# Patient Record
Sex: FEMALE | Race: WHITE | Employment: OTHER | URBAN - METROPOLITAN AREA
[De-identification: names, ages, dates, MRNs, and addresses within clinical notes are randomized per-mention and may not be internally consistent; named-entity substitution may affect disease eponyms.]

---

## 2017-02-14 ENCOUNTER — HOSPITAL ENCOUNTER (EMERGENCY)
Age: 57
Discharge: HOME | End: 2017-02-15
Attending: EMERGENCY MEDICINE

## 2017-02-14 ENCOUNTER — APPOINTMENT (OUTPATIENT)
Dept: GENERAL RADIOLOGY | Age: 57
End: 2017-02-14

## 2017-02-14 DIAGNOSIS — J44.1 COPD EXACERBATION (HCC): Primary | ICD-10-CM

## 2017-02-14 LAB
ABSOLUTE EOS #: 0.1 K/UL (ref 0–0.4)
ABSOLUTE LYMPH #: 1.4 K/UL (ref 1–4.8)
ABSOLUTE MONO #: 0.4 K/UL (ref 0.1–1.2)
ANION GAP SERPL CALCULATED.3IONS-SCNC: 15 MMOL/L (ref 9–17)
BASOPHILS # BLD: 0 % (ref 0–2)
BASOPHILS ABSOLUTE: 0 K/UL (ref 0–0.2)
BUN BLDV-MCNC: 14 MG/DL (ref 6–20)
BUN/CREAT BLD: ABNORMAL (ref 9–20)
CALCIUM SERPL-MCNC: 9.4 MG/DL (ref 8.6–10.4)
CHLORIDE BLD-SCNC: 101 MMOL/L (ref 98–107)
CO2: 24 MMOL/L (ref 20–31)
CREAT SERPL-MCNC: 0.95 MG/DL (ref 0.5–0.9)
DIFFERENTIAL TYPE: ABNORMAL
EOSINOPHILS RELATIVE PERCENT: 1 % (ref 1–4)
GFR AFRICAN AMERICAN: >60 ML/MIN
GFR NON-AFRICAN AMERICAN: >60 ML/MIN
GFR SERPL CREATININE-BSD FRML MDRD: ABNORMAL ML/MIN/{1.73_M2}
GFR SERPL CREATININE-BSD FRML MDRD: ABNORMAL ML/MIN/{1.73_M2}
GLUCOSE BLD-MCNC: 206 MG/DL (ref 70–99)
HCT VFR BLD CALC: 41.1 % (ref 36–46)
HEMOGLOBIN: 13.7 G/DL (ref 12–16)
LYMPHOCYTES # BLD: 18 % (ref 24–44)
MCH RBC QN AUTO: 27.5 PG (ref 26–34)
MCHC RBC AUTO-ENTMCNC: 33.2 G/DL (ref 31–37)
MCV RBC AUTO: 82.8 FL (ref 80–100)
MONOCYTES # BLD: 5 % (ref 2–11)
PDW BLD-RTO: 15.6 % (ref 12.5–15.4)
PLATELET # BLD: 216 K/UL (ref 140–450)
PLATELET ESTIMATE: ABNORMAL
PMV BLD AUTO: 8.9 FL (ref 6–12)
POC TROPONIN I: 0 NG/ML (ref 0–0.1)
POC TROPONIN INTERP: NORMAL
POTASSIUM SERPL-SCNC: 3.4 MMOL/L (ref 3.7–5.3)
RBC # BLD: 4.97 M/UL (ref 4–5.2)
RBC # BLD: ABNORMAL 10*6/UL
SEG NEUTROPHILS: 76 % (ref 36–66)
SEGMENTED NEUTROPHILS ABSOLUTE COUNT: 5.9 K/UL (ref 1.8–7.7)
SODIUM BLD-SCNC: 140 MMOL/L (ref 135–144)
WBC # BLD: 7.8 K/UL (ref 3.5–11)
WBC # BLD: ABNORMAL 10*3/UL

## 2017-02-14 PROCEDURE — 6360000002 HC RX W HCPCS: Performed by: EMERGENCY MEDICINE

## 2017-02-14 PROCEDURE — 85025 COMPLETE CBC W/AUTO DIFF WBC: CPT

## 2017-02-14 PROCEDURE — 6370000000 HC RX 637 (ALT 250 FOR IP): Performed by: EMERGENCY MEDICINE

## 2017-02-14 PROCEDURE — 84484 ASSAY OF TROPONIN QUANT: CPT

## 2017-02-14 PROCEDURE — 99285 EMERGENCY DEPT VISIT HI MDM: CPT

## 2017-02-14 PROCEDURE — 94640 AIRWAY INHALATION TREATMENT: CPT

## 2017-02-14 PROCEDURE — 80048 BASIC METABOLIC PNL TOTAL CA: CPT

## 2017-02-14 PROCEDURE — 93005 ELECTROCARDIOGRAM TRACING: CPT

## 2017-02-14 PROCEDURE — 71020 XR CHEST STANDARD TWO VW: CPT

## 2017-02-14 RX ORDER — SULFAMETHOXAZOLE AND TRIMETHOPRIM 800; 160 MG/1; MG/1
1 TABLET ORAL ONCE
Status: COMPLETED | OUTPATIENT
Start: 2017-02-14 | End: 2017-02-14

## 2017-02-14 RX ORDER — METHYLPREDNISOLONE SODIUM SUCCINATE 125 MG/2ML
125 INJECTION, POWDER, LYOPHILIZED, FOR SOLUTION INTRAMUSCULAR; INTRAVENOUS ONCE
Status: COMPLETED | OUTPATIENT
Start: 2017-02-14 | End: 2017-02-14

## 2017-02-14 RX ORDER — IBUPROFEN 800 MG/1
800 TABLET ORAL ONCE
Status: COMPLETED | OUTPATIENT
Start: 2017-02-14 | End: 2017-02-14

## 2017-02-14 RX ADMIN — METHYLPREDNISOLONE SODIUM SUCCINATE 125 MG: 125 INJECTION, POWDER, FOR SOLUTION INTRAMUSCULAR; INTRAVENOUS at 22:43

## 2017-02-14 RX ADMIN — IPRATROPIUM BROMIDE 0.5 MG: 0.5 SOLUTION RESPIRATORY (INHALATION) at 22:27

## 2017-02-14 RX ADMIN — SULFAMETHOXAZOLE AND TRIMETHOPRIM 1 TABLET: 800; 160 TABLET ORAL at 22:43

## 2017-02-14 RX ADMIN — ALBUTEROL SULFATE 5 MG: 5 SOLUTION RESPIRATORY (INHALATION) at 22:28

## 2017-02-14 RX ADMIN — IBUPROFEN 800 MG: 800 TABLET, FILM COATED ORAL at 22:43

## 2017-02-14 ASSESSMENT — ENCOUNTER SYMPTOMS
NAUSEA: 1
SHORTNESS OF BREATH: 1
DIARRHEA: 0
WHEEZING: 1
CONSTIPATION: 0
COUGH: 1
ABDOMINAL PAIN: 0
CHEST TIGHTNESS: 1
EYE PAIN: 0
VOMITING: 0
RHINORRHEA: 1

## 2017-02-14 ASSESSMENT — PAIN DESCRIPTION - DESCRIPTORS: DESCRIPTORS: ACHING

## 2017-02-14 ASSESSMENT — PAIN DESCRIPTION - LOCATION: LOCATION: CHEST;BACK

## 2017-02-14 ASSESSMENT — PAIN DESCRIPTION - PAIN TYPE: TYPE: ACUTE PAIN

## 2017-02-14 ASSESSMENT — PAIN SCALES - GENERAL
PAINLEVEL_OUTOF10: 6
PAINLEVEL_OUTOF10: 6

## 2017-02-14 ASSESSMENT — PAIN DESCRIPTION - FREQUENCY: FREQUENCY: INTERMITTENT

## 2017-02-14 ASSESSMENT — PAIN DESCRIPTION - ORIENTATION: ORIENTATION: MID;UPPER

## 2017-02-15 VITALS
RESPIRATION RATE: 18 BRPM | TEMPERATURE: 98.1 F | BODY MASS INDEX: 37.67 KG/M2 | SYSTOLIC BLOOD PRESSURE: 177 MMHG | DIASTOLIC BLOOD PRESSURE: 107 MMHG | HEIGHT: 67 IN | OXYGEN SATURATION: 95 % | HEART RATE: 102 BPM | WEIGHT: 240 LBS

## 2017-02-15 LAB
POC TROPONIN I: 0 NG/ML (ref 0–0.1)
POC TROPONIN INTERP: NORMAL

## 2017-02-15 PROCEDURE — 96374 THER/PROPH/DIAG INJ IV PUSH: CPT

## 2017-02-15 PROCEDURE — 84484 ASSAY OF TROPONIN QUANT: CPT

## 2017-02-15 RX ORDER — PREDNISONE 20 MG/1
40 TABLET ORAL DAILY
Qty: 10 TABLET | Refills: 0 | Status: SHIPPED | OUTPATIENT
Start: 2017-02-15 | End: 2017-02-25

## 2017-02-15 RX ORDER — SULFAMETHOXAZOLE AND TRIMETHOPRIM 800; 160 MG/1; MG/1
1 TABLET ORAL 2 TIMES DAILY
Qty: 14 TABLET | Refills: 0 | Status: SHIPPED | OUTPATIENT
Start: 2017-02-15 | End: 2017-02-22

## 2017-02-16 LAB
EKG ATRIAL RATE: 93 BPM
EKG P AXIS: 42 DEGREES
EKG P-R INTERVAL: 148 MS
EKG Q-T INTERVAL: 380 MS
EKG QRS DURATION: 76 MS
EKG QTC CALCULATION (BAZETT): 472 MS
EKG R AXIS: 62 DEGREES
EKG T AXIS: 48 DEGREES
EKG VENTRICULAR RATE: 93 BPM

## 2018-01-09 ENCOUNTER — HOSPITAL ENCOUNTER (EMERGENCY)
Age: 58
Discharge: HOME | End: 2018-01-09
Attending: EMERGENCY MEDICINE

## 2018-01-09 ENCOUNTER — APPOINTMENT (OUTPATIENT)
Dept: GENERAL RADIOLOGY | Age: 58
End: 2018-01-09

## 2018-01-09 VITALS
HEART RATE: 78 BPM | HEIGHT: 68 IN | OXYGEN SATURATION: 99 % | WEIGHT: 245 LBS | BODY MASS INDEX: 37.13 KG/M2 | TEMPERATURE: 97.9 F | RESPIRATION RATE: 18 BRPM | DIASTOLIC BLOOD PRESSURE: 86 MMHG | SYSTOLIC BLOOD PRESSURE: 148 MMHG

## 2018-01-09 DIAGNOSIS — R07.9 CHEST PAIN, UNSPECIFIED TYPE: Primary | ICD-10-CM

## 2018-01-09 DIAGNOSIS — R73.9 HYPERGLYCEMIA: ICD-10-CM

## 2018-01-09 DIAGNOSIS — M62.838 SPASM OF MUSCLE: ICD-10-CM

## 2018-01-09 LAB
ABSOLUTE EOS #: 0.11 K/UL (ref 0–0.44)
ABSOLUTE IMMATURE GRANULOCYTE: 0.03 K/UL (ref 0–0.3)
ABSOLUTE LYMPH #: 1.76 K/UL (ref 1.1–3.7)
ABSOLUTE MONO #: 0.48 K/UL (ref 0.1–1.2)
ANION GAP SERPL CALCULATED.3IONS-SCNC: 14 MMOL/L (ref 9–17)
BASOPHILS # BLD: 1 % (ref 0–2)
BASOPHILS ABSOLUTE: 0.07 K/UL (ref 0–0.2)
BUN BLDV-MCNC: 17 MG/DL (ref 6–20)
BUN/CREAT BLD: ABNORMAL (ref 9–20)
CALCIUM SERPL-MCNC: 9.2 MG/DL (ref 8.6–10.4)
CHLORIDE BLD-SCNC: 100 MMOL/L (ref 98–107)
CO2: 25 MMOL/L (ref 20–31)
CREAT SERPL-MCNC: 1.05 MG/DL (ref 0.5–0.9)
DIFFERENTIAL TYPE: ABNORMAL
EKG ATRIAL RATE: 76 BPM
EKG P AXIS: 28 DEGREES
EKG P-R INTERVAL: 164 MS
EKG Q-T INTERVAL: 408 MS
EKG QRS DURATION: 76 MS
EKG QTC CALCULATION (BAZETT): 459 MS
EKG R AXIS: 57 DEGREES
EKG T AXIS: 67 DEGREES
EKG VENTRICULAR RATE: 76 BPM
EOSINOPHILS RELATIVE PERCENT: 1 % (ref 1–4)
GFR AFRICAN AMERICAN: >60 ML/MIN
GFR NON-AFRICAN AMERICAN: 54 ML/MIN
GFR SERPL CREATININE-BSD FRML MDRD: ABNORMAL ML/MIN/{1.73_M2}
GFR SERPL CREATININE-BSD FRML MDRD: ABNORMAL ML/MIN/{1.73_M2}
GLUCOSE BLD-MCNC: 143 MG/DL (ref 70–99)
HCT VFR BLD CALC: 42.7 % (ref 36.3–47.1)
HEMOGLOBIN: 13.3 G/DL (ref 11.9–15.1)
IMMATURE GRANULOCYTES: 0 %
LYMPHOCYTES # BLD: 20 % (ref 24–43)
MCH RBC QN AUTO: 27.3 PG (ref 25.2–33.5)
MCHC RBC AUTO-ENTMCNC: 31.1 G/DL (ref 28.4–34.8)
MCV RBC AUTO: 87.5 FL (ref 82.6–102.9)
MONOCYTES # BLD: 6 % (ref 3–12)
PDW BLD-RTO: 14 % (ref 11.8–14.4)
PLATELET # BLD: 241 K/UL (ref 138–453)
PLATELET ESTIMATE: ABNORMAL
PMV BLD AUTO: 10.6 FL (ref 8.1–13.5)
POC TROPONIN I: 0.03 NG/ML (ref 0–0.1)
POC TROPONIN I: 0.04 NG/ML (ref 0–0.1)
POC TROPONIN INTERP: NORMAL
POC TROPONIN INTERP: NORMAL
POTASSIUM SERPL-SCNC: 4 MMOL/L (ref 3.7–5.3)
RBC # BLD: 4.88 M/UL (ref 3.95–5.11)
RBC # BLD: ABNORMAL 10*6/UL
SEG NEUTROPHILS: 72 % (ref 36–65)
SEGMENTED NEUTROPHILS ABSOLUTE COUNT: 6.35 K/UL (ref 1.5–8.1)
SODIUM BLD-SCNC: 139 MMOL/L (ref 135–144)
WBC # BLD: 8.8 K/UL (ref 3.5–11.3)
WBC # BLD: ABNORMAL 10*3/UL

## 2018-01-09 PROCEDURE — 84484 ASSAY OF TROPONIN QUANT: CPT

## 2018-01-09 PROCEDURE — 71046 X-RAY EXAM CHEST 2 VIEWS: CPT

## 2018-01-09 PROCEDURE — 96375 TX/PRO/DX INJ NEW DRUG ADDON: CPT

## 2018-01-09 PROCEDURE — 96374 THER/PROPH/DIAG INJ IV PUSH: CPT

## 2018-01-09 PROCEDURE — 6360000002 HC RX W HCPCS: Performed by: EMERGENCY MEDICINE

## 2018-01-09 PROCEDURE — 80048 BASIC METABOLIC PNL TOTAL CA: CPT

## 2018-01-09 PROCEDURE — 93005 ELECTROCARDIOGRAM TRACING: CPT

## 2018-01-09 PROCEDURE — 99285 EMERGENCY DEPT VISIT HI MDM: CPT

## 2018-01-09 PROCEDURE — 94640 AIRWAY INHALATION TREATMENT: CPT

## 2018-01-09 PROCEDURE — 85025 COMPLETE CBC W/AUTO DIFF WBC: CPT

## 2018-01-09 RX ORDER — LORAZEPAM 2 MG/ML
2 INJECTION INTRAMUSCULAR ONCE
Status: COMPLETED | OUTPATIENT
Start: 2018-01-09 | End: 2018-01-09

## 2018-01-09 RX ORDER — CYCLOBENZAPRINE HCL 10 MG
10 TABLET ORAL 3 TIMES DAILY PRN
Qty: 30 TABLET | Refills: 0 | Status: SHIPPED | OUTPATIENT
Start: 2018-01-09 | End: 2018-01-19

## 2018-01-09 RX ORDER — ALBUTEROL SULFATE 2.5 MG/3ML
5 SOLUTION RESPIRATORY (INHALATION)
Status: DISCONTINUED | OUTPATIENT
Start: 2018-01-09 | End: 2018-01-09 | Stop reason: HOSPADM

## 2018-01-09 RX ORDER — DIAZEPAM 5 MG/ML
5 INJECTION, SOLUTION INTRAMUSCULAR; INTRAVENOUS ONCE
Status: DISCONTINUED | OUTPATIENT
Start: 2018-01-09 | End: 2018-01-09

## 2018-01-09 RX ORDER — FENTANYL CITRATE 50 UG/ML
50 INJECTION, SOLUTION INTRAMUSCULAR; INTRAVENOUS ONCE
Status: COMPLETED | OUTPATIENT
Start: 2018-01-09 | End: 2018-01-09

## 2018-01-09 RX ORDER — ALBUTEROL SULFATE 90 UG/1
2 AEROSOL, METERED RESPIRATORY (INHALATION)
Status: DISCONTINUED | OUTPATIENT
Start: 2018-01-09 | End: 2018-01-09

## 2018-01-09 RX ORDER — ACETAMINOPHEN 500 MG
500 TABLET ORAL EVERY 6 HOURS PRN
Qty: 60 TABLET | Refills: 2 | Status: SHIPPED | OUTPATIENT
Start: 2018-01-09

## 2018-01-09 RX ORDER — IBUPROFEN 600 MG/1
600 TABLET ORAL EVERY 6 HOURS PRN
Qty: 60 TABLET | Refills: 2 | Status: SHIPPED | OUTPATIENT
Start: 2018-01-09 | End: 2019-07-19

## 2018-01-09 RX ORDER — IPRATROPIUM BROMIDE AND ALBUTEROL SULFATE 2.5; .5 MG/3ML; MG/3ML
1 SOLUTION RESPIRATORY (INHALATION)
Status: DISCONTINUED | OUTPATIENT
Start: 2018-01-09 | End: 2018-01-09

## 2018-01-09 RX ADMIN — ALBUTEROL SULFATE 5 MG: 5 SOLUTION RESPIRATORY (INHALATION) at 11:47

## 2018-01-09 RX ADMIN — IPRATROPIUM BROMIDE 0.5 MG: 0.5 SOLUTION RESPIRATORY (INHALATION) at 11:47

## 2018-01-09 RX ADMIN — FENTANYL CITRATE 50 MCG: 50 INJECTION, SOLUTION INTRAMUSCULAR; INTRAVENOUS at 11:40

## 2018-01-09 RX ADMIN — LORAZEPAM 2 MG: 2 INJECTION INTRAMUSCULAR; INTRAVENOUS at 13:01

## 2018-01-09 RX ADMIN — ALBUTEROL SULFATE 5 MG: 5 SOLUTION RESPIRATORY (INHALATION) at 12:03

## 2018-01-09 ASSESSMENT — ENCOUNTER SYMPTOMS
COLOR CHANGE: 0
NAUSEA: 0
SHORTNESS OF BREATH: 1
RHINORRHEA: 0
COUGH: 0
VOMITING: 0
ABDOMINAL PAIN: 0
WHEEZING: 0

## 2018-01-09 ASSESSMENT — HEART SCORE: ECG: 1

## 2018-01-09 ASSESSMENT — PAIN SCALES - GENERAL
PAINLEVEL_OUTOF10: 7
PAINLEVEL_OUTOF10: 7

## 2018-03-16 ENCOUNTER — HOSPITAL ENCOUNTER (EMERGENCY)
Age: 58
Discharge: HOME | End: 2018-03-16
Attending: EMERGENCY MEDICINE

## 2018-03-16 ENCOUNTER — APPOINTMENT (OUTPATIENT)
Dept: GENERAL RADIOLOGY | Age: 58
End: 2018-03-16

## 2018-03-16 VITALS
HEART RATE: 93 BPM | DIASTOLIC BLOOD PRESSURE: 69 MMHG | WEIGHT: 245 LBS | OXYGEN SATURATION: 94 % | TEMPERATURE: 98.1 F | BODY MASS INDEX: 37.25 KG/M2 | RESPIRATION RATE: 25 BRPM | SYSTOLIC BLOOD PRESSURE: 156 MMHG

## 2018-03-16 DIAGNOSIS — F17.200 SMOKER: ICD-10-CM

## 2018-03-16 DIAGNOSIS — J45.909 UNCOMPLICATED ASTHMA, UNSPECIFIED ASTHMA SEVERITY, UNSPECIFIED WHETHER PERSISTENT: Primary | ICD-10-CM

## 2018-03-16 DIAGNOSIS — I10 HYPERTENSION, UNSPECIFIED TYPE: ICD-10-CM

## 2018-03-16 LAB
ABSOLUTE EOS #: 0.07 K/UL (ref 0–0.44)
ABSOLUTE IMMATURE GRANULOCYTE: 0.04 K/UL (ref 0–0.3)
ABSOLUTE LYMPH #: 2.13 K/UL (ref 1.1–3.7)
ABSOLUTE MONO #: 0.5 K/UL (ref 0.1–1.2)
ANION GAP SERPL CALCULATED.3IONS-SCNC: 13 MMOL/L (ref 9–17)
BASOPHILS # BLD: 1 % (ref 0–2)
BASOPHILS ABSOLUTE: 0.05 K/UL (ref 0–0.2)
BUN BLDV-MCNC: 17 MG/DL (ref 6–20)
BUN/CREAT BLD: ABNORMAL (ref 9–20)
CALCIUM SERPL-MCNC: 9.4 MG/DL (ref 8.6–10.4)
CHLORIDE BLD-SCNC: 101 MMOL/L (ref 98–107)
CO2: 25 MMOL/L (ref 20–31)
CREAT SERPL-MCNC: 0.91 MG/DL (ref 0.5–0.9)
DIFFERENTIAL TYPE: ABNORMAL
EKG ATRIAL RATE: 72 BPM
EKG P AXIS: 27 DEGREES
EKG P-R INTERVAL: 168 MS
EKG Q-T INTERVAL: 410 MS
EKG QRS DURATION: 80 MS
EKG QTC CALCULATION (BAZETT): 448 MS
EKG R AXIS: 52 DEGREES
EKG T AXIS: 21 DEGREES
EKG VENTRICULAR RATE: 72 BPM
EOSINOPHILS RELATIVE PERCENT: 1 % (ref 1–4)
GFR AFRICAN AMERICAN: >60 ML/MIN
GFR NON-AFRICAN AMERICAN: >60 ML/MIN
GFR SERPL CREATININE-BSD FRML MDRD: ABNORMAL ML/MIN/{1.73_M2}
GFR SERPL CREATININE-BSD FRML MDRD: ABNORMAL ML/MIN/{1.73_M2}
GLUCOSE BLD-MCNC: 135 MG/DL (ref 70–99)
HCT VFR BLD CALC: 39.2 % (ref 36.3–47.1)
HEMOGLOBIN: 12.6 G/DL (ref 11.9–15.1)
IMMATURE GRANULOCYTES: 1 %
LYMPHOCYTES # BLD: 26 % (ref 24–43)
MCH RBC QN AUTO: 27.3 PG (ref 25.2–33.5)
MCHC RBC AUTO-ENTMCNC: 32.1 G/DL (ref 28.4–34.8)
MCV RBC AUTO: 84.8 FL (ref 82.6–102.9)
MONOCYTES # BLD: 6 % (ref 3–12)
NRBC AUTOMATED: 0 PER 100 WBC
PDW BLD-RTO: 13.8 % (ref 11.8–14.4)
PLATELET # BLD: 236 K/UL (ref 138–453)
PLATELET ESTIMATE: ABNORMAL
PMV BLD AUTO: 12 FL (ref 8.1–13.5)
POC TROPONIN I: 0 NG/ML (ref 0–0.1)
POC TROPONIN I: 0 NG/ML (ref 0–0.1)
POC TROPONIN INTERP: NORMAL
POC TROPONIN INTERP: NORMAL
POTASSIUM SERPL-SCNC: 4 MMOL/L (ref 3.7–5.3)
RBC # BLD: 4.62 M/UL (ref 3.95–5.11)
RBC # BLD: ABNORMAL 10*6/UL
SEG NEUTROPHILS: 65 % (ref 36–65)
SEGMENTED NEUTROPHILS ABSOLUTE COUNT: 5.27 K/UL (ref 1.5–8.1)
SODIUM BLD-SCNC: 139 MMOL/L (ref 135–144)
WBC # BLD: 8.1 K/UL (ref 3.5–11.3)
WBC # BLD: ABNORMAL 10*3/UL

## 2018-03-16 PROCEDURE — 94640 AIRWAY INHALATION TREATMENT: CPT

## 2018-03-16 PROCEDURE — 71046 X-RAY EXAM CHEST 2 VIEWS: CPT

## 2018-03-16 PROCEDURE — 96374 THER/PROPH/DIAG INJ IV PUSH: CPT

## 2018-03-16 PROCEDURE — 6360000002 HC RX W HCPCS: Performed by: EMERGENCY MEDICINE

## 2018-03-16 PROCEDURE — 80048 BASIC METABOLIC PNL TOTAL CA: CPT

## 2018-03-16 PROCEDURE — 93005 ELECTROCARDIOGRAM TRACING: CPT

## 2018-03-16 PROCEDURE — 84484 ASSAY OF TROPONIN QUANT: CPT

## 2018-03-16 PROCEDURE — 94664 DEMO&/EVAL PT USE INHALER: CPT

## 2018-03-16 PROCEDURE — 85025 COMPLETE CBC W/AUTO DIFF WBC: CPT

## 2018-03-16 PROCEDURE — 6370000000 HC RX 637 (ALT 250 FOR IP): Performed by: EMERGENCY MEDICINE

## 2018-03-16 PROCEDURE — G0384 LEV 5 HOSP TYPE B ED VISIT: HCPCS

## 2018-03-16 RX ORDER — ALBUTEROL SULFATE 2.5 MG/3ML
5 SOLUTION RESPIRATORY (INHALATION)
Status: DISCONTINUED | OUTPATIENT
Start: 2018-03-16 | End: 2018-03-16 | Stop reason: HOSPADM

## 2018-03-16 RX ORDER — ALBUTEROL SULFATE 90 UG/1
2 AEROSOL, METERED RESPIRATORY (INHALATION)
Status: DISCONTINUED | OUTPATIENT
Start: 2018-03-16 | End: 2018-03-16 | Stop reason: HOSPADM

## 2018-03-16 RX ORDER — BENZONATATE 100 MG/1
100 CAPSULE ORAL ONCE
Status: COMPLETED | OUTPATIENT
Start: 2018-03-16 | End: 2018-03-16

## 2018-03-16 RX ORDER — DEXAMETHASONE SODIUM PHOSPHATE 10 MG/ML
10 INJECTION INTRAMUSCULAR; INTRAVENOUS ONCE
Status: COMPLETED | OUTPATIENT
Start: 2018-03-16 | End: 2018-03-16

## 2018-03-16 RX ORDER — IPRATROPIUM BROMIDE AND ALBUTEROL SULFATE 2.5; .5 MG/3ML; MG/3ML
1 SOLUTION RESPIRATORY (INHALATION)
Status: DISCONTINUED | OUTPATIENT
Start: 2018-03-16 | End: 2018-03-16 | Stop reason: HOSPADM

## 2018-03-16 RX ORDER — BENZONATATE 100 MG/1
100 CAPSULE ORAL 3 TIMES DAILY PRN
Qty: 30 CAPSULE | Refills: 0 | Status: SHIPPED | OUTPATIENT
Start: 2018-03-16 | End: 2018-03-23

## 2018-03-16 RX ADMIN — DEXAMETHASONE SODIUM PHOSPHATE 10 MG: 10 INJECTION INTRAMUSCULAR; INTRAVENOUS at 18:50

## 2018-03-16 RX ADMIN — ALBUTEROL SULFATE 5 MG: 5 SOLUTION RESPIRATORY (INHALATION) at 20:30

## 2018-03-16 RX ADMIN — ALBUTEROL SULFATE 5 MG: 5 SOLUTION RESPIRATORY (INHALATION) at 18:55

## 2018-03-16 RX ADMIN — BENZONATATE 100 MG: 100 CAPSULE ORAL at 18:49

## 2018-03-16 RX ADMIN — IPRATROPIUM BROMIDE 0.5 MG: 0.5 SOLUTION RESPIRATORY (INHALATION) at 20:30

## 2018-03-16 RX ADMIN — IPRATROPIUM BROMIDE 0.5 MG: 0.5 SOLUTION RESPIRATORY (INHALATION) at 18:55

## 2018-03-16 NOTE — ED NOTES
Patient updated, denies complaints  Will continue to monitor  Call light within reach         Zhen Martínez RN  03/16/18 [id number]

## 2018-03-16 NOTE — ED PROVIDER NOTES
[facility]); and PAD (peripheral artery disease) ([facility] [address] has a past surgical history that includes Coronary angioplasty with stent; Tonsillectomy; and Inner ear surgery (Right). Social History     Social History    Marital status:      Spouse name: N/A    Number of children: N/A    Years of education: N/A     Occupational History    Not on file. Social History Main Topics    Smoking status: Current Every Day Smoker     Packs/day: 0.50     Types: Cigarettes    Smokeless tobacco: Current User    Alcohol use No    Drug use: Yes     Types: Marijuana    Sexual activity: No     Other Topics Concern    Not on file     Social History Narrative    No narrative on file       History reviewed. No pertinent family history. Allergies:  Codeine and Morphine    Home Medications:  Prior to Admission medications    Medication Sig Start Date End Date Taking? Authorizing Provider   albuterol (PROVENTIL) (5 MG/ML) 0.5% nebulizer solution Take 0.5 mLs by nebulization every 6 hours as needed for Wheezing 3/16/18  Yes Jemal Dominguez MD   tiotropium (SPIRIVA) 18 MCG inhalation capsule Inhale 1 capsule into the lungs daily Pt told that she was using this medication at home. 3/16/18  Yes Jemal Dominguez MD   albuterol-ipratropium (COMBIVENT RESPIMAT)  MCG/ACT AERS inhaler Inhale 1 puff into the lungs every 6 hours 3/16/18  Yes Jemal Dominguez MD   benzonatate (TESSALON PERLES) 100 MG capsule Take 1 capsule by mouth 3 times daily as needed for Cough 3/16/18 3/23/18 Yes Jemal Dominguez MD   ibuprofen (ADVIL;MOTRIN) 600 MG tablet Take 1 tablet by mouth every 6 hours as needed for Pain 1/9/18   Robert Sharpe MD   acetaminophen (TYLENOL) 500 MG tablet Take 1 tablet by mouth every 6 hours as needed for Pain 1/9/18   Robert Sharpe MD   aspirin 325 MG tablet Take 325 mg by mouth daily. Historical Provider, MD   lisinopril (PRINIVIL;ZESTRIL) 40 MG tablet Take 40 mg by mouth daily.     Historical Provider, MD Initiate ED Aerosol Protocol    POCT troponin    POCT troponin    POCT troponin    EKG 12 Lead    Insert peripheral IV       MEDICATIONS ORDERED:  Orders Placed This Encounter   Medications    dexamethasone (DECADRON) injection 10 mg    benzonatate (TESSALON) capsule 100 mg    DISCONTD: albuterol (PROVENTIL) nebulizer solution 5 mg    DISCONTD: ipratropium-albuterol (DUONEB) nebulizer solution 1 ampule    DISCONTD: albuterol (PROVENTIL) nebulizer solution 5 mg    DISCONTD: albuterol sulfate  (90 Base) MCG/ACT inhaler 2 puff    DISCONTD: albuterol sulfate  (90 Base) MCG/ACT inhaler 2 puff    DISCONTD: ipratropium (ATROVENT HFA) 17 MCG/ACT inhaler 2 puff    DISCONTD: ipratropium (ATROVENT) 0.02 % nebulizer solution 0.5 mg    albuterol (PROVENTIL) (5 MG/ML) 0.5% nebulizer solution     Sig: Take 0.5 mLs by nebulization every 6 hours as needed for Wheezing     Dispense:  120 each     Refill:  0    tiotropium (SPIRIVA) 18 MCG inhalation capsule     Sig: Inhale 1 capsule into the lungs daily Pt told that she was using this medication at home.      Dispense:  1 capsule     Refill:  0    albuterol-ipratropium (COMBIVENT RESPIMAT)  MCG/ACT AERS inhaler     Sig: Inhale 1 puff into the lungs every 6 hours     Dispense:  1 Inhaler     Refill:  0    benzonatate (TESSALON PERLES) 100 MG capsule     Sig: Take 1 capsule by mouth 3 times daily as needed for Cough     Dispense:  30 capsule     Refill:  0       DDX: asthma,COPD exacerbation,  Pneumothorax, anaphylaxis, anxiety, PE, pericardial effusion, CHF, ACS/MI, atelectasis, lower airway obstruction, aspiration,    DIAGNOSTIC RESULTS / EMERGENCY DEPARTMENT COURSE / MDM     LABS:  Results for orders placed or performed during the hospital encounter of 03/16/18   CBC Auto Differential   Result Value Ref Range    WBC 8.1 3.5 - 11.3 k/uL    RBC 4.62 3.95 - 5.11 m/uL    Hemoglobin 12.6 11.9 - 15.1 g/dL    Hematocrit 39.2 36.3 - 47.1 %    MCV 84.8 given that she has numerous cardiac work ups although I feel that most of her symptoms are related to an asthma exacerbation secondary to her being out of her medications. Will do two sets of troponins and reassess    RADIOLOGY:  Xr Chest Standard (2 Vw)    Result Date: 3/16/2018  EXAMINATION: TWO VIEWS OF THE CHEST 3/16/2018 7:30 pm COMPARISON: Two-view chest 01/09/2018 HISTORY: ORDERING SYSTEM PROVIDED HISTORY: chest pain TECHNOLOGIST PROVIDED HISTORY: Reason for exam:->chest pain Acute chest Pain FINDINGS: Cardiac silhouette appears within normal limits for size. Mediastinum appears unremarkable. The hilar silhouettes appear unremarkable. New hazy alveolar density has developed right infrahilar lung and medial left lung base. No pleural effusion is evident. No pneumothorax is seen. Visualized osseous structures appear unremarkable. Wires and cardiac leads overlying the chest could obscure an underlying finding. Indeterminate new alveolar densities right infrahilar region and medial left lung base can be seen with pneumonitis or atelectasis. Radiographic surveillance to ensure clearing is recommended. EKG  EKG Interpretation    Interpreted by emergency department physician    Rhythm: normal sinus   Rate: normal  Axis: normal  Ectopy: none  Conduction: normal  ST Segments: no acute change  T Waves: no acute change  Q Waves: none    Clinical Impression: no acute changes and non-specific EKG    Jemal Space      All EKG's are interpreted by the Emergency Department Physician who either signs or Co-signs this chart in the absence of a cardiologist.    EMERGENCY DEPARTMENT COURSE:  Steroids given, respiratory evaluated, pulse ox placed and verified    Pre-hypertension/Hypertension: You have been informed that you may have pre-hypertension or Hypertension based on a blood pressure reading in the emergency department.  I recommend you call the primary care provider listed on your discharge instructions or

## 2018-03-16 NOTE — ED NOTES
Pt. Resting on stretcher, eyes open, RR even and labored per pt. Arrival.    Updated on POC, will continue to monitor.        Miguel Rae RN  03/16/18 [id number]

## 2018-03-16 NOTE — ED PROVIDER NOTES
[address] [facility]     Emergency Department     Faculty Attestation    I performed a history and physical examination of the patient and discussed management with the resident. I have reviewed and agree with the residents findings including all diagnostic interpretations, and treatment plans as written. Any areas of disagreement are noted on the chart. I was personally present for the key portions of any procedures. I have documented in the chart those procedures where I was not present during the key portions. I have reviewed the emergency nurses triage note. I agree with the chief complaint, past medical history, past surgical history, allergies, medications, social and family history as documented unless otherwise noted below. Documentation of the HPI, Physical Exam and Medical Decision Making performed by scribcristiano is based on my personal performance of the HPI, PE and MDM. For Physician Assistant/ Nurse Practitioner cases/documentation I have personally evaluated this patient and have completed at least one if not all key elements of the E/M (history, physical exam, and MDM). Additional findings are as noted. 61-year-old female complaining of asthma exacerbation ran out of her home medication. No fever, no chest pain. Patient does smoke, history of heart disease  PE hypertensive, speaking in complete sentences, no acute distress, abdomen obese nontender, no peritoneal finding. Plan lab xr neb  -pt s/s improved eval stable, scripts given,       Pre-hypertension/Hypertension: The patient has been informed that they may have pre-hypertension or Hypertension based on a blood pressure reading in the emergency department. I recommend that the patient call the primary care provider listed on their discharge instructions or a physician of their choice this week to arrange follow up for further evaluation of possible pre-hypertension or Hypertension.       EKG Interpretation    Interpreted by me, normal sinus rhythm, heart rate is 72, normal axis, no ST elevation, no ectopy. QT corrected is 448      CRITICAL CARE: There was a high probability of clinically significant/life threatening deterioration in this patient's condition which required my urgent intervention. Total critical care time was  minutes. This excludes any time for separately reportable procedures.        [facility], DO  03/16/18 [id number] [address], DO  03/16/18 2128       Selena Lewisold,   03/17/18 1717

## 2018-03-17 NOTE — ED NOTES
Pt. Resting on stretcher, eyes open, RR even and non-labored. Updated on POC, will continue to monitor.        More Da Silva RN  03/16/18 2053

## 2019-02-10 ENCOUNTER — APPOINTMENT (OUTPATIENT)
Dept: GENERAL RADIOLOGY | Age: 59
DRG: 246 | End: 2019-02-10

## 2019-02-10 ENCOUNTER — HOSPITAL ENCOUNTER (INPATIENT)
Age: 59
LOS: 2 days | Discharge: HOME | DRG: 246 | End: 2019-02-12
Attending: EMERGENCY MEDICINE | Admitting: INTERNAL MEDICINE

## 2019-02-10 DIAGNOSIS — I24.9 ACS (ACUTE CORONARY SYNDROME) (HCC): ICD-10-CM

## 2019-02-10 DIAGNOSIS — R77.8 ELEVATED TROPONIN: ICD-10-CM

## 2019-02-10 DIAGNOSIS — E11.8 TYPE 2 DIABETES MELLITUS WITH COMPLICATION, WITHOUT LONG-TERM CURRENT USE OF INSULIN (HCC): Primary | ICD-10-CM

## 2019-02-10 DIAGNOSIS — I20.8 STABLE ANGINA PECTORIS (HCC): ICD-10-CM

## 2019-02-10 PROBLEM — I10 HYPERTENSION: Status: ACTIVE | Noted: 2019-02-10

## 2019-02-10 LAB
ABSOLUTE EOS #: 0.32 K/UL (ref 0–0.44)
ABSOLUTE IMMATURE GRANULOCYTE: 0.04 K/UL (ref 0–0.3)
ABSOLUTE LYMPH #: 1.35 K/UL (ref 1.1–3.7)
ABSOLUTE MONO #: 0.47 K/UL (ref 0.1–1.2)
ANION GAP SERPL CALCULATED.3IONS-SCNC: 14 MMOL/L (ref 9–17)
BASOPHILS # BLD: 1 % (ref 0–2)
BASOPHILS ABSOLUTE: 0.06 K/UL (ref 0–0.2)
BNP INTERPRETATION: ABNORMAL
BUN BLDV-MCNC: 18 MG/DL (ref 6–20)
BUN/CREAT BLD: ABNORMAL (ref 9–20)
CALCIUM SERPL-MCNC: 9.4 MG/DL (ref 8.6–10.4)
CHLORIDE BLD-SCNC: 106 MMOL/L (ref 98–107)
CHOLESTEROL/HDL RATIO: 4.4
CHOLESTEROL: 161 MG/DL
CO2: 21 MMOL/L (ref 20–31)
CREAT SERPL-MCNC: 1.03 MG/DL (ref 0.5–0.9)
CREATININE URINE: 145 MG/DL (ref 28–217)
DIFFERENTIAL TYPE: ABNORMAL
EOSINOPHILS RELATIVE PERCENT: 4 % (ref 1–4)
GFR AFRICAN AMERICAN: >60 ML/MIN
GFR NON-AFRICAN AMERICAN: 55 ML/MIN
GFR SERPL CREATININE-BSD FRML MDRD: ABNORMAL ML/MIN/{1.73_M2}
GFR SERPL CREATININE-BSD FRML MDRD: ABNORMAL ML/MIN/{1.73_M2}
GLUCOSE BLD-MCNC: 188 MG/DL (ref 65–105)
GLUCOSE BLD-MCNC: 224 MG/DL (ref 70–99)
GLUCOSE BLD-MCNC: 273 MG/DL (ref 65–105)
HCT VFR BLD CALC: 39.6 % (ref 36.3–47.1)
HDLC SERPL-MCNC: 37 MG/DL
HEMOGLOBIN: 12.4 G/DL (ref 11.9–15.1)
IMMATURE GRANULOCYTES: 1 %
LDL CHOLESTEROL: 96 MG/DL (ref 0–130)
LYMPHOCYTES # BLD: 17 % (ref 24–43)
MCH RBC QN AUTO: 27.4 PG (ref 25.2–33.5)
MCHC RBC AUTO-ENTMCNC: 31.3 G/DL (ref 28.4–34.8)
MCV RBC AUTO: 87.4 FL (ref 82.6–102.9)
MONOCYTES # BLD: 6 % (ref 3–12)
NRBC AUTOMATED: 0 PER 100 WBC
PARTIAL THROMBOPLASTIN TIME: 23.3 SEC (ref 20.5–30.5)
PARTIAL THROMBOPLASTIN TIME: 28.6 SEC (ref 20.5–30.5)
PARTIAL THROMBOPLASTIN TIME: 33 SEC (ref 20.5–30.5)
PDW BLD-RTO: 14 % (ref 11.8–14.4)
PLATELET # BLD: 190 K/UL (ref 138–453)
PLATELET ESTIMATE: ABNORMAL
PMV BLD AUTO: 11 FL (ref 8.1–13.5)
POTASSIUM SERPL-SCNC: 4.3 MMOL/L (ref 3.7–5.3)
PRO-BNP: 523 PG/ML
RBC # BLD: 4.53 M/UL (ref 3.95–5.11)
RBC # BLD: ABNORMAL 10*6/UL
SEG NEUTROPHILS: 71 % (ref 36–65)
SEGMENTED NEUTROPHILS ABSOLUTE COUNT: 5.69 K/UL (ref 1.5–8.1)
SODIUM BLD-SCNC: 141 MMOL/L (ref 135–144)
SODIUM,UR: 209 MMOL/L
TOTAL PROTEIN, URINE: 18 MG/DL
TRIGL SERPL-MCNC: 139 MG/DL
TROPONIN INTERP: ABNORMAL
TROPONIN INTERP: NORMAL
TROPONIN T: ABNORMAL NG/ML
TROPONIN T: NORMAL NG/ML
TROPONIN, HIGH SENSITIVITY: 11 NG/L (ref 0–14)
TROPONIN, HIGH SENSITIVITY: 16 NG/L (ref 0–14)
TROPONIN, HIGH SENSITIVITY: 172 NG/L (ref 0–14)
TROPONIN, HIGH SENSITIVITY: 225 NG/L (ref 0–14)
TSH SERPL DL<=0.05 MIU/L-ACNC: 1.07 MIU/L (ref 0.3–5)
VLDLC SERPL CALC-MCNC: ABNORMAL MG/DL (ref 1–30)
WBC # BLD: 7.9 K/UL (ref 3.5–11.3)
WBC # BLD: ABNORMAL 10*3/UL

## 2019-02-10 PROCEDURE — 82570 ASSAY OF URINE CREATININE: CPT

## 2019-02-10 PROCEDURE — 84156 ASSAY OF PROTEIN URINE: CPT

## 2019-02-10 PROCEDURE — 84300 ASSAY OF URINE SODIUM: CPT

## 2019-02-10 PROCEDURE — 83880 ASSAY OF NATRIURETIC PEPTIDE: CPT

## 2019-02-10 PROCEDURE — 6360000002 HC RX W HCPCS: Performed by: EMERGENCY MEDICINE

## 2019-02-10 PROCEDURE — 85730 THROMBOPLASTIN TIME PARTIAL: CPT

## 2019-02-10 PROCEDURE — 93005 ELECTROCARDIOGRAM TRACING: CPT

## 2019-02-10 PROCEDURE — 36415 COLL VENOUS BLD VENIPUNCTURE: CPT

## 2019-02-10 PROCEDURE — 2580000003 HC RX 258: Performed by: STUDENT IN AN ORGANIZED HEALTH CARE EDUCATION/TRAINING PROGRAM

## 2019-02-10 PROCEDURE — 85025 COMPLETE CBC W/AUTO DIFF WBC: CPT

## 2019-02-10 PROCEDURE — 80061 LIPID PANEL: CPT

## 2019-02-10 PROCEDURE — 82947 ASSAY GLUCOSE BLOOD QUANT: CPT

## 2019-02-10 PROCEDURE — 84484 ASSAY OF TROPONIN QUANT: CPT

## 2019-02-10 PROCEDURE — 84443 ASSAY THYROID STIM HORMONE: CPT

## 2019-02-10 PROCEDURE — 6370000000 HC RX 637 (ALT 250 FOR IP): Performed by: STUDENT IN AN ORGANIZED HEALTH CARE EDUCATION/TRAINING PROGRAM

## 2019-02-10 PROCEDURE — 83036 HEMOGLOBIN GLYCOSYLATED A1C: CPT

## 2019-02-10 PROCEDURE — 99285 EMERGENCY DEPT VISIT HI MDM: CPT

## 2019-02-10 PROCEDURE — 2580000003 HC RX 258: Performed by: EMERGENCY MEDICINE

## 2019-02-10 PROCEDURE — 80048 BASIC METABOLIC PNL TOTAL CA: CPT

## 2019-02-10 PROCEDURE — 2060000002 HC BURN ICU INTERMEDIATE R&B

## 2019-02-10 PROCEDURE — 71045 X-RAY EXAM CHEST 1 VIEW: CPT

## 2019-02-10 RX ORDER — ONDANSETRON 2 MG/ML
4 INJECTION INTRAMUSCULAR; INTRAVENOUS ONCE
Status: COMPLETED | OUTPATIENT
Start: 2019-02-10 | End: 2019-02-10

## 2019-02-10 RX ORDER — ALBUTEROL SULFATE 90 UG/1
2 AEROSOL, METERED RESPIRATORY (INHALATION) 4 TIMES DAILY
Status: DISCONTINUED | OUTPATIENT
Start: 2019-02-10 | End: 2019-02-11

## 2019-02-10 RX ORDER — HEPARIN SODIUM 1000 [USP'U]/ML
4000 INJECTION, SOLUTION INTRAVENOUS; SUBCUTANEOUS PRN
Status: DISCONTINUED | OUTPATIENT
Start: 2019-02-10 | End: 2019-02-12

## 2019-02-10 RX ORDER — NITROGLYCERIN 0.4 MG/1
0.4 TABLET SUBLINGUAL EVERY 5 MIN PRN
Status: DISCONTINUED | OUTPATIENT
Start: 2019-02-10 | End: 2019-02-12 | Stop reason: HOSPADM

## 2019-02-10 RX ORDER — SODIUM CHLORIDE 0.9 % (FLUSH) 0.9 %
10 SYRINGE (ML) INJECTION EVERY 12 HOURS SCHEDULED
Status: DISCONTINUED | OUTPATIENT
Start: 2019-02-10 | End: 2019-02-12 | Stop reason: HOSPADM

## 2019-02-10 RX ORDER — ONDANSETRON 2 MG/ML
4 INJECTION INTRAMUSCULAR; INTRAVENOUS EVERY 6 HOURS PRN
Status: DISCONTINUED | OUTPATIENT
Start: 2019-02-10 | End: 2019-02-12 | Stop reason: HOSPADM

## 2019-02-10 RX ORDER — SODIUM CHLORIDE 0.9 % (FLUSH) 0.9 %
10 SYRINGE (ML) INJECTION PRN
Status: DISCONTINUED | OUTPATIENT
Start: 2019-02-10 | End: 2019-02-12 | Stop reason: HOSPADM

## 2019-02-10 RX ORDER — ASPIRIN 81 MG/1
81 TABLET, CHEWABLE ORAL DAILY
Status: DISCONTINUED | OUTPATIENT
Start: 2019-02-10 | End: 2019-02-12 | Stop reason: HOSPADM

## 2019-02-10 RX ORDER — ATORVASTATIN CALCIUM 40 MG/1
40 TABLET, FILM COATED ORAL NIGHTLY
Status: DISCONTINUED | OUTPATIENT
Start: 2019-02-10 | End: 2019-02-12 | Stop reason: HOSPADM

## 2019-02-10 RX ORDER — LISINOPRIL 20 MG/1
40 TABLET ORAL DAILY
Status: DISCONTINUED | OUTPATIENT
Start: 2019-02-10 | End: 2019-02-12 | Stop reason: HOSPADM

## 2019-02-10 RX ORDER — DIAZEPAM 5 MG/ML
5 INJECTION, SOLUTION INTRAMUSCULAR; INTRAVENOUS ONCE
Status: DISCONTINUED | OUTPATIENT
Start: 2019-02-10 | End: 2019-02-10

## 2019-02-10 RX ORDER — FENTANYL CITRATE 50 UG/ML
25 INJECTION, SOLUTION INTRAMUSCULAR; INTRAVENOUS ONCE
Status: COMPLETED | OUTPATIENT
Start: 2019-02-10 | End: 2019-02-10

## 2019-02-10 RX ORDER — NITROGLYCERIN 20 MG/100ML
5 INJECTION INTRAVENOUS CONTINUOUS
Status: DISCONTINUED | OUTPATIENT
Start: 2019-02-10 | End: 2019-02-10

## 2019-02-10 RX ORDER — HEPARIN SODIUM 10000 [USP'U]/100ML
1000 INJECTION, SOLUTION INTRAVENOUS CONTINUOUS
Status: DISCONTINUED | OUTPATIENT
Start: 2019-02-10 | End: 2019-02-10

## 2019-02-10 RX ORDER — HEPARIN SODIUM 10000 [USP'U]/100ML
1000 INJECTION, SOLUTION INTRAVENOUS CONTINUOUS
Status: DISCONTINUED | OUTPATIENT
Start: 2019-02-10 | End: 2019-02-11

## 2019-02-10 RX ORDER — LORAZEPAM 2 MG/ML
1 INJECTION INTRAMUSCULAR ONCE
Status: COMPLETED | OUTPATIENT
Start: 2019-02-10 | End: 2019-02-10

## 2019-02-10 RX ORDER — HEPARIN SODIUM 1000 [USP'U]/ML
2000 INJECTION, SOLUTION INTRAVENOUS; SUBCUTANEOUS PRN
Status: DISCONTINUED | OUTPATIENT
Start: 2019-02-10 | End: 2019-02-12

## 2019-02-10 RX ORDER — 0.9 % SODIUM CHLORIDE 0.9 %
1000 INTRAVENOUS SOLUTION INTRAVENOUS ONCE
Status: COMPLETED | OUTPATIENT
Start: 2019-02-10 | End: 2019-02-10

## 2019-02-10 RX ORDER — HEPARIN SODIUM 1000 [USP'U]/ML
4000 INJECTION, SOLUTION INTRAVENOUS; SUBCUTANEOUS ONCE
Status: COMPLETED | OUTPATIENT
Start: 2019-02-10 | End: 2019-02-10

## 2019-02-10 RX ORDER — ACETAMINOPHEN 325 MG/1
650 TABLET ORAL EVERY 4 HOURS PRN
Status: DISCONTINUED | OUTPATIENT
Start: 2019-02-10 | End: 2019-02-12 | Stop reason: HOSPADM

## 2019-02-10 RX ADMIN — HEPARIN SODIUM AND DEXTROSE 13 UNITS/KG/HR: 10000; 5 INJECTION INTRAVENOUS at 17:35

## 2019-02-10 RX ADMIN — LORAZEPAM 1 MG: 2 INJECTION INTRAMUSCULAR; INTRAVENOUS at 11:22

## 2019-02-10 RX ADMIN — FENTANYL CITRATE 25 MCG: 50 INJECTION, SOLUTION INTRAMUSCULAR; INTRAVENOUS at 09:05

## 2019-02-10 RX ADMIN — HEPARIN SODIUM AND DEXTROSE 10 ML/HR: 10000; 5 INJECTION INTRAVENOUS at 11:38

## 2019-02-10 RX ADMIN — LISINOPRIL 40 MG: 20 TABLET ORAL at 20:37

## 2019-02-10 RX ADMIN — Medication 10 ML: at 20:32

## 2019-02-10 RX ADMIN — DESMOPRESSIN ACETATE 40 MG: 0.2 TABLET ORAL at 20:40

## 2019-02-10 RX ADMIN — HEPARIN SODIUM 2000 UNITS: 1000 INJECTION, SOLUTION INTRAVENOUS; SUBCUTANEOUS at 23:48

## 2019-02-10 RX ADMIN — SODIUM CHLORIDE 1000 ML: 9 INJECTION, SOLUTION INTRAVENOUS at 11:42

## 2019-02-10 RX ADMIN — HEPARIN SODIUM 4000 UNITS: 1000 INJECTION, SOLUTION INTRAVENOUS; SUBCUTANEOUS at 11:37

## 2019-02-10 RX ADMIN — HEPARIN SODIUM 4000 UNITS: 1000 INJECTION, SOLUTION INTRAVENOUS; SUBCUTANEOUS at 17:39

## 2019-02-10 RX ADMIN — ONDANSETRON 4 MG: 2 INJECTION INTRAMUSCULAR; INTRAVENOUS at 09:06

## 2019-02-10 ASSESSMENT — PAIN SCALES - GENERAL
PAINLEVEL_OUTOF10: 0
PAINLEVEL_OUTOF10: 8
PAINLEVEL_OUTOF10: 4
PAINLEVEL_OUTOF10: 7

## 2019-02-10 ASSESSMENT — PAIN DESCRIPTION - PROGRESSION: CLINICAL_PROGRESSION: GRADUALLY IMPROVING

## 2019-02-10 ASSESSMENT — PAIN DESCRIPTION - FREQUENCY
FREQUENCY: INTERMITTENT
FREQUENCY: CONTINUOUS
FREQUENCY: CONTINUOUS

## 2019-02-10 ASSESSMENT — PAIN DESCRIPTION - ORIENTATION
ORIENTATION: LEFT
ORIENTATION: LEFT
ORIENTATION: MID;LEFT

## 2019-02-10 ASSESSMENT — PAIN DESCRIPTION - PAIN TYPE
TYPE: ACUTE PAIN
TYPE: ACUTE PAIN

## 2019-02-10 ASSESSMENT — PAIN DESCRIPTION - DESCRIPTORS
DESCRIPTORS: SHARP;SHOOTING;STABBING
DESCRIPTORS: DULL
DESCRIPTORS: SHARP;SHOOTING;STABBING

## 2019-02-10 ASSESSMENT — PAIN DESCRIPTION - LOCATION
LOCATION: ARM
LOCATION: ARM
LOCATION: CHEST

## 2019-02-10 ASSESSMENT — HEART SCORE: ECG: 1

## 2019-02-11 ENCOUNTER — APPOINTMENT (OUTPATIENT)
Dept: CARDIAC CATH/INVASIVE PROCEDURES | Age: 59
DRG: 246 | End: 2019-02-11

## 2019-02-11 PROBLEM — Z95.5 S/P DRUG ELUTING CORONARY STENT PLACEMENT: Status: ACTIVE | Noted: 2019-02-11

## 2019-02-11 LAB
ABSOLUTE EOS #: 0.38 K/UL (ref 0–0.44)
ABSOLUTE IMMATURE GRANULOCYTE: <0.03 K/UL (ref 0–0.3)
ABSOLUTE LYMPH #: 1.51 K/UL (ref 1.1–3.7)
ABSOLUTE MONO #: 0.47 K/UL (ref 0.1–1.2)
ACTIVATED CLOTTING TIME: 245 SEC (ref 79–149)
ANION GAP SERPL CALCULATED.3IONS-SCNC: 14 MMOL/L (ref 9–17)
BASOPHILS # BLD: 1 % (ref 0–2)
BASOPHILS ABSOLUTE: 0.05 K/UL (ref 0–0.2)
BUN BLDV-MCNC: 17 MG/DL (ref 6–20)
BUN/CREAT BLD: ABNORMAL (ref 9–20)
CALCIUM SERPL-MCNC: 9 MG/DL (ref 8.6–10.4)
CHLORIDE BLD-SCNC: 106 MMOL/L (ref 98–107)
CO2: 19 MMOL/L (ref 20–31)
CREAT SERPL-MCNC: 0.77 MG/DL (ref 0.5–0.9)
DIFFERENTIAL TYPE: ABNORMAL
EKG ATRIAL RATE: 72 BPM
EKG ATRIAL RATE: 72 BPM
EKG ATRIAL RATE: 77 BPM
EKG P AXIS: 41 DEGREES
EKG P AXIS: 47 DEGREES
EKG P AXIS: 48 DEGREES
EKG P-R INTERVAL: 160 MS
EKG P-R INTERVAL: 176 MS
EKG P-R INTERVAL: 178 MS
EKG Q-T INTERVAL: 406 MS
EKG Q-T INTERVAL: 408 MS
EKG Q-T INTERVAL: 432 MS
EKG QRS DURATION: 76 MS
EKG QRS DURATION: 84 MS
EKG QRS DURATION: 88 MS
EKG QTC CALCULATION (BAZETT): 444 MS
EKG QTC CALCULATION (BAZETT): 461 MS
EKG QTC CALCULATION (BAZETT): 473 MS
EKG R AXIS: 41 DEGREES
EKG R AXIS: 59 DEGREES
EKG R AXIS: 61 DEGREES
EKG T AXIS: 18 DEGREES
EKG T AXIS: 57 DEGREES
EKG T AXIS: 58 DEGREES
EKG VENTRICULAR RATE: 72 BPM
EKG VENTRICULAR RATE: 72 BPM
EKG VENTRICULAR RATE: 77 BPM
EOSINOPHILS RELATIVE PERCENT: 5 % (ref 1–4)
ESTIMATED AVERAGE GLUCOSE: 217 MG/DL
GFR AFRICAN AMERICAN: >60 ML/MIN
GFR NON-AFRICAN AMERICAN: >60 ML/MIN
GFR SERPL CREATININE-BSD FRML MDRD: ABNORMAL ML/MIN/{1.73_M2}
GFR SERPL CREATININE-BSD FRML MDRD: ABNORMAL ML/MIN/{1.73_M2}
GLUCOSE BLD-MCNC: 179 MG/DL (ref 65–105)
GLUCOSE BLD-MCNC: 194 MG/DL (ref 70–99)
GLUCOSE BLD-MCNC: 196 MG/DL (ref 65–105)
GLUCOSE BLD-MCNC: 196 MG/DL (ref 65–105)
HBA1C MFR BLD: 9.2 % (ref 4–6)
HCT VFR BLD CALC: 39.7 % (ref 36.3–47.1)
HEMOGLOBIN: 12.5 G/DL (ref 11.9–15.1)
IMMATURE GRANULOCYTES: 0 %
LYMPHOCYTES # BLD: 21 % (ref 24–43)
MCH RBC QN AUTO: 27.1 PG (ref 25.2–33.5)
MCHC RBC AUTO-ENTMCNC: 31.5 G/DL (ref 28.4–34.8)
MCV RBC AUTO: 85.9 FL (ref 82.6–102.9)
MONOCYTES # BLD: 7 % (ref 3–12)
NRBC AUTOMATED: 0 PER 100 WBC
PARTIAL THROMBOPLASTIN TIME: 33.7 SEC (ref 20.5–30.5)
PDW BLD-RTO: 14 % (ref 11.8–14.4)
PLATELET # BLD: 179 K/UL (ref 138–453)
PLATELET ESTIMATE: ABNORMAL
PMV BLD AUTO: 11.4 FL (ref 8.1–13.5)
POTASSIUM SERPL-SCNC: 4.2 MMOL/L (ref 3.7–5.3)
RBC # BLD: 4.62 M/UL (ref 3.95–5.11)
RBC # BLD: ABNORMAL 10*6/UL
SEG NEUTROPHILS: 66 % (ref 36–65)
SEGMENTED NEUTROPHILS ABSOLUTE COUNT: 4.82 K/UL (ref 1.5–8.1)
SODIUM BLD-SCNC: 139 MMOL/L (ref 135–144)
TROPONIN INTERP: ABNORMAL
TROPONIN T: ABNORMAL NG/ML
TROPONIN, HIGH SENSITIVITY: 276 NG/L (ref 0–14)
WBC # BLD: 7.3 K/UL (ref 3.5–11.3)
WBC # BLD: ABNORMAL 10*3/UL

## 2019-02-11 PROCEDURE — 6370000000 HC RX 637 (ALT 250 FOR IP): Performed by: INTERNAL MEDICINE

## 2019-02-11 PROCEDURE — 85025 COMPLETE CBC W/AUTO DIFF WBC: CPT

## 2019-02-11 PROCEDURE — 6370000000 HC RX 637 (ALT 250 FOR IP)

## 2019-02-11 PROCEDURE — 82947 ASSAY GLUCOSE BLOOD QUANT: CPT

## 2019-02-11 PROCEDURE — 6370000000 HC RX 637 (ALT 250 FOR IP): Performed by: STUDENT IN AN ORGANIZED HEALTH CARE EDUCATION/TRAINING PROGRAM

## 2019-02-11 PROCEDURE — 4A023N7 MEASUREMENT OF CARDIAC SAMPLING AND PRESSURE, LEFT HEART, PERCUTANEOUS APPROACH: ICD-10-PCS | Performed by: INTERNAL MEDICINE

## 2019-02-11 PROCEDURE — 94760 N-INVAS EAR/PLS OXIMETRY 1: CPT

## 2019-02-11 PROCEDURE — B2111ZZ FLUOROSCOPY OF MULTIPLE CORONARY ARTERIES USING LOW OSMOLAR CONTRAST: ICD-10-PCS | Performed by: INTERNAL MEDICINE

## 2019-02-11 PROCEDURE — 2060000000 HC ICU INTERMEDIATE R&B

## 2019-02-11 PROCEDURE — 84484 ASSAY OF TROPONIN QUANT: CPT

## 2019-02-11 PROCEDURE — C1887 CATHETER, GUIDING: HCPCS

## 2019-02-11 PROCEDURE — 6370000000 HC RX 637 (ALT 250 FOR IP): Performed by: EMERGENCY MEDICINE

## 2019-02-11 PROCEDURE — 92943 PRQ TRLUML REVSC CH OCC ANT: CPT

## 2019-02-11 PROCEDURE — 2500000003 HC RX 250 WO HCPCS

## 2019-02-11 PROCEDURE — 85730 THROMBOPLASTIN TIME PARTIAL: CPT

## 2019-02-11 PROCEDURE — 6360000002 HC RX W HCPCS: Performed by: INTERNAL MEDICINE

## 2019-02-11 PROCEDURE — 6360000004 HC RX CONTRAST MEDICATION

## 2019-02-11 PROCEDURE — C1769 GUIDE WIRE: HCPCS

## 2019-02-11 PROCEDURE — 99223 1ST HOSP IP/OBS HIGH 75: CPT | Performed by: INTERNAL MEDICINE

## 2019-02-11 PROCEDURE — 94640 AIRWAY INHALATION TREATMENT: CPT

## 2019-02-11 PROCEDURE — C1874 STENT, COATED/COV W/DEL SYS: HCPCS

## 2019-02-11 PROCEDURE — 2580000003 HC RX 258: Performed by: STUDENT IN AN ORGANIZED HEALTH CARE EDUCATION/TRAINING PROGRAM

## 2019-02-11 PROCEDURE — 2580000003 HC RX 258: Performed by: INTERNAL MEDICINE

## 2019-02-11 PROCEDURE — 6360000002 HC RX W HCPCS: Performed by: EMERGENCY MEDICINE

## 2019-02-11 PROCEDURE — C1725 CATH, TRANSLUMIN NON-LASER: HCPCS

## 2019-02-11 PROCEDURE — C1894 INTRO/SHEATH, NON-LASER: HCPCS

## 2019-02-11 PROCEDURE — 7100000010 HC PHASE II RECOVERY - FIRST 15 MIN

## 2019-02-11 PROCEDURE — 6360000002 HC RX W HCPCS

## 2019-02-11 PROCEDURE — 6360000002 HC RX W HCPCS: Performed by: STUDENT IN AN ORGANIZED HEALTH CARE EDUCATION/TRAINING PROGRAM

## 2019-02-11 PROCEDURE — 93458 L HRT ARTERY/VENTRICLE ANGIO: CPT

## 2019-02-11 PROCEDURE — B2151ZZ FLUOROSCOPY OF LEFT HEART USING LOW OSMOLAR CONTRAST: ICD-10-PCS | Performed by: INTERNAL MEDICINE

## 2019-02-11 PROCEDURE — 7100000011 HC PHASE II RECOVERY - ADDTL 15 MIN

## 2019-02-11 PROCEDURE — 36415 COLL VENOUS BLD VENIPUNCTURE: CPT

## 2019-02-11 PROCEDURE — 85347 COAGULATION TIME ACTIVATED: CPT

## 2019-02-11 PROCEDURE — 80048 BASIC METABOLIC PNL TOTAL CA: CPT

## 2019-02-11 PROCEDURE — 2709999900 HC NON-CHARGEABLE SUPPLY

## 2019-02-11 RX ORDER — LORAZEPAM 0.5 MG/1
0.5 TABLET ORAL ONCE
Status: COMPLETED | OUTPATIENT
Start: 2019-02-11 | End: 2019-02-11

## 2019-02-11 RX ORDER — UREA 10 %
3 LOTION (ML) TOPICAL NIGHTLY PRN
Status: DISCONTINUED | OUTPATIENT
Start: 2019-02-11 | End: 2019-02-12 | Stop reason: HOSPADM

## 2019-02-11 RX ORDER — UREA 10 %
3 LOTION (ML) TOPICAL NIGHTLY PRN
Status: DISCONTINUED | OUTPATIENT
Start: 2019-02-12 | End: 2019-02-11

## 2019-02-11 RX ORDER — ALBUTEROL SULFATE 90 UG/1
2 AEROSOL, METERED RESPIRATORY (INHALATION) EVERY 4 HOURS PRN
Status: DISCONTINUED | OUTPATIENT
Start: 2019-02-11 | End: 2019-02-12 | Stop reason: HOSPADM

## 2019-02-11 RX ORDER — SODIUM CHLORIDE 9 MG/ML
INJECTION, SOLUTION INTRAVENOUS CONTINUOUS
Status: DISCONTINUED | OUTPATIENT
Start: 2019-02-11 | End: 2019-02-12 | Stop reason: HOSPADM

## 2019-02-11 RX ORDER — ONDANSETRON 2 MG/ML
4 INJECTION INTRAMUSCULAR; INTRAVENOUS ONCE
Status: COMPLETED | OUTPATIENT
Start: 2019-02-11 | End: 2019-02-11

## 2019-02-11 RX ORDER — SODIUM CHLORIDE 0.9 % (FLUSH) 0.9 %
10 SYRINGE (ML) INJECTION PRN
Status: DISCONTINUED | OUTPATIENT
Start: 2019-02-11 | End: 2019-02-12 | Stop reason: HOSPADM

## 2019-02-11 RX ORDER — SODIUM CHLORIDE 0.9 % (FLUSH) 0.9 %
10 SYRINGE (ML) INJECTION EVERY 12 HOURS SCHEDULED
Status: DISCONTINUED | OUTPATIENT
Start: 2019-02-11 | End: 2019-02-12 | Stop reason: HOSPADM

## 2019-02-11 RX ADMIN — ONDANSETRON 4 MG: 2 INJECTION INTRAMUSCULAR; INTRAVENOUS at 16:44

## 2019-02-11 RX ADMIN — METOPROLOL TARTRATE 12.5 MG: 25 TABLET ORAL at 22:39

## 2019-02-11 RX ADMIN — IPRATROPIUM BROMIDE 2 PUFF: 17 AEROSOL, METERED RESPIRATORY (INHALATION) at 18:19

## 2019-02-11 RX ADMIN — INSULIN LISPRO 1 UNITS: 100 INJECTION, SOLUTION INTRAVENOUS; SUBCUTANEOUS at 22:07

## 2019-02-11 RX ADMIN — Medication 3 MG: at 22:11

## 2019-02-11 RX ADMIN — DESMOPRESSIN ACETATE 40 MG: 0.2 TABLET ORAL at 22:39

## 2019-02-11 RX ADMIN — ACETAMINOPHEN 650 MG: 325 TABLET ORAL at 16:36

## 2019-02-11 RX ADMIN — TICAGRELOR 90 MG: 90 TABLET ORAL at 21:50

## 2019-02-11 RX ADMIN — ONDANSETRON 4 MG: 2 INJECTION INTRAMUSCULAR; INTRAVENOUS at 21:50

## 2019-02-11 RX ADMIN — HEPARIN SODIUM 2000 UNITS: 1000 INJECTION, SOLUTION INTRAVENOUS; SUBCUTANEOUS at 08:03

## 2019-02-11 RX ADMIN — LORAZEPAM 0.5 MG: 0.5 TABLET ORAL at 21:38

## 2019-02-11 RX ADMIN — SODIUM CHLORIDE: 9 INJECTION, SOLUTION INTRAVENOUS at 17:44

## 2019-02-11 RX ADMIN — Medication 10 ML: at 21:51

## 2019-02-11 RX ADMIN — Medication 10 ML: at 08:13

## 2019-02-11 RX ADMIN — HEPARIN SODIUM AND DEXTROSE 15 UNITS/KG/HR: 10000; 5 INJECTION INTRAVENOUS at 05:13

## 2019-02-11 RX ADMIN — SODIUM CHLORIDE: 9 INJECTION, SOLUTION INTRAVENOUS at 21:39

## 2019-02-11 ASSESSMENT — PAIN SCALES - GENERAL
PAINLEVEL_OUTOF10: 0
PAINLEVEL_OUTOF10: 3

## 2019-02-12 VITALS
TEMPERATURE: 98.2 F | RESPIRATION RATE: 18 BRPM | SYSTOLIC BLOOD PRESSURE: 117 MMHG | HEART RATE: 87 BPM | BODY MASS INDEX: 36.99 KG/M2 | DIASTOLIC BLOOD PRESSURE: 79 MMHG | HEIGHT: 68 IN | OXYGEN SATURATION: 92 % | WEIGHT: 244.05 LBS

## 2019-02-12 LAB
ABSOLUTE EOS #: 0.12 K/UL (ref 0–0.44)
ABSOLUTE IMMATURE GRANULOCYTE: 0.04 K/UL (ref 0–0.3)
ABSOLUTE LYMPH #: 1.36 K/UL (ref 1.1–3.7)
ABSOLUTE MONO #: 0.64 K/UL (ref 0.1–1.2)
ANION GAP SERPL CALCULATED.3IONS-SCNC: 11 MMOL/L (ref 9–17)
BASOPHILS # BLD: 1 % (ref 0–2)
BASOPHILS ABSOLUTE: 0.04 K/UL (ref 0–0.2)
BUN BLDV-MCNC: 13 MG/DL (ref 6–20)
BUN/CREAT BLD: ABNORMAL (ref 9–20)
CALCIUM SERPL-MCNC: 8.9 MG/DL (ref 8.6–10.4)
CHLORIDE BLD-SCNC: 105 MMOL/L (ref 98–107)
CO2: 22 MMOL/L (ref 20–31)
CREAT SERPL-MCNC: 0.86 MG/DL (ref 0.5–0.9)
DIFFERENTIAL TYPE: ABNORMAL
EOSINOPHILS RELATIVE PERCENT: 2 % (ref 1–4)
GFR AFRICAN AMERICAN: >60 ML/MIN
GFR NON-AFRICAN AMERICAN: >60 ML/MIN
GFR SERPL CREATININE-BSD FRML MDRD: ABNORMAL ML/MIN/{1.73_M2}
GFR SERPL CREATININE-BSD FRML MDRD: ABNORMAL ML/MIN/{1.73_M2}
GLUCOSE BLD-MCNC: 166 MG/DL (ref 65–105)
GLUCOSE BLD-MCNC: 178 MG/DL (ref 65–105)
GLUCOSE BLD-MCNC: 182 MG/DL (ref 70–99)
HCT VFR BLD CALC: 37.8 % (ref 36.3–47.1)
HEMOGLOBIN: 11.9 G/DL (ref 11.9–15.1)
IMMATURE GRANULOCYTES: 1 %
LV EF: 53 %
LVEF MODALITY: NORMAL
LYMPHOCYTES # BLD: 17 % (ref 24–43)
MCH RBC QN AUTO: 27 PG (ref 25.2–33.5)
MCHC RBC AUTO-ENTMCNC: 31.5 G/DL (ref 28.4–34.8)
MCV RBC AUTO: 85.9 FL (ref 82.6–102.9)
MONOCYTES # BLD: 8 % (ref 3–12)
NRBC AUTOMATED: 0 PER 100 WBC
PDW BLD-RTO: 14.2 % (ref 11.8–14.4)
PLATELET # BLD: 196 K/UL (ref 138–453)
PLATELET ESTIMATE: ABNORMAL
PMV BLD AUTO: 10.9 FL (ref 8.1–13.5)
POTASSIUM SERPL-SCNC: 3.7 MMOL/L (ref 3.7–5.3)
RBC # BLD: 4.4 M/UL (ref 3.95–5.11)
RBC # BLD: ABNORMAL 10*6/UL
SEG NEUTROPHILS: 71 % (ref 36–65)
SEGMENTED NEUTROPHILS ABSOLUTE COUNT: 5.97 K/UL (ref 1.5–8.1)
SODIUM BLD-SCNC: 138 MMOL/L (ref 135–144)
WBC # BLD: 8.2 K/UL (ref 3.5–11.3)
WBC # BLD: ABNORMAL 10*3/UL

## 2019-02-12 PROCEDURE — 6370000000 HC RX 637 (ALT 250 FOR IP): Performed by: INTERNAL MEDICINE

## 2019-02-12 PROCEDURE — 80048 BASIC METABOLIC PNL TOTAL CA: CPT

## 2019-02-12 PROCEDURE — 6370000000 HC RX 637 (ALT 250 FOR IP): Performed by: STUDENT IN AN ORGANIZED HEALTH CARE EDUCATION/TRAINING PROGRAM

## 2019-02-12 PROCEDURE — 82947 ASSAY GLUCOSE BLOOD QUANT: CPT

## 2019-02-12 PROCEDURE — 99239 HOSP IP/OBS DSCHRG MGMT >30: CPT | Performed by: INTERNAL MEDICINE

## 2019-02-12 PROCEDURE — 85025 COMPLETE CBC W/AUTO DIFF WBC: CPT

## 2019-02-12 PROCEDURE — G0108 DIAB MANAGE TRN  PER INDIV: HCPCS

## 2019-02-12 PROCEDURE — 94640 AIRWAY INHALATION TREATMENT: CPT

## 2019-02-12 PROCEDURE — 36415 COLL VENOUS BLD VENIPUNCTURE: CPT

## 2019-02-12 PROCEDURE — 027034Z DILATION OF CORONARY ARTERY, ONE ARTERY WITH DRUG-ELUTING INTRALUMINAL DEVICE, PERCUTANEOUS APPROACH: ICD-10-PCS | Performed by: INTERNAL MEDICINE

## 2019-02-12 PROCEDURE — 93306 TTE W/DOPPLER COMPLETE: CPT

## 2019-02-12 RX ORDER — NICOTINE POLACRILEX 4 MG
15 LOZENGE BUCCAL PRN
Status: DISCONTINUED | OUTPATIENT
Start: 2019-02-12 | End: 2019-02-12 | Stop reason: HOSPADM

## 2019-02-12 RX ORDER — BLOOD-GLUCOSE METER
1 KIT MISCELLANEOUS DAILY
Qty: 1 KIT | Refills: 0 | Status: SHIPPED | OUTPATIENT
Start: 2019-02-12

## 2019-02-12 RX ORDER — ATORVASTATIN CALCIUM 40 MG/1
40 TABLET, FILM COATED ORAL NIGHTLY
Qty: 30 TABLET | Refills: 3 | Status: SHIPPED | OUTPATIENT
Start: 2019-02-12 | End: 2021-07-25

## 2019-02-12 RX ORDER — DEXTROSE MONOHYDRATE 50 MG/ML
100 INJECTION, SOLUTION INTRAVENOUS PRN
Status: DISCONTINUED | OUTPATIENT
Start: 2019-02-12 | End: 2019-02-12 | Stop reason: HOSPADM

## 2019-02-12 RX ORDER — GLUCOSAMINE HCL/CHONDROITIN SU 500-400 MG
CAPSULE ORAL
Qty: 100 STRIP | Refills: 3 | Status: SHIPPED | OUTPATIENT
Start: 2019-02-12

## 2019-02-12 RX ORDER — ATORVASTATIN CALCIUM 40 MG/1
40 TABLET, FILM COATED ORAL DAILY
Qty: 30 TABLET | Refills: 3 | Status: SHIPPED | OUTPATIENT
Start: 2019-02-12 | End: 2021-07-25

## 2019-02-12 RX ORDER — NITROGLYCERIN 0.4 MG/1
TABLET SUBLINGUAL
Qty: 25 TABLET | Refills: 3 | Status: SHIPPED | OUTPATIENT
Start: 2019-02-12

## 2019-02-12 RX ORDER — DEXTROSE MONOHYDRATE 25 G/50ML
12.5 INJECTION, SOLUTION INTRAVENOUS PRN
Status: DISCONTINUED | OUTPATIENT
Start: 2019-02-12 | End: 2019-02-12 | Stop reason: HOSPADM

## 2019-02-12 RX ORDER — GLIPIZIDE 5 MG/1
5 TABLET ORAL
Status: DISCONTINUED | OUTPATIENT
Start: 2019-02-13 | End: 2019-02-12 | Stop reason: HOSPADM

## 2019-02-12 RX ORDER — ASPIRIN 81 MG/1
81 TABLET, CHEWABLE ORAL DAILY
Qty: 30 TABLET | Refills: 3 | Status: SHIPPED | OUTPATIENT
Start: 2019-02-13 | End: 2022-07-22

## 2019-02-12 RX ORDER — LANCETS 30 GAUGE
1 EACH MISCELLANEOUS DAILY
Qty: 100 EACH | Refills: 3 | Status: SHIPPED | OUTPATIENT
Start: 2019-02-12

## 2019-02-12 RX ORDER — INSULIN GLARGINE 100 [IU]/ML
10 INJECTION, SOLUTION SUBCUTANEOUS NIGHTLY
Status: DISCONTINUED | OUTPATIENT
Start: 2019-02-12 | End: 2019-02-12 | Stop reason: HOSPADM

## 2019-02-12 RX ORDER — LORAZEPAM 1 MG/1
1 TABLET ORAL ONCE
Status: COMPLETED | OUTPATIENT
Start: 2019-02-12 | End: 2019-02-12

## 2019-02-12 RX ORDER — GLIPIZIDE 5 MG/1
5 TABLET ORAL 2 TIMES DAILY
Qty: 60 TABLET | Refills: 3 | Status: SHIPPED | OUTPATIENT
Start: 2019-02-12 | End: 2022-02-14 | Stop reason: ALTCHOICE

## 2019-02-12 RX ADMIN — ASPIRIN 81 MG: 81 TABLET, CHEWABLE ORAL at 09:30

## 2019-02-12 RX ADMIN — LORAZEPAM 1 MG: 1 TABLET ORAL at 03:17

## 2019-02-12 RX ADMIN — METOPROLOL TARTRATE 12.5 MG: 25 TABLET ORAL at 09:29

## 2019-02-12 RX ADMIN — LISINOPRIL 40 MG: 20 TABLET ORAL at 09:30

## 2019-02-12 RX ADMIN — IPRATROPIUM BROMIDE 2 PUFF: 17 AEROSOL, METERED RESPIRATORY (INHALATION) at 06:33

## 2019-02-12 RX ADMIN — INSULIN GLARGINE 10 UNITS: 100 INJECTION, SOLUTION SUBCUTANEOUS at 13:13

## 2019-02-12 RX ADMIN — INSULIN LISPRO 2 UNITS: 100 INJECTION, SOLUTION INTRAVENOUS; SUBCUTANEOUS at 09:31

## 2019-02-12 RX ADMIN — TICAGRELOR 90 MG: 90 TABLET ORAL at 09:30

## 2019-02-12 RX ADMIN — IPRATROPIUM BROMIDE 2 PUFF: 17 AEROSOL, METERED RESPIRATORY (INHALATION) at 15:01

## 2019-02-12 RX ADMIN — IPRATROPIUM BROMIDE 2 PUFF: 17 AEROSOL, METERED RESPIRATORY (INHALATION) at 11:15

## 2019-02-12 RX ADMIN — METFORMIN HYDROCHLORIDE 1000 MG: 500 TABLET ORAL at 14:36

## 2019-02-12 ASSESSMENT — ENCOUNTER SYMPTOMS
CHEST TIGHTNESS: 1
COUGH: 0
SHORTNESS OF BREATH: 1
VOMITING: 0
NAUSEA: 1
EYE PAIN: 0
DIARRHEA: 0
SORE THROAT: 0
ABDOMINAL PAIN: 0

## 2019-02-12 ASSESSMENT — PAIN SCALES - GENERAL: PAINLEVEL_OUTOF10: 0

## 2019-07-19 ENCOUNTER — HOSPITAL ENCOUNTER (OUTPATIENT)
Age: 59
Setting detail: OBSERVATION
Discharge: HOME | End: 2019-07-20
Attending: EMERGENCY MEDICINE | Admitting: EMERGENCY MEDICINE
Payer: MEDICARE

## 2019-07-19 ENCOUNTER — APPOINTMENT (OUTPATIENT)
Dept: GENERAL RADIOLOGY | Age: 59
End: 2019-07-19
Payer: MEDICARE

## 2019-07-19 ENCOUNTER — APPOINTMENT (OUTPATIENT)
Dept: CT IMAGING | Age: 59
End: 2019-07-19
Payer: MEDICARE

## 2019-07-19 DIAGNOSIS — R07.9 CHEST PAIN, UNSPECIFIED TYPE: Primary | ICD-10-CM

## 2019-07-19 LAB
ABSOLUTE EOS #: 0.34 K/UL (ref 0–0.44)
ABSOLUTE IMMATURE GRANULOCYTE: 0.03 K/UL (ref 0–0.3)
ABSOLUTE LYMPH #: 1.46 K/UL (ref 1.1–3.7)
ABSOLUTE MONO #: 0.65 K/UL (ref 0.1–1.2)
ANION GAP SERPL CALCULATED.3IONS-SCNC: 16 MMOL/L (ref 9–17)
BASOPHILS # BLD: 1 % (ref 0–2)
BASOPHILS ABSOLUTE: 0.06 K/UL (ref 0–0.2)
BUN BLDV-MCNC: 20 MG/DL (ref 6–20)
BUN/CREAT BLD: ABNORMAL (ref 9–20)
CALCIUM SERPL-MCNC: 9.2 MG/DL (ref 8.6–10.4)
CHLORIDE BLD-SCNC: 100 MMOL/L (ref 98–107)
CO2: 18 MMOL/L (ref 20–31)
CREAT SERPL-MCNC: 1.05 MG/DL (ref 0.5–0.9)
DIFFERENTIAL TYPE: ABNORMAL
EOSINOPHILS RELATIVE PERCENT: 3 % (ref 1–4)
GFR AFRICAN AMERICAN: >60 ML/MIN
GFR NON-AFRICAN AMERICAN: 54 ML/MIN
GFR SERPL CREATININE-BSD FRML MDRD: ABNORMAL ML/MIN/{1.73_M2}
GFR SERPL CREATININE-BSD FRML MDRD: ABNORMAL ML/MIN/{1.73_M2}
GLUCOSE BLD-MCNC: 189 MG/DL (ref 70–99)
HCT VFR BLD CALC: 38.8 % (ref 36.3–47.1)
HEMOGLOBIN: 11.8 G/DL (ref 11.9–15.1)
IMMATURE GRANULOCYTES: 0 %
LYMPHOCYTES # BLD: 15 % (ref 24–43)
MCH RBC QN AUTO: 25.8 PG (ref 25.2–33.5)
MCHC RBC AUTO-ENTMCNC: 30.4 G/DL (ref 28.4–34.8)
MCV RBC AUTO: 84.7 FL (ref 82.6–102.9)
MONOCYTES # BLD: 7 % (ref 3–12)
NRBC AUTOMATED: 0 PER 100 WBC
PDW BLD-RTO: 15.6 % (ref 11.8–14.4)
PLATELET # BLD: 245 K/UL (ref 138–453)
PLATELET ESTIMATE: ABNORMAL
PMV BLD AUTO: 11.1 FL (ref 8.1–13.5)
POTASSIUM SERPL-SCNC: 5.4 MMOL/L (ref 3.7–5.3)
RBC # BLD: 4.58 M/UL (ref 3.95–5.11)
RBC # BLD: ABNORMAL 10*6/UL
SEG NEUTROPHILS: 74 % (ref 36–65)
SEGMENTED NEUTROPHILS ABSOLUTE COUNT: 7.53 K/UL (ref 1.5–8.1)
SODIUM BLD-SCNC: 134 MMOL/L (ref 135–144)
TROPONIN INTERP: NORMAL
TROPONIN INTERP: NORMAL
TROPONIN T: NORMAL NG/ML
TROPONIN T: NORMAL NG/ML
TROPONIN, HIGH SENSITIVITY: 11 NG/L (ref 0–14)
TROPONIN, HIGH SENSITIVITY: 12 NG/L (ref 0–14)
WBC # BLD: 10.1 K/UL (ref 3.5–11.3)
WBC # BLD: ABNORMAL 10*3/UL

## 2019-07-19 PROCEDURE — 94760 N-INVAS EAR/PLS OXIMETRY 1: CPT

## 2019-07-19 PROCEDURE — 99285 EMERGENCY DEPT VISIT HI MDM: CPT

## 2019-07-19 PROCEDURE — 94664 DEMO&/EVAL PT USE INHALER: CPT

## 2019-07-19 PROCEDURE — 80048 BASIC METABOLIC PNL TOTAL CA: CPT

## 2019-07-19 PROCEDURE — G0378 HOSPITAL OBSERVATION PER HR: HCPCS

## 2019-07-19 PROCEDURE — 84484 ASSAY OF TROPONIN QUANT: CPT

## 2019-07-19 PROCEDURE — 2580000003 HC RX 258: Performed by: STUDENT IN AN ORGANIZED HEALTH CARE EDUCATION/TRAINING PROGRAM

## 2019-07-19 PROCEDURE — 71046 X-RAY EXAM CHEST 2 VIEWS: CPT

## 2019-07-19 PROCEDURE — 93005 ELECTROCARDIOGRAM TRACING: CPT

## 2019-07-19 PROCEDURE — 6360000004 HC RX CONTRAST MEDICATION: Performed by: STUDENT IN AN ORGANIZED HEALTH CARE EDUCATION/TRAINING PROGRAM

## 2019-07-19 PROCEDURE — 6360000002 HC RX W HCPCS: Performed by: STUDENT IN AN ORGANIZED HEALTH CARE EDUCATION/TRAINING PROGRAM

## 2019-07-19 PROCEDURE — 85025 COMPLETE CBC W/AUTO DIFF WBC: CPT

## 2019-07-19 PROCEDURE — 94640 AIRWAY INHALATION TREATMENT: CPT

## 2019-07-19 PROCEDURE — 6370000000 HC RX 637 (ALT 250 FOR IP): Performed by: STUDENT IN AN ORGANIZED HEALTH CARE EDUCATION/TRAINING PROGRAM

## 2019-07-19 PROCEDURE — 71275 CT ANGIOGRAPHY CHEST: CPT

## 2019-07-19 RX ORDER — ALBUTEROL SULFATE 90 UG/1
2 AEROSOL, METERED RESPIRATORY (INHALATION) 4 TIMES DAILY
Status: DISCONTINUED | OUTPATIENT
Start: 2019-07-19 | End: 2019-07-20 | Stop reason: HOSPADM

## 2019-07-19 RX ORDER — ALBUTEROL SULFATE 90 UG/1
2 AEROSOL, METERED RESPIRATORY (INHALATION)
Status: DISCONTINUED | OUTPATIENT
Start: 2019-07-19 | End: 2019-07-19

## 2019-07-19 RX ORDER — LISINOPRIL 20 MG/1
40 TABLET ORAL DAILY
Status: DISCONTINUED | OUTPATIENT
Start: 2019-07-20 | End: 2019-07-20 | Stop reason: HOSPADM

## 2019-07-19 RX ORDER — ALBUTEROL SULFATE 2.5 MG/3ML
5 SOLUTION RESPIRATORY (INHALATION)
Status: DISCONTINUED | OUTPATIENT
Start: 2019-07-19 | End: 2019-07-20 | Stop reason: HOSPADM

## 2019-07-19 RX ORDER — 0.9 % SODIUM CHLORIDE 0.9 %
500 INTRAVENOUS SOLUTION INTRAVENOUS ONCE
Status: COMPLETED | OUTPATIENT
Start: 2019-07-19 | End: 2019-07-19

## 2019-07-19 RX ORDER — GLIPIZIDE 5 MG/1
5 TABLET ORAL 2 TIMES DAILY
Status: DISCONTINUED | OUTPATIENT
Start: 2019-07-19 | End: 2019-07-20 | Stop reason: HOSPADM

## 2019-07-19 RX ORDER — SODIUM CHLORIDE 0.9 % (FLUSH) 0.9 %
10 SYRINGE (ML) INJECTION EVERY 12 HOURS SCHEDULED
Status: DISCONTINUED | OUTPATIENT
Start: 2019-07-19 | End: 2019-07-20 | Stop reason: HOSPADM

## 2019-07-19 RX ORDER — ASPIRIN 81 MG/1
81 TABLET, CHEWABLE ORAL DAILY
Status: DISCONTINUED | OUTPATIENT
Start: 2019-07-20 | End: 2019-07-20 | Stop reason: HOSPADM

## 2019-07-19 RX ORDER — NITROGLYCERIN 0.4 MG/1
0.4 TABLET SUBLINGUAL EVERY 5 MIN PRN
Status: DISCONTINUED | OUTPATIENT
Start: 2019-07-19 | End: 2019-07-20 | Stop reason: HOSPADM

## 2019-07-19 RX ORDER — SODIUM CHLORIDE 0.9 % (FLUSH) 0.9 %
10 SYRINGE (ML) INJECTION PRN
Status: DISCONTINUED | OUTPATIENT
Start: 2019-07-19 | End: 2019-07-20 | Stop reason: HOSPADM

## 2019-07-19 RX ORDER — ACETAMINOPHEN 325 MG/1
650 TABLET ORAL EVERY 4 HOURS PRN
Status: DISCONTINUED | OUTPATIENT
Start: 2019-07-19 | End: 2019-07-20 | Stop reason: HOSPADM

## 2019-07-19 RX ORDER — IPRATROPIUM BROMIDE AND ALBUTEROL SULFATE 2.5; .5 MG/3ML; MG/3ML
1 SOLUTION RESPIRATORY (INHALATION)
Status: DISCONTINUED | OUTPATIENT
Start: 2019-07-19 | End: 2019-07-19

## 2019-07-19 RX ORDER — ATORVASTATIN CALCIUM 40 MG/1
40 TABLET, FILM COATED ORAL NIGHTLY
Status: DISCONTINUED | OUTPATIENT
Start: 2019-07-19 | End: 2019-07-20 | Stop reason: HOSPADM

## 2019-07-19 RX ADMIN — ALBUTEROL SULFATE 5 MG: 5 SOLUTION RESPIRATORY (INHALATION) at 18:36

## 2019-07-19 RX ADMIN — ALBUTEROL SULFATE 5 MG: 5 SOLUTION RESPIRATORY (INHALATION) at 18:26

## 2019-07-19 RX ADMIN — IOHEXOL 75 ML: 350 INJECTION, SOLUTION INTRAVENOUS at 19:02

## 2019-07-19 RX ADMIN — IPRATROPIUM BROMIDE 0.5 MG: 0.5 SOLUTION RESPIRATORY (INHALATION) at 18:26

## 2019-07-19 RX ADMIN — Medication 10 ML: at 22:34

## 2019-07-19 RX ADMIN — TICAGRELOR 90 MG: 90 TABLET ORAL at 22:30

## 2019-07-19 RX ADMIN — GLIPIZIDE 5 MG: 5 TABLET ORAL at 22:30

## 2019-07-19 RX ADMIN — METOPROLOL TARTRATE 12.5 MG: 25 TABLET ORAL at 22:30

## 2019-07-19 RX ADMIN — SODIUM CHLORIDE 500 ML: 9 INJECTION, SOLUTION INTRAVENOUS at 19:55

## 2019-07-19 ASSESSMENT — PAIN DESCRIPTION - LOCATION: LOCATION: CHEST;SHOULDER

## 2019-07-19 ASSESSMENT — PAIN DESCRIPTION - ORIENTATION: ORIENTATION: LEFT;RIGHT

## 2019-07-19 ASSESSMENT — PAIN SCALES - GENERAL: PAINLEVEL_OUTOF10: 6

## 2019-07-19 NOTE — ED NOTES
Bed: 16  Expected date:   Expected time:   Means of arrival:   Comments:  Jl Novoa RN  07/19/19 [id number]

## 2019-07-19 NOTE — ED PROVIDER NOTES
[facility]  Emergency Department Encounter  EmergencyMedicine Resident     Pt Name:Pina Enriquez  MRN: [id number]  Birthdate 1960  Date of evaluation: 7/19/19  PCP:  Renu Capellan    CHIEF COMPLAINT       Chief Complaint   Patient presents with    Chest Pain       HISTORY OF PRESENT ILLNESS  (Location/Symptom, Timing/Onset, Context/Setting, Quality, Duration, Modifying Factors, Severity.)      Yuli Schwarz is a 66 y.o. female who presents with midsternal chest pressure with radiation to bilateral shoulders which began approximately 1 hour prior to arrival.  Patient states that since approximately 8 AM this morning she has been lightheaded. She has a past history of CAD, COPD, diabetes mellitus, hyperlipidemia, hypertension. Most recent cardiac catheterization 2/10/19, she has a stent in place. Cardiologist is Dr. Jade Leon. She states that she used nitro with moderate improvement in symptoms. She took 4 aspirin prior to arrival in the emergency department. She admits to daily marijuana use. She denies any history of DVT, PE, recent surgery, long travel, calf swelling, hemoptysis, hormone replacement, or cancer. PAST MEDICAL / SURGICAL / SOCIAL / FAMILY HISTORY      has a past medical history of Asthma, Blood circulation, collateral, CAD (coronary artery disease), COPD (chronic obstructive pulmonary disease) ([address] Diabetes mellitus ([address] Hyperlipidemia, Hypertension, Movement disorder, Neuropathy, and PAD (peripheral artery disease) ([address] has a past surgical history that includes Coronary angioplasty with stent; Tonsillectomy; Inner ear surgery (Right); and Coronary angioplasty with stent (02/11/2019). Social History     Socioeconomic History    Marital status:       Spouse name: Not on file    Number of children: Not on file    Years of education: Not on file    Highest education level: Not on file   Occupational History    Not on file   Social Needs    Financial resource strain: Not on file    Food insecurity:     Worry: Not on file     Inability: Not on file    Transportation needs:     Medical: Not on file     Non-medical: Not on file   Tobacco Use    Smoking status: Current Every Day Smoker     Packs/day: 0.50     Types: Cigarettes    Smokeless tobacco: Current User   Substance and Sexual Activity    Alcohol use: No    Drug use: Yes     Types: Marijuana    Sexual activity: Never   Lifestyle    Physical activity:     Days per week: Not on file     Minutes per session: Not on file    Stress: Not on file   Relationships    Social connections:     Talks on phone: Not on file     Gets together: Not on file     Attends Orthodox service: Not on file     Active member of club or organization: Not on file     Attends meetings of clubs or organizations: Not on file     Relationship status: Not on file    Intimate partner violence:     Fear of current or ex partner: Not on file     Emotionally abused: Not on file     Physically abused: Not on file     Forced sexual activity: Not on file   Other Topics Concern    Not on file   Social History Narrative    Not on file       History reviewed. No pertinent family history. Allergies:  Codeine and Morphine    Home Medications:  Prior to Admission medications    Medication Sig Start Date End Date Taking? Authorizing Provider   aspirin 81 MG chewable tablet Take 1 tablet by mouth daily 2/13/19  Yes BINDU Blake CNP   nitroGLYCERIN (NITROSTAT) 0.4 MG SL tablet up to max of 3 total doses.  If no relief after 1 dose, call 911. 2/12/19  Yes BINDU Blake CNP   atorvastatin (LIPITOR) 40 MG tablet Take 1 tablet by mouth nightly 2/12/19   BINDU Blake CNP   metoprolol tartrate (LOPRESSOR) 25 MG tablet Take 0.5 tablets by mouth 2 times daily 2/12/19   BINDU Blake CNP   ticagrelor (BRILINTA) 90 MG TABS tablet Take 1 tablet by mouth 2 times daily 2/12/19   BINDU Blake CNP   Lancets MISC 1 each by Does not apply route daily 2/12/19   Yamile Wang MD   glipiZIDE (GLUCOTROL) 5 MG tablet Take 1 tablet by mouth 2 times daily 2/12/19   Yamile Wang MD   metFORMIN (GLUCOPHAGE) 1000 MG tablet Take 1 tablet by mouth 2 times daily (with meals) 2/12/19   Yamile Wang MD   atorvastatin (LIPITOR) 40 MG tablet Take 1 tablet by mouth daily 2/12/19   Yamile Wang MD   blood glucose monitor strips Test 3 times a day & as needed for symptoms of irregular blood glucose. 2/12/19   Yamile Wang MD   glucose monitoring kit (FREESTYLE) monitoring kit 1 kit by Does not apply route daily 2/12/19   Yamile Wang MD   albuterol (PROVENTIL) (5 MG/ML) 0.5% nebulizer solution Take 0.5 mLs by nebulization every 6 hours as needed for Wheezing 3/16/18   Cherelle Espinosa MD   tiotropium (SPIRIVA) 18 MCG inhalation capsule Inhale 1 capsule into the lungs daily Pt told that she was using this medication at home. 3/16/18   Cherelle Espinosa MD   albuterol-ipratropium (COMBIVENT RESPIMAT)  MCG/ACT AERS inhaler Inhale 1 puff into the lungs every 6 hours 3/16/18   Cherelle Espinosa MD   acetaminophen (TYLENOL) 500 MG tablet Take 1 tablet by mouth every 6 hours as needed for Pain 1/9/18   Jayda Escobar MD   lisinopril (PRINIVIL;ZESTRIL) 40 MG tablet Take 40 mg by mouth daily. Historical Provider, MD       REVIEW OF SYSTEMS    (2-9 systems for level 4, 10 or more for level 5)      Review of Systems   Constitutional: Negative for chills, diaphoresis, fatigue and fever. HENT: Negative for congestion, rhinorrhea and sore throat. Eyes: Negative for visual disturbance. Respiratory: Positive for shortness of breath. Cardiovascular: Positive for chest pain. Negative for leg swelling. Gastrointestinal: Positive for nausea. Negative for abdominal pain and vomiting. Genitourinary: Negative for dysuria, frequency and hematuria. Musculoskeletal: Positive for back pain. Negative for neck pain.    Skin:

## 2019-07-20 VITALS
RESPIRATION RATE: 18 BRPM | WEIGHT: 240 LBS | TEMPERATURE: 98 F | OXYGEN SATURATION: 97 % | DIASTOLIC BLOOD PRESSURE: 61 MMHG | HEART RATE: 62 BPM | SYSTOLIC BLOOD PRESSURE: 123 MMHG | BODY MASS INDEX: 36.49 KG/M2

## 2019-07-20 LAB
GLUCOSE BLD-MCNC: 101 MG/DL (ref 65–105)
GLUCOSE BLD-MCNC: 163 MG/DL (ref 65–105)

## 2019-07-20 PROCEDURE — 6370000000 HC RX 637 (ALT 250 FOR IP): Performed by: STUDENT IN AN ORGANIZED HEALTH CARE EDUCATION/TRAINING PROGRAM

## 2019-07-20 PROCEDURE — 6360000002 HC RX W HCPCS: Performed by: STUDENT IN AN ORGANIZED HEALTH CARE EDUCATION/TRAINING PROGRAM

## 2019-07-20 PROCEDURE — G0378 HOSPITAL OBSERVATION PER HR: HCPCS

## 2019-07-20 PROCEDURE — 82947 ASSAY GLUCOSE BLOOD QUANT: CPT

## 2019-07-20 PROCEDURE — 96372 THER/PROPH/DIAG INJ SC/IM: CPT

## 2019-07-20 PROCEDURE — 2580000003 HC RX 258: Performed by: STUDENT IN AN ORGANIZED HEALTH CARE EDUCATION/TRAINING PROGRAM

## 2019-07-20 PROCEDURE — 94640 AIRWAY INHALATION TREATMENT: CPT

## 2019-07-20 PROCEDURE — 93005 ELECTROCARDIOGRAM TRACING: CPT | Performed by: EMERGENCY MEDICINE

## 2019-07-20 RX ORDER — DEXTROSE MONOHYDRATE 25 G/50ML
12.5 INJECTION, SOLUTION INTRAVENOUS PRN
Status: DISCONTINUED | OUTPATIENT
Start: 2019-07-20 | End: 2019-07-20 | Stop reason: HOSPADM

## 2019-07-20 RX ORDER — NICOTINE POLACRILEX 4 MG
15 LOZENGE BUCCAL PRN
Status: DISCONTINUED | OUTPATIENT
Start: 2019-07-20 | End: 2019-07-20 | Stop reason: HOSPADM

## 2019-07-20 RX ORDER — DEXTROSE MONOHYDRATE 50 MG/ML
100 INJECTION, SOLUTION INTRAVENOUS PRN
Status: DISCONTINUED | OUTPATIENT
Start: 2019-07-20 | End: 2019-07-20 | Stop reason: HOSPADM

## 2019-07-20 RX ADMIN — ALBUTEROL SULFATE 2 PUFF: 90 AEROSOL, METERED RESPIRATORY (INHALATION) at 08:50

## 2019-07-20 RX ADMIN — IPRATROPIUM BROMIDE 2 PUFF: 17 AEROSOL, METERED RESPIRATORY (INHALATION) at 12:23

## 2019-07-20 RX ADMIN — IPRATROPIUM BROMIDE 2 PUFF: 17 AEROSOL, METERED RESPIRATORY (INHALATION) at 08:50

## 2019-07-20 RX ADMIN — TICAGRELOR 90 MG: 90 TABLET ORAL at 08:22

## 2019-07-20 RX ADMIN — ENOXAPARIN SODIUM 40 MG: 40 INJECTION SUBCUTANEOUS at 08:26

## 2019-07-20 RX ADMIN — ALBUTEROL SULFATE 2 PUFF: 90 AEROSOL, METERED RESPIRATORY (INHALATION) at 12:23

## 2019-07-20 RX ADMIN — DESMOPRESSIN ACETATE 40 MG: 0.2 TABLET ORAL at 08:22

## 2019-07-20 RX ADMIN — Medication 10 ML: at 08:23

## 2019-07-20 RX ADMIN — ASPIRIN 81 MG: 81 TABLET, CHEWABLE ORAL at 08:22

## 2019-07-20 RX ADMIN — TIOTROPIUM BROMIDE 18 MCG: 18 CAPSULE ORAL; RESPIRATORY (INHALATION) at 08:50

## 2019-07-20 ASSESSMENT — ENCOUNTER SYMPTOMS
VOMITING: 0
SORE THROAT: 0
RHINORRHEA: 0
ABDOMINAL PAIN: 0
NAUSEA: 1
COLOR CHANGE: 0
SHORTNESS OF BREATH: 1
BACK PAIN: 1

## 2019-07-20 ASSESSMENT — HEART SCORE: ECG: 0

## 2019-07-20 NOTE — H&P
[facility]  CDU / OBSERVATION eNCOUnter  Resident Note     Pt Name: Alonzo Thrasher  MRN: [id number]  [place] 1960  Date of evaluation: 7/20/19  Patient's PCP is :  Chano De La Rosa    CHIEF COMPLAINT       Chief Complaint   Patient presents with    Chest Pain         HISTORY OF PRESENT ILLNESS    Alonzo Thrasher is a 66 y.o. female who presents with acute retrosternal chest pressure that radiates to her bilateral shoulders and was associated with some lightheadedness. This pain lasted a little over an hour. states nitroglycerin helped. She denied vomiting, diaphoresis, shortness of breath. Patient had a heart catheterization with stenting in February 2019. Patient has a past medical history of CAD with stenting, peripheral artery disease, hypertension, hyperlipidemia, diabetes, COPD, asthma. She was admitted to the observation unit for evaluation by cardiology. Location/Symptom: retrosternal  Timing/Onset: this 8am  Provocation: none  Quality: pressure  Radiation: b/l arms  Severity: moderate  Timing/Duration: x3 hours  Modifying Factors: previous stetnting    REVIEW OF SYSTEMS       General ROS - No fevers, No malaise   Ophthalmic ROS - No discharge, No changes in vision  ENT ROS -  No sore throat, No rhinorrhea,   Respiratory ROS - no shortness of breath, no cough, no  wheezing  Cardiovascular ROS - chest pain, no dyspnea on exertion  Gastrointestinal ROS - No abdominal pain, no nausea or vomiting, no change in bowel habits, no black or bloody stools  Genito-Urinary ROS - No dysuria, trouble voiding, or hematuria  Musculoskeletal ROS - No myalgias, No arthalgias  Neurological ROS - No headache, lightheadedness, No focal weakness, no loss of sensation  Dermatological ROS - No lesions, No rash     (PQRS) Advance directives on face sheet per hospital policy.  No change unless specifically mentioned in chart    PAST MEDICAL HISTORY    has a past medical history of Asthma, Blood

## 2019-07-20 NOTE — CONSULTS
[facility]  In Patient Cardiology Consult             Date:   7/20/2019  Patient name: Juliet Ennis  Date of admission:  7/19/2019  5:14 PM  MRN:   [id number]  YOB: 1960    Reason for Admission: Bilateral shoulder pain    CHIEF COMPLAINT: Bilateral shoulder pain    History Obtained From: Patient/chart    HISTORY OF PRESENT ILLNESS:    This is a 62-year-old female with history as below who presents due to bilateral shoulder pain. She states it started on her right and left shoulder. It did radiate to her back. Also had some pain across her chest.  She states she has shoulder issues. This feels like her shoulder pain. States that she did have a history of cardiac catheterization with stenting in February 2019. This feels completely different than what she felt prior to that. No associated diaphoresis, shortness of breath, orthopnea, PND, palpitations, lower extremity edema. Past Medical History:   has a past medical history of Asthma, Blood circulation, collateral, CAD (coronary artery disease), COPD (chronic obstructive pulmonary disease) ([address] Diabetes mellitus ([address] Hyperlipidemia, Hypertension, Movement disorder, Neuropathy, and PAD (peripheral artery disease) ([address] Past Surgical History:   has a past surgical history that includes Coronary angioplasty with stent; Tonsillectomy; Inner ear surgery (Right); and Coronary angioplasty with stent (02/11/2019). Home Medications:    Prior to Admission medications    Medication Sig Start Date End Date Taking? Authorizing Provider   aspirin 81 MG chewable tablet Take 1 tablet by mouth daily 2/13/19  Yes BINDU Mock CNP   nitroGLYCERIN (NITROSTAT) 0.4 MG SL tablet up to max of 3 total doses.  If no relief after 1 dose, call 911. 2/12/19  Yes BINDU Mock CNP   atorvastatin (LIPITOR) 40 MG tablet Take 1 tablet by mouth nightly 2/12/19   BINDU Mock CNP   metoprolol tartrate (LOPRESSOR) 25 MG tablet Take 0.5 tablets by mouth 2 times daily 2/12/19   BINDU Ordaz CNP   ticagrelor (BRILINTA) 90 MG TABS tablet Take 1 tablet by mouth 2 times daily 2/12/19   BINDU Ordaz CNP   Lancets MISC 1 each by Does not apply route daily 2/12/19   Satya Juarez MD   glipiZIDE (GLUCOTROL) 5 MG tablet Take 1 tablet by mouth 2 times daily 2/12/19   Satya Juarez MD   metFORMIN (GLUCOPHAGE) 1000 MG tablet Take 1 tablet by mouth 2 times daily (with meals) 2/12/19   Satya Juarez MD   atorvastatin (LIPITOR) 40 MG tablet Take 1 tablet by mouth daily 2/12/19   Satya Juarez MD   blood glucose monitor strips Test 3 times a day & as needed for symptoms of irregular blood glucose. 2/12/19   Satya Juarez MD   glucose monitoring kit (FREESTYLE) monitoring kit 1 kit by Does not apply route daily 2/12/19   Satya Juarez MD   albuterol (PROVENTIL) (5 MG/ML) 0.5% nebulizer solution Take 0.5 mLs by nebulization every 6 hours as needed for Wheezing 3/16/18   Clarisa Kerr MD   tiotropium (SPIRIVA) 18 MCG inhalation capsule Inhale 1 capsule into the lungs daily Pt told that she was using this medication at home. 3/16/18   Clarisa Kerr MD   albuterol-ipratropium (COMBIVENT RESPIMAT)  MCG/ACT AERS inhaler Inhale 1 puff into the lungs every 6 hours 3/16/18   Clarisa Kerr MD   acetaminophen (TYLENOL) 500 MG tablet Take 1 tablet by mouth every 6 hours as needed for Pain 1/9/18   Bhupinder Astudillo MD   lisinopril (PRINIVIL;ZESTRIL) 40 MG tablet Take 40 mg by mouth daily. Historical Provider, MD       Allergies:  Codeine and Morphine    Social History:   reports that she has been smoking cigarettes. She has been smoking about 0.50 packs per day. She uses smokeless tobacco. She reports that she has current or past drug history. Drug: Marijuana. She reports that she does not drink alcohol.      Family History:   neg for early CAD    REVIEW OF SYSTEMS:    · Constitutional: there has been no unanticipated weight loss. There's been No change in energy level, No change in activity level. · Eyes: No visual changes or diplopia. No scleral icterus. · ENT: No Headaches, hearing loss or vertigo. No mouth sores or sore throat. · Cardiovascular: As HPI  · Respiratory: As HPI  · Gastrointestinal: No abdominal pain, appetite loss, blood in stools. No change in bowel or bladder habits. · Genitourinary: No dysuria, trouble voiding, or hematuria. · Musculoskeletal:  No gait disturbance, No weakness or joint complaints. · Integumentary: No rash or pruritis. · Neurological: No headache, diplopia, change in muscle strength, numbness or tingling. No change in gait, balance, coordination, mood, affect, memory, mentation, behavior. · Psychiatric: No anxiety, or depression. · Endocrine: No temperature intolerance. No excessive thirst, fluid intake, or urination. No tremor. · Hematologic/Lymphatic: No abnormal bruising or bleeding, blood clots or swollen lymph nodes. · Allergic/Immunologic: No nasal congestion or hives. PHYSICAL EXAM:    Physical Examination:    /67   Pulse 89   Temp 97.3 °F (36.3 °C) (Oral)   Resp 18   Wt 240 lb (108.9 kg)   SpO2 100%   BMI 36.49 kg/m²    Constitutional and General Appearance: alert, cooperative, no distress and appears stated age  HEENT: PERRL, no cervical lymphadenopathy. No masses palpable. Normal oral mucosa  Respiratory:  · Normal excursion and expansion without use of accessory muscles  · Resp Auscultation: Good respiratory effort. No for increased work of breathing.  On auscultation: Clear  Cardiovascular:  · Producible shoulder, chest wall pain  · Heart tones are crisp and normal. regular S1 and S2. Murmurs: none  · Jugular venous pulsation Normal  · The carotid upstroke is normal in amplitude and contour without delay or bruit  · Peripheral pulses are symmetrical and full   Abdomen:  · No masses or tenderness  · Bowel sounds present  Extremities:  · present. Poor runoff was present. The lesion was diagnosed as High Risk    (C). Devices used       - Whisper Wire 190 cm. Number of passes: 1.       - Cross It Wire 190cm. Number of passes: 1.       - Mini Trek Balloon 2.0mm x 12mm. 11 inflation(s) to a max pressure of:    16 liz. - [place] Quantum [place] Balloon 3.0x12. 3 inflation(s) to a max pressure of:    12 liz. - Xience Shirley 3.25 x 18 JOSE MARTIN. 3 inflation(s) to a max pressure of: 14    liz. Coronary Tree      Dominance: Right     LV function assessed as:Abnormal.  Ejection Fraction  +----------------------------------------------------------------------+---+  ! Method                                                                ! EF%! +----------------------------------------------------------------------+---+  ! LV gram                                                               !45 !  +----------------------------------------------------------------------+---+    Procedure Data  Procedure Start Time: 02/11/2019 15:22. Procedure End Time: 02/11/2019  16:01. The procedure was explained in detail to the patient. Risks, complications  and alternative treatments were reviewed. Written consent was obtained. Interventional Cath Status: Urgent    Entry Locations    - Retrograde Percutaneous access was performed through the Right Radial      artery. A 6 Fr sheath was inserted. Hemostasis was successfully obtained      using TR Band. Diagnostic catheters:    - 6F Guide Catheter 3DRCwas used for Right coronary angiography. Procedure Medications:    - Heparin I.V. bolus 44384 units. - Versed I.V. 1 mg.      - Fentanyl I.V. 25 mcg.      - Nitroglycerin I.C. 200 mcg.      - Fentanyl I.V. 50 mcg.      - Nitroglycerin I.C. 200 mcg.      - Nitroglycerin I.C. 200 mcg.      - Brilinta P.O. 180 mg. Contrast Material:    - Optiray 55214 ml    Fluoroscopy Time: Diagnostic: 19:12 minutes. Total: 19:12 minutes.     Estimated Blood Loss: 10 ml.     Medical History      Risk Factors      The patient risk factors include:physical activity, treated   hypercholesterolemia, treated hypertension, orally-treated diabetes   mellitus, last creatinine: 0.7 mg/dl, creatinine clearance: 154.22 ml/min   and Current/Recent(w/in 1 year) tobacco use. Admission Data  Admission Date: 02/10/2019    Admission Status: Inpatient    Cardiac Status:        -The patient's anginal syndrome was assessed as Non-STEMI with cCS III      of the [place] clinical classification. Hemodynamics      Condition: Baseline Room Air      Estimated: 210. 74Heart Rate: 68 bpm     Pressure  +-----+--------------------------------------------------------------------+  ! Site ! Pressure                                                            ! +-----+--------------------------------------------------------------------+  ! AO   !118/52 (79)                                                         !  +-----+--------------------------------------------------------------------+    Shunts     Oxygen Values      O2 [id number]            Labs:     CBC:   Recent Labs     07/19/19 1744   WBC 10.1   HGB 11.8*   HCT 38.8        BMP:   Recent Labs     07/19/19 1744   *   K 5.4*   CO2 18*   BUN 20   CREATININE 1.05*   LABGLOM 54*   GLUCOSE 189*     BNP: No results for input(s): BNP in the last 72 hours. PT/INR: No results for input(s): PROTIME, INR in the last 72 hours. APTT:No results for input(s): APTT in the last 72 hours. CARDIAC ENZYMES:  Recent Labs     07/19/19 1744 07/19/19  1948   TROPHS 12 11     FASTING LIPID PANEL:  Lab Results   Component Value Date    HDL 37 02/10/2019    LDLDIRECT 123 06/02/2012    TRIG 139 02/10/2019     LIVER PROFILE:No results for input(s): AST, ALT, LABALBU in the last 72 hours.       IMPRESSION:    Patient Active Problem List   Diagnosis    SOB (shortness of breath)    COPD (chronic obstructive pulmonary disease) [address]    Chest pain    Smoker    Non morbid obesity    Lung nodule    ACS (acute coronary syndrome) (HCC)    Hypertension    S/P drug eluting coronary stent placement - Mid RCA () 2/11/19 (Dr. Marlyne Lesch)     -Musculoskeletal chest pain. Started in her shoulders went to her back radiated across her chest.  Different character than her pain prior to her PCI in the past.  -Known CAD status post PCI with drug-eluting stent to a  of the RCA and February/2019.  -Reserved LVEF based on echo February/2019.  -History of COPD  -History of smoking  -history of COPD  -Obesity    RECOMMENDATIONS:  -Acute MI has been ruled out with negative troponins.  -Pain is most likely musculoskeletal in nature and she states this is different than her pain that she had prior to her PCI. -No further inpatient cardiac work-up at this time. Patient can follow-up as an outpatient in 2 weeks and if continues to have symptoms we will consider further testing at that time. Discussed with patient and nursing. Thank you for allowing me to participate in the care of this patient, please do not hesitate to call if you have any questions. Brittany Bennett DO, [facility] [place], [address] [facility]  [email or web address]  [phone]

## 2019-07-22 LAB
EKG ATRIAL RATE: 76 BPM
EKG ATRIAL RATE: 81 BPM
EKG P AXIS: 33 DEGREES
EKG P AXIS: 52 DEGREES
EKG P-R INTERVAL: 174 MS
EKG P-R INTERVAL: 176 MS
EKG Q-T INTERVAL: 384 MS
EKG Q-T INTERVAL: 412 MS
EKG QRS DURATION: 74 MS
EKG QRS DURATION: 76 MS
EKG QTC CALCULATION (BAZETT): 446 MS
EKG QTC CALCULATION (BAZETT): 463 MS
EKG R AXIS: 44 DEGREES
EKG R AXIS: 67 DEGREES
EKG T AXIS: 24 DEGREES
EKG T AXIS: 56 DEGREES
EKG VENTRICULAR RATE: 76 BPM
EKG VENTRICULAR RATE: 81 BPM

## 2020-06-16 ENCOUNTER — HOSPITAL ENCOUNTER (OUTPATIENT)
Age: 60
Setting detail: SPECIMEN
Discharge: HOME | End: 2020-06-16
Payer: MEDICARE

## 2020-06-16 LAB
ABSOLUTE EOS #: 0.38 K/UL (ref 0–0.44)
ABSOLUTE IMMATURE GRANULOCYTE: 0.03 K/UL (ref 0–0.3)
ABSOLUTE LYMPH #: 1.78 K/UL (ref 1.1–3.7)
ABSOLUTE MONO #: 0.59 K/UL (ref 0.1–1.2)
BASOPHILS # BLD: 1 % (ref 0–2)
BASOPHILS ABSOLUTE: 0.05 K/UL (ref 0–0.2)
CHOLESTEROL/HDL RATIO: 3.5
CHOLESTEROL: 122 MG/DL
DIFFERENTIAL TYPE: ABNORMAL
EOSINOPHILS RELATIVE PERCENT: 4 % (ref 1–4)
HCT VFR BLD CALC: 41.3 % (ref 36.3–47.1)
HDLC SERPL-MCNC: 35 MG/DL
HEMOGLOBIN: 12 G/DL (ref 11.9–15.1)
IMMATURE GRANULOCYTES: 0 %
LDL CHOLESTEROL: 57 MG/DL (ref 0–130)
LYMPHOCYTES # BLD: 18 % (ref 24–43)
MCH RBC QN AUTO: 25.7 PG (ref 25.2–33.5)
MCHC RBC AUTO-ENTMCNC: 29.1 G/DL (ref 28.4–34.8)
MCV RBC AUTO: 88.4 FL (ref 82.6–102.9)
MONOCYTES # BLD: 6 % (ref 3–12)
NRBC AUTOMATED: 0 PER 100 WBC
PDW BLD-RTO: 15.8 % (ref 11.8–14.4)
PLATELET # BLD: 298 K/UL (ref 138–453)
PLATELET ESTIMATE: ABNORMAL
PMV BLD AUTO: 11.6 FL (ref 8.1–13.5)
RBC # BLD: 4.67 M/UL (ref 3.95–5.11)
RBC # BLD: ABNORMAL 10*6/UL
SEG NEUTROPHILS: 71 % (ref 36–65)
SEGMENTED NEUTROPHILS ABSOLUTE COUNT: 7.1 K/UL (ref 1.5–8.1)
TRIGL SERPL-MCNC: 152 MG/DL
VITAMIN D 25-HYDROXY: 10.7 NG/ML (ref 30–100)
VLDLC SERPL CALC-MCNC: ABNORMAL MG/DL (ref 1–30)
WBC # BLD: 9.9 K/UL (ref 3.5–11.3)
WBC # BLD: ABNORMAL 10*3/UL

## 2020-06-17 LAB
ALBUMIN SERPL-MCNC: 3.5 G/DL (ref 3.5–5.2)
ALBUMIN/GLOBULIN RATIO: 1.1 (ref 1–2.5)
ALP BLD-CCNC: 94 U/L (ref 35–104)
ALT SERPL-CCNC: 12 U/L (ref 5–33)
ANION GAP SERPL CALCULATED.3IONS-SCNC: 18 MMOL/L (ref 9–17)
AST SERPL-CCNC: 15 U/L
BILIRUB SERPL-MCNC: <0.1 MG/DL (ref 0.3–1.2)
BUN BLDV-MCNC: 18 MG/DL (ref 6–20)
BUN/CREAT BLD: ABNORMAL (ref 9–20)
CALCIUM SERPL-MCNC: 9.4 MG/DL (ref 8.6–10.4)
CHLORIDE BLD-SCNC: 107 MMOL/L (ref 98–107)
CO2: 19 MMOL/L (ref 20–31)
CREAT SERPL-MCNC: 1 MG/DL (ref 0.5–0.9)
GFR AFRICAN AMERICAN: >60 ML/MIN
GFR NON-AFRICAN AMERICAN: 57 ML/MIN
GFR SERPL CREATININE-BSD FRML MDRD: ABNORMAL ML/MIN/{1.73_M2}
GFR SERPL CREATININE-BSD FRML MDRD: ABNORMAL ML/MIN/{1.73_M2}
GLUCOSE BLD-MCNC: 131 MG/DL (ref 70–99)
POTASSIUM SERPL-SCNC: 4.7 MMOL/L (ref 3.7–5.3)
SODIUM BLD-SCNC: 144 MMOL/L (ref 135–144)
THYROXINE, FREE: 1.11 NG/DL (ref 0.93–1.7)
TOTAL PROTEIN: 6.8 G/DL (ref 6.4–8.3)
TSH SERPL DL<=0.05 MIU/L-ACNC: 1.24 MIU/L (ref 0.3–5)

## 2020-07-22 ENCOUNTER — APPOINTMENT (OUTPATIENT)
Dept: GENERAL RADIOLOGY | Age: 60
End: 2020-07-22
Payer: MEDICARE

## 2020-07-22 ENCOUNTER — HOSPITAL ENCOUNTER (EMERGENCY)
Age: 60
Discharge: HOME | End: 2020-07-22
Attending: EMERGENCY MEDICINE
Payer: MEDICARE

## 2020-07-22 VITALS
HEART RATE: 75 BPM | SYSTOLIC BLOOD PRESSURE: 167 MMHG | OXYGEN SATURATION: 97 % | TEMPERATURE: 97.2 F | RESPIRATION RATE: 18 BRPM | DIASTOLIC BLOOD PRESSURE: 60 MMHG

## 2020-07-22 LAB
-: ABNORMAL
ABSOLUTE EOS #: 0.24 K/UL (ref 0–0.44)
ABSOLUTE IMMATURE GRANULOCYTE: <0.03 K/UL (ref 0–0.3)
ABSOLUTE LYMPH #: 1.46 K/UL (ref 1.1–3.7)
ABSOLUTE MONO #: 0.61 K/UL (ref 0.1–1.2)
ALBUMIN SERPL-MCNC: 3.7 G/DL (ref 3.5–5.2)
ALBUMIN/GLOBULIN RATIO: 1.1 (ref 1–2.5)
ALP BLD-CCNC: 86 U/L (ref 35–104)
ALT SERPL-CCNC: 15 U/L (ref 5–33)
AMORPHOUS: ABNORMAL
ANION GAP SERPL CALCULATED.3IONS-SCNC: 13 MMOL/L (ref 9–17)
AST SERPL-CCNC: 14 U/L
BACTERIA: ABNORMAL
BASOPHILS # BLD: 1 % (ref 0–2)
BASOPHILS ABSOLUTE: 0.09 K/UL (ref 0–0.2)
BILIRUB SERPL-MCNC: 0.29 MG/DL (ref 0.3–1.2)
BILIRUBIN URINE: NEGATIVE
BUN BLDV-MCNC: 20 MG/DL (ref 6–20)
BUN/CREAT BLD: ABNORMAL (ref 9–20)
CALCIUM SERPL-MCNC: 9.1 MG/DL (ref 8.6–10.4)
CASTS UA: ABNORMAL /LPF (ref 0–2)
CHLORIDE BLD-SCNC: 103 MMOL/L (ref 98–107)
CO2: 20 MMOL/L (ref 20–31)
COLOR: YELLOW
COMMENT UA: ABNORMAL
CREAT SERPL-MCNC: 1.37 MG/DL (ref 0.5–0.9)
CRYSTALS, UA: ABNORMAL /HPF
DIFFERENTIAL TYPE: ABNORMAL
EOSINOPHILS RELATIVE PERCENT: 2 % (ref 1–4)
EPITHELIAL CELLS UA: ABNORMAL /HPF (ref 0–5)
GFR AFRICAN AMERICAN: 48 ML/MIN
GFR NON-AFRICAN AMERICAN: 40 ML/MIN
GFR SERPL CREATININE-BSD FRML MDRD: ABNORMAL ML/MIN/{1.73_M2}
GFR SERPL CREATININE-BSD FRML MDRD: ABNORMAL ML/MIN/{1.73_M2}
GLUCOSE BLD-MCNC: 128 MG/DL (ref 70–99)
GLUCOSE URINE: NEGATIVE
HCT VFR BLD CALC: 42.4 % (ref 36.3–47.1)
HEMOGLOBIN: 13 G/DL (ref 11.9–15.1)
IMMATURE GRANULOCYTES: 0 %
KETONES, URINE: NEGATIVE
LEUKOCYTE ESTERASE, URINE: ABNORMAL
LIPASE: 34 U/L (ref 13–60)
LYMPHOCYTES # BLD: 15 % (ref 24–43)
MCH RBC QN AUTO: 26.1 PG (ref 25.2–33.5)
MCHC RBC AUTO-ENTMCNC: 30.7 G/DL (ref 28.4–34.8)
MCV RBC AUTO: 85 FL (ref 82.6–102.9)
MONOCYTES # BLD: 6 % (ref 3–12)
MUCUS: ABNORMAL
NITRITE, URINE: NEGATIVE
NRBC AUTOMATED: 0 PER 100 WBC
OTHER OBSERVATIONS UA: ABNORMAL
PDW BLD-RTO: 16.2 % (ref 11.8–14.4)
PH UA: 5.5 (ref 5–8)
PLATELET # BLD: 278 K/UL (ref 138–453)
PLATELET ESTIMATE: ABNORMAL
PMV BLD AUTO: 11 FL (ref 8.1–13.5)
POTASSIUM SERPL-SCNC: 4.3 MMOL/L (ref 3.7–5.3)
PROTEIN UA: NEGATIVE
RBC # BLD: 4.99 M/UL (ref 3.95–5.11)
RBC # BLD: ABNORMAL 10*6/UL
RBC UA: ABNORMAL /HPF (ref 0–2)
RENAL EPITHELIAL, UA: ABNORMAL /HPF
SEG NEUTROPHILS: 76 % (ref 36–65)
SEGMENTED NEUTROPHILS ABSOLUTE COUNT: 7.61 K/UL (ref 1.5–8.1)
SODIUM BLD-SCNC: 136 MMOL/L (ref 135–144)
SPECIFIC GRAVITY UA: 1.02 (ref 1–1.03)
TOTAL PROTEIN: 7.1 G/DL (ref 6.4–8.3)
TRICHOMONAS: ABNORMAL
TROPONIN INTERP: NORMAL
TROPONIN INTERP: NORMAL
TROPONIN T: NORMAL NG/ML
TROPONIN T: NORMAL NG/ML
TROPONIN, HIGH SENSITIVITY: 12 NG/L (ref 0–14)
TROPONIN, HIGH SENSITIVITY: 12 NG/L (ref 0–14)
TURBIDITY: ABNORMAL
URINE HGB: ABNORMAL
UROBILINOGEN, URINE: NORMAL
WBC # BLD: 10 K/UL (ref 3.5–11.3)
WBC # BLD: ABNORMAL 10*3/UL
WBC UA: ABNORMAL /HPF (ref 0–5)
YEAST: ABNORMAL

## 2020-07-22 PROCEDURE — 99284 EMERGENCY DEPT VISIT MOD MDM: CPT

## 2020-07-22 PROCEDURE — 96375 TX/PRO/DX INJ NEW DRUG ADDON: CPT

## 2020-07-22 PROCEDURE — 85025 COMPLETE CBC W/AUTO DIFF WBC: CPT

## 2020-07-22 PROCEDURE — 83690 ASSAY OF LIPASE: CPT

## 2020-07-22 PROCEDURE — 93005 ELECTROCARDIOGRAM TRACING: CPT | Performed by: EMERGENCY MEDICINE

## 2020-07-22 PROCEDURE — 6370000000 HC RX 637 (ALT 250 FOR IP): Performed by: EMERGENCY MEDICINE

## 2020-07-22 PROCEDURE — 96365 THER/PROPH/DIAG IV INF INIT: CPT

## 2020-07-22 PROCEDURE — 84484 ASSAY OF TROPONIN QUANT: CPT

## 2020-07-22 PROCEDURE — 2580000003 HC RX 258: Performed by: EMERGENCY MEDICINE

## 2020-07-22 PROCEDURE — 87086 URINE CULTURE/COLONY COUNT: CPT

## 2020-07-22 PROCEDURE — 80053 COMPREHEN METABOLIC PANEL: CPT

## 2020-07-22 PROCEDURE — 71045 X-RAY EXAM CHEST 1 VIEW: CPT

## 2020-07-22 PROCEDURE — 81001 URINALYSIS AUTO W/SCOPE: CPT

## 2020-07-22 PROCEDURE — 6360000002 HC RX W HCPCS: Performed by: EMERGENCY MEDICINE

## 2020-07-22 RX ORDER — FENTANYL CITRATE 50 UG/ML
25 INJECTION, SOLUTION INTRAMUSCULAR; INTRAVENOUS ONCE
Status: DISCONTINUED | OUTPATIENT
Start: 2020-07-22 | End: 2020-07-22

## 2020-07-22 RX ORDER — 0.9 % SODIUM CHLORIDE 0.9 %
1000 INTRAVENOUS SOLUTION INTRAVENOUS ONCE
Status: COMPLETED | OUTPATIENT
Start: 2020-07-22 | End: 2020-07-22

## 2020-07-22 RX ORDER — ACETAMINOPHEN 500 MG
TABLET ORAL
Status: DISCONTINUED
Start: 2020-07-22 | End: 2020-07-22 | Stop reason: HOSPADM

## 2020-07-22 RX ORDER — ASPIRIN 81 MG/1
324 TABLET, CHEWABLE ORAL ONCE
Status: COMPLETED | OUTPATIENT
Start: 2020-07-22 | End: 2020-07-22

## 2020-07-22 RX ORDER — CEPHALEXIN 500 MG/1
500 CAPSULE ORAL 3 TIMES DAILY
Qty: 9 CAPSULE | Refills: 0 | Status: ON HOLD | OUTPATIENT
Start: 2020-07-22 | End: 2021-07-27 | Stop reason: HOSPADM

## 2020-07-22 RX ORDER — ONDANSETRON 2 MG/ML
4 INJECTION INTRAMUSCULAR; INTRAVENOUS ONCE
Status: COMPLETED | OUTPATIENT
Start: 2020-07-22 | End: 2020-07-22

## 2020-07-22 RX ORDER — ACETAMINOPHEN 500 MG
1000 TABLET ORAL ONCE
Status: COMPLETED | OUTPATIENT
Start: 2020-07-22 | End: 2020-07-22

## 2020-07-22 RX ADMIN — ONDANSETRON 4 MG: 2 INJECTION INTRAMUSCULAR; INTRAVENOUS at 04:39

## 2020-07-22 RX ADMIN — CEFTRIAXONE SODIUM 1 G: 1 INJECTION, POWDER, FOR SOLUTION INTRAMUSCULAR; INTRAVENOUS at 06:35

## 2020-07-22 RX ADMIN — SODIUM CHLORIDE 1000 ML: 9 INJECTION, SOLUTION INTRAVENOUS at 05:54

## 2020-07-22 RX ADMIN — ACETAMINOPHEN 1000 MG: 500 TABLET ORAL at 05:32

## 2020-07-22 RX ADMIN — ASPIRIN 81 MG 324 MG: 81 TABLET ORAL at 04:38

## 2020-07-22 ASSESSMENT — PAIN DESCRIPTION - ORIENTATION: ORIENTATION: LEFT;UPPER

## 2020-07-22 ASSESSMENT — ENCOUNTER SYMPTOMS
DIARRHEA: 1
NAUSEA: 1
COLOR CHANGE: 0
BLOOD IN STOOL: 0
CONSTIPATION: 0
SHORTNESS OF BREATH: 0
BACK PAIN: 0
SINUS PAIN: 0
PHOTOPHOBIA: 0
ABDOMINAL PAIN: 1
VOMITING: 0
CHEST TIGHTNESS: 0
APNEA: 0
FACIAL SWELLING: 0

## 2020-07-22 ASSESSMENT — PAIN DESCRIPTION - LOCATION: LOCATION: ABDOMEN

## 2020-07-22 ASSESSMENT — PAIN DESCRIPTION - FREQUENCY: FREQUENCY: INTERMITTENT

## 2020-07-22 ASSESSMENT — PAIN DESCRIPTION - DESCRIPTORS: DESCRIPTORS: SHARP

## 2020-07-22 ASSESSMENT — PAIN DESCRIPTION - PAIN TYPE: TYPE: ACUTE PAIN

## 2020-07-22 ASSESSMENT — PAIN SCALES - GENERAL: PAINLEVEL_OUTOF10: 9

## 2020-07-22 NOTE — ED PROVIDER NOTES
[address]  ED  Emergency Department Encounter  EmergencyMedicine Resident     Pt Name:Pina Hadley  MRN: [id number]  Ilsagfeleanor 1960  Date of evaluation: 7/22/20  PCP:  Charo Rutledge       Chief Complaint   Patient presents with    Abdominal Pain     Upper left quadrant    Nausea       HISTORY OF PRESENT ILLNESS  (Location/Symptom, Timing/Onset, Context/Setting, Quality, Duration, Modifying Factors, Severity.)      rFeda Mascorro is a 64 y.o. female who presents with abdominal pain diffusely of her abdomen more in the upper left quadrant. Nominal pain woke her up from sleep at 0130 this evening. Never felt this pain before, described as sharp, comes and goes in waves, at worst rated 9/10. She denies any new onset of shortness of breath she does have COPD. Pain with palpation of the left chest wall. Reports some associated nausea no vomiting. Episode of diarrhea yesterday. Reports no fever, chills, or palpitations. She states she has been urinating more because she has been drinking more, does not relate that to increased frequency. PAST MEDICAL / SURGICAL / SOCIAL / FAMILY HISTORY      has a past medical history of Asthma, Blood circulation, collateral, CAD (coronary artery disease), COPD (chronic obstructive pulmonary disease) ([address] Diabetes mellitus ([address] Hyperlipidemia, Hypertension, Movement disorder, Neuropathy, and PAD (peripheral artery disease) ([address] No other pertinent past medical history     has a past surgical history that includes Coronary angioplasty with stent; Tonsillectomy; Inner ear surgery (Right); and Coronary angioplasty with stent (02/11/2019). No other pertinent past surgical history    Social History     Socioeconomic History    Marital status:       Spouse name: Not on file    Number of children: Not on file    Years of education: Not on file    Highest education level: Not on file   Occupational History    Not on file Social Needs    Financial resource strain: Not on file    Food insecurity     Worry: Not on file     Inability: Not on file    Transportation needs     Medical: Not on file     Non-medical: Not on file   Tobacco Use    Smoking status: Current Every Day Smoker     Packs/day: 0.50     Types: Cigarettes    Smokeless tobacco: Current User   Substance and Sexual Activity    Alcohol use: No    Drug use: Yes     Types: Marijuana    Sexual activity: Never   Lifestyle    Physical activity     Days per week: Not on file     Minutes per session: Not on file    Stress: Not on file   Relationships    Social connections     Talks on phone: Not on file     Gets together: Not on file     Attends Latter day service: Not on file     Active member of club or organization: Not on file     Attends meetings of clubs or organizations: Not on file     Relationship status: Not on file    Intimate partner violence     Fear of current or ex partner: Not on file     Emotionally abused: Not on file     Physically abused: Not on file     Forced sexual activity: Not on file   Other Topics Concern    Not on file   Social History Narrative    Not on file       History reviewed. No pertinent family history. Allergies:  Codeine and Morphine    Home Medications:  Prior to Admission medications    Medication Sig Start Date End Date Taking? Authorizing Provider   cephALEXin (KEFLEX) 500 MG capsule Take 1 capsule by mouth 3 times daily 7/22/20  Yes Gaston Dickson,    aspirin 81 MG chewable tablet Take 1 tablet by mouth daily 2/13/19   BINDU Somers CNP   nitroGLYCERIN (NITROSTAT) 0.4 MG SL tablet up to max of 3 total doses.  If no relief after 1 dose, call 911. 2/12/19   BNIDU Somers CNP   atorvastatin (LIPITOR) 40 MG tablet Take 1 tablet by mouth nightly 2/12/19   BINDU Somers CNP   metoprolol tartrate (LOPRESSOR) 25 MG tablet Take 0.5 tablets by mouth 2 times daily 2/12/19 BINDU Combs CNP   ticagrelor (BRILINTA) 90 MG TABS tablet Take 1 tablet by mouth 2 times daily 2/12/19   BINDU Combs CNP   Lancets MISC 1 each by Does not apply route daily 2/12/19   Dragan Sullivan MD   glipiZIDE (GLUCOTROL) 5 MG tablet Take 1 tablet by mouth 2 times daily 2/12/19   Dragan Sullivan MD   metFORMIN (GLUCOPHAGE) 1000 MG tablet Take 1 tablet by mouth 2 times daily (with meals) 2/12/19   Dragan Sullivan MD   atorvastatin (LIPITOR) 40 MG tablet Take 1 tablet by mouth daily 2/12/19   Dragan Sullivan MD   blood glucose monitor strips Test 3 times a day & as needed for symptoms of irregular blood glucose. 2/12/19   Dragan Sullivan MD   glucose monitoring kit (FREESTYLE) monitoring kit 1 kit by Does not apply route daily 2/12/19   Dragan Sullivan MD   albuterol (PROVENTIL) (5 MG/ML) 0.5% nebulizer solution Take 0.5 mLs by nebulization every 6 hours as needed for Wheezing 3/16/18   Edward Braswell MD   tiotropium (SPIRIVA) 18 MCG inhalation capsule Inhale 1 capsule into the lungs daily Pt told that she was using this medication at home. 3/16/18   Edward Braswell MD   albuterol-ipratropium (COMBIVENT RESPIMAT)  MCG/ACT AERS inhaler Inhale 1 puff into the lungs every 6 hours 3/16/18   Edward Braswell MD   acetaminophen (TYLENOL) 500 MG tablet Take 1 tablet by mouth every 6 hours as needed for Pain 1/9/18   Carlo Ospina MD   lisinopril (PRINIVIL;ZESTRIL) 40 MG tablet Take 40 mg by mouth daily. Historical Provider, MD       REVIEW OF SYSTEMS    (2-9 systems for level 4, 10 or more for level 5)      Review of Systems   Constitutional: Negative for appetite change, chills and fever. HENT: Negative for facial swelling and sinus pain. Eyes: Negative for photophobia. Respiratory: Negative for apnea, chest tightness and shortness of breath. Cardiovascular: Positive for chest pain. Negative for palpitations. Gastrointestinal: Positive for abdominal pain, diarrhea and nausea. Negative for blood in stool, constipation and vomiting. Genitourinary: Negative for difficulty urinating, dysuria, flank pain and hematuria. Musculoskeletal: Negative for arthralgias, back pain and joint swelling. Skin: Negative for color change. Neurological: Negative for light-headedness and headaches. Psychiatric/Behavioral: Negative for hallucinations. The patient is nervous/anxious. PHYSICAL EXAM   (up to 7 for level 4, 8 or more for level 5)      INITIAL VITALS:   BP (!) 167/60   Pulse 75   Temp 97.2 °F (36.2 °C) (Oral)   Resp 18   SpO2 97%     Physical Exam  Vitals signs and nursing note reviewed. Constitutional:       Appearance: Normal appearance. HENT:      Head: Normocephalic and atraumatic. Mouth/Throat:      Mouth: Mucous membranes are moist.      Pharynx: Oropharynx is clear. Eyes:      Extraocular Movements: Extraocular movements intact. Conjunctiva/sclera: Conjunctivae normal.      Pupils: Pupils are equal, round, and reactive to light. Neck:      Musculoskeletal: Normal range of motion and neck supple. Cardiovascular:      Rate and Rhythm: Normal rate and regular rhythm. Pulses: Normal pulses. Heart sounds: Normal heart sounds. Abdominal:      General: Abdomen is flat. There is no distension. Palpations: Abdomen is soft. There is no mass. Tenderness: There is abdominal tenderness in the epigastric area and left upper quadrant. There is no guarding or rebound. Musculoskeletal: Normal range of motion. General: No swelling or tenderness. Skin:     General: Skin is warm and dry. Coloration: Skin is not pale. Findings: No erythema or rash. Neurological:      General: No focal deficit present. Mental Status: She is alert and oriented to person, place, and time. Mental status is at baseline.    Psychiatric:         Mood and Affect: Mood normal.         Behavior: Behavior normal.         DIFFERENTIAL  DIAGNOSIS PLAN (LABS / IMAGING / EKG):  Orders Placed This Encounter   Procedures    Culture, Urine    XR CHEST PORTABLE    CBC Auto Differential    Lipase    Comprehensive Metabolic Panel    Troponin    Urinalysis Reflex to Culture    Microscopic Urinalysis    EKG 12 Lead    Insert peripheral IV       MEDICATIONS ORDERED:  Orders Placed This Encounter   Medications    aspirin chewable tablet 324 mg    ondansetron (ZOFRAN) injection 4 mg    DISCONTD: fentaNYL (SUBLIMAZE) injection 25 mcg    acetaminophen (TYLENOL) tablet 1,000 mg    acetaminophen (TYLENOL) 500 MG tablet     Hetal Mata: cabinet override    0.9 % sodium chloride bolus    cefTRIAXone (ROCEPHIN) 1 g IVPB in 50 mL D5W minibag    cephALEXin (KEFLEX) 500 MG capsule     Sig: Take 1 capsule by mouth 3 times daily     Dispense:  9 capsule     Refill:  0       DDX: GERD, PUD, pancreatitis, cholecystitis, GB colic, cholangitis, Wdfs-[id number]-[id number], SBO, DKA, AAA, mesenteric ischemia, perforated viscous, acute gastroenteritis, NSAP, pyelonephritis, kidney stone, appendicitis, hernia, UTI, constipation, ovarian torsion, ovarian cyst, PID, tuboovarian abscess, period/ fibroid      DIAGNOSTIC RESULTS / EMERGENCY DEPARTMENT COURSE / MDM   LAB RESULTS:  Results for orders placed or performed during the hospital encounter of 07/22/20   CBC Auto Differential   Result Value Ref Range    WBC 10.0 3.5 - 11.3 k/uL    RBC 4.99 3.95 - 5.11 m/uL    Hemoglobin 13.0 11.9 - 15.1 g/dL    Hematocrit 42.4 36.3 - 47.1 %    MCV 85.0 82.6 - 102.9 fL    MCH 26.1 25.2 - 33.5 pg    MCHC 30.7 28.4 - 34.8 g/dL    RDW 16.2 (H) 11.8 - 14.4 %    Platelets 859 530 - 926 k/uL    MPV 11.0 8.1 - 13.5 fL    NRBC Automated 0.0 0.0 per 100 WBC    Differential Type NOT REPORTED     Seg Neutrophils 76 (H) 36 - 65 %    Lymphocytes 15 (L) 24 - 43 %    Monocytes 6 3 - 12 %    Eosinophils % 2 1 - 4 %    Basophils 1 0 - 2 %    Immature Granulocytes 0 0 %    Segs Absolute 7.61 1.50 - 8.10 k/uL Absolute Lymph # 1.46 1.10 - 3.70 k/uL    Absolute Mono # 0.61 0.10 - 1.20 k/uL    Absolute Eos # 0.24 0.00 - 0.44 k/uL    Basophils Absolute 0.09 0.00 - 0.20 k/uL    Absolute Immature Granulocyte <0.03 0.00 - 0.30 k/uL    WBC Morphology NOT REPORTED     RBC Morphology ANISOCYTOSIS PRESENT     Platelet Estimate NOT REPORTED    Lipase   Result Value Ref Range    Lipase 34 13 - 60 U/L   Comprehensive Metabolic Panel   Result Value Ref Range    Glucose 128 (H) 70 - 99 mg/dL    BUN 20 6 - 20 mg/dL    CREATININE 1.37 (H) 0.50 - 0.90 mg/dL    Bun/Cre Ratio NOT REPORTED 9 - 20    Calcium 9.1 8.6 - 10.4 mg/dL    Sodium 136 135 - 144 mmol/L    Potassium 4.3 3.7 - 5.3 mmol/L    Chloride 103 98 - 107 mmol/L    CO2 20 20 - 31 mmol/L    Anion Gap 13 9 - 17 mmol/L    Alkaline Phosphatase 86 35 - 104 U/L    ALT 15 5 - 33 U/L    AST 14 <32 U/L    Total Bilirubin 0.29 (L) 0.3 - 1.2 mg/dL    Total Protein 7.1 6.4 - 8.3 g/dL    Alb 3.7 3.5 - 5.2 g/dL    Albumin/Globulin Ratio 1.1 1.0 - 2.5    GFR Non-African American 40 (L) >60 mL/min    GFR  48 (L) >60 mL/min    GFR Comment          GFR Staging NOT REPORTED    Troponin   Result Value Ref Range    Troponin, High Sensitivity 12 0 - 14 ng/L    Troponin T NOT REPORTED <0.03 ng/mL    Troponin Interp NOT REPORTED    Troponin   Result Value Ref Range    Troponin, High Sensitivity 12 0 - 14 ng/L    Troponin T NOT REPORTED <0.03 ng/mL    Troponin Interp NOT REPORTED    Urinalysis Reflex to Culture    Specimen: Urine, clean catch   Result Value Ref Range    Color, UA YELLOW YELLOW    Turbidity UA CLOUDY (A) CLEAR    Glucose, Ur NEGATIVE NEGATIVE    Bilirubin Urine NEGATIVE NEGATIVE    Ketones, Urine NEGATIVE NEGATIVE    Specific Gravity, UA 1.020 1.005 - 1.030    Urine Hgb TRACE (A) NEGATIVE    pH, UA 5.5 5.0 - 8.0    Protein, UA NEGATIVE NEGATIVE    Urobilinogen, Urine Normal Normal    Nitrite, Urine NEGATIVE NEGATIVE    Leukocyte Esterase, Urine MODERATE (A) NEGATIVE Urinalysis Comments NOT REPORTED    Microscopic Urinalysis   Result Value Ref Range    -          WBC, UA 20 TO 50 0 - 5 /HPF    RBC, UA 2 TO 5 0 - 2 /HPF    Casts UA NOT REPORTED 0 - 2 /LPF    Crystals, UA NOT REPORTED None /HPF    Epithelial Cells UA 20 TO 50 0 - 5 /HPF    Renal Epithelial, UA NOT REPORTED 0 /HPF    Bacteria, UA MODERATE (A) None    Mucus, UA 1+ (A) None    Trichomonas, UA NOT REPORTED None    Amorphous, UA NOT REPORTED None    Other Observations UA NOT REPORTED NOT REQ. Yeast, UA NOT REPORTED None       RADIOLOGY:  XR CHEST PORTABLE   Final Result   No acute cardiopulmonary abnormality. EKG  EKG Interpretation    Interpreted by emergency department physician    Rhythm: normal sinus   Rate: normal  Axis: normal  Ectopy: premature atrial contraction  Conduction: normal  ST Segments: normal  T Waves: normal  Q Waves: none    Clinical Impression: non-specific EKG    Randolph Lizarraga     All EKG's are interpreted by the Emergency Department Physician who either signs or Co-signs this chart in the absence of a cardiologist.    EMERGENCY DEPARTMENT COURSE:  ED Course as of Jul 22 0837 Wed Jul 22, 2020   0400 Dr. Nicki Morse and myself evaluated the patient. History and physical were obtained. Initial nausea control was obtained by Zofran. Aspirin was given for the concern for acute coronary syndrome. EKG was obtained and read as normal.    [CR]   0440 Troponin [CR]   0500 Patient's creatinine is currently elevated. Concern for acute kidney injury. Creatinine(!): 1.37 [CR]   3978 Patient describing that she wants more pain management. She requests not to have opiate medications due to risk of addiction. Tylenol was provided for the patient. [CR]   4313 Lizett Hall was held with patient concerning her UTI and elevated creatinine. She states she has appointment with her primary care physician on Friday.   We will give her fluids here and she can follow-up with the elevated creatinine with her primary care physician. Also informed her would give her antibiotics    [CR]      ED Course User Index  [CR] Opal Dickson DO      Antibiotics were finished, fluid was completed. Patient was discharged with antibiotics. Upon discharge her pain was resolved. CONSULTS:  None    MEDICAL DECISION MAKING:  Patient with history risk factors for acute coronary syndrome presented with abdominal pain. Symptoms in the left quadrant upper quadrant which could be atypical acute coronary syndrome. This work-up proved to be negative with a negative troponin. Urine came back positive for UTI which could be symptoms for her abdominal pain. Her pain resolved with Tylenol. Patient resting comfortably. She did have an elevated creatinine again 30% of her baseline  A month ago. This could be acute kidney injury she was given fluids here in the emergency department. She has an appointment PCP on Friday and was emphasized for strict follow-up for her elevated creatinine which the patient understood on discharge. CRITICAL CARE:  Please see attending note    FINAL IMPRESSION      1. Acute cystitis without hematuria          DISPOSITION / PLAN     DISPOSITION Decision To Discharge 07/22/2020 07:35:41 AM      PATIENT REFERRED TO:  No follow-up provider specified.     DISCHARGE MEDICATIONS:  Discharge Medication List as of 7/22/2020  7:34 AM      START taking these medications    Details   cephALEXin (KEFLEX) 500 MG capsule Take 1 capsule by mouth 3 times daily, Disp-9 capsule,R-0Print             Randolph Puckett DO  Emergency Medicine Resident    (Please note that portions of thisnote were completed with a voice recognition program.  Efforts were made to edit the dictations but occasionally words are mis-transcribed.)       Elva Castillo DO  Resident  07/22/20 9734

## 2020-07-22 NOTE — ED NOTES
Pt presents to ED with complaints of upper left abdominal pain. Pt states it started tonight around 0130. Pt states feeling nauseous and having diarrhea yesterday. Pt states having a normal meal last night at 1900. Pt's alert and oriented x4, respirations even and unlabored, and talking in clear and complete sentences. Pt states the pain is intermittent. Pt states taking an ibuprofen at 0130 but it not helping. Pt attached to cardiac monitor, EKG done, and IV established. Pt hunched over in pain.  Will continue to monitor     Za Craig RN  07/22/20 4747

## 2020-07-22 NOTE — ED NOTES
Pt ambulated to bathroom without assistance and was given urine cup for specimen     Bruno De Souza RN  07/22/20 6250

## 2020-07-22 NOTE — ED NOTES
[facility] Jul[facility] Malik [facility] [address] [facility], RN  07/22/20 1448

## 2020-07-22 NOTE — ED PROVIDER NOTES
[facility]     Emergency Department     Faculty Attestation    I performed a history and physical examination of the patient and discussed management with the resident. I have reviewed and agree with the residents findings including all diagnostic interpretations, and treatment plans as written. Any areas of disagreement are noted on the chart. I was personally present for the key portions of any procedures. I have documented in the chart those procedures where I was not present during the key portions. I have reviewed the emergency nurses triage note. I agree with the chief complaint, past medical history, past surgical history, allergies, medications, social and family history as documented unless otherwise noted below. Documentation of the HPI, Physical Exam and Medical Decision Making performed by scribcristiano is based on my personal performance of the HPI, PE and MDM. For Physician Assistant/ Nurse Practitioner cases/documentation I have personally evaluated this patient and have completed at least one if not all key elements of the E/M (history, physical exam, and MDM). Additional findings are as noted.     63 yo F c/o L sided abdominal pain, no fever, no cp, + nausea & vomiting, no sob, no injury,   PE Hetal RN escort for exam, neck supple, abdomen mild tender LUQ, no distension, no rigidity, no mass no rebound,   No calf swelling, no calf tenderness,     S/s improved, cr elevated > 1.3, fluids given pt has follow up appt in 2 days, trop x2 12, pt wishing to follow up as out pt, (not wishing to stay, risk discussed per Dr Natalya Sanchez)  appears to have decision making capacity, uti treated, vss    EKG Interpretation    Interpreted by me  Sinus rhythm, heart rate 72, normal axis, no ischemia, QT corrected 481    CRITICAL CARE: There was a high probability of clinically significant/life threatening deterioration in this patient's condition which required my urgent intervention. Total critical care time was 10 minutes. This excludes any time for separately reportable procedures.        [place] [address], DO  07/22/20 [address], DO  07/22/20 [address], DO  07/22/20 3435

## 2020-07-23 ENCOUNTER — CARE COORDINATION (OUTPATIENT)
Dept: CARE COORDINATION | Age: 60
End: 2020-07-23

## 2020-07-23 LAB
CULTURE: NORMAL
Lab: NORMAL
SPECIMEN DESCRIPTION: NORMAL

## 2020-07-23 NOTE — CARE COORDINATION
1st outreach for ED follow up/ COVID screening.  No answer, left message with return contact information

## 2020-07-23 NOTE — CARE COORDINATION
Patient contacted regarding recent discharge and COVID-19 risk. Discussed COVID-19 related testing which was not done at this time. Test results were not done. Patient informed of results, if available? [facility] Transition Nurse/ Ambulatory Care Manager contacted the patient by telephone to perform post discharge assessment. Verified name and  with patient as identifiers. Patient has following risk factors of: COPD, diabetes and obesity . CTN/ACM reviewed discharge instructions, medical action plan and red flags related to discharge diagnosis. Reviewed and educated them on any new and changed medications related to discharge diagnosis. Advised obtaining a 90-day supply of all daily and as-needed medications. Education provided regarding infection prevention, and signs and symptoms of COVID-19 and when to seek medical attention with patient who verbalized understanding. Discussed exposure protocols and quarantine from [address] you at higher risk for severe illness  and given an opportunity for questions and concerns. The patient agrees to contact the COVID-19 hotline [phone] or PCP office for questions related to their healthcare. CTN/ACM provided contact information for future reference. From CDC: Are you at higher risk for severe illness?  Wash your hands often.  Avoid close contact (6 feet, which is about two arm lengths) with people who are sick.  Put distance between yourself and other people if COVID-19 is spreading in your community.  Clean and disinfect frequently touched surfaces.  Avoid all cruise travel and non-essential air travel.  Call your healthcare professional if you have concerns about COVID-19 and your underlying condition or if you are sick.     For more information on steps you can take to protect yourself, see CDC's How to Protect Yourself    Declines Get Well Loop     Plan for follow-up call in 7-14 days based on severity of symptoms and risk factors. Spoke with patient, symptoms have improved. No new or worsening symptoms.    Plans to call her PCP this morning

## 2020-07-24 LAB
EKG ATRIAL RATE: 72 BPM
EKG P-R INTERVAL: 166 MS
EKG Q-T INTERVAL: 440 MS
EKG QRS DURATION: 76 MS
EKG QTC CALCULATION (BAZETT): 481 MS
EKG R AXIS: 54 DEGREES
EKG T AXIS: 27 DEGREES
EKG VENTRICULAR RATE: 72 BPM

## 2020-08-06 ENCOUNTER — CARE COORDINATION (OUTPATIENT)
Dept: CARE COORDINATION | Age: 60
End: 2020-08-06

## 2020-08-06 NOTE — CARE COORDINATION
You Patient resolved from the Care Transitions episode on 8/6/20  Discussed COVID-19 related testing which was not done at this time. Test results were not done. Patient informed of results, if available? NA    Patient/family has been provided the following resources and education related to COVID-19:                         Signs, symptoms and red flags related to COVID-19            CDC exposure and quarantine guidelines            [facility] exposure contact - [phone]            Contact for their local Department of Health                 Patient currently reports that the following symptoms have improved:  no new/worsening symptoms     No further outreach scheduled with this CTN/ACM. Episode of Care resolved. Patient has this CTN/ACM contact information if future needs arise.

## 2020-10-24 ENCOUNTER — APPOINTMENT (OUTPATIENT)
Dept: ULTRASOUND IMAGING | Age: 60
End: 2020-10-24
Payer: MEDICARE

## 2020-10-24 ENCOUNTER — APPOINTMENT (OUTPATIENT)
Dept: GENERAL RADIOLOGY | Age: 60
End: 2020-10-24
Payer: MEDICARE

## 2020-10-24 ENCOUNTER — HOSPITAL ENCOUNTER (EMERGENCY)
Age: 60
Discharge: HOME | End: 2020-10-24
Attending: EMERGENCY MEDICINE
Payer: MEDICARE

## 2020-10-24 VITALS
OXYGEN SATURATION: 99 % | DIASTOLIC BLOOD PRESSURE: 79 MMHG | SYSTOLIC BLOOD PRESSURE: 142 MMHG | HEART RATE: 77 BPM | TEMPERATURE: 97.3 F

## 2020-10-24 LAB
-: NORMAL
ABSOLUTE EOS #: 0.19 K/UL (ref 0–0.44)
ABSOLUTE IMMATURE GRANULOCYTE: <0.03 K/UL (ref 0–0.3)
ABSOLUTE LYMPH #: 1.29 K/UL (ref 1.1–3.7)
ABSOLUTE MONO #: 0.44 K/UL (ref 0.1–1.2)
AMORPHOUS: NORMAL
ANION GAP SERPL CALCULATED.3IONS-SCNC: 14 MMOL/L (ref 9–17)
BACTERIA: NORMAL
BASOPHILS # BLD: 1 % (ref 0–2)
BASOPHILS ABSOLUTE: 0.07 K/UL (ref 0–0.2)
BILIRUBIN URINE: NEGATIVE
BUN BLDV-MCNC: 21 MG/DL (ref 6–20)
BUN/CREAT BLD: ABNORMAL (ref 9–20)
CALCIUM SERPL-MCNC: 9.2 MG/DL (ref 8.6–10.4)
CASTS UA: NORMAL /LPF (ref 0–8)
CHLORIDE BLD-SCNC: 106 MMOL/L (ref 98–107)
CO2: 17 MMOL/L (ref 20–31)
COLOR: YELLOW
COMMENT UA: ABNORMAL
CREAT SERPL-MCNC: 1.22 MG/DL (ref 0.5–0.9)
CRYSTALS, UA: NORMAL /HPF
DIFFERENTIAL TYPE: ABNORMAL
DIRECT EXAM: NORMAL
EOSINOPHILS RELATIVE PERCENT: 2 % (ref 1–4)
EPITHELIAL CELLS UA: NORMAL /HPF (ref 0–5)
GFR AFRICAN AMERICAN: 55 ML/MIN
GFR NON-AFRICAN AMERICAN: 45 ML/MIN
GFR SERPL CREATININE-BSD FRML MDRD: ABNORMAL ML/MIN/{1.73_M2}
GFR SERPL CREATININE-BSD FRML MDRD: ABNORMAL ML/MIN/{1.73_M2}
GLUCOSE BLD-MCNC: 179 MG/DL (ref 70–99)
GLUCOSE URINE: NEGATIVE
HCT VFR BLD CALC: 39.8 % (ref 36.3–47.1)
HEMOGLOBIN: 12.4 G/DL (ref 11.9–15.1)
IMMATURE GRANULOCYTES: 0 %
KETONES, URINE: NEGATIVE
LEUKOCYTE ESTERASE, URINE: NEGATIVE
LYMPHOCYTES # BLD: 15 % (ref 24–43)
Lab: NORMAL
MCH RBC QN AUTO: 26.3 PG (ref 25.2–33.5)
MCHC RBC AUTO-ENTMCNC: 31.2 G/DL (ref 28.4–34.8)
MCV RBC AUTO: 84.5 FL (ref 82.6–102.9)
MONOCYTES # BLD: 5 % (ref 3–12)
MUCUS: NORMAL
NITRITE, URINE: NEGATIVE
NRBC AUTOMATED: 0 PER 100 WBC
OTHER OBSERVATIONS UA: NORMAL
PDW BLD-RTO: 15.7 % (ref 11.8–14.4)
PH UA: 5.5 (ref 5–8)
PLATELET # BLD: 225 K/UL (ref 138–453)
PLATELET ESTIMATE: ABNORMAL
PMV BLD AUTO: 11.6 FL (ref 8.1–13.5)
POTASSIUM SERPL-SCNC: 4.1 MMOL/L (ref 3.7–5.3)
PROTEIN UA: NEGATIVE
RBC # BLD: 4.71 M/UL (ref 3.95–5.11)
RBC # BLD: ABNORMAL 10*6/UL
RBC UA: NORMAL /HPF (ref 0–4)
RENAL EPITHELIAL, UA: NORMAL /HPF
SEG NEUTROPHILS: 77 % (ref 36–65)
SEGMENTED NEUTROPHILS ABSOLUTE COUNT: 6.47 K/UL (ref 1.5–8.1)
SODIUM BLD-SCNC: 137 MMOL/L (ref 135–144)
SPECIFIC GRAVITY UA: 1.02 (ref 1–1.03)
SPECIMEN DESCRIPTION: NORMAL
TRICHOMONAS: NORMAL
TROPONIN INTERP: NORMAL
TROPONIN INTERP: NORMAL
TROPONIN T: NORMAL NG/ML
TROPONIN T: NORMAL NG/ML
TROPONIN, HIGH SENSITIVITY: 10 NG/L (ref 0–14)
TROPONIN, HIGH SENSITIVITY: 11 NG/L (ref 0–14)
TURBIDITY: ABNORMAL
URINE HGB: ABNORMAL
UROBILINOGEN, URINE: NORMAL
WBC # BLD: 8.5 K/UL (ref 3.5–11.3)
WBC # BLD: ABNORMAL 10*3/UL
WBC UA: NORMAL /HPF (ref 0–5)
YEAST: NORMAL

## 2020-10-24 PROCEDURE — 84484 ASSAY OF TROPONIN QUANT: CPT

## 2020-10-24 PROCEDURE — 87660 TRICHOMONAS VAGIN DIR PROBE: CPT

## 2020-10-24 PROCEDURE — 85025 COMPLETE CBC W/AUTO DIFF WBC: CPT

## 2020-10-24 PROCEDURE — 80048 BASIC METABOLIC PNL TOTAL CA: CPT

## 2020-10-24 PROCEDURE — 87510 GARDNER VAG DNA DIR PROBE: CPT

## 2020-10-24 PROCEDURE — 76830 TRANSVAGINAL US NON-OB: CPT

## 2020-10-24 PROCEDURE — 87591 N.GONORRHOEAE DNA AMP PROB: CPT

## 2020-10-24 PROCEDURE — 81001 URINALYSIS AUTO W/SCOPE: CPT

## 2020-10-24 PROCEDURE — 71045 X-RAY EXAM CHEST 1 VIEW: CPT

## 2020-10-24 PROCEDURE — 93005 ELECTROCARDIOGRAM TRACING: CPT | Performed by: STUDENT IN AN ORGANIZED HEALTH CARE EDUCATION/TRAINING PROGRAM

## 2020-10-24 PROCEDURE — 87491 CHLMYD TRACH DNA AMP PROBE: CPT

## 2020-10-24 PROCEDURE — 87480 CANDIDA DNA DIR PROBE: CPT

## 2020-10-24 PROCEDURE — 99284 EMERGENCY DEPT VISIT MOD MDM: CPT

## 2020-10-24 NOTE — FLOWSHEET NOTE
Assessment:  Patient is a 64 y.o. female in ED #30. Patient welcomed  and shared family dynamics. Intervention:   listened and validated patient's feelings and concerns.  was ministry of presence. Outcome:  Patient expressed emotions and concerns. Patient expressed gratitude for visit. Plan:  Chaplains will remain available for spiritual and emotiona     10/24/20 1442   Encounter Summary   Services provided to: Patient   Referral/Consult From: [facility]   Support System Children   Continue Visiting   (10/24/2020)   Complexity of Encounter Moderate   Length of Encounter 15 minutes   Spiritual Assessment Completed Yes   Routine   Type Initial   Assessment Calm; Approachable   Intervention Active listening;Sustaining presence/ Ministry of presence   Outcome Expressed gratitude   l support as needed.

## 2020-10-24 NOTE — ED NOTES
Pt to Ed with complaint of sudden vaginal bleeding. Pt denies any other complaints at this time.       Nyla Ramires RN  10/24/20 8422

## 2020-10-24 NOTE — ED PROVIDER NOTES
FACULTY SIGN-OUT  ADDENDUM     Care of this patient was assumed from previous attending physician. The patient's initial evaluation and plan have been discussed with the prior provider who initially evaluated the patient. Attestation  I was available and discussed any additional care issues that arose and coordinated the management plans with the resident(s) caring for the patient during my duty period. Any areas of disagreement with resident's documentation of care or procedures are noted on the chart. I was personally present for the key portions of any/all procedures, during my duty period. I have documented in the chart those procedures where I was not present during the key portions. ED COURSE      The patient was given the following medications:  No orders of the defined types were placed in this encounter. RECENT VITALS:   Temp: 97.3 °F (36.3 °C), Pulse: 77,  , BP: (!) 142/79    MEDICAL DECISION MAKING        Joya Guidry is a 64 y.o. female who presents to the Emergency Department with complaints of vaginal bleeding. Ultrasound shows a thickened and hemorrhagic endometrium. Recommending short-term ultrasound follow-up in 8 to 12 weeks. Patient is not anemic with hemoglobin of 12.4. Will consult GYN for abnormal vaginal bleeding. Isaiah Hernandez MD, F.A.C.E.P.   Attending Emergency Physician    (Please note that portions of this note were completed with a voice recognition program.  Efforts were made to edit the dictations but occasionally words are mis-transcribed.)         Isaiah Hernandez MD  10/24/20 7929

## 2020-10-24 NOTE — ED PROVIDER NOTES
abnormal vaginal bleeding can be a sign of cancer in a postmenopausal woman.     EKG Interpretation    Interpreted by emergency department physician    Rhythm: normal sinus   Rate: normal  Axis: normal  Ectopy: none  Conduction: normal  ST Segments: normal  T Waves: normal  Q Waves: none    Clinical Impression: non-specific EKG    Tamara Terry MD  Attending Emergency  Physician              Nayla Camacho MD  10/24/20 [address], [place]  10/24/20 1251

## 2020-10-24 NOTE — ED PROVIDER NOTES
CNP   atorvastatin (LIPITOR) 40 MG tablet Take 1 tablet by mouth nightly 2/12/19   BINDU Cantrell - CNP   metoprolol tartrate (LOPRESSOR) 25 MG tablet Take 0.5 tablets by mouth 2 times daily 2/12/19   BINDU Cantrell - CNP   ticagrelor (BRILINTA) 90 MG TABS tablet Take 1 tablet by mouth 2 times daily 2/12/19   BINDU Cantrell - CNP   Lancets MISC 1 each by Does not apply route daily 2/12/19   Jose Roberto Wagner MD   glipiZIDE (GLUCOTROL) 5 MG tablet Take 1 tablet by mouth 2 times daily 2/12/19   Jose Roberto Wagner MD   metFORMIN (GLUCOPHAGE) 1000 MG tablet Take 1 tablet by mouth 2 times daily (with meals) 2/12/19   Jose Roberto Wagner MD   atorvastatin (LIPITOR) 40 MG tablet Take 1 tablet by mouth daily 2/12/19   Jose Roberto Wagner MD   blood glucose monitor strips Test 3 times a day & as needed for symptoms of irregular blood glucose. 2/12/19   Jose Roberto Wagner MD   glucose monitoring kit (FREESTYLE) monitoring kit 1 kit by Does not apply route daily 2/12/19   Jose Roberto Wagner MD   albuterol (PROVENTIL) (5 MG/ML) 0.5% nebulizer solution Take 0.5 mLs by nebulization every 6 hours as needed for Wheezing 3/16/18   April Garrison MD   tiotropium (SPIRIVA) 18 MCG inhalation capsule Inhale 1 capsule into the lungs daily Pt told that she was using this medication at home. 3/16/18   April Garrison MD   albuterol-ipratropium (COMBIVENT RESPIMAT)  MCG/ACT AERS inhaler Inhale 1 puff into the lungs every 6 hours 3/16/18   April Garrison MD   acetaminophen (TYLENOL) 500 MG tablet Take 1 tablet by mouth every 6 hours as needed for Pain 1/9/18   Mary Anne Cruz MD   lisinopril (PRINIVIL;ZESTRIL) 40 MG tablet Take 40 mg by mouth daily.     Historical Provider, MD       REVIEW OFSYSTEMS    (2-9 systems for level 4, 10 or more for level 5)      Constitutional ROS - No recent fevers, No recent chills  Neurological ROS - No Headache, No Syncope  Opthalmologic ROS- No eye pain, No vision changes   ENT ROS - No sore throat, No congestion  Respiratory ROS - No cough, No shortness of breath  Cardiovascular ROS - No chest pain, No palpitations   Gastrointestinal ROS - No abdominal pain, No nausea, No vomiting  Genito-Urinary ROS - No dysuria, Nohematuria, +vaginal bleeding   Musculoskeletal ROS - No back pain, No neck pain  Dermatological ROS - No wound, No rash  PHYSICAL EXAM   (up to 7 for level 4, 8 or more forlevel 5)      INITIAL VITALS:   ED Triage Vitals   BP Temp Temp src Pulse Resp SpO2 Height Weight   -- 10/24/20 1144 -- 10/24/20 1206 -- 10/24/20 1206 -- --    97.3 °F (36.3 °C)  77  99 %         Physical Exam  Constitutional:       Appearance: She is well-developed. HENT:      Head: Normocephalic and atraumatic. Eyes:      Pupils: Pupils are equal, round, and reactive to light. Neck:      Musculoskeletal: Normal range of motion and neck supple. Cardiovascular:      Rate and Rhythm: Normal rate and regular rhythm. Pulmonary:      Effort: Pulmonary effort is normal. No respiratory distress. Breath sounds: Normal breath sounds. No stridor. Abdominal:      General: Bowel sounds are normal. There is no distension. Palpations: Abdomen is soft. Genitourinary:     Comments: Cervical os was visualized, cervical os was closed, there was active bleeding, however not hemorrhaging on pelvic exam, no obvious lacerations, palpable tumors or masses. Musculoskeletal: Normal range of motion. General: No deformity. Skin:     General: Skin is warm and dry. Capillary Refill: Capillary refill takes less than 2 seconds. Neurological:      Mental Status: She is alert and oriented to person, place, and time.    Psychiatric:         Behavior: Behavior normal.         DIFFERENTIAL  DIAGNOSIS     PLAN (LABS / IMAGING / EKG):  Orders Placed This Encounter   Procedures    VAGINITIS DNA PROBE    C.trachomatis N.gonorrhoeae DNA    XR CHEST PORTABLE    US NON OB TRANSVAGINAL    CBC Auto Differential    Troponin    Urinalysis Reflex to Culture    Microscopic Urinalysis    Basic Metabolic Panel   [facility] - Silvia Roe MD, Gynecology, [facility], THE Inpatient consult to Obstetrics / Gynecology    EKG 12 Lead       MEDICATIONS ORDERED:  No orders of the defined types were placed in this encounter. DDX: Abnormal vaginal bleeding, endometrial cancer, uterine fibroids, dysuria, UTI, rectal bleeding, ACS, CAD, PE    Initial MDM/Plan: 64 y.o. female who presents with concern for vaginal bleeding of 1 day duration, states that she soaked through a maxipads today. Patient is postmenopausal, last menstrual period at age 41. Patient denies any chest pain, shortness of breath, nausea, vomiting, fever, chills. There is concern for causes of abnormal vaginal bleeding such as endometrial cancer versus uterine fibroids. Patient is hemodynamically stable, alert and oriented. Plan to do a cardiac work-up as patient does report dizziness. Will order CBC, BMP, EKG, chest x-ray. Patient has history of cardiac stents, and is on Brilinta. Likely patient be discharged home, however disposition will depend upon laboratory values and diagnostic imaging. Patient has primary care follow-up, however does not have a specialty gynecological clinic, will refer patient to gynecology here at [facility] in addition to her already scheduled follow-up, would like to ensure that patient has adequate follow-up for abnormal vaginal bleeding.     DIAGNOSTIC RESULTS / EMERGENCYDEPARTMENT COURSE / MDM     LABS:  Labs Reviewed   CBC WITH AUTO DIFFERENTIAL - Abnormal; Notable for the following components:       Result Value    RDW 15.7 (*)     Seg Neutrophils 77 (*)     Lymphocytes 15 (*)     All other components within normal limits   URINE RT REFLEX TO CULTURE - Abnormal; Notable for the following components:    Turbidity UA CLOUDY (*)     Urine Hgb LARGE (*)     All other components within normal limits   BASIC METABOLIC PANEL - Abnormal; Notable for the following components:    Glucose 179 (*)     BUN 21 (*)     CREATININE 1.22 (*)     CO2 17 (*)     GFR Non- 45 (*)     GFR  55 (*)     All other components within normal limits   VAGINITIS DNA PROBE   C.TRACHOMATIS N.GONORRHOEAE DNA   TROPONIN   TROPONIN   MICROSCOPIC URINALYSIS         RADIOLOGY:  Us Non Ob Transvaginal    Result Date: 10/24/2020  EXAMINATION: PELVIC ULTRASOUND 10/24/2020 TECHNIQUE: Transvaginal pelvic ultrasound was performed. No color Doppler evaluation was performed. COMPARISON: None HISTORY: ORDERING SYSTEM PROVIDED HISTORY: abnormal vaginal bleeding, tender uterus on bimanual FINDINGS: Exam is suboptimal due to overlying bowel gas. Measurements: Uterus:  5.7 x 3.9 x 2.7 cm Endometrial stripe:  1.3 cm Right Ovary:  1.3 x 1.1 x 0.7 cm Left Ovary:  Not measured Ultrasound Findings: Uterus: Uterus demonstrates normal myometrial echotexture. Endometrial stripe: Endometrial stripe is thickened and heterogeneous. Right Ovary: Right ovary is within normal limits. Left Ovary:  Left ovary is not visualized. Free Fluid: No evidence of free fluid. Thickened and heterogeneous endometrium as measured above findings may relate to clot/hemorrhage given history of bleeding or alternatively endometrial hyperplasia/neoplasia. Consider further assessment with endometrial tissue sampling and/or short-term follow-up ultrasound in 8-12 weeks to reassess. Xr Chest Portable    Result Date: 10/24/2020  EXAMINATION: ONE XRAY VIEW OF THE CHEST 10/24/2020 12:36 pm COMPARISON: 07/22/2020 HISTORY: ORDERING SYSTEM PROVIDED HISTORY: dizzy Reason for Exam: upr FINDINGS: The lungs are without acute focal process. No effusion or pneumothorax. The cardiomediastinal silhouette is normal.  Stable degenerative change of the mid to lower thoracic spine. Negative portable chest with no focal airspace consolidation or overt edema/CHF.          EKG  EKG Interpretation    Interpreted by me    Rhythm: normal sinus   Rate: normal  Axis: normal  Ectopy: none  Conduction: normal  ST Segments: no acute change  T Waves: no acute change  Q Waves: none    Clinical Impression: no acute changes and normal EKG    All EKG's are interpreted by the Emergency Department Physicianwho either signs or Co-signs this chart in the absence of a cardiologist.    EMERGENCY DEPARTMENT COURSE:  ED Course as of Oct 24 1830   Sat Oct 24, 2020   1515 Thickened and heterogeneous endometrium as measured above findings may relate  to clot/hemorrhage given history of bleeding or alternatively endometrial  hyperplasia/neoplasia. Consider further assessment with endometrial tissue  sampling and/or short-term follow-up ultrasound in 8-12 weeks to reassess. [NETTE]   [phone] [id number] Plan to discharge patient home with referral for obstetrician and gynecology follow-up. [NETTE]      ED Course User Index  [NETTE] Jess Allen DO           PROCEDURES:  None    CONSULTS:  IP CONSULT TO OB GYN    CRITICAL CARE:  Please see attending note    FINAL IMPRESSION      1.  Vaginal bleeding          DISPOSITION / PLAN     DISPOSITION        PATIENT REFERRED TO:  Farnaz Tong  [address] [address]  [phone]    Call   As needed    [facility]  [address] [address]  [phone]  Call   As needed    Shaka Rojas [address]  [address] [facility]  [phone]  Call in 2 days  For follow up and reschedule appt      DISCHARGE MEDICATIONS:  Discharge Medication List as of 10/24/2020  3:33 PM          Jyotsna Caba DO  Emergency Medicine Resident    (Please note that portions of this note were completed with a voice recognition program.Efforts were made to edit the dictations but occasionally words are mis-transcribed.)        Jyotsna Caba DO  Resident  10/24/20 0014

## 2020-10-26 ENCOUNTER — TELEPHONE (OUTPATIENT)
Dept: OBGYN | Age: 60
End: 2020-10-26

## 2020-10-26 LAB
EKG ATRIAL RATE: 76 BPM
EKG P AXIS: 11 DEGREES
EKG P-R INTERVAL: 190 MS
EKG Q-T INTERVAL: 420 MS
EKG QRS DURATION: 76 MS
EKG QTC CALCULATION (BAZETT): 472 MS
EKG R AXIS: 36 DEGREES
EKG T AXIS: 10 DEGREES
EKG VENTRICULAR RATE: 76 BPM

## 2020-10-26 PROCEDURE — 93010 ELECTROCARDIOGRAM REPORT: CPT | Performed by: INTERNAL MEDICINE

## 2020-10-27 LAB
C TRACH DNA GENITAL QL NAA+PROBE: NEGATIVE
N. GONORRHOEAE DNA: NEGATIVE
SPECIMEN DESCRIPTION: NORMAL

## 2021-05-08 ENCOUNTER — APPOINTMENT (OUTPATIENT)
Dept: GENERAL RADIOLOGY | Age: 61
End: 2021-05-08
Payer: MEDICARE

## 2021-05-08 ENCOUNTER — APPOINTMENT (OUTPATIENT)
Dept: NUCLEAR MEDICINE | Age: 61
End: 2021-05-08
Payer: MEDICARE

## 2021-05-08 ENCOUNTER — HOSPITAL ENCOUNTER (OUTPATIENT)
Age: 61
Setting detail: OBSERVATION
Discharge: LEFT BEFORE DISCHARGE | End: 2021-05-08
Attending: EMERGENCY MEDICINE | Admitting: EMERGENCY MEDICINE
Payer: MEDICARE

## 2021-05-08 VITALS
BODY MASS INDEX: 31.83 KG/M2 | HEIGHT: 68 IN | SYSTOLIC BLOOD PRESSURE: 150 MMHG | DIASTOLIC BLOOD PRESSURE: 81 MMHG | HEART RATE: 86 BPM | OXYGEN SATURATION: 100 % | TEMPERATURE: 97.4 F | WEIGHT: 210 LBS | RESPIRATION RATE: 20 BRPM

## 2021-05-08 DIAGNOSIS — I20.8 STABLE ANGINA PECTORIS (HCC): Primary | ICD-10-CM

## 2021-05-08 LAB
ABSOLUTE EOS #: 0.28 K/UL (ref 0–0.44)
ABSOLUTE IMMATURE GRANULOCYTE: 0.05 K/UL (ref 0–0.3)
ABSOLUTE LYMPH #: 1.29 K/UL (ref 1.1–3.7)
ABSOLUTE MONO #: 0.54 K/UL (ref 0.1–1.2)
ANION GAP SERPL CALCULATED.3IONS-SCNC: 11 MMOL/L (ref 9–17)
BASOPHILS # BLD: 1 % (ref 0–2)
BASOPHILS ABSOLUTE: 0.07 K/UL (ref 0–0.2)
BUN BLDV-MCNC: 23 MG/DL (ref 6–20)
BUN/CREAT BLD: ABNORMAL (ref 9–20)
CALCIUM SERPL-MCNC: 9.1 MG/DL (ref 8.6–10.4)
CHLORIDE BLD-SCNC: 107 MMOL/L (ref 98–107)
CO2: 22 MMOL/L (ref 20–31)
CREAT SERPL-MCNC: 1.18 MG/DL (ref 0.5–0.9)
DIFFERENTIAL TYPE: ABNORMAL
EOSINOPHILS RELATIVE PERCENT: 3 % (ref 1–4)
GFR AFRICAN AMERICAN: 57 ML/MIN
GFR NON-AFRICAN AMERICAN: 47 ML/MIN
GFR SERPL CREATININE-BSD FRML MDRD: ABNORMAL ML/MIN/{1.73_M2}
GFR SERPL CREATININE-BSD FRML MDRD: ABNORMAL ML/MIN/{1.73_M2}
GLUCOSE BLD-MCNC: 120 MG/DL (ref 70–99)
HCT VFR BLD CALC: 38.8 % (ref 36.3–47.1)
HEMOGLOBIN: 12.3 G/DL (ref 11.9–15.1)
IMMATURE GRANULOCYTES: 1 %
LV EF: 55 %
LV EF: 70 %
LVEF MODALITY: NORMAL
LVEF MODALITY: NORMAL
LYMPHOCYTES # BLD: 14 % (ref 24–43)
MCH RBC QN AUTO: 27.8 PG (ref 25.2–33.5)
MCHC RBC AUTO-ENTMCNC: 31.7 G/DL (ref 28.4–34.8)
MCV RBC AUTO: 87.6 FL (ref 82.6–102.9)
MONOCYTES # BLD: 6 % (ref 3–12)
NRBC AUTOMATED: 0 PER 100 WBC
PDW BLD-RTO: 15 % (ref 11.8–14.4)
PLATELET # BLD: 232 K/UL (ref 138–453)
PLATELET ESTIMATE: ABNORMAL
PMV BLD AUTO: 10.6 FL (ref 8.1–13.5)
POTASSIUM SERPL-SCNC: 3.8 MMOL/L (ref 3.7–5.3)
RBC # BLD: 4.43 M/UL (ref 3.95–5.11)
RBC # BLD: ABNORMAL 10*6/UL
SEG NEUTROPHILS: 75 % (ref 36–65)
SEGMENTED NEUTROPHILS ABSOLUTE COUNT: 6.95 K/UL (ref 1.5–8.1)
SODIUM BLD-SCNC: 140 MMOL/L (ref 135–144)
TROPONIN INTERP: NORMAL
TROPONIN INTERP: NORMAL
TROPONIN T: NORMAL NG/ML
TROPONIN T: NORMAL NG/ML
TROPONIN, HIGH SENSITIVITY: 11 NG/L (ref 0–14)
TROPONIN, HIGH SENSITIVITY: 12 NG/L (ref 0–14)
WBC # BLD: 9.2 K/UL (ref 3.5–11.3)
WBC # BLD: ABNORMAL 10*3/UL

## 2021-05-08 PROCEDURE — 99284 EMERGENCY DEPT VISIT MOD MDM: CPT

## 2021-05-08 PROCEDURE — G0378 HOSPITAL OBSERVATION PER HR: HCPCS

## 2021-05-08 PROCEDURE — 71046 X-RAY EXAM CHEST 2 VIEWS: CPT

## 2021-05-08 PROCEDURE — 78452 HT MUSCLE IMAGE SPECT MULT: CPT

## 2021-05-08 PROCEDURE — 3430000000 HC RX DIAGNOSTIC RADIOPHARMACEUTICAL: Performed by: EMERGENCY MEDICINE

## 2021-05-08 PROCEDURE — 93005 ELECTROCARDIOGRAM TRACING: CPT | Performed by: STUDENT IN AN ORGANIZED HEALTH CARE EDUCATION/TRAINING PROGRAM

## 2021-05-08 PROCEDURE — 80048 BASIC METABOLIC PNL TOTAL CA: CPT

## 2021-05-08 PROCEDURE — 6360000002 HC RX W HCPCS: Performed by: STUDENT IN AN ORGANIZED HEALTH CARE EDUCATION/TRAINING PROGRAM

## 2021-05-08 PROCEDURE — A9500 TC99M SESTAMIBI: HCPCS | Performed by: EMERGENCY MEDICINE

## 2021-05-08 PROCEDURE — 2580000003 HC RX 258: Performed by: STUDENT IN AN ORGANIZED HEALTH CARE EDUCATION/TRAINING PROGRAM

## 2021-05-08 PROCEDURE — 94640 AIRWAY INHALATION TREATMENT: CPT

## 2021-05-08 PROCEDURE — 85025 COMPLETE CBC W/AUTO DIFF WBC: CPT

## 2021-05-08 PROCEDURE — 2580000003 HC RX 258: Performed by: EMERGENCY MEDICINE

## 2021-05-08 PROCEDURE — 84484 ASSAY OF TROPONIN QUANT: CPT

## 2021-05-08 PROCEDURE — 96374 THER/PROPH/DIAG INJ IV PUSH: CPT

## 2021-05-08 PROCEDURE — 6360000002 HC RX W HCPCS: Performed by: EMERGENCY MEDICINE

## 2021-05-08 PROCEDURE — 94664 DEMO&/EVAL PT USE INHALER: CPT

## 2021-05-08 PROCEDURE — 6370000000 HC RX 637 (ALT 250 FOR IP): Performed by: STUDENT IN AN ORGANIZED HEALTH CARE EDUCATION/TRAINING PROGRAM

## 2021-05-08 PROCEDURE — 93017 CV STRESS TEST TRACING ONLY: CPT | Performed by: NURSE PRACTITIONER

## 2021-05-08 PROCEDURE — 93306 TTE W/DOPPLER COMPLETE: CPT

## 2021-05-08 PROCEDURE — 94761 N-INVAS EAR/PLS OXIMETRY MLT: CPT

## 2021-05-08 RX ORDER — SODIUM CHLORIDE 0.9 % (FLUSH) 0.9 %
10 SYRINGE (ML) INJECTION PRN
Status: DISCONTINUED | OUTPATIENT
Start: 2021-05-08 | End: 2021-05-08 | Stop reason: HOSPADM

## 2021-05-08 RX ORDER — IPRATROPIUM BROMIDE AND ALBUTEROL SULFATE 2.5; .5 MG/3ML; MG/3ML
1 SOLUTION RESPIRATORY (INHALATION) EVERY 4 HOURS PRN
Status: CANCELLED | OUTPATIENT
Start: 2021-05-08

## 2021-05-08 RX ORDER — DOBUTAMINE HYDROCHLORIDE 400 MG/100ML
10 INJECTION INTRAVENOUS CONTINUOUS
Status: DISCONTINUED | OUTPATIENT
Start: 2021-05-08 | End: 2021-05-08

## 2021-05-08 RX ORDER — ASPIRIN 81 MG/1
324 TABLET, CHEWABLE ORAL ONCE
Status: COMPLETED | OUTPATIENT
Start: 2021-05-08 | End: 2021-05-08

## 2021-05-08 RX ORDER — 0.9 % SODIUM CHLORIDE 0.9 %
1000 INTRAVENOUS SOLUTION INTRAVENOUS ONCE
Status: COMPLETED | OUTPATIENT
Start: 2021-05-08 | End: 2021-05-08

## 2021-05-08 RX ORDER — KETOROLAC TROMETHAMINE 15 MG/ML
15 INJECTION, SOLUTION INTRAMUSCULAR; INTRAVENOUS ONCE
Status: COMPLETED | OUTPATIENT
Start: 2021-05-08 | End: 2021-05-08

## 2021-05-08 RX ORDER — SODIUM CHLORIDE 9 MG/ML
500 INJECTION, SOLUTION INTRAVENOUS CONTINUOUS PRN
Status: DISCONTINUED | OUTPATIENT
Start: 2021-05-08 | End: 2021-05-08

## 2021-05-08 RX ORDER — ATORVASTATIN CALCIUM 80 MG/1
40 TABLET, FILM COATED ORAL NIGHTLY
Status: CANCELLED | OUTPATIENT
Start: 2021-05-08

## 2021-05-08 RX ORDER — ATROPINE SULFATE 0.1 MG/ML
0.5 INJECTION INTRAVENOUS EVERY 5 MIN PRN
Status: DISCONTINUED | OUTPATIENT
Start: 2021-05-08 | End: 2021-05-08

## 2021-05-08 RX ORDER — SODIUM CHLORIDE 0.9 % (FLUSH) 0.9 %
10 SYRINGE (ML) INJECTION PRN
Status: DISCONTINUED | OUTPATIENT
Start: 2021-05-08 | End: 2021-05-08

## 2021-05-08 RX ORDER — LISINOPRIL 10 MG/1
40 TABLET ORAL DAILY
Status: CANCELLED | OUTPATIENT
Start: 2021-05-08

## 2021-05-08 RX ORDER — NITROGLYCERIN 0.4 MG/1
0.4 TABLET SUBLINGUAL EVERY 5 MIN PRN
Status: DISCONTINUED | OUTPATIENT
Start: 2021-05-08 | End: 2021-05-08

## 2021-05-08 RX ORDER — DULAGLUTIDE 1.5 MG/.5ML
1.5 INJECTION, SOLUTION SUBCUTANEOUS WEEKLY
COMMUNITY

## 2021-05-08 RX ORDER — ASPIRIN 81 MG/1
81 TABLET, CHEWABLE ORAL DAILY
Status: CANCELLED | OUTPATIENT
Start: 2021-05-08

## 2021-05-08 RX ORDER — ALBUTEROL SULFATE 90 UG/1
2 AEROSOL, METERED RESPIRATORY (INHALATION) PRN
Status: DISCONTINUED | OUTPATIENT
Start: 2021-05-08 | End: 2021-05-08

## 2021-05-08 RX ORDER — METOPROLOL TARTRATE 5 MG/5ML
5 INJECTION INTRAVENOUS EVERY 5 MIN PRN
Status: DISCONTINUED | OUTPATIENT
Start: 2021-05-08 | End: 2021-05-08

## 2021-05-08 RX ADMIN — SODIUM CHLORIDE, PRESERVATIVE FREE 10 ML: 5 INJECTION INTRAVENOUS at 12:01

## 2021-05-08 RX ADMIN — ALBUTEROL SULFATE 5 MG: 5 SOLUTION RESPIRATORY (INHALATION) at 09:20

## 2021-05-08 RX ADMIN — IPRATROPIUM BROMIDE 0.5 MG: 0.5 SOLUTION RESPIRATORY (INHALATION) at 09:19

## 2021-05-08 RX ADMIN — SODIUM CHLORIDE, PRESERVATIVE FREE 10 ML: 5 INJECTION INTRAVENOUS at 11:48

## 2021-05-08 RX ADMIN — SODIUM CHLORIDE, PRESERVATIVE FREE 10 ML: 5 INJECTION INTRAVENOUS at 12:00

## 2021-05-08 RX ADMIN — SODIUM CHLORIDE 1000 ML: 9 INJECTION, SOLUTION INTRAVENOUS at 08:49

## 2021-05-08 RX ADMIN — KETOROLAC TROMETHAMINE 15 MG: 15 INJECTION, SOLUTION INTRAMUSCULAR; INTRAVENOUS at 08:49

## 2021-05-08 RX ADMIN — SODIUM CHLORIDE, PRESERVATIVE FREE 10 ML: 5 INJECTION INTRAVENOUS at 10:40

## 2021-05-08 RX ADMIN — ASPIRIN 324 MG: 81 TABLET, CHEWABLE ORAL at 08:49

## 2021-05-08 RX ADMIN — TETRAKIS(2-METHOXYISOBUTYLISOCYANIDE)COPPER(I) TETRAFLUOROBORATE 40 MILLICURIE: 1 INJECTION, POWDER, LYOPHILIZED, FOR SOLUTION INTRAVENOUS at 12:00

## 2021-05-08 RX ADMIN — TETRAKIS(2-METHOXYISOBUTYLISOCYANIDE)COPPER(I) TETRAFLUOROBORATE 14.2 MILLICURIE: 1 INJECTION, POWDER, LYOPHILIZED, FOR SOLUTION INTRAVENOUS at 10:40

## 2021-05-08 RX ADMIN — REGADENOSON 0.4 MG: 0.08 INJECTION, SOLUTION INTRAVENOUS at 12:01

## 2021-05-08 ASSESSMENT — PAIN DESCRIPTION - DESCRIPTORS: DESCRIPTORS: ACHING

## 2021-05-08 ASSESSMENT — ENCOUNTER SYMPTOMS
ABDOMINAL PAIN: 0
COUGH: 0
VOMITING: 0
BACK PAIN: 0
NAUSEA: 1
SHORTNESS OF BREATH: 0

## 2021-05-08 ASSESSMENT — PAIN DESCRIPTION - FREQUENCY: FREQUENCY: CONTINUOUS

## 2021-05-08 NOTE — Clinical Note
Patient Class: Observation [104]   REQUIRED: Diagnosis: Chest pain [621423]   Estimated Length of Stay: Estimated stay of less than 2 midnights   Admitting Provider: Rodrigo Kam [[id number]]

## 2021-05-08 NOTE — PROCEDURES
[facility] [address]                              CARDIAC STRESS TEST    PATIENT NAME: Darrell Coleman                      :        1960  MED REC NO:   [id number]                             ROOM:       32  ACCOUNT NO:   [de-identified]                           ADMIT DATE: 2021  PROVIDER:     Neri Flores    DATE OF STUDY:  2021    ORDERING PROVIDER:  Hansa De DO  PRIMARY CARE PROVIDER:  Nik Monroe  INTERPRETING PHYSICIAN:  Neri Flores MD    LEXISCAN STRESS STUDY:  Indication:  chest pain  Procedure explained and consent signed    Medications:  Lexiscan, 0.4 mg  Resting heart rate:  82 bpm  Resting blood pressure:  112/70 mm/Hg  Infusion heart rate:  111 bpm  Infusion blood pressure:  133/83 mm/Hg  Resting EKG:  Abnormal (sinus rhythm/heart rate-82, PVC'S, prolonged QT)  Stress heart response:  Normal  Stress BP response:  Appropriate  Stress EKG(S):  No changes seen. Heart rate increased to 80 bpm.  Chest discomfort:  None  Ischemic EKG changes:  None    IMPRESSION:  Electrocardiographically negative Lexiscan stress study. Radio-isotope  results to follow from the department of Nuclear Medicine.             Michael Muro    D: 2021 13:33:45       T: 2021 13:35:08     AS/ZACH  Job#: [id number]     Doc#: Unknown    CC:    ()

## 2021-05-08 NOTE — ED NOTES
Pt requesting to leave AMA, stating \"doctor is taking too long to discharge me\". Pt signed [place] paperwork. Pt A&Ox4 with even non labored breaths. Pt ambulated out of department with steady gait with family member.      Didi Montilla, RN  05/08/21 3343

## 2021-05-08 NOTE — ED PROVIDER NOTES
[address]  ED  Emergency Department Encounter  Emergency Medicine Resident     Pt Name: Jose Singh  MRN: [id number]  [place] 1960  Date of evaluation: 5/8/21  PCP:  Charo Rutledge       Chief Complaint   Patient presents with    Chest Pain     started around 0200 today     Dizziness       HISTORY OFPRESENT ILLNESS  (Location/Symptom, Timing/Onset, Context/Setting, Quality, Duration, Modifying Factors,Severity.)      Jose Singh is a 64 y.o. female who presents with complaints of chest pain, lightheadedness. Patient states that starting around 4-5 this morning she began having left-sided chest pain, going into her left shoulder. She was already awake and this did not wake her from sleep. She does admit to some nausea but states that she generally has some chronic issues with nausea and this is not new. She did admit to some lightheadedness this morning but currently denies. She denies shortness of breath, cough, fever, sick contacts, loss of taste or smell, lower extremity swelling, calf tenderness, history of DVT/PE, recent surgery, mobilization, hormone placement, hemoptysis or active malignancy. She denies that the chest pain is exertional, states that it does get worse with movement of her left arm and does have a history of rotator cuff injury. Patient underwent cardiac cath in 2019 where she had a drug-eluting stent placed of the RCA. She is on Brilinta and taking as prescribed. PAST MEDICAL / SURGICAL / SOCIAL / FAMILY HISTORY      has a past medical history of Asthma, Blood circulation, collateral, CAD (coronary artery disease), COPD (chronic obstructive pulmonary disease) ([address] Diabetes mellitus ([address] Hyperlipidemia, Hypertension, Movement disorder, Neuropathy, and PAD (peripheral artery disease) ([address] has a past surgical history that includes Coronary angioplasty with stent; Tonsillectomy;  Inner ear surgery (Right); and Coronary angioplasty with stent (02/11/2019). Social History     Socioeconomic History    Marital status:      Spouse name: Not on file    Number of children: Not on file    Years of education: Not on file    Highest education level: Not on file   Occupational History    Not on file   Social Needs    Financial resource strain: Not on file    Food insecurity     Worry: Not on file     Inability: Not on file    Transportation needs     Medical: Not on file     Non-medical: Not on file   Tobacco Use    Smoking status: Current Every Day Smoker     Packs/day: 0.50     Types: Cigarettes    Smokeless tobacco: Current User   Substance and Sexual Activity    Alcohol use: No    Drug use: Yes     Types: Marijuana    Sexual activity: Never   Lifestyle    Physical activity     Days per week: Not on file     Minutes per session: Not on file    Stress: Not on file   Relationships    Social connections     Talks on phone: Not on file     Gets together: Not on file     Attends Yazidi service: Not on file     Active member of club or organization: Not on file     Attends meetings of clubs or organizations: Not on file     Relationship status: Not on file    Intimate partner violence     Fear of current or ex partner: Not on file     Emotionally abused: Not on file     Physically abused: Not on file     Forced sexual activity: Not on file   Other Topics Concern    Not on file   Social History Narrative    Not on file       History reviewed. No pertinent family history. Allergies:  Codeine and Morphine    Home Medications:  Prior to Admission medications    Medication Sig Start Date End Date Taking?  Authorizing Provider   Dulaglutide (TRULICITY) 1.5 GQ/5.2VN SOPN Inject 1.5 mg into the skin once a week   Yes Historical Provider, MD   cephALEXin (KEFLEX) 500 MG capsule Take 1 capsule by mouth 3 times daily 7/22/20   Inelumang Dickson DO   aspirin 81 MG chewable tablet Take 1 tablet by mouth daily 2/13/19 BINDU Alonso CNP   nitroGLYCERIN (NITROSTAT) 0.4 MG SL tablet up to max of 3 total doses. If no relief after 1 dose, call 911. 2/12/19   BINDU Alonso CNP   atorvastatin (LIPITOR) 40 MG tablet Take 1 tablet by mouth nightly 2/12/19   BINDU Alonso CNP   metoprolol tartrate (LOPRESSOR) 25 MG tablet Take 0.5 tablets by mouth 2 times daily 2/12/19   BINDU Alonso CNP   ticagrelor (BRILINTA) 90 MG TABS tablet Take 1 tablet by mouth 2 times daily 2/12/19   BINDU Alonso CNP   Lancets MISC 1 each by Does not apply route daily 2/12/19   Shruthi Feldman MD   glipiZIDE (GLUCOTROL) 5 MG tablet Take 1 tablet by mouth 2 times daily 2/12/19   Shruthi Feldman MD   metFORMIN (GLUCOPHAGE) 1000 MG tablet Take 1 tablet by mouth 2 times daily (with meals) 2/12/19   Shruthi Feldman MD   atorvastatin (LIPITOR) 40 MG tablet Take 1 tablet by mouth daily 2/12/19   Shruthi Feldman MD   blood glucose monitor strips Test 3 times a day & as needed for symptoms of irregular blood glucose. 2/12/19   Shruthi Feldman MD   glucose monitoring kit (FREESTYLE) monitoring kit 1 kit by Does not apply route daily 2/12/19   Shruthi Feldman MD   albuterol (PROVENTIL) (5 MG/ML) 0.5% nebulizer solution Take 0.5 mLs by nebulization every 6 hours as needed for Wheezing 3/16/18   Bruce Sim MD   tiotropium (SPIRIVA) 18 MCG inhalation capsule Inhale 1 capsule into the lungs daily Pt told that she was using this medication at home. 3/16/18   Bruce Sim MD   albuterol-ipratropium (COMBIVENT RESPIMAT)  MCG/ACT AERS inhaler Inhale 1 puff into the lungs every 6 hours 3/16/18   Bruce Sim MD   acetaminophen (TYLENOL) 500 MG tablet Take 1 tablet by mouth every 6 hours as needed for Pain 1/9/18   Alex Sepulveda MD   lisinopril (PRINIVIL;ZESTRIL) 40 MG tablet Take 40 mg by mouth daily.     Historical Provider, MD       REVIEW OFSYSTEMS    (2-9 systems for level 4, 10 or more for level 5) Skin:     General: Skin is warm and dry. Findings: No rash. Neurological:      Mental Status: She is alert. Mental status is at baseline. Psychiatric:         Behavior: Behavior normal.         Thought Content: Thought content normal.         DIFFERENTIAL  DIAGNOSIS     PLAN (LABS / IMAGING / EKG):  Orders Placed This Encounter   Procedures    XR CHEST (2 VW)    NM Cardiac Stress Test Nuclear Imaging    CBC WITH AUTO DIFFERENTIAL    Troponin    BASIC METABOLIC PANEL    Troponin    Diet NPO, After Midnight    Beta Blocker Management Prior to Cardiac Stress Test    Initiate ED RT Aerosol protocol    HHN Treatment    Nasal Cannula Oxygen    Cardiac Stress Test- W Pharm    EKG 12 Lead    ECHO Complete 2D W Doppler W Color    PATIENT STATUS (FROM ED OR OR/PROCEDURAL) Observation       MEDICATIONS ORDERED:  Orders Placed This Encounter   Medications    0.9 % sodium chloride bolus    aspirin chewable tablet 324 mg    ketorolac (TORADOL) injection 15 mg    albuterol (PROVENTIL) nebulizer solution 5 mg    ipratropium (ATROVENT) 0.02 % nebulizer solution 0.5 mg    DISCONTD: DOBUTamine (DOBUTREX) 1000 mg in dextrose 5 % 250 mL infusion     Order Specific Question:   Label Comment     Answer:   STRESS TEST ONLY    perflutren lipid microspheres (DEFINITY) injection 1.65 mg    sodium chloride flush 0.9 % injection 10 mL    0.9 % sodium chloride infusion    albuterol sulfate  (90 Base) MCG/ACT inhaler 2 puff    atropine injection 0.5 mg    nitroGLYCERIN (NITROSTAT) SL tablet 0.4 mg    metoprolol (LOPRESSOR) injection 5 mg    regadenoson (LEXISCAN) injection 0.4 mg           Initial MDM/Plan/ED COURSE:    64 y.o. female who presents with complaints of chest pain, nausea. On exam patient is well-appearing, nontoxic. Vital signs are within normal limits. On physical exam abdomen is soft, nontender, nondistended. Lungs with mild end expiratory wheezing.   Cardiac regular rate and COMPARISON: 10/24/2028, 1230 hours HISTORY: ORDERING SYSTEM PROVIDED HISTORY: chest pain TECHNOLOGIST PROVIDED HISTORY: chest pain Reason for Exam: chest pain Acuity: Acute Type of Exam: Initial 56-year-old female with acute chest pain FINDINGS: Cardiac monitor leads overlie the chest. Stable blunting of the costophrenic angles which may represent chronic pleural thickening. Trachea midline. No pneumothorax. No free air. No acute focal airspace consolidation or sizable pleural effusions. Visualized osseous structures remain unchanged. Mild diffuse degenerative changes throughout the spine. 1. Stable blunting of the costophrenic angles which may reflect chronic pleural thickening. 2. No acute focal airspace consolidation. PROCEDURES:  None    CONSULTS:  IP CONSULT TO CARDIOLOGY    CRITICAL CARE:  Please see attending note    FINAL IMPRESSION      1. Stable angina pectoris ([address]          DISPOSITION / PLAN     DISPOSITION Admitted 05/08/2021 10:55:12 AM      PATIENT REFERRED TO:  No follow-up provider specified.     DISCHARGE MEDICATIONS:  New Prescriptions    No medications on file       Natalie Vences DO  Emergency Medicine Resident    (Please note that portions of this note were completed with a voice recognition program.Efforts were made to edit the dictations but occasionally words are mis-transcribed.)        Natalie Vences DO  Resident  05/08/21 0719

## 2021-05-08 NOTE — ED PROVIDER NOTES
[facility]     Emergency Department     Faculty Attestation    I performed a history and physical examination of the patient and discussed management with the resident. I reviewed the residents note and agree with the documented findings including all diagnostic interpretations and plan of care. Any areas of disagreement are noted on the chart. I was personally present for the key portions of any procedures. I have documented in the chart those procedures where I was not present during the key portions. I have reviewed the emergency nurses triage note. I agree with the chief complaint, past medical history, past surgical history, allergies, medications, social and family history as documented unless otherwise noted below. Documentation of the HPI, Physical Exam and Medical Decision Making performed by scribcristiano is based on my personal performance of the HPI, PE and MDM. For Physician Assistant/ Nurse Practitioner cases/documentation I have personally evaluated this patient and have completed at least one if not all key elements of the E/M (history, physical exam, and MDM). Additional findings are as noted. This patient was evaluated in the Emergency Department for symptoms described in the history of present illness. He/she was evaluated in the context of the global COVID-19 pandemic, which necessitated consideration that the patient might be at risk for infection with the SARS-CoV-2 virus that causes COVID-19. Institutional protocols and algorithms that pertain to the evaluation of patients at risk for COVID-19 are in a state of rapid change based on information released by regulatory bodies including the CDC and federal and state organizations. These policies and algorithms were followed during the patient's care in the ED. Primary Care Physician: Nneka Abraham    History:  This is a 64 y.o. female who presents to the Emergency Department with complaint of chest pain. Left-sided radiates to the shoulder. Started this morning. Took nitro as well as her anxiety medications without relief. History of stents. Associated lightheadedness with episode of near syncope    Physical:     height is 5' 8\" (1.727 m) and weight is 210 lb (95.3 kg). Her temporal temperature is 97.4 °F (36.3 °C). Her blood pressure is 150/81 (abnormal) and her pulse is 86. Her respiration is 20 and oxygen saturation is 99%. 64 y.o. female no acute distress, cardiac exam regular rate and rhythm no murmurs rubs gallops, pulmonary clear bilaterally abdomen is soft nontender nondistended, calves nontender nonswollen, radial pulse 2+ bilaterally.     Impression: Chest pain    Plan: Cardiac work-up, likely need for ETU admission    EKG Interpretation  EKG Interpretation    Interpreted by emergency department physician    Rhythm: normal sinus   Rate: normal  Axis: normal  Ectopy: none  Conduction: normal  ST Segments: no acute change  T Waves: no acute change  Q Waves: none    EKG  Impression: non-specific EKG    Twan Castillo MD      Interpreted by me      Halina Giles MD, Diana Arreola  Attending Emergency Physician        Twan Castillo MD  05/08/21 [id number]

## 2021-05-10 LAB
EKG ATRIAL RATE: 66 BPM
EKG Q-T INTERVAL: 426 MS
EKG QRS DURATION: 78 MS
EKG QTC CALCULATION (BAZETT): 446 MS
EKG R AXIS: 51 DEGREES
EKG T AXIS: 28 DEGREES
EKG VENTRICULAR RATE: 66 BPM

## 2021-05-10 PROCEDURE — 93010 ELECTROCARDIOGRAM REPORT: CPT | Performed by: INTERNAL MEDICINE

## 2021-05-10 NOTE — H&P
[facility]  CDU / OBSERVATION ENCOUNTER  RESIDENT NOTE     Pt Name: Sweetie Thorne  MRN: [id number]  [place] 1960  Date of evaluation: 5/8/21  Patient's PCP is :  [address] COMPLAINT       Chief Complaint   Patient presents with    Chest Pain     started around 0200 today     Dizziness         HISTORY OF Jihan Alicea 60 is a 64 y.o. female who presents chest pain and lightheadedness. Patient states that earlier this morning she began having left-sided chest pain that radiated to her shoulder. This did not wake her from sleep, she was already awake as she gets up early. Patient reports some nausea but no vomiting, this is not new and she has continuous nausea for about a year and particularly in the a.m. Denies any diaphoresis, shortness of breath, abdominal pain, change in urination or bowel habits. Patient denies having a fever, chills or any other signs of illness. Pain does increase with movement of the left arm, with history of rotator cuff injury and she feels that it potentially could be this. Patient does have a history of cardiac nature. Location/Symptom: Left-sided chest pain  Timing/Onset: This morning  Provocation: Movement of left arm  Quality: Dull pressure-like  Radiation: Left shoulder  Severity: Moderate  Timing/Duration: Consistent  Modifying Factors: Nothing    REVIEW OF SYSTEMS       Review of Systems   Constitutional: Negative for chills and fever. HENT: Negative for congestion. Respiratory: Negative for chest tightness and shortness of breath. Cardiovascular: Positive for chest pain. Negative for leg swelling. Gastrointestinal: Positive for nausea. Negative for abdominal pain, constipation, diarrhea and vomiting. Endocrine: Negative for polyuria. Genitourinary: Negative for difficulty urinating. Musculoskeletal: Positive for arthralgias (Left shoulder with history of rotator cuff injury). Negative for neck pain. Skin: Negative for color change. Neurological: Positive for light-headedness. Negative for dizziness, weakness and headaches. Psychiatric/Behavioral: Negative for confusion. (PQRS) Advance directives on face sheet per hospital policy. No change unless specifically mentioned in chart    PAST MEDICAL HISTORY    has a past medical history of Asthma, Blood circulation, collateral, CAD (coronary artery disease), COPD (chronic obstructive pulmonary disease) ([facility] Diabetes mellitus ([facility] Hyperlipidemia, Hypertension, Movement disorder, Neuropathy, and PAD (peripheral artery disease) ([facility] I have reviewed the past medical history with the patient and it is pertinent to this complaint. SURGICAL HISTORY      has a past surgical history that includes Coronary angioplasty with stent; Tonsillectomy; Inner ear surgery (Right); and Coronary angioplasty with stent (02/11/2019). I have reviewed and agree with Surgical History entered and it is pertinent to this complaint. CURRENT MEDICATIONS     No current facility-administered medications for this encounter. All medication charted and reviewed. ALLERGIES     is allergic to codeine and morphine. FAMILY HISTORY     has no family status information on file. family history is not on file. The patient denies any pertinent family history. I have reviewed and agree with the family history entered. I have reviewed the Family History and it is not significant to the case    SOCIAL HISTORY      reports that she has been smoking cigarettes. She has been smoking about 0.50 packs per day. She uses smokeless tobacco. She reports current drug use. Drug: Marijuana. She reports that she does not drink alcohol. I have reviewed and agree with all Social.  There are no concerns for substance abuse/use. PHYSICAL EXAM     INITIAL VITALS:  height is 5' 8\" (1.727 m) and weight is 210 lb (95.3 kg). Her temporal temperature is 97.4 °F (36.3 °C).  Her blood pressure is 150/81 (abnormal) and her pulse is 86. Her respiration is 20 and oxygen saturation is 100%. Physical Exam  Constitutional:       Appearance: Normal appearance. She is obese. HENT:      Head: Normocephalic and atraumatic. Mouth/Throat:      Mouth: Mucous membranes are moist.      Pharynx: Oropharynx is clear. Eyes:      Extraocular Movements: Extraocular movements intact. Conjunctiva/sclera: Conjunctivae normal.   Cardiovascular:      Rate and Rhythm: Normal rate and regular rhythm. Pulses: Normal pulses. Heart sounds: Normal heart sounds. No murmur. Pulmonary:      Effort: Pulmonary effort is normal.      Breath sounds: Normal breath sounds. Abdominal:      General: Bowel sounds are normal. There is no distension. Tenderness: There is no abdominal tenderness. There is no guarding. Musculoskeletal: Normal range of motion. General: Tenderness (Tenderness with movement of left shoulder) present. Comments: Range of motion noted to be normal with patient's natural movements   Skin:     General: Skin is warm and dry. Findings: No rash (On exposed skin). Neurological:      General: No focal deficit present. Mental Status: She is alert and oriented to person, place, and time.    Psychiatric:         Mood and Affect: Mood normal.         Behavior: Behavior normal.           DIFFERENTIAL DIAGNOSIS/MDM:     Acute coronary syndrome, shoulder injury, costochondritis, viral pericarditis    DIAGNOSTIC RESULTS     EKG: All EKG's are interpreted by the Observation Physician who either signs or Co-signs this chart in the absence of a cardiologist.    EKG Interpretation      Interpreted by observation physician    Rhythm: junctional  Rate: normal  Axis: normal  Ectopy: none  Conduction: Junctional rhythm  ST Segments: nonspecific changes  T Waves: flattening in  v1, v2, v3 and aVf  Q Waves: II, III and aVf    Clinical Impression: junctional rhythm    Randolph Muñoz DO        RADIOLOGY:   I directly visualized the following  images and reviewed the radiologist interpretations:    Echo Complete 2d W Doppler W Color    Result Date: 5/8/2021  Transthoracic Echocardiography Report (TTE)  Patient Name Hoda Sidhu Date of Study           05/08/2021               L   Date of      1960  Gender                  Female  Birth   Age          64 year(s)  Race                       Room Number  26          Height:                 68 inch, 172.72 cm   Corporate ID [id number]    Weight:                 210 pounds, 95.3 kg  #   Patient Acct [de-identified]   BSA:        2.09 m^2    BMI:        31.93 kg/m^2  #   MR #         [id number]     Sonographer             Nydia [facility]   Accession #  [id number]  Interpreting Physician  Jose Luis Zimmerman   Fellow                   Referring Nurse                           Practitioner   Interpreting             Referring Physician     Dorys Renee  Fellow                                           DO Randolph  Type of Study   TTE procedure:2D Echocardiogram, M-Mode, Doppler, Color Doppler. Procedure Date Date: 05/08/2021 Start: 11:06 AM Study Location: [facility] Technical Quality: Adequate visualization Indications:Chest pain and Dyspnea/SOB. History / Tech. Comments: Procedure explained to patient. COPD, HTN Patient Status: Inpatient Height: 68 inches Weight: 210 pounds BSA: 2.09 m^2 BMI: 31.93 kg/m^2 HR: 56 bpm CONCLUSIONS Summary Normal LV size and wall thickness. No obvious wall motion abnormality seen. Normal LV systolic function with LVEF >55%. Normal RV size and function. RV systolic pressure LA appears mildly dilated and RA appears normal in size. MAC, Calcified leaflets. Reduced mobility. Mild AS mean gradient 6 mmHg, Mild MR No other valvular abnormality noted Normal aortic root dimension. No significant pericardial effusion noted. No obvious intra-cardiac mass or shunt noted.  IVC normal diameter and inspiratory variation indicating normal RA filling pressure. Signature ----------------------------------------------------------------------------  Electronically signed by Sara Cobos(Interpreting physician) on  2021 12:32 PM ---------------------------------------------------------------------------- ----------------------------------------------------------------------------  Electronically signed by Emma Santiago(Sonographer) on 2021  12:19 PM ---------------------------------------------------------------------------- FINDINGS Left Atrium Left atrium appears mildly dilated. Left Ventricle Left ventricle is normal in size. Global left ventricular systolic function is normal. Calculated ejection fraction 66% by Heart Model. Right Atrium Right atrium is normal in size. Right Ventricle Normal right ventricular size and function. TAPSE value of 2.97cm noted. Mitral Valve Mitral valve sclerosis with mild stenosis. Mean gradient is 6mmHg . MV PHT = 120msec. MVA = 1.8cm2. At least mild mitral regurgitation. Aortic Valve Normal aortic valve structure and function without stenosis or regurgitation. Tricuspid Valve Normal tricuspid valve structure and function. No tricuspid regurgitation. Pulmonic Valve The pulmonic valve is normal in structure. No pulmonic insufficiency. Pericardial Effusion Anterior echo free space suggestive of adipose tissue is seen. Miscellaneous E/E' average = 30.7. IVC normal diameter & inspiratory collapse indicating normal RA filling pressure .  M-mode / 2D Measurements & Calculations:   LVIDd:4.7 cm(3.7 - 5.6 cm)       Diastolic EEBXOC:034 ml  QJQWM:6.5 cm(2.2 - 4.0 cm)       Systolic XCMXSC:52 ml  IVSd:1 cm(0.6 - 1.1 cm)          Aortic Root:2.7 cm(2.0 - 3.7 cm)  LVPWd:1.1 cm(0.6 - 1.1 cm)       LA Dimension: 3.7 cm(1.9 - 4.0 cm)  Fractional Shortenin.55 %    LA volume/Index: 61.33 ml /29m^2  Calculated LVEF (%): 66.67 %     LVOT:1.7 cm RVDd:2.7 cm   Mitral:                                 Aortic   Valve Area (P1/2-Time): 1.83 cm^2       Peak Velocity: 1.61 m/s  Peak E-Wave: 1.63 m/s                   Mean Velocity: 0.94 m/s  Peak A-Wave: 1.57 m/s                   Peak Gradient: 10.37 mmHg  E/A Ratio: 1.04                         Mean Gradient: 4 mmHg  Peak Gradient: 10.63 mmHg  Mean Gradient: 6 mmHg  Deceleration Time: 338 msec             Area (continuity): 1.83 cm^2  P1/2t: 120 msec                         AV VTI: 35.9 cm   Area (continuity): 0.96 cm^2  Mean Velocity: 1.16 m/s                                           Pulmonic:                                           Peak Velocity: 0.87 m/s                                          Peak Gradient: 3.02 mmHg  Diastology / Tissue Doppler Septal Wall E' velocity:0.05 m/s Septal Wall E/E':32.6 Lateral Wall E' velocity:0.06 m/s Lateral Wall E/E':28.8    Xr Chest (2 Vw)    Result Date: 5/8/2021  EXAMINATION: TWO XRAY VIEWS OF THE CHEST 5/8/2021 8:34 am COMPARISON: 10/24/2028, 1230 hours HISTORY: ORDERING SYSTEM PROVIDED HISTORY: chest pain TECHNOLOGIST PROVIDED HISTORY: chest pain Reason for Exam: chest pain Acuity: Acute Type of Exam: Initial 53-year-old female with acute chest pain FINDINGS: Cardiac monitor leads overlie the chest. Stable blunting of the costophrenic angles which may represent chronic pleural thickening. Trachea midline. No pneumothorax. No free air. No acute focal airspace consolidation or sizable pleural effusions. Visualized osseous structures remain unchanged. Mild diffuse degenerative changes throughout the spine. 1. Stable blunting of the costophrenic angles which may reflect chronic pleural thickening. 2. No acute focal airspace consolidation.      Nm Cardiac Stress Test Nuclear Imaging    Result Date: 5/8/2021  EXAMINATION: MYOCARDIAL PERFUSION IMAGING 5/8/2021 11:06 am TECHNIQUE: Rest dose:  40 mCi Tc-99m sestamibi intravenously Stress dose:  14.2 mCi Tc-99m sestamibi intravenously Under cardiology supervision, 0.4 mg Lexiscan was infused intravenously prior to injection of the stress dose. SPECT imaging was acquired following injection of the sestamibi. ECG gating was obtained following the stress acquisition. COMPARISON: None Available. HISTORY: ORDERING SYSTEM PROVIDED HISTORY: Chest pain TECHNOLOGIST PROVIDED HISTORY: Reason for Exam: Chest pain Procedure Type->Rx chest pain Reason for Exam: chest pain Additional signs and symptoms: coronary stent 70-year-old female with chest pain FINDINGS: The patient achieved a maximum heart rate of 111 beats per minute, 68 % of the maximum age predicted heart rate of 162 beats per minute. Perfusion: There is no scintigraphic evidence for a reversible or fixed perfusion defect to suggest reversible ischemia or infarct. Function: The gated SPECT data demonstrates normal left ventricular size and normal wall motion. Left ventricular ejection fraction:  Greater than 70% TID score:  1.24 (threshold value of 1.39 is used for Lexiscan stress with Tc-99m). There is no stress-induced cavitary dilatation to suggest compensated triple vessel disease. End diastolic volume:  01CW Scores are visually adjusted to account for potential artifact. Summed stress score:  0 Summed rest score:  0 Summed reversibility score:  0     1. No definitive scintigraphic evidence for reversible ischemia or infarct. 2. Left ventricular ejection fracture of greater than 70%. 3.  Please see report for EKG portion of the examination which will be performed separately by physician from cardiology. Risk stratification:  Low risk Note:  Risk stratification incorporates both clinical history and test results. Final risk determination is the responsibility of the ordering provider as history and other test results may increase or decrease the risk stratification reported for this examination.  Risk stratification criteria are adapted from \"Noninvasive Risk Stratification\" criteria from [employer]. Al, ACC/AATS/AHA/ASE/ASNC/SCAI/SCCT/STS 2017 Appropriate Use Criteria For Coronary Revascularization in Patients With Stable Ischemic Heart Disease [facility] Volume 69, Issue 17, May 2017 High risk (>3% annual death or MI) 1. Severe resting LV dysfunction (LVEF >35%) not readily explained by non coronary causes 2. Resting perfusion abnormalities greater than 10% of the myocardium in patients without prior history or evidence of MI 3. Stress-induced perfusion abnormalities encumbering greater than or equal to 10% myocardium or stress segmental scores indicating multiple vascular territories with abnormalities 4. Stress-induced LV dilatation (TID ratio greater than 1.19 for exercise and greater than 1.39 for regadenoson) Intermediate risk (1% to 3% annual death or MI) 1. Mild/moderate resting LV dysfunction (LVEF 35% to 49%) not readily explained by non coronary causes. 2.  Resting perfusion abnormalities in 5%-9.9% of the myocardium in patients without a history or prior evidence of MI 3. Stress-induced perfusion abnormality encumbering 5%-9.9% of the myocardium or stress segmental scores indicating 1 vascular territory with abnormalities but without LV dilation 4. Small wall motion abnormality involving 1-2 segments and only 1 coronary bed. Low Risk (Less than 1% annual death or MI) 1. Normal or small myocardial perfusion defect at rest or with stress encumbering less than 5% of the myocardium. LABS:  I have reviewed and interpreted all available lab results.   Labs Reviewed   CBC WITH AUTO DIFFERENTIAL - Abnormal; Notable for the following components:       Result Value    RDW 15.0 (*)     Seg Neutrophils 75 (*)     Lymphocytes 14 (*)     Immature Granulocytes 1 (*)     All other components within normal limits   BASIC METABOLIC PANEL - Abnormal; Notable for the following components:    Glucose 120 (*)     BUN 23 (*)     CREATININE 1.18 (*)     GFR Non- American 47 (*)     GFR  62 (*)     All other components within normal limits   TROPONIN   TROPONIN       SCREENING TOOLS:    HEART Risk Score for Chest Pain Patients   History and Physical Exam Suspicion Level  (Nausea, Vomiting, Diaphoresis, Radiation, Exertion)   Slightly Suspicious (0 pts)   Moderately Suspicious (1 pt)   Highly Suspicious (2 pts)   EKG Interpretation   Normal (0 pts)   Non-Specific Repolarization Disturbance (1 pt)   Significant ST-Depression (2 pts)   Age of Patient (in years)   = [address] (0 pts)   46-64 (1 pt)   = 65 (2 pts)   Risk Factors   No Risk Factors (0 pts)   1-2 Risk Factors (1 pt)   = 3 Risk Factors (2 pts)   Risk Factors Include:   Hypercholesterolemia   Hypertension   Diabetes Mellitus   Cigarette smoking   Positive family history   Obesity   CAD   (SLE, CKDz, HIV, Cocaine abuse)   Troponin Levels   = Normal Limit (0 pts)   1-3 Times Normal Limit (1 pt)   > 3 Times Normal Limit (2 pts)  TOTAL:    Percent Risk for Major Adverse Cardiac Event (MACE)  0-3 pts indicates low risk for MACE   2.5% (DISCHARGE)   4-7 pts indicates moderate risk for MACE  20.3% (OBS)  8-10 pts indicates high risk for MACE  72.7% (EARLY INVASIVE [place])    CDU IMPRESSION / PLAN      Daniel White is a 64 y.o. female who presents with acute chest pain radiating to left shoulder. · Echo/stress test to rule out acute coronary syndrome, noted to be negative. · If negative advised follow-up with PCP for left shoulder rotator cuff injury.   · Continue home medications and pain control  · Monitor vitals, labs, and imaging  · DISPO: pending consults and clinical improvement    CONSULTS:    None    PROCEDURES:  Not indicated       PATIENT REFERRED TO:    Devang Davis MD  [address] [address] [phone]    Schedule an appointment as soon as possible for a visit in 1 day        --  Alise Chris DO   Emergency Medicine Resident     This dictation was generated by voice recognition computer software. Although all attempts are made to edit the dictation for accuracy, there may be errors in the transcription that are not intended.

## 2021-05-11 ASSESSMENT — ENCOUNTER SYMPTOMS
COLOR CHANGE: 0
NAUSEA: 1
ABDOMINAL PAIN: 0
CONSTIPATION: 0
VOMITING: 0
DIARRHEA: 0
CHEST TIGHTNESS: 0
SHORTNESS OF BREATH: 0

## 2021-05-12 NOTE — DISCHARGE SUMMARY
CDU Discharge Summary        Patient:  Jessi Salmeron  YOB: 1960    MRN: [id number]   Acct: [de-identified]    Primary Care Physician: Barbee Romberg    Admit date:  5/8/2021  8:04 AM  Discharge date: 5/8/2021  5:03 PM     Discharge Diagnoses:     Acute chest pain and lightheadedness worsens with shoulder movement. Patient had negative cardiac work-up, symptoms are most likely secondary to rotator cuff injury or costochondritis. Patient's pain improved with pain management and rest.  Patient was discharged home in good condition. Follow-up:  Call today/tomorrow for a follow up appointment with Barbee Romberg , or return to the Emergency Room with worsening symptoms    Stressed to patient the importance of following up with primary care doctor for further workup/management of symptoms. Pt verbalizes understanding and agrees with plan. Discharge Medications: No changes to medications          Stanislav Delia   Home Medication Instructions [facility]:[id number]    Printed on:05/12/21 8148   Medication Information                      acetaminophen (TYLENOL) 500 MG tablet  Take 1 tablet by mouth every 6 hours as needed for Pain             albuterol (PROVENTIL) (5 MG/ML) 0.5% nebulizer solution  Take 0.5 mLs by nebulization every 6 hours as needed for Wheezing             albuterol-ipratropium (COMBIVENT RESPIMAT)  MCG/ACT AERS inhaler  Inhale 1 puff into the lungs every 6 hours             aspirin 81 MG chewable tablet  Take 1 tablet by mouth daily             atorvastatin (LIPITOR) 40 MG tablet  Take 1 tablet by mouth nightly             atorvastatin (LIPITOR) 40 MG tablet  Take 1 tablet by mouth daily             blood glucose monitor strips  Test 3 times a day & as needed for symptoms of irregular blood glucose.              cephALEXin (KEFLEX) 500 MG capsule  Take 1 capsule by mouth 3 times daily             Dulaglutide (TRULICITY) 1.5 MN/4.5KO SOPN  Inject 1.5 mg into the skin once a week glipiZIDE (GLUCOTROL) 5 MG tablet  Take 1 tablet by mouth 2 times daily             glucose monitoring kit (FREESTYLE) monitoring kit  1 kit by Does not apply route daily             Lancets MISC  1 each by Does not apply route daily             lisinopril (PRINIVIL;ZESTRIL) 40 MG tablet  Take 40 mg by mouth daily. metFORMIN (GLUCOPHAGE) 1000 MG tablet  Take 1 tablet by mouth 2 times daily (with meals)             metoprolol tartrate (LOPRESSOR) 25 MG tablet  Take 0.5 tablets by mouth 2 times daily             nitroGLYCERIN (NITROSTAT) 0.4 MG SL tablet  up to max of 3 total doses. If no relief after 1 dose, call 911. ticagrelor (BRILINTA) 90 MG TABS tablet  Take 1 tablet by mouth 2 times daily             tiotropium (SPIRIVA) 18 MCG inhalation capsule  Inhale 1 capsule into the lungs daily Pt told that she was using this medication at home.                  Diet:  No diet orders on file , Advance as tolerated     Activity:  As tolerated    Consultants: None    Procedures:  Not indicated     Diagnostic Test:   Results for orders placed or performed during the hospital encounter of 05/08/21   CBC WITH AUTO DIFFERENTIAL   Result Value Ref Range    WBC 9.2 3.5 - 11.3 k/uL    RBC 4.43 3.95 - 5.11 m/uL    Hemoglobin 12.3 11.9 - 15.1 g/dL    Hematocrit 38.8 36.3 - 47.1 %    MCV 87.6 82.6 - 102.9 fL    MCH 27.8 25.2 - 33.5 pg    MCHC 31.7 28.4 - 34.8 g/dL    RDW 15.0 (H) 11.8 - 14.4 %    Platelets 073 165 - 265 k/uL    MPV 10.6 8.1 - 13.5 fL    NRBC Automated 0.0 0.0 per 100 WBC    Differential Type NOT REPORTED     Seg Neutrophils 75 (H) 36 - 65 %    Lymphocytes 14 (L) 24 - 43 %    Monocytes 6 3 - 12 %    Eosinophils % 3 1 - 4 %    Basophils 1 0 - 2 %    Immature Granulocytes 1 (H) 0 %    Segs Absolute 6.95 1.50 - 8.10 k/uL    Absolute Lymph # 1.29 1.10 - 3.70 k/uL    Absolute Mono # 0.54 0.10 - 1.20 k/uL    Absolute Eos # 0.28 0.00 - 0.44 k/uL    Basophils Absolute 0.07 0.00 - 0.20 k/uL Absolute Immature Granulocyte 0.05 0.00 - 0.30 k/uL    WBC Morphology NOT REPORTED     RBC Morphology ANISOCYTOSIS PRESENT     Platelet Estimate NOT REPORTED    Troponin   Result Value Ref Range    Troponin, High Sensitivity 12 0 - 14 ng/L    Troponin T NOT REPORTED <0.03 ng/mL    Troponin Interp NOT REPORTED    BASIC METABOLIC PANEL   Result Value Ref Range    Glucose 120 (H) 70 - 99 mg/dL    BUN 23 (H) 6 - 20 mg/dL    CREATININE 1.18 (H) 0.50 - 0.90 mg/dL    Bun/Cre Ratio NOT REPORTED 9 - 20    Calcium 9.1 8.6 - 10.4 mg/dL    Sodium 140 135 - 144 mmol/L    Potassium 3.8 3.7 - 5.3 mmol/L    Chloride 107 98 - 107 mmol/L    CO2 22 20 - 31 mmol/L    Anion Gap 11 9 - 17 mmol/L    GFR Non-African American 47 (L) >60 mL/min    GFR  57 (L) >60 mL/min    GFR Comment          GFR Staging NOT REPORTED    Troponin   Result Value Ref Range    Troponin, High Sensitivity 11 0 - 14 ng/L    Troponin T NOT REPORTED <0.03 ng/mL    Troponin Interp NOT REPORTED    EKG 12 Lead   Result Value Ref Range    Ventricular Rate 66 BPM    Atrial Rate 66 BPM    QRS Duration 78 ms    Q-T Interval 426 ms    QTc Calculation (Bazett) 446 ms    R Axis 51 degrees    T Axis 28 degrees     Echo Complete 2d W Doppler W Color    Result Date: 5/8/2021  Transthoracic Echocardiography Report (TTE)  Patient Name Elieser Hidalgo Date of Study           05/08/2021               L   Date of      1960  Gender                  Female  Birth   Age          64 year(s)  Race                       Room Number  26          Height:                 68 inch, 172.72 cm   Corporate ID [id number]    Weight:                 210 pounds, 95.3 kg  #   Patient Acct [de-identified]   BSA:        2.09 m^2    BMI:        31.93 kg/m^2  #   MR #         [id number]     Sonographer             Frank Sutton   Accession #  [id number]  Interpreting Physician  Jose Luis Burton 63   Fellow                   Referring Nurse                           Practitioner   Interpreting Referring Physician     Charlie Dela Cruz  Fellow                                           DO Randolph  Type of Study   TTE procedure:2D Echocardiogram, M-Mode, Doppler, Color Doppler. Procedure Date Date: 05/08/2021 Start: 11:06 AM Study Location: [facility] Technical Quality: Adequate visualization Indications:Chest pain and Dyspnea/SOB. History / Tech. Comments: Procedure explained to patient. COPD, HTN Patient Status: Inpatient Height: 68 inches Weight: 210 pounds BSA: 2.09 m^2 BMI: 31.93 kg/m^2 HR: 56 bpm CONCLUSIONS Summary Normal LV size and wall thickness. No obvious wall motion abnormality seen. Normal LV systolic function with LVEF >55%. Normal RV size and function. RV systolic pressure LA appears mildly dilated and RA appears normal in size. MAC, Calcified leaflets. Reduced mobility. Mild AS mean gradient 6 mmHg, Mild MR No other valvular abnormality noted Normal aortic root dimension. No significant pericardial effusion noted. No obvious intra-cardiac mass or shunt noted. IVC normal diameter and inspiratory variation indicating normal RA filling pressure. Signature ----------------------------------------------------------------------------  Electronically signed by Sara Cobos(Interpreting physician) on  05/08/2021 12:32 PM ---------------------------------------------------------------------------- ----------------------------------------------------------------------------  Electronically signed by Emma Roth(Sonographer) on 05/08/2021  12:19 PM ---------------------------------------------------------------------------- FINDINGS Left Atrium Left atrium appears mildly dilated. Left Ventricle Left ventricle is normal in size. Global left ventricular systolic function is normal. Calculated ejection fraction 66% by Heart Model. Right Atrium Right atrium is normal in size. Right Ventricle Normal right ventricular size and function. TAPSE value of 2.97cm noted.  Mitral Valve Mitral valve sclerosis with mild stenosis. Mean gradient is 6mmHg . MV PHT = 120msec. MVA = 1.8cm2. At least mild mitral regurgitation. Aortic Valve Normal aortic valve structure and function without stenosis or regurgitation. Tricuspid Valve Normal tricuspid valve structure and function. No tricuspid regurgitation. Pulmonic Valve The pulmonic valve is normal in structure. No pulmonic insufficiency. Pericardial Effusion Anterior echo free space suggestive of adipose tissue is seen. Miscellaneous E/E' average = 30.7. IVC normal diameter & inspiratory collapse indicating normal RA filling pressure .  M-mode / 2D Measurements & Calculations:   LVIDd:4.7 cm(3.7 - 5.6 cm)       Diastolic UWPFIQ:996 ml  VIFAR:8.5 cm(2.2 - 4.0 cm)       Systolic DMZLMD:40 ml  IVSd:1 cm(0.6 - 1.1 cm)          Aortic Root:2.7 cm(2.0 - 3.7 cm)  LVPWd:1.1 cm(0.6 - 1.1 cm)       LA Dimension: 3.7 cm(1.9 - 4.0 cm)  Fractional Shortenin.55 %    LA volume/Index: 61.33 ml /29m^2  Calculated LVEF (%): 66.67 %     LVOT:1.7 cm                                   RVDd:2.7 cm   Mitral:                                 Aortic   Valve Area (P1/2-Time): 1.83 cm^2       Peak Velocity: 1.61 m/s  Peak E-Wave: 1.63 m/s                   Mean Velocity: 0.94 m/s  Peak A-Wave: 1.57 m/s                   Peak Gradient: 10.37 mmHg  E/A Ratio: 1.04                         Mean Gradient: 4 mmHg  Peak Gradient: 10.63 mmHg  Mean Gradient: 6 mmHg  Deceleration Time: 338 msec             Area (continuity): 1.83 cm^2  P1/2t: 120 msec                         AV VTI: 35.9 cm   Area (continuity): 0.96 cm^2  Mean Velocity: 1.16 m/s                                           Pulmonic:                                           Peak Velocity: 0.87 m/s                                          Peak Gradient: 3.02 mmHg  Diastology / Tissue Doppler Septal Wall E' velocity:0.05 m/s Septal Wall E/E':32.6 Lateral Wall E' velocity:0.06 m/s Lateral Wall E/E':28.8    Xr Chest (2 Vw)    Result Date: 5/8/2021  EXAMINATION: TWO XRAY VIEWS OF THE CHEST 5/8/2021 8:34 am COMPARISON: 10/24/2028, 1230 hours HISTORY: ORDERING SYSTEM PROVIDED HISTORY: chest pain TECHNOLOGIST PROVIDED HISTORY: chest pain Reason for Exam: chest pain Acuity: Acute Type of Exam: Initial 68-year-old female with acute chest pain FINDINGS: Cardiac monitor leads overlie the chest. Stable blunting of the costophrenic angles which may represent chronic pleural thickening. Trachea midline. No pneumothorax. No free air. No acute focal airspace consolidation or sizable pleural effusions. Visualized osseous structures remain unchanged. Mild diffuse degenerative changes throughout the spine. 1. Stable blunting of the costophrenic angles which may reflect chronic pleural thickening. 2. No acute focal airspace consolidation. Nm Cardiac Stress Test Nuclear Imaging    Result Date: 5/8/2021  EXAMINATION: MYOCARDIAL PERFUSION IMAGING 5/8/2021 11:06 am TECHNIQUE: Rest dose:  40 mCi Tc-99m sestamibi intravenously Stress dose:  14.2 mCi Tc-99m sestamibi intravenously Under cardiology supervision, 0.4 mg Lexiscan was infused intravenously prior to injection of the stress dose. SPECT imaging was acquired following injection of the sestamibi. ECG gating was obtained following the stress acquisition. COMPARISON: None Available. HISTORY: ORDERING SYSTEM PROVIDED HISTORY: Chest pain TECHNOLOGIST PROVIDED HISTORY: Reason for Exam: Chest pain Procedure Type->Rx chest pain Reason for Exam: chest pain Additional signs and symptoms: coronary stent 68-year-old female with chest pain FINDINGS: The patient achieved a maximum heart rate of 111 beats per minute, 68 % of the maximum age predicted heart rate of 162 beats per minute. Perfusion: There is no scintigraphic evidence for a reversible or fixed perfusion defect to suggest reversible ischemia or infarct.  Function: The gated SPECT data demonstrates normal left ventricular size and coronary causes. 2.  Resting perfusion abnormalities in 5%-9.9% of the myocardium in patients without a history or prior evidence of MI 3. Stress-induced perfusion abnormality encumbering 5%-9.9% of the myocardium or stress segmental scores indicating 1 vascular territory with abnormalities but without LV dilation 4. Small wall motion abnormality involving 1-2 segments and only 1 coronary bed. Low Risk (Less than 1% annual death or MI) 1. Normal or small myocardial perfusion defect at rest or with stress encumbering less than 5% of the myocardium. Physical Exam:    General appearance - NAD, AOx 3   Lungs -CTAB, no R/R/R  Heart - RRR, no M/R/G  Abdomen - Soft, NT/ND  Neurological:  MAEx4, No focal motor deficit, sensory loss  Extremities - Cap refil <2 sec in all ext., no edema  Skin -warm, dry      Hospital Course:  Clinical course has improved, labs and imaging reviewed. Vanessa Conde originally presented to the hospital on 5/8/2021  8:04 AM. with chest pain. At that time it was determined that She required further observation and further cardiac work-up and pain management. She was admitted and labs and imaging were followed daily. Imaging results as above. She is medically stable to be discharged. Disposition: Home    Patient stated that they will not drive themselves home from the hospital if they have gotten pain killers/ narcotics earlier that day and that they will arrange for transportation on their own or work with the  for a ride. Patient counseled NOT to drive while under the influence of narcotics/ pain killers. Condition: Good    Patient stable and ready for discharge home. I have discussed plan of care with patient and they are in understanding. They were instructed to read discharge paperwork. All of their questions and concerns were addressed.      Time Spent: 0 day      --  Tk Dhaliwal,   Emergency Medicine Resident Physician    This dictation was generated by voice recognition computer software. Although all attempts are made to edit the dictation for accuracy, there may be errors in the transcription that are not intended.

## 2021-07-25 ENCOUNTER — APPOINTMENT (OUTPATIENT)
Dept: GENERAL RADIOLOGY | Age: 61
DRG: 249 | End: 2021-07-25
Payer: MEDICARE

## 2021-07-25 ENCOUNTER — APPOINTMENT (OUTPATIENT)
Dept: CT IMAGING | Age: 61
DRG: 249 | End: 2021-07-25
Payer: MEDICARE

## 2021-07-25 ENCOUNTER — HOSPITAL ENCOUNTER (INPATIENT)
Age: 61
LOS: 2 days | Discharge: HOME | DRG: 249 | End: 2021-07-27
Attending: EMERGENCY MEDICINE | Admitting: INTERNAL MEDICINE
Payer: MEDICARE

## 2021-07-25 DIAGNOSIS — R10.84 GENERALIZED ABDOMINAL PAIN: Primary | ICD-10-CM

## 2021-07-25 PROBLEM — I25.10 CORONARY ARTERY DISEASE INVOLVING NATIVE CORONARY ARTERY OF NATIVE HEART WITHOUT ANGINA PECTORIS: Status: ACTIVE | Noted: 2021-07-25

## 2021-07-25 PROBLEM — F12.10 MARIJUANA ABUSE: Status: ACTIVE | Noted: 2021-07-25

## 2021-07-25 PROBLEM — E55.9 VITAMIN D DEFICIENCY: Status: ACTIVE | Noted: 2021-07-25

## 2021-07-25 PROBLEM — R11.2 NAUSEA AND VOMITING: Status: ACTIVE | Noted: 2021-07-25

## 2021-07-25 PROBLEM — E87.20 LACTIC ACIDOSIS: Status: ACTIVE | Noted: 2021-07-25

## 2021-07-25 PROBLEM — N17.9 AKI (ACUTE KIDNEY INJURY) (HCC): Status: ACTIVE | Noted: 2021-07-25

## 2021-07-25 PROBLEM — Z71.6 TOBACCO ABUSE COUNSELING: Status: ACTIVE | Noted: 2021-07-25

## 2021-07-25 PROBLEM — K52.9 ENTERITIS: Status: ACTIVE | Noted: 2021-07-25

## 2021-07-25 PROBLEM — E87.29 HIGH ANION GAP METABOLIC ACIDOSIS: Status: ACTIVE | Noted: 2021-07-25

## 2021-07-25 PROBLEM — E11.65 TYPE 2 DIABETES MELLITUS WITH HYPERGLYCEMIA, WITHOUT LONG-TERM CURRENT USE OF INSULIN (HCC): Status: ACTIVE | Noted: 2021-07-25

## 2021-07-25 LAB
-: ABNORMAL
ABSOLUTE EOS #: 0.26 K/UL (ref 0–0.44)
ABSOLUTE IMMATURE GRANULOCYTE: 0.04 K/UL (ref 0–0.3)
ABSOLUTE LYMPH #: 1.96 K/UL (ref 1.1–3.7)
ABSOLUTE MONO #: 0.85 K/UL (ref 0.1–1.2)
ALBUMIN SERPL-MCNC: 3.8 G/DL (ref 3.5–5.2)
ALBUMIN/GLOBULIN RATIO: 1.2 (ref 1–2.5)
ALP BLD-CCNC: 103 U/L (ref 35–104)
ALT SERPL-CCNC: 10 U/L (ref 5–33)
AMORPHOUS: ABNORMAL
ANION GAP SERPL CALCULATED.3IONS-SCNC: 17 MMOL/L (ref 9–17)
AST SERPL-CCNC: 10 U/L
BACTERIA: ABNORMAL
BASOPHILS # BLD: 1 % (ref 0–2)
BASOPHILS ABSOLUTE: 0.07 K/UL (ref 0–0.2)
BILIRUB SERPL-MCNC: 0.22 MG/DL (ref 0.3–1.2)
BILIRUBIN DIRECT: <0.08 MG/DL
BILIRUBIN URINE: NEGATIVE
BILIRUBIN, INDIRECT: ABNORMAL MG/DL (ref 0–1)
BUN BLDV-MCNC: 26 MG/DL (ref 6–20)
BUN/CREAT BLD: ABNORMAL (ref 9–20)
C-REACTIVE PROTEIN: 11.4 MG/L (ref 0–5)
CALCIUM SERPL-MCNC: 9.2 MG/DL (ref 8.6–10.4)
CASTS UA: ABNORMAL /LPF (ref 0–2)
CASTS UA: ABNORMAL /LPF (ref 0–2)
CHLORIDE BLD-SCNC: 104 MMOL/L (ref 98–107)
CO2: 17 MMOL/L (ref 20–31)
COLOR: YELLOW
COMMENT UA: ABNORMAL
CREAT SERPL-MCNC: 1.3 MG/DL (ref 0.5–0.9)
CRYSTALS, UA: ABNORMAL /HPF
DIFFERENTIAL TYPE: ABNORMAL
EOSINOPHILS RELATIVE PERCENT: 2 % (ref 1–4)
EPITHELIAL CELLS UA: ABNORMAL /HPF (ref 0–5)
GFR AFRICAN AMERICAN: 51 ML/MIN
GFR NON-AFRICAN AMERICAN: 42 ML/MIN
GFR SERPL CREATININE-BSD FRML MDRD: ABNORMAL ML/MIN/{1.73_M2}
GFR SERPL CREATININE-BSD FRML MDRD: ABNORMAL ML/MIN/{1.73_M2}
GLOBULIN: ABNORMAL G/DL (ref 1.5–3.8)
GLUCOSE BLD-MCNC: 134 MG/DL (ref 70–99)
GLUCOSE BLD-MCNC: 159 MG/DL (ref 65–105)
GLUCOSE BLD-MCNC: 199 MG/DL (ref 65–105)
GLUCOSE BLD-MCNC: 223 MG/DL (ref 65–105)
GLUCOSE URINE: NEGATIVE
HCT VFR BLD CALC: 43.9 % (ref 36.3–47.1)
HEMOGLOBIN: 13.7 G/DL (ref 11.9–15.1)
IMMATURE GRANULOCYTES: 0 %
KETONES, URINE: NEGATIVE
LACTIC ACID, SEPSIS WHOLE BLOOD: 2.1 MMOL/L (ref 0.5–1.9)
LACTIC ACID, SEPSIS: ABNORMAL MMOL/L (ref 0.5–1.9)
LEUKOCYTE ESTERASE, URINE: ABNORMAL
LIPASE: 90 U/L (ref 13–60)
LYMPHOCYTES # BLD: 17 % (ref 24–43)
MCH RBC QN AUTO: 26.7 PG (ref 25.2–33.5)
MCHC RBC AUTO-ENTMCNC: 31.2 G/DL (ref 28.4–34.8)
MCV RBC AUTO: 85.6 FL (ref 82.6–102.9)
MONOCYTES # BLD: 7 % (ref 3–12)
MUCUS: ABNORMAL
NITRITE, URINE: NEGATIVE
NRBC AUTOMATED: 0 PER 100 WBC
OTHER OBSERVATIONS UA: ABNORMAL
PDW BLD-RTO: 13.7 % (ref 11.8–14.4)
PH UA: 5 (ref 5–8)
PLATELET # BLD: 245 K/UL (ref 138–453)
PLATELET ESTIMATE: ABNORMAL
PMV BLD AUTO: 11.2 FL (ref 8.1–13.5)
POTASSIUM SERPL-SCNC: 3.8 MMOL/L (ref 3.7–5.3)
PROTEIN UA: NEGATIVE
RBC # BLD: 5.13 M/UL (ref 3.95–5.11)
RBC # BLD: ABNORMAL 10*6/UL
RBC UA: ABNORMAL /HPF (ref 0–2)
RENAL EPITHELIAL, UA: ABNORMAL /HPF
SEDIMENTATION RATE, ERYTHROCYTE: 55 MM (ref 0–30)
SEG NEUTROPHILS: 73 % (ref 36–65)
SEGMENTED NEUTROPHILS ABSOLUTE COUNT: 8.69 K/UL (ref 1.5–8.1)
SODIUM BLD-SCNC: 138 MMOL/L (ref 135–144)
SPECIFIC GRAVITY UA: 1.02 (ref 1–1.03)
TOTAL PROTEIN: 7.1 G/DL (ref 6.4–8.3)
TRICHOMONAS: ABNORMAL
TROPONIN INTERP: NORMAL
TROPONIN T: NORMAL NG/ML
TROPONIN, HIGH SENSITIVITY: 13 NG/L (ref 0–14)
TURBIDITY: ABNORMAL
URINE HGB: NEGATIVE
UROBILINOGEN, URINE: NORMAL
WBC # BLD: 11.9 K/UL (ref 3.5–11.3)
WBC # BLD: ABNORMAL 10*3/UL
WBC UA: ABNORMAL /HPF (ref 0–5)
YEAST: ABNORMAL

## 2021-07-25 PROCEDURE — 6370000000 HC RX 637 (ALT 250 FOR IP): Performed by: NURSE PRACTITIONER

## 2021-07-25 PROCEDURE — 2580000003 HC RX 258: Performed by: INTERNAL MEDICINE

## 2021-07-25 PROCEDURE — 2500000003 HC RX 250 WO HCPCS: Performed by: INTERNAL MEDICINE

## 2021-07-25 PROCEDURE — 96375 TX/PRO/DX INJ NEW DRUG ADDON: CPT

## 2021-07-25 PROCEDURE — 6360000002 HC RX W HCPCS: Performed by: INTERNAL MEDICINE

## 2021-07-25 PROCEDURE — 83690 ASSAY OF LIPASE: CPT

## 2021-07-25 PROCEDURE — 2580000003 HC RX 258: Performed by: STUDENT IN AN ORGANIZED HEALTH CARE EDUCATION/TRAINING PROGRAM

## 2021-07-25 PROCEDURE — 86140 C-REACTIVE PROTEIN: CPT

## 2021-07-25 PROCEDURE — 82947 ASSAY GLUCOSE BLOOD QUANT: CPT

## 2021-07-25 PROCEDURE — 87506 IADNA-DNA/RNA PROBE TQ 6-11: CPT

## 2021-07-25 PROCEDURE — 2500000003 HC RX 250 WO HCPCS: Performed by: STUDENT IN AN ORGANIZED HEALTH CARE EDUCATION/TRAINING PROGRAM

## 2021-07-25 PROCEDURE — 83605 ASSAY OF LACTIC ACID: CPT

## 2021-07-25 PROCEDURE — 6360000004 HC RX CONTRAST MEDICATION: Performed by: STUDENT IN AN ORGANIZED HEALTH CARE EDUCATION/TRAINING PROGRAM

## 2021-07-25 PROCEDURE — 1200000000 HC SEMI PRIVATE

## 2021-07-25 PROCEDURE — 6360000002 HC RX W HCPCS: Performed by: STUDENT IN AN ORGANIZED HEALTH CARE EDUCATION/TRAINING PROGRAM

## 2021-07-25 PROCEDURE — 2500000003 HC RX 250 WO HCPCS: Performed by: NURSE PRACTITIONER

## 2021-07-25 PROCEDURE — 87449 NOS EACH ORGANISM AG IA: CPT

## 2021-07-25 PROCEDURE — 93005 ELECTROCARDIOGRAM TRACING: CPT | Performed by: STUDENT IN AN ORGANIZED HEALTH CARE EDUCATION/TRAINING PROGRAM

## 2021-07-25 PROCEDURE — 87324 CLOSTRIDIUM AG IA: CPT

## 2021-07-25 PROCEDURE — 94664 DEMO&/EVAL PT USE INHALER: CPT

## 2021-07-25 PROCEDURE — 94640 AIRWAY INHALATION TREATMENT: CPT

## 2021-07-25 PROCEDURE — 74177 CT ABD & PELVIS W/CONTRAST: CPT

## 2021-07-25 PROCEDURE — 99222 1ST HOSP IP/OBS MODERATE 55: CPT | Performed by: INTERNAL MEDICINE

## 2021-07-25 PROCEDURE — 80076 HEPATIC FUNCTION PANEL: CPT

## 2021-07-25 PROCEDURE — 80048 BASIC METABOLIC PNL TOTAL CA: CPT

## 2021-07-25 PROCEDURE — 83993 ASSAY FOR CALPROTECTIN FECAL: CPT

## 2021-07-25 PROCEDURE — 6370000000 HC RX 637 (ALT 250 FOR IP): Performed by: INTERNAL MEDICINE

## 2021-07-25 PROCEDURE — 85652 RBC SED RATE AUTOMATED: CPT

## 2021-07-25 PROCEDURE — 96374 THER/PROPH/DIAG INJ IV PUSH: CPT

## 2021-07-25 PROCEDURE — 84484 ASSAY OF TROPONIN QUANT: CPT

## 2021-07-25 PROCEDURE — 85025 COMPLETE CBC W/AUTO DIFF WBC: CPT

## 2021-07-25 PROCEDURE — 74022 RADEX COMPL AQT ABD SERIES: CPT

## 2021-07-25 PROCEDURE — 6360000002 HC RX W HCPCS: Performed by: NURSE PRACTITIONER

## 2021-07-25 PROCEDURE — 87086 URINE CULTURE/COLONY COUNT: CPT

## 2021-07-25 PROCEDURE — 99283 EMERGENCY DEPT VISIT LOW MDM: CPT

## 2021-07-25 PROCEDURE — 81001 URINALYSIS AUTO W/SCOPE: CPT

## 2021-07-25 RX ORDER — SODIUM CHLORIDE, SODIUM LACTATE, POTASSIUM CHLORIDE, CALCIUM CHLORIDE 600; 310; 30; 20 MG/100ML; MG/100ML; MG/100ML; MG/100ML
INJECTION, SOLUTION INTRAVENOUS CONTINUOUS
Status: DISCONTINUED | OUTPATIENT
Start: 2021-07-25 | End: 2021-07-27 | Stop reason: HOSPADM

## 2021-07-25 RX ORDER — MORPHINE SULFATE 4 MG/ML
4 INJECTION, SOLUTION INTRAMUSCULAR; INTRAVENOUS
Status: DISCONTINUED | OUTPATIENT
Start: 2021-07-25 | End: 2021-07-27 | Stop reason: HOSPADM

## 2021-07-25 RX ORDER — ASPIRIN 81 MG/1
81 TABLET, CHEWABLE ORAL DAILY
Status: DISCONTINUED | OUTPATIENT
Start: 2021-07-25 | End: 2021-07-27 | Stop reason: HOSPADM

## 2021-07-25 RX ORDER — ACETAMINOPHEN 325 MG/1
650 TABLET ORAL EVERY 6 HOURS PRN
Status: DISCONTINUED | OUTPATIENT
Start: 2021-07-25 | End: 2021-07-27 | Stop reason: HOSPADM

## 2021-07-25 RX ORDER — MAGNESIUM SULFATE 1 G/100ML
1000 INJECTION INTRAVENOUS PRN
Status: DISCONTINUED | OUTPATIENT
Start: 2021-07-25 | End: 2021-07-27 | Stop reason: HOSPADM

## 2021-07-25 RX ORDER — POLYETHYLENE GLYCOL 3350 17 G/17G
17 POWDER, FOR SOLUTION ORAL DAILY PRN
Status: DISCONTINUED | OUTPATIENT
Start: 2021-07-25 | End: 2021-07-25 | Stop reason: SDUPTHER

## 2021-07-25 RX ORDER — HYDROCODONE BITARTRATE AND ACETAMINOPHEN 5; 325 MG/1; MG/1
2 TABLET ORAL EVERY 4 HOURS PRN
Status: DISCONTINUED | OUTPATIENT
Start: 2021-07-25 | End: 2021-07-27 | Stop reason: HOSPADM

## 2021-07-25 RX ORDER — DEXTROSE MONOHYDRATE 50 MG/ML
100 INJECTION, SOLUTION INTRAVENOUS PRN
Status: DISCONTINUED | OUTPATIENT
Start: 2021-07-25 | End: 2021-07-27 | Stop reason: HOSPADM

## 2021-07-25 RX ORDER — CALCIUM CARBONATE 200(500)MG
500 TABLET,CHEWABLE ORAL 3 TIMES DAILY PRN
Status: DISCONTINUED | OUTPATIENT
Start: 2021-07-25 | End: 2021-07-27 | Stop reason: HOSPADM

## 2021-07-25 RX ORDER — SODIUM CHLORIDE 0.9 % (FLUSH) 0.9 %
10 SYRINGE (ML) INJECTION PRN
Status: DISCONTINUED | OUTPATIENT
Start: 2021-07-25 | End: 2021-07-25

## 2021-07-25 RX ORDER — NICOTINE POLACRILEX 4 MG
15 LOZENGE BUCCAL PRN
Status: DISCONTINUED | OUTPATIENT
Start: 2021-07-25 | End: 2021-07-27 | Stop reason: HOSPADM

## 2021-07-25 RX ORDER — 0.9 % SODIUM CHLORIDE 0.9 %
500 INTRAVENOUS SOLUTION INTRAVENOUS ONCE
Status: COMPLETED | OUTPATIENT
Start: 2021-07-25 | End: 2021-07-26

## 2021-07-25 RX ORDER — 0.9 % SODIUM CHLORIDE 0.9 %
1000 INTRAVENOUS SOLUTION INTRAVENOUS ONCE
Status: COMPLETED | OUTPATIENT
Start: 2021-07-25 | End: 2021-07-25

## 2021-07-25 RX ORDER — SODIUM CHLORIDE 0.9 % (FLUSH) 0.9 %
5-40 SYRINGE (ML) INJECTION EVERY 12 HOURS SCHEDULED
Status: DISCONTINUED | OUTPATIENT
Start: 2021-07-25 | End: 2021-07-27 | Stop reason: HOSPADM

## 2021-07-25 RX ORDER — ACETAMINOPHEN 650 MG/1
650 SUPPOSITORY RECTAL EVERY 6 HOURS PRN
Status: DISCONTINUED | OUTPATIENT
Start: 2021-07-25 | End: 2021-07-27 | Stop reason: HOSPADM

## 2021-07-25 RX ORDER — POTASSIUM CHLORIDE 20 MEQ/1
40 TABLET, EXTENDED RELEASE ORAL PRN
Status: DISCONTINUED | OUTPATIENT
Start: 2021-07-25 | End: 2021-07-27 | Stop reason: HOSPADM

## 2021-07-25 RX ORDER — SODIUM CHLORIDE 9 MG/ML
25 INJECTION, SOLUTION INTRAVENOUS PRN
Status: DISCONTINUED | OUTPATIENT
Start: 2021-07-25 | End: 2021-07-27 | Stop reason: HOSPADM

## 2021-07-25 RX ORDER — ONDANSETRON 2 MG/ML
4 INJECTION INTRAMUSCULAR; INTRAVENOUS EVERY 6 HOURS PRN
Status: DISCONTINUED | OUTPATIENT
Start: 2021-07-25 | End: 2021-07-27 | Stop reason: HOSPADM

## 2021-07-25 RX ORDER — DIPHENHYDRAMINE HYDROCHLORIDE 50 MG/ML
25 INJECTION INTRAMUSCULAR; INTRAVENOUS ONCE
Status: COMPLETED | OUTPATIENT
Start: 2021-07-25 | End: 2021-07-25

## 2021-07-25 RX ORDER — MORPHINE SULFATE 4 MG/ML
4 INJECTION, SOLUTION INTRAMUSCULAR; INTRAVENOUS ONCE
Status: COMPLETED | OUTPATIENT
Start: 2021-07-25 | End: 2021-07-25

## 2021-07-25 RX ORDER — ONDANSETRON 4 MG/1
4 TABLET, ORALLY DISINTEGRATING ORAL EVERY 8 HOURS PRN
Status: DISCONTINUED | OUTPATIENT
Start: 2021-07-25 | End: 2021-07-27 | Stop reason: HOSPADM

## 2021-07-25 RX ORDER — SODIUM CHLORIDE 9 MG/ML
25 INJECTION, SOLUTION INTRAVENOUS PRN
Status: DISCONTINUED | OUTPATIENT
Start: 2021-07-25 | End: 2021-07-25

## 2021-07-25 RX ORDER — DEXTROSE MONOHYDRATE 25 G/50ML
12.5 INJECTION, SOLUTION INTRAVENOUS PRN
Status: DISCONTINUED | OUTPATIENT
Start: 2021-07-25 | End: 2021-07-27 | Stop reason: HOSPADM

## 2021-07-25 RX ORDER — ACETAMINOPHEN 325 MG/1
650 TABLET ORAL EVERY 6 HOURS PRN
Status: DISCONTINUED | OUTPATIENT
Start: 2021-07-25 | End: 2021-07-25 | Stop reason: SDUPTHER

## 2021-07-25 RX ORDER — DEXTROSE, SODIUM CHLORIDE, AND POTASSIUM CHLORIDE 5; .45; .15 G/100ML; G/100ML; G/100ML
INJECTION INTRAVENOUS CONTINUOUS
Status: DISCONTINUED | OUTPATIENT
Start: 2021-07-25 | End: 2021-07-25

## 2021-07-25 RX ORDER — ACETAMINOPHEN 650 MG/1
650 SUPPOSITORY RECTAL EVERY 6 HOURS PRN
Status: DISCONTINUED | OUTPATIENT
Start: 2021-07-25 | End: 2021-07-25 | Stop reason: SDUPTHER

## 2021-07-25 RX ORDER — MORPHINE SULFATE 4 MG/ML
2 INJECTION, SOLUTION INTRAMUSCULAR; INTRAVENOUS
Status: DISCONTINUED | OUTPATIENT
Start: 2021-07-25 | End: 2021-07-27 | Stop reason: HOSPADM

## 2021-07-25 RX ORDER — POLYETHYLENE GLYCOL 3350 17 G/17G
17 POWDER, FOR SOLUTION ORAL DAILY PRN
Status: DISCONTINUED | OUTPATIENT
Start: 2021-07-25 | End: 2021-07-27 | Stop reason: HOSPADM

## 2021-07-25 RX ORDER — HYDROCODONE BITARTRATE AND ACETAMINOPHEN 5; 325 MG/1; MG/1
1 TABLET ORAL EVERY 4 HOURS PRN
Status: DISCONTINUED | OUTPATIENT
Start: 2021-07-25 | End: 2021-07-27 | Stop reason: HOSPADM

## 2021-07-25 RX ORDER — SODIUM CHLORIDE 0.9 % (FLUSH) 0.9 %
10 SYRINGE (ML) INJECTION PRN
Status: DISCONTINUED | OUTPATIENT
Start: 2021-07-25 | End: 2021-07-27 | Stop reason: HOSPADM

## 2021-07-25 RX ORDER — POTASSIUM CHLORIDE 7.45 MG/ML
10 INJECTION INTRAVENOUS PRN
Status: DISCONTINUED | OUTPATIENT
Start: 2021-07-25 | End: 2021-07-27 | Stop reason: HOSPADM

## 2021-07-25 RX ORDER — METOCLOPRAMIDE HYDROCHLORIDE 5 MG/ML
10 INJECTION INTRAMUSCULAR; INTRAVENOUS ONCE
Status: COMPLETED | OUTPATIENT
Start: 2021-07-25 | End: 2021-07-25

## 2021-07-25 RX ADMIN — MORPHINE SULFATE 4 MG: 4 INJECTION INTRAVENOUS at 11:27

## 2021-07-25 RX ADMIN — METRONIDAZOLE 500 MG: 500 INJECTION, SOLUTION INTRAVENOUS at 14:00

## 2021-07-25 RX ADMIN — METRONIDAZOLE 500 MG: 500 INJECTION, SOLUTION INTRAVENOUS at 06:14

## 2021-07-25 RX ADMIN — MORPHINE SULFATE 4 MG: 4 INJECTION INTRAVENOUS at 09:09

## 2021-07-25 RX ADMIN — MORPHINE SULFATE 4 MG: 4 INJECTION INTRAVENOUS at 04:25

## 2021-07-25 RX ADMIN — ANTACID TABLETS 500 MG: 500 TABLET, CHEWABLE ORAL at 20:10

## 2021-07-25 RX ADMIN — ONDANSETRON 4 MG: 2 INJECTION INTRAMUSCULAR; INTRAVENOUS at 06:43

## 2021-07-25 RX ADMIN — TIOTROPIUM BROMIDE INHALATION SPRAY 2 PUFF: 3.12 SPRAY, METERED RESPIRATORY (INHALATION) at 08:40

## 2021-07-25 RX ADMIN — CEFTRIAXONE SODIUM 1000 MG: 1 INJECTION, POWDER, FOR SOLUTION INTRAMUSCULAR; INTRAVENOUS at 03:59

## 2021-07-25 RX ADMIN — ALBUTEROL SULFATE 2.5 MG: 5 SOLUTION RESPIRATORY (INHALATION) at 16:07

## 2021-07-25 RX ADMIN — ACETAMINOPHEN 650 MG: 325 TABLET ORAL at 23:24

## 2021-07-25 RX ADMIN — SODIUM CHLORIDE, POTASSIUM CHLORIDE, SODIUM LACTATE AND CALCIUM CHLORIDE: 600; 310; 30; 20 INJECTION, SOLUTION INTRAVENOUS at 11:30

## 2021-07-25 RX ADMIN — METRONIDAZOLE 500 MG: 500 INJECTION, SOLUTION INTRAVENOUS at 21:30

## 2021-07-25 RX ADMIN — MORPHINE SULFATE 4 MG: 4 INJECTION INTRAVENOUS at 01:00

## 2021-07-25 RX ADMIN — ENOXAPARIN SODIUM 40 MG: 100 INJECTION SUBCUTANEOUS at 09:05

## 2021-07-25 RX ADMIN — DIPHENHYDRAMINE HYDROCHLORIDE 25 MG: 50 INJECTION INTRAMUSCULAR; INTRAVENOUS at 01:01

## 2021-07-25 RX ADMIN — POTASSIUM CHLORIDE, DEXTROSE MONOHYDRATE AND SODIUM CHLORIDE: 150; 5; 450 INJECTION, SOLUTION INTRAVENOUS at 08:27

## 2021-07-25 RX ADMIN — SODIUM CHLORIDE 1000 ML: 9 INJECTION, SOLUTION INTRAVENOUS at 01:00

## 2021-07-25 RX ADMIN — MORPHINE SULFATE 4 MG: 4 INJECTION INTRAVENOUS at 17:07

## 2021-07-25 RX ADMIN — MORPHINE SULFATE 4 MG: 4 INJECTION INTRAVENOUS at 14:00

## 2021-07-25 RX ADMIN — IOPAMIDOL 75 ML: 755 INJECTION, SOLUTION INTRAVENOUS at 03:26

## 2021-07-25 RX ADMIN — METOCLOPRAMIDE 10 MG: 5 INJECTION, SOLUTION INTRAMUSCULAR; INTRAVENOUS at 01:01

## 2021-07-25 RX ADMIN — ONDANSETRON 4 MG: 2 INJECTION INTRAMUSCULAR; INTRAVENOUS at 14:00

## 2021-07-25 RX ADMIN — TICAGRELOR 90 MG: 90 TABLET ORAL at 21:30

## 2021-07-25 RX ADMIN — ASPIRIN 81 MG: 81 TABLET, CHEWABLE ORAL at 17:08

## 2021-07-25 ASSESSMENT — PAIN SCALES - GENERAL
PAINLEVEL_OUTOF10: 7
PAINLEVEL_OUTOF10: 9
PAINLEVEL_OUTOF10: 8
PAINLEVEL_OUTOF10: 5
PAINLEVEL_OUTOF10: 0
PAINLEVEL_OUTOF10: 6
PAINLEVEL_OUTOF10: 8
PAINLEVEL_OUTOF10: 7
PAINLEVEL_OUTOF10: 8
PAINLEVEL_OUTOF10: 6
PAINLEVEL_OUTOF10: 10

## 2021-07-25 ASSESSMENT — PAIN DESCRIPTION - LOCATION: LOCATION: ABDOMEN

## 2021-07-25 ASSESSMENT — ENCOUNTER SYMPTOMS
NAUSEA: 1
VOMITING: 0
SHORTNESS OF BREATH: 0
BACK PAIN: 1
ABDOMINAL PAIN: 1

## 2021-07-25 ASSESSMENT — PAIN DESCRIPTION - PAIN TYPE: TYPE: ACUTE PAIN

## 2021-07-25 NOTE — ED PROVIDER NOTES
[facility]  ED  Emergency Department Encounter  Emergency Medicine Resident     Pt Name: Haleigh Roca  MRN: [id number]  [place] 1960  Date of evaluation: 7/25/21  PCP:  Jb Monzon MD    [address] [place] [address]       Chief Complaint   Patient presents with    Emesis     Since This evening x2 episodes    Abdominal Pain    Anxiety    Back Pain       HISTORY OFPRESENT ILLNESS  (Location/Symptom, Timing/Onset, Context/Setting, Quality, Duration, Modifying Factors,Severity.)      Haleigh Roca is a 64 y. o.yo female who presents with abdominal pain, n/v.  Patient here with acute onset of abdominal pain along with nausea and vomiting, states started 5 hours before presentation, states she had 2 episode of emesis, states there is generalized abdominal pain and states that there is also back pain, has a history of chronic back pain that has been worsened by continued episodes of emesis. States she does smoke marijuana, states she has never felt like this before. Denies any traumatic injuries to the abdomen, denies hematuria, dysuria, headache or vision changes or focal neuro deficits. Denies chest pain or shortness of breath. States the pain in her abdomen is 7 out of 10 in severity, nonradiating. PAST MEDICAL / SURGICAL / SOCIAL / FAMILY HISTORY      has a past medical history of Asthma, Blood circulation, collateral, CAD (coronary artery disease), COPD (chronic obstructive pulmonary disease) ([address] Diabetes mellitus ([address] Hyperlipidemia, Hypertension, Movement disorder, Neuropathy, and PAD (peripheral artery disease) ([address] has a past surgical history that includes Coronary angioplasty with stent; Tonsillectomy; Inner ear surgery (Right); and Coronary angioplasty with stent (02/11/2019). Social History     Socioeconomic History    Marital status:       Spouse name: Not on file    Number of children: Not on file    Years of education: Not on file    Highest education level: Not on file   Occupational History    Not on file   Tobacco Use    Smoking status: Current Every Day Smoker     Packs/day: 0.50     Types: Cigarettes    Smokeless tobacco: Current User   Substance and Sexual Activity    Alcohol use: No    Drug use: Yes     Types: Marijuana    Sexual activity: Never   Other Topics Concern    Not on file   Social History Narrative    Not on file     Social Determinants of Health     Financial Resource Strain:     Difficulty of Paying Living Expenses:    Food Insecurity:     Worried About Running Out of Food in the Last Year:     Ran Out of Food in the Last Year:    Transportation Needs:     Lack of Transportation (Medical):  Lack of Transportation (Non-Medical):    Physical Activity:     Days of Exercise per Week:     Minutes of Exercise per Session:    Stress:     Feeling of Stress :    Social Connections:     Frequency of Communication with Friends and Family:     Frequency of Social Gatherings with Friends and Family:     Attends Zoroastrian Services:     Active Member of Clubs or Organizations:     Attends Club or Organization Meetings:     Marital Status:    Intimate Partner Violence:     Fear of Current or Ex-Partner:     Emotionally Abused:     Physically Abused:     Sexually Abused:        History reviewed. No pertinent family history. Allergies:  Codeine and Morphine    Home Medications:  Prior to Admission medications    Medication Sig Start Date End Date Taking? Authorizing Provider   Dulaglutide (TRULICITY) 1.5 TF/8.7IF SOPN Inject 1.5 mg into the skin once a week    Historical Provider, MD   cephALEXin (KEFLEX) 500 MG capsule Take 1 capsule by mouth 3 times daily 7/22/20   Cassie Dickson DO   aspirin 81 MG chewable tablet Take 1 tablet by mouth daily 2/13/19   BINDU Keller - CNP   nitroGLYCERIN (NITROSTAT) 0.4 MG SL tablet up to max of 3 total doses.  If no relief after 1 dose, call 911. 2/12/19   [facility] Christina Tracy, APRN - CNP   atorvastatin (LIPITOR) 40 MG tablet Take 1 tablet by mouth nightly 2/12/19   Claudetta Norway, APRN - JOSE   metoprolol tartrate (LOPRESSOR) 25 MG tablet Take 0.5 tablets by mouth 2 times daily 2/12/19   Claudetta Norway, APRN - CNP   ticagrelor (BRILINTA) 90 MG TABS tablet Take 1 tablet by mouth 2 times daily 2/12/19   Claudetta Norway, APRN - CNP   Lancets MISC 1 each by Does not apply route daily 2/12/19   Eleanor Bui MD   glipiZIDE (GLUCOTROL) 5 MG tablet Take 1 tablet by mouth 2 times daily 2/12/19   Eleanor Bui MD   metFORMIN (GLUCOPHAGE) 1000 MG tablet Take 1 tablet by mouth 2 times daily (with meals) 2/12/19   Eleanor Bui MD   atorvastatin (LIPITOR) 40 MG tablet Take 1 tablet by mouth daily 2/12/19   Eleanor Bui MD   blood glucose monitor strips Test 3 times a day & as needed for symptoms of irregular blood glucose. 2/12/19   Eleanor Bui MD   glucose monitoring kit (FREESTYLE) monitoring kit 1 kit by Does not apply route daily 2/12/19   Eleanor Bui MD   albuterol (PROVENTIL) (5 MG/ML) 0.5% nebulizer solution Take 0.5 mLs by nebulization every 6 hours as needed for Wheezing 3/16/18   Jong Ojeda MD   tiotropium (SPIRIVA) 18 MCG inhalation capsule Inhale 1 capsule into the lungs daily Pt told that she was using this medication at home. 3/16/18   Jong Ojeda MD   albuterol-ipratropium (COMBIVENT RESPIMAT)  MCG/ACT AERS inhaler Inhale 1 puff into the lungs every 6 hours 3/16/18   Jong Ojeda MD   acetaminophen (TYLENOL) 500 MG tablet Take 1 tablet by mouth every 6 hours as needed for Pain 1/9/18   Mumtaz Small MD   lisinopril (PRINIVIL;ZESTRIL) 40 MG tablet Take 40 mg by mouth daily. Historical Provider, MD       REVIEW OFSYSTEMS    (2-9 systems for level 4, 10 or more for level 5)      Review of Systems   Constitutional: Negative for diaphoresis and fever. HENT: Negative for congestion.     Eyes: Negative for visual disturbance. Respiratory: Negative for shortness of breath. Cardiovascular: Negative for chest pain. Gastrointestinal: Positive for abdominal pain and nausea. Negative for vomiting. Endocrine: Negative for polyuria. Genitourinary: Negative for dysuria. Musculoskeletal: Positive for back pain. Skin: Negative for wound. Neurological: Negative for headaches. Psychiatric/Behavioral: Negative for confusion. PHYSICAL EXAM   (up to 7 for level 4, 8 or more forlevel 5)      ED TRIAGE VITALS BP: (!) 162/92, Temp: 97.6 °F (36.4 °C), Pulse: 84, Resp: 20, SpO2: 95 %    Vitals:    07/25/21 0042 07/25/21 0201 07/25/21 0216 07/25/21 0502   BP: (!) 162/92 109/65 107/66 129/81   Pulse: 84      Resp: 20      Temp: 97.6 °F (36.4 °C)      TempSrc: Oral      SpO2: 95% 93% 94% 96%   Weight: 214 lb (97.1 kg)      Height: 5' 8\" (1.727 m)          Physical Exam  Constitutional:       General: She is not in acute distress. Appearance: She is well-developed. HENT:      Head: Normocephalic and atraumatic. Nose: Nose normal.   Eyes:      Pupils: Pupils are equal, round, and reactive to light. Cardiovascular:      Rate and Rhythm: Normal rate and regular rhythm. Heart sounds: No murmur heard. Pulmonary:      Effort: Pulmonary effort is normal. No respiratory distress. Breath sounds: No stridor. No wheezing. Abdominal:      General: There is no distension. Palpations: Abdomen is soft. Tenderness: There is generalized abdominal tenderness. Musculoskeletal:         General: No tenderness. Normal range of motion. Cervical back: Normal range of motion and neck supple. Skin:     General: Skin is warm and dry. Capillary Refill: Capillary refill takes less than 2 seconds. Findings: No erythema or rash. Neurological:      Mental Status: She is alert and oriented to person, place, and time. Sensory: No sensory deficit.       Deep Tendon Reflexes: Reflexes normal.   Psychiatric:         Behavior: Behavior normal.         DIFFERENTIAL  DIAGNOSIS     PLAN (LABS / IMAGING / EKG):  Orders Placed This Encounter   Procedures    Culture, Urine    XR ACUTE ABD SERIES CHEST 1 VW    CT ABDOMEN PELVIS W IV CONTRAST Additional Contrast? None    Urinalysis Reflex to Culture    CBC Auto Differential    Basic Metabolic Panel w/ Reflex to MG    CBC Auto Differential    Lactate, Sepsis    Lipase    Hepatic Function Panel    Troponin    Microscopic Urinalysis    Inpatient consult to Hospitalist    EKG 12 Lead    Saline lock IV    Insert peripheral IV    PATIENT STATUS (FROM ED OR OR/PROCEDURAL) Inpatient       MEDICATIONS ORDERED:  Orders Placed This Encounter   Medications    metoclopramide (REGLAN) injection 10 mg    diphenhydrAMINE (BENADRYL) injection 25 mg    morphine injection 4 mg    0.9 % sodium chloride bolus    iopamidol (ISOVUE-370) 76 % injection 75 mL    cefTRIAXone (ROCEPHIN) 1000 mg IVPB in 50 mL D5W minibag     Order Specific Question:   Antimicrobial Indications     Answer:   Urinary Tract Infection    morphine injection 4 mg    metronidazole (FLAGYL) 500 mg in NaCl 100 mL IVPB premix     Order Specific Question:   Antimicrobial Indications     Answer:   Intra-Abdominal Infection       DDX:     SBO, Pancreatitis, intractable nausea vomiting, gastroenteritis    Initial MDM/Plan: 64 y.o. female who presents with abdominal pain. Small bowel obstruction:  Elevated lactate  CT negative for small bowel obstruction  Does show enteritis  Not tolerating p.o.   IV fluids  Reglan for nausea    Intractable nausea vomiting:  Symptomatic management  IV hydration  Admission    Pancreatitis:  Lipase elevated however not 3 times the normal limit  IV hydration  Pain control    Gastroenteritis:  Could be from viral cause that is causing thickening in the colon  CT read as enteritis  IV Flagyl    UTI:  Urinalysis showing UTI  IV ceftriaxone      DIAGNOSTIC RESULTS / EMERGENCYDEPARTMENT COURSE / MDM     LABS:  Results for orders placed or performed during the hospital encounter of 07/25/21   Urinalysis Reflex to Culture    Specimen: Urine, clean catch   Result Value Ref Range    Color, UA YELLOW YELLOW    Turbidity UA CLOUDY (A) CLEAR    Glucose, Ur NEGATIVE NEGATIVE    Bilirubin Urine NEGATIVE NEGATIVE    Ketones, Urine NEGATIVE NEGATIVE    Specific Gravity, UA 1.021 1.005 - 1.030    Urine Hgb NEGATIVE NEGATIVE    pH, UA 5.0 5.0 - 8.0    Protein, UA NEGATIVE NEGATIVE    Urobilinogen, Urine Normal Normal    Nitrite, Urine NEGATIVE NEGATIVE    Leukocyte Esterase, Urine MODERATE (A) NEGATIVE    Urinalysis Comments NOT REPORTED    Basic Metabolic Panel w/ Reflex to MG   Result Value Ref Range    Glucose 134 (H) 70 - 99 mg/dL    BUN 26 (H) 6 - 20 mg/dL    CREATININE 1.30 (H) 0.50 - 0.90 mg/dL    Bun/Cre Ratio NOT REPORTED 9 - 20    Calcium 9.2 8.6 - 10.4 mg/dL    Sodium 138 135 - 144 mmol/L    Potassium 3.8 3.7 - 5.3 mmol/L    Chloride 104 98 - 107 mmol/L    CO2 17 (L) 20 - 31 mmol/L    Anion Gap 17 9 - 17 mmol/L    GFR Non-African American 42 (L) >60 mL/min    GFR  51 (L) >60 mL/min    GFR Comment          GFR Staging NOT REPORTED    CBC Auto Differential   Result Value Ref Range    WBC 11.9 (H) 3.5 - 11.3 k/uL    RBC 5.13 (H) 3.95 - 5.11 m/uL    Hemoglobin 13.7 11.9 - 15.1 g/dL    Hematocrit 43.9 36.3 - 47.1 %    MCV 85.6 82.6 - 102.9 fL    MCH 26.7 25.2 - 33.5 pg    MCHC 31.2 28.4 - 34.8 g/dL    RDW 13.7 11.8 - 14.4 %    Platelets 065 732 - 741 k/uL    MPV 11.2 8.1 - 13.5 fL    NRBC Automated 0.0 0.0 per 100 WBC    Differential Type NOT REPORTED     Seg Neutrophils 73 (H) 36 - 65 %    Lymphocytes 17 (L) 24 - 43 %    Monocytes 7 3 - 12 %    Eosinophils % 2 1 - 4 %    Basophils 1 0 - 2 %    Immature Granulocytes 0 0 %    Segs Absolute 8.69 (H) 1.50 - 8.10 k/uL    Absolute Lymph # 1.96 1.10 - 3.70 k/uL    Absolute Mono # 0.85 0.10 - 1.20 k/uL    Absolute Eos # 0.26 0.00 - 0.44 k/uL    Basophils Absolute 0.07 0.00 - 0.20 k/uL    Absolute Immature Granulocyte 0.04 0.00 - 0.30 k/uL    WBC Morphology NOT REPORTED     RBC Morphology NOT REPORTED     Platelet Estimate NOT REPORTED    Lactate, Sepsis   Result Value Ref Range    Lactic Acid, Sepsis NOT REPORTED 0.5 - 1.9 mmol/L    Lactic Acid, Sepsis, Whole Blood 2.1 (H) 0.5 - 1.9 mmol/L   Lipase   Result Value Ref Range    Lipase 90 (H) 13 - 60 U/L   Hepatic Function Panel   Result Value Ref Range    Albumin 3.8 3.5 - 5.2 g/dL    Alkaline Phosphatase 103 35 - 104 U/L    ALT 10 5 - 33 U/L    AST 10 <32 U/L    Total Bilirubin 0.22 (L) 0.3 - 1.2 mg/dL    Bilirubin, Direct <0.08 <0.31 mg/dL    Bilirubin, Indirect CANNOT BE CALCULATED 0.00 - 1.00 mg/dL    Total Protein 7.1 6.4 - 8.3 g/dL    Globulin NOT REPORTED 1.5 - 3.8 g/dL    Albumin/Globulin Ratio 1.2 1.0 - 2.5   Troponin   Result Value Ref Range    Troponin, High Sensitivity 13 0 - 14 ng/L    Troponin T NOT REPORTED <0.03 ng/mL    Troponin Interp NOT REPORTED    Microscopic Urinalysis   Result Value Ref Range    -          WBC, UA 20 TO 50 0 - 5 /HPF    RBC, UA 0 TO 2 0 - 2 /HPF    Casts UA 2 TO 5 0 - 2 /LPF    Casts UA HYALINE 0 - 2 /LPF    Crystals, UA NOT REPORTED None /HPF    Epithelial Cells UA 10 TO 20 0 - 5 /HPF    Renal Epithelial, UA NOT REPORTED 0 /HPF    Bacteria, UA MODERATE (A) None    Mucus, UA 2+ (A) None    Trichomonas, UA NOT REPORTED None    Amorphous, UA NOT REPORTED None    Other Observations UA NOT REPORTED NOT REQ. Yeast, UA NOT REPORTED None       RADIOLOGY:  CT ABDOMEN PELVIS W IV CONTRAST Additional Contrast? None   Final Result   1. Distal jejunal and ileal bowel wall thickening/edema and increased   enhancement consistent with acute enteritis. Differential diagnostic   considerations include association with infectious etiology versus   inflammatory bowel disease. Recommend clinical correlation.    2. Associated minimal pelvic free fluid. 3. Mid right renal small exophytic simple cyst.   4. Marked descending and sigmoid colon diverticulosis without evidence of   diverticulitis. XR ACUTE ABD SERIES CHEST 1 VW   Final Result   No acute process in the chest.      No bowel obstruction or free air. EKG  No ST segment elevations or depressions seen on EKG    All EKG's are interpreted by the Emergency Department Physicianwho either signs or Co-signs this chart in the absence of a cardiologist.    EMERGENCY DEPARTMENT COURSE:  ED Course as of Jul 25 0609   Sun Jul 25, 2021   0051 Seen and assessed in the emergency department no acute respiratory cardiovascular distress. Patient here with acute onset of abdominal pain along with nausea and vomiting, states started 5 hours before presentation, states she had 2 episode of emesis, states there is generalized abdominal pain and states that there is also back pain, has a history of chronic back pain that has been worsened by continued episodes of emesis. States she does smoke marijuana, states she has never felt like this before. Denies any traumatic injuries to the abdomen, denies hematuria, dysuria, headache or vision changes or focal neuro deficits. Denies chest pain or shortness of breath. States the pain in her abdomen is 7 out of 10 in severity, nonradiating.    [PS]   0230 Lactic Acid, Sepsis, Whole Blood(!): 2.1 [PS]   0230 WBC(!): 11.9 [PS]   0343 Bacteria, UA(!): MODERATE [PS]   0343 IV Rocephin   WBC, UA: 20 TO 50 [PS]   0422 acute enteritis   CT ABDOMEN PELVIS W IV CONTRAST Additional Contrast? None [PS]   5905 D/w intermed      [PS]      ED Course User Index  [PS] Kenny Hampton MD          PROCEDURES:  None    CONSULTS:  IP CONSULT TO HOSPITALIST    CRITICAL CARE:  Please see attending note    FINAL IMPRESSION      1.  Generalized abdominal pain          DISPOSITION / PLAN     DISPOSITION Admitted 07/25/2021 05:01:06 AM       PATIENT REFERRED TO:  No follow-up provider specified.     DISCHARGE MEDICATIONS:  New Prescriptions    No medications on file       Apurva Orellana MD  Emergency Medicine Resident    (Please note that portions of this note were completed with a voice recognition program.Efforts were made to edit the dictations but occasionally words are mis-transcribed.)       Apurva Orellana MD  Resident  07/25/21 [id number]

## 2021-07-25 NOTE — ED NOTES
Patient presents to ED with c/o sudden onset abd pain approx 5 hrs ago with vomiting x 3. Patient describes pain as cramping in center of abd, pain 5/10. Patient alert and oriented x 3, resp e/u, skin PWD.      Ayan Melton RN  07/25/21 0111

## 2021-07-25 NOTE — H&P
[facility]  Office: [address], DO, Susan Guevara, DO, Estevan Diana DO, Lennox Mars Blood, DO, Dayna Corado MD, Abdiaziz Fuchs MD, Navi Lim MD, Sreedhar Givens MD, Johanna Vila MD, Andre Graf MD, Guillermo Villagomez MD, Leela Snow, DO, Kapil Arevalo MD, Mercy Giles DO, Karrie Gonzalez MD,  Joy Hankins DO, Burna Cockayne, MD, Tracie Schaefer MD, Madison Colindres MD, David Fox MD, Tony Zhu MD, Ying Shah MD, Ovidio Boss [facility], [facility] [place], CNP, Rogers Padron, CNP, Paddy Duane, CNS, Ruth Plascencia, CNP, Gauri Kim, CNP, Aleksandra Dumont, CNP, Lanie Elias, CNP, Geri Malin, CNP, Jan Vera PA-C, Daisey Meigs, [facility], Lisbet Jay, CNP, Jose Bernstein, CNP, Nia Meza, CNP, Estela Nobles, CNP, Micaela Malin, CNP, Michelle Ayala, CNP, Bin Vallecillo, CNP, Christelle Vasquez, [address]    HISTORY AND PHYSICAL EXAMINATION            Date:   7/25/2021  Patient name:  Joya Guidry  Date of admission:  7/25/2021 12:44 AM  MRN:   [id number]  Account:  [de-identified]  YOB: 1960  PCP:    Alok Morgan MD  Room:   3570/4239-62  Code Status:    Full Code    Chief Complaint:     Chief Complaint   Patient presents with    Emesis     Since This evening x2 episodes    Abdominal Pain    Anxiety    Back Pain       History Obtained From:     patient, Quality of history:  good historian    History of Present Illness:     Joya Guidry is a 64 y.o. female with a history of hypertension, hyperlipidemia, non-insulin-dependent diabetes, coronary artery disease, peripheral artery disease, COPD who presents to the hospital with complaints of diffuse abdominal pain. Pain started yesterday and is described as a squeezing pain that is a 7 out of 10 in severity. Patient states that she vomited 3 times overnight but denies seeing any blood in her vomit. Pain radiates to her.   She denies any sick contacts. Denies any travel, or history of alcohol use/gallstones. Never been told she has pancreatitis or cholecystitis. Has never had abdominal surgery before. Has never had a small bowel obstruction. She did have leftover chicken but otherwise cannot recall eating anything out of the normal for her. She vomited approximately 3 times last night. On exam, patient does appear fatigued and uncomfortable. Denies any hematuria, dysuria, polyuria or urinary symptoms. No history of kidney stones. Denies any recent antibiotic use/history of C. difficile. Does admit to history of acid reflux but says this feels different. Denies any chest pain, shortness of breath, difficulty breathing. CT of her abdomen pelvis showed distal jejunal and ileal bowel wall thickening/edema and increased enhancement consistent with acute enteritis. She denies any history of inflammatory bowel disease and has no history of Crohn's/ulcerative colitis. Denies any history of diverticulitis but does have diverticulosis. Labs on admission significant for high anion gap metabolic acidosis with slightly elevated creatinine of 1.3. Lipase was only minimally elevated remainder of metabolic panel unremarkable outside of lactic acid of 2.1. CBC shows leukocytosis with left shift. Urinalysis shows moderate bacteria, leukocyte esterase, culture pending. Past Medical History:     Past Medical History:   Diagnosis Date    Asthma     Blood circulation, collateral     CAD (coronary artery disease)     [facility]ch[facility] obstructive [facility] disease) [facility]     Diabetes mellitus [facility])     Hyperlipidemia     Hypertension     Movement disorder     B/L torn rotator cuff.     Neuropathy     PAD (peripheral artery disease) ([facility])         Past Surgical History:     Past Surgical History:   Procedure Laterality Date    CORONARY ANGIOPLASTY WITH STENT PLACEMENT      x2    CORONARY ANGIOPLASTY WITH STENT PLACEMENT  02/11/2019    JOSE MARTIN to RCA per Dr. Renard Dejesus radial approuch    INNER EAR SURGERY Right     TONSILLECTOMY          Medications Prior to Admission:     Prior to Admission medications    Medication Sig Start Date End Date Taking? Authorizing Provider   Dulaglutide (TRULICITY) 1.5 EV/2.5WJ SOPN Inject 1.5 mg into the skin once a week   Yes Historical Provider, MD   metoprolol tartrate (LOPRESSOR) 25 MG tablet Take 0.5 tablets by mouth 2 times daily 2/12/19  Yes Arlys January, APRN - CNP   ticagrelor (BRILINTA) 90 MG TABS tablet Take 1 tablet by mouth 2 times daily 2/12/19  Yes Arlys January, APRN - CNP   cephALEXin (KEFLEX) 500 MG capsule Take 1 capsule by mouth 3 times daily 7/22/20   [facility],    aspirin 81 MG chewable tablet Take 1 tablet by mouth daily 2/13/19   Arlys January, APRN - CNP   nitroGLYCERIN (NITROSTAT) 0.4 MG SL tablet up to max of 3 total doses. If no relief after 1 dose, call 911. 2/12/19   Arlys January, APRN - CNP   Lancets MISC 1 each by Does not apply route daily 2/12/19   Conchita Dodd MD   glipiZIDE (GLUCOTROL) 5 MG tablet Take 1 tablet by mouth 2 times daily 2/12/19   Conchita Dodd MD   metFORMIN (GLUCOPHAGE) 1000 MG tablet Take 1 tablet by mouth 2 times daily (with meals) 2/12/19   Conchita Dodd MD   blood glucose monitor strips Test 3 times a day & as needed for symptoms of irregular blood glucose. 2/12/19   Conchita Dodd MD   glucose monitoring kit (FREESTYLE) monitoring kit 1 kit by Does not apply route daily 2/12/19   Conchita Dodd MD   albuterol (PROVENTIL) (5 MG/ML) 0.5% nebulizer solution Take 0.5 mLs by nebulization every 6 hours as needed for Wheezing 3/16/18   Griffin Peng MD   tiotropium (SPIRIVA) 18 MCG inhalation capsule Inhale 1 capsule into the lungs daily Pt told that she was using this medication at home.  3/16/18   Griffin Peng MD   albuterol-ipratropium (COMBIVENT RESPIMAT)  MCG/ACT AERS inhaler Inhale 1 puff into the lungs every 6 hours 3/16/18   Janey Mccracken MD   acetaminophen (TYLENOL) 500 MG tablet Take 1 tablet by mouth every 6 hours as needed for Pain 1/9/18   Bruce Zuluaga MD        Allergies:     Codeine and Morphine    Social History:     Tobacco:    reports that she has been smoking cigarettes. She has a 22.50 pack-year smoking history. She uses smokeless tobacco.  Alcohol:      reports no history of alcohol use. Drug Use:  reports current drug use. Drug: Marijuana. Family History:     Family History   Problem Relation Age of Onset    Hypertension Mother     Hypertension Father     Diabetes Sister     Diabetes Brother     Stroke Brother        Review of Systems:     Positive and Negative as described in HPI.     CONSTITUTIONAL:  negative for fevers, chills, sweats, admits to fatigue, denies weight loss  HEENT:  negative for vision, hearing changes, runny nose, throat pain  RESPIRATORY:  negative for shortness of breath, cough, congestion, wheezing  CARDIOVASCULAR:  negative for chest pain, palpitations  GASTROINTESTINAL: Admits to nausea, vomiting, diarrhea, denies constipation, change in bowel habits, admits to diffuse 7 out of 10 cramping abdominal pain   GENITOURINARY:  negative for difficulty of urination, burning with urination, frequency   INTEGUMENT:  negative for rash, skin lesions, easy bruising   HEMATOLOGIC/LYMPHATIC:  negative for swelling/edema   ALLERGIC/IMMUNOLOGIC:  negative for urticaria , itching  ENDOCRINE:  negative increase in drinking, increase in urination, hot or cold intolerance  MUSCULOSKELETAL:  negative joint pains, muscle aches, swelling of joints admits to no aching back pain  NEUROLOGICAL:  negative for headaches, dizziness, lightheadedness, numbness, pain, tingling extremities  BEHAVIOR/PSYCH:  negative for depression, anxiety    Physical Exam:   /71   Pulse 88   Temp 98.7 °F (37.1 °C) (Oral)   Resp 16   Ht 5' 8\" (1.727 m)   Wt 214 lb (97.1 kg)   SpO2 95% BMI 32.54 kg/m²   Temp (24hrs), Av.2 °F (36.8 °C), Min:97.6 °F (36.4 °C), Max:98.7 °F (37.1 °C)    No results for input(s): POCGLU in the last 72 hours. No intake or output data in the 24 hours ending 21 1041    General Appearance: Alert, uncomfortable appearing female in bed, appears stated age  Mental status: oriented to person, place, and time  Head: normocephalic, atraumatic  Eye: no icterus, redness, pupils equal and reactive, extraocular eye movements intact, conjunctiva clear  Ear: normal external ear, no discharge, hearing intact  Nose: no drainage noted  Mouth: mucous membranes are dry  Neck: supple, no carotid bruits, thyroid not palpable  Lungs: Bilateral equal air entry, clear to ausculation, no wheezing, rales or rhonchi, normal effort  Cardiovascular: normal rate, regular rhythm, no murmur, gallop, rub  Abdomen: Soft, slightly tender to palpation, nondistended, hyperactive  bowel sounds, no hepatomegaly or splenomegaly.   No masses or incisions noted  Neurologic: There are no new focal motor or sensory deficits, normal muscle tone and bulk, no abnormal sensation, normal speech, cranial nerves II through XII grossly intact  Skin: No gross lesions, rashes, bruising or bleeding on exposed skin area  Extremities: peripheral pulses palpable, no pedal edema or calf pain with palpation  Psych: normal affect    Investigations:      Laboratory Testing:  Recent Results (from the past 24 hour(s))   Basic Metabolic Panel w/ Reflex to MG    Collection Time: 21  1:11 AM   Result Value Ref Range    Glucose 134 (H) 70 - 99 mg/dL    BUN 26 (H) 6 - 20 mg/dL    CREATININE 1.30 (H) 0.50 - 0.90 mg/dL    Bun/Cre Ratio NOT REPORTED 9 - 20    Calcium 9.2 8.6 - 10.4 mg/dL    Sodium 138 135 - 144 mmol/L    Potassium 3.8 3.7 - 5.3 mmol/L    Chloride 104 98 - 107 mmol/L    CO2 17 (L) 20 - 31 mmol/L    Anion Gap 17 9 - 17 mmol/L    GFR Non-African American 42 (L) >60 mL/min    GFR  51 (L) >60 mL/min    GFR Comment          GFR Staging NOT REPORTED    CBC Auto Differential    Collection Time: 07/25/21  1:11 AM   Result Value Ref Range    WBC 11.9 (H) 3.5 - 11.3 k/uL    RBC 5.13 (H) 3.95 - 5.11 m/uL    Hemoglobin 13.7 11.9 - 15.1 g/dL    Hematocrit 43.9 36.3 - 47.1 %    MCV 85.6 82.6 - 102.9 fL    MCH 26.7 25.2 - 33.5 pg    MCHC 31.2 28.4 - 34.8 g/dL    RDW 13.7 11.8 - 14.4 %    Platelets 233 014 - 768 k/uL    MPV 11.2 8.1 - 13.5 fL    NRBC Automated 0.0 0.0 per 100 WBC    Differential Type NOT REPORTED     Seg Neutrophils 73 (H) 36 - 65 %    Lymphocytes 17 (L) 24 - 43 %    Monocytes 7 3 - 12 %    Eosinophils % 2 1 - 4 %    Basophils 1 0 - 2 %    Immature Granulocytes 0 0 %    Segs Absolute 8.69 (H) 1.50 - 8.10 k/uL    Absolute Lymph # 1.96 1.10 - 3.70 k/uL    Absolute Mono # 0.85 0.10 - 1.20 k/uL    Absolute Eos # 0.26 0.00 - 0.44 k/uL    Basophils Absolute 0.07 0.00 - 0.20 k/uL    Absolute Immature Granulocyte 0.04 0.00 - 0.30 k/uL    WBC Morphology NOT REPORTED     RBC Morphology NOT REPORTED     Platelet Estimate NOT REPORTED    Lactate, Sepsis    Collection Time: 07/25/21  1:11 AM   Result Value Ref Range    Lactic Acid, Sepsis NOT REPORTED 0.5 - 1.9 mmol/L    Lactic Acid, Sepsis, Whole Blood 2.1 (H) 0.5 - 1.9 mmol/L   Lipase    Collection Time: 07/25/21  1:11 AM   Result Value Ref Range    Lipase 90 (H) 13 - 60 U/L   Hepatic Function Panel    Collection Time: 07/25/21  1:11 AM   Result Value Ref Range    Albumin 3.8 3.5 - 5.2 g/dL    Alkaline Phosphatase 103 35 - 104 U/L    ALT 10 5 - 33 U/L    AST 10 <32 U/L    Total Bilirubin 0.22 (L) 0.3 - 1.2 mg/dL    Bilirubin, Direct <0.08 <0.31 mg/dL    Bilirubin, Indirect CANNOT BE CALCULATED 0.00 - 1.00 mg/dL    Total Protein 7.1 6.4 - 8.3 g/dL    Globulin NOT REPORTED 1.5 - 3.8 g/dL    Albumin/Globulin Ratio 1.2 1.0 - 2.5   Troponin    Collection Time: 07/25/21  1:11 AM   Result Value Ref Range    Troponin, High Sensitivity 13 0 - 14 ng/L    Troponin T NOT REPORTED <0.03 ng/mL    Troponin Interp NOT REPORTED    Urinalysis Reflex to Culture    Collection Time: 07/25/21  2:45 AM    Specimen: Urine, clean catch   Result Value Ref Range    Color, UA YELLOW YELLOW    Turbidity UA CLOUDY (A) CLEAR    Glucose, Ur NEGATIVE NEGATIVE    Bilirubin Urine NEGATIVE NEGATIVE    Ketones, Urine NEGATIVE NEGATIVE    Specific Gravity, UA 1.021 1.005 - 1.030    Urine Hgb NEGATIVE NEGATIVE    pH, UA 5.0 5.0 - 8.0    Protein, UA NEGATIVE NEGATIVE    Urobilinogen, Urine Normal Normal    Nitrite, Urine NEGATIVE NEGATIVE    Leukocyte Esterase, Urine MODERATE (A) NEGATIVE    Urinalysis Comments NOT REPORTED    Microscopic Urinalysis    Collection Time: 07/25/21  2:45 AM   Result Value Ref Range    -          WBC, UA 20 TO 50 0 - 5 /HPF    RBC, UA 0 TO 2 0 - 2 /HPF    Casts UA 2 TO 5 0 - 2 /LPF    Casts UA HYALINE 0 - 2 /LPF    Crystals, UA NOT REPORTED None /HPF    Epithelial Cells UA 10 TO 20 0 - 5 /HPF    Renal Epithelial, UA NOT REPORTED 0 /HPF    Bacteria, UA MODERATE (A) None    Mucus, UA 2+ (A) None    Trichomonas, UA NOT REPORTED None    Amorphous, UA NOT REPORTED None    Other Observations UA NOT REPORTED NOT REQ. Yeast, UA NOT REPORTED None       Imaging/Diagnostics:  XR ACUTE ABD SERIES CHEST 1 VW    Result Date: 7/25/2021  No acute process in the chest. No bowel obstruction or free air. CT ABDOMEN PELVIS W IV CONTRAST Additional Contrast? None    Result Date: 7/25/2021  1. Distal jejunal and ileal bowel wall thickening/edema and increased enhancement consistent with acute enteritis. Differential diagnostic considerations include association with infectious etiology versus inflammatory bowel disease. Recommend clinical correlation. 2. Associated minimal pelvic free fluid. 3. Mid right renal small exophytic simple cyst. 4. Marked descending and sigmoid colon diverticulosis without evidence of diverticulitis.        Assessment :      Hospital Problems         Last Modified POA hypoglycemia  Marijuana use disorder: smoking cessation encouraged  Obesity BMI of 32: Lifestyle modifications, increased exercise  Asthma: will order home meds  Labs, imaging, ecg reviewed  Med rec completed  DVT ppx  PT/OT  Discharge planning. Consultations:   IP CONSULT TO HOSPITALIST     Patient is admitted as inpatient status because of co-morbidities listed above, severity of signs and symptoms as outlined, requirement for current medical therapies and most importantly because of direct risk to patient if care not provided in a hospital setting. Expected length of stay > 48 hours.     Radha Roldan DO  7/25/2021  10:41 AM    Copy sent to Dr. Felipe Bates MD

## 2021-07-25 NOTE — CARE COORDINATION
Case Management Initial Discharge Plan  Tonny Mauro,             Met with:patient to discuss discharge plans. Information verified: address, contacts, phone number, , insurance Yes  Insurance Provider: [employer]. Emergency Contact/Next of Kin name & number: soha Alanis at [phone]  Who are involved in patient's support system? Son and daughter and 3 grandchildren    PCP: Ravindra Koch MD  Date of last visit: couple wks ago      Discharge Planning    Living Arrangements:  Children, Other (Comment) (grandchildren)     Home has 2 stories  3 stairs to climb to get into front door,   Location of bedroom/bathroom in home main    Patient able to perform ADL's:Independent    Current Services (outpatient & in home) none  DME equipment: nebulizer  DME provider: unknown    Is patient receiving oral anticoagulation therapy? Yes    If indicated:   Physician managing anticoagulation treatment: Schuyler Lopez MD  Where does patient obtain lab work for ATC treatment? [facility]      Potential Assistance Needed:  Home Care    Patient agreeable to home care: Yes  Freedom of choice provided:  no    Prior SNF/Rehab Placement and Facility: none  Agreeable to SNF/Rehab: Yes  [place] of choice provided: no     Evaluation: no    Expected Discharge date:       Patient expects to be discharged to: If home: is the family and/or caregiver wiling & able to provide support at home? yes  Who will be providing this support? Son, daughter and 3 grandchildren    Follow Up Appointment: Best Day/ Time:      Transportation provider: esme  Transportation arrangements needed for discharge: No    Readmission Risk              Risk of Unplanned Readmission:  24             Does patient have a readmission risk score greater than 14?: Yes  If yes, follow-up appointment must be made within 7 days of discharge.      Goals of Care:       Educated pt on transitional options, provided freedom of choice and are agreeable with plan      Discharge Plan: Home independently, has family support. Pt requesting walker, has no DME to assist her. Monitor for needs.    [phone] PS Dr. Gisella Almazan group to inform of pt's request.           Electronically signed by Vasile Villalta RN on 7/25/21 at 4:11 PM EDT

## 2021-07-26 LAB
ABSOLUTE EOS #: <0.03 K/UL (ref 0–0.44)
ABSOLUTE IMMATURE GRANULOCYTE: 0.03 K/UL (ref 0–0.3)
ABSOLUTE LYMPH #: 0.99 K/UL (ref 1.1–3.7)
ABSOLUTE MONO #: 0.59 K/UL (ref 0.1–1.2)
ALBUMIN SERPL-MCNC: 2.9 G/DL (ref 3.5–5.2)
ALBUMIN/GLOBULIN RATIO: 1.1 (ref 1–2.5)
ALP BLD-CCNC: 68 U/L (ref 35–104)
ALT SERPL-CCNC: 27 U/L (ref 5–33)
AMYLASE: 24 U/L (ref 28–100)
ANION GAP SERPL CALCULATED.3IONS-SCNC: 11 MMOL/L (ref 9–17)
AST SERPL-CCNC: 25 U/L
BASOPHILS # BLD: 0 % (ref 0–2)
BASOPHILS ABSOLUTE: 0.03 K/UL (ref 0–0.2)
BILIRUB SERPL-MCNC: 0.27 MG/DL (ref 0.3–1.2)
BUN BLDV-MCNC: 20 MG/DL (ref 6–20)
BUN/CREAT BLD: ABNORMAL (ref 9–20)
C DIFF AG + TOXIN: NEGATIVE
CALCIUM SERPL-MCNC: 8 MG/DL (ref 8.6–10.4)
CAMPYLOBACTER PCR: NORMAL
CHLORIDE BLD-SCNC: 105 MMOL/L (ref 98–107)
CO2: 19 MMOL/L (ref 20–31)
CREAT SERPL-MCNC: 0.93 MG/DL (ref 0.5–0.9)
CULTURE: NORMAL
DIFFERENTIAL TYPE: ABNORMAL
E COLI ENTEROTOXIGENIC PCR: NORMAL
EKG ATRIAL RATE: 84 BPM
EKG P AXIS: 54 DEGREES
EKG P-R INTERVAL: 176 MS
EKG Q-T INTERVAL: 390 MS
EKG QRS DURATION: 84 MS
EKG QTC CALCULATION (BAZETT): 460 MS
EKG R AXIS: 80 DEGREES
EKG T AXIS: 20 DEGREES
EKG VENTRICULAR RATE: 84 BPM
EOSINOPHILS RELATIVE PERCENT: 0 % (ref 1–4)
GFR AFRICAN AMERICAN: >60 ML/MIN
GFR NON-AFRICAN AMERICAN: >60 ML/MIN
GFR SERPL CREATININE-BSD FRML MDRD: ABNORMAL ML/MIN/{1.73_M2}
GFR SERPL CREATININE-BSD FRML MDRD: ABNORMAL ML/MIN/{1.73_M2}
GLUCOSE BLD-MCNC: 106 MG/DL (ref 65–105)
GLUCOSE BLD-MCNC: 115 MG/DL (ref 65–105)
GLUCOSE BLD-MCNC: 135 MG/DL (ref 70–99)
GLUCOSE BLD-MCNC: 136 MG/DL (ref 65–105)
GLUCOSE BLD-MCNC: 155 MG/DL (ref 65–105)
HCT VFR BLD CALC: 36.6 % (ref 36.3–47.1)
HEMOGLOBIN: 11.1 G/DL (ref 11.9–15.1)
IMMATURE GRANULOCYTES: 0 %
LACTIC ACID, SEPSIS WHOLE BLOOD: 1.1 MMOL/L (ref 0.5–1.9)
LACTIC ACID, SEPSIS: NORMAL MMOL/L (ref 0.5–1.9)
LIPASE: 13 U/L (ref 13–60)
LYMPHOCYTES # BLD: 12 % (ref 24–43)
Lab: NORMAL
MAGNESIUM: 1.7 MG/DL (ref 1.6–2.6)
MCH RBC QN AUTO: 26.7 PG (ref 25.2–33.5)
MCHC RBC AUTO-ENTMCNC: 30.3 G/DL (ref 28.4–34.8)
MCV RBC AUTO: 88 FL (ref 82.6–102.9)
MONOCYTES # BLD: 7 % (ref 3–12)
NRBC AUTOMATED: 0 PER 100 WBC
PDW BLD-RTO: 14.2 % (ref 11.8–14.4)
PHOSPHORUS: 2.2 MG/DL (ref 2.6–4.5)
PLATELET # BLD: 175 K/UL (ref 138–453)
PLATELET ESTIMATE: ABNORMAL
PLESIOMONAS SHIGELLOIDES PCR: NORMAL
PMV BLD AUTO: 10.7 FL (ref 8.1–13.5)
POTASSIUM SERPL-SCNC: 3.5 MMOL/L (ref 3.7–5.3)
RBC # BLD: 4.16 M/UL (ref 3.95–5.11)
RBC # BLD: ABNORMAL 10*6/UL
SALMONELLA PCR: NORMAL
SEG NEUTROPHILS: 81 % (ref 36–65)
SEGMENTED NEUTROPHILS ABSOLUTE COUNT: 6.92 K/UL (ref 1.5–8.1)
SHIGATOXIN GENE PCR: NORMAL
SHIGELLA SP PCR: NORMAL
SODIUM BLD-SCNC: 135 MMOL/L (ref 135–144)
SPECIMEN DESCRIPTION: NORMAL
TOTAL PROTEIN: 5.6 G/DL (ref 6.4–8.3)
VIBRIO PCR: NORMAL
WBC # BLD: 8.6 K/UL (ref 3.5–11.3)
WBC # BLD: ABNORMAL 10*3/UL
YERSINIA ENTEROCOLITICA PCR: NORMAL

## 2021-07-26 PROCEDURE — 84100 ASSAY OF PHOSPHORUS: CPT

## 2021-07-26 PROCEDURE — 80053 COMPREHEN METABOLIC PANEL: CPT

## 2021-07-26 PROCEDURE — 6360000002 HC RX W HCPCS: Performed by: INTERNAL MEDICINE

## 2021-07-26 PROCEDURE — 2580000003 HC RX 258: Performed by: NURSE PRACTITIONER

## 2021-07-26 PROCEDURE — 83735 ASSAY OF MAGNESIUM: CPT

## 2021-07-26 PROCEDURE — 82150 ASSAY OF AMYLASE: CPT

## 2021-07-26 PROCEDURE — 94640 AIRWAY INHALATION TREATMENT: CPT

## 2021-07-26 PROCEDURE — 83690 ASSAY OF LIPASE: CPT

## 2021-07-26 PROCEDURE — 97530 THERAPEUTIC ACTIVITIES: CPT

## 2021-07-26 PROCEDURE — 82947 ASSAY GLUCOSE BLOOD QUANT: CPT

## 2021-07-26 PROCEDURE — 6370000000 HC RX 637 (ALT 250 FOR IP): Performed by: INTERNAL MEDICINE

## 2021-07-26 PROCEDURE — 85025 COMPLETE CBC W/AUTO DIFF WBC: CPT

## 2021-07-26 PROCEDURE — 83605 ASSAY OF LACTIC ACID: CPT

## 2021-07-26 PROCEDURE — 97165 OT EVAL LOW COMPLEX 30 MIN: CPT

## 2021-07-26 PROCEDURE — 36415 COLL VENOUS BLD VENIPUNCTURE: CPT

## 2021-07-26 PROCEDURE — 2580000003 HC RX 258: Performed by: INTERNAL MEDICINE

## 2021-07-26 PROCEDURE — 76937 US GUIDE VASCULAR ACCESS: CPT

## 2021-07-26 PROCEDURE — 1200000000 HC SEMI PRIVATE

## 2021-07-26 PROCEDURE — 2500000003 HC RX 250 WO HCPCS: Performed by: INTERNAL MEDICINE

## 2021-07-26 PROCEDURE — 93010 ELECTROCARDIOGRAM REPORT: CPT | Performed by: INTERNAL MEDICINE

## 2021-07-26 PROCEDURE — 99233 SBSQ HOSP IP/OBS HIGH 50: CPT | Performed by: INTERNAL MEDICINE

## 2021-07-26 PROCEDURE — 97535 SELF CARE MNGMENT TRAINING: CPT

## 2021-07-26 PROCEDURE — 97161 PT EVAL LOW COMPLEX 20 MIN: CPT

## 2021-07-26 PROCEDURE — 2500000003 HC RX 250 WO HCPCS: Performed by: NURSE PRACTITIONER

## 2021-07-26 RX ORDER — SIMETHICONE 80 MG
80 TABLET,CHEWABLE ORAL EVERY 6 HOURS PRN
Status: DISCONTINUED | OUTPATIENT
Start: 2021-07-26 | End: 2021-07-27 | Stop reason: HOSPADM

## 2021-07-26 RX ADMIN — HYDROCODONE BITARTRATE AND ACETAMINOPHEN 2 TABLET: 5; 325 TABLET ORAL at 11:32

## 2021-07-26 RX ADMIN — METRONIDAZOLE 500 MG: 500 INJECTION, SOLUTION INTRAVENOUS at 17:35

## 2021-07-26 RX ADMIN — SIMETHICONE 80 MG: 80 TABLET, CHEWABLE ORAL at 13:00

## 2021-07-26 RX ADMIN — METOPROLOL TARTRATE 12.5 MG: 25 TABLET ORAL at 11:30

## 2021-07-26 RX ADMIN — CEFTRIAXONE SODIUM 1000 MG: 1 INJECTION, POWDER, FOR SOLUTION INTRAMUSCULAR; INTRAVENOUS at 04:30

## 2021-07-26 RX ADMIN — SODIUM CHLORIDE 500 ML: 9 INJECTION, SOLUTION INTRAVENOUS at 00:58

## 2021-07-26 RX ADMIN — METRONIDAZOLE 500 MG: 500 INJECTION, SOLUTION INTRAVENOUS at 22:32

## 2021-07-26 RX ADMIN — TICAGRELOR 90 MG: 90 TABLET ORAL at 20:00

## 2021-07-26 RX ADMIN — POTASSIUM CHLORIDE 40 MEQ: 1500 TABLET, EXTENDED RELEASE ORAL at 07:35

## 2021-07-26 RX ADMIN — TIOTROPIUM BROMIDE INHALATION SPRAY 2 PUFF: 3.12 SPRAY, METERED RESPIRATORY (INHALATION) at 08:20

## 2021-07-26 RX ADMIN — HYDROCODONE BITARTRATE AND ACETAMINOPHEN 2 TABLET: 5; 325 TABLET ORAL at 19:29

## 2021-07-26 RX ADMIN — ASPIRIN 81 MG: 81 TABLET, CHEWABLE ORAL at 11:31

## 2021-07-26 RX ADMIN — METOPROLOL TARTRATE 12.5 MG: 25 TABLET ORAL at 20:00

## 2021-07-26 RX ADMIN — MORPHINE SULFATE 4 MG: 4 INJECTION INTRAVENOUS at 04:33

## 2021-07-26 RX ADMIN — SODIUM PHOSPHATE, MONOBASIC, MONOHYDRATE 15 MMOL: 276; 142 INJECTION, SOLUTION INTRAVENOUS at 11:32

## 2021-07-26 RX ADMIN — METRONIDAZOLE 500 MG: 500 INJECTION, SOLUTION INTRAVENOUS at 06:30

## 2021-07-26 RX ADMIN — TICAGRELOR 90 MG: 90 TABLET ORAL at 11:31

## 2021-07-26 ASSESSMENT — PAIN SCALES - GENERAL
PAINLEVEL_OUTOF10: 9
PAINLEVEL_OUTOF10: 0
PAINLEVEL_OUTOF10: 7
PAINLEVEL_OUTOF10: 7
PAINLEVEL_OUTOF10: 5
PAINLEVEL_OUTOF10: 5

## 2021-07-26 ASSESSMENT — PAIN DESCRIPTION - PAIN TYPE
TYPE: ACUTE PAIN
TYPE: ACUTE PAIN

## 2021-07-26 ASSESSMENT — PAIN DESCRIPTION - LOCATION
LOCATION: ABDOMEN
LOCATION: ABDOMEN

## 2021-07-26 NOTE — PLAN OF CARE
Problem: Falls - Risk of:  Goal: Will remain free from falls  Description: Will remain free from falls  Outcome: Ongoing     Problem: Falls - Risk of:  Goal: Absence of physical injury  Description: Absence of physical injury  Outcome: Ongoing Size Of Lesion In Cm: 0.9 Anesthesia Type: 1% lidocaine with epinephrine and a 1:10 solution of 8.4% sodium bicarbonate Post-Care Instructions: I reviewed with the patient in detail post-care instructions. Patient is to keep the biopsy site dry overnight, and then apply bacitracin twice daily until healed. Patient may apply hydrogen peroxide soaks to remove any crusting. Destruction After The Procedure: No Cryotherapy Text: The wound bed was treated with cryotherapy after the biopsy was performed. Lab: [id number] Detail Level: Detailed Electrodesiccation Text: The wound bed was treated with electrodesiccation after the biopsy was performed. X Size Of Lesion In Cm: 0.7 Biopsy Type: H and E Notification Instructions: Patient will be notified of biopsy results. However, patient instructed to call the office if not contacted within 2 weeks. Depth Of Biopsy: dermis Additional Anesthesia Volume In Cc (Will Not Render If 0): 0 Lab Facility: [id number] Wound Care: Vaseline Dressing: bandage Anesthesia Volume In Cc (Will Not Render If 0): 1 Electrodesiccation And Curettage Text: The wound bed was treated with electrodesiccation and curettage after the biopsy was performed. Consent: Verbal consent was obtained and risks were reviewed including but not limited to scarring, infection, bleeding, scabbing, incomplete removal, nerve damage and allergy to anesthesia. Type Of Destruction Used: Curettage Billing Type: Third-Party Bill Biopsy Method: Personna blade Hemostasis: Drysol and Electrocautery Silver Nitrate Text: The wound bed was treated with silver nitrate after the biopsy was performed. Was A Bandage Applied: Yes

## 2021-07-26 NOTE — PROGRESS NOTES
Physical Therapy    Facility/Department: [address] ORTHO/MED SURG  Initial Assessment    NAME: Vince Love  : 1960  MRN: [id number]    Date of Service: 2021  Chief Complaint   Patient presents with    Emesis     Since This evening x2 episodes    Abdominal Pain    Anxiety    Back Pain     Discharge Recommendations:    No further therapy required at discharge. Assessment   Body structures, Functions, Activity limitations: Decreased functional mobility ; Decreased endurance;Decreased strength  Assessment: The pt ambulated 50 ft with a RW x CGA . She had a c/o increased abd pain with mobilization. Will continue with gait training, pain reduction. Prognosis: Good  Decision Making: Medium Complexity  PT Education: Goals;PT Role;Plan of Care  REQUIRES PT FOLLOW UP: Yes  Activity Tolerance  Activity Tolerance: Patient Tolerated treatment well       Patient Diagnosis(es): The encounter diagnosis was Generalized abdominal pain. has a past medical history of Asthma, Blood circulation, collateral, CAD (coronary artery disease), COPD (chronic obstructive pulmonary disease) ([address] Diabetes mellitus ([address] Hyperlipidemia, Hypertension, Movement disorder, Neuropathy, and PAD (peripheral artery disease) ([address] has a past surgical history that includes Coronary angioplasty with stent; Tonsillectomy; Inner ear surgery (Right); and Coronary angioplasty with stent (2019). Restrictions  Restrictions/Precautions  Restrictions/Precautions: Up as Tolerated  Required Braces or Orthoses?: No  Position Activity Restriction  Other position/activity restrictions:  Up with assist  Vision/Hearing  Vision: Impaired  Vision Exceptions: Wears glasses for reading  Hearing: Within functional limits     Subjective  General  Patient assessed for rehabilitation services?: Yes  Response To Previous Treatment: Not applicable  Family / Caregiver Present: No  Follows Commands: Within Functional Limits  Subjective  Subjective: RN and pt agreeable to PT eval  Pain Screening  Patient Currently in Pain: Yes  Pain Assessment  Pain Assessment: 0-10  Pain Level: 5  Pain Location: Abdomen  Vital Signs  Patient Currently in Pain: Yes     Orientation  Orientation  Overall Orientation Status: Within Normal Limits  Social/Functional History  Social/Functional History  Lives With: Daughter  Type of Home: Apartment (lower duplex)  Home Layout: One level  Home Access: Stairs to enter with rails  Entrance Stairs - Number of Steps: 2  Entrance Stairs - Rails: Left  Bathroom Shower/Tub: Tub/Shower unit  Bathroom Toilet: Standard  Bathroom Equipment: Shower chair  Receives Help From: Family  ADL Assistance: Independent  Homemaking Assistance: Independent  Homemaking Responsibilities: Yes  Ambulation Assistance: Independent  Transfer Assistance: Independent  Active : No  Patient's  Info: TARPS or friend  Occupation: On disability  Leisure & Hobbies: likes to watch grandson who's involved in motorcross, likes to go to [employer]  Cognition      Objective     AROM RLE (degrees)  RLE AROM: WNL  AROM LLE (degrees)  LLE AROM : WNL  AROM RUE (degrees)  RUE AROM : WNL  AROM LUE (degrees)  LUE AROM : WNL  Strength RLE  Strength RLE: WNL  Strength LLE  Strength LLE: WNL  Strength RUE  Strength RUE: WNL  Strength LUE  Strength LUE: WNL     Sensation  Overall Sensation Status: Impaired (reports numbness in B LE's)  Bed mobility  Supine to Sit: Stand by assistance  Sit to Supine: Stand by assistance  Scooting: Stand by assistance  Transfers  Sit to Stand: Stand by assistance  Stand to sit: Stand by assistance  Ambulation  Ambulation?: Yes  Ambulation 1  Surface: level tile  Device: No Device  Assistance: Contact guard assistance  Distance: amb 50 ft without a device x CGA     Balance  Posture: Good  Sitting - Static: Good  Sitting - Dynamic: Good  Standing - Static: Good  Standing - Dynamic: [facility]  Times per week: 5-6x wk  Current Treatment Recommendations: Strengthening, Endurance Training, Gait Training, Functional Mobility Training, Stair training, Safety Education & Training  Safety Devices  Type of devices: Nurse notified, Left in bed, Call light within reach    G-Code     OutComes Score     AM-PAC Score  AM-PAC Inpatient Mobility Raw Score : 20 (07/26/21 1015)  AM-PAC Inpatient T-Scale Score : 47.67 (07/26/21 1015)  Mobility Inpatient CMS 0-100% Score: 35.83 (07/26/21 1015)  Mobility Inpatient CMS G-Code Modifier : Juliet Barrow (07/26/21 1015)     Goals  Short term goals  Time Frame for Short term goals: 10 visits  Short term goal 1: amb 150 ft without a device x SBA  Short term goal 2: ascend/descend 4 steps with SBA  Short term goal 3: 20 min exercise program x SBA  Patient Goals   Patient goals : Return home       Therapy Time   Individual Concurrent Group Co-treatment   Time In 4680         Time Out 0820         Minutes 25              1 ae [address], PT

## 2021-07-26 NOTE — PROGRESS NOTES
Occupational Therapy   Occupational Therapy Initial Assessment  Date: 2021   Patient Name: Payal Oh  MRN: [id number]     : 1960    Date of Service: 2021    Discharge Recommendations:No therapy recommended at discharge. Copied from Emergency medicine:  Payal Oh is a 64 y. o.yo female who presents with abdominal pain, n/v.  Patient here with acute onset of abdominal pain along with nausea and vomiting, states started 5 hours before presentation, states she had 2 episode of emesis, states there is generalized abdominal pain and states that there is also back pain, has a history of chronic back pain that has been worsened by continued episodes of emesis. States she does smoke marijuana, states she has never felt like this before. Denies any traumatic injuries to the abdomen, denies hematuria, dysuria, headache or vision changes or focal neuro deficits. Denies chest pain or shortness of breath. States the pain in her abdomen is 7 out of 10 in severity, nonradiating     OT Equipment Recommendations  Equipment Needed: Yes  Mobility Devices: ADL Assistive Devices  ADL Assistive Devices: Hand-held Shower    Assessment    Pt sitting up on couch upon entrance to room. Pt sat at edge of couch 25 minutes with good unsupported sitting balance. Pt frequently shifting weight and re positioning due to abdominal pain. Sit to stand with stand by assistance. Pt completed dynamic mob in room with stand by assistance. Please refer to below assist levels for adl completion. Pt ed on OT POC, safety awareness tech, proper hand placement for transfers, and energy conservation tech with proper breathing tech with fair return. Pt retired supine in bed with call light and phone in reach, RN present upon exit. All needs met upon exit.   Performance deficits / Impairments: Decreased endurance;Decreased high-level IADLs;Decreased posture  Treatment Diagnosis: Enteritis  Prognosis: Good  Decision Making: Low Complexity  OT Education: OT Role;Plan of Care  Patient Education: Pt ed on OT POC, safety awareness tech, proper hand placement for transfers, and energy conservation tech with proper breathing tech with fair return. REQUIRES OT FOLLOW UP: Yes  Activity Tolerance  Activity Tolerance: Patient limited by pain  Safety Devices  Safety Devices in place: Yes  Type of devices: Call light within reach; Left in bed;Nurse notified (nurse present upon exit)  Restraints  Initially in place: No         Patient Diagnosis(es): The encounter diagnosis was Generalized abdominal pain. has a past medical history of Asthma, Blood circulation, collateral, CAD (coronary artery disease), COPD (chronic obstructive pulmonary disease) ([facility] [address] Diabetes mellitus ([facility] [address] Hyperlipidemia, Hypertension, Movement disorder, Neuropathy, and PAD (peripheral artery disease) ([facility] [address] has a past surgical history that includes Coronary angioplasty with stent; Tonsillectomy; Inner ear surgery (Right); and Coronary angioplasty with stent (02/11/2019). Treatment Diagnosis: Enteritis      Restrictions  Restrictions/Precautions  Restrictions/Precautions: Up as Tolerated, Contact Precautions  Required Braces or Orthoses?: No  Position Activity Restriction  Other position/activity restrictions: Up with assistance    Subjective   General  Patient assessed for rehabilitation services?: Yes  Patient Currently in Pain: Yes  Pain Assessment  Pain Assessment: 0-10  Pain Level: 7  Pain Type: Acute pain  Pain Location: Abdomen  Non-Pharmaceutical Pain Intervention(s): Repositioned; Ambulation/Increased Activity; Therapeutic presence;Distraction  Patient Currently in Pain: Yes  Oxygen Therapy  SpO2: 95 %  O2 Device: None (Room air)  Social/Functional History  Social/Functional History  Lives With: Family, Daughter (and 3 gr children ages 12,15,19 yrs old)  Type of Home: House (Lower duplex)  Home Layout: One level  Home Access: Stairs to enter with rails  Entrance Stairs - Number of Steps: 3 steps to enter  Entrance Stairs - Rails: Left (pt reports L rail is broken, ed pt on not to use for safety)  Bathroom Shower/Tub: Tub/Shower unit, Curtain, Shower chair without back  [address]: Standard (pt has sink on R side to push off from as needed)  Bathroom Equipment: Shower chair  Home Equipment:  (no DME)  Receives Help From: Family (pt reports dtr works days at [employer], sometimes does not get home until 10pm. 3 gr children not very helpful. Supportive dtr in law that lives 3 blocks away. Also has 3 sisters in the area)  ADL Assistance: [address]: Independent  Homemaking Responsibilities: Yes  Meal Prep Responsibility: Primary  Laundry Responsibility: Primary (pt I for own laundry)  Cleaning Responsibility: Secondary (pt responsible for cleaning living room and kitchen. Family cleans remainder.)  Bill Paying/Finance Responsibility: Primary  Shopping Responsibility: Primary (pt uses [employer] or best friend will assist with errands. pt uses electric scooter to navigate throughout the store)  Ambulation Assistance: Independent  Transfer Assistance: Independent  Active : No  Patient's  Info: [employer] or friend  Mode of Transportation: Car, Bus, SUV, Truck  Occupation: On disability  Type of occupation: Pt worked 25 years at [employer]: likes to watch grandson who's involved in 2018 [employer] likes to go to summer concerts att he drive in. Enjoys going to the [employer], has not done in a while     Objective   Vision: Impaired  Vision Exceptions: Wears glasses for reading (glasses present in hospital)  Hearing: Exceptions to [facility]  Hearing Exceptions: Hard of hearing/hearing concerns; No hearing aid (pt reports minimal hearing in L ear and 50% hearing in R ear)    Orientation  Overall Orientation Status: Within Functional Limits     Balance  Sitting Balance: Independent (pt dhifting weight and position frequently due to increased Shoulder Flexion 0-180: 0-120  R Elbow Flexion 0-145: WFL  R Elbow Extension 145-0: WFL  R Wrist Flexion 0-80: WFL  R Wrist Extension 0-70: WFL  Right Hand PROM (degrees)  Right Hand PROM: WFL  Right Hand AROM (degrees)  Right Hand AROM: WFL  LUE Strength  Gross LUE Strength: Exceptions to [facility]  L Shoulder Flex: 3-/5  L Shoulder Ext: 3-/5  L Elbow Flex: 3+/5  L Elbow Ext: 3+/5  L Wrist Flex: 4-/5  L Wrist Ext: 4-/5  L Hand General: 4-/5  RUE Strength  Gross RUE Strength: Exceptions to [facility]  R Shoulder Flex: 3-/5  R Shoulder Ext: 3-/5  R Elbow Flex: 4-/5  R Elbow Ext: 4-/5  R Wrist Flex: 4-/5  R Wrist Ext: 4-/5  R Hand General: 4-/5     Plan   Plan  Times per week: 1-3 visits  Current Treatment Recommendations: Patient/Caregiver Education & Training, Home Management Training, Pain Management, Safety Education & Training, Endurance Training    AM-PAC Score  AM-PAC Inpatient Daily Activity Raw Score: 21 (07/26/21 1209)  AM-PAC Inpatient ADL T-Scale Score : 44.27 (07/26/21 1209)  ADL Inpatient CMS 0-100% Score: 32.79 (07/26/21 1209)  ADL Inpatient CMS G-Code Modifier : CJ (07/26/21 1209)    Goals  Short term goals  Time Frame for Short term goals: Pt will, by discharge:  Short term goal 1: Dem I with a 10 min dynamic task to increase activity tolerance for daily occupations utilizing pursed lip breathing tech  Short term goal 2: Dem energy conservation tech for I with ADL's/IADL's  Short term goal 3: Identify 2 compensatory strategies for pain management relief       Therapy Time   Individual Concurrent Group Co-treatment   Time In 1110         Time Out 1152         Minutes 42         Timed Code Treatment Minutes: [address], OTR/L

## 2021-07-26 NOTE — PROGRESS NOTES
ZOIE Denis notified of pt's BP of 84/51 & 87/64, HR 90. Checked it manually and got 97/55. Ordered a 500ml bolus.

## 2021-07-26 NOTE — PROGRESS NOTES
[facility]  Office: [address], DO, John Smoker, DO, Zeenat Centers, DO, Marylu Locke Blood, DO, Colonel Agusto MD, Gareth Coronado MD, Annabel Iglesias MD, Ana Luisa Denis MD, Rao Blank MD, Casandra Syed MD, Belinda Amaya MD, Veronica Scruggs, DO, Jose A Peterson MD, Alex Cope, DO, Teresa Rebolledo MD,  Turner Badillo, DO, Gautam Wheat MD, Lin Belcher MD, Vita Balbuena MD, Grayson Bernstein MD, Madison Hawkins MD, Maria L Granados MD, Hola Giles, [facility], [facility], CNP, Abiola Venegas, CNP, Nanette Vanegas, CNS, Hiren Carney, CNP, Abdelrahman Kumar, CNP, Delana Spatz, CNP, Roland Whittaker, CNP, Gurpreet Gonzalez, CNP, Sarahy Cast PA-C, Norma Gonzalez, [facility], Estrella Lights, CNP, Zeferino Dies, CNP, Eileen Palacios, CNP, Tori Javier, CNP, Travon Lopez, CNP, Eddie Button, CNP, Mirian Rae, CNP, Leah Olivares, [address] [place] [address]    Progress Note    7/26/2021    11:41 AM    Name:   Belkis Felix  MRN:     [id number]     [place]:      [de-identified]   Room:   [address] [place] [address] Day:  1  Admit Date:  7/25/2021 12:44 AM    PCP:   Lindsay Munson MD  Code Status:  Full Code    Subjective:     C/C:   Chief Complaint   Patient presents with    Emesis     Since This evening x2 episodes    Abdominal Pain    Anxiety    Back Pain     Interval History Status: improved. Patient was seen and evaluated bedside this morning. She reports that she continues to have some nausea, abdominal pain however it has improved since yesterday. Patient reports that her symptoms have been going on for past few days. Reports that she is having loose stools.     Brief History:     See H&P    Review of Systems:     Constitutional:  negative for chills, fevers, sweats  Respiratory:  negative for cough, dyspnea on exertion, shortness of breath, wheezing  Cardiovascular:  negative for chest pain, chest pressure/discomfort, lower extremity edema, History:   Family History   Problem Relation Age of Onset    Hypertension Mother     Hypertension Father     Diabetes Sister     Diabetes Brother     Stroke Brother        Vitals:  BP (!) 123/45   Pulse 92   Temp 97.9 °F (36.6 °C) (Oral)   Resp 20   Ht 5' 8\" (1.727 m)   Wt 214 lb (97.1 kg)   SpO2 95%   BMI 32.54 kg/m²   Temp (24hrs), Av.6 °F (37 °C), Min:97.9 °F (36.6 °C), Max:99.8 °F (37.7 °C)    Recent Labs     21  15521  0721  1103   POCGLU 199* 159* 115* 136*       I/O (24Hr):   No intake or output data in the 24 hours ending 21 1141    Labs:  Hematology:  Recent Labs     21  0530   WBC 11.9* 8.6   RBC 5.13* 4.16   HGB 13.7 11.1*   HCT 43.9 36.6   MCV 85.6 88.0   MCH 26.7 26.7   MCHC 31.2 30.3   RDW 13.7 14.2    175   MPV 11.2 10.7   SEDRATE 55*  --    CRP 11.4*  --      Chemistry:  Recent Labs     21  0530    135   K 3.8 3.5*    105   CO2 17* 19*   GLUCOSE 134* 135*   BUN 26* 20   CREATININE 1.30* 0.93*   MG  --  1.7   ANIONGAP 17 11   LABGLOM 42* >60   GFRAA 51* >60   CALCIUM 9.2 8.0*   PHOS  --  2.2*   TROPHS 13  --      Recent Labs     21  01121  1211 21  1558 21  0530 21  0703 21  1103   PROT 7.1  --   --   --  5.6*  --   --    LABALBU 3.8  --   --   --  2.9*  --   --    AST 10  --   --   --  25  --   --    ALT 10  --   --   --  27  --   --    ALKPHOS 103  --   --   --  68  --   --    BILITOT 0.22*  --   --   --  0.27*  --   --    BILIDIR <0.08  --   --   --   --   --   --    AMYLASE  --   --   --   --  24*  --   --    LIPASE 90*  --   --   --  13  --   --    POCGLU  --  223* 199* 159*  --  115* 136*     ABG:No results found for: POCPH, PHART, PH, POCPCO2, VBS0YRV, PCO2, POCPO2, PO2ART, PO2, POCHCO3, PEF1LXH, HCO3, NBEA, PBEA, BEART, BE, THGBART, THB, KXR9WKH, ZNGJ7PLF, G9QWGORY, O2SAT, FIO2  Lab Results   Component Value Date/Time SPECIAL NOT REPORTED 07/25/2021 02:45 AM     Lab Results   Component Value Date/Time    CULTURE CULTURE IN PROGRESS 07/25/2021 02:45 AM       Radiology:  XR ACUTE ABD SERIES CHEST 1 VW    Result Date: 7/25/2021  No acute process in the chest. No bowel obstruction or free air. CT ABDOMEN PELVIS W IV CONTRAST Additional Contrast? None    Result Date: 7/25/2021  1. Distal jejunal and ileal bowel wall thickening/edema and increased enhancement consistent with acute enteritis. Differential diagnostic considerations include association with infectious etiology versus inflammatory bowel disease. Recommend clinical correlation. 2. Associated minimal pelvic free fluid. 3. Mid right renal small exophytic simple cyst. 4. Marked descending and sigmoid colon diverticulosis without evidence of diverticulitis.        Physical Examination:        General appearance:  alert, cooperative and no distress  Mental Status:  oriented to person, place and time and normal affect  Lungs:  clear to auscultation bilaterally, normal effort  Heart:  regular rate and rhythm, no murmur  Abdomen:  soft, nontender, nondistended, normal bowel sounds, no masses, hepatomegaly, splenomegaly  Extremities:  no edema, redness, tenderness in the calves  Skin:  no gross lesions, rashes, induration    Assessment:        Hospital Problems         Last Modified POA    * (Principal) Gastroenteritis 7/25/2021 Yes    Chronic bronchitis ([address] 7/25/2021 Yes    Tobacco abuse 7/25/2021 Yes    Lung nodule 7/25/2021 Yes    Essential hypertension 7/25/2021 Yes    HARVEY (acute kidney injury) ([address] 7/25/2021 Yes    High anion gap metabolic acidosis 1/35/9046 Yes    Coronary artery disease involving native coronary artery of native heart without angina pectoris 7/25/2021 Yes    Overview Signed 7/25/2021 10:30 AM by Mandy Davis, DO     s/p PCI with JOSE MARTIN to a  of RCA in Feb 2019:          Tobacco abuse counseling 7/25/2021 Yes    Lactic acidosis 7/25/2021 Yes

## 2021-07-27 VITALS
HEIGHT: 68 IN | OXYGEN SATURATION: 98 % | WEIGHT: 216.4 LBS | SYSTOLIC BLOOD PRESSURE: 130 MMHG | TEMPERATURE: 98 F | RESPIRATION RATE: 20 BRPM | DIASTOLIC BLOOD PRESSURE: 70 MMHG | HEART RATE: 62 BPM | BODY MASS INDEX: 32.8 KG/M2

## 2021-07-27 PROBLEM — R11.2 NAUSEA AND VOMITING: Status: RESOLVED | Noted: 2021-07-25 | Resolved: 2021-07-27

## 2021-07-27 LAB — GLUCOSE BLD-MCNC: 129 MG/DL (ref 65–105)

## 2021-07-27 PROCEDURE — 99238 HOSP IP/OBS DSCHRG MGMT 30/<: CPT | Performed by: INTERNAL MEDICINE

## 2021-07-27 PROCEDURE — 82947 ASSAY GLUCOSE BLOOD QUANT: CPT

## 2021-07-27 PROCEDURE — 2580000003 HC RX 258: Performed by: INTERNAL MEDICINE

## 2021-07-27 PROCEDURE — 6360000002 HC RX W HCPCS: Performed by: INTERNAL MEDICINE

## 2021-07-27 PROCEDURE — 6370000000 HC RX 637 (ALT 250 FOR IP): Performed by: NURSE PRACTITIONER

## 2021-07-27 PROCEDURE — 6370000000 HC RX 637 (ALT 250 FOR IP): Performed by: INTERNAL MEDICINE

## 2021-07-27 PROCEDURE — 2500000003 HC RX 250 WO HCPCS: Performed by: INTERNAL MEDICINE

## 2021-07-27 RX ORDER — ATORVASTATIN CALCIUM 40 MG/1
40 TABLET, FILM COATED ORAL DAILY
Qty: 30 TABLET | Refills: 3 | Status: SHIPPED | OUTPATIENT
Start: 2021-07-27

## 2021-07-27 RX ORDER — LISINOPRIL 40 MG/1
40 TABLET ORAL DAILY
Qty: 30 TABLET | Refills: 0 | Status: ON HOLD | OUTPATIENT
Start: 2021-07-27 | End: 2021-10-08 | Stop reason: HOSPADM

## 2021-07-27 RX ADMIN — CEFTRIAXONE SODIUM 1000 MG: 1 INJECTION, POWDER, FOR SOLUTION INTRAMUSCULAR; INTRAVENOUS at 04:13

## 2021-07-27 RX ADMIN — TICAGRELOR 90 MG: 90 TABLET ORAL at 08:42

## 2021-07-27 RX ADMIN — METOPROLOL TARTRATE 12.5 MG: 25 TABLET ORAL at 08:42

## 2021-07-27 RX ADMIN — ANTACID TABLETS 500 MG: 500 TABLET, CHEWABLE ORAL at 11:12

## 2021-07-27 RX ADMIN — ASPIRIN 81 MG: 81 TABLET, CHEWABLE ORAL at 08:42

## 2021-07-27 RX ADMIN — METRONIDAZOLE 500 MG: 500 INJECTION, SOLUTION INTRAVENOUS at 05:10

## 2021-07-27 NOTE — PLAN OF CARE
Problem: Falls - Risk of:  Goal: Will remain free from falls  Description: Will remain free from falls  7/27/2021 1255 by Domingo Ryder RN  Outcome: Met This Shift  7/27/2021 0631 by Jeremias Glaser RN  Outcome: Met This Shift  Goal: Absence of physical injury  Description: Absence of physical injury  7/27/2021 1255 by Domingo Ryder RN  Outcome: Met This Shift  7/27/2021 0631 by Jeremias Glaser RN  Outcome: Met This Shift     Problem: Pain:  Goal: Pain level will decrease  Description: Pain level will decrease  7/27/2021 1255 by Domingo Ryder RN  Outcome: Ongoing  7/27/2021 0631 by Jeremias Glaser RN  Outcome: Met This Shift  Goal: Control of acute pain  Description: Control of acute pain  7/27/2021 1255 by Domingo Ryder RN  Outcome: Ongoing  7/27/2021 0631 by Jeremias Glaser RN  Outcome: Met This Shift  Goal: Control of chronic pain  Description: Control of chronic pain  7/27/2021 1255 by Domingo Ryder RN  Outcome: Ongoing  7/27/2021 0631 by Jeremias Glaser RN  Outcome: Met This Shift

## 2021-07-27 NOTE — PLAN OF CARE
Problem: Falls - Risk of:  Goal: Will remain free from falls  Description: Will remain free from falls  7/27/2021 1257 by Zbigniew Holliday RN  Outcome: Completed  7/27/2021 1255 by Zbigniew Holliday RN  Outcome: Met This Shift  7/27/2021 0631 by Kathleen Mendoza RN  Outcome: Met This Shift  Goal: Absence of physical injury  Description: Absence of physical injury  7/27/2021 1257 by Zbigniew Holliday RN  Outcome: Completed  7/27/2021 1255 by Zbigniew Holliday RN  Outcome: Met This Shift  7/27/2021 0631 by Kathleen Mendoza RN  Outcome: Met This Shift     Problem: Pain:  Goal: Pain level will decrease  Description: Pain level will decrease  7/27/2021 1257 by Zbigniew Holliday RN  Outcome: Completed  7/27/2021 1255 by Zbigniew Holliday RN  Outcome: Ongoing  7/27/2021 0631 by Kathleen Mendoza RN  Outcome: Met This Shift  Goal: Control of acute pain  Description: Control of acute pain  7/27/2021 1257 by Zbigniew Holliday RN  Outcome: Completed  7/27/2021 1255 by Zbigniew Holliday RN  Outcome: Ongoing  7/27/2021 0631 by Kathleen Mendoza RN  Outcome: Met This Shift  Goal: Control of chronic pain  Description: Control of chronic pain  7/27/2021 1257 by Zbigniew Holliday RN  Outcome: Completed  7/27/2021 1255 by Zbigniew Holliday RN  Outcome: Ongoing  7/27/2021 0631 by Kathleen Mendoza RN  Outcome: Met This Shift

## 2021-07-27 NOTE — PLAN OF CARE
Problem: Falls - Risk of:  Goal: Will remain free from falls  Description: Will remain free from falls  Outcome: Met This Shift  Goal: Absence of physical injury  Description: Absence of physical injury  Outcome: Met This Shift     Problem: Pain:  Goal: Pain level will decrease  Description: Pain level will decrease  Outcome: Met This Shift  Goal: Control of acute pain  Description: Control of acute pain  Outcome: Met This Shift  Goal: Control of chronic pain  Description: Control of chronic pain  Outcome: Met This Shift     Problem: Musculor/Skeletal Functional Status  Goal: Highest potential functional level  Outcome: Met This Shift

## 2021-07-28 LAB — CALPROTECTIN, FECAL: 175 UG/G

## 2021-07-30 NOTE — DISCHARGE SUMMARY
[facility]  Office: [address], DO, Justyna Richey, DO, Celi Fonseca, DO, Cristian Hawk Blood, DO, Shara Patel MD, Karla Lucio MD, Torey Ulloa MD, Skyler Brown MD, Elisa Fuentes MD, Glo Lopez MD, Madi Kaplan MD, Trinh Lowery, DO, Gregoria Shi MD, Maria Isabel Kennedy DO, Sunshine Hoskins MD,  Duane Boudreaux DO, Mia Dorado MD, Malgorzata Peters MD, Jacob Espinoza MD, Johanna Valle MD, Gustavo Hodegs MD, Nato Bragg MD, Jeremiah Millan, [facility], [facility], CNP, Danitza Carrero, CNP, Bang Chavez, CNS, Michoacano Jackson, CNP, Abran Doyle, [facility], Marlon Garces, CNP, Gilda Cabrera, CNP, Jesus Villeda, CNP, Cooper Mello PA-C, Wilda Aleman, [facility], Dagoberto Day, CNP, Vinh Dowd, CNP, Frank Soto, CNP, Daniela Feldman, CNP, Souleymane Frausto, CNP, Otto Ahn, CNP, Vaughn Nissen, CNP, Tucker Dias, [facility]    Discharge Summary     Patient ID: Manuel Umana  :  1960   MRN: [id number]     ACCOUNT:  [de-identified]   Patient's PCP: Abdirizak Hawk MD  Admit Date: 2021   Discharge Date: 21    Length of Stay: 2  Code Status:  Prior  Admitting Physician: Gregoria Shi MD  Discharge Physician: Gregoria Shi MD     Active Discharge Diagnoses:     Hospital Problem Lists:  Principal Problem:    Gastroenteritis  Active Problems:    Chronic bronchitis ([facility] [address]    Tobacco abuse    Lung nodule    Essential hypertension    HARVEY (acute kidney injury) ([facility] [address]    High anion gap metabolic acidosis    Coronary artery disease involving native coronary artery of native heart without angina pectoris    Tobacco abuse counseling    Lactic acidosis    Vitamin D deficiency    Marijuana abuse    Type 2 diabetes mellitus with hyperglycemia, without long-term current use of insulin ([facility] [address]  Resolved Problems:    Nausea and vomiting      Admission Condition:  serious     Discharged Condition: stable    Hospital Stay: Hospital Course:  Joya Guidry is a 64 y.o. female who was admitted for the management of   Gastroenteritis , presented to ER with Emesis (Since This evening x2 episodes), Abdominal Pain, Anxiety, and Back Pain  per H&P    Joya Guidry is a 64 y.o. female with a history of hypertension, hyperlipidemia, non-insulin-dependent diabetes, coronary artery disease, peripheral artery disease, COPD who presents to the hospital with complaints of diffuse abdominal pain. Pain started yesterday and is described as a squeezing pain that is a 7 out of 10 in severity. Patient states that she vomited 3 times overnight but denies seeing any blood in her vomit. Pain radiates to her. She denies any sick contacts. Denies any travel, or history of alcohol use/gallstones. Never been told she has pancreatitis or cholecystitis. Has never had abdominal surgery before. Has never had a small bowel obstruction. She did have leftover chicken but otherwise cannot recall eating anything out of the normal for her. She vomited approximately 3 times last night. On exam, patient does appear fatigued and uncomfortable. Denies any hematuria, dysuria, polyuria or urinary symptoms. No history of kidney stones. Denies any recent antibiotic use/history of C. difficile. Does admit to history of acid reflux but says this feels different. Denies any chest pain, shortness of breath, difficulty breathing. CT of her abdomen pelvis showed distal jejunal and ileal bowel wall thickening/edema and increased enhancement consistent with acute enteritis. She denies any history of inflammatory bowel disease and has no history of Crohn's/ulcerative colitis. Denies any history of diverticulitis but does have diverticulosis. Labs on admission significant for high anion gap metabolic acidosis with slightly elevated creatinine of 1.3. Lipase was only minimally elevated remainder of metabolic panel unremarkable outside of lactic acid of 2.1.   CBC shows leukocytosis with left shift. Urinalysis shows moderate bacteria, leukocyte esterase,     Symptoms resolved and pt going home     Review of system:  Denies any nausea vomiting fever chills,  Denies any headaches or blurred vision,  Denies any chest pain shortness of pain orthopnea,   Denies any cough phlegm hemoptysis,  Denies any abdominal pain diarrhea constipation,  Denies any tingling tingling numbness weakness of arms or legs,   Skin no rash,    On examination,  Alert awake oriented x3,  S1-S2 present,  CTA bilateral,  Abdomen soft nontender nondistended bowel sounds present   Extremity no edema no calf tenderness,,  Skin no rash  CNS no focal neurological deficits        Significant therapeutic interventions:   Gastroenteritis: likely infectious vs cyclic vomiting syndrome from marijuana use vs nflammatory bowel disease (not likely). Continue supportive care. Continue IV fluids. Pain control. C. difficile negative. Awaiting stool study. Will need colonoscopy/age appropriate cancer screening outpt with her GI physician. Advance diet as tolerated. Dc abx if symptoms improve rapidly, symptoms resolved and she wants to go home   Acute Kidney injury with HAGMA : mild. Improving. Lactic acidosis : Resolved. Continue IV fluids  Known CAD s/p PCI with JOSE MARTIN to a  of RCA in Feb 2019: continue ASA and Brillinta. Continue lopressor. Hold ACE due to HARVEY. Vit D deficiency : replace vit D  Essential hypertension: restart home antihypertensive regimen  HLD: Continue statin  Peripheral artery disease: continue antiplt, statin, risk factor modification. Tobacco use disorder:counseled pt on importance of tobacco cessation.   DM type II with hyper glycemia: Restart home regimen.  Monitor for signs of hypoglycemia  Marijuana use disorder: smoking cessation encouraged  Obesity BMI of 32: Lifestyle modifications, increased exercise  Asthma: will order home meds  Labs, imaging, ecg reviewed  Med rec completed  DVT ppx  Significant Diagnostic Studies:   Labs / Micro:  CBC:   Lab Results   Component Value Date    WBC 8.6 07/26/2021    RBC 4.16 07/26/2021    RBC 4.10 06/02/2012    HGB 11.1 07/26/2021    HCT 36.6 07/26/2021    MCV 88.0 07/26/2021    MCH 26.7 07/26/2021    MCHC 30.3 07/26/2021    RDW 14.2 07/26/2021     07/26/2021     06/02/2012     BMP:    Lab Results   Component Value Date    GLUCOSE 135 07/26/2021    GLUCOSE 94 06/02/2012     07/26/2021    K 3.5 07/26/2021     07/26/2021    CO2 19 07/26/2021    ANIONGAP 11 07/26/2021    BUN 20 07/26/2021    CREATININE 0.93 07/26/2021    BUNCRER NOT REPORTED 07/26/2021    CALCIUM 8.0 07/26/2021    LABGLOM >60 07/26/2021    GFRAA >60 07/26/2021    GFR      07/26/2021    GFR NOT REPORTED 07/26/2021     HFP:    Lab Results   Component Value Date    PROT 5.6 07/26/2021     CMP:    Lab Results   Component Value Date    GLUCOSE 135 07/26/2021    GLUCOSE 94 06/02/2012     07/26/2021    K 3.5 07/26/2021     07/26/2021    CO2 19 07/26/2021    BUN 20 07/26/2021    CREATININE 0.93 07/26/2021    ANIONGAP 11 07/26/2021    ALKPHOS 68 07/26/2021    ALT 27 07/26/2021    AST 25 07/26/2021    BILITOT 0.27 07/26/2021    LABALBU 2.9 07/26/2021    LABALBU 4.0 06/01/2012    ALBUMIN 1.1 07/26/2021    LABGLOM >60 07/26/2021    GFRAA >60 07/26/2021    GFR      07/26/2021    GFR NOT REPORTED 07/26/2021    PROT 5.6 07/26/2021    CALCIUM 8.0 07/26/2021     PT/INR:    Lab Results   Component Value Date    PROTIME 9.7 11/20/2016    PROTIME 9.9 06/01/2012    INR 0.9 11/20/2016     PTT:   Lab Results   Component Value Date    APTT 33.7 02/11/2019     FLP:    Lab Results   Component Value Date    CHOL 122 06/16/2020    TRIG 152 06/16/2020    HDL 35 06/16/2020     U/A:    Lab Results   Component Value Date    COLORU YELLOW 07/25/2021    TURBIDITY CLOUDY 07/25/2021    SPECGRAV 1.021 07/25/2021    HGBUR NEGATIVE 07/25/2021    PHUR 5.0 07/25/2021    PROTEINU NEGATIVE 07/25/2021 GLUCOSEU NEGATIVE 07/25/2021    GLUCOSEU NEGATIVE 06/01/2012    KETUA NEGATIVE 07/25/2021    BILIRUBINUR NEGATIVE 07/25/2021    BILIRUBINUR NEGATIVE 06/01/2012    UROBILINOGEN Normal 07/25/2021    NITRU NEGATIVE 07/25/2021    LEUKOCYTESUR MODERATE 07/25/2021     TSH:    Lab Results   Component Value Date    TSH 1.24 06/16/2020          Radiology:  XR ACUTE ABD SERIES CHEST 1 VW    Result Date: 7/25/2021  No acute process in the chest. No bowel obstruction or free air. CT ABDOMEN PELVIS W IV CONTRAST Additional Contrast? None    Result Date: 7/25/2021  1. Distal jejunal and ileal bowel wall thickening/edema and increased enhancement consistent with acute enteritis. Differential diagnostic considerations include association with infectious etiology versus inflammatory bowel disease. Recommend clinical correlation. 2. Associated minimal pelvic free fluid. 3. Mid right renal small exophytic simple cyst. 4. Marked descending and sigmoid colon diverticulosis without evidence of diverticulitis. Consultations:    Consults:     Final Specialist Recommendations/Findings:   IP CONSULT TO HOSPITALIST  IP CONSULT TO IV TEAM      The patient was seen and examined on day of discharge and this discharge summary is in conjunction with any daily progress note from day of discharge. Discharge plan:     Disposition: Home    Physician Follow Up: Felipe Bates MD  In 5 days   No follow-up provider specified.      Requiring Further Evaluation/Follow Up POST HOSPITALIZATION/Incidental Findings:     Diet: cardiac diet    Activity: As tolerated    Instructions to Patient:     Discharge Medications:      Medication List      CONTINUE taking these medications    acetaminophen 500 MG tablet  Commonly known as: TYLENOL  Take 1 tablet by mouth every 6 hours as needed for Pain     albuterol (5 MG/ML) 0.5% nebulizer solution  Commonly known as: PROVENTIL  Take 0.5 mLs by nebulization every 6 hours as needed for Wheezing albuterol-ipratropium  MCG/ACT Aers inhaler  Commonly known as: COMBIVENT RESPIMAT  Inhale 1 puff into the lungs every 6 hours     aspirin 81 MG chewable tablet  Take 1 tablet by mouth daily     atorvastatin 40 MG tablet  Commonly known as: Lipitor  Take 1 tablet by mouth daily     blood glucose test strips  Test 3 times a day & as needed for symptoms of irregular blood glucose. glipiZIDE 5 MG tablet  Commonly known as: GLUCOTROL  Take 1 tablet by mouth 2 times daily     glucose monitoring kit  1 kit by Does not apply route daily     Lancets Misc  1 each by Does not apply route daily     lisinopril 40 MG tablet  Commonly known as: PRINIVIL;ZESTRIL  Take 1 tablet by mouth daily     metFORMIN 1000 MG tablet  Commonly known as: GLUCOPHAGE  Take 1 tablet by mouth 2 times daily (with meals)     metoprolol tartrate 25 MG tablet  Commonly known as: LOPRESSOR  Take 0.5 tablets by mouth 2 times daily     nitroGLYCERIN 0.4 MG SL tablet  Commonly known as: NITROSTAT  up to max of 3 total doses. If no relief after 1 dose, call 911. ticagrelor 90 MG Tabs tablet  Commonly known as: BRILINTA  Take 1 tablet by mouth 2 times daily     tiotropium 18 MCG inhalation capsule  Commonly known as: SPIRIVA  Inhale 1 capsule into the lungs daily Pt told that she was using this medication at home. Trulicity 1.5 TT/2.7JG Sopn  Generic drug: Dulaglutide        STOP taking these medications    cephALEXin 500 MG capsule  Commonly known as: Keflex           Where to Get Your Medications      These medications were sent to [facility], [address] [place] [address]  [facility], [address] [address]    Phone: [phone]   atorvastatin 40 MG tablet  lisinopril 40 MG tablet         No discharge procedures on file.     Time Spent on discharge is  20 mins in patient examination, evaluation, counseling as well as medication reconciliation, prescriptions for required medications, discharge plan and follow up. Electronically signed by   Kanchan Porter MD  7/30/2021  11:54 AM      Thank you Dr. Jb Monzon MD for the opportunity to be involved in this patient's care. No

## 2021-09-29 ENCOUNTER — APPOINTMENT (OUTPATIENT)
Dept: GENERAL RADIOLOGY | Age: 61
DRG: 951 | End: 2021-09-29
Payer: MEDICARE

## 2021-09-29 ENCOUNTER — HOSPITAL ENCOUNTER (INPATIENT)
Age: 61
LOS: 9 days | Discharge: HOME | DRG: 951 | End: 2021-10-08
Attending: EMERGENCY MEDICINE | Admitting: INTERNAL MEDICINE
Payer: MEDICARE

## 2021-09-29 DIAGNOSIS — N17.9 AKI (ACUTE KIDNEY INJURY) (HCC): ICD-10-CM

## 2021-09-29 DIAGNOSIS — E11.65 TYPE 2 DIABETES MELLITUS WITH HYPERGLYCEMIA, WITHOUT LONG-TERM CURRENT USE OF INSULIN (HCC): ICD-10-CM

## 2021-09-29 DIAGNOSIS — J96.01 ACUTE RESPIRATORY FAILURE WITH HYPOXIA AND HYPERCAPNIA (HCC): Primary | ICD-10-CM

## 2021-09-29 DIAGNOSIS — J12.9 VIRAL PNEUMONIA: ICD-10-CM

## 2021-09-29 DIAGNOSIS — J96.02 ACUTE RESPIRATORY FAILURE WITH HYPOXIA AND HYPERCAPNIA (HCC): Primary | ICD-10-CM

## 2021-09-29 PROBLEM — R06.03 RESPIRATORY DISTRESS: Status: ACTIVE | Noted: 2021-09-29

## 2021-09-29 LAB
-: ABNORMAL
ABSOLUTE EOS #: 0 K/UL (ref 0–0.4)
ABSOLUTE IMMATURE GRANULOCYTE: 0 K/UL (ref 0–0.3)
ABSOLUTE LYMPH #: 6.89 K/UL (ref 1–4.8)
ABSOLUTE MONO #: 0.92 K/UL (ref 0.1–0.8)
ADENOVIRUS PCR: NOT DETECTED
ALLEN TEST: ABNORMAL
ALLEN TEST: ABNORMAL
ALLEN TEST: POSITIVE
AMORPHOUS: ABNORMAL
ANION GAP SERPL CALCULATED.3IONS-SCNC: 15 MMOL/L (ref 9–17)
ANION GAP SERPL CALCULATED.3IONS-SCNC: 24 MMOL/L (ref 9–17)
ANION GAP: 14 MMOL/L (ref 7–16)
ANION GAP: 8 MMOL/L (ref 7–16)
ATYPICAL LYMPHOCYTE ABSOLUTE COUNT: 0.15 K/UL
ATYPICAL LYMPHOCYTES: 1 %
BACTERIA: ABNORMAL
BASOPHILS # BLD: 1 % (ref 0–2)
BASOPHILS ABSOLUTE: 0.15 K/UL (ref 0–0.2)
BETA-HYDROXYBUTYRATE: 0.18 MMOL/L (ref 0.02–0.27)
BILIRUBIN URINE: NEGATIVE
BNP INTERPRETATION: ABNORMAL
BORDETELLA PARAPERTUSSIS: NOT DETECTED
BORDETELLA PERTUSSIS PCR: NOT DETECTED
BUN BLDV-MCNC: 18 MG/DL (ref 6–20)
BUN BLDV-MCNC: 30 MG/DL (ref 6–20)
BUN/CREAT BLD: ABNORMAL (ref 9–20)
BUN/CREAT BLD: ABNORMAL (ref 9–20)
CALCIUM SERPL-MCNC: 8.1 MG/DL (ref 8.6–10.4)
CALCIUM SERPL-MCNC: 9 MG/DL (ref 8.6–10.4)
CASTS UA: ABNORMAL /LPF (ref 0–8)
CHLAMYDIA PNEUMONIAE BY PCR: NOT DETECTED
CHLORIDE BLD-SCNC: 103 MMOL/L (ref 98–107)
CHLORIDE BLD-SCNC: 98 MMOL/L (ref 98–107)
CO2: 14 MMOL/L (ref 20–31)
CO2: 18 MMOL/L (ref 20–31)
COLOR: YELLOW
CORONAVIRUS 229E PCR: NOT DETECTED
CORONAVIRUS HKU1 PCR: NOT DETECTED
CORONAVIRUS NL63 PCR: NOT DETECTED
CORONAVIRUS OC43 PCR: NOT DETECTED
CREAT SERPL-MCNC: 1.49 MG/DL (ref 0.5–0.9)
CREAT SERPL-MCNC: 1.82 MG/DL (ref 0.5–0.9)
CRYSTALS, UA: ABNORMAL /HPF
DIFFERENTIAL TYPE: ABNORMAL
EOSINOPHILS RELATIVE PERCENT: 0 % (ref 1–4)
EPITHELIAL CELLS UA: ABNORMAL /HPF (ref 0–5)
FIO2: 100
FIO2: ABNORMAL
FIO2: ABNORMAL
GFR AFRICAN AMERICAN: 35 ML/MIN
GFR AFRICAN AMERICAN: 44 ML/MIN
GFR NON-AFRICAN AMERICAN: 29 ML/MIN
GFR NON-AFRICAN AMERICAN: 31 ML/MIN
GFR NON-AFRICAN AMERICAN: 36 ML/MIN
GFR NON-AFRICAN AMERICAN: 36 ML/MIN
GFR SERPL CREATININE-BSD FRML MDRD: 38 ML/MIN
GFR SERPL CREATININE-BSD FRML MDRD: 43 ML/MIN
GFR SERPL CREATININE-BSD FRML MDRD: ABNORMAL ML/MIN/{1.73_M2}
GLUCOSE BLD-MCNC: 164 MG/DL (ref 74–100)
GLUCOSE BLD-MCNC: 202 MG/DL (ref 65–105)
GLUCOSE BLD-MCNC: 206 MG/DL (ref 65–105)
GLUCOSE BLD-MCNC: 234 MG/DL (ref 65–105)
GLUCOSE BLD-MCNC: 260 MG/DL (ref 70–99)
GLUCOSE BLD-MCNC: 271 MG/DL (ref 70–99)
GLUCOSE BLD-MCNC: 274 MG/DL (ref 65–105)
GLUCOSE BLD-MCNC: 313 MG/DL (ref 74–100)
GLUCOSE BLD-MCNC: 373 MG/DL (ref 74–100)
GLUCOSE URINE: NEGATIVE
HCO3 VENOUS: 20.7 MMOL/L (ref 22–29)
HCO3 VENOUS: 25.7 MMOL/L (ref 22–29)
HCT VFR BLD CALC: 46.2 % (ref 36.3–47.1)
HEMOGLOBIN: 13.2 G/DL (ref 11.9–15.1)
HUMAN METAPNEUMOVIRUS PCR: NOT DETECTED
IMMATURE GRANULOCYTES: 0 %
INFLUENZA A BY PCR: NOT DETECTED
INFLUENZA A H1 (2009) PCR: ABNORMAL
INFLUENZA A H1 PCR: ABNORMAL
INFLUENZA A H3 PCR: ABNORMAL
INFLUENZA B BY PCR: NOT DETECTED
KETONES, URINE: NEGATIVE
LACTIC ACID, WHOLE BLOOD: 2.7 MMOL/L (ref 0.7–2.1)
LACTIC ACID, WHOLE BLOOD: 3 MMOL/L (ref 0.7–2.1)
LACTIC ACID, WHOLE BLOOD: 8.4 MMOL/L (ref 0.7–2.1)
LACTIC ACID: ABNORMAL MMOL/L
LEUKOCYTE ESTERASE, URINE: NEGATIVE
LYMPHOCYTES # BLD: 45 % (ref 24–44)
MAGNESIUM: 2.4 MG/DL (ref 1.6–2.6)
MCH RBC QN AUTO: 26.6 PG (ref 25.2–33.5)
MCHC RBC AUTO-ENTMCNC: 28.6 G/DL (ref 28.4–34.8)
MCV RBC AUTO: 93.1 FL (ref 82.6–102.9)
MODE: ABNORMAL
MONOCYTES # BLD: 6 % (ref 1–7)
MORPHOLOGY: ABNORMAL
MRSA, DNA, NASAL: ABNORMAL
MUCUS: ABNORMAL
MYCOPLASMA PNEUMONIAE PCR: NOT DETECTED
NEGATIVE BASE EXCESS, ART: 3 (ref 0–2)
NEGATIVE BASE EXCESS, VEN: 17 (ref 0–2)
NEGATIVE BASE EXCESS, VEN: 9 (ref 0–2)
NITRITE, URINE: NEGATIVE
NRBC AUTOMATED: 0 PER 100 WBC
O2 DEVICE/FLOW/%: ABNORMAL
O2 SAT, VEN: 21 % (ref 60–85)
O2 SAT, VEN: 28 % (ref 60–85)
OTHER OBSERVATIONS UA: ABNORMAL
PARAINFLUENZA 1 PCR: NOT DETECTED
PARAINFLUENZA 2 PCR: NOT DETECTED
PARAINFLUENZA 3 PCR: NOT DETECTED
PARAINFLUENZA 4 PCR: NOT DETECTED
PARTIAL THROMBOPLASTIN TIME: 18 SEC (ref 20.5–30.5)
PARTIAL THROMBOPLASTIN TIME: 35.9 SEC (ref 20.5–30.5)
PARTIAL THROMBOPLASTIN TIME: 45.9 SEC (ref 20.5–30.5)
PATIENT TEMP: ABNORMAL
PCO2, VEN: 106.6 MM HG (ref 41–51)
PCO2, VEN: 121.1 MM HG (ref 41–51)
PDW BLD-RTO: 15.7 % (ref 11.8–14.4)
PH UA: 6.5 (ref 5–8)
PH VENOUS: 6.84 (ref 7.32–7.43)
PH VENOUS: 6.99 (ref 7.32–7.43)
PLATELET # BLD: 290 K/UL (ref 138–453)
PLATELET ESTIMATE: ABNORMAL
PMV BLD AUTO: 11.2 FL (ref 8.1–13.5)
PO2, VEN: 28.8 MM HG (ref 30–50)
PO2, VEN: 28.9 MM HG (ref 30–50)
POC BUN: 17 MG/DL (ref 8–26)
POC BUN: 19 MG/DL (ref 8–26)
POC CHLORIDE: 103 MMOL/L (ref 98–107)
POC CHLORIDE: 103 MMOL/L (ref 98–107)
POC CREATININE: 1.51 MG/DL (ref 0.51–1.19)
POC CREATININE: 1.69 MG/DL (ref 0.51–1.19)
POC HCO3: 23.7 MMOL/L (ref 21–28)
POC HEMATOCRIT: 47 % (ref 36–46)
POC HEMATOCRIT: 48 % (ref 36–46)
POC HEMOGLOBIN: 15.9 G/DL (ref 12–16)
POC HEMOGLOBIN: 16.3 G/DL (ref 12–16)
POC IONIZED CALCIUM: 1.27 MMOL/L (ref 1.15–1.33)
POC IONIZED CALCIUM: 1.31 MMOL/L (ref 1.15–1.33)
POC LACTIC ACID: 12.07 MMOL/L (ref 0.56–1.39)
POC LACTIC ACID: 3.57 MMOL/L (ref 0.56–1.39)
POC O2 SATURATION: 100 % (ref 94–98)
POC PCO2 TEMP: ABNORMAL MM HG
POC PCO2: 47.6 MM HG (ref 35–48)
POC PH TEMP: ABNORMAL
POC PH: 7.3 (ref 7.35–7.45)
POC PO2 TEMP: ABNORMAL MM HG
POC PO2: 514.7 MM HG (ref 83–108)
POC POTASSIUM: 3.7 MMOL/L (ref 3.5–4.5)
POC POTASSIUM: 5.2 MMOL/L (ref 3.5–4.5)
POC SODIUM: 138 MMOL/L (ref 138–146)
POC SODIUM: 140 MMOL/L (ref 138–146)
POC TCO2: 24 MMOL/L (ref 22–30)
POC TCO2: 28 MMOL/L (ref 22–30)
POSITIVE BASE EXCESS, ART: ABNORMAL (ref 0–3)
POSITIVE BASE EXCESS, VEN: ABNORMAL (ref 0–3)
POSITIVE BASE EXCESS, VEN: ABNORMAL (ref 0–3)
POTASSIUM SERPL-SCNC: 3.8 MMOL/L (ref 3.7–5.3)
POTASSIUM SERPL-SCNC: 3.9 MMOL/L (ref 3.7–5.3)
PRO-BNP: 2497 PG/ML
PROCALCITONIN: 0.09 NG/ML
PROTEIN UA: ABNORMAL
RBC # BLD: 4.96 M/UL (ref 3.95–5.11)
RBC # BLD: ABNORMAL 10*6/UL
RBC UA: ABNORMAL /HPF (ref 0–4)
RENAL EPITHELIAL, UA: ABNORMAL /HPF
RESP SYNCYTIAL VIRUS PCR: NOT DETECTED
RHINO/ENTEROVIRUS PCR: DETECTED
SAMPLE SITE: ABNORMAL
SARS-COV-2, PCR: NOT DETECTED
SARS-COV-2, RAPID: NOT DETECTED
SEG NEUTROPHILS: 47 % (ref 36–66)
SEGMENTED NEUTROPHILS ABSOLUTE COUNT: 7.19 K/UL (ref 1.8–7.7)
SODIUM BLD-SCNC: 136 MMOL/L (ref 135–144)
SODIUM BLD-SCNC: 136 MMOL/L (ref 135–144)
SPECIFIC GRAVITY UA: 1.01 (ref 1–1.03)
SPECIMEN DESCRIPTION: ABNORMAL
SPECIMEN DESCRIPTION: ABNORMAL
SPECIMEN DESCRIPTION: NORMAL
TCO2 (CALC), ART: ABNORMAL MMOL/L (ref 22–29)
TOTAL CO2, VENOUS: ABNORMAL MMOL/L (ref 23–30)
TOTAL CO2, VENOUS: ABNORMAL MMOL/L (ref 23–30)
TRICHOMONAS: ABNORMAL
TROPONIN INTERP: ABNORMAL
TROPONIN T: ABNORMAL NG/ML
TROPONIN, HIGH SENSITIVITY: 20 NG/L (ref 0–14)
TROPONIN, HIGH SENSITIVITY: 237 NG/L (ref 0–14)
TROPONIN, HIGH SENSITIVITY: 242 NG/L (ref 0–14)
TROPONIN, HIGH SENSITIVITY: 265 NG/L (ref 0–14)
TROPONIN, HIGH SENSITIVITY: 282 NG/L (ref 0–14)
TROPONIN, HIGH SENSITIVITY: 72 NG/L (ref 0–14)
TURBIDITY: CLEAR
URINE HGB: NEGATIVE
UROBILINOGEN, URINE: NORMAL
WBC # BLD: 15.3 K/UL (ref 3.5–11.3)
WBC # BLD: ABNORMAL 10*3/UL
WBC UA: ABNORMAL /HPF (ref 0–5)
YEAST: ABNORMAL

## 2021-09-29 PROCEDURE — 82803 BLOOD GASES ANY COMBINATION: CPT

## 2021-09-29 PROCEDURE — 2500000003 HC RX 250 WO HCPCS

## 2021-09-29 PROCEDURE — 82947 ASSAY GLUCOSE BLOOD QUANT: CPT

## 2021-09-29 PROCEDURE — 84145 PROCALCITONIN (PCT): CPT

## 2021-09-29 PROCEDURE — 96374 THER/PROPH/DIAG INJ IV PUSH: CPT

## 2021-09-29 PROCEDURE — 80051 ELECTROLYTE PANEL: CPT

## 2021-09-29 PROCEDURE — 83735 ASSAY OF MAGNESIUM: CPT

## 2021-09-29 PROCEDURE — 93005 ELECTROCARDIOGRAM TRACING: CPT | Performed by: HEALTH CARE PROVIDER

## 2021-09-29 PROCEDURE — 2500000003 HC RX 250 WO HCPCS: Performed by: HEALTH CARE PROVIDER

## 2021-09-29 PROCEDURE — 2580000003 HC RX 258: Performed by: HEALTH CARE PROVIDER

## 2021-09-29 PROCEDURE — 71045 X-RAY EXAM CHEST 1 VIEW: CPT

## 2021-09-29 PROCEDURE — 2000000000 HC ICU R&B

## 2021-09-29 PROCEDURE — 2580000003 HC RX 258: Performed by: STUDENT IN AN ORGANIZED HEALTH CARE EDUCATION/TRAINING PROGRAM

## 2021-09-29 PROCEDURE — 0BH18EZ INSERTION OF ENDOTRACHEAL AIRWAY INTO TRACHEA, VIA NATURAL OR ARTIFICIAL OPENING ENDOSCOPIC: ICD-10-PCS | Performed by: EMERGENCY MEDICINE

## 2021-09-29 PROCEDURE — 6370000000 HC RX 637 (ALT 250 FOR IP): Performed by: STUDENT IN AN ORGANIZED HEALTH CARE EDUCATION/TRAINING PROGRAM

## 2021-09-29 PROCEDURE — 36600 WITHDRAWAL OF ARTERIAL BLOOD: CPT

## 2021-09-29 PROCEDURE — 93005 ELECTROCARDIOGRAM TRACING: CPT | Performed by: STUDENT IN AN ORGANIZED HEALTH CARE EDUCATION/TRAINING PROGRAM

## 2021-09-29 PROCEDURE — 6370000000 HC RX 637 (ALT 250 FOR IP): Performed by: INTERNAL MEDICINE

## 2021-09-29 PROCEDURE — 36415 COLL VENOUS BLD VENIPUNCTURE: CPT

## 2021-09-29 PROCEDURE — 85014 HEMATOCRIT: CPT

## 2021-09-29 PROCEDURE — 36556 INSERT NON-TUNNEL CV CATH: CPT | Performed by: INTERNAL MEDICINE

## 2021-09-29 PROCEDURE — 93005 ELECTROCARDIOGRAM TRACING: CPT | Performed by: INTERNAL MEDICINE

## 2021-09-29 PROCEDURE — 94761 N-INVAS EAR/PLS OXIMETRY MLT: CPT

## 2021-09-29 PROCEDURE — 83605 ASSAY OF LACTIC ACID: CPT

## 2021-09-29 PROCEDURE — 6360000002 HC RX W HCPCS: Performed by: STUDENT IN AN ORGANIZED HEALTH CARE EDUCATION/TRAINING PROGRAM

## 2021-09-29 PROCEDURE — 06HY33Z INSERTION OF INFUSION DEVICE INTO LOWER VEIN, PERCUTANEOUS APPROACH: ICD-10-PCS | Performed by: INTERNAL MEDICINE

## 2021-09-29 PROCEDURE — 84520 ASSAY OF UREA NITROGEN: CPT

## 2021-09-29 PROCEDURE — 99285 EMERGENCY DEPT VISIT HI MDM: CPT

## 2021-09-29 PROCEDURE — 99291 CRITICAL CARE FIRST HOUR: CPT | Performed by: INTERNAL MEDICINE

## 2021-09-29 PROCEDURE — 82565 ASSAY OF CREATININE: CPT

## 2021-09-29 PROCEDURE — 6360000002 HC RX W HCPCS

## 2021-09-29 PROCEDURE — 94664 DEMO&/EVAL PT USE INHALER: CPT

## 2021-09-29 PROCEDURE — 6360000002 HC RX W HCPCS: Performed by: HEALTH CARE PROVIDER

## 2021-09-29 PROCEDURE — 31500 INSERT EMERGENCY AIRWAY: CPT

## 2021-09-29 PROCEDURE — 85025 COMPLETE CBC W/AUTO DIFF WBC: CPT

## 2021-09-29 PROCEDURE — 87635 SARS-COV-2 COVID-19 AMP PRB: CPT

## 2021-09-29 PROCEDURE — 94002 VENT MGMT INPAT INIT DAY: CPT

## 2021-09-29 PROCEDURE — 87070 CULTURE OTHR SPECIMN AEROBIC: CPT

## 2021-09-29 PROCEDURE — 80048 BASIC METABOLIC PNL TOTAL CA: CPT

## 2021-09-29 PROCEDURE — 89220 SPUTUM SPECIMEN COLLECTION: CPT

## 2021-09-29 PROCEDURE — 82010 KETONE BODYS QUAN: CPT

## 2021-09-29 PROCEDURE — 94640 AIRWAY INHALATION TREATMENT: CPT

## 2021-09-29 PROCEDURE — 2580000003 HC RX 258

## 2021-09-29 PROCEDURE — 0202U NFCT DS 22 TRGT SARS-COV-2: CPT

## 2021-09-29 PROCEDURE — 84484 ASSAY OF TROPONIN QUANT: CPT

## 2021-09-29 PROCEDURE — 87086 URINE CULTURE/COLONY COUNT: CPT

## 2021-09-29 PROCEDURE — 6370000000 HC RX 637 (ALT 250 FOR IP)

## 2021-09-29 PROCEDURE — 85730 THROMBOPLASTIN TIME PARTIAL: CPT

## 2021-09-29 PROCEDURE — 74018 RADEX ABDOMEN 1 VIEW: CPT

## 2021-09-29 PROCEDURE — 87641 MR-STAPH DNA AMP PROBE: CPT

## 2021-09-29 PROCEDURE — 82330 ASSAY OF CALCIUM: CPT

## 2021-09-29 PROCEDURE — 2700000000 HC OXYGEN THERAPY PER DAY

## 2021-09-29 PROCEDURE — 87205 SMEAR GRAM STAIN: CPT

## 2021-09-29 PROCEDURE — 2500000003 HC RX 250 WO HCPCS: Performed by: STUDENT IN AN ORGANIZED HEALTH CARE EDUCATION/TRAINING PROGRAM

## 2021-09-29 PROCEDURE — 83880 ASSAY OF NATRIURETIC PEPTIDE: CPT

## 2021-09-29 PROCEDURE — 5A1945Z RESPIRATORY VENTILATION, 24-96 CONSECUTIVE HOURS: ICD-10-PCS | Performed by: INTERNAL MEDICINE

## 2021-09-29 PROCEDURE — 81001 URINALYSIS AUTO W/SCOPE: CPT

## 2021-09-29 PROCEDURE — 36620 INSERTION CATHETER ARTERY: CPT

## 2021-09-29 PROCEDURE — 94003 VENT MGMT INPAT SUBQ DAY: CPT

## 2021-09-29 PROCEDURE — 36556 INSERT NON-TUNNEL CV CATH: CPT

## 2021-09-29 PROCEDURE — 6370000000 HC RX 637 (ALT 250 FOR IP): Performed by: HEALTH CARE PROVIDER

## 2021-09-29 PROCEDURE — 94660 CPAP INITIATION&MGMT: CPT

## 2021-09-29 PROCEDURE — 96375 TX/PRO/DX INJ NEW DRUG ADDON: CPT

## 2021-09-29 PROCEDURE — 05JY3ZZ INSPECTION OF UPPER VEIN, PERCUTANEOUS APPROACH: ICD-10-PCS | Performed by: INTERNAL MEDICINE

## 2021-09-29 PROCEDURE — 87040 BLOOD CULTURE FOR BACTERIA: CPT

## 2021-09-29 RX ORDER — ETOMIDATE 2 MG/ML
30 INJECTION INTRAVENOUS ONCE
Status: COMPLETED | OUTPATIENT
Start: 2021-09-29 | End: 2021-09-29

## 2021-09-29 RX ORDER — POLYETHYLENE GLYCOL 3350 17 G/17G
17 POWDER, FOR SOLUTION ORAL DAILY PRN
Status: DISCONTINUED | OUTPATIENT
Start: 2021-09-29 | End: 2021-10-08 | Stop reason: HOSPADM

## 2021-09-29 RX ORDER — ALBUTEROL SULFATE 2.5 MG/3ML
2.5 SOLUTION RESPIRATORY (INHALATION) 2 TIMES DAILY
Status: DISCONTINUED | OUTPATIENT
Start: 2021-09-29 | End: 2021-10-03

## 2021-09-29 RX ORDER — INSULIN GLARGINE 100 [IU]/ML
15 INJECTION, SOLUTION SUBCUTANEOUS NIGHTLY
Status: DISCONTINUED | OUTPATIENT
Start: 2021-09-29 | End: 2021-10-01

## 2021-09-29 RX ORDER — NOREPINEPHRINE BIT/0.9 % NACL 16MG/250ML
2-100 INFUSION BOTTLE (ML) INTRAVENOUS CONTINUOUS
Status: DISCONTINUED | OUTPATIENT
Start: 2021-09-29 | End: 2021-10-02

## 2021-09-29 RX ORDER — SODIUM CHLORIDE 0.9 % (FLUSH) 0.9 %
5-40 SYRINGE (ML) INJECTION EVERY 12 HOURS SCHEDULED
Status: DISCONTINUED | OUTPATIENT
Start: 2021-09-29 | End: 2021-10-08 | Stop reason: HOSPADM

## 2021-09-29 RX ORDER — PROPOFOL 10 MG/ML
5-50 INJECTION, EMULSION INTRAVENOUS
Status: DISCONTINUED | OUTPATIENT
Start: 2021-09-29 | End: 2021-09-29

## 2021-09-29 RX ORDER — SODIUM CHLORIDE 0.9 % (FLUSH) 0.9 %
5-40 SYRINGE (ML) INJECTION PRN
Status: DISCONTINUED | OUTPATIENT
Start: 2021-09-29 | End: 2021-10-08 | Stop reason: HOSPADM

## 2021-09-29 RX ORDER — ONDANSETRON 2 MG/ML
4 INJECTION INTRAMUSCULAR; INTRAVENOUS EVERY 6 HOURS PRN
Status: DISCONTINUED | OUTPATIENT
Start: 2021-09-29 | End: 2021-10-08 | Stop reason: HOSPADM

## 2021-09-29 RX ORDER — IPRATROPIUM BROMIDE AND ALBUTEROL SULFATE 2.5; .5 MG/3ML; MG/3ML
1 SOLUTION RESPIRATORY (INHALATION) EVERY 4 HOURS PRN
Status: DISCONTINUED | OUTPATIENT
Start: 2021-09-29 | End: 2021-09-29

## 2021-09-29 RX ORDER — ATORVASTATIN CALCIUM 40 MG/1
40 TABLET, FILM COATED ORAL DAILY
Status: DISCONTINUED | OUTPATIENT
Start: 2021-09-29 | End: 2021-10-04

## 2021-09-29 RX ORDER — SODIUM CHLORIDE 9 MG/ML
25 INJECTION, SOLUTION INTRAVENOUS PRN
Status: DISCONTINUED | OUTPATIENT
Start: 2021-09-29 | End: 2021-10-08 | Stop reason: HOSPADM

## 2021-09-29 RX ORDER — ACETAMINOPHEN 650 MG/1
650 SUPPOSITORY RECTAL EVERY 6 HOURS PRN
Status: DISCONTINUED | OUTPATIENT
Start: 2021-09-29 | End: 2021-10-08 | Stop reason: HOSPADM

## 2021-09-29 RX ORDER — ASPIRIN 81 MG/1
81 TABLET, CHEWABLE ORAL DAILY
Status: DISCONTINUED | OUTPATIENT
Start: 2021-09-29 | End: 2021-10-08 | Stop reason: HOSPADM

## 2021-09-29 RX ORDER — FAMOTIDINE 20 MG/1
20 TABLET, FILM COATED ORAL DAILY
Status: DISCONTINUED | OUTPATIENT
Start: 2021-09-29 | End: 2021-09-29

## 2021-09-29 RX ORDER — ONDANSETRON 4 MG/1
4 TABLET, ORALLY DISINTEGRATING ORAL EVERY 8 HOURS PRN
Status: DISCONTINUED | OUTPATIENT
Start: 2021-09-29 | End: 2021-10-08 | Stop reason: HOSPADM

## 2021-09-29 RX ORDER — HEPARIN SODIUM 5000 [USP'U]/ML
5000 INJECTION, SOLUTION INTRAVENOUS; SUBCUTANEOUS EVERY 8 HOURS SCHEDULED
Status: DISCONTINUED | OUTPATIENT
Start: 2021-09-29 | End: 2021-09-29

## 2021-09-29 RX ORDER — HEPARIN SODIUM 10000 [USP'U]/100ML
5-30 INJECTION, SOLUTION INTRAVENOUS CONTINUOUS
Status: DISCONTINUED | OUTPATIENT
Start: 2021-09-29 | End: 2021-10-04

## 2021-09-29 RX ORDER — IPRATROPIUM BROMIDE AND ALBUTEROL SULFATE 2.5; .5 MG/3ML; MG/3ML
1 SOLUTION RESPIRATORY (INHALATION) 4 TIMES DAILY
Status: DISCONTINUED | OUTPATIENT
Start: 2021-09-29 | End: 2021-10-01

## 2021-09-29 RX ORDER — DEXTROSE MONOHYDRATE 50 MG/ML
100 INJECTION, SOLUTION INTRAVENOUS PRN
Status: DISCONTINUED | OUTPATIENT
Start: 2021-09-29 | End: 2021-10-08 | Stop reason: HOSPADM

## 2021-09-29 RX ORDER — DEXTROSE MONOHYDRATE 25 G/50ML
12.5 INJECTION, SOLUTION INTRAVENOUS PRN
Status: DISCONTINUED | OUTPATIENT
Start: 2021-09-29 | End: 2021-10-08 | Stop reason: HOSPADM

## 2021-09-29 RX ORDER — HEPARIN SODIUM 1000 [USP'U]/ML
2000 INJECTION, SOLUTION INTRAVENOUS; SUBCUTANEOUS PRN
Status: DISCONTINUED | OUTPATIENT
Start: 2021-09-29 | End: 2021-10-05

## 2021-09-29 RX ORDER — SODIUM CHLORIDE, SODIUM LACTATE, POTASSIUM CHLORIDE, CALCIUM CHLORIDE 600; 310; 30; 20 MG/100ML; MG/100ML; MG/100ML; MG/100ML
INJECTION, SOLUTION INTRAVENOUS CONTINUOUS
Status: DISCONTINUED | OUTPATIENT
Start: 2021-09-29 | End: 2021-10-02

## 2021-09-29 RX ORDER — NICOTINE POLACRILEX 4 MG
15 LOZENGE BUCCAL PRN
Status: DISCONTINUED | OUTPATIENT
Start: 2021-09-29 | End: 2021-10-08 | Stop reason: HOSPADM

## 2021-09-29 RX ORDER — ALBUTEROL SULFATE 2.5 MG/3ML
2.5 SOLUTION RESPIRATORY (INHALATION) EVERY 6 HOURS PRN
Status: DISCONTINUED | OUTPATIENT
Start: 2021-09-29 | End: 2021-09-29

## 2021-09-29 RX ORDER — FENTANYL CITRATE 50 UG/ML
50 INJECTION, SOLUTION INTRAMUSCULAR; INTRAVENOUS
Status: DISCONTINUED | OUTPATIENT
Start: 2021-09-29 | End: 2021-10-02

## 2021-09-29 RX ORDER — SODIUM CHLORIDE, SODIUM LACTATE, POTASSIUM CHLORIDE, AND CALCIUM CHLORIDE .6; .31; .03; .02 G/100ML; G/100ML; G/100ML; G/100ML
500 INJECTION, SOLUTION INTRAVENOUS ONCE
Status: COMPLETED | OUTPATIENT
Start: 2021-09-29 | End: 2021-09-29

## 2021-09-29 RX ORDER — SODIUM CHLORIDE 9 MG/ML
INJECTION, SOLUTION INTRAVENOUS CONTINUOUS
Status: DISCONTINUED | OUTPATIENT
Start: 2021-09-29 | End: 2021-09-29

## 2021-09-29 RX ORDER — PROPOFOL 10 MG/ML
INJECTION, EMULSION INTRAVENOUS
Status: COMPLETED
Start: 2021-09-29 | End: 2021-09-29

## 2021-09-29 RX ORDER — IPRATROPIUM BROMIDE AND ALBUTEROL SULFATE 2.5; .5 MG/3ML; MG/3ML
1 SOLUTION RESPIRATORY (INHALATION)
Status: DISCONTINUED | OUTPATIENT
Start: 2021-09-29 | End: 2021-09-29

## 2021-09-29 RX ORDER — NITROGLYCERIN 20 MG/100ML
INJECTION INTRAVENOUS
Status: DISPENSED
Start: 2021-09-29 | End: 2021-09-29

## 2021-09-29 RX ORDER — ACETAMINOPHEN 325 MG/1
650 TABLET ORAL EVERY 6 HOURS PRN
Status: DISCONTINUED | OUTPATIENT
Start: 2021-09-29 | End: 2021-10-08 | Stop reason: HOSPADM

## 2021-09-29 RX ORDER — HEPARIN SODIUM 1000 [USP'U]/ML
4000 INJECTION, SOLUTION INTRAVENOUS; SUBCUTANEOUS ONCE
Status: COMPLETED | OUTPATIENT
Start: 2021-09-29 | End: 2021-09-29

## 2021-09-29 RX ORDER — LORAZEPAM 2 MG/ML
1 INJECTION INTRAMUSCULAR ONCE
Status: COMPLETED | OUTPATIENT
Start: 2021-09-29 | End: 2021-09-29

## 2021-09-29 RX ORDER — SUCCINYLCHOLINE CHLORIDE 20 MG/ML
150 INJECTION INTRAMUSCULAR; INTRAVENOUS ONCE
Status: COMPLETED | OUTPATIENT
Start: 2021-09-29 | End: 2021-09-29

## 2021-09-29 RX ORDER — METHYLPREDNISOLONE SODIUM SUCCINATE 125 MG/2ML
40 INJECTION, POWDER, LYOPHILIZED, FOR SOLUTION INTRAMUSCULAR; INTRAVENOUS EVERY 12 HOURS
Status: DISCONTINUED | OUTPATIENT
Start: 2021-09-29 | End: 2021-10-02

## 2021-09-29 RX ORDER — ALBUTEROL SULFATE 2.5 MG/3ML
2.5 SOLUTION RESPIRATORY (INHALATION) EVERY 6 HOURS PRN
Status: DISCONTINUED | OUTPATIENT
Start: 2021-09-29 | End: 2021-09-30

## 2021-09-29 RX ORDER — HEPARIN SODIUM 1000 [USP'U]/ML
4000 INJECTION, SOLUTION INTRAVENOUS; SUBCUTANEOUS PRN
Status: DISCONTINUED | OUTPATIENT
Start: 2021-09-29 | End: 2021-10-05

## 2021-09-29 RX ORDER — METHYLPREDNISOLONE SODIUM SUCCINATE 125 MG/2ML
40 INJECTION, POWDER, LYOPHILIZED, FOR SOLUTION INTRAMUSCULAR; INTRAVENOUS DAILY
Status: DISCONTINUED | OUTPATIENT
Start: 2021-09-29 | End: 2021-09-29

## 2021-09-29 RX ADMIN — SODIUM CHLORIDE, POTASSIUM CHLORIDE, SODIUM LACTATE AND CALCIUM CHLORIDE 500 ML: 600; 310; 30; 20 INJECTION, SOLUTION INTRAVENOUS at 13:17

## 2021-09-29 RX ADMIN — HEPARIN SODIUM 2000 UNITS: 1000 INJECTION, SOLUTION INTRAVENOUS; SUBCUTANEOUS at 22:49

## 2021-09-29 RX ADMIN — Medication 2 MG/HR: at 07:41

## 2021-09-29 RX ADMIN — INSULIN LISPRO 6 UNITS: 100 INJECTION, SOLUTION INTRAVENOUS; SUBCUTANEOUS at 21:18

## 2021-09-29 RX ADMIN — SUCCINYLCHOLINE CHLORIDE 150 MG: 20 INJECTION, SOLUTION INTRAMUSCULAR; INTRAVENOUS at 02:16

## 2021-09-29 RX ADMIN — SODIUM CHLORIDE, POTASSIUM CHLORIDE, SODIUM LACTATE AND CALCIUM CHLORIDE: 600; 310; 30; 20 INJECTION, SOLUTION INTRAVENOUS at 07:40

## 2021-09-29 RX ADMIN — Medication 125 MCG/HR: at 18:35

## 2021-09-29 RX ADMIN — Medication 2 MCG/MIN: at 09:58

## 2021-09-29 RX ADMIN — AZITHROMYCIN MONOHYDRATE 500 MG: 500 INJECTION, POWDER, LYOPHILIZED, FOR SOLUTION INTRAVENOUS at 08:49

## 2021-09-29 RX ADMIN — INSULIN LISPRO 4 UNITS: 100 INJECTION, SOLUTION INTRAVENOUS; SUBCUTANEOUS at 08:35

## 2021-09-29 RX ADMIN — ALBUTEROL SULFATE 2.5 MG: 2.5 SOLUTION RESPIRATORY (INHALATION) at 03:45

## 2021-09-29 RX ADMIN — IPRATROPIUM BROMIDE AND ALBUTEROL SULFATE 1 AMPULE: .5; 3 SOLUTION RESPIRATORY (INHALATION) at 20:02

## 2021-09-29 RX ADMIN — CEFTRIAXONE SODIUM 1000 MG: 1 INJECTION, POWDER, FOR SOLUTION INTRAMUSCULAR; INTRAVENOUS at 04:12

## 2021-09-29 RX ADMIN — IPRATROPIUM BROMIDE AND ALBUTEROL SULFATE 1 AMPULE: .5; 3 SOLUTION RESPIRATORY (INHALATION) at 07:50

## 2021-09-29 RX ADMIN — FAMOTIDINE 20 MG: 10 INJECTION INTRAVENOUS at 08:54

## 2021-09-29 RX ADMIN — PROPOFOL 20 MCG/KG/MIN: 10 INJECTION, EMULSION INTRAVENOUS at 02:21

## 2021-09-29 RX ADMIN — ETOMIDATE 30 MG: 2 INJECTION INTRAVENOUS at 02:12

## 2021-09-29 RX ADMIN — INSULIN GLARGINE 15 UNITS: 100 INJECTION, SOLUTION SUBCUTANEOUS at 21:17

## 2021-09-29 RX ADMIN — INSULIN LISPRO 6 UNITS: 100 INJECTION, SOLUTION INTRAVENOUS; SUBCUTANEOUS at 17:50

## 2021-09-29 RX ADMIN — ALBUTEROL SULFATE 2.5 MG: 2.5 SOLUTION RESPIRATORY (INHALATION) at 02:00

## 2021-09-29 RX ADMIN — INSULIN LISPRO 6 UNITS: 100 INJECTION, SOLUTION INTRAVENOUS; SUBCUTANEOUS at 13:56

## 2021-09-29 RX ADMIN — HEPARIN SODIUM 2000 UNITS: 1000 INJECTION, SOLUTION INTRAVENOUS; SUBCUTANEOUS at 16:58

## 2021-09-29 RX ADMIN — ALBUTEROL SULFATE 2.5 MG: 2.5 SOLUTION RESPIRATORY (INHALATION) at 01:40

## 2021-09-29 RX ADMIN — SODIUM CHLORIDE, PRESERVATIVE FREE 10 ML: 5 INJECTION INTRAVENOUS at 21:07

## 2021-09-29 RX ADMIN — FENTANYL CITRATE 50 MCG: 50 INJECTION, SOLUTION INTRAMUSCULAR; INTRAVENOUS at 08:03

## 2021-09-29 RX ADMIN — METHYLPREDNISOLONE SODIUM SUCCINATE 40 MG: 125 INJECTION, POWDER, FOR SOLUTION INTRAMUSCULAR; INTRAVENOUS at 08:54

## 2021-09-29 RX ADMIN — IPRATROPIUM BROMIDE AND ALBUTEROL SULFATE 1 AMPULE: .5; 3 SOLUTION RESPIRATORY (INHALATION) at 11:25

## 2021-09-29 RX ADMIN — DESMOPRESSIN ACETATE 40 MG: 0.2 TABLET ORAL at 13:18

## 2021-09-29 RX ADMIN — HEPARIN SODIUM 4000 UNITS: 1000 INJECTION, SOLUTION INTRAVENOUS; SUBCUTANEOUS at 08:06

## 2021-09-29 RX ADMIN — LORAZEPAM 1 MG: 2 INJECTION INTRAMUSCULAR; INTRAVENOUS at 01:22

## 2021-09-29 RX ADMIN — PHENYLEPHRINE HYDROCHLORIDE 50 MCG/MIN: 10 INJECTION INTRAVENOUS at 21:00

## 2021-09-29 RX ADMIN — Medication 50 MCG/HR: at 10:25

## 2021-09-29 RX ADMIN — FENTANYL CITRATE 50 MCG: 50 INJECTION, SOLUTION INTRAMUSCULAR; INTRAVENOUS at 05:01

## 2021-09-29 RX ADMIN — Medication 7 MG/HR: at 20:24

## 2021-09-29 RX ADMIN — ALBUTEROL SULFATE 2.5 MG: 2.5 SOLUTION RESPIRATORY (INHALATION) at 02:35

## 2021-09-29 RX ADMIN — ALBUTEROL SULFATE 2.5 MG: 2.5 SOLUTION RESPIRATORY (INHALATION) at 23:35

## 2021-09-29 RX ADMIN — HEPARIN SODIUM AND DEXTROSE 10.2 UNITS/KG/HR: 10000; 5 INJECTION INTRAVENOUS at 08:06

## 2021-09-29 RX ADMIN — METHYLPREDNISOLONE SODIUM SUCCINATE 40 MG: 125 INJECTION, POWDER, FOR SOLUTION INTRAMUSCULAR; INTRAVENOUS at 21:05

## 2021-09-29 RX ADMIN — IPRATROPIUM BROMIDE AND ALBUTEROL SULFATE 1 AMPULE: .5; 3 SOLUTION RESPIRATORY (INHALATION) at 15:12

## 2021-09-29 RX ADMIN — ASPIRIN 81 MG: 81 TABLET, CHEWABLE ORAL at 13:18

## 2021-09-29 ASSESSMENT — PULMONARY FUNCTION TESTS
PIF_VALUE: 19
PIF_VALUE: 22
PIF_VALUE: 18
PIF_VALUE: 20
PIF_VALUE: 15
PIF_VALUE: 20
PIF_VALUE: 18
PIF_VALUE: 27
PIF_VALUE: 19
PIF_VALUE: 18
PIF_VALUE: 37
PIF_VALUE: 19
PIF_VALUE: 27
PIF_VALUE: 22
PIF_VALUE: 21
PIF_VALUE: 22
PIF_VALUE: 21
PIF_VALUE: 24
PIF_VALUE: 20
PIF_VALUE: 17
PIF_VALUE: 20
PIF_VALUE: 27
PIF_VALUE: 20
PIF_VALUE: 21
PIF_VALUE: 22

## 2021-09-29 ASSESSMENT — PAIN SCALES - GENERAL: PAINLEVEL_OUTOF10: 0

## 2021-09-29 NOTE — PLAN OF CARE
Problem: OXYGENATION/RESPIRATORY FUNCTION  Goal: Patient will maintain patent airway  Outcome: Ongoing  Goal: Patient will achieve/maintain normal respiratory rate/effort  Description: Respiratory rate and effort will be within normal limits for the patient  Outcome: Ongoing     Problem: MECHANICAL VENTILATION  Goal: Patient will maintain patent airway  Outcome: Ongoing  Goal: Oral health is maintained or improved  Outcome: Ongoing  Goal: ET tube will be managed safely  Outcome: Ongoing  Goal: Ability to express needs and understand communication  Outcome: Ongoing  Goal: Mobility/activity is maintained at optimum level for patient  Outcome: Ongoing     Problem: SKIN INTEGRITY  Goal: Skin integrity is maintained or improved  Outcome: Ongoing   BRONCHOSPASM/BRONCHOCONSTRICTION     [x]         IMPROVE AERATION/BREATH SOUNDS  [x]   ADMINISTER BRONCHODILATOR THERAPY AS APPROPRIATE  [x]   ASSESS BREATH SOUNDS  [x]   IMPLEMENT AEROSOL/MDI PROTOCOL  [x]   PATIENT EDUCATION AS NEEDED

## 2021-09-29 NOTE — PLAN OF CARE
Problem: OXYGENATION/RESPIRATORY FUNCTION  Goal: Patient will maintain patent airway  9/29/2021 0350 by Monserrat Galeano RN  Outcome: Ongoing  9/29/2021 0238 by Belkys Carrington RCP  Outcome: Ongoing  Goal: Patient will achieve/maintain normal respiratory rate/effort  Description: Respiratory rate and effort will be within normal limits for the patient  9/29/2021 0350 by Monserrat Galeano RN  Outcome: Ongoing  9/29/2021 0238 by Belkys Carrington RCP  Outcome: Ongoing     Problem: MECHANICAL VENTILATION  Goal: Patient will maintain patent airway  9/29/2021 0350 by Monserrat Galeano RN  Outcome: Ongoing  9/29/2021 0238 by Belkys Carrington RCP  Outcome: Ongoing  Goal: Oral health is maintained or improved  9/29/2021 0350 by Monserrat Galeano RN  Outcome: Ongoing  9/29/2021 0238 by Belkys Carrington RCP  Outcome: Ongoing  Goal: ET tube will be managed safely  9/29/2021 0350 by Monserrat Galeano RN  Outcome: Ongoing  9/29/2021 0238 by Belkys Carrington RCP  Outcome: Ongoing  Goal: Ability to express needs and understand communication  9/29/2021 0350 by Monserrat Galeano RN  Outcome: Ongoing  9/29/2021 0238 by Belkys Carrington RCP  Outcome: Ongoing  Goal: Mobility/activity is maintained at optimum level for patient  9/29/2021 0350 by Monserrat Galeano RN  Outcome: Ongoing  9/29/2021 0238 by Belkys Carrington RCP  Outcome: Ongoing     Problem: SKIN INTEGRITY  Goal: Skin integrity is maintained or improved  9/29/2021 0350 by Monserrat Galeano RN  Outcome: Ongoing  9/29/2021 0238 by Belkys Carrington RCP  Outcome: Ongoing     Problem: Non-Violent Restraints  Goal: Removal from restraints as soon as assessed to be safe  Outcome: Ongoing  Goal: No harm/injury to patient while restraints in use  Outcome: Ongoing  Goal: Patient's dignity will be maintained  Outcome: Ongoing     Problem: Discharge Planning:  Goal: Participates in care planning  Description: Participates in care planning  Outcome: Ongoing  Goal: Discharged to appropriate level of care  Description: Discharged to appropriate level of care  Outcome: Ongoing     Problem: Airway Clearance - Ineffective:  Goal: Ability to maintain a clear airway will improve  Description: Ability to maintain a clear airway will improve  Outcome: Ongoing     Problem: Anxiety/Stress:  Goal: Level of anxiety will decrease  Description: Level of anxiety will decrease  Outcome: Ongoing     Problem: Aspiration:  Goal: Absence of aspiration  Description: Absence of aspiration  Outcome: Ongoing     Problem: Bowel Function - Altered:  Goal: Bowel elimination is within specified parameters  Description: Bowel elimination is within specified parameters  Outcome: Ongoing     Problem: Cardiac Output - Decreased:  Goal: Hemodynamic stability will improve  Description: Hemodynamic stability will improve  Outcome: Ongoing     Problem: Fluid Volume - Imbalance:  Goal: Absence of imbalanced fluid volume signs and symptoms  Description: Absence of imbalanced fluid volume signs and symptoms  Outcome: Ongoing     Problem: Gas Exchange - Impaired:  Goal: Levels of oxygenation will improve  Description: Levels of oxygenation will improve  Outcome: Ongoing     Problem: Mental Status - Impaired:  Goal: Mental status will be restored to baseline  Description: Mental status will be restored to baseline  Outcome: Ongoing     Problem: Nutrition Deficit:  Goal: Ability to achieve adequate nutritional intake will improve  Description: Ability to achieve adequate nutritional intake will improve  Outcome: Ongoing     Problem: Pain:  Description: Pain management should include both nonpharmacologic and pharmacologic interventions.   Goal: Pain level will decrease  Description: Pain level will decrease  Outcome: Ongoing  Goal: Recognizes and communicates pain  Description: Recognizes and communicates pain  Outcome: Ongoing  Goal: Control of acute pain  Description: Control of acute pain  Outcome: Ongoing  Goal: Control of chronic pain  Description: Control of chronic pain  Outcome: Ongoing     Problem: Serum Glucose Level - Abnormal:  Goal: Ability to maintain appropriate glucose levels will improve to within specified parameters  Description: Ability to maintain appropriate glucose levels will improve to within specified parameters  Outcome: Ongoing     Problem: Skin Integrity - Impaired:  Goal: Will show no infection signs and symptoms  Description: Will show no infection signs and symptoms  Outcome: Ongoing  Goal: Absence of new skin breakdown  Description: Absence of new skin breakdown  Outcome: Ongoing     Problem: Sleep Pattern Disturbance:  Goal: Appears well-rested  Description: Appears well-rested  Outcome: Ongoing     Problem: Tissue Perfusion, Altered:  Goal: Circulatory function within specified parameters  Description: Circulatory function within specified parameters  Outcome: Ongoing     Problem: Tissue Perfusion - Cardiopulmonary, Altered:  Goal: Absence of angina  Description: Absence of angina  Outcome: Ongoing  Goal: Hemodynamic stability will improve  Description: Hemodynamic stability will improve  Outcome: Ongoing

## 2021-09-29 NOTE — PROCEDURES
PROCEDURE NOTE - CENTRAL VENOUS LINE PLACEMENT    PATIENT NAME: Fred Gray RECORD NO. [id number]  DATE: 2021  ATTENDING PHYSICIAN: Dr. Aria Tello DIAGNOSIS:  vascular access  POSTOPERATIVE DIAGNOSIS:  Same  PROCEDURE PERFORMED:  Right Internal Jugular Vein Central Line Insertion  PERFORMING PHYSICIAN: Virginie Coleman MD  ANESTHESIA:  Local utilizing 1% lidocaine  ESTIMATED BLOOD LOSS:  Less than 25 ml  COMPLICATIONS:  None immediately appreciated. DISCUSSION:  Audra Bennett is a 64y.o.-year-old female who requires central IV access vascular access. The history and physical examination were reviewed and confirmed. CONSENT: Unable to be obtained due to the emergent nature of this procedure. PROCEDURE:  A timeout was initiated by the bedside nurse and was confirmed by those present. The patient was placed in a supine position. The skin overlying the Right Internal Jugular Vein was prepped with chlorhexadine and draped in sterile fashion. The skin was infiltrated with local anesthetic. The vessel and surrounding anatomy was visualized using ultrasound. Through the anesthetized region, the introducer needle was inserted into the internal jugular vein returning dark red non pulsatile blood. A guidewire was placed through the center of the needle with no resistance. Ultrasound did not confirmed presence of wire in the vein and the wire was pulled out for attempt at femoral access. PROCEDURE NOTE - CENTRAL VENOUS LINE PLACEMENT    PATIENT NAME: Fred Gray RECORD NO. [id number]  DATE: 2021  ATTENDING PHYSICIAN: Dr. Aria Tello DIAGNOSIS:  vascular access  POSTOPERATIVE DIAGNOSIS:  Same  PROCEDURE PERFORMED:  Right Femoral Vein Central Line Insertion  PERFORMING PHYSICIAN: Virginie Coleman MD  ANESTHESIA:  Local utilizing 1% lidocaine  ESTIMATED BLOOD LOSS:  Less than 25 ml  COMPLICATIONS:  None immediately appreciated.      DISCUSSION:  Audra Bennett is a 64y.o.-year-old female who requires central IV access vascular access. The history and physical examination were reviewed and confirmed. CONSENT: Unable to be obtained due to the emergent nature of this procedure. PROCEDURE:  A timeout was initiated by the bedside nurse and was confirmed by those present. The patient was placed in a supine position. The skin overlying the Right Femoral Vein was prepped with chlorhexadine and draped in sterile fashion. The skin was infiltrated with local anesthetic. The vessel and surrounding anatomy was visualized using ultrasound. Through the anesthetized region, the introducer needle was inserted into the femoral vein returning dark red non pulsatile blood. A guidewire was placed through the center of the needle with no resistance. Ultrasound confirmed presence of wire in the vein. A small incision made in the skin with a #11 scalpel blade. The dilator was inserted into the skin and vein over guidewire using Seldinger technique. The dilator was then removed and the 7F 20cm catheter was placed in the vein over the guidewire using Seldinger technique. The guidewire was then removed and all ports aspirated and flushed appropriately. The catheter then secured using silk suture and a temporary sterile dressing was applied. No immediate complication was evident. All sponge, instrument and needle counts were correct at the completion of the procedure. The patient tolerated the procedure well with no immediate complication evident.      Carmita Nice MD  3:08 PM, 9/29/21

## 2021-09-29 NOTE — CONSULTS
[facility] Cardiology Cardiology    Consult / H&P               Today's Date: 9/29/2021  Patient Name: Lenny Kumar  Date of admission: 9/29/2021 12:59 AM  Patient's age: 64 y. o., 1960  Admission Dx: Respiratory distress [R06.03]  Viral pneumonia [J12.9]  Acute respiratory failure with hypoxia and hypercapnia ([facility] [address] [J96.01, J96.02]    Reason for Consult:  Cardiac evaluation    Requesting Physician: Vaughn Lei MD    CHIEF COMPLAINT:  AMS     History Obtained From:  Patient CHART REVIEW     HISTORY OF PRESENT ILLNESS:      The patient is a 64 y.o. With past m eical history of copd,cad s/p stent rca   Presented with altered mental status secondary to acute hypoxic hypercapnic respiratory failure. Initial ABG showing PCO2 of 106.2 with pH of 6.9  Patient was altered on arrival of EMS. proBNP 2497, tropes trended 20 -. EKG ws showing sinus tachycardia       Cardiology has been consulted for tropes elevation. Past Medical History:   has a past medical history of Asthma, Blood circulation, collateral, CAD (coronary artery disease), COPD (chronic obstructive pulmonary disease) ([facility] Diabetes mellitus ([facility] [address] Hyperlipidemia, Hypertension, Movement disorder, Neuropathy, and PAD (peripheral artery disease) ([facility] [facility] Past Surgical History:   has a past surgical history that includes Coronary angioplasty with stent; Tonsillectomy; Inner ear surgery (Right); and Coronary angioplasty with stent (02/11/2019). Home Medications:    Prior to Admission medications    Medication Sig Start Date End Date Taking?  Authorizing Provider   atorvastatin (LIPITOR) 40 MG tablet Take 1 tablet by mouth daily 7/27/21   Adeline Goldmann, MD   lisinopril (PRINIVIL;ZESTRIL) 40 MG tablet Take 1 tablet by mouth daily 7/27/21   Adeline Goldmann, MD   Dulaglutide (TRULICITY) 1.5 GN/5.7VT SOPN Inject 1.5 mg into the skin once a week    Historical Provider, MD   aspirin 81 MG chewable tablet Take 1 tablet by mouth daily 2/13/19 BINDU Cunningham CNP   nitroGLYCERIN (NITROSTAT) 0.4 MG SL tablet up to max of 3 total doses. If no relief after 1 dose, call 911. 2/12/19   BINDU Cunningham CNP   metoprolol tartrate (LOPRESSOR) 25 MG tablet Take 0.5 tablets by mouth 2 times daily 2/12/19   BINDU Cunningham CNP   ticagrelor (BRILINTA) 90 MG TABS tablet Take 1 tablet by mouth 2 times daily 2/12/19   BINDU Cunningham CNP   Lancets MISC 1 each by Does not apply route daily 2/12/19   Todd Avalos MD   glipiZIDE (GLUCOTROL) 5 MG tablet Take 1 tablet by mouth 2 times daily 2/12/19   Todd Avalos MD   metFORMIN (GLUCOPHAGE) 1000 MG tablet Take 1 tablet by mouth 2 times daily (with meals) 2/12/19   Todd Avalos MD   blood glucose monitor strips Test 3 times a day & as needed for symptoms of irregular blood glucose. 2/12/19   Todd Avalos MD   glucose monitoring kit (FREESTYLE) monitoring kit 1 kit by Does not apply route daily 2/12/19   Todd Avalos MD   albuterol (PROVENTIL) (5 MG/ML) 0.5% nebulizer solution Take 0.5 mLs by nebulization every 6 hours as needed for Wheezing 3/16/18   Genie Leiva MD   tiotropium (SPIRIVA) 18 MCG inhalation capsule Inhale 1 capsule into the lungs daily Pt told that she was using this medication at home. 3/16/18   Genie Leiva MD   albuterol-ipratropium (COMBIVENT RESPIMAT)  MCG/ACT AERS inhaler Inhale 1 puff into the lungs every 6 hours 3/16/18   Genie Leiva MD   acetaminophen (TYLENOL) 500 MG tablet Take 1 tablet by mouth every 6 hours as needed for Pain 1/9/18   Sherryle Bonus, MD        Allergies:  Codeine and Morphine    Social History:   reports that she has been smoking cigarettes. She has a 22.50 pack-year smoking history. She uses smokeless tobacco. She reports current drug use. Drug: Marijuana. She reports that she does not drink alcohol.      Family History: family history includes Diabetes in her brother and sister; Hypertension in her father and mother; Stroke in her brother. No h/o sudden cardiac death. No for premature CAD    REVIEW OF SYSTEMS:    Constitutional: there has been no unanticipated weight loss. There's been No change in energy level, No change in activity level. Eyes: No visual changes or diplopia. No scleral icterus. ENT: No Headaches  Cardiovascular: As written above  Respiratory: No previous pulmonary problems, No cough  Gastrointestinal: No abdominal pain. No change in bowel or bladder habits. Genitourinary: No dysuria, trouble voiding, or hematuria. Musculoskeletal:  No gait disturbance, No weakness or joint complaints. Integumentary: No rash or pruritis. Neurological: No headache, diplopia, change in muscle strength, numbness or tingling. No change in gait, balance, coordination, mood, affect, memory, mentation, behavior. Psychiatric: No anxiety, or depression. Endocrine: No temperature intolerance. No excessive thirst, fluid intake, or urination. No tremor. Hematologic/Lymphatic: No abnormal bruising or bleeding, blood clots or swollen lymph nodes. Allergic/Immunologic: No nasal congestion or hives. PHYSICAL EXAM:      BP (!) 84/57   Pulse 109   Temp 98.2 °F (36.8 °C) (Oral)   Resp (!) 40   Ht 5' 9\" (1.753 m)   Wt 231 lb 4.2 oz (104.9 kg)   SpO2 96%   BMI 34.15 kg/m²    Constitutional and General Appearance: alert, cooperative, no distress and appears stated age  HEENT: PERRL, no cervical lymphadenopathy. No masses palpable. Normal oral mucosa  Respiratory:  Normal excursion and expansion without use of accessory muscles  Resp Auscultation: Good respiratory effort. No for increased work of breathing. On auscultation: clear to auscultation bilaterally  Cardiovascular:   The apical impulse is not displaced  Heart tones are crisp and normal. regular S1 and S2.  Jugular venous pulsation Normal  The carotid upstroke is normal in amplitude and contour without delay or bruit  Peripheral pulses are symmetrical and full   Abdomen:   No masses or tenderness  Bowel sounds present  Extremities:   No Cyanosis or Clubbing   Lower extremity edema: No   Skin: Warm and dry  Neurological:  Alert and oriented. Moves all extremities well  No abnormalities of mood, affect, memory, mentation, or behavior are noted    DATA:    Diagnostics:    EKG: sinus tachy  ECHO:Normal LV size and wall thickness. No obvious wall motion abnormality seen. Normal LV systolic function with LVEF >55%. Normal RV size and function. RV systolic pressure  LA appears mildly dilated and RA appears normal in size. MAC, Calcified leaflets. Reduced mobility. Mild AS mean gradient 6 mmHg,  Mild MR      Labs:     CBC:   Recent Labs     09/29/21  0115   WBC 15.3*   HGB 13.2   HCT 46.2        BMP:   Recent Labs     09/29/21  0115 09/29/21  0203     --    K 3.8  --    CO2 14*  --    BUN 18  --    CREATININE 1.49* 1.69*   LABGLOM 36* 31*   GLUCOSE 271*  --      BNP: No results for input(s): BNP in the last 72 hours. PT/INR: No results for input(s): PROTIME, INR in the last 72 hours. APTT:  Recent Labs     09/29/21  0620   APTT 18.0*     CARDIAC ENZYMES:No results for input(s): CKTOTAL, CKMB, CKMBINDEX, TROPONINI in the last 72 hours. FASTING LIPID PANEL:  Lab Results   Component Value Date    HDL 35 06/16/2020    LDLDIRECT 123 06/02/2012    TRIG 152 06/16/2020     LIVER PROFILE:No results for input(s): AST, ALT, LABALBU in the last 72 hours.     IMPRESSION:    Patient Active Problem List   Diagnosis    SOB (shortness of breath)    Chronic bronchitis (HCC)    Chest pain    Tobacco abuse    Non morbid obesity    Lung nodule    ACS (acute coronary syndrome) (HCC)    Essential hypertension    S/P drug eluting coronary stent placement - Mid RCA () 2/11/19 (Dr. Johanna Loredo)    Gastroenteritis    HARVEY (acute kidney injury) ([facility]    High anion gap metabolic acidosis    Coronary artery disease involving native coronary artery of native heart without angina pectoris    Tobacco abuse counseling    Lactic acidosis    Vitamin D deficiency    Marijuana abuse    Type 2 diabetes mellitus with hyperglycemia, without long-term current use of insulin (HCC)    Respiratory distress       RECOMMENDATIONS:  Impression  NSTEMI type 2 however with history oif JOSE MARTIN to RCA ,type 1 cannot be rulked out  HARVEY likely secondary to prerenal azootemia   HTN  DM  JOSE MARTIN to RCA 2019   Acute hypoxic hypercapniec respiratory failure   Rhinovirus positive     Plan :  Continue heparin gtt and aspirin for now . Will get echo and further workp later . Yolanda Ahn MD  Internal Medicine Resident, PGY2  [address] [place] [address], [facility]  9/29/2021,1:29 PM       I have reviewed the case / procedure with resident / fellow  I have examined the patient personally  Patient agree with treatment plan as discussed before, final arrangement based on my evaluation and exam.    Risk and benefit of procedure planned were explained in details. Procedure was performed by me personally, with all aspect of the procedure being done using standard protocol. Note was modified based on my own assessment and treatment.     Evelyn Cuello MD  [facility]

## 2021-09-29 NOTE — PROGRESS NOTES
Pharmacy Note     Renal Dose Adjustment    Víctor Orosco is a 64 y.o. female. Pharmacist assessment of renally cleared medications. Recent Labs     09/29/21  0115   BUN 18       Recent Labs     09/29/21  0115 09/29/21  0203   CREATININE 1.49* 1.69*       Estimated Creatinine Clearance: 44 mL/min (A) (based on SCr of 1.69 mg/dL (H)). Estimated CrCl using Ideal Body Weight: 44 mL/min (based on IBW 68.4 kg)    Height:   Ht Readings from Last 1 Encounters:   09/29/21 5' 8\" (1.727 m)     Weight:  Wt Readings from Last 1 Encounters:   09/29/21 216 lb (98 kg)       The following medication dose has been adjusted based upon renal function per P&T Guidelines:             Famotidine 20 mg oral twice daily changed to famotidine 20 mg oral once daily.

## 2021-09-29 NOTE — Clinical Note
Patient Class: Inpatient [101]   REQUIRED: Diagnosis: Respiratory distress [186546]   Estimated Length of Stay: Estimated stay of more than 2 midnights   Admitting Provider: William Moon [[id number]]   Telemetry/Cardiac Monitoring Required?: Yes

## 2021-09-29 NOTE — ED NOTES
Pt. To the ED via LS1 c/o respiratory distress. Pt. Arrived on [facility], EMS reports that pt. Gave herself multiple breathing treatments. Pt. Is currently not responding but is following commands. Pt. Placed on full cardiac monitor.  Dr. Kristina Mckeon and Dr. Yoshi Garciaing at bedside      [facility]  09/29/21 2005

## 2021-09-29 NOTE — PROCEDURES
Insert Arterial Line  Date/Time:  09/29/21, 12:13 PM  Performed by: Christen Giron RCP    Patient identity confirmed: arm band and provided demographic data   Time out: Immediately prior to procedure a \"time out\" was called to verify the correct patient, procedure, equipment, support staff. Preparation: Patient was prepped and draped in the usual sterile fashion.     Location:left radial    Jameson's test normal: yes  Needle gauge: 20     Number of attempts: 1  Post-procedure: transparent dressing applied and line secured    Patient tolerance: well

## 2021-09-29 NOTE — PROGRESS NOTES
Patient admitted on Mechanical Ventilator Protocol. Patients height measured at 69\" for an IBW 66.2kg    Patient placed on the ventilator on settings as charted on flowsheeet. Ventilator Bronchodilator assessment    Breath sounds: End expiratory wheezes throughout/ extensive smoking HX and COPD  Inspiratory Pressure: 27  Plateau Pressure: UTO pt coughing and breathing over frequently    Patient assessed at level 3          [x]    Bronchodilator Assessment    BRONCHODILATOR ASSESSMENT SCORE  Score 0 (Home) 1 2 3 4   Breath Sounds   []  Chronic Ventilator: Patient at baseline []  Mild Wheezes/ Clear []  Intermittent wheezes with good air entry [x]  Bilateral/unilateral wheezing with diminished air entry []  Insp/Exp wheeze and/or poor aeration   Ventilator Pressures   []  Chronic Ventilator []  Insp. Pressure less than 25 cm H20 []  Insp. Pressure less than 25 cm H20 [x]  Insp. Pressure exceeds 25 cm H20 []  Insp.  Pressure exceeds 30 cm H20   Plateau Pressure []  NA   []  Plateau Pressure less than 4  []  Plateau Pressure less than or equal to 5 [x]  Plateau Pressure greater than or equal to 6 []  Plateau Pressure greater than or equal to 8       [employer], [facility]  5:36 AM

## 2021-09-29 NOTE — FLOWSHEET NOTE
SPIRITUAL CARE DEPARTMENT - [facility]  PROGRESS NOTE    Shift date: 9/28/2021  Shift day: Tuesday   Shift # 3    Room # ED19  Name: Sujatha Cordoba            Age: 64 y.o. Gender: female          Evangelical: Unknown  Place of [employer]: Unknown    Referral: ED Alert    Admit Date & Time: 9/29/2021 12:59 AM    PATIENT/EVENT DESCRIPTION:  Sujatha Cordoba is a 64 y.o. female who arrived to the ED due to \"respiratory distress. \" Patient is \"suspected to have COVID. \" Patient was \"emergently intubated,\" in the ED. SPIRITUAL ASSESSMENT/INTERVENTION:   responded to referral from ED staff, indicating that patient was \"emergently intubated. \"  attempted calls to patient's children, with no success.  left voicemail for Inderjit Butt ([phone]), with request to contact the ED main line as soon as possible for a medical update. The other two phone numbers listed for patient's children rang and included a generic voicemail.  informed ED HUC of suspected call back from patient's son. Patient was moved to ICU. SPIRITUAL CARE FOLLOW-UP PLAN:  Chaplains can assist in facilitating medical update to patient's family, if requested. Chaplains can be reached 24/7 via [employer], to offer further support and care to patient and family.      09/29/21 0308   Encounter Summary   Services provided to: Patient not available   Referral/Consult From: Nicole  (ED Alert)   Support System Children   Continue Visiting   (9/28/2021)   Complexity of Encounter Moderate   Length of Encounter 45 minutes   Spiritual Assessment Completed Yes   Routine   Type Initial   Crisis   Type ED Alert   Spiritual/Yarsani   Type Spiritual support   Assessment Unable to respond   Intervention Sustaining presence/ Ministry of presence   Outcome Did not respond     Electronically signed by Leif Irizarry on 9/29/2021 at 8:02 AM.  [employer]  [phone]

## 2021-09-29 NOTE — PROGRESS NOTES
Comprehensive Nutrition Assessment    Type and Reason for Visit:  Initial (vent)    Nutrition Recommendations/Plan: Start nutrition as able. If TF needed, suggest Peptide Based formula with goal rate of 55 mL/hr to provide 1584 kcal and 109 g pro/day. Will follow/monitor care plans. Nutrition Assessment:  Pt admitted with respiratory failure, pneumonia. PMH includes:CAD, COPD, DM, HTN, PAD. Pt is currently intubated. No nutrition at present. Meds/labs reviewed. Malnutrition Assessment:  Malnutrition Status: At risk for malnutrition     Context:  Chronic Illness     Findings of the 6 clinical characteristics of malnutrition:  Energy Intake:  Unable to assess  Weight Loss:  No significant weight loss     Body Fat Loss:  No significant body fat loss     Muscle Mass Loss:  No significant muscle mass loss    Fluid Accumulation:  1 - Mild Generalized   Strength:  Not Performed    Estimated Daily Nutrient Needs:  Energy (kcal):  1500 kcal/day; Weight Used for Energy Requirements:  Current     Protein (g):  100 g pro/day; Weight Used for Protein Requirements:  Ideal (1.5)        Fluid (ml/day):  2200 mL/day; Method Used for Fluid Requirements:  ml/Kg (28 x Adj. BW)      Nutrition Related Findings:  meds/labs reviewed      Wounds:  None       Current Nutrition Therapies:    Diet NPO  Additional Calorie Sources:   none    Anthropometric Measures:  · Height: 5' 9\" (175.3 cm)  · Current Body Weight: 231 lb 4.2 oz (104.9 kg)   · Admission Body Weight: 231 lb 4.2 oz (104.9 kg)    · Usual Body Weight: 216 lb (98 kg) (7/25/21)     · Ideal Body Weight: 145 lbs; % Ideal Body Weight 159.5 %   · BMI: 34.1  · BMI Categories: Obese Class 1 (BMI 30.0-34. 9)       Nutrition Diagnosis:   · Inadequate oral intake related to impaired respiratory function as evidenced by NPO or clear liquid status due to medical condition    Nutrition Interventions:   Food and/or Nutrient Delivery:  Start nutrition as able.  If TF needed, suggest Peptide Based formula with goal rate of 55 mL/hr to [id number] kcal and 109 g pro/day. Nutrition Education/Counseling:  No recommendation at this time   Coordination of Nutrition Care:  Continue to monitor while inpatient    Goals:  meet % of estimated nutrient needs-goal set        Nutrition Monitoring and Evaluation:   Behavioral-Environmental Outcomes:  None Identified   Food/Nutrient Intake Outcomes:  Diet Advancement/Tolerance  Physical Signs/Symptoms Outcomes:  Biochemical Data, Nutrition Focused Physical Findings, Skin, Weight     Discharge Planning:     Too soon to determine     Electronically signed by Yen Breaux RD, LD on 9/29/21 at 2:43 PM EDT    Contact: [phone]

## 2021-09-29 NOTE — ED PROVIDER NOTES
[facility] ED  Emergency Department Encounter  Emergency Medicine Resident     Pt Name: Víctor Orosco  MRN: [id number]  [place] 1960  Date of evaluation: 9/29/21  PCP:  Francisco Johnson MD    [address]       Chief Complaint   Patient presents with    Respiratory Distress       [facility]  (Location/Symptom, Timing/Onset, Context/Setting, Quality, Duration, Modifying Factors,Severity.)      Víctor Orosco is a 64 y.o. female of COPD who presents with respiratory distress. Patient was brought in by EMS after family called when they found her tripoding and struggling to breathe. Patient was hypoxic in the 60s and hypotensive on initial examination. Patient was given albuterol, Solu-Medrol and magnesium in route to the hospital.  She was also placed on CPAP. On arrival patient is in acute distress, nonverbal and not following commands. PAST MEDICAL / SURGICAL / SOCIAL / FAMILY HISTORY      has a past medical history of Asthma, Blood circulation, collateral, CAD (coronary artery disease), COPD (chronic obstructive pulmonary disease) ([facility] Diabetes mellitus ([facility] Hyperlipidemia, Hypertension, Movement disorder, Neuropathy, and PAD (peripheral artery disease) ([facility] has a past surgical history that includes Coronary angioplasty with stent; Tonsillectomy; Inner ear surgery (Right); and Coronary angioplasty with stent (02/11/2019). Social History     Socioeconomic History    Marital status:       Spouse name: Not on file    Number of children: Not on file    Years of education: Not on file    Highest education level: Not on file   Occupational History    Not on file   Tobacco Use    Smoking status: Current Every Day Smoker     Packs/day: 0.50     Years: 45.00     Pack years: 22.50     Types: Cigarettes    Smokeless tobacco: Current User   Substance and Sexual Activity    Alcohol use: No    Drug use: Yes     Types: Marijuana    Sexual activity: Never   Other Topics Concern    Not on file   Social History Narrative    Not on file     Social Determinants of Health     Financial Resource Strain:     Difficulty of Paying Living Expenses:    Food Insecurity:     Worried About Running Out of Food in the Last Year:     [address] in the Last Year:    Transportation Needs:     Lack of Transportation (Medical):  Lack of Transportation (Non-Medical):    Physical Activity:     Days of Exercise per Week:     Minutes of Exercise per Session:    Stress:     Feeling of Stress :    Social Connections:     Frequency of Communication with Friends and Family:     Frequency of Social Gatherings with Friends and Family:     Attends [employer] Services:     Active Member of Clubs or Organizations:     Attends Club or Organization Meetings:     Marital Status:    Intimate Partner Violence:     Fear of Current or Ex-Partner:     Emotionally Abused:     Physically Abused:     Sexually Abused:        Family History   Problem Relation Age of Onset    Hypertension Mother     Hypertension Father     Diabetes Sister     Diabetes Brother     Stroke Brother         Allergies:  Codeine and Morphine    Home Medications:  Prior to Admission medications    Medication Sig Start Date End Date Taking? Authorizing Provider   atorvastatin (LIPITOR) 40 MG tablet Take 1 tablet by mouth daily 7/27/21   Joanie Vargas MD   lisinopril (PRINIVIL;ZESTRIL) 40 MG tablet Take 1 tablet by mouth daily 7/27/21   Joanie Vargas MD   Dulaglutide (TRULICITY) 1.5 RO/8.1BF SOPN Inject 1.5 mg into the skin once a week    Historical Provider, MD   aspirin 81 MG chewable tablet Take 1 tablet by mouth daily 2/13/19   BINDU Bull CNP   nitroGLYCERIN (NITROSTAT) 0.4 MG SL tablet up to max of 3 total doses.  If no relief after 1 dose, call 911. 2/12/19   BINDU Bull CNP   metoprolol tartrate (LOPRESSOR) 25 MG tablet Take 0.5 tablets by mouth 2 times daily 2/12/19   BINDU Shea CNP   ticagrelor (BRILINTA) 90 MG TABS tablet Take 1 tablet by mouth 2 times daily 2/12/19   BINDU Shea CNP   Lancets MISC 1 each by Does not apply route daily 2/12/19   Zion Samaniego MD   glipiZIDE (GLUCOTROL) 5 MG tablet Take 1 tablet by mouth 2 times daily 2/12/19   Zion Samaniego MD   metFORMIN (GLUCOPHAGE) 1000 MG tablet Take 1 tablet by mouth 2 times daily (with meals) 2/12/19   Zion Samaniego MD   blood glucose monitor strips Test 3 times a day & as needed for symptoms of irregular blood glucose. 2/12/19   Zion Samaniego MD   glucose monitoring kit (FREESTYLE) monitoring kit 1 kit by Does not apply route daily 2/12/19   Zion Samaniego MD   albuterol (PROVENTIL) (5 MG/ML) 0.5% nebulizer solution Take 0.5 mLs by nebulization every 6 hours as needed for Wheezing 3/16/18   Annie Serrano MD   tiotropium (SPIRIVA) 18 MCG inhalation capsule Inhale 1 capsule into the lungs daily Pt told that she was using this medication at home. 3/16/18   Annie Serrano MD   albuterol-ipratropium (COMBIVENT RESPIMAT)  MCG/ACT AERS inhaler Inhale 1 puff into the lungs every 6 hours 3/16/18   Annie Serrano MD   acetaminophen (TYLENOL) 500 MG tablet Take 1 tablet by mouth every 6 hours as needed for Pain 1/9/18   Garrett Bhatia MD       REVIEW OFSYSTEMS    (2-9 systems for level 4, 10 or more for level 5)      Review of Systems   Unable to perform ROS: Acuity of condition       PHYSICAL EXAM   (up to 7 for level 4, 8 or more forlevel 5)      INITIAL VITALS:   ED Triage Vitals   BP Temp Temp Source Pulse Resp SpO2 Height Weight   09/29/21 0107 09/29/21 0108 09/29/21 0108 09/29/21 0104 09/29/21 0104 09/29/21 0108 -- 09/29/21 0104   (!) 157/115 97.9 °F (36.6 °C) Axillary 154 29 99 %  216 lb (98 kg)       Physical Exam  Constitutional:       General: She is in acute distress. Appearance: She is ill-appearing and toxic-appearing.    HENT: Head: Normocephalic and atraumatic. Right Ear: External ear normal.      Left Ear: External ear normal.      Mouth/Throat:      Mouth: Mucous membranes are dry. Pharynx: Oropharynx is clear. Eyes:      Extraocular Movements: Extraocular movements intact. Pupils: Pupils are equal, round, and reactive to light. Cardiovascular:      Rate and Rhythm: Regular rhythm. Tachycardia present. Pulses: Normal pulses. Heart sounds: Normal heart sounds. Pulmonary:      Effort: Respiratory distress present. Comments: Patient in respiratory distress. Tachypneic with use of accessory muscles. Diffusely diminished breath sounds bilaterally with intermittent, scant wheezing. Abdominal:      General: There is distension. Palpations: Abdomen is soft. Musculoskeletal:      Cervical back: Normal range of motion and neck supple. Right lower leg: Edema present. Left lower leg: Edema present. Skin:     Capillary Refill: Capillary refill takes 2 to 3 seconds. Coloration: Skin is pale. Comments: Pale and ashen appearance. Skin mottling diffusely. Neurological:      General: No focal deficit present. Comments: Patient is nonverbal, but following commands. No focal deficits.          DIFFERENTIAL  DIAGNOSIS     PLAN (LABS / IMAGING / EKG):  Orders Placed This Encounter   Procedures    COVID-19, Rapid    Respiratory Panel, Molecular, with COVID-19 (Restricted: peds pts or suitable admitted adults)    Culture, Urine    Culture, Blood 1    Culture, Blood 2    XR CHEST PORTABLE    XR CHEST PORTABLE    XR ABDOMEN FOR NG/OG/NE TUBE PLACEMENT    CBC Auto Differential    Basic Metabolic Panel w/ Reflex to MG    Troponin    Brain Natriuretic Peptide    Lactic Acid, Plasma    ELECTROLYTES PLUS    Hemoglobin and hematocrit, blood    CALCIUM, IONIC (POC)    Troponin    ELECTROLYTES PLUS    Hemoglobin and hematocrit, blood    CALCIUM, IONIC (POC)    Blood Gas, Arterial    Urinalysis with Microscopic    Beta-Hydroxybutyrate    Troponin    CBC auto differential    Comprehensive Metabolic Panel    Magnesium    Phosphorus    APTT    LACTIC ACID, WHOLE BLOOD    Troponin    Procalcitonin    Diet NPO    NG/OG Large Bore Tube Insertion    Nurse to Enter X-ray Order for Tube Placement After Insertion    NG/NE/OG Tube Assessment    Strict intake and output    Enteric Tube Flushes    Telemetry monitoring - continuous duration    Vital signs per unit routine    Daily weights    Intake and output    Place intermittent pneumatic compression device    Telemetry monitoring - continuous duration    Notify physician    Strict Bedrest    Elevate Head of Bed     Insert indwelling urinary catheter    Discontinue indwelling urinary catheter when documented indications no longer apply per hospital policy    HYPOGLYCEMIA TREATMENT: blood glucose less than 50 mg/dL and patient  ALERT and TOLERATING PO    HYPOGLYCEMIA TREATMENT: blood glucose less than 70 mg/dL and patient ALERT and TOLERATING PO    HYPOGLYCEMIA TREATMENT: blood glucose less than 70 mg/dL and patient NOT ALERT or NPO    Full Code    Inpatient consult to Critical Care    End Tidal CO2 Continuous    ABG draw    Pulse oximetry, continuous    Spontaneous Breathing Trial (SBT)    Initiate Oxygen Therapy Protocol    Initiate RT Adult Mechanical Ventilation Protocol    Mechanical Ventilation with default initial settings    Initiate RT Adult Ventilator Aerosol Protocol    Respiratory Care Evaluation and Treat    Venous Blood Gas, POC    Creatinine W/GFR Point of Care    POCT urea (BUN)    Lactic Acid, POC    POCT Glucose    POC Blood Gas    POC Blood Gas    Venous Blood Gas, POC    Creatinine W/GFR Point of Care    POCT urea (BUN)    Lactic Acid, POC    POCT Glucose    POCT Glucose    EKG 12 Lead    EKG 12 Lead    Insert/Change Shook Catheter    PATIENT STATUS (FROM ED OR OR/PROCEDURAL) Inpatient    Restraints non-violent or non-self-destructive       MEDICATIONS ORDERED:  Orders Placed This Encounter   Medications    ipratropium-albuterol (DUONEB) nebulizer solution 1 ampule     Order Specific Question:   Initiate RT Bronchodilator Protocol     Answer: Yes    LORazepam (ATIVAN) injection 1 mg    nitroGLYCERIN 200-5 MCG/ML-% infusion     SANDRA DEWITT: cabinet override    propofol 1000 MG/100ML injection     SANDRA DEWITT: cabinet override    DISCONTD: albuterol (PROVENTIL) nebulizer solution 2.5 mg     Order Specific Question:   Initiate RT Bronchodilator Protocol     Answer: Yes    DISCONTD: ipratropium-albuterol (DUONEB) nebulizer solution 1 ampule     Order Specific Question:   Initiate RT Bronchodilator Protocol     Answer: Yes    propofol injection    etomidate (AMIDATE) injection 30 mg    succinylcholine (ANECTINE) injection 150 mg    cefTRIAXone (ROCEPHIN) 1000 mg IVPB in 50 mL D5W minibag     Order Specific Question:   Antimicrobial Indications     Answer:   Pneumonia (CAP)    azithromycin (ZITHROMAX) 500 mg in dextrose 5% 250 mL IVPB     Order Specific Question:   Antimicrobial Indications     Answer:   Pneumonia (CAP)    sodium chloride flush 0.9 % injection 5-40 mL    sodium chloride flush 0.9 % injection 5-40 mL    0.9 % sodium chloride infusion    OR Linked Order Group     ondansetron (ZOFRAN-ODT) disintegrating tablet 4 mg     ondansetron (ZOFRAN) injection 4 mg    polyethylene glycol (GLYCOLAX) packet 17 g    OR Linked Order Group     acetaminophen (TYLENOL) tablet 650 mg     acetaminophen (TYLENOL) suppository 650 mg    DISCONTD: famotidine (PEPCID) tablet 20 mg    DISCONTD: heparin (porcine) injection 5,000 Units    albuterol (PROVENTIL) nebulizer solution 2.5 mg     Order Specific Question:   Initiate RT Bronchodilator Protocol     Answer:    Yes    albuterol (PROVENTIL) nebulizer solution 2.5 mg     Order Specific Question: components within normal limits   BRAIN NATRIURETIC PEPTIDE - Abnormal; Notable for the following components:    Pro-BNP 2,497 (*)     All other components within normal limits   LACTIC ACID, PLASMA - Abnormal; Notable for the following components:    Lactic Acid, Whole Blood 8.4 (*)     All other components within normal limits   HGB/HCT - Abnormal; Notable for the following components:    POC Hemoglobin 16.3 (*)     POC Hematocrit 48 (*)     All other components within normal limits   TROPONIN - Abnormal; Notable for the following components:    Troponin, High Sensitivity 72 (*)     All other components within normal limits   ELECTROLYTES PLUS - Abnormal; Notable for the following components:    POC Potassium 5.2 (*)     All other components within normal limits   HGB/HCT - Abnormal; Notable for the following components:    POC Hematocrit 47 (*)     All other components within normal limits   URINALYSIS WITH MICROSCOPIC - Abnormal; Notable for the following components:    Protein, UA 2+ (*)     All other components within normal limits   PROCALCITONIN - Abnormal; Notable for the following components:    Procalcitonin 0.09 (*)     All other components within normal limits   VENOUS BLOOD GAS, POINT OF CARE - Abnormal; Notable for the following components:    pH, Adolph 6.840 (*)     pCO2, Adolph 121.1 (*)     pO2, Adolph 28.8 (*)     HCO3, Venous 20.7 (*)     Negative Base Excess, Adolph 17 (*)     O2 Sat, Adolph 21 (*)     All other components within normal limits   CREATININE W/GFR POINT OF CARE - Abnormal; Notable for the following components:    POC Creatinine 1.51 (*)     GFR Comment 43 (*)     GFR Non- 36 (*)     All other components within normal limits   LACTIC ACID,POINT OF CARE - Abnormal; Notable for the following components:    POC Lactic Acid 12.07 (*)     All other components within normal limits   POCT GLUCOSE - Abnormal; Notable for the following components:    POC Glucose 313 (*)     All other components within normal limits   VENOUS BLOOD GAS, POINT OF CARE - Abnormal; Notable for the following components:    pH, Adolph 6.990 (*)     pCO2, Adolph 106.6 (*)     pO2, Adolph 28.9 (*)     Negative Base Excess, Adolph 9 (*)     O2 Sat, Adolph 28 (*)     All other components within normal limits   CREATININE W/GFR POINT OF CARE - Abnormal; Notable for the following components:    POC Creatinine 1.69 (*)     GFR Comment 38 (*)     GFR Non- 31 (*)     All other components within normal limits   LACTIC ACID,POINT OF CARE - Abnormal; Notable for the following components:    POC Lactic Acid 3.57 (*)     All other components within normal limits   POCT GLUCOSE - Abnormal; Notable for the following components:    POC Glucose 373 (*)     All other components within normal limits   COVID-19, RAPID   CULTURE, URINE   CULTURE, BLOOD 1   CULTURE, BLOOD 2   ELECTROLYTES PLUS   CALCIUM, IONIC (POC)   CALCIUM, IONIC (POC)   BETA-HYDROXYBUTYRATE   BLOOD GAS, ARTERIAL   TROPONIN   CBC WITH AUTO DIFFERENTIAL   COMPREHENSIVE METABOLIC PANEL   MAGNESIUM   PHOSPHORUS   APTT   APTT   APTT   LACTIC ACID, WHOLE BLOOD   TROPONIN   TROPONIN   TROPONIN   UREA N (POC)   UREA N (POC)   POC BLOOD GAS   POC BLOOD GAS         RADIOLOGY:  XR CHEST PORTABLE    Result Date: 9/29/2021  EXAMINATION: ONE XRAY VIEW OF THE CHEST 9/29/2021 3:16 am COMPARISON: Earlier same day HISTORY: ORDERING SYSTEM PROVIDED HISTORY: s/p intubation TECHNOLOGIST PROVIDED HISTORY: s/p intubation Reason for Exam: upr,et placement FINDINGS: An endotracheal tube terminates 6 cm above the [place]. An enteric tube courses to the abdomen, beyond the field of view. Diffuse bilateral airspace opacities are unchanged. There is no pleural effusion or pneumothorax. 1. Endotracheal tube terminating 6 cm above the [place]. 2. Persistent diffuse bilateral airspace opacities suggestive of an atypical bronchopneumonia.      XR CHEST PORTABLE    Multifocal airspace opacities concerning for an atypical bronchopneumonia. COVID-19 pneumonia should be excluded. XR ABDOMEN FOR NG/OG/NE TUBE PLACEMENT    Result Date: 2021  EXAMINATION: ONE SUPINE XRAY VIEW(S) OF THE ABDOMEN 2021 4:34 am COMPARISON: 2020 HISTORY: ORDERING SYSTEM PROVIDED HISTORY: NG/OG tube placement TECHNOLOGIST PROVIDED HISTORY: NG/OG tube placement Portable? ->Yes Reason for Exam: supine FINDINGS: An enteric tube terminates in the body of the stomach. The side port is distal to the GE junction. Enteric tube terminating in the body of the stomach. The side port is distal to the GE junction. EKG    EKG Interpretation    Interpreted by emergency department physician    Rhythm: sinus tachycardia  Rate: 150-160  Axis: normal  Ectopy: none  Conduction: OR 82ms  ST Segments: normal  T Waves: normal  Q Waves: none    Clinical Impression: Tachycardia with short OR intervals    Wally Mart MD    All EKG's are interpreted by the Emergency Department Physicianwho either signs or Co-signs this chart in the absence of a cardiologist.    EMERGENCY DEPARTMENT COURSE:  ED Course as of Sep 29 0511   Wed Sep 29, 2021   0210 Patient continues to be severely tachypneic in the 30s to 40s. Has altered mental status and is pulling at lines and tubes. Attempting to remove her BiPAP mask. Will intubate patient for airway protection and impending respiratory failure. [GG]   0247 Repeat lactic acid decreased to 3.57 from 8.4 after treatment with BiPAP. This lactate was likely elevated due to patient's respiratory distress and not sepsis. Additionally, patient's BNP was elevated with clinical finding of bilateral lower extremity edema, concerning for some element of CHF. Therefore, patient was not given 30 cc/kg fluid bolus.     [GG]      ED Course User Index  [GG] Wally Mart MD          PROCEDURES:    PROCEDURE NOTE - EMERGENCY INTUBATION    PATIENT NAME: Fred Gray RECORD NO. [id number]  DATE: 9/29/2021  ATTENDING PHYSICIAN: Dr. Shruthi Haley DIAGNOSIS:  Acute Respiratory Failure  POSTOPERATIVE DIAGNOSIS:  Same  PROCEDURE PERFORMED:  Emergency endotracheal intubation  PERFORMING PHYSICIAN: Keely Gutierrez MD    MEDICATIONS: etomidate intravenously and succinycholine intravenously    DISCUSSION:  Heaven Henao is a 64y.o.-year-old female who requires intubation and ventilatory support due to impending respiratory failure and airway protection. The history and physical examination were reviewed and confirmed. CONSENT: Unable to be obtained due to the emergent nature of this procedure. PROCEDURE:  A timeout was initiated by the bedside nurse and was confirmed by those present. The patient was placed in the appropriate position. Cricoid pressure was not required. Intubation was performed by direct laryngoscopy using a laryngoscope and a 7.5 cuffed endotracheal tube. The cuff was then inflated and the tube was secured appropriately at a distance of 23 cm to the dental ridge. Initial confirmation of placement included bilateral breath sounds, an end tidal CO2 detector, absence of sounds over the stomach, tube fogging, adequate chest rise, adequate pulse oximetry reading and improved skin color. A chest x-ray to verify correct placement of the tube showed the tube needed advancing and the tube was repositioned accordingly. The patient tolerated the procedure well. COMPLICATIONS:  None     Keely Gutierrez MD  5:11 AM, 9/29/21    CONSULTS:  IP CONSULT TO CRITICAL CARE    CRITICAL CARE:  Please see attending note    FINAL IMPRESSION      1. Acute respiratory failure with hypoxia and hypercapnia (HCC)    2. Viral pneumonia          DISPOSITION / PLAN     DISPOSITION Admitted 09/29/2021 03:18:25 AM      PATIENT REFERRED TO:  No follow-up provider specified.     DISCHARGE MEDICATIONS:  New Prescriptions    No medications on file       Keely Gutierrez MD  Emergency Medicine Resident    (Please note that portions of this note were completed with a voice recognition program.Efforts were made to edit the dictations but occasionally words are mis-transcribed.)       Toby Mendoza MD  Resident  09/29/21 [id number]

## 2021-09-29 NOTE — H&P
Critical Care - History and Physical Examination    Patient's name:  [address] Record Number: [id number]  Patient's account/billing number: [de-identified]  Patient's YOB: 1960  Age: 64 y.o. Date of Admission: 9/29/2021 12:59 AM  Date of History and Physical Examination: 9/29/2021      Primary Care Physician: Malka Alicia MD  Attending Physician: Dr. Valle Score Status: Full Code    Chief complaint:   Chief Complaint   Patient presents with    Respiratory Distress         HISTORY OF PRESENT ILLNESS:      History was obtained from chart review. Gabi Ascencio is a 64 y.o. with PMH of   -asthma/COPD  -CAD angioplasty with stent placement(2/11/2019)  -Diabetes mellitus  -Hyperlipidemia  -Hypertension  -PAD    Presented to ER with chief complaints of respiratory distress. Patient was brought in by EMS after family called when they found her tripoding and struggling to breathe. Patient was hypoxic in the 60s and hypotensive on initial examination. Patient was given albuterol, Solu-Medrol and magnesium in route to the hospital.  She was placed on CPAP. On arrival she was in acute distress nonverbal and not following commands. Rapid Covid test was negative. Admission ABG showed pH of 6.990, PCO2 of 106.6, HCO3 of 25.7 and Lactic of 8.4. She has elevated anion gap of 24, hyperkalemia 5.2, creatinine at 1.49      Patient was intubated as she had altered mental status and continued to be hypoxic.  she is sedated with 30 of propofol. PRVC: Rate 18, tidal volume 500, PIP 37, PEEP 8      Upon admission, pt was AMS,  Hypertensive BP-157/115, tachycardic-133 and afebrile. proBNP was 2497, troponins elevated at 72 from 20. Repeat EKG ordered and troponins are trending. Repeat troponins at 265. Glucose is at 271. WBC is elevated to 15.3. Creatinine is at 1.49.   PTT is at 18      Xray showed   Persistent diffuse bilateral airspace opacities suggestive of an atypical bronchopneumonia. ABG this morning:   pH 7.305, PCO2 47.6, PO2-514.7, HCO3 23.7        PAST MEDICAL HISTORY:         Diagnosis Date    Asthma     Blood circulation, collateral     CAD (coronary artery disease)     COPD (chronic obstructive pulmonary disease) (HCC)     Diabetes mellitus (HCC)     Hyperlipidemia     Hypertension     Movement disorder     B/L torn rotator cuff.  Neuropathy     PAD (peripheral artery disease) ([address]          PAST SURGICAL HISTORY:         Procedure Laterality Date    CORONARY ANGIOPLASTY WITH STENT PLACEMENT      x2    CORONARY ANGIOPLASTY WITH STENT PLACEMENT  02/11/2019    JOSE MARTIN to RCA per Dr. Alaniz Agent radial approuch    INNER EAR SURGERY Right     TONSILLECTOMY         ALLERGIES:      Allergies   Allergen Reactions    Codeine     Morphine          HOME MEDS: :      Prior to Admission medications    Medication Sig Start Date End Date Taking? Authorizing Provider   atorvastatin (LIPITOR) 40 MG tablet Take 1 tablet by mouth daily 7/27/21   Rafaela Santos MD   lisinopril (PRINIVIL;ZESTRIL) 40 MG tablet Take 1 tablet by mouth daily 7/27/21   Rafaela Santos MD   Dulaglutide (TRULICITY) 1.5 UX/7.0PY SOPN Inject 1.5 mg into the skin once a week    Historical Provider, MD   aspirin 81 MG chewable tablet Take 1 tablet by mouth daily 2/13/19   Naheed Reaves, APRN - CNP   nitroGLYCERIN (NITROSTAT) 0.4 MG SL tablet up to max of 3 total doses.  If no relief after 1 dose, call 911. 2/12/19   Naheed Reaves, APRN - CNP   metoprolol tartrate (LOPRESSOR) 25 MG tablet Take 0.5 tablets by mouth 2 times daily 2/12/19   Naheed Reaves, APRN - CNP   ticagrelor (BRILINTA) 90 MG TABS tablet Take 1 tablet by mouth 2 times daily 2/12/19   Naheed Reaves, APRN - CNP   Lancets MISC 1 each by Does not apply route daily 2/12/19   Jodee Caputo MD   glipiZIDE (GLUCOTROL) 5 MG tablet Take 1 tablet by mouth 2 times daily 2/12/19   Jodee Caputo MD   metFORMIN (GLUCOPHAGE) 1000 MG tablet Take 1 tablet by mouth 2 times daily (with meals) 19   Rasheed Knapp MD   blood glucose monitor strips Test 3 times a day & as needed for symptoms of irregular blood glucose. 19   Rasheed Knapp MD   glucose monitoring kit (FREESTYLE) monitoring kit 1 kit by Does not apply route daily 19   Rasheed Knapp MD   albuterol (PROVENTIL) (5 MG/ML) 0.5% nebulizer solution Take 0.5 mLs by nebulization every 6 hours as needed for Wheezing 3/16/18   Nadine Piña MD   tiotropium (SPIRIVA) 18 MCG inhalation capsule Inhale 1 capsule into the lungs daily Pt told that she was using this medication at home. 3/16/18   Nadine Piña MD   albuterol-ipratropium (COMBIVENT RESPIMAT)  MCG/ACT AERS inhaler Inhale 1 puff into the lungs every 6 hours 3/16/18   Nadine Piña MD   acetaminophen (TYLENOL) 500 MG tablet Take 1 tablet by mouth every 6 hours as needed for Pain 18   Henry Hdez MD       SOCIAL HISTORY:       TOBACCO:   reports that she has been smoking cigarettes. She has a 22.50 pack-year smoking history. She uses smokeless tobacco.  ETOH:   reports no history of alcohol use. DRUGS:  reports current drug use. Drug: Marijuana. FAMILY HISTORY:          Problem Relation Age of Onset    Hypertension Mother     Hypertension Father     Diabetes Sister     Diabetes Brother     Stroke Brother        REVIEW OF SYSTEMS (ROS):     Cannot be obtained because of patient's condition.   OBJECTIVE:     VITAL SIGNS:  BP (!) 157/115   Pulse 133   Temp 97.9 °F (36.6 °C) (Axillary)   Resp (!) 31   Wt 216 lb (98 kg)   SpO2 99%   BMI 32.84 kg/m²   Tmax over 24 hours:  Temp (24hrs), Av.9 °F (36.6 °C), Min:97.9 °F (36.6 °C), Max:97.9 °F (36.6 °C)      Patient Vitals for the past 8 hrs:   BP Temp Temp src Pulse Resp SpO2 Weight   21 0235    133 (!) 31 99 %    21 0200     (!) 36 99 %    21 0140     (!) 34 100 %    21 0123     (!) 36     09/29/21 0108  97.9 °F (36.6 °C) Axillary   99 %    09/29/21 0107 (!) 157/115         09/29/21 0104    154 29  216 lb (98 kg)       No intake or output data in the 24 hours ending 09/29/21 0325    Wt Readings from Last 3 Encounters:   09/29/21 216 lb (98 kg)   07/27/21 216 lb 6.4 oz (98.2 kg)   05/08/21 210 lb (95.3 kg)     Body mass index is 32.84 kg/m². PHYSICAL EXAM:  Constitutional: intubated, sedated  Respiratory: diminished breath sounds bilaterally with scattered wheezes. Cardiovascular: regular rhythm, tachycardia. normal S1, S2, no murmur noted and 2+ pulses throughout  Abdomen: soft, distended. NEUROLOGIC: pt is intubated and sedated. Extremities:  peripheral pulses normal, bilateral pedal edema,.       MEDICATIONS:  Scheduled Meds:   ipratropium-albuterol  1 ampule Inhalation Q4H WA    cefTRIAXone (ROCEPHIN) IV  1,000 mg IntraVENous Once    azithromycin  500 mg IntraVENous Once    sodium chloride flush  5-40 mL IntraVENous 2 times per day    famotidine  20 mg Oral BID    heparin (porcine)  5,000 Units SubCUTAneous 3 times per day     Continuous Infusions:   nitroGLYCERIN Stopped (09/29/21 0303)    propofol 30 mcg/kg/min (09/29/21 0259)    sodium chloride       PRN Meds:   albuterol, 2.5 mg, Q6H PRN  ipratropium-albuterol, 1 ampule, Q4H PRN  sodium chloride flush, 5-40 mL, PRN  sodium chloride, 25 mL, PRN  ondansetron, 4 mg, Q8H PRN   Or  ondansetron, 4 mg, Q6H PRN  polyethylene glycol, 17 g, Daily PRN  acetaminophen, 650 mg, Q6H PRN   Or  acetaminophen, 650 mg, Q6H PRN          ABGs:   Lab Results   Component Value Date    FIO2 NOT REPORTED 09/29/2021       DATA:  Complete Blood Count:   Recent Labs     09/29/21  0115   WBC 15.3*   RBC 4.96   HGB 13.2   HCT 46.2   MCV 93.1   MCH 26.6   MCHC 28.6   RDW 15.7*      MPV 11.2        Last 3 Blood Glucose:   Recent Labs     09/29/21  0115   GLUCOSE 271*        PT/INR:    Lab Results   Component Value Date    PROTIME 9.7 11/20/2016    PROTIME 9.9 06/01/2012    INR 0.9 11/20/2016     PTT:    Lab Results   Component Value Date    APTT 33.7 02/11/2019       Comprehensive Metabolic Profile:   Recent Labs     09/29/21  0101 09/29/21  0115 09/29/21  0203   NA  --  136  --    K  --  3.8  --    CL  --  98  --    CO2  --  14*  --    BUN  --  18  --    CREATININE 1.51* 1.49* 1.69*   GLUCOSE  --  271*  --    CALCIUM  --  9.0  --       Magnesium:   Lab Results   Component Value Date    MG 1.7 07/26/2021    MG 2.1 06/01/2012    MG 2.2 06/01/2012     Phosphorus:   Lab Results   Component Value Date    PHOS 2.2 07/26/2021     Ionized Calcium: No results found for: RODNEY     Urinalysis:   Lab Results   Component Value Date    NITRU NEGATIVE 09/29/2021    COLORU Yellow 09/29/2021    PHUR 6.5 09/29/2021    WBCUA 2 TO 5 09/29/2021    RBCUA 2 TO 5 09/29/2021    MUCUS NOT REPORTED 09/29/2021    TRICHOMONAS NOT REPORTED 09/29/2021    YEAST NOT REPORTED 09/29/2021    BACTERIA NOT REPORTED 09/29/2021    SPECGRAV 1.012 09/29/2021    LEUKOCYTESUR NEGATIVE 09/29/2021    UROBILINOGEN Normal 09/29/2021    BILIRUBINUR NEGATIVE 09/29/2021    BILIRUBINUR NEGATIVE 06/01/2012    GLUCOSEU NEGATIVE 09/29/2021    GLUCOSEU NEGATIVE 06/01/2012    KETUA NEGATIVE 09/29/2021    AMORPHOUS NOT REPORTED 09/29/2021       HgBA1c:    Lab Results   Component Value Date    LABA1C 9.2 02/10/2019     TSH:    Lab Results   Component Value Date    TSH 1.24 06/16/2020       Lactic Acid:   Lab Results   Component Value Date    LACTA NOT REPORTED 09/29/2021      Troponin: No results for input(s): TROPONINI in the last 72 hours. ASSESSMENT:     Active Problems:    Respiratory distress  Resolved Problems:    * No resolved hospital problems. *      PLAN:     ICU PROPHYLAXIS:  Stress ulcer:  [x] PPI Agent  [] [id number] [] Sucralfate  [] Other:  VTE:   [] Enoxaparin  [] Unfract.  Heparin Subcut  [] EPC Cuffs    NUTRITION:  [x] NPO  [] Tube Feeding ) [] TPN  [] PO    CONSULTATION NEEDED:  [] No   [x] Yes     HOME MEDICATIONS RECONCILED: [x] No  [] Yes    FAMILY UPDATED:    [] No   [] Yes     ADDITIONAL PLAN:  -  -  -Acute hypoxic and hypercapnic respiratory failure:  Covid negative  Viral panel ordered-rhino enterovirus detected. CXR:   Persistent diffuse bilateral airspace opacities suggestive of an atypical   bronchopneumonia. Proventil nebulizer  DuoNeb nebulizer  Solu-Medrol injection 40 milligram IV daily    Septic shock due to unknown etiology:  On Levophed 2 mcg  On fentanyl 20 mcg  Urine Analysis, culture ordered  Blood culture ordered  On ceftriaxone 1000 mg IV. Elevated troponins concerning for NSTEMI type 2:  Troponins trending 20-> 72->265, Repeat EKG ordered. On heparin drip  On aspirin  on atorvastatin. Cardio consulted. Echo ordered. Diabetes mellitus type 2  Hyperglycemia-373 -164  Patient on sliding scale insulin. HARVEY likely secondary to prerenal azotemia. Creatinine 1.49  BUN 18  Lactic acid is decreased from 8.4-2.7. Continue IV fluids    GI Prophylaxis: Pepcid injection 20 mg IV daily    Karly Capellan MD,  ICU resident   [facility]  9/29/2021 3:25 AM    The critical care team assigned to the patient will be following up the patient in the intensive care unit. I have discussed the current plan with the critical care attending. The above mentioned assessment and plan will be reviewed again in detail by the critical care attending at bedside, and can be further changed or modified accordingly by the attending physician.

## 2021-09-29 NOTE — VCC REMOTE MONITORING
Spoke with primary RN Aleah regarding Sepsis bundle.     Thank you,      Sandy Prater, BSN RN  [address] [facility]   Callback # [phone]

## 2021-09-29 NOTE — CARE COORDINATION
Case Management Initial Discharge Plan  Elli Rodriguez,         Met with:patient's daughter to discuss discharge plans. Information verified: address, contacts, phone number, , insurance Yes  Insurance Provider: [employer]    Emergency Contact/Next of Kin name & number: pt's daughter Dimple Chiang [phone] and pt's son Bipin Amin [phone]  Who are involved in patient's support system? Pt's daughter    PCP: Mor Salomon MD  Date of last visit: within the last month or two      Discharge Planning    Living Arrangements:  Children, Family Members     Home has 1 story  4 stairs to climb to get into front door    Patient able to perform ADL's:Independent    Current Services (outpatient & in home) none  DME equipment: glucometer, nebulizer  DME provider: n/a    Is patient receiving oral anticoagulation therapy? No    If indicated:   Physician managing anticoagulation treatment: n/a  Where does patient obtain lab work for ATC treatment? n/a      Potential Assistance Needed:  [address], Durable Medical Equipment    Patient agreeable to home care: will determine as pt progresses  Freedom of choice provided:  n/a    Prior SNF/Rehab Placement and Facility: none  Agreeable to SNF/Rehab: Will determine as pt progresses  Freedom of choice provided: n/a     Evaluation: n/a    Expected Discharge date:  10/04/21    Patient expects to be discharged to: If home: is the family and/or caregiver wiling & able to provide support at home? yes  Who will be providing this support? daughter    Follow Up Appointment: Best Day/ Time:      Transportation provider: family  Transportation arrangements needed for discharge:     Readmission Risk              Risk of Unplanned Readmission:  29             Does patient have a readmission risk score greater than 14?: Yes  If yes, follow-up appointment must be made within 7 days of discharge. Goals of Care:        Will education pt and pt's family on transitional options as pt progresses      Discharge Plan:  Intubated/ sedation 30% PEEP of 5, Goal is Home, monitor for needs        Electronically signed by Mirian Rodriguez RN on 9/29/21 at 7:30 PM EDT

## 2021-09-30 LAB
ABSOLUTE EOS #: 0 K/UL (ref 0–0.4)
ABSOLUTE IMMATURE GRANULOCYTE: 0 K/UL (ref 0–0.3)
ABSOLUTE LYMPH #: 0.92 K/UL (ref 1–4.8)
ABSOLUTE MONO #: 0.69 K/UL (ref 0.1–0.8)
ALBUMIN SERPL-MCNC: 3.3 G/DL (ref 3.5–5.2)
ALBUMIN/GLOBULIN RATIO: 1.2 (ref 1–2.5)
ALLEN TEST: ABNORMAL
ALP BLD-CCNC: 97 U/L (ref 35–104)
ALT SERPL-CCNC: 46 U/L (ref 5–33)
ANION GAP SERPL CALCULATED.3IONS-SCNC: 8 MMOL/L (ref 9–17)
AST SERPL-CCNC: 27 U/L
BASOPHILS # BLD: 0 % (ref 0–2)
BASOPHILS ABSOLUTE: 0 K/UL (ref 0–0.2)
BILIRUB SERPL-MCNC: 0.22 MG/DL (ref 0.3–1.2)
BUN BLDV-MCNC: 26 MG/DL (ref 6–20)
BUN/CREAT BLD: ABNORMAL (ref 9–20)
CALCIUM SERPL-MCNC: 8.2 MG/DL (ref 8.6–10.4)
CHLORIDE BLD-SCNC: 107 MMOL/L (ref 98–107)
CO2: 23 MMOL/L (ref 20–31)
CREAT SERPL-MCNC: 1.43 MG/DL (ref 0.5–0.9)
CULTURE: ABNORMAL
CULTURE: NO GROWTH
DIFFERENTIAL TYPE: ABNORMAL
DIRECT EXAM: ABNORMAL
EKG ATRIAL RATE: 110 BPM
EKG ATRIAL RATE: 112 BPM
EKG ATRIAL RATE: 125 BPM
EKG ATRIAL RATE: 130 BPM
EKG ATRIAL RATE: 155 BPM
EKG P AXIS: 19 DEGREES
EKG P AXIS: 40 DEGREES
EKG P AXIS: 50 DEGREES
EKG P AXIS: 57 DEGREES
EKG P-R INTERVAL: 150 MS
EKG P-R INTERVAL: 156 MS
EKG P-R INTERVAL: 164 MS
EKG P-R INTERVAL: 82 MS
EKG Q-T INTERVAL: 296 MS
EKG Q-T INTERVAL: 320 MS
EKG Q-T INTERVAL: 356 MS
EKG Q-T INTERVAL: 368 MS
EKG Q-T INTERVAL: 414 MS
EKG QRS DURATION: 76 MS
EKG QRS DURATION: 78 MS
EKG QRS DURATION: 78 MS
EKG QRS DURATION: 80 MS
EKG QRS DURATION: 86 MS
EKG QTC CALCULATION (BAZETT): 404 MS
EKG QTC CALCULATION (BAZETT): 481 MS
EKG QTC CALCULATION (BAZETT): 514 MS
EKG QTC CALCULATION (BAZETT): 541 MS
EKG QTC CALCULATION (BAZETT): 597 MS
EKG R AXIS: 45 DEGREES
EKG R AXIS: 73 DEGREES
EKG R AXIS: 77 DEGREES
EKG R AXIS: 80 DEGREES
EKG R AXIS: 82 DEGREES
EKG T AXIS: -179 DEGREES
EKG T AXIS: -7 DEGREES
EKG T AXIS: 180 DEGREES
EKG T AXIS: 22 DEGREES
EKG T AXIS: 44 DEGREES
EKG VENTRICULAR RATE: 110 BPM
EKG VENTRICULAR RATE: 112 BPM
EKG VENTRICULAR RATE: 125 BPM
EKG VENTRICULAR RATE: 130 BPM
EKG VENTRICULAR RATE: 155 BPM
EOSINOPHILS RELATIVE PERCENT: 0 % (ref 1–4)
FIO2: 30
GFR AFRICAN AMERICAN: 46 ML/MIN
GFR NON-AFRICAN AMERICAN: 38 ML/MIN
GFR SERPL CREATININE-BSD FRML MDRD: ABNORMAL ML/MIN/{1.73_M2}
GFR SERPL CREATININE-BSD FRML MDRD: ABNORMAL ML/MIN/{1.73_M2}
GLUCOSE BLD-MCNC: 123 MG/DL (ref 65–105)
GLUCOSE BLD-MCNC: 138 MG/DL (ref 70–99)
GLUCOSE BLD-MCNC: 138 MG/DL (ref 74–100)
GLUCOSE BLD-MCNC: 170 MG/DL (ref 65–105)
GLUCOSE BLD-MCNC: 188 MG/DL (ref 65–105)
GLUCOSE BLD-MCNC: 188 MG/DL (ref 65–105)
HCT VFR BLD CALC: 36.5 % (ref 36.3–47.1)
HEMOGLOBIN: 11.6 G/DL (ref 11.9–15.1)
IMMATURE GRANULOCYTES: 0 %
LV EF: 44 %
LVEF MODALITY: NORMAL
LYMPHOCYTES # BLD: 4 % (ref 24–44)
Lab: ABNORMAL
Lab: NORMAL
MAGNESIUM: 2.4 MG/DL (ref 1.6–2.6)
MCH RBC QN AUTO: 27 PG (ref 25.2–33.5)
MCHC RBC AUTO-ENTMCNC: 31.8 G/DL (ref 28.4–34.8)
MCV RBC AUTO: 85.1 FL (ref 82.6–102.9)
MODE: ABNORMAL
MONOCYTES # BLD: 3 % (ref 1–7)
MORPHOLOGY: ABNORMAL
NEGATIVE BASE EXCESS, ART: ABNORMAL (ref 0–2)
NRBC AUTOMATED: 0 PER 100 WBC
O2 DEVICE/FLOW/%: ABNORMAL
PARTIAL THROMBOPLASTIN TIME: 49.5 SEC (ref 20.5–30.5)
PARTIAL THROMBOPLASTIN TIME: 55.7 SEC (ref 20.5–30.5)
PARTIAL THROMBOPLASTIN TIME: 58 SEC (ref 20.5–30.5)
PATIENT TEMP: ABNORMAL
PDW BLD-RTO: 15.8 % (ref 11.8–14.4)
PHOSPHORUS: 3.5 MG/DL (ref 2.6–4.5)
PLATELET # BLD: 252 K/UL (ref 138–453)
PLATELET ESTIMATE: ABNORMAL
PMV BLD AUTO: 10.8 FL (ref 8.1–13.5)
POC HCO3: 25.1 MMOL/L (ref 21–28)
POC O2 SATURATION: 95 % (ref 94–98)
POC PCO2 TEMP: ABNORMAL MM HG
POC PCO2: 44.4 MM HG (ref 35–48)
POC PH TEMP: ABNORMAL
POC PH: 7.36 (ref 7.35–7.45)
POC PO2 TEMP: ABNORMAL MM HG
POC PO2: 78.9 MM HG (ref 83–108)
POSITIVE BASE EXCESS, ART: 0 (ref 0–3)
POTASSIUM SERPL-SCNC: 4.1 MMOL/L (ref 3.7–5.3)
RBC # BLD: 4.29 M/UL (ref 3.95–5.11)
RBC # BLD: ABNORMAL 10*6/UL
SAMPLE SITE: ABNORMAL
SEG NEUTROPHILS: 93 % (ref 36–66)
SEGMENTED NEUTROPHILS ABSOLUTE COUNT: 21.39 K/UL (ref 1.8–7.7)
SODIUM BLD-SCNC: 138 MMOL/L (ref 135–144)
SPECIMEN DESCRIPTION: ABNORMAL
SPECIMEN DESCRIPTION: NORMAL
TCO2 (CALC), ART: ABNORMAL MMOL/L (ref 22–29)
TOTAL PROTEIN: 6.1 G/DL (ref 6.4–8.3)
WBC # BLD: 23 K/UL (ref 3.5–11.3)
WBC # BLD: ABNORMAL 10*3/UL

## 2021-09-30 PROCEDURE — 93010 ELECTROCARDIOGRAM REPORT: CPT | Performed by: INTERNAL MEDICINE

## 2021-09-30 PROCEDURE — 37799 UNLISTED PX VASCULAR SURGERY: CPT

## 2021-09-30 PROCEDURE — 6360000002 HC RX W HCPCS: Performed by: STUDENT IN AN ORGANIZED HEALTH CARE EDUCATION/TRAINING PROGRAM

## 2021-09-30 PROCEDURE — 82803 BLOOD GASES ANY COMBINATION: CPT

## 2021-09-30 PROCEDURE — 6370000000 HC RX 637 (ALT 250 FOR IP): Performed by: STUDENT IN AN ORGANIZED HEALTH CARE EDUCATION/TRAINING PROGRAM

## 2021-09-30 PROCEDURE — 94640 AIRWAY INHALATION TREATMENT: CPT

## 2021-09-30 PROCEDURE — 6360000002 HC RX W HCPCS: Performed by: HEALTH CARE PROVIDER

## 2021-09-30 PROCEDURE — 93306 TTE W/DOPPLER COMPLETE: CPT

## 2021-09-30 PROCEDURE — 89220 SPUTUM SPECIMEN COLLECTION: CPT

## 2021-09-30 PROCEDURE — 99291 CRITICAL CARE FIRST HOUR: CPT | Performed by: INTERNAL MEDICINE

## 2021-09-30 PROCEDURE — 2580000003 HC RX 258: Performed by: STUDENT IN AN ORGANIZED HEALTH CARE EDUCATION/TRAINING PROGRAM

## 2021-09-30 PROCEDURE — 84100 ASSAY OF PHOSPHORUS: CPT

## 2021-09-30 PROCEDURE — 94761 N-INVAS EAR/PLS OXIMETRY MLT: CPT

## 2021-09-30 PROCEDURE — 85730 THROMBOPLASTIN TIME PARTIAL: CPT

## 2021-09-30 PROCEDURE — 94003 VENT MGMT INPAT SUBQ DAY: CPT

## 2021-09-30 PROCEDURE — 93970 EXTREMITY STUDY: CPT

## 2021-09-30 PROCEDURE — 83735 ASSAY OF MAGNESIUM: CPT

## 2021-09-30 PROCEDURE — 93356 MYOCRD STRAIN IMG SPCKL TRCK: CPT

## 2021-09-30 PROCEDURE — 85025 COMPLETE CBC W/AUTO DIFF WBC: CPT

## 2021-09-30 PROCEDURE — 2500000003 HC RX 250 WO HCPCS: Performed by: STUDENT IN AN ORGANIZED HEALTH CARE EDUCATION/TRAINING PROGRAM

## 2021-09-30 PROCEDURE — 6370000000 HC RX 637 (ALT 250 FOR IP): Performed by: INTERNAL MEDICINE

## 2021-09-30 PROCEDURE — 80053 COMPREHEN METABOLIC PANEL: CPT

## 2021-09-30 PROCEDURE — 2700000000 HC OXYGEN THERAPY PER DAY

## 2021-09-30 PROCEDURE — 6370000000 HC RX 637 (ALT 250 FOR IP)

## 2021-09-30 PROCEDURE — 2000000000 HC ICU R&B

## 2021-09-30 PROCEDURE — 82947 ASSAY GLUCOSE BLOOD QUANT: CPT

## 2021-09-30 RX ADMIN — FAMOTIDINE 20 MG: 10 INJECTION INTRAVENOUS at 08:23

## 2021-09-30 RX ADMIN — SODIUM CHLORIDE, PRESERVATIVE FREE 10 ML: 5 INJECTION INTRAVENOUS at 08:24

## 2021-09-30 RX ADMIN — ASPIRIN 81 MG: 81 TABLET, CHEWABLE ORAL at 08:23

## 2021-09-30 RX ADMIN — Medication 125 MCG/HR: at 18:33

## 2021-09-30 RX ADMIN — Medication 8 MG/HR: at 10:23

## 2021-09-30 RX ADMIN — INSULIN GLARGINE 15 UNITS: 100 INJECTION, SOLUTION SUBCUTANEOUS at 21:07

## 2021-09-30 RX ADMIN — SODIUM CHLORIDE, PRESERVATIVE FREE 10 ML: 5 INJECTION INTRAVENOUS at 21:09

## 2021-09-30 RX ADMIN — IPRATROPIUM BROMIDE AND ALBUTEROL SULFATE 1 AMPULE: .5; 3 SOLUTION RESPIRATORY (INHALATION) at 11:54

## 2021-09-30 RX ADMIN — ALBUTEROL SULFATE 2.5 MG: 2.5 SOLUTION RESPIRATORY (INHALATION) at 23:48

## 2021-09-30 RX ADMIN — INSULIN LISPRO 3 UNITS: 100 INJECTION, SOLUTION INTRAVENOUS; SUBCUTANEOUS at 17:51

## 2021-09-30 RX ADMIN — IPRATROPIUM BROMIDE AND ALBUTEROL SULFATE 1 AMPULE: .5; 3 SOLUTION RESPIRATORY (INHALATION) at 19:58

## 2021-09-30 RX ADMIN — PHENYLEPHRINE HYDROCHLORIDE 75 MCG/MIN: 10 INJECTION INTRAVENOUS at 08:30

## 2021-09-30 RX ADMIN — HEPARIN SODIUM AND DEXTROSE 16.2 UNITS/KG/HR: 10000; 5 INJECTION INTRAVENOUS at 20:44

## 2021-09-30 RX ADMIN — IPRATROPIUM BROMIDE AND ALBUTEROL SULFATE 1 AMPULE: .5; 3 SOLUTION RESPIRATORY (INHALATION) at 15:10

## 2021-09-30 RX ADMIN — METHYLPREDNISOLONE SODIUM SUCCINATE 40 MG: 125 INJECTION, POWDER, FOR SOLUTION INTRAMUSCULAR; INTRAVENOUS at 08:23

## 2021-09-30 RX ADMIN — Medication 8 MG/HR: at 22:58

## 2021-09-30 RX ADMIN — ALBUTEROL SULFATE 2.5 MG: 2.5 SOLUTION RESPIRATORY (INHALATION) at 09:50

## 2021-09-30 RX ADMIN — ALBUTEROL SULFATE 2.5 MG: 2.5 SOLUTION RESPIRATORY (INHALATION) at 03:21

## 2021-09-30 RX ADMIN — DESMOPRESSIN ACETATE 40 MG: 0.2 TABLET ORAL at 08:23

## 2021-09-30 RX ADMIN — Medication 125 MCG/HR: at 02:40

## 2021-09-30 RX ADMIN — Medication 125 MCG/HR: at 10:35

## 2021-09-30 RX ADMIN — INSULIN LISPRO 3 UNITS: 100 INJECTION, SOLUTION INTRAVENOUS; SUBCUTANEOUS at 13:01

## 2021-09-30 RX ADMIN — HEPARIN SODIUM AND DEXTROSE 14.2 UNITS/KG/HR: 10000; 5 INJECTION INTRAVENOUS at 02:41

## 2021-09-30 RX ADMIN — CEFTRIAXONE SODIUM 1000 MG: 1 INJECTION, POWDER, FOR SOLUTION INTRAMUSCULAR; INTRAVENOUS at 04:00

## 2021-09-30 RX ADMIN — SODIUM CHLORIDE, POTASSIUM CHLORIDE, SODIUM LACTATE AND CALCIUM CHLORIDE: 600; 310; 30; 20 INJECTION, SOLUTION INTRAVENOUS at 02:40

## 2021-09-30 RX ADMIN — IPRATROPIUM BROMIDE AND ALBUTEROL SULFATE 1 AMPULE: .5; 3 SOLUTION RESPIRATORY (INHALATION) at 07:36

## 2021-09-30 RX ADMIN — INSULIN LISPRO 2 UNITS: 100 INJECTION, SOLUTION INTRAVENOUS; SUBCUTANEOUS at 21:07

## 2021-09-30 RX ADMIN — METHYLPREDNISOLONE SODIUM SUCCINATE 40 MG: 125 INJECTION, POWDER, FOR SOLUTION INTRAMUSCULAR; INTRAVENOUS at 21:32

## 2021-09-30 ASSESSMENT — PULMONARY FUNCTION TESTS
PIF_VALUE: 31
PIF_VALUE: 28
PIF_VALUE: 25
PIF_VALUE: 22
PIF_VALUE: 17
PIF_VALUE: 28
PIF_VALUE: 28
PIF_VALUE: 17
PIF_VALUE: 17
PIF_VALUE: 28
PIF_VALUE: 16
PIF_VALUE: 24
PIF_VALUE: 27
PIF_VALUE: 28
PIF_VALUE: 26
PIF_VALUE: 12
PIF_VALUE: 30
PIF_VALUE: 28
PIF_VALUE: 31
PIF_VALUE: 30
PIF_VALUE: 28
PIF_VALUE: 28
PIF_VALUE: 29
PIF_VALUE: 28
PIF_VALUE: 29
PIF_VALUE: 28
PIF_VALUE: 26
PIF_VALUE: 28
PIF_VALUE: 13
PIF_VALUE: 31
PIF_VALUE: 28
PIF_VALUE: 24
PIF_VALUE: 28
PIF_VALUE: 27

## 2021-09-30 NOTE — PROGRESS NOTES
[facility] Cardiology Cardiology    Consult / H&P               Today's Date: 9/30/2021  Patient Name: Elli Rodriguez  Date of admission: 9/29/2021 12:59 AM  Patient's age: 64 y. o., 1960  Admission Dx: Respiratory distress [R06.03]  Viral pneumonia [J12.9]  Acute respiratory failure with hypoxia and hypercapnia ([facility] [J96.01, J96.02]    Reason for Consult:  Cardiac evaluation    Requesting Physician: Marietta Canela MD    CHIEF COMPLAINT:  AMS     History Obtained From:   CHART REVIEW     Subjective  Patient continues to be intubated, did not follow any commands for me. Echocardiogram pending  Patient needs to be on pressor support  Troponin trend 265-> 282-> 237  EKG from yesterday consistent with A. fib with heart rate in 130s. Patient is already on heparin drip. This morning patient was in sinus tachycardia    HISTORY OF PRESENT ILLNESS:    The patient is a 64y.o.-year-old female with past medical history of COPD, CAD s/p RCA stent placed in 2019. Patient was brought in by EMS after family noticed that she was struggling to breathe, was found to be hypoxic in 62s and hypotensive. Patient was placed on BiPAP, was given IV Solu-Medrol magnesium and was brought in to the hospital.  Patient was intubated in the hospital as she continued to be hypoxic and altered. Initial troponins 20> 72 -> 265 so cardiology was consulted. Past Medical History:   has a past medical history of Asthma, Blood circulation, collateral, CAD (coronary artery disease), COPD (chronic obstructive pulmonary disease) ([facility] Diabetes mellitus ([facility] [address] [facility] Hyperlipidemia, Hypertension, Movement disorder, Neuropathy, and PAD (peripheral artery disease) ([facility] Past Surgical History:   has a past surgical history that includes Coronary angioplasty with stent; Tonsillectomy; Inner ear surgery (Right); and Coronary angioplasty with stent (02/11/2019).      Home Medications:    Prior to Admission medications    Medication Sig Start Date End Date Taking? Authorizing Provider   atorvastatin (LIPITOR) 40 MG tablet Take 1 tablet by mouth daily 7/27/21   Fernando Frazier MD   lisinopril (PRINIVIL;ZESTRIL) 40 MG tablet Take 1 tablet by mouth daily 7/27/21   Fernando Frazier MD   Dulaglutide (TRULICITY) 1.5 TA/2.4ZX SOPN Inject 1.5 mg into the skin once a week    Historical Provider, MD   aspirin 81 MG chewable tablet Take 1 tablet by mouth daily 2/13/19   BINDU Rubio CNP   nitroGLYCERIN (NITROSTAT) 0.4 MG SL tablet up to max of 3 total doses. If no relief after 1 dose, call 911. 2/12/19   BINDU Rubio CNP   metoprolol tartrate (LOPRESSOR) 25 MG tablet Take 0.5 tablets by mouth 2 times daily 2/12/19   BINDU Rubio CNP   ticagrelor (BRILINTA) 90 MG TABS tablet Take 1 tablet by mouth 2 times daily 2/12/19   BINDU Rubio CNP   Lancets MISC 1 each by Does not apply route daily 2/12/19   Israel Finch MD   glipiZIDE (GLUCOTROL) 5 MG tablet Take 1 tablet by mouth 2 times daily 2/12/19   Israel Finch MD   metFORMIN (GLUCOPHAGE) 1000 MG tablet Take 1 tablet by mouth 2 times daily (with meals) 2/12/19   Israel Finch MD   blood glucose monitor strips Test 3 times a day & as needed for symptoms of irregular blood glucose. 2/12/19   Israel Finch MD   glucose monitoring kit (FREESTYLE) monitoring kit 1 kit by Does not apply route daily 2/12/19   Israel Finch MD   albuterol (PROVENTIL) (5 MG/ML) 0.5% nebulizer solution Take 0.5 mLs by nebulization every 6 hours as needed for Wheezing 3/16/18   Keven Banerjee MD   tiotropium (SPIRIVA) 18 MCG inhalation capsule Inhale 1 capsule into the lungs daily Pt told that she was using this medication at home.  3/16/18   Keven Banerjee MD   albuterol-ipratropium (COMBIVENT RESPIMAT)  MCG/ACT AERS inhaler Inhale 1 puff into the lungs every 6 hours 3/16/18   Keven Banerjee MD   acetaminophen (TYLENOL) 500 MG tablet Take 1 tablet by mouth every 6 hours as needed for Pain 1/9/18   Jennifer Remy MD        Allergies:  Codeine and Morphine    Social History:   reports that she has been smoking cigarettes. She has a 22.50 pack-year smoking history. She uses smokeless tobacco. She reports current drug use. Drug: Marijuana. She reports that she does not drink alcohol. Family History: family history includes Diabetes in her brother and sister; Hypertension in her father and mother; Stroke in her brother. No h/o sudden cardiac death. No for premature CAD    REVIEW OF SYSTEMS:    Constitutional: there has been no unanticipated weight loss. There's been No change in energy level, No change in activity level. Eyes: No visual changes or diplopia. No scleral icterus. ENT: No Headaches  Cardiovascular: As written above  Respiratory: No previous pulmonary problems, No cough  Gastrointestinal: No abdominal pain. No change in bowel or bladder habits. Genitourinary: No dysuria, trouble voiding, or hematuria. Musculoskeletal:  No gait disturbance, No weakness or joint complaints. Integumentary: No rash or pruritis. Neurological: No headache, diplopia, change in muscle strength, numbness or tingling. No change in gait, balance, coordination, mood, affect, memory, mentation, behavior. Psychiatric: No anxiety, or depression. Endocrine: No temperature intolerance. No excessive thirst, fluid intake, or urination. No tremor. Hematologic/Lymphatic: No abnormal bruising or bleeding, blood clots or swollen lymph nodes. Allergic/Immunologic: No nasal congestion or hives. PHYSICAL EXAM:      /68   Pulse 85   Temp 98.8 °F (37.1 °C) (Core)   Resp 18   Ht 5' 9\" (1.753 m)   Wt 234 lb 2.1 oz (106.2 kg)   SpO2 97%   BMI 34.57 kg/m²    Constitutional and General Appearance: alert, cooperative, no distress and appears stated age  HEENT: PERRL, no cervical lymphadenopathy. No masses palpable.  Normal oral mucosa  Respiratory:  Normal excursion and expansion without use of accessory muscles  Resp Auscultation: Good respiratory effort. No for increased work of breathing. On auscultation: clear to auscultation bilaterally  Cardiovascular: The apical impulse is not displaced  Heart tones are crisp and normal. regular S1 and S2.  Jugular venous pulsation Normal  The carotid upstroke is normal in amplitude and contour without delay or bruit  Peripheral pulses are symmetrical and full   Abdomen:   No masses or tenderness  Bowel sounds present  Extremities:   No Cyanosis or Clubbing   Lower extremity edema: No   Skin: Warm and dry  Neurological:  Alert and oriented. Moves all extremities well  No abnormalities of mood, affect, memory, mentation, or behavior are noted    DATA:    Diagnostics:    EKG: sinus tachy  ECHO:Normal LV size and wall thickness. No obvious wall motion abnormality seen. Normal LV systolic function with LVEF >55%. Normal RV size and function. RV systolic pressure  LA appears mildly dilated and RA appears normal in size. MAC, Calcified leaflets. Reduced mobility. Mild AS mean gradient 6 mmHg,  Mild MR      Labs:     CBC:   Recent Labs     09/29/21  0115 09/30/21  0508   WBC 15.3* 23.0*   HGB 13.2 11.6*   HCT 46.2 36.5    252     BMP:   Recent Labs     09/29/21  1803 09/30/21  0508    138   K 3.9 4.1   CO2 18* 23   BUN 30* 26*   CREATININE 1.82* 1.43*   LABGLOM 29* 38*   GLUCOSE 260* 138*     BNP: No results for input(s): BNP in the last 72 hours. PT/INR: No results for input(s): PROTIME, INR in the last 72 hours. APTT:  Recent Labs     09/29/21  2149 09/30/21  0508   APTT 45.9* 58.0*     CARDIAC ENZYMES:No results for input(s): CKTOTAL, CKMB, CKMBINDEX, TROPONINI in the last 72 hours.   FASTING LIPID PANEL:  Lab Results   Component Value Date    HDL 35 06/16/2020    LDLDIRECT 123 06/02/2012    TRIG 152 06/16/2020     LIVER PROFILE:  Recent Labs     09/30/21  0508   AST 27   ALT 46*   LABALBU 3.3*       IMPRESSION:    Patient Active Problem List   Diagnosis    SOB (shortness of breath)    Chronic bronchitis (HCC)    Chest pain    Tobacco abuse    Non morbid obesity    Lung nodule    ACS (acute coronary syndrome) (HCC)    Essential hypertension    S/P drug eluting coronary stent placement - Mid RCA () 2/11/19 (Dr. Pati Silva)    Gastroenteritis    HARVEY (acute kidney injury) ([facility]    High anion gap metabolic acidosis    Coronary artery disease involving native coronary artery of native heart without angina pectoris    Tobacco abuse counseling    Lactic acidosis    Vitamin D deficiency    Marijuana abuse    Type 2 diabetes mellitus with hyperglycemia, without long-term current use of insulin ([facility])    Respiratory distress       RECOMMENDATIONS:  Impression  NSTEMI type likely 2   New onset A. fib now resolved  HARVEY likely secondary to prerenal azootemia   HTN  DM  JOSE MARTIN to RCA 2019   Acute hypoxic hypercapniec respiratory failure   Rhinovirus positive     Plan :  Troponin elevation likely type II from demand ischemia from HARVEY  New onset atrial fibrillation seen on EKG yesterday however sinus tachycardia this morning. Will repeat EKG. Continue heparin drip for now. Rate controlled  Echocardiogram pending      Christiano Holley MD  PGY-3 cardiology resident  [address] [place] [address]  9/30/2021 10:23 AM        I have reviewed the case / procedure with resident / fellow  I have examined the patient personally  Patient agree with treatment plan as discussed before, final arrangement based on my evaluation and exam.    Risk and benefit of procedure planned were explained in details. Procedure was performed by me personally, with all aspect of the procedure being done using standard protocol. Note was modified based on my own assessment and treatment.     Donna Dennis MD  [facility]

## 2021-09-30 NOTE — FLOWSHEET NOTE
Assessment: Patient is a 64 y.o. female who arrived to the hospital due to \"Respiratory Distress. \" Patient remained \"intubated,\" at time of 's visit. Intervention:  visited patient per follow-up visit, as no family had reached  after calls were made and a voicemail was left.  met with patient's bedside nurse who confirmed that patient's daughter and daughter-in-law were both present in the hospital today. Family has received medical updates and have been a good support to patient. Outcome: Patient remained intubated throughout encounter. Plan: Chaplains can make follow-up visit, per request. Jayson Garay can be reached 24/7 via [employer].     Poppy Hammer     09/30/21 0209   Encounter Summary   Services provided to: Patient not available  (Nurse)   Referral/Consult From: Physician   Support System Children   Continue Visiting   (9/29/2021, pt intubated)   Complexity of Encounter Low   Length of Encounter 15 minutes   Routine   Type Follow up   Spiritual/Worship   Type Spiritual support   Assessment Unable to respond   Intervention Sustaining presence/ Ministry of presence   Outcome De-escalated

## 2021-09-30 NOTE — PLAN OF CARE
Problem: OXYGENATION/RESPIRATORY FUNCTION  Goal: Patient will maintain patent airway  Outcome: Ongoing  Intervention: POSITION PATIENT FOR MAXIMUM VENTILATORY EFFICIENCY  9/29/2021 0752 by Salvador Zapien RCP  Note: Problem: OXYGENATION/RESPIRATORY FUNCTION  Goal: Patient will maintain patent airway  Outcome: Ongoing  Goal: Patient will achieve/maintain normal respiratory rate/effort  Respiratory rate and effort will be within normal limits for the patient    Outcome: Ongoing     Problem: ASPIRATION PRECAUTIONS  Goal: Patients risk of aspiration is minimized  Outcome: Ongoing     Problem: SKIN INTEGRITY  Goal: Skin integrity is maintained or improved  Outcome: Ongoing   Goal: Patient will achieve/maintain normal respiratory rate/effort  Description: Respiratory rate and effort will be within normal limits for the patient  Outcome: Ongoing     Problem: MECHANICAL VENTILATION  Goal: Patient will maintain patent airway  Outcome: Ongoing  Goal: Oral health is maintained or improved  Outcome: Ongoing  Goal: ET tube will be managed safely  Outcome: Ongoing  Goal: Ability to express needs and understand communication  Outcome: Ongoing  Goal: Mobility/activity is maintained at optimum level for patient  Outcome: Ongoing     Problem: SKIN INTEGRITY  Goal: Skin integrity is maintained or improved  Outcome: Ongoing     Problem: Non-Violent Restraints  Goal: Removal from restraints as soon as assessed to be safe  Outcome: Ongoing  Goal: No harm/injury to patient while restraints in use  Outcome: Ongoing  Goal: Patient's dignity will be maintained  Outcome: Ongoing     Problem: Discharge Planning:  Goal: Participates in care planning  Description: Participates in care planning  Outcome: Ongoing  Goal: Discharged to appropriate level of care  Description: Discharged to appropriate level of care  Outcome: Ongoing  Goal: Ability to perform activities of daily living will improve  Description: Ability to perform activities of daily living will improve  Outcome: Ongoing     Problem: Airway Clearance - Ineffective:  Goal: Ability to maintain a clear airway will improve  Description: Ability to maintain a clear airway will improve  Outcome: Ongoing     Problem: Anxiety/Stress:  Goal: Level of anxiety will decrease  Description: Level of anxiety will decrease  Outcome: Ongoing     Problem: Aspiration:  Goal: Absence of aspiration  Description: Absence of aspiration  Outcome: Ongoing     Problem:  Bowel Function - Altered:  Goal: Bowel elimination is within specified parameters  Description: Bowel elimination is within specified parameters  Outcome: Ongoing     Problem: Cardiac Output - Decreased:  Goal: Hemodynamic stability will improve  Description: Hemodynamic stability will improve  Outcome: Ongoing     Problem: Fluid Volume - Imbalance:  Goal: Absence of imbalanced fluid volume signs and symptoms  Description: Absence of imbalanced fluid volume signs and symptoms  Outcome: Ongoing     Problem: Gas Exchange - Impaired:  Goal: Levels of oxygenation will improve  Description: Levels of oxygenation will improve  Outcome: Ongoing     Problem: Mental Status - Impaired:  Goal: Mental status will be restored to baseline  Description: Mental status will be restored to baseline  Outcome: Ongoing     Problem: Nutrition Deficit:  Goal: Ability to achieve adequate nutritional intake will improve  Description: Ability to achieve adequate nutritional intake will improve  Outcome: Ongoing     Problem: Pain:  Goal: Pain level will decrease  Description: Pain level will decrease  Outcome: Ongoing  Goal: Recognizes and communicates pain  Description: Recognizes and communicates pain  Outcome: Ongoing  Goal: Control of acute pain  Description: Control of acute pain  Outcome: Ongoing  Goal: Control of chronic pain  Description: Control of chronic pain  Outcome: Ongoing     Problem: Serum Glucose Level - Abnormal:  Goal: Ability to maintain appropriate Problem: Psychomotor Activity - Altered:  Goal: Absence of psychomotor disturbance signs and symptoms  Description: Absence of psychomotor disturbance signs and symptoms  Outcome: Ongoing     Problem: Sensory Perception - Impaired:  Goal: Demonstrations of improved sensory functioning will increase  Description: Demonstrations of improved sensory functioning will increase  Outcome: Ongoing  Goal: Decrease in sensory misperception frequency  Description: Decrease in sensory misperception frequency  Outcome: Ongoing  Goal: Able to refrain from responding to false sensory perceptions  Description: Able to refrain from responding to false sensory perceptions  Outcome: Ongoing  Goal: Demonstrates accurate environmental perceptions  Description: Demonstrates accurate environmental perceptions  Outcome: Ongoing  Goal: Able to distinguish between reality-based and nonreality-based thinking  Description: Able to distinguish between reality-based and nonreality-based thinking  Outcome: Ongoing  Goal: Able to interrupt nonreality-based thinking  Description: Able to interrupt nonreality-based thinking  Outcome: Ongoing     Problem: Nutrition  Goal: Optimal nutrition therapy  9/29/2021 1450 by Fred Marsh RD, TONIA  Outcome: Ongoing

## 2021-09-30 NOTE — PROGRESS NOTES
09/30/21 0958   Vent Information   Vent Mode CPAP  (SBT start)   Pressure Support 7 cmH20   FiO2  30 %   SpO2 96 %   PEEP/CPAP 5   Vent Patient Data   Rate Measured 16 br/min   Vt Exhaled 625 mL   Minute Volume 9.4 Liters     Patient placed on SBT, minimally following commands. - eyes will open with stimulation    Cough/gag fair. Patient using abdominal/accessory muscles, Physician aware. Will continue to monitor.

## 2021-09-30 NOTE — PLAN OF CARE
Problem: OXYGENATION/RESPIRATORY FUNCTION  Goal: Patient will maintain patent airway  9/29/2021 2340 by William Ibarra RN  Outcome: Ongoing  9/29/2021 2012 by Osmani Hazel RN  Outcome: Ongoing  Goal: Patient will achieve/maintain normal respiratory rate/effort  Description: Respiratory rate and effort will be within normal limits for the patient  9/29/2021 2340 by William Ibarra RN  Outcome: Ongoing  9/29/2021 2012 by Osmani Hazel RN  Outcome: Ongoing     Problem: MECHANICAL VENTILATION  Goal: Patient will maintain patent airway  9/29/2021 2340 by William Ibarra RN  Outcome: Ongoing  9/29/2021 2012 by Osmani Hazel RN  Outcome: Ongoing  Goal: Oral health is maintained or improved  9/29/2021 2340 by William Ibarra RN  Outcome: Ongoing  9/29/2021 2012 by Osmani Hazel RN  Outcome: Ongoing  Goal: ET tube will be managed safely  9/29/2021 2340 by William Ibarra RN  Outcome: Ongoing  9/29/2021 2012 by Osmani Hazel RN  Outcome: Ongoing  Goal: Ability to express needs and understand communication  9/29/2021 2340 by William Ibarra RN  Outcome: Ongoing  9/29/2021 2012 by Osmani Hazel RN  Outcome: Ongoing  Goal: Mobility/activity is maintained at optimum level for patient  9/29/2021 2340 by William Ibarra RN  Outcome: Ongoing  9/29/2021 2012 by Osmani Hazel RN  Outcome: Ongoing     Problem: SKIN INTEGRITY  Goal: Skin integrity is maintained or improved  9/29/2021 2340 by William Ibarra RN  Outcome: Ongoing  9/29/2021 2012 by Osmani Hazel RN  Outcome: Ongoing     Problem: Non-Violent Restraints  Goal: Removal from restraints as soon as assessed to be safe  9/29/2021 2340 by William Ibarra RN  Outcome: Ongoing  9/29/2021 2012 by Osmani Hazel RN  Outcome: Ongoing  Goal: No harm/injury to patient while restraints in use  9/29/2021 2340 by William Ibarra RN  Outcome: Ongoing  9/29/2021 2012 by Osmani Hazel RN  Outcome: Ongoing  Goal: Patient's dignity will be maintained  9/29/2021 2340 by Valencia Torrze RN  Outcome: Ongoing  9/29/2021 2012 by Leida Tapia RN  Outcome: Ongoing     Problem: Discharge Planning:  Goal: Participates in care planning  Description: Participates in care planning  9/29/2021 2340 by Valencia Torrez RN  Outcome: Ongoing  9/29/2021 2012 by Leida Tapia RN  Outcome: Ongoing  Goal: Discharged to appropriate level of care  Description: Discharged to appropriate level of care  9/29/2021 2340 by Valencia Torrez RN  Outcome: Ongoing  9/29/2021 2012 by Leida Tapia RN  Outcome: Ongoing  Goal: Ability to perform activities of daily living will improve  Description: Ability to perform activities of daily living will improve  9/29/2021 2340 by Valencia Torrez RN  Outcome: Ongoing  9/29/2021 2012 by Leida Tapia RN  Outcome: Ongoing     Problem: Airway Clearance - Ineffective:  Goal: Ability to maintain a clear airway will improve  Description: Ability to maintain a clear airway will improve  9/29/2021 2340 by Valencia Torrez RN  Outcome: Ongoing  9/29/2021 2012 by Leida Tapia RN  Outcome: Ongoing     Problem: Anxiety/Stress:  Goal: Level of anxiety will decrease  Description: Level of anxiety will decrease  9/29/2021 2340 by Valencia Torrez RN  Outcome: Ongoing  9/29/2021 2012 by Leida Tapia RN  Outcome: Ongoing     Problem: Aspiration:  Goal: Absence of aspiration  Description: Absence of aspiration  9/29/2021 2340 by Valencia Torrez RN  Outcome: Ongoing  9/29/2021 2012 by Leida Tapia RN  Outcome: Ongoing     Problem:  Bowel Function - Altered:  Goal: Bowel elimination is within specified parameters  Description: Bowel elimination is within specified parameters  9/29/2021 2340 by Valencia Torrez RN  Outcome: Ongoing  9/29/2021 2012 by Leida Tapia RN  Outcome: Ongoing     Problem: Cardiac Output - Decreased:  Goal: Hemodynamic stability will improve  Description: Hemodynamic stability will improve  9/29/2021 2340 by Nayely Oro RN  Outcome: Ongoing  9/29/2021 2012 by Nate Lyn RN  Outcome: Ongoing     Problem: Fluid Volume - Imbalance:  Goal: Absence of imbalanced fluid volume signs and symptoms  Description: Absence of imbalanced fluid volume signs and symptoms  9/29/2021 2340 by Nayely Oro RN  Outcome: Ongoing  9/29/2021 2012 by Nate Lyn RN  Outcome: Ongoing     Problem: Gas Exchange - Impaired:  Goal: Levels of oxygenation will improve  Description: Levels of oxygenation will improve  9/29/2021 2340 by Nayely Oro RN  Outcome: Ongoing  9/29/2021 2012 by Nate Lyn RN  Outcome: Ongoing     Problem: Mental Status - Impaired:  Goal: Mental status will be restored to baseline  Description: Mental status will be restored to baseline  9/29/2021 2340 by Nayely Oro RN  Outcome: Ongoing  9/29/2021 2012 by Nate Lyn RN  Outcome: Ongoing     Problem: Nutrition Deficit:  Goal: Ability to achieve adequate nutritional intake will improve  Description: Ability to achieve adequate nutritional intake will improve  9/29/2021 2340 by Nayely Oro RN  Outcome: Ongoing  9/29/2021 2012 by Nate Lyn RN  Outcome: Ongoing     Problem: Pain:  Description: Pain management should include both nonpharmacologic and pharmacologic interventions.   Goal: Pain level will decrease  Description: Pain level will decrease  9/29/2021 2340 by Nayely Oro RN  Outcome: Ongoing  9/29/2021 2012 by Nate Lyn RN  Outcome: Ongoing  Goal: Recognizes and communicates pain  Description: Recognizes and communicates pain  9/29/2021 2340 by Naeyly Oro RN  Outcome: Ongoing  9/29/2021 2012 by Nate Lyn RN  Outcome: Ongoing  Goal: Control of acute pain  Description: Control of acute pain  9/29/2021 2340 by Nayely Oro RN  Outcome: Ongoing  9/29/2021 2012 by Nate Lyn RN  Outcome: Ongoing  Goal: Control of - Acute:  Goal: Mental status will be restored to baseline  Description: Mental status will be restored to baseline  9/29/2021 2340 by William Ibarra RN  Outcome: Ongoing  9/29/2021 2012 by Osmani Hazel RN  Outcome: Ongoing  Goal: Absence of continued neurological deterioration signs and symptoms  Description: Absence of continued neurological deterioration signs and symptoms  9/29/2021 2340 by William Ibarra RN  Outcome: Ongoing  9/29/2021 2012 by Osmani Hazel RN  Outcome: Ongoing     Problem: Injury - Risk of, Physical Injury:  Goal: Absence of physical injury  Description: Absence of physical injury  9/29/2021 2340 by William Ibarra RN  Outcome: Ongoing  9/29/2021 2012 by Osmani Hazel RN  Outcome: Ongoing  Goal: Will remain free from falls  Description: Will remain free from falls  9/29/2021 2340 by William Ibarra RN  Outcome: Ongoing  9/29/2021 2012 by Osmani Hazel RN  Outcome: Ongoing     Problem: Mood - Altered:  Goal: Mood stable  Description: Mood stable  9/29/2021 2340 by William Ibarra RN  Outcome: Ongoing  9/29/2021 2012 by Osmani Hazel RN  Outcome: Ongoing  Goal: Absence of abusive behavior  Description: Absence of abusive behavior  9/29/2021 2340 by William Ibarra RN  Outcome: Ongoing  9/29/2021 2012 by Osmani Hazel RN  Outcome: Ongoing  Goal: Verbalizations of feeling emotionally comfortable while being cared for will increase  Description: Verbalizations of feeling emotionally comfortable while being cared for will increase  9/29/2021 2340 by William Ibarra RN  Outcome: Ongoing  9/29/2021 2012 by Osmani Hazel RN  Outcome: Ongoing     Problem: Psychomotor Activity - Altered:  Goal: Absence of psychomotor disturbance signs and symptoms  Description: Absence of psychomotor disturbance signs and symptoms  9/29/2021 2340 by William Ibarra RN  Outcome: Ongoing  9/29/2021 2012 by Osmani Hazel RN  Outcome: Ongoing     Problem: Sensory Perception - Impaired:  Goal: Demonstrations of improved sensory functioning will increase  Description: Demonstrations of improved sensory functioning will increase  9/29/2021 2340 by Avril Hand RN  Outcome: Ongoing  9/29/2021 2012 by Marlon Putnam RN  Outcome: Ongoing  Goal: Decrease in sensory misperception frequency  Description: Decrease in sensory misperception frequency  9/29/2021 2340 by Avril Hand RN  Outcome: Ongoing  9/29/2021 2012 by Marlon Putnam RN  Outcome: Ongoing  Goal: Able to refrain from responding to false sensory perceptions  Description: Able to refrain from responding to false sensory perceptions  9/29/2021 2340 by Avril Hand RN  Outcome: Ongoing  9/29/2021 2012 by Marlon Putnam RN  Outcome: Ongoing  Goal: Demonstrates accurate environmental perceptions  Description: Demonstrates accurate environmental perceptions  9/29/2021 2340 by Avril Hand RN  Outcome: Ongoing  9/29/2021 2012 by Marlon Putnam RN  Outcome: Ongoing  Goal: Able to distinguish between reality-based and nonreality-based thinking  Description: Able to distinguish between reality-based and nonreality-based thinking  9/29/2021 2340 by Avril Hand RN  Outcome: Ongoing  9/29/2021 2012 by Marlon Putnam RN  Outcome: Ongoing  Goal: Able to interrupt nonreality-based thinking  Description: Able to interrupt nonreality-based thinking  9/29/2021 2340 by Avril Hand RN  Outcome: Ongoing  9/29/2021 2012 by Marlon Putnam RN  Outcome: Ongoing     Problem: Nutrition  Goal: Optimal nutrition therapy  9/29/2021 1450 by Ann-Marie Lozano RD, LD  Outcome: Ongoing  Note: Nutrition Problem #1: Inadequate oral intake  Intervention: Food and/or Nutrient Delivery:Start nutrition as able. If TF needed, suggest Peptide Based formula with goal rate of 55 mL/hr to provide 1584 kcal and 109 g pro/day.   Nutritional Goals: meet % of estimated nutrient needs

## 2021-09-30 NOTE — PROGRESS NOTES
Critical Care Team - Daily Progress Note      Date and time: 9/30/2021 7:39 AM  Patient's name:  [address] Record Number: [id number]  Patient's account/billing number: [de-identified]  Patient's YOB: 1960  Age: 64 y.o. Date of Admission: 9/29/2021 12:59 AM  Length of stay during current admission: 1      Primary Care Physician: Delmy Yun MD  ICU Attending Physician: Dr. Renetta Molina Status: Full Code    Reason for ICU admission:   Chief Complaint   Patient presents with    Respiratory Distress         SUBJECTIVE:     OVERNIGHT EVENTS:  Patient was seen and evaluated at bedside, was tachycardic at night, initially concerning for atrial fibrillation with RVR, repeat EKG concerning for sinus tachycardia. Levophed was switched to neosynephrine following which her HR improved. Currently, she is on 75 mcg of neosynephrine for pressor support, maintaining MAP of 65 mm Hg, HR 87/min, NSR. Labs and imaging reviewed, Cr 1.43, WBC 23 (receiving steroid for COPD exacerbation). Hb 11.6, Plt 252. She is undergoing weaning trial today and tolerating the same well but still has bilateral wheezing. Rhinovirus positive. PH 7.36, pCO2 44.4, HCO3 25. She continues to be on heparin gtt for [facility]. 2D ECHO read pending. BRIEF HISTORY  64year old female with past medical history of COPD, HTN,   T2DM, CAD s/p PCI to RCA in 2019, on ASA and [employer] at home presented to the ICU with altered mental status secondary to COPD exacerbation (pCO2 106, pH 6.9). She was in acute hypoxic and hypercapnic respiratory failure and hypotensive requiring pressor support. She was intubated for hypercapnic respiratory failure. She was also noted to have elevated troponin [phone], Cardiology was consulted and started on heparin gtt, pending 2D ECHO.      AWAKE & FOLLOWING COMMANDS:  [x] No   [] Yes    CURRENT VENTILATION STATUS:     [x] Ventilator  [] BIPAP  [] Nasal Cannula [] Room Air      IF INTUBATED, ET TUBE MARKING AT LOWER LIP:       cms    SECRETIONS Amount:  [x] Small [] Moderate  [] Large  [] None  Color:     [] White [] Colored  [] Bloody    SEDATION:  RAAS Score:  [] Propofol gtt  [x] Versed gtt  [] Fentanyl gtt   [] No Sedation    PARALYZED:  [x] No    [] Yes    DIARRHEA:                [x] No                [] Yes  (C. Difficile status: [] positive                                                                                                                       [] negative                                                                                                                     [] pending)    VASOPRESSORS:  [] No    [] Yes    If yes -   [] Levophed       [] Dopamine     [] Vasopressin       [] Dobutamine  [x] Phenylephrine         [] Epinephrine    CENTRAL LINES:     [] No   [x] Yes   (Date of Insertion:   )           If yes -     [] Right IJ     [] Left IJ [x] Right Femoral [] Left Femoral                   [] Right Subclavian [] Left Subclavian       WE[employer]OOK'S CATHETER:   [x] No   [] Yes  (Date of Insertion:   )     URINE OUTPUT:            [x] Good   [] Low              [] Anuric      OBJECTIVE:     VITAL SIGNS:  /67   Pulse 89   Temp 99 °F (37.2 °C) (Core)   Resp 18   Ht 5' 9\" (1.753 m)   Wt 234 lb 2.1 oz (106.2 kg)   SpO2 97%   BMI 34.57 kg/m²   Tmax over 24 hours:  Temp (24hrs), Av.6 °F (37 °C), Min:98.2 °F (36.8 °C), Max:99 °F (37.2 °C)      Patient Vitals for the past 8 hrs:   BP Temp Temp src Pulse Resp SpO2 Weight   21 0700 104/67   89 18 97 %    21 0645    90 18 97 %    21 0630    90 18 97 %    21 0615    91 18 97 %    21 0600 112/67   92 18 97 %    21 0545    89 18 97 %    21 0530    92 18 96 %    21 0515    98 18 96 %    21 0507     18 97 %    21 0500 113/89   97 18 96 %    21 0445    96 18 96 %    21 0430    98 18 97 %    21 0415    97 18 97 %    09/30/21 0400 114/71 99 °F (37.2 °C) CORE 98 18 97 % 234 lb 2.1 oz (106.2 kg)   09/30/21 0345    98 18 97 %    09/30/21 0330    97 18 96 %    09/30/21 0327    100 16 97 %    09/30/21 0322     17 95 %    09/30/21 0230    84 20 96 %    09/30/21 0215    95 20 96 %    09/30/21 0200 110/68 98.4 °F (36.9 °C) Oral 83 20 96 %    09/30/21 0145    89 17 97 %    09/30/21 0130    96 22 96 %    09/30/21 0115    98 20 96 %    09/30/21 0100 113/71   99 20 96 %    09/30/21 0045    97 18 95 %    09/30/21 0030    98 21 96 %    09/30/21 0015    99 18 96 %    09/30/21 0000 117/69 98.2 °F (36.8 °C) Oral 100 16 96 %    09/29/21 2345    104 20 96 %          Intake/Output Summary (Last 24 hours) at 9/30/2021 0739  Last data filed at 9/30/2021 0600  Gross per 24 hour   Intake 3373.85 ml   Output 1830 ml   Net 1543.85 ml     Date 09/30/21 0000 - 09/30/21 2359   Shift 9101-3395 9620-5089 5363-6042 24 Hour Total   INTAKE   I.V.(mL/kg) 881. 7(8.3)   881. 7(8.3)   Shift Total(mL/kg) 881. 7(8.3)   881. 7(8.3)   OUTPUT   Urine(mL/kg/hr) 560   560   Emesis/NG output(mL/kg) 200(1.9)   200(1.9)   Shift Total(mL/kg) 760(7.2)   760(7.2)   Weight (kg) 106.2 106.2 106.2 106.2     Wt Readings from Last 3 Encounters:   09/30/21 234 lb 2.1 oz (106.2 kg)   07/27/21 216 lb 6.4 oz (98.2 kg)   05/08/21 210 lb (95.3 kg)     Body mass index is 34.57 kg/m². PHYSICAL EXAM:  Constitutional: Intubated and sedated. EENT: PERRLA, EOMI, sclera clear, anicteric, oropharynx clear, no lesions, neck supple with midline trachea. Neck: Supple, symmetrical, trachea midline, no adenopathy, thyroid symmetric, no jvd skin normal  Respiratory: Bilateral diffuse wheezing present, no crackles, decreased breath sounds bilaterally.    Cardiovascular: regular rate and rhythm, normal S1, S2, no murmur noted and 2+ pulses throughout  Abdomen: soft, nontender, nondistended, no masses or organomegaly  NEUROLOGIC - Sedated, partially following commands  Extremities:  peripheral pulses normal, no pedal edema, no clubbing or cyanosis  SKIN: normal coloration and turgor    Any additional physical findings:      MEDICATIONS:  Scheduled Meds:   sodium chloride flush  5-40 mL IntraVENous 2 times per day    albuterol  2.5 mg Nebulization BID    famotidine (PEPCID) injection  20 mg IntraVENous Daily    cefTRIAXone (ROCEPHIN) IV  1,000 mg IntraVENous Q24H    aspirin  81 mg Oral Daily    atorvastatin  40 mg Oral Daily    ipratropium-albuterol  1 ampule Inhalation 4x daily    methylPREDNISolone  40 mg IntraVENous Q12H    insulin lispro  0-18 Units SubCUTAneous TID WC    insulin lispro  0-9 Units SubCUTAneous Nightly    insulin glargine  15 Units SubCUTAneous Nightly     Continuous Infusions:   sodium chloride      heparin (PORCINE) Infusion 14.2 Units/kg/hr (09/30/21 0241)    lactated ringers 100 mL/hr at 09/30/21 0240    dextrose      midazolam 8 mg/hr (09/29/21 2330)    fentaNYL 125 mcg/hr (09/30/21 0240)    norepinephrine Stopped (09/29/21 2100)    phenylephrine (TRAVON-SYNEPHRINE) 50mg/250mL infusion 75 mcg/min (09/30/21 0554)     PRN Meds:   sodium chloride flush, 5-40 mL, PRN  sodium chloride, 25 mL, PRN  ondansetron, 4 mg, Q8H PRN   Or  ondansetron, 4 mg, Q6H PRN  polyethylene glycol, 17 g, Daily PRN  acetaminophen, 650 mg, Q6H PRN   Or  acetaminophen, 650 mg, Q6H PRN  albuterol, 2.5 mg, Q6H PRN  heparin (porcine), 4,000 Units, PRN  heparin (porcine), 2,000 Units, PRN  glucose, 15 g, PRN  dextrose, 12.5 g, PRN  glucagon (rDNA), 1 mg, PRN  dextrose, 100 mL/hr, PRN  fentanNYL, 50 mcg, Q1H PRN        SUPPORT DEVICES: [x] Ventilator [] BIPAP  [] Nasal Cannula [] Room Air    VENT SETTINGS (Comprehensive) (if applicable):   PRVC mode, FiO2 30%, PEEP 5, Respiratory Rate 18, Tidal Volume 540  Vent Information  $Ventilation: $Initial Day  Skin Assessment: Clean, dry, & intact  Suction Catheter Diameter: 14  Equipment Changed: HME  Vent Type: Servo i  Vent Mode: PRVC  Vt Ordered: 530 mL  Rate Set: 18 bmp  Pressure Support: 10 cmH20  FiO2 : 30 %  SpO2: 97 %  SpO2/FiO2 ratio: 323.33  Sensitivity: 5  PEEP/CPAP: 5  I Time/ I Time %: 0.65 s  Humidification Source: HME  Nitric Oxide/Epoprostenol In Use?: No  Mask Type: Full face mask  Mask Size: Large  Additional Respiratory  Assessments  Pulse: 89  Resp: 18  SpO2: 97 %  End Tidal CO2: 29 (%)  Position: Semi-[place]'s  Humidification Source: HME  Oral Care Completed?: Yes  Oral Care: Mouth swabbed, Mouth moisturizer, Mouth suctioned  Subglottic Suction Done?: No    ABGs:     Lab Results   Component Value Date    HQZ0JBZ NOT REPORTED 09/30/2021    FIO2 30.0 09/30/2021     Lactic Acid:   Lab Results   Component Value Date    LACTA NOT REPORTED 09/29/2021         DATA:  Complete Blood Count:   Recent Labs     09/29/21  0115 09/30/21  0508   WBC 15.3* 23.0*   HGB 13.2 11.6*   MCV 93.1 85.1    252   RBC 4.96 4.29   HCT 46.2 36.5   MCH 26.6 27.0   MCHC 28.6 31.8   RDW 15.7* 15.8*   MPV 11.2 10.8        PT/INR:    Lab Results   Component Value Date    PROTIME 9.7 11/20/2016    PROTIME 9.9 06/01/2012    INR 0.9 11/20/2016     PTT:    Lab Results   Component Value Date    APTT 58.0 09/30/2021       Basal Metabolic Profile:   Recent Labs     09/29/21  0101 09/29/21  0115 09/29/21  0203 09/29/21  1803 09/30/21  0508   NA  --  136  --  136 138   K  --  3.8  --  3.9 4.1   BUN  --  18  --  30* 26*   CREATININE   < > 1.49* 1.69* 1.82* 1.43*   CL  --  98  --  103 107   CO2  --  14*  --  18* 23    < > = values in this interval not displayed. Magnesium:   Lab Results   Component Value Date    MG 2.4 09/30/2021    MG 2.4 09/29/2021    MG 1.7 07/26/2021     Phosphorus:   Lab Results   Component Value Date    PHOS 3.5 09/30/2021    PHOS 2.2 07/26/2021     S. Calcium:  Recent Labs     09/30/21  0508   CALCIUM 8.2*     S. Ionized Calcium:No results for input(s): IONCA in the last 72 hours. Urinalysis:   Lab Results   Component Value Date    NITRU NEGATIVE 09/29/2021    COLORU Yellow 09/29/2021    PHUR 6.5 09/29/2021    WBCUA 2 TO 5 09/29/2021    RBCUA 2 TO 5 09/29/2021    MUCUS NOT REPORTED 09/29/2021    TRICHOMONAS NOT REPORTED 09/29/2021    YEAST NOT REPORTED 09/29/2021    BACTERIA NOT REPORTED 09/29/2021    SPECGRAV 1.012 09/29/2021    LEUKOCYTESUR NEGATIVE 09/29/2021    UROBILINOGEN Normal 09/29/2021    BILIRUBINUR NEGATIVE 09/29/2021    BILIRUBINUR NEGATIVE 06/01/2012    GLUCOSEU NEGATIVE 09/29/2021    GLUCOSEU NEGATIVE 06/01/2012    KETUA NEGATIVE 09/29/2021    AMORPHOUS NOT REPORTED 09/29/2021       CARDIAC ENZYMES: No results for input(s): CKMB, CKMBINDEX, TROPONINI in the last 72 hours. Invalid input(s): CKTOTAL;3  BNP: No results for input(s): BNP in the last 72 hours. LFTS  Recent Labs     09/30/21  0508   ALKPHOS 97   ALT 46*   AST 27   BILITOT 0.22*   LABALBU 3.3*       AMYLASE/LIPASE/AMMONIA  No results for input(s): AMYLASE, LIPASE, AMMONIA in the last 72 hours. Last 3 Blood Glucose:   Recent Labs     09/29/21  0115 09/29/21  1803 09/30/21  0508   GLUCOSE 271* 260* 138*      HgBA1c:    Lab Results   Component Value Date    LABA1C 9.2 02/10/2019         TSH:    Lab Results   Component Value Date    TSH 1.24 06/16/2020     ANEMIA STUDIES  No results for input(s): LABIRON, TIBC, FERRITIN, [id number], FOLATE, OCCULTBLD in the last 72 hours. Cultures during this admission:     Blood cultures:                 [] None drawn      [x] Negative             []  Positive (Details:  )  Urine Culture:                   [] None drawn      [x] Negative             []  Positive (Details:  )  Sputum Culture:               [] None drawn       [] Negative             [x]  Positive (Details: Mixed bacterial morphocytes  )   Endotracheal aspirate:     [] None drawn       [] Negative             [x]  Positive (Details: Mixed bacterial morphocytes   )       ASSESSMENT AND PLAN:      1.  Acute continue bronchodilator therapy and IV steroids    NSTEMI continue heparin drip  Follow echocardiogram  Cardiology evaluation. Start tube feeds    Updated daughter at bedside    Total critical care time caring for this patient with life threatening, unstable organ failure, including direct patient contact, management of life support systems, review of data including imaging and labs, discussions with other team members and physicians at least 28   Min so far today, excluding procedures. Treatment plan Discussed with nursing staff in detail , all questions answered . Electronically signed by Chelle Baum MD on   9/30/21 at 7:10 PM EDT    Please note that this chart was generated using voice recognition Dragon dictation software. Although every effort was made to ensure the accuracy of this automated transcription, some errors in transcription may have occurred.

## 2021-09-30 NOTE — PROGRESS NOTES
09/30/21 1135   Vent Information   Vent Mode PRVC  (End SBT)   Vt Ordered 530 mL   Rate Set 12 bmp   FiO2  30 %   SpO2 93 %   PEEP/CPAP 5   Vent Patient Data   Peak Inspiratory Pressure 16 cmH2O   Rate Measured 19 br/min   Vt Exhaled 562 mL   Minute Volume 10.9 Liters     Patient placed on full support post 2 hours SBT.

## 2021-09-30 NOTE — PLAN OF CARE
Problem: OXYGENATION/RESPIRATORY FUNCTION  Goal: Patient will maintain patent airway  9/30/2021 1002 by Rolan Gutierres RCP  Outcome: Ongoing     Problem: OXYGENATION/RESPIRATORY FUNCTION  Goal: Patient will achieve/maintain normal respiratory rate/effort  Description: Respiratory rate and effort will be within normal limits for the patient  9/30/2021 1002 by Rolan Gutierres RCP  Outcome: Ongoing     Problem: MECHANICAL VENTILATION  Goal: Patient will maintain patent airway  9/30/2021 1002 by Rolan Gutierres RCP  Outcome: Ongoing     Problem: MECHANICAL VENTILATION  Goal: Oral health is maintained or improved  9/30/2021 1002 by Rolan Gutierres RCP  Outcome: Ongoing     Problem: MECHANICAL VENTILATION  Goal: ET tube will be managed safely  9/30/2021 1002 by Rolan Gutierres RCP  Outcome: Ongoing     Problem: MECHANICAL VENTILATION  Goal: Ability to express needs and understand communication  9/30/2021 1002 by Rolan Gutierres RCP  Outcome: Ongoing     Problem: MECHANICAL VENTILATION  Goal: Mobility/activity is maintained at optimum level for patient  9/30/2021 1002 by Rolan Gutierres RCP  Outcome: Ongoing     Problem: SKIN INTEGRITY  Goal: Skin integrity is maintained or improved  9/30/2021 1002 by Rolan Gutierres RCP  Outcome: Ongoing     Problem: Airway Clearance - Ineffective:  Goal: Ability to maintain a clear airway will improve  Description: Ability to maintain a clear airway will improve  9/30/2021 1002 by Rolan Gutierres RCP  Outcome: Ongoing     Problem: Respiratory  Intervention: Administer medication as ordered  Note: BRONCHOSPASM/BRONCHOCONSTRICTION     [x]         IMPROVE AERATION/BREATH SOUNDS  [x]   ADMINISTER BRONCHODILATOR THERAPY AS APPROPRIATE  [x]   ASSESS BREATH SOUNDS  []   IMPLEMENT AEROSOL/MDI PROTOCOL  [x]   PATIENT EDUCATION AS NEEDED

## 2021-09-30 NOTE — PLAN OF CARE
Problem: OXYGENATION/RESPIRATORY FUNCTION  Goal: Patient will maintain patent airway  9/30/2021 1824 by Ralph Kemp RN  Outcome: Ongoing  9/30/2021 1002 by Larisa Finch RCP  Outcome: Ongoing  Goal: Patient will achieve/maintain normal respiratory rate/effort  Description: Respiratory rate and effort will be within normal limits for the patient  9/30/2021 1824 by Ralph Kemp RN  Outcome: Ongoing  9/30/2021 1002 by Larisa Finch RCP  Outcome: Ongoing     Problem: MECHANICAL VENTILATION  Goal: Patient will maintain patent airway  9/30/2021 1824 by Ralph Kemp RN  Outcome: Ongoing  9/30/2021 1002 by Larisa Finch RCP  Outcome: Ongoing  Goal: Oral health is maintained or improved  9/30/2021 1824 by Ralph Kemp RN  Outcome: Ongoing  9/30/2021 1002 by Larisa Finch RCP  Outcome: Ongoing  Goal: ET tube will be managed safely  9/30/2021 1824 by Ralph Kemp RN  Outcome: Ongoing  9/30/2021 1002 by Larisa Finch RCP  Outcome: Ongoing  Goal: Ability to express needs and understand communication  9/30/2021 1824 by Ralph Kemp RN  Outcome: Ongoing  9/30/2021 1002 by Larisa Finch RCP  Outcome: Ongoing  Goal: Mobility/activity is maintained at optimum level for patient  9/30/2021 1824 by Ralph Kemp RN  Outcome: Ongoing  9/30/2021 1002 by Larisa Finch RCP  Outcome: Ongoing     Problem: SKIN INTEGRITY  Goal: Skin integrity is maintained or improved  9/30/2021 1824 by Ralph Kemp RN  Outcome: Ongoing  9/30/2021 1002 by Larisa Finch RCP  Outcome: Ongoing     Problem: Non-Violent Restraints  Goal: Removal from restraints as soon as assessed to be safe  Outcome: Ongoing  Goal: No harm/injury to patient while restraints in use  Outcome: Ongoing  Goal: Patient's dignity will be maintained  Outcome: Ongoing     Problem: Discharge Planning:  Goal: Participates in care planning  Description: Participates in care planning  Outcome: Ongoing  Goal: Discharged to appropriate level of Mood - Altered:  Goal: Mood stable  Description: Mood stable  Outcome: Ongoing  Goal: Absence of abusive behavior  Description: Absence of abusive behavior  Outcome: Ongoing  Goal: Verbalizations of feeling emotionally comfortable while being cared for will increase  Description: Verbalizations of feeling emotionally comfortable while being cared for will increase  Outcome: Ongoing     Problem: Psychomotor Activity - Altered:  Goal: Absence of psychomotor disturbance signs and symptoms  Description: Absence of psychomotor disturbance signs and symptoms  Outcome: Ongoing     Problem: Sensory Perception - Impaired:  Goal: Demonstrations of improved sensory functioning will increase  Description: Demonstrations of improved sensory functioning will increase  Outcome: Ongoing  Goal: Decrease in sensory misperception frequency  Description: Decrease in sensory misperception frequency  Outcome: Ongoing  Goal: Able to refrain from responding to false sensory perceptions  Description: Able to refrain from responding to false sensory perceptions  Outcome: Ongoing  Goal: Demonstrates accurate environmental perceptions  Description: Demonstrates accurate environmental perceptions  Outcome: Ongoing  Goal: Able to distinguish between reality-based and nonreality-based thinking  Description: Able to distinguish between reality-based and nonreality-based thinking  Outcome: Ongoing  Goal: Able to interrupt nonreality-based thinking  Description: Able to interrupt nonreality-based thinking  Outcome: Ongoing     Problem: Falls - Risk of:  Goal: Absence of physical injury  Description: Absence of physical injury  Outcome: Ongoing  Goal: Will remain free from falls  Description: Will remain free from falls  Outcome: Ongoing

## 2021-10-01 LAB
ABSOLUTE EOS #: 0 K/UL (ref 0–0.4)
ABSOLUTE IMMATURE GRANULOCYTE: 0.17 K/UL (ref 0–0.3)
ABSOLUTE LYMPH #: 0.35 K/UL (ref 1–4.8)
ABSOLUTE MONO #: 0 K/UL (ref 0.1–0.8)
ALBUMIN SERPL-MCNC: 3.4 G/DL (ref 3.5–5.2)
ALBUMIN/GLOBULIN RATIO: 1.3 (ref 1–2.5)
ALLEN TEST: ABNORMAL
ALP BLD-CCNC: 88 U/L (ref 35–104)
ALT SERPL-CCNC: 34 U/L (ref 5–33)
ANION GAP SERPL CALCULATED.3IONS-SCNC: 10 MMOL/L (ref 9–17)
AST SERPL-CCNC: 17 U/L
BASOPHILS # BLD: 0 % (ref 0–2)
BASOPHILS ABSOLUTE: 0 K/UL (ref 0–0.2)
BILIRUB SERPL-MCNC: 0.18 MG/DL (ref 0.3–1.2)
BUN BLDV-MCNC: 28 MG/DL (ref 6–20)
BUN/CREAT BLD: ABNORMAL (ref 9–20)
CALCIUM SERPL-MCNC: 8.2 MG/DL (ref 8.6–10.4)
CHLORIDE BLD-SCNC: 103 MMOL/L (ref 98–107)
CO2: 23 MMOL/L (ref 20–31)
CREAT SERPL-MCNC: 1.25 MG/DL (ref 0.5–0.9)
DIFFERENTIAL TYPE: ABNORMAL
EOSINOPHILS RELATIVE PERCENT: 0 % (ref 1–4)
FIO2: 30
GFR AFRICAN AMERICAN: 53 ML/MIN
GFR NON-AFRICAN AMERICAN: 44 ML/MIN
GFR SERPL CREATININE-BSD FRML MDRD: ABNORMAL ML/MIN/{1.73_M2}
GFR SERPL CREATININE-BSD FRML MDRD: ABNORMAL ML/MIN/{1.73_M2}
GLUCOSE BLD-MCNC: 226 MG/DL (ref 70–99)
GLUCOSE BLD-MCNC: 227 MG/DL (ref 65–105)
GLUCOSE BLD-MCNC: 235 MG/DL (ref 65–105)
GLUCOSE BLD-MCNC: 242 MG/DL (ref 65–105)
GLUCOSE BLD-MCNC: 246 MG/DL (ref 65–105)
GLUCOSE BLD-MCNC: 263 MG/DL (ref 74–100)
HCT VFR BLD CALC: 35.8 % (ref 36.3–47.1)
HEMOGLOBIN: 10.7 G/DL (ref 11.9–15.1)
IMMATURE GRANULOCYTES: 1 %
LYMPHOCYTES # BLD: 2 % (ref 24–44)
MAGNESIUM: 2.5 MG/DL (ref 1.6–2.6)
MCH RBC QN AUTO: 26.2 PG (ref 25.2–33.5)
MCHC RBC AUTO-ENTMCNC: 29.9 G/DL (ref 28.4–34.8)
MCV RBC AUTO: 87.5 FL (ref 82.6–102.9)
MODE: ABNORMAL
MONOCYTES # BLD: 0 % (ref 1–7)
MORPHOLOGY: ABNORMAL
NEGATIVE BASE EXCESS, ART: ABNORMAL (ref 0–2)
NRBC AUTOMATED: 0 PER 100 WBC
O2 DEVICE/FLOW/%: ABNORMAL
PARTIAL THROMBOPLASTIN TIME: 62 SEC (ref 20.5–30.5)
PATIENT TEMP: ABNORMAL
PDW BLD-RTO: 16.2 % (ref 11.8–14.4)
PHOSPHORUS: 3.1 MG/DL (ref 2.6–4.5)
PLATELET # BLD: 208 K/UL (ref 138–453)
PLATELET ESTIMATE: ABNORMAL
PMV BLD AUTO: 10.7 FL (ref 8.1–13.5)
POC HCO3: 26.9 MMOL/L (ref 21–28)
POC O2 SATURATION: 92 % (ref 94–98)
POC PCO2 TEMP: ABNORMAL MM HG
POC PCO2: 47.9 MM HG (ref 35–48)
POC PH TEMP: ABNORMAL
POC PH: 7.36 (ref 7.35–7.45)
POC PO2 TEMP: ABNORMAL MM HG
POC PO2: 68.1 MM HG (ref 83–108)
POSITIVE BASE EXCESS, ART: 1 (ref 0–3)
POTASSIUM SERPL-SCNC: 4.3 MMOL/L (ref 3.7–5.3)
RBC # BLD: 4.09 M/UL (ref 3.95–5.11)
RBC # BLD: ABNORMAL 10*6/UL
SAMPLE SITE: ABNORMAL
SEG NEUTROPHILS: 97 % (ref 36–66)
SEGMENTED NEUTROPHILS ABSOLUTE COUNT: 16.88 K/UL (ref 1.8–7.7)
SODIUM BLD-SCNC: 136 MMOL/L (ref 135–144)
TCO2 (CALC), ART: ABNORMAL MMOL/L (ref 22–29)
TOTAL PROTEIN: 6 G/DL (ref 6.4–8.3)
WBC # BLD: 17.4 K/UL (ref 3.5–11.3)
WBC # BLD: ABNORMAL 10*3/UL

## 2021-10-01 PROCEDURE — 6370000000 HC RX 637 (ALT 250 FOR IP): Performed by: STUDENT IN AN ORGANIZED HEALTH CARE EDUCATION/TRAINING PROGRAM

## 2021-10-01 PROCEDURE — 82947 ASSAY GLUCOSE BLOOD QUANT: CPT

## 2021-10-01 PROCEDURE — 94664 DEMO&/EVAL PT USE INHALER: CPT

## 2021-10-01 PROCEDURE — 2580000003 HC RX 258: Performed by: STUDENT IN AN ORGANIZED HEALTH CARE EDUCATION/TRAINING PROGRAM

## 2021-10-01 PROCEDURE — 6360000002 HC RX W HCPCS: Performed by: STUDENT IN AN ORGANIZED HEALTH CARE EDUCATION/TRAINING PROGRAM

## 2021-10-01 PROCEDURE — 94761 N-INVAS EAR/PLS OXIMETRY MLT: CPT

## 2021-10-01 PROCEDURE — 84100 ASSAY OF PHOSPHORUS: CPT

## 2021-10-01 PROCEDURE — 94003 VENT MGMT INPAT SUBQ DAY: CPT

## 2021-10-01 PROCEDURE — 2500000003 HC RX 250 WO HCPCS: Performed by: STUDENT IN AN ORGANIZED HEALTH CARE EDUCATION/TRAINING PROGRAM

## 2021-10-01 PROCEDURE — 6370000000 HC RX 637 (ALT 250 FOR IP)

## 2021-10-01 PROCEDURE — 6370000000 HC RX 637 (ALT 250 FOR IP): Performed by: INTERNAL MEDICINE

## 2021-10-01 PROCEDURE — 89220 SPUTUM SPECIMEN COLLECTION: CPT

## 2021-10-01 PROCEDURE — 82803 BLOOD GASES ANY COMBINATION: CPT

## 2021-10-01 PROCEDURE — 2700000000 HC OXYGEN THERAPY PER DAY

## 2021-10-01 PROCEDURE — 2500000003 HC RX 250 WO HCPCS: Performed by: INTERNAL MEDICINE

## 2021-10-01 PROCEDURE — 85730 THROMBOPLASTIN TIME PARTIAL: CPT

## 2021-10-01 PROCEDURE — 94640 AIRWAY INHALATION TREATMENT: CPT

## 2021-10-01 PROCEDURE — 99291 CRITICAL CARE FIRST HOUR: CPT | Performed by: INTERNAL MEDICINE

## 2021-10-01 PROCEDURE — 2000000000 HC ICU R&B

## 2021-10-01 PROCEDURE — 80053 COMPREHEN METABOLIC PANEL: CPT

## 2021-10-01 PROCEDURE — 83735 ASSAY OF MAGNESIUM: CPT

## 2021-10-01 PROCEDURE — 6360000002 HC RX W HCPCS: Performed by: HEALTH CARE PROVIDER

## 2021-10-01 PROCEDURE — 37799 UNLISTED PX VASCULAR SURGERY: CPT

## 2021-10-01 PROCEDURE — 85025 COMPLETE CBC W/AUTO DIFF WBC: CPT

## 2021-10-01 RX ORDER — INSULIN GLARGINE 100 [IU]/ML
25 INJECTION, SOLUTION SUBCUTANEOUS NIGHTLY
Status: DISCONTINUED | OUTPATIENT
Start: 2021-10-01 | End: 2021-10-05

## 2021-10-01 RX ORDER — IPRATROPIUM BROMIDE AND ALBUTEROL SULFATE 2.5; .5 MG/3ML; MG/3ML
1 SOLUTION RESPIRATORY (INHALATION) 4 TIMES DAILY
Status: DISCONTINUED | OUTPATIENT
Start: 2021-10-02 | End: 2021-10-08 | Stop reason: HOSPADM

## 2021-10-01 RX ORDER — IPRATROPIUM BROMIDE AND ALBUTEROL SULFATE 2.5; .5 MG/3ML; MG/3ML
2 SOLUTION RESPIRATORY (INHALATION) 4 TIMES DAILY
Status: DISCONTINUED | OUTPATIENT
Start: 2021-10-01 | End: 2021-10-01

## 2021-10-01 RX ADMIN — Medication 100 MCG/HR: at 21:08

## 2021-10-01 RX ADMIN — Medication 3 MG/HR: at 23:36

## 2021-10-01 RX ADMIN — IPRATROPIUM BROMIDE AND ALBUTEROL SULFATE 1 AMPULE: .5; 3 SOLUTION RESPIRATORY (INHALATION) at 08:37

## 2021-10-01 RX ADMIN — METHYLPREDNISOLONE SODIUM SUCCINATE 40 MG: 125 INJECTION, POWDER, FOR SOLUTION INTRAMUSCULAR; INTRAVENOUS at 21:13

## 2021-10-01 RX ADMIN — CEFTRIAXONE SODIUM 1000 MG: 1 INJECTION, POWDER, FOR SOLUTION INTRAMUSCULAR; INTRAVENOUS at 03:44

## 2021-10-01 RX ADMIN — DESMOPRESSIN ACETATE 40 MG: 0.2 TABLET ORAL at 09:08

## 2021-10-01 RX ADMIN — INSULIN LISPRO 6 UNITS: 100 INJECTION, SOLUTION INTRAVENOUS; SUBCUTANEOUS at 17:44

## 2021-10-01 RX ADMIN — IPRATROPIUM BROMIDE AND ALBUTEROL SULFATE 1 AMPULE: .5; 3 SOLUTION RESPIRATORY (INHALATION) at 20:43

## 2021-10-01 RX ADMIN — METHYLPREDNISOLONE SODIUM SUCCINATE 40 MG: 125 INJECTION, POWDER, FOR SOLUTION INTRAMUSCULAR; INTRAVENOUS at 09:08

## 2021-10-01 RX ADMIN — PHENYLEPHRINE HYDROCHLORIDE 25 MCG/MIN: 10 INJECTION INTRAVENOUS at 03:43

## 2021-10-01 RX ADMIN — INSULIN LISPRO 6 UNITS: 100 INJECTION, SOLUTION INTRAVENOUS; SUBCUTANEOUS at 12:07

## 2021-10-01 RX ADMIN — IPRATROPIUM BROMIDE AND ALBUTEROL SULFATE 2 AMPULE: .5; 3 SOLUTION RESPIRATORY (INHALATION) at 15:49

## 2021-10-01 RX ADMIN — Medication 125 MCG/HR: at 02:31

## 2021-10-01 RX ADMIN — SODIUM CHLORIDE, PRESERVATIVE FREE 10 ML: 5 INJECTION INTRAVENOUS at 21:16

## 2021-10-01 RX ADMIN — ASPIRIN 81 MG: 81 TABLET, CHEWABLE ORAL at 11:19

## 2021-10-01 RX ADMIN — HEPARIN SODIUM AND DEXTROSE 16.2 UNITS/KG/HR: 10000; 5 INJECTION INTRAVENOUS at 11:57

## 2021-10-01 RX ADMIN — INSULIN LISPRO 6 UNITS: 100 INJECTION, SOLUTION INTRAVENOUS; SUBCUTANEOUS at 09:09

## 2021-10-01 RX ADMIN — SODIUM CHLORIDE, PRESERVATIVE FREE 10 ML: 5 INJECTION INTRAVENOUS at 09:19

## 2021-10-01 RX ADMIN — Medication 100 MCG/HR: at 11:12

## 2021-10-01 RX ADMIN — FAMOTIDINE 20 MG: 10 INJECTION INTRAVENOUS at 09:08

## 2021-10-01 RX ADMIN — FAMOTIDINE 20 MG: 10 INJECTION INTRAVENOUS at 21:15

## 2021-10-01 RX ADMIN — INSULIN LISPRO 3 UNITS: 100 INJECTION, SOLUTION INTRAVENOUS; SUBCUTANEOUS at 21:12

## 2021-10-01 RX ADMIN — ALBUTEROL SULFATE 2.5 MG: 2.5 SOLUTION RESPIRATORY (INHALATION) at 03:30

## 2021-10-01 RX ADMIN — INSULIN GLARGINE 25 UNITS: 100 INJECTION, SOLUTION SUBCUTANEOUS at 21:13

## 2021-10-01 ASSESSMENT — PAIN SCALES - GENERAL: PAINLEVEL_OUTOF10: 0

## 2021-10-01 ASSESSMENT — PULMONARY FUNCTION TESTS
PIF_VALUE: 10
PIF_VALUE: 7
PIF_VALUE: 25
PIF_VALUE: 12
PIF_VALUE: 12
PIF_VALUE: 11
PIF_VALUE: 9
PIF_VALUE: 12
PIF_VALUE: 13
PIF_VALUE: 21

## 2021-10-01 NOTE — PROGRESS NOTES
[facility]   Progress Note                   Date:   10/1/2021  Patient name: Elli Rodriguez  Date of admission:  9/29/2021 12:59 AM  MRN:   [id number]  YOB: 1960  PCP: Mor Salomon MD    Reason for Admission:      Subjective: There were no acute events overnight, remained hemodynamically stable, denies chest pain, dyspnea, orthopnea or palpitations. Medications:   Scheduled Meds:   sodium chloride flush  5-40 mL IntraVENous 2 times per day    albuterol  2.5 mg Nebulization BID    famotidine (PEPCID) injection  20 mg IntraVENous Daily    cefTRIAXone (ROCEPHIN) IV  1,000 mg IntraVENous Q24H    aspirin  81 mg Oral Daily    atorvastatin  40 mg Oral Daily    ipratropium-albuterol  1 ampule Inhalation 4x daily    methylPREDNISolone  40 mg IntraVENous Q12H    insulin lispro  0-18 Units SubCUTAneous TID WC    insulin lispro  0-9 Units SubCUTAneous Nightly    insulin glargine  15 Units SubCUTAneous Nightly       Continuous Infusions:   sodium chloride      heparin (PORCINE) Infusion 16.2 Units/kg/hr (10/01/21 0441)    lactated ringers 75 mL/hr at 09/30/21 1015    dextrose      midazolam 3 mg/hr (10/01/21 0339)    fentaNYL 100 mcg/hr (10/01/21 0429)    norepinephrine Stopped (09/29/21 2100)    phenylephrine (TRAVON-SYNEPHRINE) 50mg/250mL infusion 50 mcg/min (10/01/21 0504)       CBC:   Recent Labs     09/29/21  0115 09/30/21  0508 10/01/21  0409   WBC 15.3* 23.0* 17.4*   HGB 13.2 11.6* 10.7*    252 208     BMP:    Recent Labs     09/29/21  1803 09/30/21  0508 10/01/21  0409    138 136   K 3.9 4.1 4.3    107 103   CO2 18* 23 23   BUN 30* 26* 28*   CREATININE 1.82* 1.43* 1.25*   GLUCOSE 260* 138* 226*     Hepatic:   Recent Labs     09/30/21  0508 10/01/21  0409   AST 27 17   ALT 46* 34*   BILITOT 0.22* 0.18*   ALKPHOS 97 88     Troponin: No results for input(s): TROPONINI in the last 72 hours.   BNP: No results for input(s): BNP in the last 72 hours. Lipids: No results for input(s): CHOL, HDL in the last 72 hours. Invalid input(s): LDLCALCU  INR: No results for input(s): INR in the last 72 hours. Objective:   Vitals: /77   Pulse 99   Temp 98.4 °F (36.9 °C) (Oral)   Resp 11   Ht 5' 9\" (1.753 m)   Wt 231 lb 4.2 oz (104.9 kg)   SpO2 98%   BMI 34.15 kg/m²     General appearance: Intubated and sedated    HEENT: Head: Normocephalic, no lesions, without obvious abnormality  Neck: no JVD  Lungs: clear to auscultation bilaterally, no basilar rales, no wheezing   Heart: regular rate and rhythm, S1, S2 normal, no murmur, click, rub or gallop  Abdomen: soft, non-tender; bowel sounds normal  Extremities: No LE edema        Left ventricle is in the upper limits of normal size. Mildly reduced LV function with Calculated ejection fraction is 44%  Anterior and posterolateral wall motion abnormality. Abnormal global longitudinal strain average of -7%. Grade I (mild) left ventricular diastolic dysfunction. Left atrium is moderately dilated. Aortic sclerosis without stenosis. Calcification of mitral valve leaflet tips with moderate stenosis. Mean  gradient is 7 mmHg  Mild mitral regurgitation. Mild tricuspid regurgitation. Estimated right ventricular systolic pressure  is 36 mmHg.       Assessment / Acute Cardiac Problems:   1. Elevated troponin NSTEMI type I versus type II.  2 acute kidney injury  3 new onset of atrial fibrillation. Currently normal sinus rhythm. 4 toxic respiratory failure requiring intubation. diabetes mellitus  5. CAD s/p PCI to RCA in 2019. Had a  of RCA. Patent stent in LAD  Plan of Treatment:   1. Currently intubated and sedated. On [place]-Synephrine  2. We will plan for left heart cath when she is extubated. 3. Continue aspirin, low-dose heparin. Lipitor on hold secondary to elevated LFTs. Discussed with patient and nursing.        Nila Noriega MD MD  Fellow, [address] Pager - [phone]

## 2021-10-01 NOTE — PLAN OF CARE
Problem: OXYGENATION/RESPIRATORY FUNCTION  Goal: Patient will maintain patent airway  9/30/2021 2016 by Selina Enriquez RN  Outcome: Ongoing  9/30/2021 1824 by Marlon Putnam RN  Outcome: Ongoing  9/30/2021 1002 by Blake Robertson RCP  Outcome: Ongoing  Goal: Patient will achieve/maintain normal respiratory rate/effort  Description: Respiratory rate and effort will be within normal limits for the patient  9/30/2021 2016 by Selina Enriquez RN  Outcome: Ongoing  9/30/2021 1824 by Marlon Putnam RN  Outcome: Ongoing  9/30/2021 1002 by Blake Robertson RCP  Outcome: Ongoing     Problem: MECHANICAL VENTILATION  Goal: Patient will maintain patent airway  9/30/2021 2016 by Selina Enriquez RN  Outcome: Ongoing  9/30/2021 1824 by Marlon Putnam RN  Outcome: Ongoing  9/30/2021 1002 by Blake Robertson RCP  Outcome: Ongoing  Goal: Oral health is maintained or improved  9/30/2021 2016 by Selina Enriquez RN  Outcome: Ongoing  9/30/2021 1824 by Marlon Putnam RN  Outcome: Ongoing  9/30/2021 1002 by Blake Robertson RCP  Outcome: Ongoing  Goal: ET tube will be managed safely  9/30/2021 2016 by Selina Enriquez RN  Outcome: Ongoing  9/30/2021 1824 by Marlon Putnam RN  Outcome: Ongoing  9/30/2021 1002 by Blake Robertson RCP  Outcome: Ongoing  Goal: Ability to express needs and understand communication  9/30/2021 2016 by Selina Enriquez RN  Outcome: Ongoing  9/30/2021 1824 by Marlon Putnam RN  Outcome: Ongoing  9/30/2021 1002 by Blake Robertson RCP  Outcome: Ongoing  Goal: Mobility/activity is maintained at optimum level for patient  9/30/2021 2016 by Selina Enriquez RN  Outcome: Ongoing  9/30/2021 1824 by Marlon Putnam RN  Outcome: Ongoing  9/30/2021 1002 by Blake Robertson RCP  Outcome: Ongoing     Problem: SKIN INTEGRITY  Goal: Skin integrity is maintained or improved  9/30/2021 2016 by Selina Enriquez RN  Outcome: Ongoing  9/30/2021 1824 by Marlon Putnam, RN  Outcome: Ongoing  9/30/2021 1002 by Blake Robertson, 2016 by Carmen Martinez RN  Outcome: Ongoing  9/30/2021 1824 by Rosa Neff RN  Outcome: Ongoing     Problem:  Bowel Function - Altered:  Goal: Bowel elimination is within specified parameters  Description: Bowel elimination is within specified parameters  9/30/2021 2016 by Carmen Martinez RN  Outcome: Ongoing  9/30/2021 1824 by Rosa Neff RN  Outcome: Ongoing     Problem: Cardiac Output - Decreased:  Goal: Hemodynamic stability will improve  Description: Hemodynamic stability will improve  9/30/2021 2016 by Carmen Martinez RN  Outcome: Ongoing  9/30/2021 1824 by Rosa Neff RN  Outcome: Ongoing     Problem: Fluid Volume - Imbalance:  Goal: Absence of imbalanced fluid volume signs and symptoms  Description: Absence of imbalanced fluid volume signs and symptoms  9/30/2021 2016 by Carmen Martinez RN  Outcome: Ongoing  9/30/2021 1824 by Rosa Neff RN  Outcome: Ongoing     Problem: Gas Exchange - Impaired:  Goal: Levels of oxygenation will improve  Description: Levels of oxygenation will improve  9/30/2021 2016 by Carmen Martinez RN  Outcome: Ongoing  9/30/2021 1824 by Rosa Neff RN  Outcome: Ongoing  9/30/2021 1002 by Donya Mena RCP  Outcome: Ongoing     Problem: Mental Status - Impaired:  Goal: Mental status will be restored to baseline  Description: Mental status will be restored to baseline  9/30/2021 2016 by Carmen Martinez RN  Outcome: Ongoing  9/30/2021 1824 by Rosa Neff RN  Outcome: Ongoing     Problem: Nutrition Deficit:  Goal: Ability to achieve adequate nutritional intake will improve  Description: Ability to achieve adequate nutritional intake will improve  9/30/2021 2016 by Carmen Martinez RN  Outcome: Ongoing  9/30/2021 1824 by Rosa Neff RN  Outcome: Ongoing     Problem: Pain:  Goal: Pain level will decrease  Description: Pain level will decrease  9/30/2021 2016 by Carmen Martinez RN  Outcome: Ongoing  9/30/2021 1824 by Rosa Neff RN  Outcome: Ongoing  Goal: Recognizes and communicates pain  Description: Recognizes and communicates pain  9/30/2021 2016 by Catalina Delgado RN  Outcome: Ongoing  9/30/2021 1824 by Tata Marsh RN  Outcome: Ongoing  Goal: Control of acute pain  Description: Control of acute pain  9/30/2021 2016 by Catalina Delgado RN  Outcome: Ongoing  9/30/2021 1824 by Tata Marsh RN  Outcome: Ongoing  Goal: Control of chronic pain  Description: Control of chronic pain  9/30/2021 2016 by Catalina Delgado RN  Outcome: Ongoing  9/30/2021 1824 by Tata Marsh RN  Outcome: Ongoing     Problem: Serum Glucose Level - Abnormal:  Goal: Ability to maintain appropriate glucose levels will improve to within specified parameters  Description: Ability to maintain appropriate glucose levels will improve to within specified parameters  9/30/2021 2016 by Catalina Delgado RN  Outcome: Ongoing  9/30/2021 1824 by Tata Marsh RN  Outcome: Ongoing     Problem: Skin Integrity - Impaired:  Goal: Will show no infection signs and symptoms  Description: Will show no infection signs and symptoms  9/30/2021 2016 by Catlaina Delgado RN  Outcome: Ongoing  9/30/2021 1824 by Tata Marsh RN  Outcome: Ongoing  Goal: Absence of new skin breakdown  Description: Absence of new skin breakdown  9/30/2021 2016 by Catalina Delgado RN  Outcome: Ongoing  9/30/2021 1824 by Tata Marsh RN  Outcome: Ongoing     Problem: Sleep Pattern Disturbance:  Goal: Appears well-rested  Description: Appears well-rested  9/30/2021 2016 by Catalina Delgado RN  Outcome: Ongoing  9/30/2021 1824 by Tata Marsh RN  Outcome: Ongoing     Problem: Tissue Perfusion, Altered:  Goal: Circulatory function within specified parameters  Description: Circulatory function within specified parameters  9/30/2021 2016 by Catalina Delgado RN  Outcome: Ongoing  9/30/2021 1824 by Tata Marsh RN  Outcome: Ongoing     Problem: Tissue Perfusion - Cardiopulmonary, Altered:  Goal: Absence of angina  Description: Absence of angina  9/30/2021 2016 by Nesha Roberts RN  Outcome: Ongoing  9/30/2021 1824 by Leonor Rowe RN  Outcome: Ongoing  Goal: Hemodynamic stability will improve  Description: Hemodynamic stability will improve  9/30/2021 2016 by Nesha Roberts RN  Outcome: Ongoing  9/30/2021 1824 by Leonor Rowe RN  Outcome: Ongoing     Problem: Confusion - Acute:  Goal: Mental status will be restored to baseline  Description: Mental status will be restored to baseline  9/30/2021 2016 by Nesha Roberts RN  Outcome: Ongoing  9/30/2021 1824 by Leonor Rowe RN  Outcome: Ongoing  Goal: Absence of continued neurological deterioration signs and symptoms  Description: Absence of continued neurological deterioration signs and symptoms  9/30/2021 2016 by Nesha Roberts RN  Outcome: Ongoing  9/30/2021 1824 by Leonor Rowe RN  Outcome: Ongoing     Problem: Injury - Risk of, Physical Injury:  Goal: Absence of physical injury  Description: Absence of physical injury  9/30/2021 2016 by Nesha Roberts RN  Outcome: Ongoing  9/30/2021 1824 by Leonor Rowe RN  Outcome: Ongoing  Goal: Will remain free from falls  Description: Will remain free from falls  9/30/2021 2016 by Nesha Roberts RN  Outcome: Ongoing  9/30/2021 1824 by Leonor Rowe RN  Outcome: Ongoing     Problem: Mood - Altered:  Goal: Mood stable  Description: Mood stable  9/30/2021 2016 by Nesha Roberts RN  Outcome: Ongoing  9/30/2021 1824 by Leonor Rowe RN  Outcome: Ongoing  Goal: Absence of abusive behavior  Description: Absence of abusive behavior  9/30/2021 2016 by Nesha Roberts RN  Outcome: Ongoing  9/30/2021 1824 by Leonor Rowe RN  Outcome: Ongoing  Goal: Verbalizations of feeling emotionally comfortable while being cared for will increase  Description: Verbalizations of feeling emotionally comfortable while being cared for will increase  9/30/2021 2016 by Desirae Noel Anamaria Hernandez RN  Outcome: Ongoing  9/30/2021 1824 by Marlon Putnam RN  Outcome: Ongoing     Problem: Psychomotor Activity - Altered:  Goal: Absence of psychomotor disturbance signs and symptoms  Description: Absence of psychomotor disturbance signs and symptoms  9/30/2021 2016 by Selina Enriquez RN  Outcome: Ongoing  9/30/2021 1824 by Marlon Putnam RN  Outcome: Ongoing     Problem: Sensory Perception - Impaired:  Goal: Demonstrations of improved sensory functioning will increase  Description: Demonstrations of improved sensory functioning will increase  9/30/2021 2016 by Selina Enriquez RN  Outcome: Ongoing  9/30/2021 1824 by Marlon Putnam RN  Outcome: Ongoing  Goal: Decrease in sensory misperception frequency  Description: Decrease in sensory misperception frequency  9/30/2021 2016 by Selina Enriquez RN  Outcome: Ongoing  9/30/2021 1824 by Marlon Putnam RN  Outcome: Ongoing  Goal: Able to refrain from responding to false sensory perceptions  Description: Able to refrain from responding to false sensory perceptions  9/30/2021 2016 by Selina Enriquez RN  Outcome: Ongoing  9/30/2021 1824 by Marlon Putnam RN  Outcome: Ongoing  Goal: Demonstrates accurate environmental perceptions  Description: Demonstrates accurate environmental perceptions  9/30/2021 2016 by Selina Enriquez RN  Outcome: Ongoing  9/30/2021 1824 by Marlon Putnam RN  Outcome: Ongoing  Goal: Able to distinguish between reality-based and nonreality-based thinking  Description: Able to distinguish between reality-based and nonreality-based thinking  9/30/2021 2016 by Selina Enriquez RN  Outcome: Ongoing  9/30/2021 1824 by Marlon Putnam RN  Outcome: Ongoing  Goal: Able to interrupt nonreality-based thinking  Description: Able to interrupt nonreality-based thinking  9/30/2021 2016 by Selina Enriquez RN  Outcome: Ongoing  9/30/2021 1824 by Marlon Putnam RN  Outcome: Ongoing     Problem: Nutrition  Goal: Optimal nutrition therapy  Outcome: Ongoing     Problem: Falls - Risk of:  Goal: Absence of physical injury  Description: Absence of physical injury  9/30/2021 2016 by Philippe Pereira RN  Outcome: Ongoing  9/30/2021 1824 by Kevin Zambrano RN  Outcome: Ongoing  Goal: Will remain free from falls  Description: Will remain free from falls  9/30/2021 2016 by Philippe Pereira RN  Outcome: Ongoing  9/30/2021 1824 by Kevin Zambrano RN  Outcome: Ongoing     Problem: Skin Integrity:  Goal: Will show no infection signs and symptoms  Description: Will show no infection signs and symptoms  Outcome: Ongoing  Goal: Absence of new skin breakdown  Description: Absence of new skin breakdown  Outcome: Ongoing     Problem: Respiratory  Intervention: Administer medication as ordered  9/30/2021 1003 by Prem Luu RCP  Note: BRONCHOSPASM/BRONCHOCONSTRICTION     [x]         IMPROVE AERATION/BREATH SOUNDS  [x]   ADMINISTER BRONCHODILATOR THERAPY AS APPROPRIATE  [x]   ASSESS BREATH SOUNDS  []   IMPLEMENT AEROSOL/MDI PROTOCOL  [x]   PATIENT EDUCATION AS NEEDED

## 2021-10-01 NOTE — PROGRESS NOTES
Physical Therapy         Physical Therapy Cancel Note      DATE: 10/1/2021    NAME: Danilo Kwon  MRN: [id number]   : 1960      Patient not seen this date for Physical Therapy due to:    Patient is on strict bedrest. Will continue to pursue for mobility-readiness.       Electronically signed by Amy Berg PT on 10/1/2021 at 2:42 PM

## 2021-10-01 NOTE — PROGRESS NOTES
Critical Care Team - Daily Progress Note      Date and time: 10/1/2021 7:31 AM  Patient's name:  [address] Record Number: [id number]  Patient's account/billing number: [de-identified]  Patient's YOB: 1960  Age: 64 y.o. Date of Admission: 9/29/2021 12:59 AM  Length of stay during current admission: 2      Primary Care Physician: Althea Melton MD  ICU Attending Physician: Dr. Keren Choi    Code Status: Full Code    Reason for ICU admission:   Chief Complaint   Patient presents with    Respiratory Distress         SUBJECTIVE:     OVERNIGHT EVENTS:  Patient was seen and evaluated at bedside. No acute events overnight. Currently, she is on 75 mcg of neosynephrine for pressor support, maintaining MAP of 65 mm Hg, HR 92/min. Labs and imaging reviewed, Cr 1.25 from 1.45, WBC 17.4 from 23.0  (receiving steroid for COPD exacerbation). Hb 10.7 from 11.6, Plt 208 from 252. Rhinovirus positive. PH 7.356, pCO2 47.9, HCO3 26.9. She continues to be on heparin gtt for [facility]. 2D Echo results  Mildly reduced LV function with Calculated ejection fraction of 44%  Anterior and posterolateral wall motion abnormality. Abnormal global longitudinal strain average of -7%. Grade I (mild) left ventricular diastolic dysfunction. Left atrium is moderately dilated. Aortic sclerosis without stenosis. Calcification of mitral valve leaflet tips with moderate stenosis. Mean gradient is 7 mmHg  Mild mitral regurgitation. Mild tricuspid regurgitation. Estimated right ventricular systolic pressure is 36 mmHg. BRIEF HISTORY  64year old female with past medical history of COPD, HTN,   T2DM, CAD s/p PCI to RCA in 2019, on ASA and [place] [employer] at home presented to the ICU with altered mental status secondary to COPD exacerbation (pCO2 106, pH 6.9). She was in acute hypoxic and hypercapnic respiratory failure and hypotensive requiring pressor support. She was intubated for hypercapnic respiratory failure. She was also noted to have elevated troponin [phone], Cardiology was consulted and started on heparin gtt, 2D ECHO done.        AWAKE & FOLLOWING COMMANDS:  [x] No   [] Yes    CURRENT VENTILATION STATUS:     [x] Ventilator  [] BIPAP  [] Nasal Cannula [] Room Air      IF INTUBATED, ET TUBE MARKING AT LOWER LIP:       cms    SECRETIONS Amount:  [x] Small [] Moderate  [] Large  [] None  Color:     [] White [] Colored  [] Bloody    SEDATION:  RAAS Score:  [] Propofol gtt  [x] Versed gtt  [] Fentanyl gtt   [] No Sedation    PARALYZED:  [x] No    [] Yes    DIARRHEA:                [x] No                [] Yes  (C. Difficile status: [] positive                                                                                                                       [] negative                                                                                                                     [] pending)    VASOPRESSORS:  [] No    [] Yes    If yes -   [] Levophed       [] Dopamine     [] Vasopressin       [] Dobutamine  [x] Phenylephrine         [] Epinephrine    CENTRAL LINES:     [] No   [x] Yes   (Date of Insertion:   )           If yes -     [] Right IJ     [] Left IJ [x] Right Femoral [] Left Femoral                   [] Right Subclavian [] Left Subclavian       WE[employer]'S CATHETER:   [x] No   [] Yes  (Date of Insertion:   )     URINE OUTPUT:            [x] Good   [] Low              [] Anuric      OBJECTIVE:     VITAL SIGNS:  BP 99/65   Pulse 92   Temp 99.5 °F (37.5 °C) (Core)   Resp 11   Ht 5' 9\" (1.753 m)   Wt 231 lb 4.2 oz (104.9 kg)   SpO2 97%   BMI 34.15 kg/m²   Tmax over 24 hours:  Temp (24hrs), Av.2 °F (37.3 °C), Min:98.8 °F (37.1 °C), Max:99.5 °F (37.5 °C)      Patient Vitals for the past 8 hrs:   BP Pulse Resp SpO2 Weight   10/01/21 0645  92 11 97 %    10/01/21 0630  90 12 97 %    10/01/21 0615  93 10 97 %    10/01/21 0600  90 11 97 %    10/01/21 0545  91 11 97 %    10/01/21 0530  91 11 98 %    10/01/21 0515  93 11 97 %    10/01/21 0500  96 10 97 %    10/01/21 0445  96 8 98 %    10/01/21 0430  96 12 98 %    10/01/21 0415  96 11 98 %    10/01/21 0400 99/65 98 11 97 %    10/01/21 0352     231 lb 4.2 oz (104.9 kg)   10/01/21 0345  99 13 96 %    10/01/21 0332  86 11 97 %    10/01/21 0330  88 11 98 %    10/01/21 0315  90 12 98 %    10/01/21 0300  91 12 97 %    10/01/21 0245  88 11 96 %    10/01/21 0230  95 10 99 %    10/01/21 0215  90 12 96 %    10/01/21 0200  98 12 97 %    10/01/21 0145  100 20 95 %    10/01/21 0130  94 11 97 %    10/01/21 0115  95 10 97 %    10/01/21 0100  95 10 97 %    10/01/21 0045  99 13 96 %    10/01/21 0030  97 11 96 %    10/01/21 0015  96 11 96 %    10/01/21 0000  96 12 96 %    09/30/21 2349  91 10 97 %    09/30/21 2345  96 12 96 %          Intake/Output Summary (Last 24 hours) at 10/1/2021 0731  Last data filed at 10/1/2021 0540  Gross per 24 hour   Intake 3072 ml   Output 2005 ml   Net 1067 ml     Date 10/01/21 0000 - 10/01/21 2359   Shift 5817-8011 6328-2503 6468-0588 24 Hour Total   INTAKE   I.V.(mL/kg) 639(6.1)   639(6.1)   NG/GT(mL/kg) 197(1.9)   197(1.9)   Shift Total(mL/kg) 836(8)   836(8)   OUTPUT   Urine(mL/kg/hr) 280   280   Shift Total(mL/kg) 280(2.7)   280(2.7)   Weight (kg) 104.9 104.9 104.9 104.9     Wt Readings from Last 3 Encounters:   10/01/21 231 lb 4.2 oz (104.9 kg)   07/27/21 216 lb 6.4 oz (98.2 kg)   05/08/21 210 lb (95.3 kg)     Body mass index is 34.15 kg/m². PHYSICAL EXAM:  Constitutional: Intubated and sedated. EENT: PERRLA, EOMI, sclera clear, anicteric, oropharynx clear, no lesions, neck supple with midline trachea. Neck: Supple, symmetrical, trachea midline, no adenopathy, thyroid symmetric, no jvd skin normal  Respiratory: Bilateral diffuse wheezing present, no crackles, decreased breath sounds bilaterally.    Cardiovascular: regular rate and rhythm, normal S1, S2, no murmur noted and 2+ $Subsequent Day  Skin Assessment: Clean, dry, & intact  Suction Catheter Diameter: 14  Equipment Changed: HME  Vent Type: Servo i  Vent Mode: PRVC  Vt Ordered: 530 mL  Rate Set: 12 bmp  Pressure Support: 7 cmH20  FiO2 : 30 %  SpO2: 97 %  SpO2/FiO2 ratio: 323.33  Sensitivity: 5  PEEP/CPAP: 5  I Time/ I Time %: 0.8 s  Humidification Source: HME  Nitric Oxide/Epoprostenol In Use?: No  Mask Type: Full face mask  Mask Size: Large  Additional Respiratory  Assessments  Pulse: 92  Resp: 11  SpO2: 97 %  End Tidal CO2: 39 (%)  Position: Semi-[place]'s  Humidification Source: HME  Oral Care Completed?: Yes  Oral Care: Mouth swabbed, Mouth moisturizer, Mouth suctioned  Subglottic Suction Done?: No  Cuff Pressure (cm H2O): 28 cm H2O  Skin barrier applied: No    ABGs:     Lab Results   Component Value Date    CUJ1DIG NOT REPORTED 10/01/2021    FIO2 30.0 10/01/2021     Lactic Acid:   Lab Results   Component Value Date    LACTA NOT REPORTED 09/29/2021         DATA:  Complete Blood Count:   Recent Labs     09/29/21  0115 09/30/21  0508 10/01/21  0409   WBC 15.3* 23.0* 17.4*   HGB 13.2 11.6* 10.7*   MCV 93.1 85.1 87.5    252 208   RBC 4.96 4.29 4.09   HCT 46.2 36.5 35.8*   MCH 26.6 27.0 26.2   MCHC 28.6 31.8 29.9   RDW 15.7* 15.8* 16.2*   MPV 11.2 10.8 10.7        PT/INR:    Lab Results   Component Value Date    PROTIME 9.7 11/20/2016    PROTIME 9.9 06/01/2012    INR 0.9 11/20/2016     PTT:    Lab Results   Component Value Date    APTT 62.0 10/01/2021       Basal Metabolic Profile:   Recent Labs     09/29/21  1803 09/30/21  0508 10/01/21  0409    138 136   K 3.9 4.1 4.3   BUN 30* 26* 28*   CREATININE 1.82* 1.43* 1.25*    107 103   CO2 18* 23 23      Magnesium:   Lab Results   Component Value Date    MG 2.4 09/30/2021    MG 2.4 09/29/2021    MG 1.7 07/26/2021     Phosphorus:   Lab Results   Component Value Date    PHOS 3.5 09/30/2021    PHOS 2.2 07/26/2021     S.  Calcium:  Recent Labs     10/01/21  0450   CALCIUM 8.2*     S. Ionized Calcium:No results for input(s): IONCA in the last 72 hours. Urinalysis:   Lab Results   Component Value Date    NITRU NEGATIVE 09/29/2021    COLORU Yellow 09/29/2021    PHUR 6.5 09/29/2021    WBCUA 2 TO 5 09/29/2021    RBCUA 2 TO 5 09/29/2021    MUCUS NOT REPORTED 09/29/2021    TRICHOMONAS NOT REPORTED 09/29/2021    YEAST NOT REPORTED 09/29/2021    BACTERIA NOT REPORTED 09/29/2021    SPECGRAV 1.012 09/29/2021    LEUKOCYTESUR NEGATIVE 09/29/2021    UROBILINOGEN Normal 09/29/2021    BILIRUBINUR NEGATIVE 09/29/2021    BILIRUBINUR NEGATIVE 06/01/2012    GLUCOSEU NEGATIVE 09/29/2021    GLUCOSEU NEGATIVE 06/01/2012    KETUA NEGATIVE 09/29/2021    AMORPHOUS NOT REPORTED 09/29/2021       CARDIAC ENZYMES: No results for input(s): CKMB, CKMBINDEX, TROPONINI in the last 72 hours. Invalid input(s): CKTOTAL;3  BNP: No results for input(s): BNP in the last 72 hours. LFTS  Recent Labs     09/30/21  0508 10/01/21  0409   ALKPHOS 97 88   ALT 46* 34*   AST 27 17   BILITOT 0.22* 0.18*   LABALBU 3.3* 3.4*       AMYLASE/LIPASE/AMMONIA  No results for input(s): AMYLASE, LIPASE, AMMONIA in the last 72 hours. Last 3 Blood Glucose:   Recent Labs     09/29/21  0115 09/29/21  1803 09/30/21  0508 10/01/21  0409   GLUCOSE 271* 260* 138* 226*      HgBA1c:    Lab Results   Component Value Date    LABA1C 9.2 02/10/2019         TSH:    Lab Results   Component Value Date    TSH 1.24 06/16/2020     ANEMIA STUDIES  No results for input(s): LABIRON, TIBC, FERRITIN, [id number], FOLATE, OCCULTBLD in the last 72 hours.       Cultures during this admission:     Blood cultures:                 [] None drawn      [x] Negative             []  Positive (Details:  )  Urine Culture:                   [] None drawn      [x] Negative             []  Positive (Details:  )  Sputum Culture:               [] None drawn       [] Negative             [x]  Positive (Details: Mixed bacterial morphocytes  )   Endotracheal aspirate:     [] None drawn       [] Negative             [x]  Positive (Details: Mixed bacterial morphocytes   )       ASSESSMENT AND PLAN:        [facility] B[facility]D.  Feeding Diet: Diet NPO   ADULT TUBE FEEDING; Nasogastric; Peptide Based; Continuous; 20; Yes; 10; Q 4 hours; 55; 30; Q 4 hours   Fluids: No continuous fluids but 0.9% NaCl and Dextrose 5% PRN   Family: Daughter and daughter in law updated.  Analgesic: Tylenol 650 mg every 6 hours   Sedation: Fentanyl 20 mcg/ml, Midazolam 1mg/ml in D5W infusion   Thrombo-prophylaxis: [] Enoxaparin, [x] Continuous Heparin 25.000 units in dextrose 5% 250 ml infusion [] EPC Cuffs   Mobility: None    Heads up: Head elevated at 45 degrees.  Ulcer prophylaxis: [] PPI Agent, [x] [id number], [] Sucralfate, [] Other:   Glycemic control: Glucose 226 mg/dl   Spontaneous breathing trial: Intubated   Bowel regimen/urine output: Urine output net - 285 -  Net I/O for 10/1   Indwelling catheter/lines: 3 IV lines intact, no erythema   De-escalation: Waiting to extubate    1. Acute hypoxic hypercapnic respiratory failure secondary to COPD exacerbation  - Continue iv steroids solumedrol 40 mg iv q12. - Continue albuterol nebulization BID and Duoneb inhaltion 4 times daily. - Continue rocephin for now. - Daily weaning trials, patient is still wheezing, plan to extubate once the same resolves. Start symbicort once extubated. 2. Acute toxic metabolic encephalopathy from hypercapnia from respiratory failure  - Partially following commands today. - On versed and fentanyl for sedation. 3. Shock,cardiogenic  - Continue neosynephrine for pressor support, wean down as tolerated to maintain a MAP of 65 mm Hg.   -     4.  NSTEMI, previous h/o CAD s/p PCI  - Continue heparin gtt for anticoagulation.   - Follow up 2D ECHO, further recommendations for ischemia work up per Cardiology to follow based on the same.   - Replace electrolytes to keep K>4 and Mg>2.     5. Concern for new onset atrial fibrillation with RVR - currently NSR, rate controlled   - Continue heparin gtt for anticoagulation.   - Continue neosynephrine for pressor support. 6. Acute kidney injury, likely secondary to tissue hypoxia and shock - improving  - Continue LR at 75cc/hr, continue to monitor I/O closely, follow up repeat BMP tomorrow am.     DVT prophylaxis - On heparin gtt for [employer]  Diet - Started on tube feeds  IV fluids - 75 cc/hr. We are waiting to extubate the patient. Jose Alfredo Reagan MD, M.D. Department of Internal Medicine/ Critical care  [facility])             10/1/2021, 7:31 AM  Attending Physician Statement  I have discussed the care of Audra Bennett, including pertinent history and exam findings,  with the resident. I have seen and examined the patient and the key elements of all parts of the encounter have been performed by me. I agree with the assessment, plan and orders as documented by the resident with additions . Doubt pulmonary embolism. Venous Dopplers are negative. Still wheezing continue bronchodilator therapy and IV steroids    NSTEMI continue heparin drip  Follow echocardiogram  Cardiology evaluation. Start tube feeds    Updated daughter at bedside    Total critical care time caring for this patient with life threatening, unstable organ failure, including direct patient contact, management of life support systems, review of data including imaging and labs, discussions with other team members and physicians at least 28   Min so far today, excluding procedures. Treatment plan Discussed with nursing staff in detail , all questions answered . Electronically signed by Venus Jaramillo MD - Intern PGY 1 on   9/30/21 at 7:10 PM EDT    Please note that this chart was generated using voice recognition Dragon dictation software.   Although every effort was made to ensure the accuracy of this automated transcription, some errors in transcription may have occurred.

## 2021-10-01 NOTE — PROGRESS NOTES
10/01/21 1053   Vent Information   Vent Mode PRVC  (SBT end, patient abdominal breathing)   Vt Ordered 530 mL   Rate Set 12 bmp   FiO2  30 %   SpO2 97 %   PEEP/CPAP 5   Vent Patient Data   Rate Measured 22 br/min   Vt Exhaled 527 mL   Minute Volume 7.7 Liters     Patient placed back on full support settings per abdominal work of breathing. Frequent low tidal volumes and patient agitation.      Will continue to monitor

## 2021-10-01 NOTE — PROGRESS NOTES
Comprehensive Nutrition Assessment    Type and Reason for Visit:  Reassess    Nutrition Recommendations/Plan:   - Continue NPO  - Continue tube feeding via OG tube with peptide based formula (Vital AF 1.2 Davide) with a goal rate of 55 mL/hr  - Monitor tube feeding tolerance/adequacy     Nutrition Assessment:  Patient remains on vent. Tube feeding started yesterday. RN reports patient tolerating tube feeding well with minimal residuals. No BM yet, per RN - PRN bowel regimen in place. Blood glucose range x past 24 hours = 170-263 mg/dL. Malnutrition Assessment:  Malnutrition Status: At risk for malnutrition     Context:  Chronic Illness     Findings of the 6 clinical characteristics of malnutrition:  Energy Intake:  Mild decrease in energy intake   Weight Loss:  No significant weight loss     Body Fat Loss:  No significant body fat loss     Muscle Mass Loss:  No significant muscle mass loss    Fluid Accumulation:  1 - Mild- Generalized   Strength:  Not Performed    Estimated Daily Nutrient Needs:  Energy (kcal):  1500 kcal/day; Weight Used for Energy Requirements:  Current     Protein (g):  100 g pro/day; Weight Used for Protein Requirements:  Ideal (1.5)        Fluid (ml/day):  2200 mL/day; Method Used for Fluid Requirements:  ml/Kg (28 x Adj. BW)      Nutrition Related Findings:  Labs/Meds reviewed. +OG tube. +1 non-pitting generalized.       Wounds:  None       Current Nutrition Therapies:    Diet NPO  ADULT TUBE FEEDING; Nasogastric; Peptide Based; Continuous; 20; Yes; 10; Q 4 hours; 55; 30; Q 4 hours  Current Tube Feeding (TF) Orders:  · Feeding Route: Orogastric  · Formula: Peptide Based (Vital AF 1.2 Davide)  · Schedule: Continuous  · Water Flushes: 30 mL Q4H or per MD  · Current TF & Flush Orders Provides: See below  · Goal TF & Flush Orders Provides: @ 55 mL/hr = 1584 kcal, 99 g protein, 1071 mL free water    Anthropometric Measures:  · Height: 5' 9\" (175.3 cm)  · Current Body Weight: 231 lb 4.2 oz

## 2021-10-01 NOTE — PLAN OF CARE
Problem: OXYGENATION/RESPIRATORY FUNCTION  Goal: Patient will maintain patent airway  9/30/2021 2023 by Davie Wu RCP  Outcome: Ongoing     Problem: OXYGENATION/RESPIRATORY FUNCTION  Goal: Patient will achieve/maintain normal respiratory rate/effort  Description: Respiratory rate and effort will be within normal limits for the patient  9/30/2021 2023 by Davie Wu RCP  Outcome: Ongoing     Problem: MECHANICAL VENTILATION  Goal: Patient will maintain patent airway  9/30/2021 2023 by Davie Wu RCP  Outcome: Ongoing     Problem: MECHANICAL VENTILATION  Goal: Oral health is maintained or improved  9/30/2021 2023 by Davie Wu RCP  Outcome: Ongoing     Problem: MECHANICAL VENTILATION  Goal: ET tube will be managed safely  9/30/2021 2023 by Davie Wu RCP  Outcome: Ongoing     Problem: MECHANICAL VENTILATION  Goal: Ability to express needs and understand communication  9/30/2021 2023 by Davie Wu RCP  Outcome: Ongoing     Problem: MECHANICAL VENTILATION  Goal: Mobility/activity is maintained at optimum level for patient  9/30/2021 2023 by Davie Wu RCP  Outcome: Ongoing     Problem: SKIN INTEGRITY  Goal: Skin integrity is maintained or improved  9/30/2021 2023 by Davie Wu RCP  Outcome: Ongoing

## 2021-10-01 NOTE — PROGRESS NOTES
10/01/21 0857   Vent Information   Vent Mode CPAP   Pressure Support 6 cmH20   FiO2  30 %   PEEP/CPAP 5   Vent Patient Data   Rate Measured 11 br/min   Vt Exhaled 816 mL       Ventilator weaning trial started.

## 2021-10-01 NOTE — PLAN OF CARE
Problem: OXYGENATION/RESPIRATORY FUNCTION  Goal: Patient will maintain patent airway  10/1/2021 1758 by Reji Baum RCP  Outcome: Ongoing  10/1/2021 0858 by Brittany Maya RCP  Outcome: Ongoing  Goal: Patient will achieve/maintain normal respiratory rate/effort  Description: Respiratory rate and effort will be within normal limits for the patient  10/1/2021 1758 by Reji Baum RCP  Outcome: Ongoing  10/1/2021 0858 by Brittany Maya RCP  Outcome: Ongoing     Problem: MECHANICAL VENTILATION  Goal: Patient will maintain patent airway  10/1/2021 1758 by Reji Baum RCP  Outcome: Ongoing  10/1/2021 0858 by Brittany Maya RCP  Outcome: Ongoing  Goal: Oral health is maintained or improved  10/1/2021 1758 by Reji Baum RCP  Outcome: Ongoing  10/1/2021 0858 by Brittany Maya RCP  Outcome: Ongoing  Goal: ET tube will be managed safely  10/1/2021 1758 by Reji Baum RCP  Outcome: Ongoing  10/1/2021 0858 by Brittany Maya RCP  Outcome: Ongoing  Goal: Ability to express needs and understand communication  10/1/2021 1758 by Reji Baum RCP  Outcome: Ongoing  10/1/2021 0858 by Brittany Maya RCP  Outcome: Ongoing  Goal: Mobility/activity is maintained at optimum level for patient  10/1/2021 1758 by Reji Baum RCP  Outcome: Ongoing  10/1/2021 0858 by Brittany Maya RCP  Outcome: Ongoing     Problem: SKIN INTEGRITY  Goal: Skin integrity is maintained or improved  10/1/2021 1758 by Reji Baum RCP  Outcome: Ongoing  10/1/2021 0858 by Brittany Maya RCP  Outcome: Ongoing     Problem: Gas Exchange - Impaired:  Goal: Levels of oxygenation will improve  Description: Levels of oxygenation will improve  10/1/2021 1758 by Reji Baum RCP  Outcome: Ongoing  10/1/2021 0858 by Brittany Maya RCP  Outcome: Ongoing

## 2021-10-02 ENCOUNTER — APPOINTMENT (OUTPATIENT)
Dept: GENERAL RADIOLOGY | Age: 61
DRG: 951 | End: 2021-10-02
Payer: MEDICARE

## 2021-10-02 LAB
ABSOLUTE EOS #: 0 K/UL (ref 0–0.4)
ABSOLUTE IMMATURE GRANULOCYTE: 0 K/UL (ref 0–0.3)
ABSOLUTE LYMPH #: 0.43 K/UL (ref 1–4.8)
ABSOLUTE MONO #: 0.43 K/UL (ref 0.1–0.8)
ALBUMIN SERPL-MCNC: 3.2 G/DL (ref 3.5–5.2)
ALBUMIN/GLOBULIN RATIO: 1.2 (ref 1–2.5)
ALLEN TEST: ABNORMAL
ALP BLD-CCNC: 87 U/L (ref 35–104)
ALT SERPL-CCNC: 21 U/L (ref 5–33)
ANION GAP SERPL CALCULATED.3IONS-SCNC: 9 MMOL/L (ref 9–17)
AST SERPL-CCNC: 11 U/L
BASOPHILS # BLD: 0 % (ref 0–2)
BASOPHILS ABSOLUTE: 0 K/UL (ref 0–0.2)
BILIRUB SERPL-MCNC: 0.18 MG/DL (ref 0.3–1.2)
BUN BLDV-MCNC: 34 MG/DL (ref 6–20)
BUN/CREAT BLD: ABNORMAL (ref 9–20)
CALCIUM SERPL-MCNC: 8.5 MG/DL (ref 8.6–10.4)
CELLS COUNTED: 200
CHLORIDE BLD-SCNC: 103 MMOL/L (ref 98–107)
CO2: 24 MMOL/L (ref 20–31)
CREAT SERPL-MCNC: 1.06 MG/DL (ref 0.5–0.9)
DIFFERENTIAL TYPE: ABNORMAL
EOSINOPHILS RELATIVE PERCENT: 0 % (ref 1–4)
FIO2: 30
GFR AFRICAN AMERICAN: >60 ML/MIN
GFR NON-AFRICAN AMERICAN: 53 ML/MIN
GFR SERPL CREATININE-BSD FRML MDRD: ABNORMAL ML/MIN/{1.73_M2}
GFR SERPL CREATININE-BSD FRML MDRD: ABNORMAL ML/MIN/{1.73_M2}
GLUCOSE BLD-MCNC: 169 MG/DL (ref 65–105)
GLUCOSE BLD-MCNC: 185 MG/DL (ref 65–105)
GLUCOSE BLD-MCNC: 269 MG/DL (ref 65–105)
GLUCOSE BLD-MCNC: 272 MG/DL (ref 65–105)
GLUCOSE BLD-MCNC: 310 MG/DL (ref 70–99)
GLUCOSE BLD-MCNC: 327 MG/DL (ref 74–100)
HCT VFR BLD CALC: 37 % (ref 36.3–47.1)
HEMOGLOBIN: 11.2 G/DL (ref 11.9–15.1)
IMMATURE GRANULOCYTES: 0 %
LYMPHOCYTES # BLD: 2 % (ref 24–44)
MAGNESIUM: 2.5 MG/DL (ref 1.6–2.6)
MCH RBC QN AUTO: 26.4 PG (ref 25.2–33.5)
MCHC RBC AUTO-ENTMCNC: 30.3 G/DL (ref 28.4–34.8)
MCV RBC AUTO: 87.3 FL (ref 82.6–102.9)
MODE: ABNORMAL
MONOCYTES # BLD: 2 % (ref 1–7)
MORPHOLOGY: ABNORMAL
NEGATIVE BASE EXCESS, ART: ABNORMAL (ref 0–2)
NRBC AUTOMATED: 0 PER 100 WBC
O2 DEVICE/FLOW/%: ABNORMAL
PARTIAL THROMBOPLASTIN TIME: 39.7 SEC (ref 20.5–30.5)
PARTIAL THROMBOPLASTIN TIME: 49.1 SEC (ref 20.5–30.5)
PARTIAL THROMBOPLASTIN TIME: 52.5 SEC (ref 20.5–30.5)
PATIENT TEMP: ABNORMAL
PDW BLD-RTO: 16.2 % (ref 11.8–14.4)
PHOSPHORUS: 3 MG/DL (ref 2.6–4.5)
PLATELET # BLD: 228 K/UL (ref 138–453)
PLATELET ESTIMATE: ABNORMAL
PMV BLD AUTO: 10.8 FL (ref 8.1–13.5)
POC HCO3: 30 MMOL/L (ref 21–28)
POC O2 SATURATION: 93 % (ref 94–98)
POC PCO2 TEMP: ABNORMAL MM HG
POC PCO2: 50.7 MM HG (ref 35–48)
POC PH TEMP: ABNORMAL
POC PH: 7.38 (ref 7.35–7.45)
POC PO2 TEMP: ABNORMAL MM HG
POC PO2: 68.3 MM HG (ref 83–108)
POSITIVE BASE EXCESS, ART: 4 (ref 0–3)
POTASSIUM SERPL-SCNC: 4.8 MMOL/L (ref 3.7–5.3)
RBC # BLD: 4.24 M/UL (ref 3.95–5.11)
RBC # BLD: ABNORMAL 10*6/UL
SAMPLE SITE: ABNORMAL
SEG NEUTROPHILS: 96 % (ref 36–66)
SEGMENTED NEUTROPHILS ABSOLUTE COUNT: 20.64 K/UL (ref 1.8–7.7)
SODIUM BLD-SCNC: 136 MMOL/L (ref 135–144)
TCO2 (CALC), ART: ABNORMAL MMOL/L (ref 22–29)
TOTAL PROTEIN: 5.9 G/DL (ref 6.4–8.3)
WBC # BLD: 21.5 K/UL (ref 3.5–11.3)
WBC # BLD: ABNORMAL 10*3/UL

## 2021-10-02 PROCEDURE — 37799 UNLISTED PX VASCULAR SURGERY: CPT

## 2021-10-02 PROCEDURE — 6370000000 HC RX 637 (ALT 250 FOR IP): Performed by: STUDENT IN AN ORGANIZED HEALTH CARE EDUCATION/TRAINING PROGRAM

## 2021-10-02 PROCEDURE — 6370000000 HC RX 637 (ALT 250 FOR IP)

## 2021-10-02 PROCEDURE — 94640 AIRWAY INHALATION TREATMENT: CPT

## 2021-10-02 PROCEDURE — 2700000000 HC OXYGEN THERAPY PER DAY

## 2021-10-02 PROCEDURE — 83735 ASSAY OF MAGNESIUM: CPT

## 2021-10-02 PROCEDURE — 85730 THROMBOPLASTIN TIME PARTIAL: CPT

## 2021-10-02 PROCEDURE — 71045 X-RAY EXAM CHEST 1 VIEW: CPT

## 2021-10-02 PROCEDURE — 2580000003 HC RX 258: Performed by: STUDENT IN AN ORGANIZED HEALTH CARE EDUCATION/TRAINING PROGRAM

## 2021-10-02 PROCEDURE — 6360000002 HC RX W HCPCS: Performed by: STUDENT IN AN ORGANIZED HEALTH CARE EDUCATION/TRAINING PROGRAM

## 2021-10-02 PROCEDURE — 6360000002 HC RX W HCPCS: Performed by: HEALTH CARE PROVIDER

## 2021-10-02 PROCEDURE — 84100 ASSAY OF PHOSPHORUS: CPT

## 2021-10-02 PROCEDURE — 85025 COMPLETE CBC W/AUTO DIFF WBC: CPT

## 2021-10-02 PROCEDURE — 82947 ASSAY GLUCOSE BLOOD QUANT: CPT

## 2021-10-02 PROCEDURE — 82803 BLOOD GASES ANY COMBINATION: CPT

## 2021-10-02 PROCEDURE — 94761 N-INVAS EAR/PLS OXIMETRY MLT: CPT

## 2021-10-02 PROCEDURE — 94003 VENT MGMT INPAT SUBQ DAY: CPT

## 2021-10-02 PROCEDURE — 89220 SPUTUM SPECIMEN COLLECTION: CPT

## 2021-10-02 PROCEDURE — 80053 COMPREHEN METABOLIC PANEL: CPT

## 2021-10-02 PROCEDURE — 2500000003 HC RX 250 WO HCPCS: Performed by: INTERNAL MEDICINE

## 2021-10-02 PROCEDURE — 6370000000 HC RX 637 (ALT 250 FOR IP): Performed by: INTERNAL MEDICINE

## 2021-10-02 PROCEDURE — 2000000000 HC ICU R&B

## 2021-10-02 PROCEDURE — 99291 CRITICAL CARE FIRST HOUR: CPT | Performed by: INTERNAL MEDICINE

## 2021-10-02 PROCEDURE — 94664 DEMO&/EVAL PT USE INHALER: CPT

## 2021-10-02 RX ORDER — BUDESONIDE AND FORMOTEROL FUMARATE DIHYDRATE 160; 4.5 UG/1; UG/1
2 AEROSOL RESPIRATORY (INHALATION) 2 TIMES DAILY
Status: DISCONTINUED | OUTPATIENT
Start: 2021-10-02 | End: 2021-10-08 | Stop reason: HOSPADM

## 2021-10-02 RX ORDER — FUROSEMIDE 10 MG/ML
20 INJECTION INTRAMUSCULAR; INTRAVENOUS ONCE
Status: COMPLETED | OUTPATIENT
Start: 2021-10-02 | End: 2021-10-02

## 2021-10-02 RX ORDER — SODIUM CHLORIDE, SODIUM LACTATE, POTASSIUM CHLORIDE, CALCIUM CHLORIDE 600; 310; 30; 20 MG/100ML; MG/100ML; MG/100ML; MG/100ML
INJECTION, SOLUTION INTRAVENOUS CONTINUOUS
Status: DISCONTINUED | OUTPATIENT
Start: 2021-10-02 | End: 2021-10-05

## 2021-10-02 RX ORDER — METHYLPREDNISOLONE SODIUM SUCCINATE 125 MG/2ML
40 INJECTION, POWDER, LYOPHILIZED, FOR SOLUTION INTRAMUSCULAR; INTRAVENOUS DAILY
Status: DISCONTINUED | OUTPATIENT
Start: 2021-10-03 | End: 2021-10-03

## 2021-10-02 RX ORDER — LISINOPRIL 10 MG/1
10 TABLET ORAL DAILY
Status: DISCONTINUED | OUTPATIENT
Start: 2021-10-02 | End: 2021-10-08 | Stop reason: HOSPADM

## 2021-10-02 RX ADMIN — ASPIRIN 81 MG: 81 TABLET, CHEWABLE ORAL at 08:33

## 2021-10-02 RX ADMIN — METHYLPREDNISOLONE SODIUM SUCCINATE 40 MG: 125 INJECTION, POWDER, FOR SOLUTION INTRAMUSCULAR; INTRAVENOUS at 08:33

## 2021-10-02 RX ADMIN — INSULIN GLARGINE 25 UNITS: 100 INJECTION, SOLUTION SUBCUTANEOUS at 20:53

## 2021-10-02 RX ADMIN — INSULIN LISPRO 9 UNITS: 100 INJECTION, SOLUTION INTRAVENOUS; SUBCUTANEOUS at 12:30

## 2021-10-02 RX ADMIN — LISINOPRIL 10 MG: 10 TABLET ORAL at 15:12

## 2021-10-02 RX ADMIN — IPRATROPIUM BROMIDE AND ALBUTEROL SULFATE 1 AMPULE: .5; 2.5 SOLUTION RESPIRATORY (INHALATION) at 20:24

## 2021-10-02 RX ADMIN — PHENYLEPHRINE HYDROCHLORIDE 30 MCG/MIN: 10 INJECTION INTRAVENOUS at 02:21

## 2021-10-02 RX ADMIN — FAMOTIDINE 20 MG: 10 INJECTION INTRAVENOUS at 20:54

## 2021-10-02 RX ADMIN — METOPROLOL TARTRATE 25 MG: 25 TABLET ORAL at 20:54

## 2021-10-02 RX ADMIN — FAMOTIDINE 20 MG: 10 INJECTION INTRAVENOUS at 08:35

## 2021-10-02 RX ADMIN — CEFTRIAXONE SODIUM 1000 MG: 1 INJECTION, POWDER, FOR SOLUTION INTRAMUSCULAR; INTRAVENOUS at 03:45

## 2021-10-02 RX ADMIN — IPRATROPIUM BROMIDE AND ALBUTEROL SULFATE 1 AMPULE: .5; 2.5 SOLUTION RESPIRATORY (INHALATION) at 16:30

## 2021-10-02 RX ADMIN — ALBUTEROL SULFATE 2.5 MG: 2.5 SOLUTION RESPIRATORY (INHALATION) at 04:24

## 2021-10-02 RX ADMIN — INSULIN LISPRO 2 UNITS: 100 INJECTION, SOLUTION INTRAVENOUS; SUBCUTANEOUS at 20:53

## 2021-10-02 RX ADMIN — IPRATROPIUM BROMIDE AND ALBUTEROL SULFATE 1 AMPULE: .5; 2.5 SOLUTION RESPIRATORY (INHALATION) at 09:00

## 2021-10-02 RX ADMIN — HEPARIN SODIUM AND DEXTROSE 16.2 UNITS/KG/HR: 10000; 5 INJECTION INTRAVENOUS at 03:47

## 2021-10-02 RX ADMIN — SODIUM CHLORIDE, PRESERVATIVE FREE 10 ML: 5 INJECTION INTRAVENOUS at 20:54

## 2021-10-02 RX ADMIN — SODIUM CHLORIDE, PRESERVATIVE FREE 5 ML: 5 INJECTION INTRAVENOUS at 08:39

## 2021-10-02 RX ADMIN — ALBUTEROL SULFATE 2.5 MG: 2.5 SOLUTION RESPIRATORY (INHALATION) at 00:00

## 2021-10-02 RX ADMIN — IPRATROPIUM BROMIDE AND ALBUTEROL SULFATE 1 AMPULE: .5; 2.5 SOLUTION RESPIRATORY (INHALATION) at 11:40

## 2021-10-02 RX ADMIN — INSULIN LISPRO 9 UNITS: 100 INJECTION, SOLUTION INTRAVENOUS; SUBCUTANEOUS at 08:37

## 2021-10-02 RX ADMIN — Medication 100 MCG/HR: at 07:09

## 2021-10-02 RX ADMIN — HEPARIN SODIUM 2000 UNITS: 1000 INJECTION, SOLUTION INTRAVENOUS; SUBCUTANEOUS at 05:49

## 2021-10-02 RX ADMIN — HEPARIN SODIUM 2000 UNITS: 1000 INJECTION, SOLUTION INTRAVENOUS; SUBCUTANEOUS at 18:43

## 2021-10-02 RX ADMIN — BENZOCAINE AND MENTHOL 1 LOZENGE: 15; 3.6 LOZENGE ORAL at 16:53

## 2021-10-02 RX ADMIN — FUROSEMIDE 20 MG: 10 INJECTION, SOLUTION INTRAMUSCULAR; INTRAVENOUS at 12:09

## 2021-10-02 RX ADMIN — SODIUM CHLORIDE, PRESERVATIVE FREE 10 ML: 5 INJECTION INTRAVENOUS at 12:23

## 2021-10-02 RX ADMIN — HEPARIN SODIUM AND DEXTROSE 20.2 UNITS/KG/HR: 10000; 5 INJECTION INTRAVENOUS at 18:45

## 2021-10-02 RX ADMIN — INSULIN LISPRO 3 UNITS: 100 INJECTION, SOLUTION INTRAVENOUS; SUBCUTANEOUS at 17:28

## 2021-10-02 RX ADMIN — SODIUM CHLORIDE, POTASSIUM CHLORIDE, SODIUM LACTATE AND CALCIUM CHLORIDE: 600; 310; 30; 20 INJECTION, SOLUTION INTRAVENOUS at 12:32

## 2021-10-02 RX ADMIN — BUDESONIDE AND FORMOTEROL FUMARATE DIHYDRATE 2 PUFF: 160; 4.5 AEROSOL RESPIRATORY (INHALATION) at 20:24

## 2021-10-02 ASSESSMENT — PULMONARY FUNCTION TESTS
PIF_VALUE: 15
PIF_VALUE: 12
PIF_VALUE: 12
PIF_VALUE: 8
PIF_VALUE: 23
PIF_VALUE: 12

## 2021-10-02 ASSESSMENT — PAIN DESCRIPTION - LOCATION: LOCATION: HEAD

## 2021-10-02 ASSESSMENT — PAIN SCALES - GENERAL
PAINLEVEL_OUTOF10: 1
PAINLEVEL_OUTOF10: 0

## 2021-10-02 NOTE — PROGRESS NOTES
Order obtained for extubation. SpO2 of 100 on 35% FiO2. Patient extubated and placed on 40% O2 via ventilator. Post extubation SpO2 is 100% with HR  104 bpm and RR 20 breaths/min. Patient had strong cough that was productive of clear  sputum. Extubation Well tolerated by patient. .   Breath Sounds: clear throughout    Briana Bustos RCP   12:22 PM

## 2021-10-02 NOTE — PROGRESS NOTES
10/02/21 0917   Vent Information   Vent Mode CPAP  (SBT start)   Pressure Support 6 cmH20   FiO2  35 %   SpO2 99 %   PEEP/CPAP 5   Vent Patient Data   Rate Measured 13 br/min   Vt Exhaled 798 mL   Minute Volume 11.7 Liters     Patient placed on SBT. Breath sounds heard bilateral clear/dimished    Neuro status improving, patient can follow commands  - turn head, squeeze hands bilaterally Minimal secretions, with strong cough/gag. Will continue to monitor.

## 2021-10-02 NOTE — PLAN OF CARE
Problem: OXYGENATION/RESPIRATORY FUNCTION  Goal: Patient will maintain patent airway  10/2/2021 1519 by Merced Juarez RN  Outcome: Ongoing     Problem: OXYGENATION/RESPIRATORY FUNCTION  Goal: Patient will achieve/maintain normal respiratory rate/effort  Description: Respiratory rate and effort will be within normal limits for the patient  10/2/2021 1519 by Merced Juarez RN  Outcome: Ongoing     Problem: SKIN INTEGRITY  Goal: Skin integrity is maintained or improved  10/2/2021 1519 by Merced Juarez RN  Outcome: Ongoing     Problem: Non-Violent Restraints  Goal: Removal from restraints as soon as assessed to be safe  10/2/2021 1519 by Merced Juarez RN  Outcome: Completed     Problem: Non-Violent Restraints  Goal: No harm/injury to patient while restraints in use  10/2/2021 1519 by Merced Juarez RN  Outcome: Completed     Problem: Non-Violent Restraints  Goal: Patient's dignity will be maintained  10/2/2021 1519 by Merced Juarez RN  Outcome: Completed     Problem: Discharge Planning:  Goal: Participates in care planning  Description: Participates in care planning  10/2/2021 1519 by Merced Juarez RN  Outcome: Ongoing     Problem: Discharge Planning:  Goal: Discharged to appropriate level of care  Description: Discharged to appropriate level of care  10/2/2021 1519 by Merced Juarez RN  Outcome: Ongoing     Problem: Discharge Planning:  Goal: Ability to perform activities of daily living will improve  Description: Ability to perform activities of daily living will improve  10/2/2021 1519 by Merced Juarez RN  Outcome: Ongoing     Problem: Airway Clearance - Ineffective:  Goal: Ability to maintain a clear airway will improve  Description: Ability to maintain a clear airway will improve  10/2/2021 1519 by Merced Juarez RN  Outcome: Ongoing     Problem: Anxiety/Stress:  Goal: Level of anxiety will decrease  Description: Level of anxiety will decrease  10/2/2021 1519 by Merced Juarez pain  10/2/2021 1519 by Reji Goss RN  Outcome: Ongoing     Problem: Serum Glucose Level - Abnormal:  Goal: Ability to maintain appropriate glucose levels will improve to within specified parameters  Description: Ability to maintain appropriate glucose levels will improve to within specified parameters  10/2/2021 1519 by Reji Goss RN  Outcome: Ongoing     Problem: Skin Integrity - Impaired:  Goal: Will show no infection signs and symptoms  Description: Will show no infection signs and symptoms  10/2/2021 1519 by Reji Goss RN  Outcome: Met This Shift     Problem: Skin Integrity - Impaired:  Goal: Absence of new skin breakdown  Description: Absence of new skin breakdown  10/2/2021 1519 by Reji Goss RN  Outcome: Met This Shift     Problem: Sleep Pattern Disturbance:  Goal: Appears well-rested  Description: Appears well-rested  10/2/2021 1519 by Reji Goss RN  Outcome: Ongoing     Problem: Tissue Perfusion, Altered:  Goal: Circulatory function within specified parameters  Description: Circulatory function within specified parameters  10/2/2021 1519 by Reji Goss RN  Outcome: Ongoing     Problem: Tissue Perfusion - Cardiopulmonary, Altered:  Goal: Absence of angina  Description: Absence of angina  10/2/2021 1519 by Reji Goss RN  Outcome: Met This Shift     Problem: Tissue Perfusion - Cardiopulmonary, Altered:  Goal: Hemodynamic stability will improve  Description: Hemodynamic stability will improve  10/2/2021 1519 by Reji Goss RN  Outcome: Ongoing     Problem: Confusion - Acute:  Goal: Mental status will be restored to baseline  Description: Mental status will be restored to baseline  10/2/2021 1519 by Reji Goss RN  Outcome: Ongoing     Problem: Injury - Risk of, Physical Injury:  Goal: Absence of physical injury  Description: Absence of physical injury  10/2/2021 1519 by Reji Goss RN  Outcome: Met This Shift     Problem: Injury - Risk of, Physical Injury:  Goal: Will remain free from falls  Description: Will remain free from falls  10/2/2021 1519 by Twila Crowe RN  Outcome: Met This Shift     Problem: Mood - Altered:  Goal: Mood stable  Description: Mood stable  10/2/2021 1519 by Twila Crowe RN  Outcome: Ongoing     Problem: Mood - Altered:  Goal: Absence of abusive behavior  Description: Absence of abusive behavior  10/2/2021 1519 by Twila Crowe RN  Outcome: Met This Shift     Problem: Mood - Altered:  Goal: Verbalizations of feeling emotionally comfortable while being cared for will increase  Description: Verbalizations of feeling emotionally comfortable while being cared for will increase  10/2/2021 1519 by Twila Crowe RN  Outcome: Ongoing     Problem: Psychomotor Activity - Altered:  Goal: Absence of psychomotor disturbance signs and symptoms  Description: Absence of psychomotor disturbance signs and symptoms  10/2/2021 1519 by Twila Crowe RN  Outcome: Met This Shift     Problem: Sensory Perception - Impaired:  Goal: Demonstrations of improved sensory functioning will increase  Description: Demonstrations of improved sensory functioning will increase  10/2/2021 1519 by Twila Crowe RN  Outcome: Met This Shift     Problem: Sensory Perception - Impaired:  Goal: Decrease in sensory misperception frequency  Description: Decrease in sensory misperception frequency  10/2/2021 1519 by Twila Crowe RN  Outcome: Met This Shift     Problem: Sensory Perception - Impaired:  Goal: Able to refrain from responding to false sensory perceptions  Description: Able to refrain from responding to false sensory perceptions  10/2/2021 1519 by Twila Crowe RN  Outcome: Met This Shift     Problem: Sensory Perception - Impaired:  Goal: Demonstrates accurate environmental perceptions  Description: Demonstrates accurate environmental perceptions  10/2/2021 1519 by Twila Crowe RN  Outcome: Met This Shift     Problem: Sensory Perception - Impaired:  Goal: Able to distinguish between reality-based and nonreality-based thinking  Description: Able to distinguish between reality-based and nonreality-based thinking  10/2/2021 1519 by Merced Juarez RN  Outcome: Met This Shift     Problem: Sensory Perception - Impaired:  Goal: Able to interrupt nonreality-based thinking  Description: Able to interrupt nonreality-based thinking  10/2/2021 1519 by Merced Juarez RN  Outcome: Met This Shift     Problem: Falls - Risk of:  Goal: Absence of physical injury  Description: Absence of physical injury  10/2/2021 1519 by Merced Juarez RN  Outcome: Met This Shift     Problem: Falls - Risk of:  Goal: Will remain free from falls  Description: Will remain free from falls  10/2/2021 1519 by Merced Juarez RN  Outcome: Met This Shift     Problem: Skin Integrity:  Goal: Will show no infection signs and symptoms  Description: Will show no infection signs and symptoms  Outcome: Met This Shift     Problem: Skin Integrity:  Goal: Absence of new skin breakdown  Description: Absence of new skin breakdown  Outcome: Met This Shift

## 2021-10-02 NOTE — PLAN OF CARE
Problem: OXYGENATION/RESPIRATORY FUNCTION  Goal: Patient will maintain patent airway  10/2/2021 1522 by Jewell Ricektts RCP  Outcome: Ongoing  10/2/2021 1519 by Shantel Lenz RN  Outcome: Ongoing  10/2/2021 0830 by Lori Samuel RN  Outcome: Ongoing  Goal: Patient will achieve/maintain normal respiratory rate/effort  Description: Respiratory rate and effort will be within normal limits for the patient  10/2/2021 1522 by Jewell Ricketts RCP  Outcome: Ongoing  10/2/2021 1519 by Shantel Lenz RN  Outcome: Ongoing  10/2/2021 0830 by Lori Samuel RN  Outcome: Ongoing      Problem: SKIN INTEGRITY  Goal: Skin integrity is maintained or improved  10/2/2021 1522 by Jewell Ricketts RCP  Outcome: Ongoing  10/2/2021 1519 by Shantel Lenz RN  Outcome: Ongoing  10/2/2021 0830 by Lori Samuel RN  Outcome: Ongoing     Problem: Airway Clearance - Ineffective:  Goal: Ability to maintain a clear airway will improve  Description: Ability to maintain a clear airway will improve  10/2/2021 1522 by Jewell Ricketts RCP  Outcome: Ongoing  10/2/2021 1519 by Shantel Lenz RN  Outcome: Ongoing  10/2/2021 0830 by Lori Samuel RN  Outcome: Ongoing     Problem: Gas Exchange - Impaired:  Goal: Levels of oxygenation will improve  Description: Levels of oxygenation will improve  10/2/2021 1522 by Jewell Ricketts RCP  Outcome: Ongoing  10/2/2021 1519 by Shantel Lenz RN  Outcome: Ongoing  10/2/2021 0830 by Lori Samuel RN  Outcome: Ongoing     Problem: Skin Integrity - Impaired:  Goal: Will show no infection signs and symptoms  Description: Will show no infection signs and symptoms  10/2/2021 1522 by Jewell Ricketts RCP  Outcome: Ongoing  10/2/2021 1519 by Shantel Lenz RN  Outcome: Met This Shift  10/2/2021 0830 by Lori Samuel RN  Outcome: Ongoing  Goal: Absence of new skin breakdown  Description: Absence of new skin breakdown  10/2/2021 1522 by Jewell Ricketts RCP  Outcome: Ongoing  10/2/2021 1519 by Shantel Lenz RN  Outcome:  Met This Shift  10/2/2021 0830 by Malgorzata Baird RN  Outcome: Ongoing     Problem: Skin Integrity:  Goal: Will show no infection signs and symptoms  Description: Will show no infection signs and symptoms  10/2/2021 1522 by Tutu Reese RCP  Outcome: Ongoing  10/2/2021 1519 by Christina Dakin, RN  Outcome: Met This Shift  Goal: Absence of new skin breakdown  Description: Absence of new skin breakdown  10/2/2021 1522 by Tutu Reese RCP  Outcome: Ongoing  10/2/2021 1519 by Christina Dakin, RN  Outcome: Met This Shift

## 2021-10-02 NOTE — PLAN OF CARE
Problem: OXYGENATION/RESPIRATORY FUNCTION  Goal: Patient will maintain patent airway  10/1/2021 2035 by Nabil Irving RCP  Outcome: Ongoing     Problem: OXYGENATION/RESPIRATORY FUNCTION  Goal: Patient will achieve/maintain normal respiratory rate/effort  Description: Respiratory rate and effort will be within normal limits for the patient  10/1/2021 2035 by Elizabeth Irving RCP  Outcome: Ongoing     Problem: MECHANICAL VENTILATION  Goal: Patient will maintain patent airway  10/1/2021 2035 by Nabil Irving RCP  Outcome: Ongoing     Problem: MECHANICAL VENTILATION  Goal: Oral health is maintained or improved  10/1/2021 2035 by Nabil Irving RCP  Outcome: Ongoing     Problem: MECHANICAL VENTILATION  Goal: ET tube will be managed safely  10/1/2021 2035 by Elizabeth Irving RCP  Outcome: Ongoing     Problem: MECHANICAL VENTILATION  Goal: Ability to express needs and understand communication  10/1/2021 2035 by Nabil Irving RCP  Outcome: Ongoing     Problem: MECHANICAL VENTILATION  Goal: Mobility/activity is maintained at optimum level for patient  10/1/2021 2035 by Nabil Irving RCP  Outcome: Ongoing     Problem: SKIN INTEGRITY  Goal: Skin integrity is maintained or improved  10/1/2021 2035 by Patricio Sexton RCP  Outcome: Ongoing

## 2021-10-02 NOTE — PLAN OF CARE
Problem: MECHANICAL VENTILATION  Goal: Patient will maintain patent airway  10/2/2021 1558 by Gemini Perez RCP  Outcome: Completed  10/2/2021 1522 by Gemini Perez, RCP  Outcome: Ongoing  10/2/2021 0830 by Nellie Stout RN  Outcome: Ongoing  Goal: Oral health is maintained or improved  10/2/2021 1558 by Gemini Perez, RCP  Outcome: Completed  10/2/2021 0830 by eNllie Stout RN  Outcome: Ongoing  Goal: ET tube will be managed safely  10/2/2021 1558 by Gemini Perez, RCP  Outcome: Completed  10/2/2021 1522 by Gemini Perez, RCP  Outcome: Ongoing  10/2/2021 0830 by Nellie Stout RN  Outcome: Ongoing  Goal: Ability to express needs and understand communication  10/2/2021 1558 by Gemini Perez RCP  Outcome: Completed  10/2/2021 1522 by Gemini Perez, RCP  Outcome: Ongoing  10/2/2021 0830 by Nellie Stout, RN  Outcome: Ongoing  Goal: Mobility/activity is maintained at optimum level for patient  10/2/2021 1558 by Gemini Perez RCP  Outcome: Completed  10/2/2021 1522 by Gemini Perez RCP  Outcome: Ongoing  10/2/2021 0830 by Nellie Stout, RN  Outcome: Ongoing

## 2021-10-02 NOTE — PROGRESS NOTES
10/02/21 1220   Vent Information   Vent Mode NIV/PC   Pressure Ordered 8   Rate Set 14 bmp   FiO2  40 %   PEEP/CPAP 5     Extubated and placed on NIV, tolerating well

## 2021-10-02 NOTE — PLAN OF CARE
Problem: OXYGENATION/RESPIRATORY FUNCTION  Goal: Patient will maintain patent airway  10/2/2021 0830 by Ziyad Singh RN  Outcome: Ongoing  10/1/2021 2035 by Geremias Irving RCP  Outcome: Ongoing  Goal: Patient will achieve/maintain normal respiratory rate/effort  Description: Respiratory rate and effort will be within normal limits for the patient  10/2/2021 0830 by Ziyad Singh RN  Outcome: Ongoing  10/1/2021 2035 by Hipolito Abreu RCP  Outcome: Ongoing     Problem: MECHANICAL VENTILATION  Goal: Patient will maintain patent airway  10/2/2021 0830 by Ziyad Singh RN  Outcome: Ongoing  10/1/2021 2035 by Hipolito Abreu RCP  Outcome: Ongoing  Goal: Oral health is maintained or improved  10/2/2021 0830 by Ziyad Singh RN  Outcome: Ongoing  10/1/2021 2035 by Hipolito Abreu RCP  Outcome: Ongoing  Goal: ET tube will be managed safely  10/2/2021 0830 by Ziyad Singh RN  Outcome: Ongoing  10/1/2021 2035 by Geremias Irving RCP  Outcome: Ongoing  Goal: Ability to express needs and understand communication  10/2/2021 0830 by Ziyad Singh RN  Outcome: Ongoing  10/1/2021 2035 by Geremias Irving RCP  Outcome: Ongoing  Goal: Mobility/activity is maintained at optimum level for patient  10/2/2021 0830 by Ziyad Singh RN  Outcome: Ongoing  10/1/2021 2035 by Geremias Irving RCP  Outcome: Ongoing     Problem: SKIN INTEGRITY  Goal: Skin integrity is maintained or improved  10/2/2021 0830 by Ziyad Singh RN  Outcome: Ongoing  10/1/2021 2035 by Geremias Irving RCP  Outcome: Ongoing     Problem: Non-Violent Restraints  Goal: Removal from restraints as soon as assessed to be safe  Outcome: Ongoing  Goal: No harm/injury to patient while restraints in use  Outcome: Ongoing  Goal: Patient's dignity will be maintained  Outcome: Ongoing     Problem: Airway Clearance - Ineffective:  Goal: Ability to maintain a clear airway will improve  Description: Ability to maintain a clear airway will improve  Outcome: Ongoing     Problem: Anxiety/Stress:  Goal: Level of anxiety will decrease  Description: Level of anxiety will decrease  Outcome: Ongoing     Problem: Aspiration:  Goal: Absence of aspiration  Description: Absence of aspiration  Outcome: Ongoing     Problem: Cardiac Output - Decreased:  Goal: Hemodynamic stability will improve  Description: Hemodynamic stability will improve  Outcome: Ongoing     Problem: Nutrition Deficit:  Goal: Ability to achieve adequate nutritional intake will improve  Description: Ability to achieve adequate nutritional intake will improve  Outcome: Ongoing     Problem: Serum Glucose Level - Abnormal:  Goal: Ability to maintain appropriate glucose levels will improve to within specified parameters  Description: Ability to maintain appropriate glucose levels will improve to within specified parameters  Outcome: Ongoing     Problem: Tissue Perfusion, Altered:  Goal: Circulatory function within specified parameters  Description: Circulatory function within specified parameters  Outcome: Ongoing     Problem: Tissue Perfusion - Cardiopulmonary, Altered:  Goal: Absence of angina  Description: Absence of angina  Outcome: Ongoing  Goal: Hemodynamic stability will improve  Description: Hemodynamic stability will improve  Outcome: Ongoing     Problem: Injury - Risk of, Physical Injury:  Goal: Absence of physical injury  Description: Absence of physical injury  Outcome: Ongoing  Goal: Will remain free from falls  Description: Will remain free from falls  Outcome: Ongoing     Problem: Psychomotor Activity - Altered:  Goal: Absence of psychomotor disturbance signs and symptoms  Description: Absence of psychomotor disturbance signs and symptoms  Outcome: Ongoing     Problem: Sensory Perception - Impaired:  Goal: Demonstrations of improved sensory functioning will increase  Description: Demonstrations of improved sensory functioning will increase  Outcome: Ongoing  Goal: Decrease in sensory misperception frequency  Description: Decrease in sensory misperception frequency  Outcome: Ongoing  Goal: Able to refrain from responding to false sensory perceptions  Description: Able to refrain from responding to false sensory perceptions  Outcome: Ongoing  Goal: Demonstrates accurate environmental perceptions  Description: Demonstrates accurate environmental perceptions  Outcome: Ongoing  Goal: Able to distinguish between reality-based and nonreality-based thinking  Description: Able to distinguish between reality-based and nonreality-based thinking  Outcome: Ongoing  Goal: Able to interrupt nonreality-based thinking  Description: Able to interrupt nonreality-based thinking  Outcome: Ongoing     Problem: Falls - Risk of:  Goal: Absence of physical injury  Description: Absence of physical injury  Outcome: Ongoing  Goal: Will remain free from falls  Description: Will remain free from falls  Outcome: Ongoing

## 2021-10-02 NOTE — PROGRESS NOTES
Critical Care Team - Daily Progress Note      Date and time: 10/2/2021 3:34 PM  Patient's name:  [address] Record Number: [id number]  Patient's account/billing number: [de-identified]  Patient's YOB: 1960  Age: 64 y.o. Date of Admission: 9/29/2021 12:59 AM  Length of stay during current admission: 3      Primary Care Physician: Daniella Mohan MD  ICU Attending Physician: Dr. Zeke Canseco    Code Status: Full Code    Reason for ICU admission:   Chief Complaint   Patient presents with    Respiratory Distress         SUBJECTIVE:     OVERNIGHT EVENTS:  Patient was seen and evaluated at bedside. No acute events overnight. Patient extubated today afternoon. Labs and imaging reviewed, Cr 1.06  from 1.45, WBC 21.5 from 17.4  (receiving IV methylprednisolone BD for COPD exacerbation). Hb 11.2 from 10.7 gm/dl, Plt 228 from 208. Rhinovirus positive. PH 7.381, pCO2 50.7, HCO3 25.7. She continues to be on heparin gtt for [facility]. BRIEF HISTORY  64year old female with past medical history of COPD, HTN,   T2DM, CAD s/p PCI to RCA in 2019, on ASA and Amol Villarreal at home presented to the ICU with altered mental status secondary to COPD exacerbation (pCO2 106, pH 6.9). She was in acute hypoxic and hypercapnic respiratory failure and hypotensive requiring pressor support. She was intubated for hypercapnic respiratory failure. She was also noted to have elevated troponin -[id number], Cardiology was consulted and started on heparin gtt, 2D ECHO done. 2D Echo results  Mildly reduced LV function with Calculated ejection fraction of 44%  Anterior and posterolateral wall motion abnormality. Abnormal global longitudinal strain average of -7%. Grade I (mild) left ventricular diastolic dysfunction. Left atrium is moderately dilated. Aortic sclerosis without stenosis. Calcification of mitral valve leaflet tips with moderate stenosis. Mean gradient is 7 mmHg  Mild mitral regurgitation.   Mild tricuspid regurgitation. Estimated right ventricular systolic pressure is 36 mmHg.       AWAKE & FOLLOWING COMMANDS:  [] No   [x] Yes    CURRENT VENTILATION STATUS:     [x] Ventilator  [] BIPAP  [] Nasal Cannula [] Room Air      IF INTUBATED, ET TUBE MARKING AT LOWER LIP:       cms    SECRETIONS Amount:  [x] Small [] Moderate  [] Large  [] None  Color:     [x] White [] Colored  [] Bloody    SEDATION:  RASS Score:  [] Propofol gtt  [x] Versed gtt  [] Fentanyl gtt   [] No Sedation    PARALYZED:  [x] No    [] Yes    DIARRHEA:                [x] No                [] Yes  (C. Difficile status: [] positive                                                                                                                       [] negative                                                                                                                     [] pending)    VASOPRESSORS:  [x] No    [] Yes    If yes -   [] Levophed       [] Dopamine     [] Vasopressin       [] Dobutamine  [] Phenylephrine         [] Epinephrine    CENTRAL LINES:     [] No   [x] Yes   (Date of Insertion:   )           If yes -     [] Right IJ     [] Left IJ [x] Right Femoral [] Left Femoral                   [] Right Subclavian [] Left Subclavian       WESTBROOK'S CATHETER:   [] No   [x] Yes  (Date of Insertion:   )     URINE OUTPUT:            [x] Good   [] Low              [] Anuric      OBJECTIVE:     VITAL SIGNS:  /76   Pulse 102   Temp 98.8 °F (37.1 °C) (Bladder)   Resp 13   Ht 5' 9\" (1.753 m)   Wt 228 lb 6.3 oz (103.6 kg)   SpO2 93%   BMI 33.73 kg/m²   Tmax over 24 hours:  Temp (24hrs), Av °F (37.2 °C), Min:98.8 °F (37.1 °C), Max:99.1 °F (37.3 °C)      Patient Vitals for the past 8 hrs:   BP Temp Temp src Pulse Resp SpO2   10/02/21 1500    102 13 93 %   10/02/21 1445    102 15 97 %   10/02/21 1400  98.8 °F (37.1 °C) Bladder 89 13 100 %   10/02/21 1300    99 13 100 %   10/02/21 1220    115 18 100 %   10/02/21 1200 134/76 99 °F (37.2 °C) Bladder 99 13 100 %   10/02/21 1142    92 12 100 %   10/02/21 1140     14 100 %   10/02/21 1104    88 11 100 %   10/02/21 1100    83 11 99 %   10/02/21 1000  99 °F (37.2 °C) Bladder 88 10 99 %   10/02/21 0917    97 14 99 %   10/02/21 0913    93 12 99 %   10/02/21 0900    100 12 99 %   10/02/21 0800 101/64 98.8 °F (37.1 °C) Bladder 89 15 97 %         Intake/Output Summary (Last 24 hours) at 10/2/2021 1534  Last data filed at 10/2/2021 1500  Gross per 24 hour   Intake 3710.93 ml   Output 4200 ml   Net -489.07 ml     Date 10/02/21 0000 - 10/02/21 2359   Shift 4555-4954 1025-4838 4450-4423 24 Hour Total   INTAKE   I.V.(mL/kg) 789. 7(7.6) 805.8(7.8)  1595. 4(15.4)   NG/GT(mL/kg) 356(3.4) 457(4.4)  813(7.8)   Shift Total(mL/kg) 1145. 7(11.1) 4145.3(93.5)  1236.5(10.0)   OUTPUT   Urine(mL/kg/hr) 700(0.8) 2755  3455   Emesis/NG output(mL/kg)  200(1.9)  200(1.9)   Shift Total(mL/kg) 700(6.8) 1050(84.2)  3476(66.3)   Weight (kg) 103.6 103.6 103.6 103.6     Wt Readings from Last 3 Encounters:   10/02/21 228 lb 6.3 oz (103.6 kg)   07/27/21 216 lb 6.4 oz (98.2 kg)   05/08/21 210 lb (95.3 kg)     Body mass index is 33.73 kg/m². PHYSICAL EXAM:  Constitutional: Patient following commands. Oriented to year, hospital and the reason why she is in the  [facility]: PERRLA, EOMI, sclera clear, anicteric, oropharynx clear, no lesions, neck supple with midline trachea. Neck: Supple, symmetrical, trachea midline, no adenopathy, thyroid symmetric, no jvd skin normal  Respiratory: No wheezing today,  no crackles, decreased breath sounds bilaterally more decreased on the Right side.    Cardiovascular: regular rate and rhythm, normal S1, S2, no murmur noted and 2+ pulses throughout  Abdomen: soft, nontender, nondistended, no masses or organomegaly  NEUROLOGIC - Sedated, partially following commands  Extremities:  peripheral pulses normal, no pedal edema, no clubbing or cyanosis  SKIN: normal coloration and turgor    Any additional physical findings:      MEDICATIONS:  Scheduled Meds:   [START ON 10/3/2021] methylPREDNISolone  40 mg IntraVENous Daily    lisinopril  10 mg Oral Daily    famotidine (PEPCID) injection  20 mg IntraVENous BID    insulin glargine  25 Units SubCUTAneous Nightly    ipratropium-albuterol  1 ampule Inhalation 4x daily    sodium chloride flush  5-40 mL IntraVENous 2 times per day    albuterol  2.5 mg Nebulization BID    cefTRIAXone (ROCEPHIN) IV  1,000 mg IntraVENous Q24H    aspirin  81 mg Oral Daily    [Held by provider] atorvastatin  40 mg Oral Daily    insulin lispro  0-18 Units SubCUTAneous TID WC    insulin lispro  0-9 Units SubCUTAneous Nightly     Continuous Infusions:   lactated ringers 32 mL/hr at 10/02/21 1512    sodium chloride      heparin (PORCINE) Infusion 18.2 Units/kg/hr (10/02/21 0549)    dextrose       PRN Meds:   benzocaine-menthol, 1 lozenge, Q2H PRN  albuterol, 2.5 mg, Q4H PRN  sodium chloride flush, 5-40 mL, PRN  sodium chloride, 25 mL, PRN  ondansetron, 4 mg, Q8H PRN   Or  ondansetron, 4 mg, Q6H PRN  polyethylene glycol, 17 g, Daily PRN  acetaminophen, 650 mg, Q6H PRN   Or  acetaminophen, 650 mg, Q6H PRN  heparin (porcine), 4,000 Units, PRN  heparin (porcine), 2,000 Units, PRN  glucose, 15 g, PRN  dextrose, 12.5 g, PRN  glucagon (rDNA), 1 mg, PRN  dextrose, 100 mL/hr, PRN        SUPPORT DEVICES: [x] Ventilator [] BIPAP  [] Nasal Cannula [] Room Air    VENT SETTINGS (Comprehensive) (if applicable):   PRVC mode, FiO2 30%, PEEP 5, Respiratory Rate 18, Tidal Volume 540  Vent Information  $Ventilation: $Subsequent Day  Skin Assessment: Clean, dry, & intact  Suction Catheter Diameter: 14  Equipment Changed: HME  Vent Type: Servo i  Vent Mode: (S) NIV/PC  Vt Ordered: 530 mL  Pressure Ordered: (S) 8  Rate Set: (S) 14 bmp  Pressure Support: 6 cmH20  FiO2 : 40 %  SpO2: 93 %  SpO2/FiO2 ratio: 250  Sensitivity: 3  PEEP/CPAP: (S) 5  I Time/ I Time %: 0.9 MUCUS NOT REPORTED 09/29/2021    TRICHOMONAS NOT REPORTED 09/29/2021    YEAST NOT REPORTED 09/29/2021    BACTERIA NOT REPORTED 09/29/2021    SPECGRAV 1.012 09/29/2021    LEUKOCYTESUR NEGATIVE 09/29/2021    UROBILINOGEN Normal 09/29/2021    BILIRUBINUR NEGATIVE 09/29/2021    BILIRUBINUR NEGATIVE 06/01/2012    GLUCOSEU NEGATIVE 09/29/2021    GLUCOSEU NEGATIVE 06/01/2012    KETUA NEGATIVE 09/29/2021    AMORPHOUS NOT REPORTED 09/29/2021       CARDIAC ENZYMES: No results for input(s): CKMB, CKMBINDEX, TROPONINI in the last 72 hours. Invalid input(s): CKTOTAL;3  BNP: No results for input(s): BNP in the last 72 hours. LFTS  Recent Labs     09/30/21  0508 10/01/21  0409 10/02/21  0426   ALKPHOS 97 88 87   ALT 46* 34* 21   AST 27 17 11   BILITOT 0.22* 0.18* 0.18*   LABALBU 3.3* 3.4* 3.2*       AMYLASE/LIPASE/AMMONIA  No results for input(s): AMYLASE, LIPASE, AMMONIA in the last 72 hours. Last 3 Blood Glucose:   Recent Labs     09/29/21  1803 09/30/21  0508 10/01/21  0409 10/02/21  0426   GLUCOSE 260* 138* 226* 310*      HgBA1c:    Lab Results   Component Value Date    LABA1C 9.2 02/10/2019         TSH:    Lab Results   Component Value Date    TSH 1.24 06/16/2020     ANEMIA STUDIES  No results for input(s): LABIRON, TIBC, FERRITIN, [id number], FOLATE, OCCULTBLD in the last 72 hours. Cultures during this admission:     Blood cultures:                 [] None drawn      [x] Negative             []  Positive (Details:  )  Urine Culture:                   [] None drawn      [x] Negative             []  Positive (Details:  )  Sputum Culture:               [] None drawn       [] Negative             [x]  Positive (Details: Mixed bacterial morphocytes  )   Endotracheal aspirate:     [] None drawn       [] Negative             [x]  Positive (Details: Mixed bacterial morphocytes   )       ASSESSMENT AND PLAN:        F.LINHSObieT. M. H.U.G.S. B.I.D.      Feeding Diet: Diet NPO   Fluids:  0.9% NaCl and Dextrose 5% PRN   Family: Daughter in law updated by us. She updated her other daughter, 2 sons and 6 siblings.  Analgesic: None given now   Sedation:  Midazolam 1mg/ml in D5W infusion   Thrombo-prophylaxis: [] Enoxaparin, [x] Continuous Heparin 25.000 units in dextrose 5% 250 ml infusion [] EPC Cuffs   Mobility: None    Heads up: Head elevated at 45 degrees.  Ulcer prophylaxis: [] PPI Agent, [x] [id number], [] Sucralfate, [] Other:   Glycemic control: Glucose 226 mg/dl   Spontaneous breathing trial: On spontaneous breathing trial of 6/5   Bowel regimen/urine output: Urine output net - 285 -  Net I/O for 10/1   Indwelling catheter/lines: 3 IV lines intact, no erythema   De-escalation: Patient extubated, stopped fentanyl and versed, stopped tube feeds, CXR ordered. 1. Acute hypoxic hypercapnic respiratory failure secondary to COPD exacerbation  - Continue iv steroids solumedrol 40 mg iv q12. - Continue albuterol nebulization BID and Duoneb inhaltion 4 times daily. - Continue rocephin for 7 days in total.   -Patient is not wheezing, Patient extubated this afternoon. Start symbicort once extubated. 2. Acute toxic metabolic encephalopathy from hypercapnia from respiratory failure  - Following commands today. Patient talking, able to squeeze finger and looks at whoever is talking.   - Stopped Versed and Fentanyl. 3. Shock, Cardiogenic  - Neosynephrine stopped   - Patient's BP is going up to 167/77mmHg. Lisinopril 10 mg OD ordered. 4. NSTEMI, previous h/o CAD s/p PCI  - Continue heparin gtt for anticoagulation.   - Cardiology suggested doing cath one she is extubated. - Replace electrolytes to keep K>4 and Mg>2.     5. Concern for new onset atrial fibrillation with RVR - currently NSR, rate controlled   - Continue heparin gtt for anticoagulation.      6. Acute kidney injury, likely secondary to tissue hypoxia and shock - improving  - Continue LR at 50cc/hr, continue to monitor I/O closely, follow up repeat BMP tomorrow am.     7. Other plans  - Stop tube feeds  - Stop Versed and Fentanyl  - Patient extubated. - Patient taken to BIPAP directly for more than an hour.  - Cut down fluids to 50 cc including Heparin.  - Give 20mg of Lasix stat. After Lasix is given we will measure how much output is there and decide if Lasix should be given tomorrow. If she is excreting above 1.5L of urine then there is nothing to be done. - After the Lasix effect goes out remove Shook. - Taper Prednisolone tomorrow. DVT prophylaxis - [facility]  Diet - Tube feeds stopped. Will perform swallow study. IV fluids - 50 cc/hr. We are waiting to extubate the patient. Tanya Montelongo MD, M.D. Department of Internal Medicine/ Critical care  [facility])             10/2/2021, 3:34 PM      Please note that this chart was generated using voice recognition Dragon dictation software. Although every effort was made to ensure the accuracy of this automated transcription, some errors in transcription may have occurred. Attending Physician Statement  I have discussed the care of Lenny Chol, including pertinent history and exam findings,  with the resident. I have seen and examined the patient and the key elements of all parts of the encounter have been performed by me. I agree with the assessment, plan and orders as documented by the resident with additions . Total critical care time caring for this patient with life threatening, unstable organ failure, including direct patient contact, management of life support systems, review of data including imaging and labs, discussions with other team members and physicians at least 27   Min so far today, excluding procedures. Treatment plan Discussed with nursing staff in detail , all questions answered .    Electronically signed by Neal Olea MD on   10/2/21 at 9:54 PM EDT    Please note that this chart was generated using voice recognition Dragon dictation software. Although every effort was made to ensure the accuracy of this automated transcription, some errors in transcription may have occurred.

## 2021-10-02 NOTE — PROGRESS NOTES
[facility]   Progress Note                    Date:   10/2/2021  Patient name:  Danilo Kwon  Date of admission:  9/29/2021 12:59 AM  MRN:   [id number]  YOB: 1960  PCP:    Steven Marques MD    Reason for Admission:  Respiratory distress [R06.03]  Viral pneumonia [J12.9]  Acute respiratory failure with hypoxia and hypercapnia ([address] [J96.01, J96.02]    Subjective:      Clinical Changes / Abnormalities:    Patient seen and examined at bedside. No acute events overnight. No chest pain or shortness of breath.     Urine output in the last 24 hours:     Intake/Output Summary (Last 24 hours) at 10/2/2021 0918  Last data filed at 10/2/2021 0839  Gross per 24 hour   Intake 2453.15 ml   Output 1795 ml   Net 658.15 ml     I/O since admission: +2.9 liters      Medications:   Scheduled Meds:   famotidine (PEPCID) injection  20 mg IntraVENous BID    insulin glargine  25 Units SubCUTAneous Nightly    ipratropium-albuterol  1 ampule Inhalation 4x daily    sodium chloride flush  5-40 mL IntraVENous 2 times per day    albuterol  2.5 mg Nebulization BID    cefTRIAXone (ROCEPHIN) IV  1,000 mg IntraVENous Q24H    aspirin  81 mg Oral Daily    [Held by provider] atorvastatin  40 mg Oral Daily    methylPREDNISolone  40 mg IntraVENous Q12H    insulin lispro  0-18 Units SubCUTAneous TID WC    insulin lispro  0-9 Units SubCUTAneous Nightly     Continuous Infusions:   sodium chloride      heparin (PORCINE) Infusion 18.2 Units/kg/hr (10/02/21 0549)    lactated ringers 75 mL/hr at 09/30/21 1015    dextrose      midazolam 3 mg/hr (10/01/21 2336)    fentaNYL 100 mcg/hr (10/02/21 0709)    norepinephrine Stopped (09/29/21 2100)    phenylephrine (TRAVON-SYNEPHRINE) 50mg/250mL infusion Stopped (10/02/21 0700)     CBC:   Recent Labs     09/30/21  0508 10/01/21  0409 10/02/21  0426   WBC 23.0* 17.4* 21.5*   HGB 11.6* 10.7* 11.2*    208 228     BMP:    Recent Labs     09/30/21  4039 10/01/21  0409 10/02/21  0426    136 136   K 4.1 4.3 4.8    103 103   CO2 23 23 24   BUN 26* 28* 34*   CREATININE 1.43* 1.25* 1.06*   GLUCOSE 138* 226* 310*     Hepatic:   Recent Labs     09/30/21  0508 10/01/21  0409 10/02/21  0426   AST 27 17 11   ALT 46* 34* 21   BILITOT 0.22* 0.18* 0.18*   ALKPHOS 97 88 87     Troponin: No results for input(s): TROPONINI in the last 72 hours. Recent Labs     09/29/21  1103 09/29/21  1803 09/29/21  2147   TROPONINT NOT REPORTED NOT REPORTED NOT REPORTED     BNP: No results for input(s): PROBNP in the last 72 hours. No results for input(s): BNP in the last 72 hours. Lipids: No results for input(s): CHOL, HDL in the last 72 hours. Invalid input(s): LDLCALCU  INR: No results for input(s): INR in the last 72 hours. Objective:   Vitals: /64   Pulse 89   Temp 98.8 °F (37.1 °C) (Bladder)   Resp 15   Ht 5' 9\" (1.753 m)   Wt 228 lb 6.3 oz (103.6 kg)   SpO2 97%   BMI 33.73 kg/m²    General appearance: Intubated and sedated    HEENT: Head: Normocephalic, no lesions, without obvious abnormality  Neck: no JVD  Lungs: clear to auscultation bilaterally, no basilar rales, no wheezing   Heart: regular rate and rhythm, S1, S2 normal, no murmur, click, rub or gallop  Abdomen: soft, non-tender; bowel sounds normal  Extremities: No LE edema           Left ventricle is in the upper limits of normal size. Mildly reduced LV function with Calculated ejection fraction is 44%  Anterior and posterolateral wall motion abnormality. Abnormal global longitudinal strain average of -7%. Grade I (mild) left ventricular diastolic dysfunction. Left atrium is moderately dilated. Aortic sclerosis without stenosis. Calcification of mitral valve leaflet tips with moderate stenosis. Mean  gradient is 7 mmHg  Mild mitral regurgitation. Mild tricuspid regurgitation. Estimated right ventricular systolic pressure  is 36 mmHg. Assessment / Acute Cardiac Problems:   1. Elevated troponin NSTEMI type I versus type II.  2  HARVEY  3  New onset of atrial fibrillation. Currently normal sinus rhythm. 4  PNA leading to Respiratory failure requiring intubation. 5. Type II diabetes mellitus  6. CAD s/p PCI to RCA in 2019. Had a  of RCA. Patent stent in LAD    Plan of Treatment:   1. Continue heparin gtt  2. Continue ASA  3. Hold Lipitor in setting of elevated LFT's  4. Currently intubated and sedated. Wean as tolerated  5. Pressors weaned off. 6. Abx per primary team  7. K> 4, Mg>2  8. Plan on coronary angiography once acute issues resolve and patient is extubated. Discussed with family who was present at bedside. All questions answered. Christen Calderón MD  Fellow, [address]      Attending note,   The patient was seen and examined, agree with above. In NSR.  Plan for cath when other issues resolve and cleared by nephro

## 2021-10-03 LAB
ABSOLUTE EOS #: <0.03 K/UL (ref 0–0.44)
ABSOLUTE IMMATURE GRANULOCYTE: 0.26 K/UL (ref 0–0.3)
ABSOLUTE LYMPH #: 1.78 K/UL (ref 1.1–3.7)
ABSOLUTE MONO #: 0.87 K/UL (ref 0.1–1.2)
ALBUMIN SERPL-MCNC: 3.1 G/DL (ref 3.5–5.2)
ALBUMIN/GLOBULIN RATIO: 1.3 (ref 1–2.5)
ALP BLD-CCNC: 71 U/L (ref 35–104)
ALT SERPL-CCNC: 16 U/L (ref 5–33)
ANION GAP SERPL CALCULATED.3IONS-SCNC: 8 MMOL/L (ref 9–17)
AST SERPL-CCNC: 12 U/L
BASOPHILS # BLD: 0 % (ref 0–2)
BASOPHILS ABSOLUTE: 0.03 K/UL (ref 0–0.2)
BILIRUB SERPL-MCNC: 0.27 MG/DL (ref 0.3–1.2)
BUN BLDV-MCNC: 33 MG/DL (ref 6–20)
BUN/CREAT BLD: ABNORMAL (ref 9–20)
CALCIUM SERPL-MCNC: 8.3 MG/DL (ref 8.6–10.4)
CHLORIDE BLD-SCNC: 101 MMOL/L (ref 98–107)
CO2: 30 MMOL/L (ref 20–31)
CREAT SERPL-MCNC: 0.99 MG/DL (ref 0.5–0.9)
DIFFERENTIAL TYPE: ABNORMAL
EOSINOPHILS RELATIVE PERCENT: 0 % (ref 1–4)
GFR AFRICAN AMERICAN: >60 ML/MIN
GFR NON-AFRICAN AMERICAN: 58 ML/MIN
GFR SERPL CREATININE-BSD FRML MDRD: ABNORMAL ML/MIN/{1.73_M2}
GFR SERPL CREATININE-BSD FRML MDRD: ABNORMAL ML/MIN/{1.73_M2}
GLUCOSE BLD-MCNC: 106 MG/DL (ref 70–99)
GLUCOSE BLD-MCNC: 154 MG/DL (ref 65–105)
GLUCOSE BLD-MCNC: 209 MG/DL (ref 65–105)
GLUCOSE BLD-MCNC: 211 MG/DL (ref 65–105)
GLUCOSE BLD-MCNC: 96 MG/DL (ref 65–105)
HCT VFR BLD CALC: 35.8 % (ref 36.3–47.1)
HEMOGLOBIN: 11 G/DL (ref 11.9–15.1)
IMMATURE GRANULOCYTES: 2 %
LYMPHOCYTES # BLD: 14 % (ref 24–43)
MAGNESIUM: 2.2 MG/DL (ref 1.6–2.6)
MCH RBC QN AUTO: 26.4 PG (ref 25.2–33.5)
MCHC RBC AUTO-ENTMCNC: 30.7 G/DL (ref 28.4–34.8)
MCV RBC AUTO: 86.1 FL (ref 82.6–102.9)
MONOCYTES # BLD: 7 % (ref 3–12)
NRBC AUTOMATED: 0 PER 100 WBC
PARTIAL THROMBOPLASTIN TIME: 47.7 SEC (ref 20.5–30.5)
PARTIAL THROMBOPLASTIN TIME: 55.1 SEC (ref 20.5–30.5)
PARTIAL THROMBOPLASTIN TIME: 74.2 SEC (ref 20.5–30.5)
PARTIAL THROMBOPLASTIN TIME: 96.2 SEC (ref 20.5–30.5)
PDW BLD-RTO: 15.9 % (ref 11.8–14.4)
PHOSPHORUS: 2.5 MG/DL (ref 2.6–4.5)
PLATELET # BLD: 180 K/UL (ref 138–453)
PLATELET ESTIMATE: ABNORMAL
PMV BLD AUTO: 10.7 FL (ref 8.1–13.5)
POTASSIUM SERPL-SCNC: 3.8 MMOL/L (ref 3.7–5.3)
RBC # BLD: 4.16 M/UL (ref 3.95–5.11)
RBC # BLD: ABNORMAL 10*6/UL
SEG NEUTROPHILS: 77 % (ref 36–65)
SEGMENTED NEUTROPHILS ABSOLUTE COUNT: 9.93 K/UL (ref 1.5–8.1)
SODIUM BLD-SCNC: 139 MMOL/L (ref 135–144)
TOTAL PROTEIN: 5.5 G/DL (ref 6.4–8.3)
WBC # BLD: 12.9 K/UL (ref 3.5–11.3)
WBC # BLD: ABNORMAL 10*3/UL

## 2021-10-03 PROCEDURE — 6360000002 HC RX W HCPCS: Performed by: INTERNAL MEDICINE

## 2021-10-03 PROCEDURE — 82947 ASSAY GLUCOSE BLOOD QUANT: CPT

## 2021-10-03 PROCEDURE — 94761 N-INVAS EAR/PLS OXIMETRY MLT: CPT

## 2021-10-03 PROCEDURE — 99291 CRITICAL CARE FIRST HOUR: CPT | Performed by: INTERNAL MEDICINE

## 2021-10-03 PROCEDURE — 6360000002 HC RX W HCPCS: Performed by: STUDENT IN AN ORGANIZED HEALTH CARE EDUCATION/TRAINING PROGRAM

## 2021-10-03 PROCEDURE — 2500000003 HC RX 250 WO HCPCS: Performed by: INTERNAL MEDICINE

## 2021-10-03 PROCEDURE — 6370000000 HC RX 637 (ALT 250 FOR IP): Performed by: INTERNAL MEDICINE

## 2021-10-03 PROCEDURE — 6370000000 HC RX 637 (ALT 250 FOR IP): Performed by: STUDENT IN AN ORGANIZED HEALTH CARE EDUCATION/TRAINING PROGRAM

## 2021-10-03 PROCEDURE — 94660 CPAP INITIATION&MGMT: CPT

## 2021-10-03 PROCEDURE — 84100 ASSAY OF PHOSPHORUS: CPT

## 2021-10-03 PROCEDURE — 37799 UNLISTED PX VASCULAR SURGERY: CPT

## 2021-10-03 PROCEDURE — 36415 COLL VENOUS BLD VENIPUNCTURE: CPT

## 2021-10-03 PROCEDURE — 6360000002 HC RX W HCPCS: Performed by: HEALTH CARE PROVIDER

## 2021-10-03 PROCEDURE — 2060000000 HC ICU INTERMEDIATE R&B

## 2021-10-03 PROCEDURE — 85025 COMPLETE CBC W/AUTO DIFF WBC: CPT

## 2021-10-03 PROCEDURE — 83735 ASSAY OF MAGNESIUM: CPT

## 2021-10-03 PROCEDURE — 85730 THROMBOPLASTIN TIME PARTIAL: CPT

## 2021-10-03 PROCEDURE — 80053 COMPREHEN METABOLIC PANEL: CPT

## 2021-10-03 PROCEDURE — 94640 AIRWAY INHALATION TREATMENT: CPT

## 2021-10-03 PROCEDURE — 2700000000 HC OXYGEN THERAPY PER DAY

## 2021-10-03 PROCEDURE — 2580000003 HC RX 258: Performed by: STUDENT IN AN ORGANIZED HEALTH CARE EDUCATION/TRAINING PROGRAM

## 2021-10-03 PROCEDURE — 6370000000 HC RX 637 (ALT 250 FOR IP)

## 2021-10-03 RX ORDER — FAMOTIDINE 20 MG/1
20 TABLET, FILM COATED ORAL 2 TIMES DAILY
Status: DISCONTINUED | OUTPATIENT
Start: 2021-10-03 | End: 2021-10-08 | Stop reason: HOSPADM

## 2021-10-03 RX ORDER — PREDNISONE 1 MG/1
5 TABLET ORAL DAILY
Status: DISCONTINUED | OUTPATIENT
Start: 2021-10-13 | End: 2021-10-08 | Stop reason: HOSPADM

## 2021-10-03 RX ORDER — PREDNISONE 10 MG/1
10 TABLET ORAL DAILY
Status: DISCONTINUED | OUTPATIENT
Start: 2021-10-10 | End: 2021-10-08 | Stop reason: HOSPADM

## 2021-10-03 RX ORDER — BUSPIRONE HYDROCHLORIDE 30 MG/1
TABLET ORAL
Status: ON HOLD | COMMUNITY
Start: 2021-09-22 | End: 2021-10-08 | Stop reason: HOSPADM

## 2021-10-03 RX ORDER — ALBUTEROL SULFATE 2.5 MG/3ML
2.5 SOLUTION RESPIRATORY (INHALATION) EVERY 6 HOURS PRN
Status: DISCONTINUED | OUTPATIENT
Start: 2021-10-03 | End: 2021-10-03

## 2021-10-03 RX ORDER — ALBUTEROL SULFATE 2.5 MG/3ML
2.5 SOLUTION RESPIRATORY (INHALATION) EVERY 4 HOURS PRN
Status: DISCONTINUED | OUTPATIENT
Start: 2021-10-03 | End: 2021-10-08 | Stop reason: HOSPADM

## 2021-10-03 RX ORDER — PREDNISONE 20 MG/1
20 TABLET ORAL DAILY
Status: COMPLETED | OUTPATIENT
Start: 2021-10-04 | End: 2021-10-06

## 2021-10-03 RX ORDER — BUSPIRONE HYDROCHLORIDE 15 MG/1
15 TABLET ORAL 3 TIMES DAILY
Status: DISCONTINUED | OUTPATIENT
Start: 2021-10-03 | End: 2021-10-08 | Stop reason: HOSPADM

## 2021-10-03 RX ORDER — ALBUTEROL SULFATE 2.5 MG/3ML
2.5 SOLUTION RESPIRATORY (INHALATION) 2 TIMES DAILY
Status: DISCONTINUED | OUTPATIENT
Start: 2021-10-04 | End: 2021-10-04

## 2021-10-03 RX ADMIN — LISINOPRIL 10 MG: 10 TABLET ORAL at 08:31

## 2021-10-03 RX ADMIN — BUDESONIDE AND FORMOTEROL FUMARATE DIHYDRATE 2 PUFF: 160; 4.5 AEROSOL RESPIRATORY (INHALATION) at 08:49

## 2021-10-03 RX ADMIN — INSULIN LISPRO 2 UNITS: 100 INJECTION, SOLUTION INTRAVENOUS; SUBCUTANEOUS at 21:13

## 2021-10-03 RX ADMIN — IPRATROPIUM BROMIDE AND ALBUTEROL SULFATE 1 AMPULE: .5; 2.5 SOLUTION RESPIRATORY (INHALATION) at 20:49

## 2021-10-03 RX ADMIN — CEFTRIAXONE SODIUM 1000 MG: 1 INJECTION, POWDER, FOR SOLUTION INTRAMUSCULAR; INTRAVENOUS at 04:40

## 2021-10-03 RX ADMIN — IPRATROPIUM BROMIDE AND ALBUTEROL SULFATE 1 AMPULE: .5; 2.5 SOLUTION RESPIRATORY (INHALATION) at 16:07

## 2021-10-03 RX ADMIN — FAMOTIDINE 20 MG: 20 TABLET, FILM COATED ORAL at 21:10

## 2021-10-03 RX ADMIN — ASPIRIN 81 MG: 81 TABLET, CHEWABLE ORAL at 08:31

## 2021-10-03 RX ADMIN — INSULIN GLARGINE 25 UNITS: 100 INJECTION, SOLUTION SUBCUTANEOUS at 21:13

## 2021-10-03 RX ADMIN — IPRATROPIUM BROMIDE AND ALBUTEROL SULFATE 1 AMPULE: .5; 2.5 SOLUTION RESPIRATORY (INHALATION) at 11:46

## 2021-10-03 RX ADMIN — SODIUM CHLORIDE, PRESERVATIVE FREE 5 ML: 5 INJECTION INTRAVENOUS at 08:43

## 2021-10-03 RX ADMIN — HEPARIN SODIUM 2000 UNITS: 1000 INJECTION, SOLUTION INTRAVENOUS; SUBCUTANEOUS at 16:35

## 2021-10-03 RX ADMIN — ALBUTEROL SULFATE 2.5 MG: 2.5 SOLUTION RESPIRATORY (INHALATION) at 11:47

## 2021-10-03 RX ADMIN — METOPROLOL TARTRATE 25 MG: 25 TABLET ORAL at 08:36

## 2021-10-03 RX ADMIN — SODIUM CHLORIDE, PRESERVATIVE FREE 10 ML: 5 INJECTION INTRAVENOUS at 21:09

## 2021-10-03 RX ADMIN — SODIUM CHLORIDE, POTASSIUM CHLORIDE, SODIUM LACTATE AND CALCIUM CHLORIDE: 600; 310; 30; 20 INJECTION, SOLUTION INTRAVENOUS at 14:28

## 2021-10-03 RX ADMIN — HEPARIN SODIUM AND DEXTROSE 17.2 UNITS/KG/HR: 10000; 5 INJECTION INTRAVENOUS at 10:42

## 2021-10-03 RX ADMIN — INSULIN LISPRO 6 UNITS: 100 INJECTION, SOLUTION INTRAVENOUS; SUBCUTANEOUS at 12:44

## 2021-10-03 RX ADMIN — METHYLPREDNISOLONE SODIUM SUCCINATE 40 MG: 125 INJECTION, POWDER, FOR SOLUTION INTRAMUSCULAR; INTRAVENOUS at 08:31

## 2021-10-03 RX ADMIN — ONDANSETRON 4 MG: 2 INJECTION INTRAMUSCULAR; INTRAVENOUS at 04:40

## 2021-10-03 RX ADMIN — SODIUM CHLORIDE, PRESERVATIVE FREE 10 ML: 5 INJECTION INTRAVENOUS at 08:42

## 2021-10-03 RX ADMIN — METOPROLOL TARTRATE 25 MG: 25 TABLET ORAL at 21:10

## 2021-10-03 RX ADMIN — BUDESONIDE AND FORMOTEROL FUMARATE DIHYDRATE 2 PUFF: 160; 4.5 AEROSOL RESPIRATORY (INHALATION) at 20:50

## 2021-10-03 RX ADMIN — INSULIN LISPRO 6 UNITS: 100 INJECTION, SOLUTION INTRAVENOUS; SUBCUTANEOUS at 17:35

## 2021-10-03 RX ADMIN — IPRATROPIUM BROMIDE AND ALBUTEROL SULFATE 1 AMPULE: .5; 2.5 SOLUTION RESPIRATORY (INHALATION) at 08:49

## 2021-10-03 RX ADMIN — BUSPIRONE HYDROCHLORIDE 15 MG: 15 TABLET ORAL at 21:10

## 2021-10-03 RX ADMIN — FAMOTIDINE 20 MG: 10 INJECTION INTRAVENOUS at 08:34

## 2021-10-03 RX ADMIN — SODIUM CHLORIDE, PRESERVATIVE FREE 5 ML: 5 INJECTION INTRAVENOUS at 08:44

## 2021-10-03 RX ADMIN — BUSPIRONE HYDROCHLORIDE 15 MG: 15 TABLET ORAL at 13:22

## 2021-10-03 RX ADMIN — ALBUTEROL SULFATE 2.5 MG: 5 SOLUTION RESPIRATORY (INHALATION) at 05:16

## 2021-10-03 ASSESSMENT — ENCOUNTER SYMPTOMS
TACHYPNEA: 1
EYES NEGATIVE: 1
GASTROINTESTINAL NEGATIVE: 1
COUGH: 0
STRIDOR: 0
SHORTNESS OF BREATH: 1
WHEEZING: 0
APNEA: 0
CHOKING: 0
CHEST TIGHTNESS: 0

## 2021-10-03 ASSESSMENT — PULMONARY FUNCTION TESTS
PIF_VALUE: 13
PIF_VALUE: 12

## 2021-10-03 ASSESSMENT — PAIN SCALES - GENERAL
PAINLEVEL_OUTOF10: 0
PAINLEVEL_OUTOF10: 0
PAINLEVEL_OUTOF10: 2
PAINLEVEL_OUTOF10: 0
PAINLEVEL_OUTOF10: 0

## 2021-10-03 ASSESSMENT — PAIN DESCRIPTION - LOCATION: LOCATION: FOOT

## 2021-10-03 NOTE — PLAN OF CARE
Problem: OXYGENATION/RESPIRATORY FUNCTION  Goal: Patient will maintain patent airway  10/3/2021 1442 by Radha Wallace RN  Outcome: Ongoing     Problem: OXYGENATION/RESPIRATORY FUNCTION  Goal: Patient will achieve/maintain normal respiratory rate/effort  Description: Respiratory rate and effort will be within normal limits for the patient  10/3/2021 1442 by Radha Wallace RN  Outcome: Ongoing     Problem: SKIN INTEGRITY  Goal: Skin integrity is maintained or improved  10/3/2021 1442 by Radha Wallace RN  Outcome: Ongoing     Problem: Discharge Planning:  Goal: Participates in care planning  Description: Participates in care planning  10/3/2021 1442 by Radha Wallace RN  Outcome: Ongoing     Problem: Discharge Planning:  Goal: Discharged to appropriate level of care  Description: Discharged to appropriate level of care  10/3/2021 1442 by Radha Wallace RN  Outcome: Ongoing     Problem: Discharge Planning:  Goal: Ability to perform activities of daily living will improve  Description: Ability to perform activities of daily living will improve  10/3/2021 1442 by Radha Wallace RN  Outcome: Ongoing     Problem: Airway Clearance - Ineffective:  Goal: Ability to maintain a clear airway will improve  Description: Ability to maintain a clear airway will improve  10/3/2021 1442 by Radha Wallace RN  Outcome: Ongoing     Problem: Anxiety/Stress:  Goal: Level of anxiety will decrease  Description: Level of anxiety will decrease  10/3/2021 1442 by Radha Wallace RN  Outcome: Ongoing     Problem: Aspiration:  Goal: Absence of aspiration  Description: Absence of aspiration  10/3/2021 1442 by Radha Wallace RN  Outcome: Met This Shift     Problem:  Bowel Function - Altered:  Goal: Bowel elimination is within specified parameters  Description: Bowel elimination is within specified parameters  10/3/2021 1442 by Radha Wallace RN  Outcome: Ongoing     Problem: Cardiac Output - Decreased:  Goal: Hemodynamic signs and symptoms  Description: Will show no infection signs and symptoms  10/3/2021 1442 by Nimesh Artis RN  Outcome: Met This Shift     Problem: Skin Integrity - Impaired:  Goal: Absence of new skin breakdown  Description: Absence of new skin breakdown  10/3/2021 1442 by Nimesh Artis RN  Outcome: Met This Shift     Problem: Sleep Pattern Disturbance:  Goal: Appears well-rested  Description: Appears well-rested  10/3/2021 1442 by Nimesh Artis RN  Outcome: Ongoing     Problem: Tissue Perfusion, Altered:  Goal: Circulatory function within specified parameters  Description: Circulatory function within specified parameters  10/3/2021 1442 by Nimesh Artis RN  Outcome: Completed     Problem: Tissue Perfusion - Cardiopulmonary, Altered:  Goal: Absence of angina  Description: Absence of angina  10/3/2021 1442 by Nimesh Artis RN  Outcome: Met This Shift     Problem: Tissue Perfusion - Cardiopulmonary, Altered:  Goal: Hemodynamic stability will improve  Description: Hemodynamic stability will improve  10/3/2021 1442 by Nimesh Artis RN  Outcome: Ongoing     Problem: Confusion - Acute:  Goal: Mental status will be restored to baseline  Description: Mental status will be restored to baseline  10/3/2021 1442 by Nimesh Artis RN  Outcome: Completed     Problem: Confusion - Acute:  Goal: Absence of continued neurological deterioration signs and symptoms  Description: Absence of continued neurological deterioration signs and symptoms  10/3/2021 1442 by Nimesh Artis RN  Outcome: Completed     Problem: Injury - Risk of, Physical Injury:  Goal: Absence of physical injury  Description: Absence of physical injury  10/3/2021 1442 by Nimesh Artis RN  Outcome: Met This Shift     Problem: Injury - Risk of, Physical Injury:  Goal: Will remain free from falls  Description: Will remain free from falls  10/3/2021 1442 by Nimesh Artis RN  Outcome: Met This Shift     Problem: Mood - Altered:  Goal: Mood stable  Description: Mood stable  10/3/2021 1442 by Virginia Berman RN  Outcome: Completed     Problem: Mood - Altered:  Goal: Absence of abusive behavior  Description: Absence of abusive behavior  10/3/2021 1442 by Virginia Berman RN  Outcome: Completed     Problem: Mood - Altered:  Goal: Verbalizations of feeling emotionally comfortable while being cared for will increase  Description: Verbalizations of feeling emotionally comfortable while being cared for will increase  10/3/2021 1442 by Virginia Berman RN  Outcome: Completed     Problem: Psychomotor Activity - Altered:  Goal: Absence of psychomotor disturbance signs and symptoms  Description: Absence of psychomotor disturbance signs and symptoms  10/3/2021 1442 by Virginia Berman RN  Outcome: Completed     Problem: Sensory Perception - Impaired:  Goal: Demonstrations of improved sensory functioning will increase  Description: Demonstrations of improved sensory functioning will increase  10/3/2021 1442 by Virginia Berman RN  Outcome: Completed     Problem: Sensory Perception - Impaired:  Goal: Decrease in sensory misperception frequency  Description: Decrease in sensory misperception frequency  10/3/2021 1442 by Virginia Berman RN  Outcome: Completed     Problem: Sensory Perception - Impaired:  Goal: Able to refrain from responding to false sensory perceptions  Description: Able to refrain from responding to false sensory perceptions  10/3/2021 1442 by Virginia Berman RN  Outcome: Completed     Problem: Sensory Perception - Impaired:  Goal: Demonstrates accurate environmental perceptions  Description: Demonstrates accurate environmental perceptions  10/3/2021 1442 by Virginia Berman RN  Outcome: Completed     Problem: Sensory Perception - Impaired:  Goal: Able to distinguish between reality-based and nonreality-based thinking  Description: Able to distinguish between reality-based and nonreality-based thinking  10/3/2021 1442 by Virginia Berman RN  Outcome: Completed     Problem: Sensory Perception - Impaired:  Goal: Able to interrupt nonreality-based thinking  Description: Able to interrupt nonreality-based thinking  10/3/2021 1442 by Nimesh Artis RN  Outcome: Completed     Problem: Nutrition  Goal: Optimal nutrition therapy  10/3/2021 1442 by Nimesh Artis RN  Outcome: Ongoing     Problem: Falls - Risk of:  Goal: Absence of physical injury  Description: Absence of physical injury  10/3/2021 1442 by Nimesh Artis RN  Outcome: Met This Shift     Problem: Falls - Risk of:  Goal: Will remain free from falls  Description: Will remain free from falls  10/3/2021 1442 by Nimesh Artis RN  Outcome: Met This Shift     Problem: Skin Integrity:  Goal: Will show no infection signs and symptoms  Description: Will show no infection signs and symptoms  10/3/2021 1442 by Nimesh Artis RN  Outcome: Met This Shift     Problem: Skin Integrity:  Goal: Absence of new skin breakdown  Description: Absence of new skin breakdown  10/3/2021 1442 by Nimesh Artis RN  Outcome: Met This Shift

## 2021-10-03 NOTE — PROGRESS NOTES
[facility]   Progress Note                    Date:   10/3/2021  Patient name:  Vicky Pike  Date of admission:  9/29/2021 12:59 AM  MRN:   [id number]  YOB: 1960  PCP:    Donya Stevens MD    Reason for Admission:  Respiratory distress [R06.03]  Viral pneumonia [J12.9]  Acute respiratory failure with hypoxia and hypercapnia (HCC) [J96.01, J96.02]    Subjective:   Clinical Changes / Abnormalities:    Patient seen and examined at bedside. No acute events overnight. No chest pain or shortness of breath.     Urine output in the last 24 hours:     Intake/Output Summary (Last 24 hours) at 10/3/2021 0713  Last data filed at 10/3/2021 0600  Gross per 24 hour   Intake 2063.12 ml   Output 5215 ml   Net -3151.88 ml     I/O since admission: +57.1 cc    Medications:   Scheduled Meds:   methylPREDNISolone  40 mg IntraVENous Daily    lisinopril  10 mg Oral Daily    budesonide-formoterol  2 puff Inhalation BID    metoprolol tartrate  25 mg Oral BID    famotidine (PEPCID) injection  20 mg IntraVENous BID    insulin glargine  25 Units SubCUTAneous Nightly    ipratropium-albuterol  1 ampule Inhalation 4x daily    sodium chloride flush  5-40 mL IntraVENous 2 times per day    cefTRIAXone (ROCEPHIN) IV  1,000 mg IntraVENous Q24H    aspirin  81 mg Oral Daily    [Held by provider] atorvastatin  40 mg Oral Daily    insulin lispro  0-18 Units SubCUTAneous TID WC    insulin lispro  0-9 Units SubCUTAneous Nightly     Continuous Infusions:   lactated ringers 35 mL/hr at 10/03/21 0300    sodium chloride      heparin (PORCINE) Infusion 17.2 Units/kg/hr (10/03/21 0256)    dextrose       CBC:   Recent Labs     10/01/21  0409 10/02/21  0426 10/03/21  0437   WBC 17.4* 21.5* 12.9*   HGB 10.7* 11.2* 11.0*    228 180     BMP:    Recent Labs     10/01/21  0409 10/02/21  0426 10/03/21  0437    136 139   K 4.3 4.8 3.8    103 101   CO2 23 24 30   BUN 28* 34* 33*   CREATININE 1.25* 1.06* 0.99*   GLUCOSE 226* 310* 106*     Hepatic:   Recent Labs     10/01/21  0409 10/02/21  0426 10/03/21  0437   AST 17 11 12   ALT 34* 21 16   BILITOT 0.18* 0.18* 0.27*   ALKPHOS 88 87 71     Troponin: No results for input(s): TROPONINI in the last 72 hours. No results for input(s): TROPONINT in the last 72 hours. BNP: No results for input(s): PROBNP in the last 72 hours. No results for input(s): BNP in the last 72 hours. Lipids: No results for input(s): CHOL, HDL in the last 72 hours. Invalid input(s): LDLCALCU  INR: No results for input(s): INR in the last 72 hours. Objective:   Vitals: /86   Pulse 82   Temp 98.4 °F (36.9 °C) (Bladder)   Resp 27   Ht 5' 9\" (1.753 m)   Wt 228 lb 6.3 oz (103.6 kg)   SpO2 94%   BMI 33.73 kg/m²    General appearance: awake and alert   HEENT: Head: Normocephalic, no lesions, without obvious abnormality  Neck: no JVD  Lungs: clear to auscultation bilaterally, no basilar rales, no wheezing   Heart: regular rate and rhythm, S1, S2 normal, no murmur, click, rub or gallop  Abdomen: soft, non-tender; bowel sounds normal  Extremities: No LE edema      Left ventricle is in the upper limits of normal size. Mildly reduced LV function with Calculated ejection fraction is 44%  Anterior and posterolateral wall motion abnormality. Abnormal global longitudinal strain average of -7%. Grade I (mild) left ventricular diastolic dysfunction. Left atrium is moderately dilated. Aortic sclerosis without stenosis. Calcification of mitral valve leaflet tips with moderate stenosis. Mean  gradient is 7 mmHg  Mild mitral regurgitation. Mild tricuspid regurgitation. Estimated right ventricular systolic pressure  is 36 mmHg.        Assessment / Acute Cardiac Problems:   1. Elevated troponin NSTEMI type I versus type II. 2  HARVEY  3  New onset of atrial fibrillation. Currently normal sinus rhythm. 4  PNA leading to Respiratory failure   5. Type II diabetes mellitus  6.  CAD s/p PCI to RCA in 2019. Had a  of RCA. Patent stent in LAD     Plan of Treatment:   1. Continue heparin gtt  2. Continue ASA  3. Hold Lipitor in setting of elevated LFT's  4. Abx per primary team  5. Continue Lopressor 25 mg BID & lisinopril 10 mg daily. 6. K> 4, Mg>2  7. Plan on coronary angiography once acute issues resolve and patient is extubated. Discussed with family who was present at bedside. All questions answered. 8. Request Nephrology clearance prior to cath as patient presented with HARVEY. Ruthy Funez MD  Fellow, [address] [place] [address]    Attending note,   The patient was seen and examined, agree with above. In NSR. Plan for cath possible tomorrow for NSTEMI and low EF if ok with nephology and cleared by other services.

## 2021-10-03 NOTE — PROGRESS NOTES
Bel Matias, [facility] complete. Respiratory distress [R06.03]  Viral pneumonia [J12.9]  Acute respiratory failure with hypoxia and hypercapnia (HCC) [J96.01, J96.02] . Vitals:    10/03/21 1500   BP:    Pulse: 81   Resp: (!) 37   Temp:    SpO2: 96%   . Patients home meds are   Prior to Admission medications    Medication Sig Start Date End Date Taking? Authorizing Provider   busPIRone (BUSPAR) 30 MG tablet TAKE ONE TABLET BY MOUTH THREE TIMES DAILY AS NEEDED 9/22/21  Yes Historical Provider, MD   atorvastatin (LIPITOR) 40 MG tablet Take 1 tablet by mouth daily 7/27/21   Rafaela Santos MD   lisinopril (PRINIVIL;ZESTRIL) 40 MG tablet Take 1 tablet by mouth daily 7/27/21   Rafaela Santos MD   Dulaglutide (TRULICITY) 1.5 FK/6.5SR SOPN Inject 1.5 mg into the skin once a week    Historical Provider, MD   aspirin 81 MG chewable tablet Take 1 tablet by mouth daily 2/13/19   Naheed Reaves APRN - CNP   nitroGLYCERIN (NITROSTAT) 0.4 MG SL tablet up to max of 3 total doses. If no relief after 1 dose, call 911. 2/12/19   BINDU Ramon CNP   metoprolol tartrate (LOPRESSOR) 25 MG tablet Take 0.5 tablets by mouth 2 times daily 2/12/19   BINDU Ramon - CNP   ticagrelor (BRILINTA) 90 MG TABS tablet Take 1 tablet by mouth 2 times daily 2/12/19   BIDNU Ramon - CNP   Lancets MISC 1 each by Does not apply route daily 2/12/19   Jodee Caputo MD   glipiZIDE (GLUCOTROL) 5 MG tablet Take 1 tablet by mouth 2 times daily 2/12/19   Jodee Caputo MD   metFORMIN (GLUCOPHAGE) 1000 MG tablet Take 1 tablet by mouth 2 times daily (with meals) 2/12/19   Jodee Caputo MD   blood glucose monitor strips Test 3 times a day & as needed for symptoms of irregular blood glucose.  2/12/19   Jodee Caputo MD   glucose monitoring kit (FREESTYLE) monitoring kit 1 kit by Does not apply route daily 2/12/19   Jodee Caputo MD   albuterol (PROVENTIL) (5 MG/ML) 0.5% nebulizer solution Take 0.5 mLs by nebulization every 6 hours as needed for Wheezing 3/16/18   Antonio Coombs MD   tiotropium (SPIRIVA) 18 MCG inhalation capsule Inhale 1 capsule into the lungs daily Pt told that she was using this medication at home.  3/16/18   Antonio Coombs MD   albuterol-ipratropium (COMBIVENT RESPIMAT)  MCG/ACT AERS inhaler Inhale 1 puff into the lungs every 6 hours 3/16/18   Antonio Coombs MD   acetaminophen (TYLENOL) 500 MG tablet Take 1 tablet by mouth every 6 hours as needed for Pain 1/9/18   Christina Engel MD   .  Recent Surgical History: None = 0     Assessment: resting comfortably, despite dyspnea with minimal activity  RR 20  Breath Sounds: inspiratory and expiratory wheezing throughout      · Bronchodilator assessment at level  4  ·     ·   · [x]    Bronchodilator Assessment  BRONCHODILATOR ASSESSMENT SCORE  Score 0 1 2 3 4 5   Breath Sounds   []  Patient Baseline []  No Wheeze good aeration []  Faint, scattered wheezing, good aeration []  Expiratory Wheezing and or moderately diminished [x]  Insp/Exp wheeze and/or very diminished []  Insp/Exp and/ or marked distress   Respiratory Rate   []  Patient Baseline []  Less than 20 []  Less than 20 [x]  20-25 []  Greater than 25 []  Greater than 25   Peak flow % of Pred or PB []  NA   []  Greater than 90%  []  81-90% []  71-80% [x]  Less than or equal to 70%  or unable to perform []  Unable due to Respiratory Distress   Dyspnea re []  Patient Baseline []  No SOB []  No SOB []  SOB on exertion [x]  SOB min activity []  At rest/acute   e FEV% Predicted       []  NA []  Above 69%  []  Unable []  Above 60-69%  []  Unable []  Above 50-59%  []  Unable []  Above 35-49%  [x]  Unable []  Less than 35%  []  Unable

## 2021-10-03 NOTE — PROGRESS NOTES
Yany Irving, [facility]. Respiratory distress [R06.03]  Viral pneumonia [J12.9]  Acute respiratory failure with hypoxia and hypercapnia (HCC) [J96.01, J96.02] . Vitals:    10/02/21 2115   BP:    Pulse: 84   Resp: 18   Temp:    SpO2: 98%   . Patients home meds are   Prior to Admission medications    Medication Sig Start Date End Date Taking? Authorizing Provider   atorvastatin (LIPITOR) 40 MG tablet Take 1 tablet by mouth daily 7/27/21   Basim Pabon MD   lisinopril (PRINIVIL;ZESTRIL) 40 MG tablet Take 1 tablet by mouth daily 7/27/21   Basim Pabon MD   Dulaglutide (TRULICITY) 1.5 EK/7.8SA SOPN Inject 1.5 mg into the skin once a week    Historical Provider, MD   aspirin 81 MG chewable tablet Take 1 tablet by mouth daily 2/13/19   Marcela Clinton, APRN - CNP   nitroGLYCERIN (NITROSTAT) 0.4 MG SL tablet up to max of 3 total doses. If no relief after 1 dose, call 911. 2/12/19   Marcela Clinton, APRN - CNP   metoprolol tartrate (LOPRESSOR) 25 MG tablet Take 0.5 tablets by mouth 2 times daily 2/12/19   Marcela Clinton, APRN - CNP   ticagrelor (BRILINTA) 90 MG TABS tablet Take 1 tablet by mouth 2 times daily 2/12/19   Marcela Clinton, APRN - CNP   Lancets MISC 1 each by Does not apply route daily 2/12/19   Kimberly Brink MD   glipiZIDE (GLUCOTROL) 5 MG tablet Take 1 tablet by mouth 2 times daily 2/12/19   Kimberly Brink MD   metFORMIN (GLUCOPHAGE) 1000 MG tablet Take 1 tablet by mouth 2 times daily (with meals) 2/12/19   Kimberly Brink MD   blood glucose monitor strips Test 3 times a day & as needed for symptoms of irregular blood glucose.  2/12/19   Kimberly Brink MD   glucose monitoring kit (FREESTYLE) monitoring kit 1 kit by Does not apply route daily 2/12/19   Kimberly Brink MD   albuterol (PROVENTIL) (5 MG/ML) 0.5% nebulizer solution Take 0.5 mLs by nebulization every 6 hours as needed for Wheezing 3/16/18   Magdalene Vasquez MD tiotropium (SPIRIVA) 18 MCG inhalation capsule Inhale 1 capsule into the lungs daily Pt told that she was using this medication at home. 3/16/18   Christy Murray MD   albuterol-ipratropium (COMBIVENT RESPIMAT)  MCG/ACT AERS inhaler Inhale 1 puff into the lungs every 6 hours 3/16/18   Christy Murray MD   acetaminophen (TYLENOL) 500 MG tablet Take 1 tablet by mouth every 6 hours as needed for Pain 1/9/18   Wendy Hein MD   .    Assessment   Pt has a Hx of COPD and is a current smoker.  Pt takes Albuterol PRN at home    RR 22  Breath Sounds: dim/ exp wzy      · Bronchodilator assessment at level  3  · Hyperinflation assessment at level   · Secretion Management assessment at level    ·   · [x]    Bronchodilator Assessment  BRONCHODILATOR ASSESSMENT SCORE  Score 0 1 2 3 4 5   Breath Sounds   []  Patient Baseline []  No Wheeze good aeration []  Faint, scattered wheezing, good aeration [x]  Expiratory Wheezing and or moderately diminished []  Insp/Exp wheeze and/or very diminished []  Insp/Exp and/ or marked distress   Respiratory Rate   []  Patient Baseline []  Less than 20 []  Less than 20 [x]  20-25 []  Greater than 25 []  Greater than 25   Peak flow % of Pred or PB [x]  NA   []  Greater than 90%  []  81-90% []  71-80% []  Less than or equal to 70%  or unable to perform []  Unable due to Respiratory Distress   Dyspnea re []  Patient Baseline []  No SOB []  No SOB [x]  SOB on exertion []  SOB min activity []  At rest/acute   e FEV% Predicted       [x]  NA []  Above 69%  []  Unable []  Above 60-69%  []  Unable []  Above 50-59%  []  Unable []  Above 35-49%  []  Unable []  Less than 35%  []  Unable                 []  Hyperinflation Assessment  Score 1 2 3   CXR and Breath Sounds   []  Clear []  No atelectasis  Basilar aeration []  Atelectasis or absent basilar breath sounds   Incentive Spirometry Volume  (Per IBW)   []  Greater than or equal to 15ml/Kg []  less than 15ml/Kg []  less than 15ml/Kg   Surgery within last 2 weeks []  None or general   []  Abdominal or thoracic surgery  []  Abdominal or thoracic   Chronic Pulmonary Historyre []  No []  Yes []  Yes     []  Secretion Management Assessment  Score 1 2 3   Bilateral Breath Sounds   []  Occasional Rhonchi []  Scattered Rhonchi []  Course Rhonchi and/or poor aeration   Sputum    []  Small amount of thin secretions []  Moderate amount of viscous secretions []  Copius, Viscious Yellow/ Secretions   CXR as reported by physician []  clear  []  Unavailable []  Infiltrates and/or consolidation  []  Unavailable []  Mucus Plugging and or lobar consolidation  []  Unavailable   Cough []  Strong, productive cough []  Weak productive cough []  No cough or weak non-productive cough   Yany Irving, [facility]  12:05 AM                            FEMALE                                  MALE                            FEV1 Predicted Normal Values                        FEV1 Predicted Normal Values          Age                                     Height in Feet and Inches       Age                                     Height in Feet and Inches       4' 11\" 5' 1\" 5' 3\" 5' 5\" 5' 7\" 5' 9\" 5' 11\" 6' 1\"  4' 11\" 5' 1\" 5' 3\" 5' 5\" 5' 7\" 5' 9\" 5' 11\" 6' 1\"   42 - 45 2.49 2.66 2.84 3.03 3.22 3.42 3.62 3.83 42 - 45 2.82 3.03 3.26 3.49 3.72 3.96 4.22 4.47   46 - 49 2.40 2.57 2.76 2.94 3.14 3.33 3.54 3.75 46 - 49 2.70 2.92 3.14 3.37 3.61 3.85 4.10 4.36   50 - 53 2.31 2.48 2.66 2.85 3.04 3.24 3.45 3.66 50 - 53 2.58 2.80 3.02 3.25 3.49 3.73 3.98 4.24   54 - 57 2.21 2.38 2.57 2.75 2.95 3.14 3.35 3.56 54 - 57 2.46 2.67 2.89 3.12 3.36 3.60 3.85 4.11   58 - 61 2.10 2.28 2.46 2.65 2.84 3.04 3.24 3.45 58 - 61 2.32 2.54 2.76 2.99 3.23 3.47 3.72 3.98   62 - 65 1.99 2.17 2.35 2.54 2.73 2.93 3.13 3.34 62 - 65 2.19 2.40 2.62 2.85 3.09 3.33 3.58 3.84   66 - 69 1.88 2.05 2.23 2.42 2.61 2.81 3.02 3.23 66 - 69 2.04 2.26 2.48 2.71 2.95 3.19 3.44 3.70   70+ 1.82 1.99 2.17 2.36 2.55 2.75 2.95 3.16 70+ 1.97 2.19 2.41 2.64

## 2021-10-03 NOTE — PLAN OF CARE
Problem: OXYGENATION/RESPIRATORY FUNCTION  Goal: Patient will maintain patent airway  10/3/2021 0509 by Yen Hays RN  Outcome: Ongoing  10/2/2021 1522 by Monserrat Kelly RCP  Outcome: Ongoing  10/2/2021 1519 by Singh Franco RN  Outcome: Ongoing  Goal: Patient will achieve/maintain normal respiratory rate/effort  Description: Respiratory rate and effort will be within normal limits for the patient  10/3/2021 0509 by Yen Hays RN  Outcome: Ongoing  10/2/2021 1522 by Monserrat Kelly RCP  Outcome: Ongoing  10/2/2021 1519 by Singh Franco RN  Outcome: Ongoing     Problem: SKIN INTEGRITY  Goal: Skin integrity is maintained or improved  10/3/2021 0509 by Yen Hays RN  Outcome: Ongoing  10/2/2021 1522 by Monserrat Kelly RCP  Outcome: Ongoing  10/2/2021 1519 by Singh rFanco RN  Outcome: Ongoing     Problem: Discharge Planning:  Goal: Participates in care planning  Description: Participates in care planning  10/3/2021 0509 by Yen Hays RN  Outcome: Ongoing  10/2/2021 1519 by Singh Franco RN  Outcome: Ongoing  Goal: Discharged to appropriate level of care  Description: Discharged to appropriate level of care  10/3/2021 0509 by Yen Hays RN  Outcome: Ongoing  10/2/2021 1519 by Singh Franco RN  Outcome: Ongoing  Goal: Ability to perform activities of daily living will improve  Description: Ability to perform activities of daily living will improve  10/3/2021 0509 by Yen Hays RN  Outcome: Ongoing  10/2/2021 1519 by Singh Franco RN  Outcome: Ongoing     Problem: Airway Clearance - Ineffective:  Goal: Ability to maintain a clear airway will improve  Description: Ability to maintain a clear airway will improve  10/3/2021 0509 by Yen Hays RN  Outcome: Ongoing  10/2/2021 1522 by Monserrat Kelly RCP  Outcome: Ongoing  10/2/2021 1519 by Singh Franco RN  Outcome: Ongoing     Problem: Anxiety/Stress:  Goal: Level of anxiety will decrease  Description: Level of anxiety will decrease  10/3/2021 0509 by Ever Sow RN  Outcome: Ongoing  10/2/2021 1519 by Christina Dakin, RN  Outcome: Ongoing     Problem: Aspiration:  Goal: Absence of aspiration  Description: Absence of aspiration  10/3/2021 0509 by Ever Sow RN  Outcome: Ongoing  10/2/2021 1519 by Christina Dakin, RN  Outcome: Ongoing     Problem:  Bowel Function - Altered:  Goal: Bowel elimination is within specified parameters  Description: Bowel elimination is within specified parameters  10/3/2021 0509 by Ever Sow RN  Outcome: Ongoing  10/2/2021 1519 by Christina Dakin, RN  Outcome: Ongoing     Problem: Cardiac Output - Decreased:  Goal: Hemodynamic stability will improve  Description: Hemodynamic stability will improve  10/3/2021 0509 by Ever Sow RN  Outcome: Ongoing  10/2/2021 1519 by Christina Dakin, RN  Outcome: Ongoing     Problem: Fluid Volume - Imbalance:  Goal: Absence of imbalanced fluid volume signs and symptoms  Description: Absence of imbalanced fluid volume signs and symptoms  10/3/2021 0509 by Ever Sow RN  Outcome: Ongoing  10/2/2021 1519 by Christina Dakin, RN  Outcome: Ongoing     Problem: Gas Exchange - Impaired:  Goal: Levels of oxygenation will improve  Description: Levels of oxygenation will improve  10/3/2021 0509 by Ever Sow RN  Outcome: Ongoing  10/2/2021 1522 by Tutu Reese RCP  Outcome: Ongoing  10/2/2021 1519 by Christina Dakin, RN  Outcome: Ongoing     Problem: Mental Status - Impaired:  Goal: Mental status will be restored to baseline  Description: Mental status will be restored to baseline  10/3/2021 0509 by Ever Sow RN  Outcome: Ongoing  10/2/2021 1519 by Christina Dakin, RN  Outcome: Ongoing     Problem: Nutrition Deficit:  Goal: Ability to achieve adequate nutritional intake will improve  Description: Ability to achieve adequate nutritional intake will improve  10/3/2021 0509 by Ever Sow RN  Outcome: Ongoing  10/2/2021 1519 by Christina Dakin, RN  Outcome: Ongoing     Problem: Pain:  Goal: Pain level will decrease  Description: Pain level will decrease  10/3/2021 0509 by Jhoan Booker RN  Outcome: Ongoing  10/2/2021 1519 by Alexsander Gonzalez RN  Outcome: Ongoing  Goal: Recognizes and communicates pain  Description: Recognizes and communicates pain  10/3/2021 0509 by Jhoan Booker RN  Outcome: Ongoing  10/2/2021 1519 by Alexsander Gonzalez RN  Outcome: Ongoing  Goal: Control of acute pain  Description: Control of acute pain  10/3/2021 0509 by Jhoan Booker RN  Outcome: Ongoing  10/2/2021 1519 by Alexsander Gonzalez RN  Outcome: Ongoing  Goal: Control of chronic pain  Description: Control of chronic pain  10/3/2021 0509 by Jhoan Booker RN  Outcome: Ongoing  10/2/2021 1519 by Alexsander Gonzalez RN  Outcome: Ongoing     Problem: Serum Glucose Level - Abnormal:  Goal: Ability to maintain appropriate glucose levels will improve to within specified parameters  Description: Ability to maintain appropriate glucose levels will improve to within specified parameters  10/3/2021 0509 by Jhoan Booker RN  Outcome: Ongoing  10/2/2021 1519 by Alexsander Gonzalez RN  Outcome: Ongoing     Problem: Skin Integrity - Impaired:  Goal: Will show no infection signs and symptoms  Description: Will show no infection signs and symptoms  10/3/2021 0509 by Jhoan Booker RN  Outcome: Ongoing  10/2/2021 1522 by Jennifer Collins RCP  Outcome: Ongoing  10/2/2021 1519 by Alexsander Gonzalez RN  Outcome: Met This Shift  Goal: Absence of new skin breakdown  Description: Absence of new skin breakdown  10/3/2021 0509 by Jhoan Booker RN  Outcome: Ongoing  10/2/2021 1522 by Jennifer Collins RCP  Outcome: Ongoing  10/2/2021 1519 by Alexsander Gonzalez RN  Outcome: Met This Shift     Problem: Sleep Pattern Disturbance:  Goal: Appears well-rested  Description: Appears well-rested  10/3/2021 0509 by Jhoan Booker RN  Outcome: Ongoing  10/2/2021 1519 by Alexsander Gonzalez RN  Outcome: Ongoing     Problem: Tissue Perfusion, Altered:  Goal: Circulatory function within specified parameters  Description: Circulatory function within specified parameters  10/3/2021 0509 by Bobby Kelly RN  Outcome: Ongoing  10/2/2021 1519 by Shellie Phalen, RN  Outcome: Ongoing     Problem: Tissue Perfusion - Cardiopulmonary, Altered:  Goal: Absence of angina  Description: Absence of angina  10/3/2021 0509 by Bobby Kelly RN  Outcome: Ongoing  10/2/2021 1519 by Shellie Phalen, RN  Outcome: Met This Shift  Goal: Hemodynamic stability will improve  Description: Hemodynamic stability will improve  10/3/2021 0509 by Bobby Kelly RN  Outcome: Ongoing  10/2/2021 1519 by Shellie Phalen, RN  Outcome: Ongoing     Problem: Confusion - Acute:  Goal: Mental status will be restored to baseline  Description: Mental status will be restored to baseline  10/3/2021 0509 by Bobby Kelly RN  Outcome: Ongoing  10/2/2021 1519 by Shellie Phalen, RN  Outcome: Ongoing  Goal: Absence of continued neurological deterioration signs and symptoms  Description: Absence of continued neurological deterioration signs and symptoms  Outcome: Ongoing     Problem: Injury - Risk of, Physical Injury:  Goal: Absence of physical injury  Description: Absence of physical injury  10/3/2021 0509 by Bobby Kelly RN  Outcome: Ongoing  10/2/2021 1519 by Shellie Phalen, RN  Outcome: Met This Shift  Goal: Will remain free from falls  Description: Will remain free from falls  10/3/2021 0509 by Bobby Kelly RN  Outcome: Ongoing  10/2/2021 1519 by Shellie Phalen, RN  Outcome: Met This Shift     Problem: Mood - Altered:  Goal: Mood stable  Description: Mood stable  10/3/2021 0509 by Bobby Kelly RN  Outcome: Ongoing  10/2/2021 1519 by Shellie Phalen, RN  Outcome: Ongoing  Goal: Absence of abusive behavior  Description: Absence of abusive behavior  10/3/2021 0509 by Bobby Kelly RN  Outcome: Ongoing  10/2/2021 1519 by Shellie Phalen, RN  Outcome: Met This Shift  Goal: Verbalizations of feeling interrupt nonreality-based thinking  10/3/2021 0509 by Orville Galindo RN  Outcome: Ongoing  10/2/2021 1519 by Loris Kanner, RN  Outcome: Met This Shift     Problem: Nutrition  Goal: Optimal nutrition therapy  Outcome: Ongoing     Problem: Falls - Risk of:  Goal: Absence of physical injury  Description: Absence of physical injury  10/3/2021 0509 by Orville Galindo RN  Outcome: Ongoing  10/2/2021 1519 by Loris Kanner, RN  Outcome: Met This Shift  Goal: Will remain free from falls  Description: Will remain free from falls  10/3/2021 0509 by Orville Galindo RN  Outcome: Ongoing  10/2/2021 1519 by Loris Kanner, RN  Outcome: Met This Shift     Problem: Skin Integrity:  Goal: Will show no infection signs and symptoms  Description: Will show no infection signs and symptoms  10/3/2021 0509 by Orville Galindo RN  Outcome: Ongoing  10/2/2021 1522 by Fay Watson RCP  Outcome: Ongoing  10/2/2021 1519 by Loris Kanner, RN  Outcome: Met This Shift  Goal: Absence of new skin breakdown  Description: Absence of new skin breakdown  10/3/2021 0509 by Orville Galindo RN  Outcome: Ongoing  10/2/2021 1522 by Fay Watson RCP  Outcome: Ongoing  10/2/2021 1519 by Loris Kanner, RN  Outcome: Met This Shift

## 2021-10-03 NOTE — FLOWSHEET NOTE
Writer called lab to inquire about 0900 PTT not resulting as of yet. Hematology will look into it.       Electronically signed by Singh Franco RN on 10/3/2021 at 10:36 AM

## 2021-10-03 NOTE — PROGRESS NOTES
INTENSIVE CARE UNIT  Resident Physician Progress Note    Patient - Karlee Coffey  Date of Admission -  9/29/2021 12:59 AM  Date of Evaluation -  10/3/2021  Room and Bed Number -  0127/0127-01   Hospital Day - 4    SUBJECTIVE:   HISTORY OF PRESENT ILLNESS:    Karlee Coffey is a 64 y.o. with PMH of asthma/COPD, CAD angioplasty with stent placement(2/11/2019), Diabetes mellitus, Hyperlipidemia, Hypertension, PAD. Presented to ER with chief complaints of respiratory distress. Patient was brought in by EMS after family called when they found her tripoding and struggling to breathe. Patient was hypoxic in the 60s and hypotensive on initial examination. Patient was given albuterol, Solu-Medrol and magnesium in route to the hospital. She was placed on CPAP. On arrival she was in acute distress nonverbal and not following commands. Rapid Covid test was negative. Admission ABG showed pH of 6.990, PCO2 of 106.6, HCO3 of 25.7 and Lactic of 8.4. She had elevated anion gap of 24, hyperkalemia 5.2, creatinine at 1.49. Patient was intubated as she had altered mental status and continued to be hypoxic. OVERNIGHT EVENTS:      No acute events overnight. Extubated yesterday. Passed swakllow study. Tolerating PO. AOx4, neurologically intact. Possible coronary angiography tomorrow per Cardiology.     TODAY:     AWAKE & FOLLOWING COMMANDS: []   No  [x]   Yes                   SECRETIONS             Amount:  [x]   None []  Small []  Moderate []  Large    Color: []   White []   Colored []   Bloody                 SEDATION:             RAAS Score: [x]   +1 to -1 []   -2 []   -3 []   -4    Sedation Agent: [x]   None []   Propofol  gtt  []   Versed []       Paralytic Agent: [x]   None []  Nimbex []  Norcuron [] Vecuron               VASOPRESSORS:  [x]   No  []   Yes        Vasopressor Agent []   Levophed []   Vasopressin []   Phenylephrine  []   Epinephrine     OBJECTIVE:   VITAL SIGNS:  Patient Vitals for the past 8 hrs:   BP Temp Temp src Pulse Resp SpO2   10/03/21 1300    92 23 95 %   10/03/21 1200 (!) 159/135 99.7 °F (37.6 °C) Bladder 88 25 99 %   10/03/21 1100    80 13 94 %   10/03/21 1000  99.5 °F (37.5 °C) Bladder 84 12 96 %   10/03/21 0900    95 14 95 %   10/03/21 0850     23 98 %   10/03/21 0800 (!) 147/82 98.8 °F (37.1 °C) Bladder 84 14 95 %   10/03/21 0700    86 17 95 %   10/03/21 0600    82 27 94 %       Tmax over 24 hours: Temp (24hrs), Av °F (37.2 °C), Min:98.4 °F (36.9 °C), Max:99.7 °F (37.6 °C)      Ins/Outs: In: 1530.1 [P.O.:268; I.V.:1262.1]  Out: 2805 [Urine:2805]    ROS:  Review of Systems   HENT: Negative. Eyes: Negative. Respiratory: Positive for shortness of breath. Negative for apnea, cough, choking, chest tightness, wheezing and stridor. Cardiovascular: Negative. Gastrointestinal: Negative. Endocrine: Negative. Genitourinary: Negative. Musculoskeletal: Negative. Skin: Negative. Neurological: Negative. Psychiatric/Behavioral: Negative. PHYSICAL EXAM:  General appearance - alert, well appearing, and in no distress  HEENT: Normocephalic and Atraumatic  Chest - wheezing to auscultation in all lung fields.  symmetric air entry  Cardiovascular - normal rate, regular rhythm, normal S1, S2, no murmurs, rubs, clicks or gallops  Abdomen - soft, nontender, nondistended, no masses or organomegaly   Neurological - Alert and oriented, Normal speech, No focal findings or movement disorder noted and Motor and sensory grossly normal bilaterally  Integumentary - Skin color, texture, turgor normal. No Rashes or lesions  Musculoskeletal -Full ROM times 4 extremities, No clubbing or cyanosis and No peripheral edema    MEDICATIONS:  Scheduled Meds:   influenza virus vaccine  0.5 mL IntraMUSCular Prior to discharge    famotidine  20 mg Oral BID    busPIRone  15 mg Oral TID    [START ON 10/4/2021] predniSONE  20 mg Oral Daily    Followed by   Jason Sanchez ON 10/7/2021] predniSONE  15 mg Oral Daily    Followed by   Dalia Malik ON 10/10/2021] predniSONE  10 mg Oral Daily    Followed by   Dalia Malik ON 10/13/2021] predniSONE  5 mg Oral Daily    lisinopril  10 mg Oral Daily    budesonide-formoterol  2 puff Inhalation BID    metoprolol tartrate  25 mg Oral BID    insulin glargine  25 Units SubCUTAneous Nightly    ipratropium-albuterol  1 ampule Inhalation 4x daily    sodium chloride flush  5-40 mL IntraVENous 2 times per day    cefTRIAXone (ROCEPHIN) IV  1,000 mg IntraVENous Q24H    aspirin  81 mg Oral Daily    [Held by provider] atorvastatin  40 mg Oral Daily    insulin lispro  0-18 Units SubCUTAneous TID WC    insulin lispro  0-9 Units SubCUTAneous Nightly       Continuous Infusions:   lactated ringers 35 mL/hr at 10/03/21 0300    sodium chloride      heparin (PORCINE) Infusion 17.2 Units/kg/hr (10/03/21 1042)    dextrose         PRN Meds:   albuterol, 2.5 mg, Q4H PRN  benzocaine-menthol, 1 lozenge, Q2H PRN  sodium chloride flush, 5-40 mL, PRN  sodium chloride, 25 mL, PRN  ondansetron, 4 mg, Q8H PRN   Or  ondansetron, 4 mg, Q6H PRN  polyethylene glycol, 17 g, Daily PRN  acetaminophen, 650 mg, Q6H PRN   Or  acetaminophen, 650 mg, Q6H PRN  heparin (porcine), 4,000 Units, PRN  heparin (porcine), 2,000 Units, PRN  glucose, 15 g, PRN  dextrose, 12.5 g, PRN  glucagon (rDNA), 1 mg, PRN  dextrose, 100 mL/hr, PRN      SUPPORT DEVICES:   [] Ventilator [] BIPAP   [] HiFlo Nasal Cannula  [] Nasal Cannula   [x] Room Air  VENT SETTINGS (Comprehensive) (if applicable):  Vent Information  $Ventilation: $Subsequent Day  Skin Assessment: Clean, dry, & intact  Suction Catheter Diameter: 14  Equipment Changed: HME  Vent Type: Servo i  Vent Mode: NIV/PC  Vt Ordered: 530 mL  Pressure Ordered: 8  Rate Set: 16 bmp  Pressure Support: 6 cmH20  FiO2 : 40 %  SpO2: 95 %  SpO2/FiO2 ratio: 250  Sensitivity: 3  PEEP/CPAP: 6  I Time/ I Time %: 0.9 s  Humidification Source: HME  Nitric Oxide/Epoprostenol In Use?: No  Mask Type: Full face mask  Mask Size: Large  Additional Respiratory  Assessments  Pulse: 92  Resp: 23  SpO2: 95 %  End Tidal CO2: 39 (%)  Position: Semi-[place]'s  Humidification Source: E  Oral Care Completed?: Yes  Oral Care: Mouthwash, Lip moisturizer applied, Mouth moisturizer, Mouth suctioned  Subglottic Suction Done?: Yes  Cuff Pressure (cm H2O): 28 cm H2O  Skin barrier applied: No  Lab Results   Component Value Date    MODE [facility] 10/02/2021       ABGs:   Arterial Blood Gas result:  pH 7.381; pCO2 50.7; pO2 68.3; HCO3 30.0; FiO2 30, P/F: 227    DATA:  Complete Blood Count:   Recent Labs     10/01/21  0409 10/02/21  0426 10/03/21  0437   WBC 17.4* 21.5* 12.9*   RBC 4.09 4.24 4.16   HGB 10.7* 11.2* 11.0*   HCT 35.8* 37.0 35.8*   MCV 87.5 87.3 86.1   MCH 26.2 26.4 26.4   MCHC 29.9 30.3 30.7   RDW 16.2* 16.2* 15.9*    228 180   MPV 10.7 10.8 10.7        PT/INR:    Lab Results   Component Value Date    PROTIME 9.7 11/20/2016    PROTIME 9.9 06/01/2012    INR 0.9 11/20/2016     PTT:    Lab Results   Component Value Date    APTT 55.1 10/03/2021       Basic Metabolic Profile:   Recent Labs     10/01/21  0409 10/02/21  0426 10/03/21  0437    136 139   K 4.3 4.8 3.8    103 101   CO2 23 24 30   BUN 28* 34* 33*   CREATININE 1.25* 1.06* 0.99*   GLUCOSE 226* 310* 106*   PHOS 3.1 3.0 2.5*       Magnesium:   Lab Results   Component Value Date    MG 2.2 10/03/2021    MG 2.5 10/02/2021    MG 2.5 10/01/2021     Phosphorus:   Lab Results   Component Value Date    PHOS 2.5 10/03/2021    PHOS 3.0 10/02/2021    PHOS 3.1 10/01/2021     Ionized Calcium: No results found for: HAILEYON     Radiology/Imaging:  XR CHEST PORTABLE    Result Date: 10/2/2021  Asymmetric airspace opacification in the perihilar right lower lung zone. Pattern may represent worsening edema or underlying pneumonitis.        ASSESSMENT:     Patient Active Problem List    Diagnosis Date Noted    S/P drug eluting coronary stent placement - Mid RCA () 2/11/19 (Dr. Jose Rg) 02/11/2019    Acute respiratory failure with hypoxia and hypercapnia ([facility]     Respiratory distress 09/29/2021    Gastroenteritis 07/25/2021    HARVEY (acute kidney injury) ([facility] 07/25/2021    High anion gap metabolic acidosis 87/03/9842    Coronary artery disease involving native coronary artery of native heart without angina pectoris 07/25/2021    Tobacco abuse counseling 07/25/2021    Lactic acidosis 07/25/2021    Vitamin D deficiency 07/25/2021    Marijuana abuse 07/25/2021    Type 2 diabetes mellitus with hyperglycemia, without long-term current use of insulin ([facility] 07/25/2021    [facility] 02/10/2019    Essential hypertension 02/10/2019    SOB (shortness of breath) 11/19/2016    Chronic bronchitis ([facility] 11/19/2016    Chest pain 11/19/2016    Tobacco abuse 11/19/2016    Non morbid obesity 11/19/2016    Lung nodule 11/19/2016        PLAN:     PLAN/MEDICAL DECISION MAKING:  Neurologic:   · Neuro exam: intact  · Neuro checks per protocol  · Sedation: None  · Pain management: acetaminophen  Cardiovascular: Elevated Troponin, Cardiogenic Shock  · Cardiology following  · Coronary angiography once cleared by nephrology  · Hemodynamically Stable  · MAP goal 65+  · Off Pressors  · Lisinopril 10mg  Pulmonary: Acute hypoxic hypercapnic respiratory failure secondary COPD exacerbation, Acute toxic metabolic encephalopathy from hypercapnia from respiratory failure  · Maintain oxygen sats >92%  · Pulmonary toilet  · Symbicort  · Solumedrol  CXR:   Impression   Asymmetric airspace opacification in the perihilar right lower lung zone. Pattern may represent worsening edema or underlying pneumonitis. GI/Nutrition  · Ulcer Prophylaxis: H2 blockers   · Diet:ADULT DIET; Regular; 4 carb choices (60 gm/meal)  Renal/Fluid/Electrolyte  · IV Fluids: . 09NS @ 25 mL/Hr   I/O: In: 1530.1 [P.O.:268;  I.V.:1262.1]  · Out: 2805 [Urine:2805]  · UOP: 1.0 cc/kg/hr overnight · BUN/Cr: 33/0.99  · Monitor electrolytes, replace PRN   ID: Leukocytosis  WBC:   Lab Results   Component Value Date    WBC 12.9 (H) 10/03/2021     Tmax: Temp (24hrs), Av °F (37.2 °C), Min:98.4 °F (36.9 °C), Max:99.7 °F (37.6 °C)  ·   · Antimicrobials: Rocephin  Hematology:  Recent Labs     10/01/21  0409 10/02/21  0426 10/03/21  043   HGB 10.7* 11.2* 11.0*   ·  stable  Endocrine:   most recent BGL is   Recent Labs     10/01/21  0409 10/02/21  0426 10/03/21  0437   GLUCOSE 226* 310* 106*     · Sliding scale: high dose  · Long acting:  lantus  DVT Prophylaxis  · SCD, heparin      CODE STATUS: Full Code    DISPOSITION:   [] To remain ICU:   [x] OK for out of ICU from [facility], [address] [facility]  Emergency Medicine Resident, PGY1  Critical Care Service   10/03/21 1:39 PM  Attending Physician Statement  I have discussed the care of Owen Zurita, including pertinent history and exam findings,  with the resident. I have seen and examined the patient and the key elements of all parts of the encounter have been performed by me. I agree with the assessment, plan and orders as documented by the resident with additions . We will start steroid taper  Removed central and arterial line  Continue bronchodilator therapy  Diuretic as needed  Complete 7 days of ceftriaxone         Total critical care time caring for this patient with life threatening, unstable organ failure, including direct patient contact, management of life support systems, review of data including imaging and labs, discussions with other team members and physicians at least 27   Min so far today, excluding procedures. Treatment plan Discussed with nursing staff in detail , all questions answered . Electronically signed by Cindy Kraft MD on   10/3/21 at 5:12 PM EDT    Please note that this chart was generated using voice recognition Dragon dictation software.   Although every effort was made to ensure the accuracy of this automated transcription, some errors in transcription may have occurred.

## 2021-10-03 NOTE — PLAN OF CARE
Problem: Respiratory  Intervention: Administer medication as ordered  Note:   PROVIDE ADEQUATE OXYGENATION WITH ACCEPTABLE SP02/ABG'S    [x]  IDENTIFY APPROPRIATE OXYGEN THERAPY  [x]   MONITOR SP02/ABG'S AS NEEDED   [x]   PATIENT EDUCATION AS NEEDED     BRONCHOSPASM/BRONCHOCONSTRICTION     [x]         IMPROVE AERATION/BREATH SOUNDS  [x]   ADMINISTER BRONCHODILATOR THERAPY AS APPROPRIATE  [x]   ASSESS BREATH SOUNDS  []   IMPLEMENT AEROSOL/MDI PROTOCOL  [x]   PATIENT EDUCATION AS NEEDED     [x]  NON INVASIVE VENTILATION  [x]  PROVIDE OPTIMAL VENTILATION/ACCEPTABLE SP02  [x]  IMPLEMENT NON INVASIVE VENTILATION PROTOCOL  [x]  ASSESSMENT SKIN INTEGRITY  [x]  PATIENT EDUCATION AS NEEDED  [x]  BIPAP AS NEEDED

## 2021-10-03 NOTE — PROGRESS NOTES
Will hold off on Nephrology consult for clearance prior to cath in setting of Cr of 0.99.      Mahad Gregorio MD  Fellow Cardiovascular Disease  [facility]

## 2021-10-03 NOTE — PROGRESS NOTES
Physical Therapy         Physical Therapy Cancel Note      DATE: 10/3/2021    NAME: Paty Caicedo  MRN: [id number]   : 1960      Patient not seen this date for Physical Therapy due to:    Patient is on strict bedrest. Will continue to pursue for mobility-readiness.       Electronically signed by Alex Malloy PT on 10/3/2021 at 1:57 PM

## 2021-10-03 NOTE — PROGRESS NOTES
Critical care team - Resident sign-out to medicine service      Date and time: 10/3/2021 3:28 PM  Patient's name:  [address] Record Number: [id number]  Patient's account/billing number: [de-identified]  Patient's YOB: 1960  Age: 64 y.o. Date of Admission: 9/29/2021 12:59 AM  Length of stay during current admission: 4    Primary Care Physician: Jeronimo Ortiz MD    Code Status: Full Code    Mode of physician to physician communication:        [x] Via telephone   [x] In person     Date and time of sign-out: 10/3/2021 3:28 PM    Accepting Internal Medicine resident: Dr. Lynn Adam    Accepting Medicine team: IM Team 3    Accepting team's attending: Dr. Marvin Masters    Patient's current ICU Bed:  127     Patient's assigned bed on floor:  443        [x] Med-Surg Monitored [x] Step-down       [] Psychiatry ICU       [] Psych floor     Reason for ICU admission:     Presented to ER in respiratory distress. Patient was hypoxic in the 60s and hypotensive. Patient was given albuterol, Solu-Medrol and magnesium in route to the hospital. She was placed on CPAP.  On arrival she was in acute distress nonverbal and not following commands.  Rapid Covid test was negative. Patient was intubated as she had altered mental status and continued to be hypoxic. ICU course summary:     9/29: Presented to the ED via EMS in respiratory distress. Intubated. Transferred to the MICU. Hypotensive, started on Levophed. CVC & Art line placed. Trops trending up, started on Heparin gtt. Concern for MI. Hx of MIx2, stents placed 2019. Episodes of ST, switched Levophed to Phenylephrine. 9/30: Weaned off vent for 2 hours. Echo shows 44% EF. Vascular ultrasound of lower extremities neg for DVT. 10/1: Start tube feeds. Cardiology consult. 10/2: Phenylephrine off at 0700, versed & fentanyl gtts d/c'd, extubated at 1216, BiPap then 3L/NC. Passed swallow study. 10/3: AO, neuro exam intact. Tolerating PO. Cards to cath tomorrow. Patient being transferred to step down. 10/4: Cardiac cath- thru R radial, old R coronary stent re-inflated d/t collapse and additional stent placed. 11,000 units heparin given & 300 plavix     Procedures during patient's ICU stay:     9/29: intubation, central line, cardiac cath      Current Vitals:     BP (!) 105/45   Pulse 81   Temp 99.7 °F (37.6 °C) (Bladder)   Resp (!) 37   Ht 5' 9\" (1.753 m)   Wt 228 lb 6.3 oz (103.6 kg)   SpO2 96%   BMI 33.73 kg/m²       Cultures:     Blood cultures:                 [] None drawn      [x] Negative             []  Positive (Details:  )  Urine Culture:                   [] None drawn      [x] Negative             []  Positive (Details:  )  Sputum Culture:               [] None drawn       [] Negative             [x]  Positive (Details:  )   Endotracheal aspirate:     [x] None drawn       [] Negative             []  Positive (Details:  )       Consults:     1.  Cardiology - heparin gtt, echo, cath    Assessment:     Patient Active Problem List    Diagnosis Date Noted    S/P drug eluting coronary stent placement - Mid RCA () 2/11/19 (Dr. Kristie Anderson) 02/11/2019    Acute respiratory failure with hypoxia and hypercapnia ([address]     Respiratory distress 09/29/2021    Gastroenteritis 07/25/2021    HARVEY (acute kidney injury) ([address] 07/25/2021    High anion gap metabolic acidosis 97/56/2532    Coronary artery disease involving native coronary artery of native heart without angina pectoris 07/25/2021    Tobacco abuse counseling 07/25/2021    Lactic acidosis 07/25/2021    Vitamin D deficiency 07/25/2021    Marijuana abuse 07/25/2021    Type 2 diabetes mellitus with hyperglycemia, without long-term current use of insulin ([address] 07/25/2021    ACS (acute coronary syndrome) ([address] 02/10/2019    Essential hypertension 02/10/2019    SOB (shortness of breath) 11/19/2016    Chronic bronchitis ([address] 11/19/2016    Chest pain 11/19/2016    Tobacco abuse 11/19/2016    Non morbid obesity 11/19/2016    Lung nodule 11/19/2016       Additional assessment:    1. Acute hypoxic hypercapnic respiratory failure secondary to COPD exacerbation   - Patient is on prednisolone taper and Symbicort inhalation.  -  Duoneb inhaltion 4 times daily.   -Continue rocephin for 7 days in total.   Wean off oxygen as tolerated.     2. Acute toxic metabolic encephalopathy from hypercapnia from respiratory failure  -Resolved     3. Shock,cardiogenic likely due to NSTEMI  -Resolved  Patient underwent cardiac cath on October 4, 2021 which showed restenosis of mid RCA requiring PTCA/JOSE MARTIN. Patient off heparin drip. 4. NSTEMI, previous h/o CAD s/p PCI  -Patient off heparin drip  On aspirin, statin, beta-blocker, lisinopril.  -Underwent cath on 10/4/2021, found to have restenosis of mid RCA, underwent PTCA/JOSE MARTIN.       5. New onset atrial fibrillation with RVR - currently NSR, rate controlled   -On beta-blocker  On full dose Lovenox for anticoagulation. Follow-up on cardiology recommendations for anticoagulation.     6. Acute kidney injury, likely secondary to tissue hypoxia and shock - improving  - Continue LR at 50cc/hr, continue to monitor I/O closely, follow up repeat BMP tomorrow am.      DVT prophylaxis -full dose Lovenox  Diet -regular carb controlled low-sodium diet  IV fluids - 50 cc/hr LR, will wean off once patient has adequate oral intake. Lyla Marte Neurological:  · Awake and alert, off sedation  · Mood disorderhome dose BuSpar     Respiratory:  · Acute hypoxic respiratory failure secondary to COPD exacerbationimproving  · Extubated on 10/2/2021 and currently on nasal cannula  · Continue bronchodilators, steroid taper, ceftriaxone for 7 days total  · Wean off oxygen as tolerated     Cardiovascular:  · NSTEMIcath on 10/4/2021 which showed mid RCA restenosis requiring PTCA/JOSE MARTIN. · New onset A. fibon beta-blockers and full dose Lovenox for anticoagulation.  CYNDY VAsc- 3  · Cardiogenic shockresolved     Gastrointestinal:  · Pepcid for GI prophylaxis required due to steroids     Renal/Fluid/Electrolytes:  · HARVEY due to shockimproving  · Continue LR at 50 cc/h. · Good urine output, no Shook in     Infectious Disease:  · Ceftriaxone for COPD exacerbation for 7 days total.     Skin/Extremities:  · No edema    Recommended Follow-up:     1. Cardiology - cardiac catheterization, recommendations for anticoagulation for A-fib. Above mentioned assessment and plan was discussed by me with the admitting medicine resident. The medicine team assigned to the patient by medicine admitting resident will be following up the patient from now onwards on the floor.        Fredrick Curtis DO  Emergency Medicine Resident  [address]  10/3/2021, 3:28 PM EDT

## 2021-10-04 LAB
ABSOLUTE EOS #: <0.03 K/UL (ref 0–0.44)
ABSOLUTE IMMATURE GRANULOCYTE: 0.23 K/UL (ref 0–0.3)
ABSOLUTE LYMPH #: 1.64 K/UL (ref 1.1–3.7)
ABSOLUTE MONO #: 0.75 K/UL (ref 0.1–1.2)
ACTIVATED CLOTTING TIME: 308 SEC (ref 79–149)
ALBUMIN SERPL-MCNC: 3.1 G/DL (ref 3.5–5.2)
ALBUMIN/GLOBULIN RATIO: 1.3 (ref 1–2.5)
ALP BLD-CCNC: 62 U/L (ref 35–104)
ALT SERPL-CCNC: 16 U/L (ref 5–33)
ANION GAP SERPL CALCULATED.3IONS-SCNC: 7 MMOL/L (ref 9–17)
AST SERPL-CCNC: 13 U/L
BASOPHILS # BLD: 0 % (ref 0–2)
BASOPHILS ABSOLUTE: <0.03 K/UL (ref 0–0.2)
BILIRUB SERPL-MCNC: 0.29 MG/DL (ref 0.3–1.2)
BUN BLDV-MCNC: 27 MG/DL (ref 6–20)
BUN/CREAT BLD: ABNORMAL (ref 9–20)
CALCIUM SERPL-MCNC: 8.3 MG/DL (ref 8.6–10.4)
CHLORIDE BLD-SCNC: 102 MMOL/L (ref 98–107)
CO2: 33 MMOL/L (ref 20–31)
CREAT SERPL-MCNC: 1.02 MG/DL (ref 0.5–0.9)
DIFFERENTIAL TYPE: ABNORMAL
EOSINOPHILS RELATIVE PERCENT: 0 % (ref 1–4)
GFR AFRICAN AMERICAN: >60 ML/MIN
GFR NON-AFRICAN AMERICAN: 56 ML/MIN
GFR SERPL CREATININE-BSD FRML MDRD: ABNORMAL ML/MIN/{1.73_M2}
GFR SERPL CREATININE-BSD FRML MDRD: ABNORMAL ML/MIN/{1.73_M2}
GLUCOSE BLD-MCNC: 113 MG/DL (ref 65–105)
GLUCOSE BLD-MCNC: 136 MG/DL (ref 65–105)
GLUCOSE BLD-MCNC: 149 MG/DL (ref 65–105)
GLUCOSE BLD-MCNC: 82 MG/DL (ref 65–105)
GLUCOSE BLD-MCNC: 93 MG/DL (ref 70–99)
HCT VFR BLD CALC: 33.2 % (ref 36.3–47.1)
HEMOGLOBIN: 9.9 G/DL (ref 11.9–15.1)
IMMATURE GRANULOCYTES: 2 %
LYMPHOCYTES # BLD: 16 % (ref 24–43)
MAGNESIUM: 2.4 MG/DL (ref 1.6–2.6)
MCH RBC QN AUTO: 26.6 PG (ref 25.2–33.5)
MCHC RBC AUTO-ENTMCNC: 29.8 G/DL (ref 28.4–34.8)
MCV RBC AUTO: 89.2 FL (ref 82.6–102.9)
MONOCYTES # BLD: 8 % (ref 3–12)
NRBC AUTOMATED: 0 PER 100 WBC
PARTIAL THROMBOPLASTIN TIME: 40.7 SEC (ref 20.5–30.5)
PARTIAL THROMBOPLASTIN TIME: 56.2 SEC (ref 20.5–30.5)
PDW BLD-RTO: 16 % (ref 11.8–14.4)
PHOSPHORUS: 3.3 MG/DL (ref 2.6–4.5)
PLATELET # BLD: 164 K/UL (ref 138–453)
PLATELET ESTIMATE: ABNORMAL
PMV BLD AUTO: 11 FL (ref 8.1–13.5)
POTASSIUM SERPL-SCNC: 3.6 MMOL/L (ref 3.7–5.3)
RBC # BLD: 3.72 M/UL (ref 3.95–5.11)
RBC # BLD: ABNORMAL 10*6/UL
SEG NEUTROPHILS: 74 % (ref 36–65)
SEGMENTED NEUTROPHILS ABSOLUTE COUNT: 7.35 K/UL (ref 1.5–8.1)
SODIUM BLD-SCNC: 142 MMOL/L (ref 135–144)
TOTAL PROTEIN: 5.4 G/DL (ref 6.4–8.3)
WBC # BLD: 10 K/UL (ref 3.5–11.3)
WBC # BLD: ABNORMAL 10*3/UL

## 2021-10-04 PROCEDURE — C1769 GUIDE WIRE: HCPCS

## 2021-10-04 PROCEDURE — 6370000000 HC RX 637 (ALT 250 FOR IP): Performed by: STUDENT IN AN ORGANIZED HEALTH CARE EDUCATION/TRAINING PROGRAM

## 2021-10-04 PROCEDURE — 6360000002 HC RX W HCPCS

## 2021-10-04 PROCEDURE — 6360000002 HC RX W HCPCS: Performed by: INTERNAL MEDICINE

## 2021-10-04 PROCEDURE — 92928 PRQ TCAT PLMT NTRAC ST 1 LES: CPT | Performed by: INTERNAL MEDICINE

## 2021-10-04 PROCEDURE — C1725 CATH, TRANSLUMIN NON-LASER: HCPCS

## 2021-10-04 PROCEDURE — 6360000002 HC RX W HCPCS: Performed by: STUDENT IN AN ORGANIZED HEALTH CARE EDUCATION/TRAINING PROGRAM

## 2021-10-04 PROCEDURE — 94660 CPAP INITIATION&MGMT: CPT

## 2021-10-04 PROCEDURE — 83735 ASSAY OF MAGNESIUM: CPT

## 2021-10-04 PROCEDURE — 2709999900 HC NON-CHARGEABLE SUPPLY

## 2021-10-04 PROCEDURE — 80053 COMPREHEN METABOLIC PANEL: CPT

## 2021-10-04 PROCEDURE — 2580000003 HC RX 258: Performed by: STUDENT IN AN ORGANIZED HEALTH CARE EDUCATION/TRAINING PROGRAM

## 2021-10-04 PROCEDURE — 6370000000 HC RX 637 (ALT 250 FOR IP)

## 2021-10-04 PROCEDURE — C1887 CATHETER, GUIDING: HCPCS

## 2021-10-04 PROCEDURE — 93458 L HRT ARTERY/VENTRICLE ANGIO: CPT | Performed by: INTERNAL MEDICINE

## 2021-10-04 PROCEDURE — 97162 PT EVAL MOD COMPLEX 30 MIN: CPT

## 2021-10-04 PROCEDURE — 85347 COAGULATION TIME ACTIVATED: CPT

## 2021-10-04 PROCEDURE — 36415 COLL VENOUS BLD VENIPUNCTURE: CPT

## 2021-10-04 PROCEDURE — C1894 INTRO/SHEATH, NON-LASER: HCPCS

## 2021-10-04 PROCEDURE — 2060000000 HC ICU INTERMEDIATE R&B

## 2021-10-04 PROCEDURE — 6360000002 HC RX W HCPCS: Performed by: HEALTH CARE PROVIDER

## 2021-10-04 PROCEDURE — 97530 THERAPEUTIC ACTIVITIES: CPT

## 2021-10-04 PROCEDURE — 82947 ASSAY GLUCOSE BLOOD QUANT: CPT

## 2021-10-04 PROCEDURE — 2500000003 HC RX 250 WO HCPCS

## 2021-10-04 PROCEDURE — 99291 CRITICAL CARE FIRST HOUR: CPT | Performed by: INTERNAL MEDICINE

## 2021-10-04 PROCEDURE — 6360000004 HC RX CONTRAST MEDICATION

## 2021-10-04 PROCEDURE — C1874 STENT, COATED/COV W/DEL SYS: HCPCS

## 2021-10-04 PROCEDURE — 85025 COMPLETE CBC W/AUTO DIFF WBC: CPT

## 2021-10-04 PROCEDURE — 2700000000 HC OXYGEN THERAPY PER DAY

## 2021-10-04 PROCEDURE — 027034Z DILATION OF CORONARY ARTERY, ONE ARTERY WITH DRUG-ELUTING INTRALUMINAL DEVICE, PERCUTANEOUS APPROACH: ICD-10-PCS | Performed by: INTERNAL MEDICINE

## 2021-10-04 PROCEDURE — 84100 ASSAY OF PHOSPHORUS: CPT

## 2021-10-04 PROCEDURE — 94640 AIRWAY INHALATION TREATMENT: CPT

## 2021-10-04 PROCEDURE — 94761 N-INVAS EAR/PLS OXIMETRY MLT: CPT

## 2021-10-04 PROCEDURE — 6370000000 HC RX 637 (ALT 250 FOR IP): Performed by: INTERNAL MEDICINE

## 2021-10-04 PROCEDURE — 85730 THROMBOPLASTIN TIME PARTIAL: CPT

## 2021-10-04 RX ORDER — SODIUM CHLORIDE 0.9 % (FLUSH) 0.9 %
5-40 SYRINGE (ML) INJECTION PRN
Status: DISCONTINUED | OUTPATIENT
Start: 2021-10-04 | End: 2021-10-08 | Stop reason: HOSPADM

## 2021-10-04 RX ORDER — SODIUM CHLORIDE 0.9 % (FLUSH) 0.9 %
5-40 SYRINGE (ML) INJECTION EVERY 12 HOURS SCHEDULED
Status: DISCONTINUED | OUTPATIENT
Start: 2021-10-04 | End: 2021-10-08 | Stop reason: HOSPADM

## 2021-10-04 RX ORDER — ATORVASTATIN CALCIUM 40 MG/1
40 TABLET, FILM COATED ORAL NIGHTLY
Status: DISCONTINUED | OUTPATIENT
Start: 2021-10-04 | End: 2021-10-08 | Stop reason: HOSPADM

## 2021-10-04 RX ORDER — SODIUM CHLORIDE 9 MG/ML
25 INJECTION, SOLUTION INTRAVENOUS PRN
Status: DISCONTINUED | OUTPATIENT
Start: 2021-10-04 | End: 2021-10-08 | Stop reason: HOSPADM

## 2021-10-04 RX ORDER — POTASSIUM CHLORIDE 7.45 MG/ML
10 INJECTION INTRAVENOUS
Status: COMPLETED | OUTPATIENT
Start: 2021-10-04 | End: 2021-10-04

## 2021-10-04 RX ORDER — POTASSIUM CHLORIDE 7.45 MG/ML
40 INJECTION INTRAVENOUS ONCE
Status: DISCONTINUED | OUTPATIENT
Start: 2021-10-04 | End: 2021-10-04 | Stop reason: CLARIF

## 2021-10-04 RX ORDER — ACETAMINOPHEN 325 MG/1
650 TABLET ORAL EVERY 4 HOURS PRN
Status: DISCONTINUED | OUTPATIENT
Start: 2021-10-04 | End: 2021-10-08 | Stop reason: HOSPADM

## 2021-10-04 RX ORDER — CLOPIDOGREL BISULFATE 75 MG/1
75 TABLET ORAL DAILY
Status: DISCONTINUED | OUTPATIENT
Start: 2021-10-05 | End: 2021-10-08 | Stop reason: HOSPADM

## 2021-10-04 RX ADMIN — INSULIN LISPRO 2 UNITS: 100 INJECTION, SOLUTION INTRAVENOUS; SUBCUTANEOUS at 20:49

## 2021-10-04 RX ADMIN — PREDNISONE 20 MG: 20 TABLET ORAL at 09:01

## 2021-10-04 RX ADMIN — METOPROLOL TARTRATE 25 MG: 25 TABLET ORAL at 09:01

## 2021-10-04 RX ADMIN — BUSPIRONE HYDROCHLORIDE 15 MG: 15 TABLET ORAL at 09:01

## 2021-10-04 RX ADMIN — BUDESONIDE AND FORMOTEROL FUMARATE DIHYDRATE 2 PUFF: 160; 4.5 AEROSOL RESPIRATORY (INHALATION) at 08:18

## 2021-10-04 RX ADMIN — IPRATROPIUM BROMIDE AND ALBUTEROL SULFATE 1 AMPULE: .5; 2.5 SOLUTION RESPIRATORY (INHALATION) at 20:46

## 2021-10-04 RX ADMIN — ACETAMINOPHEN 650 MG: 325 TABLET ORAL at 18:52

## 2021-10-04 RX ADMIN — SODIUM CHLORIDE, POTASSIUM CHLORIDE, SODIUM LACTATE AND CALCIUM CHLORIDE: 600; 310; 30; 20 INJECTION, SOLUTION INTRAVENOUS at 02:37

## 2021-10-04 RX ADMIN — BUSPIRONE HYDROCHLORIDE 15 MG: 15 TABLET ORAL at 15:59

## 2021-10-04 RX ADMIN — ALBUTEROL SULFATE 2.5 MG: 2.5 SOLUTION RESPIRATORY (INHALATION) at 00:09

## 2021-10-04 RX ADMIN — SODIUM CHLORIDE, PRESERVATIVE FREE 10 ML: 5 INJECTION INTRAVENOUS at 20:50

## 2021-10-04 RX ADMIN — INSULIN GLARGINE 25 UNITS: 100 INJECTION, SOLUTION SUBCUTANEOUS at 20:49

## 2021-10-04 RX ADMIN — METOPROLOL TARTRATE 12.5 MG: 25 TABLET ORAL at 20:47

## 2021-10-04 RX ADMIN — FAMOTIDINE 20 MG: 20 TABLET, FILM COATED ORAL at 20:48

## 2021-10-04 RX ADMIN — HEPARIN SODIUM 2000 UNITS: 1000 INJECTION, SOLUTION INTRAVENOUS; SUBCUTANEOUS at 14:20

## 2021-10-04 RX ADMIN — ONDANSETRON 4 MG: 2 INJECTION INTRAMUSCULAR; INTRAVENOUS at 09:02

## 2021-10-04 RX ADMIN — IPRATROPIUM BROMIDE AND ALBUTEROL SULFATE 1 AMPULE: .5; 2.5 SOLUTION RESPIRATORY (INHALATION) at 00:08

## 2021-10-04 RX ADMIN — DESMOPRESSIN ACETATE 40 MG: 0.2 TABLET ORAL at 20:48

## 2021-10-04 RX ADMIN — ONDANSETRON 4 MG: 2 INJECTION INTRAMUSCULAR; INTRAVENOUS at 14:24

## 2021-10-04 RX ADMIN — BUSPIRONE HYDROCHLORIDE 15 MG: 15 TABLET ORAL at 20:47

## 2021-10-04 RX ADMIN — IPRATROPIUM BROMIDE AND ALBUTEROL SULFATE 1 AMPULE: .5; 2.5 SOLUTION RESPIRATORY (INHALATION) at 15:16

## 2021-10-04 RX ADMIN — IPRATROPIUM BROMIDE AND ALBUTEROL SULFATE 1 AMPULE: .5; 2.5 SOLUTION RESPIRATORY (INHALATION) at 12:34

## 2021-10-04 RX ADMIN — LISINOPRIL 10 MG: 10 TABLET ORAL at 09:01

## 2021-10-04 RX ADMIN — POTASSIUM CHLORIDE 10 MEQ: 10 INJECTION, SOLUTION INTRAVENOUS at 14:15

## 2021-10-04 RX ADMIN — ASPIRIN 81 MG: 81 TABLET, CHEWABLE ORAL at 09:01

## 2021-10-04 RX ADMIN — FAMOTIDINE 20 MG: 20 TABLET, FILM COATED ORAL at 09:01

## 2021-10-04 RX ADMIN — POTASSIUM CHLORIDE 10 MEQ: 10 INJECTION, SOLUTION INTRAVENOUS at 15:30

## 2021-10-04 RX ADMIN — POTASSIUM CHLORIDE 10 MEQ: 10 INJECTION, SOLUTION INTRAVENOUS at 11:30

## 2021-10-04 RX ADMIN — POTASSIUM CHLORIDE 10 MEQ: 10 INJECTION, SOLUTION INTRAVENOUS at 10:45

## 2021-10-04 RX ADMIN — SODIUM CHLORIDE, PRESERVATIVE FREE 10 ML: 5 INJECTION INTRAVENOUS at 09:01

## 2021-10-04 RX ADMIN — ALBUTEROL SULFATE 2.5 MG: 2.5 SOLUTION RESPIRATORY (INHALATION) at 03:29

## 2021-10-04 RX ADMIN — HEPARIN SODIUM AND DEXTROSE 17.2 UNITS/KG/HR: 10000; 5 INJECTION INTRAVENOUS at 02:37

## 2021-10-04 RX ADMIN — IPRATROPIUM BROMIDE AND ALBUTEROL SULFATE 1 AMPULE: .5; 2.5 SOLUTION RESPIRATORY (INHALATION) at 08:18

## 2021-10-04 RX ADMIN — SODIUM CHLORIDE, PRESERVATIVE FREE 10 ML: 5 INJECTION INTRAVENOUS at 20:48

## 2021-10-04 RX ADMIN — CEFTRIAXONE SODIUM 1000 MG: 1 INJECTION, POWDER, FOR SOLUTION INTRAMUSCULAR; INTRAVENOUS at 05:17

## 2021-10-04 ASSESSMENT — PAIN DESCRIPTION - ONSET: ONSET: ON-GOING

## 2021-10-04 ASSESSMENT — PAIN DESCRIPTION - LOCATION
LOCATION: BACK
LOCATION: BACK;SHOULDER

## 2021-10-04 ASSESSMENT — PAIN DESCRIPTION - PAIN TYPE
TYPE: CHRONIC PAIN

## 2021-10-04 ASSESSMENT — PAIN DESCRIPTION - FREQUENCY: FREQUENCY: CONTINUOUS

## 2021-10-04 ASSESSMENT — PULMONARY FUNCTION TESTS
PIF_VALUE: 15
PIF_VALUE: 11
PIF_VALUE: 12

## 2021-10-04 ASSESSMENT — PAIN SCALES - GENERAL
PAINLEVEL_OUTOF10: 3
PAINLEVEL_OUTOF10: 5
PAINLEVEL_OUTOF10: 3
PAINLEVEL_OUTOF10: 2
PAINLEVEL_OUTOF10: 6

## 2021-10-04 ASSESSMENT — PAIN DESCRIPTION - ORIENTATION: ORIENTATION: RIGHT;LEFT;LOWER

## 2021-10-04 ASSESSMENT — PAIN DESCRIPTION - PROGRESSION
CLINICAL_PROGRESSION: NOT CHANGED
CLINICAL_PROGRESSION: NOT CHANGED

## 2021-10-04 ASSESSMENT — PAIN DESCRIPTION - DESCRIPTORS: DESCRIPTORS: ACHING

## 2021-10-04 NOTE — PLAN OF CARE
Problem: OXYGENATION/RESPIRATORY FUNCTION  Goal: Patient will maintain patent airway  Outcome: Ongoing  Intervention: POSITION PATIENT FOR MAXIMUM VENTILATORY EFFICIENCY  10/4/2021 1006 by Latoya Reynoso RCP     Goal: Patient will achieve/maintain normal respiratory rate/effort  Description: Respiratory rate and effort will be within normal limits for the patient  Outcome: Ongoing     Problem: SKIN INTEGRITY  Goal: Skin integrity is maintained or improved  Outcome: Ongoing     Problem: Discharge Planning:  Goal: Participates in care planning  Description: Participates in care planning  Outcome: Ongoing  Goal: Discharged to appropriate level of care  Description: Discharged to appropriate level of care  Outcome: Ongoing  Goal: Ability to perform activities of daily living will improve  Description: Ability to perform activities of daily living will improve  Outcome: Ongoing     Problem: Airway Clearance - Ineffective:  Goal: Ability to maintain a clear airway will improve  Description: Ability to maintain a clear airway will improve  Outcome: Ongoing     Problem: Anxiety/Stress:  Goal: Level of anxiety will decrease  Description: Level of anxiety will decrease  Outcome: Ongoing     Problem: Aspiration:  Goal: Absence of aspiration  Description: Absence of aspiration  Outcome: Ongoing     Problem:  Bowel Function - Altered:  Goal: Bowel elimination is within specified parameters  Description: Bowel elimination is within specified parameters  Outcome: Ongoing     Problem: Cardiac Output - Decreased:  Goal: Hemodynamic stability will improve  Description: Hemodynamic stability will improve  Outcome: Ongoing     Problem: Fluid Volume - Imbalance:  Goal: Absence of imbalanced fluid volume signs and symptoms  Description: Absence of imbalanced fluid volume signs and symptoms  Outcome: Ongoing     Problem: Gas Exchange - Impaired:  Goal: Levels of oxygenation will improve  Description: Levels of oxygenation will improve  Outcome: Ongoing     Problem: Nutrition Deficit:  Goal: Ability to achieve adequate nutritional intake will improve  Description: Ability to achieve adequate nutritional intake will improve  Outcome: Ongoing     Problem: Pain:  Goal: Pain level will decrease  Description: Pain level will decrease  Outcome: Ongoing  Goal: Recognizes and communicates pain  Description: Recognizes and communicates pain  Outcome: Ongoing  Goal: Control of acute pain  Description: Control of acute pain  Outcome: Ongoing  Goal: Control of chronic pain  Description: Control of chronic pain  Outcome: Ongoing     Problem: Skin Integrity - Impaired:  Goal: Will show no infection signs and symptoms  Description: Will show no infection signs and symptoms  Outcome: Ongoing  Goal: Absence of new skin breakdown  Description: Absence of new skin breakdown  Outcome: Ongoing     Problem: Sleep Pattern Disturbance:  Goal: Appears well-rested  Description: Appears well-rested  Outcome: Ongoing     Problem: Tissue Perfusion - Cardiopulmonary, Altered:  Goal: Absence of angina  Description: Absence of angina  Outcome: Ongoing  Goal: Hemodynamic stability will improve  Description: Hemodynamic stability will improve  Outcome: Ongoing     Problem: Nutrition  Goal: Optimal nutrition therapy  Outcome: Ongoing     Problem: Skin Integrity:  Goal: Will show no infection signs and symptoms  Description: Will show no infection signs and symptoms  Outcome: Ongoing  Goal: Absence of new skin breakdown  Description: Absence of new skin breakdown  Outcome: Ongoing     Problem: Musculor/Skeletal Functional Status  Goal: Highest potential functional level  Outcome: Ongoing  Goal: Absence of falls  Outcome: Ongoing     Problem: Pain:  Goal: Pain level will decrease  Description: Pain level will decrease  Outcome: Ongoing  Goal: Control of acute pain  Description: Control of acute pain  Outcome: Ongoing  Goal: Control of chronic pain  Description: Control of chronic pain  Outcome: Ongoing

## 2021-10-04 NOTE — PLAN OF CARE
Problem: OXYGENATION/RESPIRATORY FUNCTION  Goal: Patient will maintain patent airway  10/4/2021 0123 by Clifton Lovett RN  Outcome: Ongoing  10/3/2021 2259 by Mitzi Clark RCP  Outcome: Ongoing  10/3/2021 1442 by Shelly Shepherd RN  Outcome: Ongoing  Goal: Patient will achieve/maintain normal respiratory rate/effort  Description: Respiratory rate and effort will be within normal limits for the patient  10/4/2021 0123 by Clifton Lovett RN  Outcome: Ongoing  10/3/2021 2259 by Mitzi Clark RCP  Outcome: Ongoing  10/3/2021 1442 by Shelly Shepherd RN  Outcome: Ongoing     Problem: SKIN INTEGRITY  Goal: Skin integrity is maintained or improved  10/4/2021 0123 by Clifton Lovett RN  Outcome: Ongoing  10/3/2021 2259 by Mitzi Clark RCP  Outcome: Ongoing  10/3/2021 1442 by Shelly Shephred RN  Outcome: Ongoing     Problem: Discharge Planning:  Goal: Participates in care planning  Description: Participates in care planning  10/4/2021 0123 by Clifton Lovett RN  Outcome: Ongoing  10/3/2021 1442 by Shelly Shepherd RN  Outcome: Ongoing  Goal: Discharged to appropriate level of care  Description: Discharged to appropriate level of care  10/4/2021 0123 by Clifton Lovett RN  Outcome: Ongoing  10/3/2021 1442 by Shelly Shepherd RN  Outcome: Ongoing  Goal: Ability to perform activities of daily living will improve  Description: Ability to perform activities of daily living will improve  10/4/2021 0123 by Clifton Lovett RN  Outcome: Ongoing  10/3/2021 1442 by Shelly Shepherd RN  Outcome: Ongoing     Problem: Airway Clearance - Ineffective:  Goal: Ability to maintain a clear airway will improve  Description: Ability to maintain a clear airway will improve  10/4/2021 0123 by Clifton Lovett RN  Outcome: Ongoing  10/3/2021 2259 by Mitzi Clark RCP  Outcome: Ongoing  10/3/2021 1442 by Shelly Shepherd RN  Outcome: Ongoing     Problem: Anxiety/Stress:  Goal: Level of anxiety will decrease  Description: Level of anxiety will decrease  10/4/2021 0123 by Ever Martinez RN  Outcome: Ongoing  10/3/2021 1442 by Reji Goss RN  Outcome: Ongoing     Problem: Aspiration:  Goal: Absence of aspiration  Description: Absence of aspiration  10/4/2021 0123 by Ever Martinez RN  Outcome: Ongoing  10/3/2021 2259 by Aayush Sellers RCP  Outcome: Ongoing  10/3/2021 1442 by Reji Goss RN  Outcome: Met This Shift     Problem:  Bowel Function - Altered:  Goal: Bowel elimination is within specified parameters  Description: Bowel elimination is within specified parameters  10/4/2021 0123 by Ever Martinez RN  Outcome: Ongoing  10/3/2021 1442 by Reji Goss RN  Outcome: Ongoing     Problem: Cardiac Output - Decreased:  Goal: Hemodynamic stability will improve  Description: Hemodynamic stability will improve  10/4/2021 0123 by Ever Martinez RN  Outcome: Ongoing  10/3/2021 1442 by Reji Goss RN  Outcome: Ongoing     Problem: Fluid Volume - Imbalance:  Goal: Absence of imbalanced fluid volume signs and symptoms  Description: Absence of imbalanced fluid volume signs and symptoms  10/4/2021 0123 by Ever Martinez RN  Outcome: Ongoing  10/3/2021 1442 by Reji Goss RN  Outcome: Ongoing     Problem: Gas Exchange - Impaired:  Goal: Levels of oxygenation will improve  Description: Levels of oxygenation will improve  10/4/2021 0123 by Ever Martinez RN  Outcome: Ongoing  10/3/2021 2259 by Aayush Sellers RCP  Outcome: Ongoing  10/3/2021 1442 by Reji Goss RN  Outcome: Ongoing     Problem: Nutrition Deficit:  Goal: Ability to achieve adequate nutritional intake will improve  Description: Ability to achieve adequate nutritional intake will improve  10/4/2021 0123 by Ever Martinez RN  Outcome: Ongoing  10/3/2021 1442 by Reji Goss RN  Outcome: Ongoing     Problem: Pain:  Goal: Pain level will decrease  Description: Pain level will decrease  10/4/2021 0123 by Ever Martinez RN  Outcome: Ongoing  10/3/2021 1442 by Hugo Moon

## 2021-10-04 NOTE — PROGRESS NOTES
Critical Care Team - Daily Progress Note      Date and time: 10/4/2021 6:23 AM  Patient's name:  [address] Record Number: [id number]  Patient's account/billing number: [de-identified]  Patient's YOB: 1960  Age: 64 y.o. Date of Admission: 9/29/2021 12:59 AM  Length of stay during current admission: 5      Primary Care Physician: Donya Stevens MD  ICU Attending Physician: Dr. Jayson Kent    Code Status: Full Code    Reason for ICU admission:   Chief Complaint   Patient presents with    Respiratory Distress         SUBJECTIVE:     OVERNIGHT EVENTS:  Patient was seen and evaluated at bedside. No acute events overnight. Patient was extubated on 10/2/2021 and is now waiting for a bed to get transferred to [facility] down. Cardiac cath to be performed today. Patient is maintaining MAP of 71 mm Hg and HR of 73/min. Labs and imaging reviewed, Cr 1.02 from 1.45 mg/dl, WBC 12.9 from 23.0 k/ul  (receiving steroids). Hb 11.0 from 11.6 g/dl, Plt 180 from 252 k/ul. Rhinovirus positive. PH 7.381, pCO2 50.7, pO2 68.3. She continues to be on heparin gtt for [facility], [place]. BRIEF HISTORY  64year old female with past medical history of COPD, HTN,   T2DM, CAD s/p PCI to RCA in 2019, on ASA and [place] at home presented to the ICU with altered mental status secondary to COPD exacerbation (pCO2 106, pH 6.9). She was in acute hypoxic and hypercapnic respiratory failure and hypotensive requiring pressor support. She was intubated for hypercapnic respiratory failure. She was also noted to have elevated troponin [id number], Cardiology was consulted and started on heparin gtt, 2D ECHO done. 2D Echo results  Mildly reduced LV function with Calculated ejection fraction of 44%  Anterior and posterolateral wall motion abnormality. Abnormal global longitudinal strain average of -7%. Grade I (mild) left ventricular diastolic dysfunction. Left atrium is moderately dilated.   Aortic sclerosis without stenosis. Calcification of mitral valve leaflet tips with moderate stenosis. Mean gradient is 7 mmHg  Mild mitral regurgitation. Mild tricuspid regurgitation. Estimated right ventricular systolic pressure is 36 mmHg. Brief ICU course summary:    9/29: Presented to the ED via EMS in respiratory distress. Intubated. Transferred to the MICU. Hypotensive, started on Levophed. CVC & Art line placed. Trops trending up, started on Heparin gtt. Concern for MI. Hx of MIx2, stents placed 2019. Episodes of ST, switched Levophed to Phenylephrine. 9/30: Weaned off vent for 2 hours. Echo shows 44% EF. Vascular ultrasound of lower extremities neg for DVT. 10/1: Start tube feeds. Cardiology consult    10/2 Phenylephrine off at 0700, versed & fentanyl gtts d/c'd, extubated at 1216, BiPap then 3L/NC. Passed swallow study. Started on Symbicort    10/3 AO, neuro exam intact. Tolerating PO, a-line & jeronimo d/c'd, transfer to step-down orders     10/4 possible cardiac cath today. Waiting for a bed to get transferred to step down.      AWAKE & FOLLOWING COMMANDS:  [] No   [x] Yes    CURRENT VENTILATION STATUS:     [x] Ventilator  [] BIPAP  [x] Nasal Cannula [] Room Air      IF INTUBATED, ET TUBE MARKING AT LOWER LIP:       cms    SECRETIONS Amount:  [] Small [] Moderate  [] Large  [] None  Color:     [] White [] Colored  [] Bloody    SEDATION:  RAAS Score:  [] Propofol gtt  [] Versed gtt  [] Fentanyl gtt   [x] No Sedation    PARALYZED:  [x] No    [] Yes    DIARRHEA:                [x] No                [] Yes  (C. Difficile status: [] positive                                                                                                                       [] negative                                                                                                                     [] pending)    VASOPRESSORS:  [x] No    [] Yes    If yes -   [] Levophed       [] Dopamine     [] Vasopressin       [] Dobutamine  [] Phenylephrine         [] Epinephrine    CENTRAL LINES:     [x] No   [] Yes   (Date of Insertion:   )           If yes -     [] Right IJ     [] Left IJ [] Right Femoral [] Left Femoral                   [] Right Subclavian [] Left Subclavian       WESTBROOK'S CATHETER:   [x] No   [] Yes  (Date of Insertion:   )     URINE OUTPUT:            [x] Good   [] Low              [] Anuric      OBJECTIVE:     VITAL SIGNS:  BP (!) 114/59   Pulse 73   Temp 97.7 °F (36.5 °C) (Oral)   Resp 22   Ht 5' 9\" (1.753 m)   Wt 228 lb 6.3 oz (103.6 kg)   SpO2 98%   BMI 33.73 kg/m²   Tmax over 24 hours:  Temp (24hrs), Av.5 °F (36.9 °C), Min:97.7 °F (36.5 °C), Max:99.7 °F (37.6 °C)      Patient Vitals for the past 8 hrs:   BP Temp Temp src Pulse Resp SpO2   10/04/21 0400 (!) 114/59 97.7 °F (36.5 °C) Oral 73 22 98 %   10/04/21 0330    80 19 98 %   10/04/21 0011    73 (!) 31 100 %   10/04/21 0010 (!) 98/56 97.8 °F (36.6 °C) Oral 74 10 100 %   10/04/21 0009     13 100 %   10/03/21 2300 (!) 108/97   84 18          Intake/Output Summary (Last 24 hours) at 10/4/2021 0623  Last data filed at 10/4/2021 0500  Gross per 24 hour   Intake 1622.53 ml   Output 2125 ml   Net -502.47 ml     Date 10/04/21 0000 - 10/04/21 2359   Shift 8192-4084 7829-3557 4842-3717 24 Hour Total   INTAKE   I.V.(mL/kg) 455(4.4)   455(4.4)   Shift Total(mL/kg) 455(4.4)   455(4.4)   OUTPUT   Urine(mL/kg/hr) 500   500   Shift Total(mL/kg) 500(4.8)   500(4.8)   Weight (kg) 103.6 103.6 103.6 103.6     Wt Readings from Last 3 Encounters:   10/02/21 228 lb 6.3 oz (103.6 kg)   21 216 lb 6.4 oz (98.2 kg)   21 210 lb (95.3 kg)     Body mass index is 33.73 kg/m². PHYSICAL EXAM:  Constitutional: Awake and fully oriented to place, person and the reason why she is in the hospital.   EENT: PERRLA, EOMI, sclera clear, anicteric, oropharynx clear, no lesions, neck supple with midline trachea.   Neck: Supple, symmetrical, trachea midline, no adenopathy, thyroid symmetric, no jvd skin normal  Respiratory: Bilateral diffuse wheezing present, no crackles, decreased breath sounds bilaterally. Cardiovascular: regular rate and rhythm, normal S1, S2, no murmur noted and 2+ pulses throughout  Abdomen: soft, nontender, nondistended, no masses or organomegaly  NEUROLOGIC - Fully following commands, squeezes fingers strongly.   Extremities:  peripheral pulses normal, no pedal edema, no clubbing or cyanosis  SKIN: normal coloration and turgor    Any additional physical findings:      MEDICATIONS:  Scheduled Meds:   influenza virus vaccine  0.5 mL IntraMUSCular Prior to discharge    famotidine  20 mg Oral BID    busPIRone  15 mg Oral TID    predniSONE  20 mg Oral Daily    Followed by   Darryle Norris ON 10/7/2021] predniSONE  15 mg Oral Daily    Followed by   Darryle Norris ON 10/10/2021] predniSONE  10 mg Oral Daily    Followed by   Darryle Norris ON 10/13/2021] predniSONE  5 mg Oral Daily    albuterol  2.5 mg Nebulization BID    lisinopril  10 mg Oral Daily    budesonide-formoterol  2 puff Inhalation BID    metoprolol tartrate  25 mg Oral BID    insulin glargine  25 Units SubCUTAneous Nightly    ipratropium-albuterol  1 ampule Inhalation 4x daily    sodium chloride flush  5-40 mL IntraVENous 2 times per day    cefTRIAXone (ROCEPHIN) IV  1,000 mg IntraVENous Q24H    aspirin  81 mg Oral Daily    [Held by provider] atorvastatin  40 mg Oral Daily    insulin lispro  0-18 Units SubCUTAneous TID WC    insulin lispro  0-9 Units SubCUTAneous Nightly     Continuous Infusions:   lactated ringers 35 mL/hr at 10/04/21 0237    sodium chloride      heparin (PORCINE) Infusion 17.2 Units/kg/hr (10/04/21 0237)    dextrose       PRN Meds:   albuterol, 2.5 mg, Q4H PRN  benzocaine-menthol, 1 lozenge, Q2H PRN  sodium chloride flush, 5-40 mL, PRN  sodium chloride, 25 mL, PRN  ondansetron, 4 mg, Q8H PRN   Or  ondansetron, 4 mg, Q6H PRN  polyethylene glycol, 17 g, Daily PRN  acetaminophen, 650 mg, Q6H PRN   Or  acetaminophen, 650 mg, Q6H PRN  heparin (porcine), 4,000 Units, PRN  heparin (porcine), 2,000 Units, PRN  glucose, 15 g, PRN  dextrose, 12.5 g, PRN  glucagon (rDNA), 1 mg, PRN  dextrose, 100 mL/hr, PRN        SUPPORT DEVICES: [] Ventilator [] BIPAP  [x] Nasal Cannula [] Room Air    VENT SETTINGS (Comprehensive) (if applicable):   PRVC mode, FiO2 30%, PEEP 5, Respiratory Rate 18, Tidal Volume 540  Vent Information  $Ventilation: $Subsequent Day  Skin Assessment: Clean, dry, & intact  Suction Catheter Diameter: 14  Equipment Changed: HME  Vent Type: Servo i  Vent Mode: NIV/PC  Vt Ordered: 530 mL  Pressure Ordered: (S) 6  Rate Set: 16 bmp  Pressure Support: 6 cmH20  FiO2 : 40 %  SpO2: 98 %  SpO2/FiO2 ratio: 245  Sensitivity: 3  PEEP/CPAP: 6  I Time/ I Time %: 0.9 s  Humidification Source: HME  Nitric Oxide/Epoprostenol In Use?: No  Mask Type: Full face mask  Mask Size: Large  Additional Respiratory  Assessments  Pulse: 73  Resp: 22  SpO2: 98 %  End Tidal CO2: 39 (%)  Position: Semi-[place]'s  Humidification Source: HME  Oral Care Completed?: Yes  Oral Care: Mouthwash, Lip moisturizer applied, Mouth moisturizer, Mouth suctioned  Subglottic Suction Done?: Yes  Cuff Pressure (cm H2O): 28 cm H2O  Skin barrier applied: No    ABGs:     Lab Results   Component Value Date    WUP3VTT NOT REPORTED 10/02/2021    FIO2 30.0 10/02/2021     Lactic Acid:   Lab Results   Component Value Date    LACTA NOT REPORTED 09/29/2021         DATA:  Complete Blood Count:   Recent Labs     10/02/21  0426 10/03/21  0437 10/04/21  0501   WBC 21.5* 12.9* 10.0   HGB 11.2* 11.0* 9.9*   MCV 87.3 86.1 89.2    180 164   RBC 4.24 4.16 3.72*   HCT 37.0 35.8* 33.2*   MCH 26.4 26.4 26.6   MCHC 30.3 30.7 29.8   RDW 16.2* 15.9* 16.0*   MPV 10.8 10.7 11.0        PT/INR:    Lab Results   Component Value Date    PROTIME 9.7 11/20/2016    PROTIME 9.9 06/01/2012    INR 0.9 11/20/2016     PTT:    Lab Results   Component Value Date    APTT 56.2 10/04/2021       Basal Metabolic Profile:   Recent Labs     10/02/21  0426 10/03/21  0437    139   K 4.8 3.8   BUN 34* 33*   CREATININE 1.06* 0.99*    101   CO2 24 30      Magnesium:   Lab Results   Component Value Date    MG 2.2 10/03/2021    MG 2.5 10/02/2021    MG 2.5 10/01/2021     Phosphorus:   Lab Results   Component Value Date    PHOS 2.5 10/03/2021    PHOS 3.0 10/02/2021    PHOS 3.1 10/01/2021     S. Calcium:  Recent Labs     10/03/21  0437   CALCIUM 8.3*     S. Ionized Calcium:No results for input(s): IONCA in the last 72 hours. Urinalysis:   Lab Results   Component Value Date    NITRU NEGATIVE 09/29/2021    COLORU Yellow 09/29/2021    PHUR 6.5 09/29/2021    WBCUA 2 TO 5 09/29/2021    RBCUA 2 TO 5 09/29/2021    MUCUS NOT REPORTED 09/29/2021    TRICHOMONAS NOT REPORTED 09/29/2021    YEAST NOT REPORTED 09/29/2021    BACTERIA NOT REPORTED 09/29/2021    SPECGRAV 1.012 09/29/2021    LEUKOCYTESUR NEGATIVE 09/29/2021    UROBILINOGEN Normal 09/29/2021    BILIRUBINUR NEGATIVE 09/29/2021    BILIRUBINUR NEGATIVE 06/01/2012    GLUCOSEU NEGATIVE 09/29/2021    GLUCOSEU NEGATIVE 06/01/2012    KETUA NEGATIVE 09/29/2021    AMORPHOUS NOT REPORTED 09/29/2021       CARDIAC ENZYMES: No results for input(s): CKMB, CKMBINDEX, TROPONINI in the last 72 hours. Invalid input(s): CKTOTAL;3  BNP: No results for input(s): BNP in the last 72 hours. LFTS  Recent Labs     10/02/21  0426 10/03/21  0437   ALKPHOS 87 71   ALT 21 16   AST 11 12   BILITOT 0.18* 0.27*   LABALBU 3.2* 3.1*       AMYLASE/LIPASE/AMMONIA  No results for input(s): AMYLASE, LIPASE, AMMONIA in the last 72 hours.     Last 3 Blood Glucose:   Recent Labs     10/02/21  0426 10/03/21  0437   GLUCOSE 310* 106*      HgBA1c:    Lab Results   Component Value Date    LABA1C 9.2 02/10/2019         TSH:    Lab Results   Component Value Date    TSH 1.24 06/16/2020     ANEMIA STUDIES  No results for input(s): LABIRON, TIBC, FERRITIN, [id number], FOLATE, OCCULTBLD in the last 72 hours. Cultures during this admission:     Blood cultures:                 [] None drawn      [x] Negative             []  Positive (Details:  )  Urine Culture:                   [] None drawn      [x] Negative             []  Positive (Details:  )  Sputum Culture:               [] None drawn       [] Negative             [x]  Positive (Details: Mixed bacterial morphocytes  )   Endotracheal aspirate:     [] None drawn       [] Negative             [x]  Positive (Details: Mixed bacterial morphocytes   )       ASSESSMENT AND PLAN:        F.A.S.T. M. H.U.G.S. B.I.D.  Feeding Diet: Diet NPO   Fluids: Continuous LR infusion with Heparin 25,000 units in dextrose 5% 250 ml infusuin.  Family: Daughter and daughter in law updated. Daughter in law updates 2 other sons and 6 siblings.  Analgesic: Tylenol 650 mg every 6 hours   Sedation: Not on sedation   Thrombo-prophylaxis: [] Enoxaparin, [x] Continuous Heparin 25.000 units in dextrose 5% 250 ml infusion [] EPC Cuffs   Mobility: Standing and walking with support   Heads up: Head elevated at 45 degrees.  Ulcer prophylaxis: [] PPI Agent, [x] P1Eqcaq, [] Sucralfate, [] Other:   Glycemic control: Glucose 93 mg/dl   Spontaneous breathing trial: Passed   Bowel regimen/urine output: Urine output net (- 650) -  Net I/O - (-195)   Indwelling catheter/lines: One on Left forearm. Intact.  De-escalation: Extubated, stopped versed and fentanyl, stopped tube feeds, waiting to be transferred to step down. 1. Acute hypoxic hypercapnic respiratory failure secondary to COPD exacerbation   - Patient is on prednisolone tab and Symbicort inhalation.  -  Duoneb inhaltion 4 times daily. - Continue rocephin for 7 days in total.   - Patient extubated and is on Symbicort inhalation. 2. Acute toxic metabolic encephalopathy from hypercapnia from respiratory failure  - Fully following commands today. - Not on sedation     3. Shock,cardiogenic  - Recovering with MAP of 71    4. NSTEMI, previous h/o CAD s/p PCI  - Continue heparin gtt for anticoagulation.   - Cardiology is going to perform Cath today. 5. Concern for new onset atrial fibrillation with RVR - currently NSR, rate controlled   - Continue heparin gtt for anticoagulation. 6. Acute kidney injury, likely secondary to tissue hypoxia and shock - improving  - Continue LR at 50cc/hr, continue to monitor I/O closely, follow up repeat BMP tomorrow am.     DVT prophylaxis - On heparin gtt for [facility]  Diet - NPO  IV fluids - 50 cc/hr. Patient extubated and waiting to get transferred to step down. Felisa Gannon MD, M.D. Department of Internal Medicine/ Critical care  [facility])             10/4/2021, 6:23 AM  .       Please note that this chart was generated using voice recognition Dragon dictation software. Although every effort was made to ensure the accuracy of this automated transcription, some errors in transcription may have occurred.

## 2021-10-04 NOTE — ED PROVIDER NOTES
acute respiratory distress. She is cyanotic and mottled. Normocephalic atraumatic. Heart sounds were tachycardic and lungs with decreased air entry and wheezing throughout abdomen soft nontender. There is also some pitting edema bilaterally. Patient is alert, initially uncertain if she is following commands however with loud verbal stimuli she was able to give thumbs up some both hands and wiggle her toes. No jaundice. Decreased cap refill    MDM/Plan:   Acute respiratory distress. Patient was placed on a BiPAP and started to improve and was following commands. Given that he was starting to improve and was able to talk some through the BiPAP, will try BiPAP first and get cardiac work-up, basic labs, and x-ray. Also Covid swab. Patient's Covid was negative, however x-ray is concerning for Covid pneumonia  Patient has become more altered and combative, not redirectable. Therefore decision was made that even though her gas seem to be improving, we would intubate patient due to concern for altered mental status from hypoxia. Also performed respiratory viral panel given the x-ray findings. Patient admitted to ICU    EKG with a flutter with age-indeterminate anterior infarct    See resident note for intubation    Critical Care There was significant risk of life threatening deterioration of patient's condition requiring my direct management. Critical care time 35 minutes, excluding any documented procedures.       Naheed Storey MD  Attending Emergency Physician        Naheed Storey MD  10/03/21 [id number]

## 2021-10-04 NOTE — PROGRESS NOTES
glargine  25 Units SubCUTAneous Nightly    ipratropium-albuterol  1 ampule Inhalation 4x daily    sodium chloride flush  5-40 mL IntraVENous 2 times per day    cefTRIAXone (ROCEPHIN) IV  1,000 mg IntraVENous Q24H    aspirin  81 mg Oral Daily    [Held by provider] atorvastatin  40 mg Oral Daily    insulin lispro  0-18 Units SubCUTAneous TID WC    insulin lispro  0-9 Units SubCUTAneous Nightly     Continuous Infusions:   lactated ringers 35 mL/hr at 10/04/21 0237    sodium chloride      heparin (PORCINE) Infusion 17.2 Units/kg/hr (10/04/21 0636)    dextrose       CBC:   Recent Labs     10/02/21  0426 10/03/21  0437 10/04/21  0501   WBC 21.5* 12.9* 10.0   HGB 11.2* 11.0* 9.9*    180 164     BMP:    Recent Labs     10/02/21  0426 10/03/21  0437 10/04/21  0501    139 142   K 4.8 3.8 3.6*    101 102   CO2 24 30 33*   BUN 34* 33* 27*   CREATININE 1.06* 0.99* 1.02*   GLUCOSE 310* 106* 93     Hepatic:   Recent Labs     10/02/21  0426 10/03/21  0437 10/04/21  0501   AST 11 12 13   ALT 21 16 16   BILITOT 0.18* 0.27* 0.29*   ALKPHOS 87 71 62     Troponin: No results for input(s): TROPONINI in the last 72 hours. No results for input(s): TROPONINT in the last 72 hours. BNP: No results for input(s): PROBNP in the last 72 hours. No results for input(s): BNP in the last 72 hours. Lipids: No results for input(s): CHOL, HDL in the last 72 hours. Invalid input(s): LDLCALCU  INR: No results for input(s): INR in the last 72 hours.     Objective:   Vitals: BP (!) 114/59   Pulse 73   Temp 97.7 °F (36.5 °C) (Oral)   Resp 23   Ht 5' 9\" (1.753 m)   Wt 228 lb 6.3 oz (103.6 kg)   SpO2 96%   BMI 33.73 kg/m²    General appearance: awake and alert   HEENT: Head: Normocephalic, no lesions, without obvious abnormality  Neck: no JVD  Lungs: clear to auscultation bilaterally, no basilar rales, no wheezing   Heart: regular rate and rhythm, S1, S2 normal, no murmur, click, rub or gallop  Abdomen: soft, non-tender; bowel sounds normal  Extremities: No LE edema         Assessment / Acute Cardiac Problems:   1. Elevated troponin NSTEMI type I versus type II. 2  HARVEY  3  New onset of atrial fibrillation. Currently normal sinus rhythm. 4  PNA leading to Respiratory failure   5. Type II diabetes mellitus  6. CAD s/p PCI to RCA in 2019. Had a  of RCA. Patent stent in LAD     Plan of Treatment:   1. Continue heparin gtt  2. Continue ASA  3. Hold Lipitor in setting of elevated LFT's  4. Continue Lopressor 25 mg BID & lisinopril 10 mg daily. 5. K> 4, Mg>2  6. Plan on coronary angiography likely today. Yen Argueta MD  PGY-3 Internal Medicine Resident  [address] [place] [address]  10/4/2021 8:44 AM    I performed a history and physical examination of the patient and discussed management with the resident. I reviewed the residents note and agree with the documented findings and plan of care. Any areas of disagreement are noted on the chart. I was personally present for the key portions of any procedures. I have documented in the chart those procedures where I was not present during the key portions. I have personally evaluated this patient and have completed at least one if not all key elements of the E/M (history, physical exam, and MDM). Additional findings are as noted. Plan for cath today. Questions answered.  Mich Garcia MD

## 2021-10-04 NOTE — PROGRESS NOTES
[facility] Gutierrez[facility]. Respiratory distress [R06.03]  Viral pneumonia [J12.9]  Acute respiratory failure with hypoxia and hypercapnia (HCC) [J96.01, J96.02] . Vitals:    10/04/21 0846   BP: 111/67   Pulse: 90   Resp: 21   Temp: 97.7 °F (36.5 °C)   SpO2: 93%   . Patients home meds are   Prior to Admission medications    Medication Sig Start Date End Date Taking? Authorizing Provider   busPIRone (BUSPAR) 30 MG tablet TAKE ONE TABLET BY MOUTH THREE TIMES DAILY AS NEEDED 9/22/21  Yes Historical Provider, MD   atorvastatin (LIPITOR) 40 MG tablet Take 1 tablet by mouth daily 7/27/21   Zbigniew Alvarez MD   lisinopril (PRINIVIL;ZESTRIL) 40 MG tablet Take 1 tablet by mouth daily 7/27/21   Zbigniew Alvarez MD   Dulaglutide (TRULICITY) 1.5 TX/2.2NW SOPN Inject 1.5 mg into the skin once a week    Historical Provider, MD   aspirin 81 MG chewable tablet Take 1 tablet by mouth daily 2/13/19   Aida Prophet, APRN - CNP   nitroGLYCERIN (NITROSTAT) 0.4 MG SL tablet up to max of 3 total doses. If no relief after 1 dose, call 911. 2/12/19   Aida Prophet, APRN - CNP   metoprolol tartrate (LOPRESSOR) 25 MG tablet Take 0.5 tablets by mouth 2 times daily 2/12/19   Aida Prophet, APRN - CNP   ticagrelor (BRILINTA) 90 MG TABS tablet Take 1 tablet by mouth 2 times daily 2/12/19   Aida Prophet, APRN - CNP   Lancets MISC 1 each by Does not apply route daily 2/12/19   Rk Mota MD   glipiZIDE (GLUCOTROL) 5 MG tablet Take 1 tablet by mouth 2 times daily 2/12/19   Rk Mota MD   metFORMIN (GLUCOPHAGE) 1000 MG tablet Take 1 tablet by mouth 2 times daily (with meals) 2/12/19   Rk Mota MD   blood glucose monitor strips Test 3 times a day & as needed for symptoms of irregular blood glucose.  2/12/19   Rk Mota MD   glucose monitoring kit (FREESTYLE) monitoring kit 1 kit by Does not apply route daily 2/12/19   Rk Mota MD   albuterol (PROVENTIL) (5 MG/ML) 0.5% nebulizer solution Take 0.5 mLs by nebulization every 6 hours as needed for Wheezing 3/16/18   Demian Kaba MD   tiotropium (SPIRIVA) 18 MCG inhalation capsule Inhale 1 capsule into the lungs daily Pt told that she was using this medication at home.  3/16/18   Demian Kaba MD   albuterol-ipratropium (COMBIVENT RESPIMAT)  MCG/ACT AERS inhaler Inhale 1 puff into the lungs every 6 hours 3/16/18   Demian Kaba MD   acetaminophen (TYLENOL) 500 MG tablet Take 1 tablet by mouth every 6 hours as needed for Pain 1/9/18   Deandre Saxena MD   .  Assessment   RR 21  Breath Sounds: end expiratory wheezes      · Bronchodilator assessment at level  3  ·   · [x]    Bronchodilator Assessment  BRONCHODILATOR ASSESSMENT SCORE  Score 0 1 2 3 4 5   Breath Sounds   []  Patient Baseline []  No Wheeze good aeration []  Faint, scattered wheezing, good aeration [x]  Expiratory Wheezing and or moderately diminished []  Insp/Exp wheeze and/or very diminished []  Insp/Exp and/ or marked distress   Respiratory Rate   []  Patient Baseline []  Less than 20 []  Less than 20 [x]  20-25 []  Greater than 25 []  Greater than 25   Peak flow % of Pred or PB [x]  NA   []  Greater than 90%  []  81-90% []  71-80% []  Less than or equal to 70%  or unable to perform []  Unable due to Respiratory Distress   Dyspnea re []  Patient Baseline []  No SOB []  No SOB [x]  SOB on exertion []  SOB min activity []  At rest/acute   e FEV% Predicted       [x]  NA []  Above 69%  []  Unable []  Above 60-69%  []  Unable []  Above 50-59%  []  Unable []  Above 35-49%  []  Unable []  Less than 35%  []  Unable

## 2021-10-04 NOTE — OP NOTE
[facility]    CARDIAC CATHETERIZATION    Date:   10/4/2021  Patient name:  Heaven Henao  Date of admission:  9/29/2021 12:59 AM  MRN:   [id number]  YOB: 1960    Operators:  Primary:   Gloria Weston MD (Attending Physician)    Assistant/CV fellow:  Rafaela Graham MD      Procedure performed:     [x] Left Heart Catheterization. [] Graft Angiography. [x] Left Ventriculography. [] Right Heart Catheterization. [x] Coronary Angiography. [] Aortic Valve Studies. [x] PCI: RCA     [] Other:       Pre Procedure Conscious Sedation Data:  ASA Class:    [] I [x] II [] III [] IV    Mallampati Class:  [] I [x] II [] III [] IV      Indication:  [] STEMI      [] + Stress test  [x] ACS      [] + EKG Changes  [] Non Q MI       [x] Significant Risk Factors  [] Recurrent Angina             [] Diabetes Mellitus    [] New LBBB      [] Uncontrolled HTN. [] CHF / Low EF changes     [] Abnormal CTA / Ca Score      Procedure:  Access:  [] Femoral  [x] Radial  artery       [x] Right  [] Left    Procedure: After informed consent was obtained with explanation of the risks and benefits, patient was brought to the cath lab. The access area was prepped and draped in sterile fashion. 1% lidocaine was used for local block. The artery was cannulated with 6  Fr sheath with brisk arterial blood return. The side port was frequently flushed and aspirated with normal saline. Findings:    LMCA: Normal 0% stenosis. LAD: Mild irregularities 20-30%. LCx: Mild irregularities 20-30%. RCA: Mid stent stenosis 90%         Lesion on Mid RCA: Mid subsection. 90% stenosis 20 mm length reduced to     0%. Pre procedure MARY II flow was noted. Post Procedure MARY III flow     was present. Good runoff was present. The lesion was diagnosed as     Moderate Risk (B). Devices used         - NC Trek 3.0x12mm Balloon. 2 inflation(s) to a max pressure of: 18     liz. - Luge Wire 182 cm.  Number of passes: 1.         - NC Trek 3.0x12mm Balloon. 1 inflation(s) to a max pressure of: 15     liz. - Xience Shirley 3.5 x 28 JOSE MARTIN. 1 inflation(s) to a max pressure of: 12     liz. Coronary Tree        Dominance: Right     The LV gram was performed in the HAILE 30 position. LVEF: 55%. Estimated Blood Loss:            [x] Minimal 10-25 cc   [] Minimal < 25 cc      [] Moderate 25-50 cc         [] >50 cc     Conclusions        Procedure Summary        Restenosis mid RCA stent    Successful PTCA -JOSE MARTIN    Preserved LV function        Recommendations        Post stent protocol    Stop smoking     _  Electronically signed on 10/4/2021 at 5:39 PM by:    Ekta Gan MD  Fellow, [address]      I have reviewed the case / procedure with resident / fellow  I have examined the patient personally  Patient agree with treatment plan as discussed before, final arrangement based on my evaluation and exam.    Risk and benefit of procedure planned were explained in details. Procedure was performed by me personally, with all aspect of the procedure being done using standard protocol. Note was modified based on my own assessment and treatment.     Hima Rodriguez MD  [facility]

## 2021-10-04 NOTE — PROGRESS NOTES
Physical Therapy    [address]  Initial Assessment    NAME: Lenny Kumar  : 1960  MRN: [id number]    Date of Service: 10/4/2021  History copied and pasted from H+P--  Presented to ER with chief complaints of respiratory distress. Patient was brought in by EMS after family called when they found her tripoding and struggling to breathe. Patient was hypoxic in the 60s and hypotensive on initial examination. Patient was given albuterol, Solu-Medrol and magnesium in route to the hospital.  She was placed on CPAP. On arrival she was in acute distress nonverbal and not following commands. Rapid Covid test was negative. Pt had to be intubated, was extubated 10/2/21. Discharge Recommendations:  Further therapy recommended at discharge. PT Equipment Recommendations  Equipment Needed: No    Assessment    Pt initially reluctant to get OOB, but easily persuaded to do so; she's wanting to eat, but is NPO for procedure later today. She's normally independent, but limited gait distance d/t B LE pain with distance walking. She states she doesn't walk in the grocery store, but uses the electric seated shopping cart. She should be safe for home DC when medically appropriate as she has a good support system. Body structures, Functions, Activity limitations: Decreased functional mobility ; Decreased ROM; Decreased strength;Decreased endurance;Decreased balance;Decreased posture; Increased pain  Prognosis: Good  Decision Making: Medium Complexity  PT Education: Goals;PT Role;Plan of Care  REQUIRES PT FOLLOW UP: Yes  Activity Tolerance  Activity Tolerance: Patient limited by fatigue;Patient limited by endurance       Patient Diagnosis(es): The primary encounter diagnosis was Acute respiratory failure with hypoxia and hypercapnia ([facility] [address] A diagnosis of Viral pneumonia was also pertinent to this visit.      has a past medical history of Asthma, Blood circulation, collateral, CAD (coronary artery disease), COPD (chronic obstructive pulmonary disease) ([facility] [address] Diabetes mellitus ([facility] [address] Hyperlipidemia, Hypertension, Movement disorder, Neuropathy, and PAD (peripheral artery disease) ([facility] [address] has a past surgical history that includes Coronary angioplasty with stent; Tonsillectomy; Inner ear surgery (Right); and Coronary angioplasty with stent (02/11/2019). Restrictions  Restrictions/Precautions  Restrictions/Precautions: Cardiac, General Precautions, Fall Risk, Up as Tolerated, Contact Precautions  Required Braces or Orthoses?: No  Vision/Hearing  Vision: Within Functional Limits  Hearing: Exceptions to [facility]  Hearing Exceptions: Hard of hearing/hearing concerns     Subjective  General  Patient assessed for rehabilitation services?: Yes  Response To Previous Treatment: Not applicable  Family / Caregiver Present: Yes (dtr-in-law)  Follows Commands: Within Functional Limits  Pain Screening  Patient Currently in Pain: Yes  Pain Assessment  Pain Assessment: 0-10  Pain Level: 5  Pain Type: Chronic pain  Pain Location: Back; Shoulder  Pain Orientation: Right;Left;Lower  Pain Descriptors: Aching (\"stiff\")  Pain Frequency: Continuous  Pain Onset: On-going  Clinical Progression: Not changed  Vital Signs  Patient Currently in Pain: Yes  Pre Treatment Pain Screening  Intervention List: Patient able to continue with treatment    Orientation  Orientation  Overall Orientation Status: Within Normal Limits  Social/Functional History  Social/Functional History  Lives With: Family (dtr and two grandsons (13 and 23 yrs old))  Type of Home: Apartment (pt lives in the lower of a [facility]; her son lives upstairs, but works during the day)  Home Layout: One level  Home Access: Stairs to enter without rails (pt states she has handrails, but they're \"rotting\" and she doesn't use them)  Entrance Stairs - Number of Steps: 2  Home Equipment:  (pt is looking in to getting a scooter d/t claudication BLEs, per pt)  Receives Help From: Family, Friend(s)  ADL Assistance: Independent  Ambulation Assistance: Independent  Transfer Assistance: Independent  Active : No  Patient's  Info: TARPS, friend or daughter-in-law  Additional Comments:  (the dtr that lives with her also works, but her dtr-in-law doesn't and is available to help prn)    Objective     Observation/Palpation  Posture: Good    AROM RLE (degrees)  RLE AROM: WFL  AROM LLE (degrees)  LLE AROM : WFL  PROM RUE (degrees)  RUE PROM: WFL  PROM LUE (degrees)  LUE PROM: Exceptions--shoulder elevation to ~90 degrees; elbow distal WFL  Strength RLE  Strength RLE: WFL  Strength LLE  Strength LLE: WFL  Strength RUE  Strength RUE: Exception--shoulder grossly 3-/5; elbow distal WFL  Strength LUE  Strength LUE: Exception--shoulder grossly 1/5; elbow distal 3+/5  Tone RLE  RLE Tone: Normotonic  Tone LLE  LLE Tone: Normotonic  Sensation  Overall Sensation Status: WFL (denies N/T)  Bed mobility  Rolling to Left: Modified independent  Supine to Sit: Modified independent  Scooting: Independent  Transfers  Sit to Stand: Supervision  Stand to sit: Supervision  Bed to Chair: Supervision  Stand Pivot Transfers: Supervision  Ambulation  Ambulation?: Yes  Ambulation 1  Surface: level tile  Device: No Device  Other Apparatus: O2  Assistance: Supervision  Gait Deviations: Slow Paty; Increased BOB  Distance: 10'x2  Stairs/Curb  Stairs?: No     Balance  Posture: Good  Sitting - Static: Good  Sitting - Dynamic: Good  Standing - Static: Good  Standing - Dynamic: Fair  Other exercises  Other exercises 1: AROM BLEs seated x 10 reps: ankle pumps, LAQs, KTC--verbal cues to slow down  Other exercises 2: AAROM B shoulders x 10 reps; AROM B elbow flexion/extension x 10 reps; hand grasp/release x 10 reps     Plan   Plan  Times per week: 5 visits weekly  Times per day: Daily  Current Treatment Recommendations: Strengthening, Functional Mobility Training, ROM, Balance Training, Transfer Training, Endurance Training, Gait Training, Stair training, Pain Management, Home Exercise Program, Safety Education & Training, Patient/Caregiver Education & Training, Equipment Evaluation, Education, & procurement, Positioning  Safety Devices  Type of devices: Call light within reach, Gait belt, Patient at risk for falls, Left in chair  Restraints  Initially in place: No      AM-PAC Score  AM-PAC Inpatient Mobility Raw Score : 21 (10/04/21 1112)  AM-PAC Inpatient T-Scale Score : 50.25 (10/04/21 1112)  Mobility Inpatient CMS 0-100% Score: 28.97 (10/04/21 1112)  Mobility Inpatient CMS G-Code Modifier : Nohemi Henry (10/04/21 1112)          Goals  Short term goals  Time Frame for Short term goals: 8 visits  Short term goal 1: prevent loss of strength, mobility, independence while in the hospital  Short term goal 2: independent bed mobility without use of bed rails  Short term goal 3: independent transfers  Short term goal 4: independent gait x 30' with appropriate device vs none (history of claudication)  Patient Goals   Patient goals : eat!        Therapy Time   Individual Concurrent Group Co-treatment   Time In 1025         Time Out 1107         Minutes [address] [place] [address]

## 2021-10-04 NOTE — PLAN OF CARE
Problem: OXYGENATION/RESPIRATORY FUNCTION  Goal: Patient will maintain patent airway  10/3/2021 2259 by Sharifa Soto RCP  Outcome: Ongoing     Problem: OXYGENATION/RESPIRATORY FUNCTION  Goal: Patient will achieve/maintain normal respiratory rate/effort  Description: Respiratory rate and effort will be within normal limits for the patient  10/3/2021 2259 by Sharifa Soto RCP  Outcome: Ongoing     Problem: SKIN INTEGRITY  Goal: Skin integrity is maintained or improved  10/3/2021 2259 by Sharifa Soto RCP  Outcome: Ongoing     Problem: Airway Clearance - Ineffective:  Goal: Ability to maintain a clear airway will improve  Description: Ability to maintain a clear airway will improve  10/3/2021 2259 by Sharifa Soto RCP  Outcome: Ongoing     Problem: Aspiration:  Goal: Absence of aspiration  Description: Absence of aspiration  10/3/2021 2259 by Sharifa Soto RCP  Outcome: Ongoing     Problem: Gas Exchange - Impaired:  Goal: Levels of oxygenation will improve  Description: Levels of oxygenation will improve  10/3/2021 2259 by Sharifa Soto RCP  Outcome: Ongoing   BRONCHOSPASM/BRONCHOCONSTRICTION     [x]         IMPROVE AERATION/BREATH SOUNDS  [x]   ADMINISTER BRONCHODILATOR THERAPY AS APPROPRIATE  [x]   ASSESS BREATH SOUNDS  []   IMPLEMENT AEROSOL/MDI PROTOCOL  [x]   PATIENT EDUCATION AS NEEDED   NONINVASIVE VENTILATION    PROVIDE OPTIMAL VENTILATION/ACCEPTABLE SPO2   IMPLEMENT NONINVASIVE VENTILATION PROTOCOL   MAINTAIN ACCEPTABLE SPO2   ASSESS SKIN INTEGRITY/BREAKDOWN SCORE   PATIENT EDUCATION AS NEEDED   BIPAP AS NEEDED

## 2021-10-05 LAB
ABSOLUTE EOS #: 0.17 K/UL (ref 0–0.44)
ABSOLUTE IMMATURE GRANULOCYTE: 0.17 K/UL (ref 0–0.3)
ABSOLUTE LYMPH #: 1.45 K/UL (ref 1.1–3.7)
ABSOLUTE MONO #: 0.63 K/UL (ref 0.1–1.2)
ALBUMIN SERPL-MCNC: 2.7 G/DL (ref 3.5–5.2)
ALBUMIN/GLOBULIN RATIO: 1 (ref 1–2.5)
ALP BLD-CCNC: 63 U/L (ref 35–104)
ALT SERPL-CCNC: 18 U/L (ref 5–33)
ANION GAP SERPL CALCULATED.3IONS-SCNC: 11 MMOL/L (ref 9–17)
AST SERPL-CCNC: 20 U/L
BASOPHILS # BLD: 0 % (ref 0–2)
BASOPHILS ABSOLUTE: 0.03 K/UL (ref 0–0.2)
BILIRUB SERPL-MCNC: 0.38 MG/DL (ref 0.3–1.2)
BUN BLDV-MCNC: 23 MG/DL (ref 6–20)
BUN/CREAT BLD: ABNORMAL (ref 9–20)
CALCIUM SERPL-MCNC: 8.7 MG/DL (ref 8.6–10.4)
CHLORIDE BLD-SCNC: 102 MMOL/L (ref 98–107)
CO2: 24 MMOL/L (ref 20–31)
CREAT SERPL-MCNC: 0.98 MG/DL (ref 0.5–0.9)
CULTURE: NORMAL
CULTURE: NORMAL
DIFFERENTIAL TYPE: ABNORMAL
EOSINOPHILS RELATIVE PERCENT: 2 % (ref 1–4)
GFR AFRICAN AMERICAN: >60 ML/MIN
GFR NON-AFRICAN AMERICAN: 58 ML/MIN
GFR SERPL CREATININE-BSD FRML MDRD: ABNORMAL ML/MIN/{1.73_M2}
GFR SERPL CREATININE-BSD FRML MDRD: ABNORMAL ML/MIN/{1.73_M2}
GLUCOSE BLD-MCNC: 128 MG/DL (ref 65–105)
GLUCOSE BLD-MCNC: 157 MG/DL (ref 65–105)
GLUCOSE BLD-MCNC: 173 MG/DL (ref 65–105)
GLUCOSE BLD-MCNC: 246 MG/DL (ref 65–105)
GLUCOSE BLD-MCNC: 72 MG/DL (ref 65–105)
GLUCOSE BLD-MCNC: 74 MG/DL (ref 70–99)
HCT VFR BLD CALC: 33.5 % (ref 36.3–47.1)
HEMOGLOBIN: 10.1 G/DL (ref 11.9–15.1)
IMMATURE GRANULOCYTES: 2 %
LYMPHOCYTES # BLD: 15 % (ref 24–43)
Lab: NORMAL
Lab: NORMAL
MAGNESIUM: 2.8 MG/DL (ref 1.6–2.6)
MCH RBC QN AUTO: 26.5 PG (ref 25.2–33.5)
MCHC RBC AUTO-ENTMCNC: 30.1 G/DL (ref 28.4–34.8)
MCV RBC AUTO: 87.9 FL (ref 82.6–102.9)
MONOCYTES # BLD: 6 % (ref 3–12)
NRBC AUTOMATED: 0 PER 100 WBC
PDW BLD-RTO: 16.4 % (ref 11.8–14.4)
PHOSPHORUS: 4 MG/DL (ref 2.6–4.5)
PLATELET # BLD: ABNORMAL K/UL (ref 138–453)
PLATELET ESTIMATE: ABNORMAL
PLATELET, FLUORESCENCE: NORMAL K/UL (ref 138–453)
PMV BLD AUTO: ABNORMAL FL (ref 8.1–13.5)
POTASSIUM SERPL-SCNC: 4.9 MMOL/L (ref 3.7–5.3)
RBC # BLD: 3.81 M/UL (ref 3.95–5.11)
RBC # BLD: ABNORMAL 10*6/UL
SEG NEUTROPHILS: 75 % (ref 36–65)
SEGMENTED NEUTROPHILS ABSOLUTE COUNT: 7.5 K/UL (ref 1.5–8.1)
SODIUM BLD-SCNC: 137 MMOL/L (ref 135–144)
SPECIMEN DESCRIPTION: NORMAL
SPECIMEN DESCRIPTION: NORMAL
TOTAL PROTEIN: 5.5 G/DL (ref 6.4–8.3)
WBC # BLD: 10 K/UL (ref 3.5–11.3)
WBC # BLD: ABNORMAL 10*3/UL

## 2021-10-05 PROCEDURE — 6370000000 HC RX 637 (ALT 250 FOR IP): Performed by: INTERNAL MEDICINE

## 2021-10-05 PROCEDURE — 97535 SELF CARE MNGMENT TRAINING: CPT

## 2021-10-05 PROCEDURE — 97166 OT EVAL MOD COMPLEX 45 MIN: CPT

## 2021-10-05 PROCEDURE — 6370000000 HC RX 637 (ALT 250 FOR IP): Performed by: STUDENT IN AN ORGANIZED HEALTH CARE EDUCATION/TRAINING PROGRAM

## 2021-10-05 PROCEDURE — 2580000003 HC RX 258: Performed by: STUDENT IN AN ORGANIZED HEALTH CARE EDUCATION/TRAINING PROGRAM

## 2021-10-05 PROCEDURE — 82947 ASSAY GLUCOSE BLOOD QUANT: CPT

## 2021-10-05 PROCEDURE — 6370000000 HC RX 637 (ALT 250 FOR IP)

## 2021-10-05 PROCEDURE — 94640 AIRWAY INHALATION TREATMENT: CPT

## 2021-10-05 PROCEDURE — 6360000002 HC RX W HCPCS: Performed by: STUDENT IN AN ORGANIZED HEALTH CARE EDUCATION/TRAINING PROGRAM

## 2021-10-05 PROCEDURE — 85025 COMPLETE CBC W/AUTO DIFF WBC: CPT

## 2021-10-05 PROCEDURE — 94761 N-INVAS EAR/PLS OXIMETRY MLT: CPT

## 2021-10-05 PROCEDURE — 2700000000 HC OXYGEN THERAPY PER DAY

## 2021-10-05 PROCEDURE — 1200000000 HC SEMI PRIVATE

## 2021-10-05 PROCEDURE — 85055 RETICULATED PLATELET ASSAY: CPT

## 2021-10-05 PROCEDURE — 80053 COMPREHEN METABOLIC PANEL: CPT

## 2021-10-05 PROCEDURE — 83735 ASSAY OF MAGNESIUM: CPT

## 2021-10-05 PROCEDURE — 99291 CRITICAL CARE FIRST HOUR: CPT | Performed by: INTERNAL MEDICINE

## 2021-10-05 PROCEDURE — 6360000002 HC RX W HCPCS: Performed by: INTERNAL MEDICINE

## 2021-10-05 PROCEDURE — 36415 COLL VENOUS BLD VENIPUNCTURE: CPT

## 2021-10-05 PROCEDURE — 84100 ASSAY OF PHOSPHORUS: CPT

## 2021-10-05 RX ORDER — INSULIN GLARGINE 100 [IU]/ML
20 INJECTION, SOLUTION SUBCUTANEOUS NIGHTLY
Status: DISCONTINUED | OUTPATIENT
Start: 2021-10-05 | End: 2021-10-07

## 2021-10-05 RX ADMIN — APIXABAN 5 MG: 5 TABLET, FILM COATED ORAL at 20:40

## 2021-10-05 RX ADMIN — CLOPIDOGREL 75 MG: 75 TABLET, FILM COATED ORAL at 08:22

## 2021-10-05 RX ADMIN — BUSPIRONE HYDROCHLORIDE 15 MG: 15 TABLET ORAL at 08:22

## 2021-10-05 RX ADMIN — POLYETHYLENE GLYCOL 3350 17 G: 17 POWDER, FOR SOLUTION ORAL at 16:04

## 2021-10-05 RX ADMIN — CEFTRIAXONE SODIUM 1000 MG: 1 INJECTION, POWDER, FOR SOLUTION INTRAMUSCULAR; INTRAVENOUS at 03:26

## 2021-10-05 RX ADMIN — ONDANSETRON 4 MG: 4 TABLET, ORALLY DISINTEGRATING ORAL at 14:33

## 2021-10-05 RX ADMIN — DESMOPRESSIN ACETATE 40 MG: 0.2 TABLET ORAL at 20:40

## 2021-10-05 RX ADMIN — METOPROLOL TARTRATE 12.5 MG: 25 TABLET ORAL at 20:40

## 2021-10-05 RX ADMIN — ENOXAPARIN SODIUM 100 MG: 100 INJECTION SUBCUTANEOUS at 10:04

## 2021-10-05 RX ADMIN — BUSPIRONE HYDROCHLORIDE 15 MG: 15 TABLET ORAL at 20:41

## 2021-10-05 RX ADMIN — METOPROLOL TARTRATE 12.5 MG: 25 TABLET ORAL at 08:22

## 2021-10-05 RX ADMIN — INSULIN GLARGINE 20 UNITS: 100 INJECTION, SOLUTION SUBCUTANEOUS at 21:37

## 2021-10-05 RX ADMIN — BUDESONIDE AND FORMOTEROL FUMARATE DIHYDRATE 2 PUFF: 160; 4.5 AEROSOL RESPIRATORY (INHALATION) at 08:39

## 2021-10-05 RX ADMIN — INSULIN LISPRO 2 UNITS: 100 INJECTION, SOLUTION INTRAVENOUS; SUBCUTANEOUS at 20:42

## 2021-10-05 RX ADMIN — ALBUTEROL SULFATE 2.5 MG: 2.5 SOLUTION RESPIRATORY (INHALATION) at 05:27

## 2021-10-05 RX ADMIN — INSULIN LISPRO 3 UNITS: 100 INJECTION, SOLUTION INTRAVENOUS; SUBCUTANEOUS at 17:56

## 2021-10-05 RX ADMIN — FAMOTIDINE 20 MG: 20 TABLET, FILM COATED ORAL at 08:22

## 2021-10-05 RX ADMIN — SODIUM CHLORIDE, PRESERVATIVE FREE 10 ML: 5 INJECTION INTRAVENOUS at 08:16

## 2021-10-05 RX ADMIN — IPRATROPIUM BROMIDE AND ALBUTEROL SULFATE 1 AMPULE: .5; 2.5 SOLUTION RESPIRATORY (INHALATION) at 15:35

## 2021-10-05 RX ADMIN — ASPIRIN 81 MG: 81 TABLET, CHEWABLE ORAL at 08:21

## 2021-10-05 RX ADMIN — IPRATROPIUM BROMIDE AND ALBUTEROL SULFATE 1 AMPULE: .5; 2.5 SOLUTION RESPIRATORY (INHALATION) at 08:39

## 2021-10-05 RX ADMIN — IPRATROPIUM BROMIDE AND ALBUTEROL SULFATE 1 AMPULE: .5; 2.5 SOLUTION RESPIRATORY (INHALATION) at 11:51

## 2021-10-05 RX ADMIN — SODIUM CHLORIDE, PRESERVATIVE FREE 10 ML: 5 INJECTION INTRAVENOUS at 20:47

## 2021-10-05 RX ADMIN — BUSPIRONE HYDROCHLORIDE 15 MG: 15 TABLET ORAL at 15:29

## 2021-10-05 RX ADMIN — FAMOTIDINE 20 MG: 20 TABLET, FILM COATED ORAL at 20:41

## 2021-10-05 RX ADMIN — LISINOPRIL 10 MG: 10 TABLET ORAL at 08:27

## 2021-10-05 RX ADMIN — PREDNISONE 20 MG: 20 TABLET ORAL at 08:22

## 2021-10-05 RX ADMIN — SODIUM CHLORIDE, PRESERVATIVE FREE 10 ML: 5 INJECTION INTRAVENOUS at 20:41

## 2021-10-05 ASSESSMENT — PAIN DESCRIPTION - ORIENTATION: ORIENTATION: RIGHT;LEFT

## 2021-10-05 ASSESSMENT — ENCOUNTER SYMPTOMS
VOICE CHANGE: 0
RESPIRATORY NEGATIVE: 1
COUGH: 0
PHOTOPHOBIA: 0
SHORTNESS OF BREATH: 0
WHEEZING: 0
EYES NEGATIVE: 1
NAUSEA: 0
BLOOD IN STOOL: 0
CHEST TIGHTNESS: 0
CONSTIPATION: 0
ABDOMINAL PAIN: 0
GASTROINTESTINAL NEGATIVE: 1
VOMITING: 0
DIARRHEA: 0
TROUBLE SWALLOWING: 0

## 2021-10-05 ASSESSMENT — PAIN SCALES - GENERAL
PAINLEVEL_OUTOF10: 0
PAINLEVEL_OUTOF10: 5

## 2021-10-05 ASSESSMENT — PAIN DESCRIPTION - FREQUENCY: FREQUENCY: CONTINUOUS

## 2021-10-05 ASSESSMENT — PULMONARY FUNCTION TESTS: PIF_VALUE: 11

## 2021-10-05 ASSESSMENT — PAIN DESCRIPTION - PAIN TYPE: TYPE: CHRONIC PAIN;ACUTE PAIN

## 2021-10-05 ASSESSMENT — PAIN DESCRIPTION - LOCATION: LOCATION: HIP

## 2021-10-05 ASSESSMENT — PAIN DESCRIPTION - DESCRIPTORS: DESCRIPTORS: ACHING

## 2021-10-05 NOTE — PROGRESS NOTES
Comprehensive Nutrition Assessment    Type and Reason for Visit:  Reassess    Nutrition Recommendations/Plan:   - Continue current diet - CCHO (60g), 2g Na  - Encourage intake at meals    Nutrition Assessment:  Pt extubated, passed swallow study, TF d/c on 10/2. Per pt good appetite and wants to eat, but does not like the food. Pt declined ONS. Pt also noted she has many broken and chipped teeth that do not impact her eating. Has some soreness when swallowing, but feels she is eating well and declined softer diet. No BM yet, PRN bowel regimen remain in place. Malnutrition Assessment:  Malnutrition Status: At risk for malnutrition (Comment)    Context:  Chronic Illness     Findings of the 6 clinical characteristics of malnutrition:  Energy Intake:  Mild decrease in energy intake (Comment)  Weight Loss:  No significant weight loss (3% wt loss noted since admission, with edema)     Body Fat Loss:  Unable to assess     Muscle Mass Loss:  Unable to assess    Fluid Accumulation:  1 - Mild Generalized   Strength:  Not Performed    Estimated Daily Nutrient Needs:  Energy (kcal):  1.1 to 1.3 -> 1800 to 2100 kcal/day; Weight Used for Energy Requirements:  Current     Protein (g):  80 to 100 g/day (1.2 to 1.4 g/kg); Weight Used for Protein Requirements:  Ideal        Fluid (ml/day):  1800 to 2100 mL/day or per MD; Method Used for Fluid Requirements:  1 ml/kcal      Nutrition Related Findings:  Labs/meds reviewed. +1 generalized edema. Wounds:  None       Current Nutrition Therapies:    ADULT DIET; Regular; 4 carb choices (60 gm/meal); Low Sodium (2 gm)    Anthropometric Measures:  · Height: 5' 9\" (175.3 cm)  · Current Body Weight: 224 lb 6.9 oz (101.8 kg)   · Admission Body Weight: 231 lb 4.2 oz (104.9 kg)    · Usual Body Weight: 216 lb (98 kg) (7/25/21)     · Ideal Body Weight: 145 lbs; % Ideal Body Weight 154.8 %   · BMI: 33.1  · BMI Categories: Obese Class 1 (BMI 30.0-34. 9)       Nutrition Diagnosis:

## 2021-10-05 NOTE — PLAN OF CARE
Problem: OXYGENATION/RESPIRATORY FUNCTION  Goal: Patient will maintain patent airway  Outcome: Ongoing  Intervention: POSITION PATIENT FOR MAXIMUM VENTILATORY EFFICIENCY  10/5/2021 0853 by Matthew Coronado RCP  Note: BRONCHOSPASM/BRONCHOCONSTRICTION   [x]  IMPROVE AERATION/BREATH SOUNDS  [x]   ADMINISTER BRONCHODILATOR THERAPY AS APPROPRIATE  [x]   ASSESS BREATH SOUNDS  [x]   IMPLEMENT AEROSOL/MDI PROTOCOL  [x]   PATIENT EDUCATION AS NEEDED     Goal: Patient will achieve/maintain normal respiratory rate/effort  Description: Respiratory rate and effort will be within normal limits for the patient  Outcome: Ongoing     Problem: SKIN INTEGRITY  Goal: Skin integrity is maintained or improved  Outcome: Ongoing     Problem: Discharge Planning:  Goal: Participates in care planning  Description: Participates in care planning  Outcome: Ongoing  Goal: Discharged to appropriate level of care  Description: Discharged to appropriate level of care  Outcome: Ongoing  Goal: Ability to perform activities of daily living will improve  Description: Ability to perform activities of daily living will improve  Outcome: Ongoing     Problem: Airway Clearance - Ineffective:  Goal: Ability to maintain a clear airway will improve  Description: Ability to maintain a clear airway will improve  Outcome: Ongoing     Problem: Anxiety/Stress:  Goal: Level of anxiety will decrease  Description: Level of anxiety will decrease  Outcome: Ongoing     Problem: Aspiration:  Goal: Absence of aspiration  Description: Absence of aspiration  Outcome: Ongoing     Problem:  Bowel Function - Altered:  Goal: Bowel elimination is within specified parameters  Description: Bowel elimination is within specified parameters  Outcome: Ongoing     Problem: Cardiac Output - Decreased:  Goal: Hemodynamic stability will improve  Description: Hemodynamic stability will improve  Outcome: Ongoing     Problem: Fluid Volume - Imbalance:  Goal: Absence of imbalanced fluid volume Ongoing     Problem: Falls - Risk of:  Goal: Absence of physical injury  Description: Absence of physical injury  Outcome: Ongoing  Goal: Will remain free from falls  Description: Will remain free from falls  Outcome: Ongoing     Problem: Musculor/Skeletal Functional Status  Goal: Highest potential functional level  Outcome: Ongoing     Problem: Pain:  Goal: Pain level will decrease  Description: Pain level will decrease  Outcome: Ongoing  Goal: Control of acute pain  Description: Control of acute pain  Outcome: Ongoing  Goal: Control of chronic pain  Description: Control of chronic pain  Outcome: Ongoing

## 2021-10-05 NOTE — PROGRESS NOTES
Physical Therapy        Physical Therapy Cancel Note      DATE: 10/5/2021    NAME: Annia Davis  MRN: [id number]   : 1960      Patient not seen this date for Physical Therapy due to:     Other: pt just got back to bed, has been getting up and going to the bathroom ind; monitor and continue treatment as needed; possible DC home tomorrow per pt      Electronically signed by Aleksandra Rincon PT on 10/5/2021 at 12:45 PM

## 2021-10-05 NOTE — PROGRESS NOTES
Occupational Therapy   Occupational Therapy Initial Assessment  Date: 10/5/2021   Patient Name: Jannet Modi  MRN: [id number]     : 1960    Date of Service: 10/5/2021     Chief Complaint   Patient presents with    Respiratory Distress         Discharge Recommendations:  Patient would benefit from continued therapy after discharge  OT Equipment Recommendations  Equipment Needed: No    Assessment   Performance deficits / Impairments: Decreased functional mobility ; Decreased ADL status; Decreased endurance;Decreased high-level IADLs;Decreased safe awareness  Assessment: Pt demonstrated functional sit<>stand transfers with SBA, static/dynamic standing tasks with SBA and functional mobility with CGA. No AD used throughout. Completed mulitple grooming tasks sinkside IND w/ setup. Pt completed toileting with SBA and donned/doffed socsk with Mod IND. Pt limited by SOB throughout session and decreased endurance. Pt educated on use of pursed lip breahting technique. Retured to bedside chair at end of session. Pt is expected to require skilled OT services during their acute hospitalization stay to address the above noted deficits through skilled occupational therapy intervention for promotion of increased independence throughout ADLs, IADLs and functional mobility tasks. Prognosis: Good  Decision Making: Medium Complexity  OT Education: Energy Conservation;OT Role;Plan of Care;Transfer Training  Patient Education: activity promotion, use of restbreaks PRN, pursed lip breathing tech, home safety/setup/equiptment-good return  Activity Tolerance  Activity Tolerance: Patient Tolerated treatment well  Activity Tolerance: Limited by decreased endurance/SOB  Safety Devices  Safety Devices in place: Yes  Type of devices: Gait belt;Call light within reach; Left in chair;Nurse notified  Restraints  Initially in place: No           Patient Diagnosis(es): The primary encounter diagnosis was Acute respiratory failure with hypoxia and hypercapnia ([facility] [address] A diagnosis of Viral pneumonia was also pertinent to this visit. has a past medical history of Asthma, Blood circulation, collateral, CAD (coronary artery disease), COPD (chronic obstructive pulmonary disease) ([address] Diabetes mellitus ([facility] [address] Hyperlipidemia, Hypertension, Movement disorder, Neuropathy, and PAD (peripheral artery disease) ([facility] [address] has a past surgical history that includes Coronary angioplasty with stent; Tonsillectomy; Inner ear surgery (Right); and Coronary angioplasty with stent (02/11/2019). Restrictions  Restrictions/Precautions  Restrictions/Precautions: Fall Risk, Up as Tolerated, Contact Precautions  Required Braces or Orthoses?: No  Position Activity Restriction  Other position/activity restrictions: 10/4 cardiac cath    Subjective   General  Patient assessed for rehabilitation services?: Yes  Family / Caregiver Present: No  General Comment  Comments: RN ok'd for OT cornelio this AM. Pt agreeable to session, pleasent/cooperative throughout. Patient Currently in Pain: Yes  Pain Assessment  Pain Assessment: 0-10  Pain Level: 5 (Reports it is d/t laying in bed too long)  Pain Type: Chronic pain;Acute pain  Pain Location: Hip  Pain Orientation: Right;Left  Pain Descriptors: Aching  Pain Frequency: Continuous  Non-Pharmaceutical Pain Intervention(s): Repositioned; Ambulation/Increased Activity  Response to Pain Intervention: Patient Satisfied  Vital Signs  Patient Currently in Pain: Yes  Social/Functional History  Social/Functional History  Lives With: Family (dtr and two grandsons (13 and 23 yrs old))  Type of Home: Apartment (pt lives in the lower of a [facility]; her son lives upstairs, but works during the day)  Home Layout: One level  Home Access: Stairs to enter without rails (pt states she has handrails, but they're \"rotting\" and she doesn't use them)  Entrance Stairs - Number of Steps: 2  Bathroom Shower/Tub: Tub/Shower unit  Bathroom Toilet: Standard  Bathroom Equipment: Shower chair (Reports does not work because she cannot step over)  Home Equipment:  (pt is looking in to getting a scooter d/t claudication BLEs, per pt)  Receives Help From: Family, Friend(s)  ADL Assistance: Independent  Homemaking Responsibilities: Yes  Meal Prep Responsibility: Primary  Laundry Responsibility: Primary  Cleaning Responsibility: Primary  Shopping Responsibility: Primary  Ambulation Assistance: Independent  Transfer Assistance: Independent  Active : No  Patient's  Info: TARPS, friend or daughter-in-law  Additional Comments:  (the dtr that lives with her also works, but her dtr-in-law doesn't and is available to help prn)       Objective   Vision: Within Functional Limits  [facility]  Hearing Exceptions: Hard of hearing/hearing concerns    Orientation  Overall Orientation Status: Within Functional Limits     Balance  Sitting Balance: Supervision (Seated unsupported edge of chair for LB dressing and unsupported on toilet for toileting tasks ~14 minutes total)  Standing Balance: Stand by assistance  Standing Balance  Time: 6 minutes  Activity: static standing, standing sinskide for ADL tasks, standing toilet for toileting tasks  Comment: No AD, no unsteadiness/LOB  Functional Mobility  Functional - Mobility Device: No device  Activity: Other  Assist Level: Contact guard assistance  Functional Mobility Comments: Demonstrated functional mobility to/from bathroom and around room to practice household distances with no AD and CGA. Pt with quick mobility, min VCs to slow down. SOB after all functional tasks with education provided to use pursed lip breathing tech. Toilet Transfers  Toilet - Technique: Ambulating  Equipment Used: Standard toilet  Toilet Transfer: Stand by assistance  Toilet Transfers Comments: No AD  ADL  Feeding: Independent;Setup  Grooming: Setup; Independent (Stood sinkside to wash face, wash hands and comb hair.)  UE Bathing: Supervision;Setup  LE Bathing: Stand by assistance;Setup; Increased time to complete  UE Dressing: Modified independent   LE Dressing: Setup; Increased time to complete;Stand by assistance (Donned/doffed socks with Mod IND seated unsupported edge of bedside chair)  Toileting: Stand by assistance (SBA while standing to manage clothes and complete pericare at toilet)  Tone RUE  RUE Tone: Normotonic  Tone LUE  LUE Tone: Normotonic  Coordination  Movements Are Fluid And Coordinated: Yes     Bed mobility  Supine to Sit: Unable to assess  Sit to Supine: Unable to assess  Comment: Pt in recliner upon arrival and retired to chair at conclusion of session.   Transfers  Sit to stand: Stand by assistance  Stand to sit: Stand by assistance  Transfer Comments: No AD     Cognition  Overall Cognitive Status: WFL        Sensation  Overall Sensation Status: WFL (Denies any numbness/tingling)        LUE AROM (degrees)  LUE AROM : WFL  Left Hand AROM (degrees)  Left Hand AROM: WFL  RUE AROM (degrees)  RUE AROM : WFL  Right Hand AROM (degrees)  Right Hand AROM: WFL  LUE Strength  Gross LUE Strength: WFL  L Hand General: 4+/5  LUE Strength Comment: Grossly 4+/5  RUE Strength  Gross RUE Strength: WFL  R Hand General: 4+/5  RUE Strength Comment: Grossly 4+/5                   Plan   Plan  Times per week: 1-3x/wk  Current Treatment Recommendations: Safety Education & Training, Patient/Caregiver Education & Training, Self-Care / ADL, Functional Mobility Training, Equipment Evaluation, Education, & procurement, Home Management Training, Endurance Training      AM-PAC Score        AM-[facility] Inpatient Daily Activity Raw Score: 21 (10/05/21 1227)  AM-PAC Inpatient ADL T-Scale Score : 44.27 (10/05/21 1227)  ADL Inpatient CMS 0-100% Score: 32.79 (10/05/21 1227)  ADL Inpatient CMS G-Code Modifier : Celsa Horan (10/05/21 1227)    Goals  Short term goals  Time Frame for Short term goals: By discharge, pt will:  Short term goal 1: Demonstrate functional sit<>stand transfers IND  Short term goal 2: Demonstrate functional transfers with SUP  Short term goal 3: Demonstrate +15 minutes of standing tolerance during functional/ADL tasks with 0 restbreaks  Short term goal 4: Demonstrate +30 minutes of activity tolerance throughout ADL/functional tasks with  SUP  Short term goal 5: Demonstrate use of energy conservation techniques IND throughout all functional/ADL tasks IND PRN with 0 VCs  Short term goal 6: Demo ADLs with mod IND       Therapy Time   Individual Concurrent Group Co-treatment   Time In 0849         Time Out 0919         Minutes 30         Timed Code Treatment Minutes: 25 Minutes       Danny Bocanegra OTR/L

## 2021-10-05 NOTE — PROGRESS NOTES
[facility]   Progress Note                    Date:   10/5/2021  Patient name:  Sujatha Cordoba  Date of admission:  9/29/2021 12:59 AM  MRN:   [id number]  YOB: 1960  PCP:    Melia Tellez MD    Reason for Admission:  Respiratory distress [R06.03]  Viral pneumonia [J12.9]  Acute respiratory failure with hypoxia and hypercapnia (HCC) [J96.01, J96.02]    Subjective:     No acute events overnight. Patient successfully underwent cardiac cath yesterday was found to have 90% stenosis of RCA mid stent s/p JOSE MARTIN placed. LVEF 55%. Denies any chest pain shortness of breath or any other symptoms. /77, HR 79  Continue aspirin, Lipitor, Plavix, Lopressor 12.5 BID and lisinopril. Patient is also on full dose Lovenox    Brief history:  Past medical history of asthma, CAD angioplasty with RCA stent placement in 2019, diabetes, hyperlipidemia presented with respiratory distress ultimately got intubated in the ER. Cardiology was consulted for elevation in troponin 20-> 72-> 265-> 237    Echocardiogram  Left ventricle is in the upper limits of normal size. Mildly reduced LV function with Calculated ejection fraction is 44%  Anterior and posterolateral wall motion abnormality. Abnormal global longitudinal strain average of -7%. Grade I (mild) left ventricular diastolic dysfunction. Left atrium is moderately dilated. Aortic sclerosis without stenosis. Calcification of mitral valve leaflet tips with moderate stenosis. Mean  gradient is 7 mmHg  Mild mitral regurgitation. Mild tricuspid regurgitation.  Estimated right ventricular systolic pressure  is 36 mmHg      Medications:   Scheduled Meds:   insulin glargine  20 Units SubCUTAneous Nightly    enoxaparin  1 mg/kg SubCUTAneous BID    metoprolol tartrate  12.5 mg Oral BID    sodium chloride flush  5-40 mL IntraVENous 2 times per day    clopidogrel  75 mg Oral Daily    atorvastatin  40 mg Oral Nightly    influenza virus vaccine 0.5 mL IntraMUSCular Prior to discharge    famotidine  20 mg Oral BID    busPIRone  15 mg Oral TID    predniSONE  20 mg Oral Daily    Followed by   Ree Kaur ON 10/7/2021] predniSONE  15 mg Oral Daily    Followed by   Ree Kaur ON 10/10/2021] predniSONE  10 mg Oral Daily    Followed by   Ree Kaur ON 10/13/2021] predniSONE  5 mg Oral Daily    lisinopril  10 mg Oral Daily    budesonide-formoterol  2 puff Inhalation BID    ipratropium-albuterol  1 ampule Inhalation 4x daily    sodium chloride flush  5-40 mL IntraVENous 2 times per day    cefTRIAXone (ROCEPHIN) IV  1,000 mg IntraVENous Q24H    aspirin  81 mg Oral Daily    insulin lispro  0-18 Units SubCUTAneous TID WC    insulin lispro  0-9 Units SubCUTAneous Nightly     Continuous Infusions:   sodium chloride      lactated ringers 35 mL/hr at 10/04/21 0237    sodium chloride      dextrose       CBC:   Recent Labs     10/03/21  0437 10/04/21  0501 10/05/21  0522   WBC 12.9* 10.0 10.0   HGB 11.0* 9.9* 10.1*    164 See Reflexed IPF Result     BMP:    Recent Labs     10/03/21  0437 10/04/21  0501 10/05/21  0522    142 137   K 3.8 3.6* 4.9    102 102   CO2 30 33* 24   BUN 33* 27* 23*   CREATININE 0.99* 1.02* 0.98*   GLUCOSE 106* 93 74     Hepatic:   Recent Labs     10/03/21  0437 10/04/21  0501 10/05/21  0522   AST 12 13 20   ALT 16 16 18   BILITOT 0.27* 0.29* 0.38   ALKPHOS 71 62 63     Troponin: No results for input(s): TROPONINI in the last 72 hours. No results for input(s): TROPONINT in the last 72 hours. BNP: No results for input(s): PROBNP in the last 72 hours. No results for input(s): BNP in the last 72 hours. Lipids: No results for input(s): CHOL, HDL in the last 72 hours. Invalid input(s): LDLCALCU  INR: No results for input(s): INR in the last 72 hours.     Objective:   Vitals: /64   Pulse 76   Temp 97.6 °F (36.4 °C) (Oral)   Resp 16   Ht 5' 9\" (1.753 m)   Wt 224 lb 6.9 oz (101.8 kg)   SpO2 93%   BMI 33.14 statin and ACE.   Junior Kimani MD

## 2021-10-05 NOTE — PROGRESS NOTES
kit by Does not apply route daily 2/12/19   Rasheed Knapp MD   albuterol (PROVENTIL) (5 MG/ML) 0.5% nebulizer solution Take 0.5 mLs by nebulization every 6 hours as needed for Wheezing 3/16/18   Nadine Piña MD   tiotropium (SPIRIVA) 18 MCG inhalation capsule Inhale 1 capsule into the lungs daily Pt told that she was using this medication at home.  3/16/18   Nadine Piña MD   albuterol-ipratropium (COMBIVENT RESPIMAT)  MCG/ACT AERS inhaler Inhale 1 puff into the lungs every 6 hours 3/16/18   Nadine Piña MD   acetaminophen (TYLENOL) 500 MG tablet Take 1 tablet by mouth every 6 hours as needed for Pain 1/9/18   Henry Hdez MD       Assessment       RR 20  Breath Sounds: expiratory wheezes      · Bronchodilator assessment at level  3    [x]    Bronchodilator Assessment  BRONCHODILATOR ASSESSMENT SCORE  Score 0 1 2 3 4 5   Breath Sounds   []  Patient Baseline []  No Wheeze good aeration []  Faint, scattered wheezing, good aeration [x]  Expiratory Wheezing and or moderately diminished []  Insp/Exp wheeze and/or very diminished []  Insp/Exp and/ or marked distress   Respiratory Rate   []  Patient Baseline []  Less than 20 []  Less than 20 [x]  20-25 []  Greater than 25 []  Greater than 25   Peak flow % of Pred or PB [x]  NA   []  Greater than 90%  []  81-90% []  71-80% []  Less than or equal to 70%  or unable to perform []  Unable due to Respiratory Distress   Dyspnea re []  Patient Baseline []  No SOB []  No SOB [x]  SOB on exertion []  SOB min activity []  At rest/acute   e FEV% Predicted       [x]  NA []  Above 69%  []  Unable []  Above 60-69%  []  Unable []  Above 50-59%  []  Unable []  Above 35-49%  []  Unable []  Less than 35%  []  Unable

## 2021-10-05 NOTE — FLOWSHEET NOTE
Report called to [facility]  for rm [phone], and transferred in MICU bed, w/monitor, O2 per NC 3L., all belongings, in her purse,personal items, papers, cell phone and . Meds and the chart are given to receiving RN.  Ela Conrad walked from hallway to the chair,

## 2021-10-05 NOTE — CARE COORDINATION
Transitional planning-talked with patient-plan is to go home with her family, not wanting SNF of [facility], has ride

## 2021-10-05 NOTE — PROGRESS NOTES
44%  Anterior and posterolateral wall motion abnormality. Abnormal global longitudinal strain average of -7%. Grade I (mild) left ventricular diastolic dysfunction. Left atrium is moderately dilated. Aortic sclerosis without stenosis. Calcification of mitral valve leaflet tips with moderate stenosis. Mean gradient is 7 mmHg  Mild mitral regurgitation. Mild tricuspid regurgitation. Estimated right ventricular systolic pressure is 36 mmHg. Brief ICU course summary:    : Presented to the ED via EMS in respiratory distress. Intubated. Transferred to the MICU. Hypotensive, started on Levophed. CVC & Art line placed. Trops trending up, started on Heparin gtt. Concern for MI. Hx of MIx2, stents placed . Episodes of ST, switched Levophed to Phenylephrine. : Weaned off vent for 2 hours. Echo shows 44% EF. Vascular ultrasound of lower extremities neg for DVT. 10/1: Started tube feeds. Cardiology consult    10/2 Phenylephrine off at 0700, versed & fentanyl gtts d/c'd, extubated at 1216, BiPap then 3L/NC. Passed swallow study. Started on Symbicort    10/3 AO, neuro exam intact. Tolerating PO, a-line & jeronimo d/c'd, transfer to step-down orders     10/4 cath showed restenosis of mid RCA stent, PTCA/JOSE MARTIN. 10/5possible transfer out of the ICU.     OBJECTIVE:     VITAL SIGNS:  /73   Pulse 76   Temp 97.6 °F (36.4 °C) (Oral)   Resp 15   Ht 5' 9\" (1.753 m)   Wt 224 lb 6.9 oz (101.8 kg)   SpO2 100%   BMI 33.14 kg/m²   Tmax over 24 hours:  Temp (24hrs), Av.8 °F (36.6 °C), Min:97.6 °F (36.4 °C), Max:98.2 °F (36.8 °C)      Patient Vitals for the past 8 hrs:   BP Temp Temp src Pulse Resp SpO2 Weight   10/05/21 0630       224 lb 6.9 oz (101.8 kg)   10/05/21 0527     15     10/05/21 0400 113/73 97.6 °F (36.4 °C) Oral 76 22 100 %    10/05/21 0356    69 15     10/05/21 0300 (!) 84/51   66 11 100 %    10/05/21 0200 87/61   62 23 100 %    10/05/21 0100 113/75   68 (!) 41 100 %    10/05/21 0000 106/65 97.7 °F (36.5 °C) Oral 71 (!) 32 100 %    10/04/21 2357    73 (!) 41 100 %          Intake/Output Summary (Last 24 hours) at 10/5/2021 0708  Last data filed at 10/5/2021 0400  Gross per 24 hour   Intake 1584 ml   Output 2550 ml   Net -966 ml     Date 10/05/21 0000 - 10/05/21 2359   Shift 2310-0318 4186-2857 3695-3873 24 Hour Total   INTAKE   I.V.(mL/kg) 489(4.8)   489(4.8)   Shift Total(mL/kg) 489(4.8)   489(4.8)   OUTPUT   Urine(mL/kg/hr) 1150   1150   Shift Total(mL/kg) 1150(11.3)   1150(11.3)   Weight (kg) 101.8 101.8 101.8 101.8     Wt Readings from Last 3 Encounters:   10/05/21 224 lb 6.9 oz (101.8 kg)   07/27/21 216 lb 6.4 oz (98.2 kg)   05/08/21 210 lb (95.3 kg)     Body mass index is 33.14 kg/m². PHYSICAL EXAM:  Constitutional: Awake and oriented. EENT: PERRLA, EOMI, sclera clear, anicteric, oropharynx clear, no lesions, neck supple with midline trachea. Neck: Supple, symmetrical, trachea midline, no adenopathy, thyroid symmetric, no jvd skin normal  Respiratory: Diminished breath sounds bilaterally. Wheezing improved.   Cardiovascular: Regular rate and rhythm, normal S1, S2, no murmur noted and 2+ pulses throughout  Abdomen: soft, nontender, nondistended, no masses or organomegaly  NEUROLOGIC -at baseline  Extremities:  peripheral pulses normal, no pedal edema, no clubbing or cyanosis  SKIN: normal coloration and turgor    MEDICATIONS:  Scheduled Meds:   insulin glargine  20 Units SubCUTAneous Nightly    metoprolol tartrate  12.5 mg Oral BID    sodium chloride flush  5-40 mL IntraVENous 2 times per day    clopidogrel  75 mg Oral Daily    atorvastatin  40 mg Oral Nightly    influenza virus vaccine  0.5 mL IntraMUSCular Prior to discharge    famotidine  20 mg Oral BID    busPIRone  15 mg Oral TID    predniSONE  20 mg Oral Daily    Followed by   Spencer Fine ON 10/7/2021] predniSONE  15 mg Oral Daily    Followed by   Spencer Fine ON 10/10/2021] predniSONE  10 mg Oral Daily    Followed by   Navi Padilla ON 10/13/2021] predniSONE  5 mg Oral Daily    lisinopril  10 mg Oral Daily    budesonide-formoterol  2 puff Inhalation BID    ipratropium-albuterol  1 ampule Inhalation 4x daily    sodium chloride flush  5-40 mL IntraVENous 2 times per day    cefTRIAXone (ROCEPHIN) IV  1,000 mg IntraVENous Q24H    aspirin  81 mg Oral Daily    insulin lispro  0-18 Units SubCUTAneous TID WC    insulin lispro  0-9 Units SubCUTAneous Nightly     Continuous Infusions:   sodium chloride      lactated ringers 35 mL/hr at 10/04/21 0237    sodium chloride      dextrose       PRN Meds:   sodium chloride flush, 5-40 mL, PRN  sodium chloride, 25 mL, PRN  acetaminophen, 650 mg, Q4H PRN  albuterol, 2.5 mg, Q4H PRN  benzocaine-menthol, 1 lozenge, Q2H PRN  sodium chloride flush, 5-40 mL, PRN  sodium chloride, 25 mL, PRN  ondansetron, 4 mg, Q8H PRN   Or  ondansetron, 4 mg, Q6H PRN  polyethylene glycol, 17 g, Daily PRN  acetaminophen, 650 mg, Q6H PRN   Or  acetaminophen, 650 mg, Q6H PRN  heparin (porcine), 4,000 Units, PRN  heparin (porcine), 2,000 Units, PRN  glucose, 15 g, PRN  dextrose, 12.5 g, PRN  glucagon (rDNA), 1 mg, PRN  dextrose, 100 mL/hr, PRN        SUPPORT DEVICES: [] Ventilator [] BIPAP  [x] Nasal Cannula [] Room Air    VENT SETTINGS (Comprehensive) (if applicable):   PRVC mode, FiO2 30%, PEEP 5, Respiratory Rate 18, Tidal Volume 540  Vent Information  $Ventilation: Off Vent  Skin Assessment: Clean, dry, & intact  Suction Catheter Diameter: 14  Equipment Changed: HME  Vent Type: Servo i  Vent Mode: NIV/PC  Vt Ordered: 530 mL  Pressure Ordered: 6  Rate Set: 16 bmp  Pressure Support: 6 cmH20  FiO2 : 40 %  SpO2: 100 %  SpO2/FiO2 ratio: 250  Sensitivity: 3  PEEP/CPAP: 6  I Time/ I Time %: 0.9 s  Humidification Source: HME  Nitric Oxide/Epoprostenol In Use?: No  Mask Type: Full face mask  Mask Size: Large  Additional Respiratory  Assessments  Pulse: 76  Resp: 15  SpO2: 100 %  End Tidal CO2: 39 (%)  Position: Semi-[place]'s  Humidification Source: HME  Oral Care Completed?: Yes  Oral Care: Mouthwash, Lip moisturizer applied, Mouth moisturizer, Mouth suctioned  Subglottic Suction Done?: Yes  Cuff Pressure (cm H2O): 28 cm H2O  Skin barrier applied: No    ABGs:   Lab Results   Component Value Date    RUB7AZG NOT REPORTED 10/02/2021    FIO2 30.0 10/02/2021     Lactic Acid:   Lab Results   Component Value Date    LACTA NOT REPORTED 09/29/2021     DATA:  Complete Blood Count:   Recent Labs     10/03/21  0437 10/04/21  0501 10/05/21  0522   WBC 12.9* 10.0 10.0   HGB 11.0* 9.9* 10.1*   MCV 86.1 89.2 87.9    164 See Reflexed IPF Result   RBC 4.16 3.72* 3.81*   HCT 35.8* 33.2* 33.5*   MCH 26.4 26.6 26.5   MCHC 30.7 29.8 30.1   RDW 15.9* 16.0* 16.4*   MPV 10.7 11.0 NOT REPORTED        PT/INR:    Lab Results   Component Value Date    PROTIME 9.7 11/20/2016    PROTIME 9.9 06/01/2012    INR 0.9 11/20/2016     PTT:    Lab Results   Component Value Date    APTT 40.7 10/04/2021       Basal Metabolic Profile:   Recent Labs     10/03/21  0437 10/04/21  0501 10/05/21  0522    142 137   K 3.8 3.6* 4.9   BUN 33* 27* 23*   CREATININE 0.99* 1.02* 0.98*    102 102   CO2 30 33* 24      Magnesium:   Lab Results   Component Value Date    MG 2.8 10/05/2021    MG 2.4 10/04/2021    MG 2.2 10/03/2021     Phosphorus:   Lab Results   Component Value Date    PHOS 4.0 10/05/2021    PHOS 3.3 10/04/2021    PHOS 2.5 10/03/2021     S.  Calcium:  Recent Labs     10/05/21  0522   CALCIUM 8.7     Urinalysis:   Lab Results   Component Value Date    NITRU NEGATIVE 09/29/2021    COLORU Yellow 09/29/2021    PHUR 6.5 09/29/2021    WBCUA 2 TO 5 09/29/2021    RBCUA 2 TO 5 09/29/2021    MUCUS NOT REPORTED 09/29/2021    TRICHOMONAS NOT REPORTED 09/29/2021    YEAST NOT REPORTED 09/29/2021    BACTERIA NOT REPORTED 09/29/2021    SPECGRAV 1.012 09/29/2021    LEUKOCYTESUR NEGATIVE 09/29/2021    UROBILINOGEN Normal 09/29/2021    BILIRUBINUR NEGATIVE 09/29/2021    BILIRUBINUR NEGATIVE 06/01/2012    GLUCOSEU NEGATIVE 09/29/2021    GLUCOSEU NEGATIVE 06/01/2012    KETUA NEGATIVE 09/29/2021    AMORPHOUS NOT REPORTED 09/29/2021     LFTS  Recent Labs     10/03/21  0437 10/04/21  0501 10/05/21  0522   ALKPHOS 71 62 63   ALT 16 16 18   AST 12 13 20   BILITOT 0.27* 0.29* 0.38   LABALBU 3.1* 3.1* 2.7*     Last 3 Blood Glucose:   Recent Labs     10/03/21  0437 10/04/21  0501 10/05/21  0522   GLUCOSE 106* 93 76      HgBA1c:    Lab Results   Component Value Date    LABA1C 9.2 02/10/2019     TSH:    Lab Results   Component Value Date    TSH 1.24 06/16/2020     Cultures during this admission:     Blood cultures:                 [] None drawn      [x] Negative             []  Positive (Details:  )  Urine Culture:                   [] None drawn      [x] Negative             []  Positive (Details:  )  Sputum Culture: Inadequate specimen             [] None drawn       [] Negative             [x]  Positive (Details: Mixed bacterial morphocytes  )   Endotracheal aspirate:     [] None drawn       [] Negative             [x]  Positive (Details: Mixed bacterial morphocytes   )       ASSESSMENT AND PLAN:        1. Acute hypoxic hypercapnic respiratory failure secondary to COPD exacerbation   - Patient is on prednisolone taper and Symbicort inhalation.  -  Duoneb inhaltion 4 times daily.   -Continue rocephin for 7 days in total.   Wean off oxygen as tolerated. 2. Acute toxic metabolic encephalopathy from hypercapnia from respiratory failure  -Resolved    3. Shock,cardiogenic likely due to NSTEMI  -Resolved  Patient underwent cardiac cath on October 4, 2021 which showed restenosis of mid RCA requiring PTCA/JOSE MARTIN. Patient off heparin drip. 4. NSTEMI, previous h/o CAD s/p PCI  -Patient off heparin drip  On aspirin, statin, beta-blocker, lisinopril.  -Underwent cath on 10/4/2021, found to have restenosis of mid RCA, underwent PTCA/JOSE MARTIN.       5. New onset atrial fibrillation with RVR - currently NSR, rate controlled   -On beta-blocker  On full dose Lovenox for anticoagulation. Follow-up on cardiology recommendations for anticoagulation. 6. Acute kidney injury, likely secondary to tissue hypoxia and shock - improving  - Continue LR at 50cc/hr, continue to monitor I/O closely, follow up repeat BMP tomorrow am.     DVT prophylaxis -full dose Lovenox  Diet -regular carb controlled low-sodium diet  IV fluids - 50 cc/hr LR, will wean off once patient has adequate oral intake. Shanika Royal Neurological:   Awake and alert, off sedation   Mood disorderhome dose BuSpar    Respiratory:   Acute hypoxic respiratory failure secondary to COPD exacerbationimproving   Extubated on 10/2/2021 and currently on nasal cannula   Continue bronchodilators, steroid taper, ceftriaxone for 7 days total   Wean off oxygen as tolerated    Cardiovascular:   NSTEMIcath on 10/4/2021 which showed mid RCA restenosis requiring PTCA/JOSE MARTIN.  New onset A. fibon beta-blockers and full dose Lovenox for anticoagulation. PRANAY VAsc- 3   Cardiogenic shockresolved    Gastrointestinal:   Pepcid for GI prophylaxis required due to steroids    Renal/Fluid/Electrolytes:   HARVEY due to shockimproving   Continue LR at 50 cc/h.  Good urine output, no Shook in    Infectious Disease:   Ceftriaxone for COPD exacerbation for 7 days total.    Skin/Extremities:   No edema    Nutrition/Diet:   Regular diet    DVT Prophylaxis:   Full dose Lovenox for A. Fib- pranay vasc- 3   We will switch to Eliquis/Xarelto depending on insurance    Zechariah Plaza MD  Department of Internal Medicine/ Critical care  \[facility] [place] [facility]\"")             10/5/2021, 7:08 AM  .       Please note that this chart was generated using voice recognition Dragon dictation software. Although every effort was made to ensure the accuracy of this automated transcription, some errors in transcription may have occurred.

## 2021-10-06 LAB
ABSOLUTE EOS #: 0.19 K/UL (ref 0–0.44)
ABSOLUTE IMMATURE GRANULOCYTE: 0.14 K/UL (ref 0–0.3)
ABSOLUTE LYMPH #: 1.52 K/UL (ref 1.1–3.7)
ABSOLUTE MONO #: 0.63 K/UL (ref 0.1–1.2)
ALBUMIN SERPL-MCNC: 3.1 G/DL (ref 3.5–5.2)
ALBUMIN/GLOBULIN RATIO: 1.2 (ref 1–2.5)
ALP BLD-CCNC: 58 U/L (ref 35–104)
ALT SERPL-CCNC: 16 U/L (ref 5–33)
ANION GAP SERPL CALCULATED.3IONS-SCNC: 9 MMOL/L (ref 9–17)
AST SERPL-CCNC: 13 U/L
BASOPHILS # BLD: 0 % (ref 0–2)
BASOPHILS ABSOLUTE: 0.03 K/UL (ref 0–0.2)
BILIRUB SERPL-MCNC: 0.47 MG/DL (ref 0.3–1.2)
BUN BLDV-MCNC: 19 MG/DL (ref 6–20)
BUN/CREAT BLD: ABNORMAL (ref 9–20)
CALCIUM SERPL-MCNC: 8.5 MG/DL (ref 8.6–10.4)
CHLORIDE BLD-SCNC: 103 MMOL/L (ref 98–107)
CO2: 28 MMOL/L (ref 20–31)
CREAT SERPL-MCNC: 1.02 MG/DL (ref 0.5–0.9)
DIFFERENTIAL TYPE: ABNORMAL
EOSINOPHILS RELATIVE PERCENT: 2 % (ref 1–4)
GFR AFRICAN AMERICAN: >60 ML/MIN
GFR NON-AFRICAN AMERICAN: 56 ML/MIN
GFR SERPL CREATININE-BSD FRML MDRD: ABNORMAL ML/MIN/{1.73_M2}
GFR SERPL CREATININE-BSD FRML MDRD: ABNORMAL ML/MIN/{1.73_M2}
GLUCOSE BLD-MCNC: 142 MG/DL (ref 65–105)
GLUCOSE BLD-MCNC: 160 MG/DL (ref 65–105)
GLUCOSE BLD-MCNC: 167 MG/DL (ref 65–105)
GLUCOSE BLD-MCNC: 72 MG/DL (ref 65–105)
GLUCOSE BLD-MCNC: 75 MG/DL (ref 70–99)
HCT VFR BLD CALC: 35.2 % (ref 36.3–47.1)
HEMOGLOBIN: 10.1 G/DL (ref 11.9–15.1)
IMMATURE GRANULOCYTES: 2 %
LYMPHOCYTES # BLD: 18 % (ref 24–43)
MAGNESIUM: 2.4 MG/DL (ref 1.6–2.6)
MCH RBC QN AUTO: 26.3 PG (ref 25.2–33.5)
MCHC RBC AUTO-ENTMCNC: 28.7 G/DL (ref 28.4–34.8)
MCV RBC AUTO: 91.7 FL (ref 82.6–102.9)
MONOCYTES # BLD: 8 % (ref 3–12)
NRBC AUTOMATED: 0 PER 100 WBC
PDW BLD-RTO: 16.3 % (ref 11.8–14.4)
PHOSPHORUS: 4 MG/DL (ref 2.6–4.5)
PLATELET # BLD: 158 K/UL (ref 138–453)
PLATELET ESTIMATE: ABNORMAL
PMV BLD AUTO: 11 FL (ref 8.1–13.5)
POTASSIUM SERPL-SCNC: 4 MMOL/L (ref 3.7–5.3)
RBC # BLD: 3.84 M/UL (ref 3.95–5.11)
RBC # BLD: ABNORMAL 10*6/UL
SEG NEUTROPHILS: 70 % (ref 36–65)
SEGMENTED NEUTROPHILS ABSOLUTE COUNT: 5.93 K/UL (ref 1.5–8.1)
SODIUM BLD-SCNC: 140 MMOL/L (ref 135–144)
TOTAL PROTEIN: 5.6 G/DL (ref 6.4–8.3)
WBC # BLD: 8.4 K/UL (ref 3.5–11.3)
WBC # BLD: ABNORMAL 10*3/UL

## 2021-10-06 PROCEDURE — 80053 COMPREHEN METABOLIC PANEL: CPT

## 2021-10-06 PROCEDURE — 6370000000 HC RX 637 (ALT 250 FOR IP): Performed by: STUDENT IN AN ORGANIZED HEALTH CARE EDUCATION/TRAINING PROGRAM

## 2021-10-06 PROCEDURE — 36415 COLL VENOUS BLD VENIPUNCTURE: CPT

## 2021-10-06 PROCEDURE — 6370000000 HC RX 637 (ALT 250 FOR IP): Performed by: INTERNAL MEDICINE

## 2021-10-06 PROCEDURE — 2580000003 HC RX 258: Performed by: STUDENT IN AN ORGANIZED HEALTH CARE EDUCATION/TRAINING PROGRAM

## 2021-10-06 PROCEDURE — 6370000000 HC RX 637 (ALT 250 FOR IP)

## 2021-10-06 PROCEDURE — 1200000000 HC SEMI PRIVATE

## 2021-10-06 PROCEDURE — 84100 ASSAY OF PHOSPHORUS: CPT

## 2021-10-06 PROCEDURE — 94640 AIRWAY INHALATION TREATMENT: CPT

## 2021-10-06 PROCEDURE — 83735 ASSAY OF MAGNESIUM: CPT

## 2021-10-06 PROCEDURE — 99232 SBSQ HOSP IP/OBS MODERATE 35: CPT | Performed by: INTERNAL MEDICINE

## 2021-10-06 PROCEDURE — 99233 SBSQ HOSP IP/OBS HIGH 50: CPT | Performed by: INTERNAL MEDICINE

## 2021-10-06 PROCEDURE — 82947 ASSAY GLUCOSE BLOOD QUANT: CPT

## 2021-10-06 PROCEDURE — 85025 COMPLETE CBC W/AUTO DIFF WBC: CPT

## 2021-10-06 PROCEDURE — 2700000000 HC OXYGEN THERAPY PER DAY

## 2021-10-06 PROCEDURE — 94761 N-INVAS EAR/PLS OXIMETRY MLT: CPT

## 2021-10-06 RX ORDER — TRAZODONE HYDROCHLORIDE 50 MG/1
50 TABLET ORAL PRN
COMMUNITY
End: 2022-02-14 | Stop reason: ALTCHOICE

## 2021-10-06 RX ORDER — ALBUTEROL SULFATE 90 UG/1
2 AEROSOL, METERED RESPIRATORY (INHALATION) 3 TIMES DAILY
COMMUNITY

## 2021-10-06 RX ORDER — LIDOCAINE 50 MG/G
1 PATCH TOPICAL PRN
COMMUNITY

## 2021-10-06 RX ORDER — FAMOTIDINE 20 MG/1
20 TABLET, FILM COATED ORAL 2 TIMES DAILY
COMMUNITY

## 2021-10-06 RX ADMIN — METOPROLOL TARTRATE 12.5 MG: 25 TABLET ORAL at 21:27

## 2021-10-06 RX ADMIN — INSULIN GLARGINE 20 UNITS: 100 INJECTION, SOLUTION SUBCUTANEOUS at 21:30

## 2021-10-06 RX ADMIN — CLOPIDOGREL 75 MG: 75 TABLET, FILM COATED ORAL at 08:39

## 2021-10-06 RX ADMIN — BUSPIRONE HYDROCHLORIDE 15 MG: 15 TABLET ORAL at 13:46

## 2021-10-06 RX ADMIN — FAMOTIDINE 20 MG: 20 TABLET, FILM COATED ORAL at 08:39

## 2021-10-06 RX ADMIN — PREDNISONE 20 MG: 20 TABLET ORAL at 08:41

## 2021-10-06 RX ADMIN — IPRATROPIUM BROMIDE AND ALBUTEROL SULFATE 1 AMPULE: .5; 2.5 SOLUTION RESPIRATORY (INHALATION) at 11:38

## 2021-10-06 RX ADMIN — DESMOPRESSIN ACETATE 40 MG: 0.2 TABLET ORAL at 21:27

## 2021-10-06 RX ADMIN — SODIUM CHLORIDE, PRESERVATIVE FREE 10 ML: 5 INJECTION INTRAVENOUS at 08:50

## 2021-10-06 RX ADMIN — APIXABAN 5 MG: 5 TABLET, FILM COATED ORAL at 21:28

## 2021-10-06 RX ADMIN — BUSPIRONE HYDROCHLORIDE 15 MG: 15 TABLET ORAL at 21:27

## 2021-10-06 RX ADMIN — BUDESONIDE AND FORMOTEROL FUMARATE DIHYDRATE 2 PUFF: 160; 4.5 AEROSOL RESPIRATORY (INHALATION) at 00:38

## 2021-10-06 RX ADMIN — IPRATROPIUM BROMIDE AND ALBUTEROL SULFATE 1 AMPULE: .5; 2.5 SOLUTION RESPIRATORY (INHALATION) at 15:47

## 2021-10-06 RX ADMIN — SODIUM CHLORIDE, PRESERVATIVE FREE 10 ML: 5 INJECTION INTRAVENOUS at 21:28

## 2021-10-06 RX ADMIN — BUDESONIDE AND FORMOTEROL FUMARATE DIHYDRATE 2 PUFF: 160; 4.5 AEROSOL RESPIRATORY (INHALATION) at 08:21

## 2021-10-06 RX ADMIN — IPRATROPIUM BROMIDE AND ALBUTEROL SULFATE 1 AMPULE: .5; 2.5 SOLUTION RESPIRATORY (INHALATION) at 08:20

## 2021-10-06 RX ADMIN — INSULIN LISPRO 3 UNITS: 100 INJECTION, SOLUTION INTRAVENOUS; SUBCUTANEOUS at 17:27

## 2021-10-06 RX ADMIN — IPRATROPIUM BROMIDE AND ALBUTEROL SULFATE 1 AMPULE: .5; 2.5 SOLUTION RESPIRATORY (INHALATION) at 00:37

## 2021-10-06 RX ADMIN — INSULIN LISPRO 3 UNITS: 100 INJECTION, SOLUTION INTRAVENOUS; SUBCUTANEOUS at 11:55

## 2021-10-06 RX ADMIN — BUDESONIDE AND FORMOTEROL FUMARATE DIHYDRATE 2 PUFF: 160; 4.5 AEROSOL RESPIRATORY (INHALATION) at 21:37

## 2021-10-06 RX ADMIN — LISINOPRIL 10 MG: 10 TABLET ORAL at 08:39

## 2021-10-06 RX ADMIN — SODIUM CHLORIDE, PRESERVATIVE FREE 10 ML: 5 INJECTION INTRAVENOUS at 08:54

## 2021-10-06 RX ADMIN — BUSPIRONE HYDROCHLORIDE 15 MG: 15 TABLET ORAL at 08:39

## 2021-10-06 RX ADMIN — INSULIN LISPRO 2 UNITS: 100 INJECTION, SOLUTION INTRAVENOUS; SUBCUTANEOUS at 21:30

## 2021-10-06 RX ADMIN — METOPROLOL TARTRATE 12.5 MG: 25 TABLET ORAL at 08:39

## 2021-10-06 RX ADMIN — FAMOTIDINE 20 MG: 20 TABLET, FILM COATED ORAL at 21:27

## 2021-10-06 RX ADMIN — SODIUM CHLORIDE, PRESERVATIVE FREE 10 ML: 5 INJECTION INTRAVENOUS at 21:35

## 2021-10-06 RX ADMIN — APIXABAN 5 MG: 5 TABLET, FILM COATED ORAL at 08:39

## 2021-10-06 RX ADMIN — IPRATROPIUM BROMIDE AND ALBUTEROL SULFATE 1 AMPULE: .5; 2.5 SOLUTION RESPIRATORY (INHALATION) at 21:36

## 2021-10-06 RX ADMIN — ASPIRIN 81 MG: 81 TABLET, CHEWABLE ORAL at 08:39

## 2021-10-06 ASSESSMENT — PAIN SCALES - GENERAL: PAINLEVEL_OUTOF10: 0

## 2021-10-06 NOTE — PROGRESS NOTES
[facility]  Progress Note                   Date:   10/6/2021  Patient name: Kan Inman  Date of admission:  9/29/2021 12:59 AM  MRN:   [id number]  YOB: 1960  PCP: Darryle Alu, MD    Reason for Admission: Respiratory distress [R06.03]  Viral pneumonia [J12.9]  Acute respiratory failure with hypoxia and hypercapnia ([address] [J96.01, J96.02]    Subjective:       Clinical Changes /Abnormalities: Seen & examined in room. Tx to step-down bed. Denies any chest pain or SOB presently. On NC oxygen without distress. Labs, vitals, & tele reviewed.  Remains SR    Review of Systems    Medications:   Scheduled Meds:   insulin glargine  20 Units SubCUTAneous Nightly    apixaban  5 mg Oral BID    metoprolol tartrate  12.5 mg Oral BID    sodium chloride flush  5-40 mL IntraVENous 2 times per day    clopidogrel  75 mg Oral Daily    atorvastatin  40 mg Oral Nightly    influenza virus vaccine  0.5 mL IntraMUSCular Prior to discharge    famotidine  20 mg Oral BID    busPIRone  15 mg Oral TID    [START ON 10/7/2021] predniSONE  15 mg Oral Daily    Followed by   Darryle Norris ON 10/10/2021] predniSONE  10 mg Oral Daily    Followed by   Darryle Norris ON 10/13/2021] predniSONE  5 mg Oral Daily    lisinopril  10 mg Oral Daily    budesonide-formoterol  2 puff Inhalation BID    ipratropium-albuterol  1 ampule Inhalation 4x daily    sodium chloride flush  5-40 mL IntraVENous 2 times per day    aspirin  81 mg Oral Daily    insulin lispro  0-18 Units SubCUTAneous TID     insulin lispro  0-9 Units SubCUTAneous Nightly     Continuous Infusions:   sodium chloride      sodium chloride      dextrose       CBC:   Recent Labs     10/04/21  0501 10/05/21  0522 10/06/21  0614   WBC 10.0 10.0 8.4   HGB 9.9* 10.1* 10.1*    See Reflexed IPF Result 158     BMP:    Recent Labs     10/04/21  0501 10/05/21  0522 10/06/21  0614    137 140   K 3.6* 4.9 4.0    102 103   CO2 33* 24 28   BUN 27* 23* 19   CREATININE 1.02* 0.98* 1.02*   GLUCOSE 93 74 75     Hepatic:  Recent Labs     10/04/21  0501 10/05/21  0522 10/06/21  0614   AST 13 20 13   ALT 16 18 16   BILITOT 0.29* 0.38 0.47   ALKPHOS 62 63 58     Troponin: No results for input(s): TROPHS in the last 72 hours. BNP: No results for input(s): BNP in the last 72 hours. Lipids: No results for input(s): CHOL, HDL in the last 72 hours. Invalid input(s): LDLCALCU  INR: No results for input(s): INR in the last 72 hours. Objective:   Vitals: BP (!) 151/70   Pulse 74   Temp 97.3 °F (36.3 °C) (Oral)   Resp 16   Ht 5' 9\" (1.753 m)   Wt 224 lb 6.9 oz (101.8 kg)   SpO2 96%   BMI 33.14 kg/m²   General appearance: alert and cooperative with exam  HEENT: Head: Normocephalic, no lesions, without obvious abnormality. Neck:no JVD, trachea midline, no adenopathy  Lungs: Clear to auscultation  Heart: Regular rate and rhythm, s1/s2 auscultated, no murmurs  Abdomen: soft, non-tender, bowel sounds active  Extremities: no edema  Neurologic: not done    Cath 2/2019  Findings:     RCA: Chronic total occlusion, successfully crossed with \"Cross-it\" wire, mid high grade stenosis then reduced to 0% with PTCA with NC balloon followed by JOSE MARTIN (3.25 X 18 mm Xience) with restoration of MRAY III flow. RPDA has mild disease.         Conclusions:  Successful PTCA/JOSE MARTIN to mid RCA   Patent stent in the LAD  Moderate disease in LCx same as prior    Cardiac Cath 10/4/21  Findings:     LMCA: Normal 0% stenosis. LAD: Mild irregularities 20-30%. LCx: Mild irregularities 20-30%. RCA: Mid stent stenosis 90%         Lesion on Mid RCA: Mid subsection. 90% stenosis 20 mm length reduced to     0%. Pre procedure MARY II flow was noted. Post Procedure MARY III flow     was present. Good runoff was present. The lesion was diagnosed as     Moderate Risk (B).       Devices used         - NC Trek 3.0x12mm Balloon.  2 inflation(s) to a max pressure of: 18     liz.         - Luge Wire 182 cm. Number of passes: 1.         - NC Trek 3.0x12mm Balloon. 1 inflation(s) to a max pressure of: 15     liz.         - Xience Shirley 3.5 x 28 JOSE MARTIN. 1 inflation(s) to a max pressure of: 12     liz.        Coronary Tree        Dominance: Right      The LV gram was performed in the HAILE 30 position. LVEF: 55%.      Conclusions        Procedure Summary        Restenosis mid RCA stent    Successful PTCA -JOSE MARTIN    Preserved LV function     Echo 9/30/21  Summary  Left ventricle is in the upper limits of normal size. Mildly reduced LV function with Calculated ejection fraction is 44%  Anterior and posterolateral wall motion abnormality. Abnormal global longitudinal strain average of -7%. Grade I (mild) left ventricular diastolic dysfunction. Left atrium is moderately dilated. Aortic sclerosis without stenosis. Calcification of mitral valve leaflet tips with moderate stenosis. Mean  gradient is 7 mmHg  Mild mitral regurgitation. Mild tricuspid regurgitation. Estimated right ventricular systolic pressure  is 36 mmHg. Assessment / Acute Cardiac Problems:   1. NSTEMI  2. CAD hx of  with PTCA/JOSE MARTIN as above  3. New onset Afib - converted to SR  4. HARVEY  5. HTN  6. DM2  7.  Acute hypoxic resp failure post intubation/extubation    Patient Active Problem List:     SOB (shortness of breath)     Chronic bronchitis (HCC)     Chest pain     Tobacco abuse     Non morbid obesity     Lung nodule     ACS (acute coronary syndrome) (HCC)     Essential hypertension     S/P drug eluting coronary stent placement - Mid RCA () 2/11/19 (Dr. Harsha Matta)     Gastroenteritis     HARVEY (acute kidney injury) ([address]     High anion gap metabolic acidosis     Coronary artery disease involving native coronary artery of native heart without angina pectoris     Tobacco abuse counseling     Lactic acidosis     Vitamin D deficiency     Marijuana abuse     Type 2 diabetes mellitus with hyperglycemia, without long-term current use of insulin ([address] Respiratory distress     Acute respiratory failure with hypoxia and hypercapnia (HCC)    [id number] Score for Atrial Fibrillation Stroke Risk   Risk   Factors  Component Value   C CHF No 0   H HTN Yes 1   A2 Age >= 76 No,  (61 y.o.) 0   D DM Yes 1   S2 Prior Stroke/TIA No 0   V Vascular Disease No 0   A Age 74-69 No,  (61 y.o.) 0   Sc Sex female 1    [id number]  Score  3   Score last updated 10/6/21 90:22 PM EDT    Click here for a link to the UpToDate guideline \"Atrial Fibrillation: Anticoagulation therapy to prevent embolization    Disclaimer: Risk Score calculation is dependent on accuracy of patient problem list and past encounter diagnosis. Coronary Discharge Core Measure: Please indicate the medication given by X, and if not the reasons not given:    Not Given Reason  Given      Beta Blockers x      ACE-I x      Statins x      ASA x      OAP (Plavix/Effient/Brilinta) Plavix    SL Nitro x         Plan of Treatment:   1. Stable from CV standpoint. Continue PO ASA, Plavix, statin, BB, & ACE. Discussed in detail with patient post cath POC including but not limited to medications, diet, exercise, right radial artery site care, and follow-up. Questions and concerns addressed. 2. PAF- remains SR. Continue PO BB & Eliquis  3. Will plan to D/C ASA after 1 month  4. No objection to discharge from CV standpoint. F/U in clinic in 2 -3 weeks.      Electronically signed by BINDU Hodge CNP on 10/6/2021 at 11:58 AM  [address]  [phone]

## 2021-10-06 NOTE — PROGRESS NOTES
was consulted. Cardiac cath on 10/05, successful PTCA - JOSE MARTIN to RCA. Patient developed new onset atrial fibrillation with RVR was started beta-blocker and full dose Lovenox full anticoagulation. Patient developed HARVEY post cath fluids were given.     OBJECTIVE     Vital Signs:  /88   Pulse 88   Temp 98.2 °F (36.8 °C) (Oral)   Resp 18   Ht 5' 9\" (1.753 m)   Wt 224 lb 6.9 oz (101.8 kg)   SpO2 100%   BMI 33.14 kg/m²     Temp (24hrs), Av.9 °F (36.6 °C), Min:97.4 °F (36.3 °C), Max:98.2 °F (36.8 °C)    In: 430   Out: 250 [Urine:250]    Physical Exam:  Physical Exam  Constitutional:       Appearance: She is obese. HENT:      Head: Normocephalic and atraumatic. Nose: Nose normal.      Mouth/Throat:      Mouth: Mucous membranes are moist.   Eyes:      Extraocular Movements: Extraocular movements intact. Cardiovascular:      Rate and Rhythm: Normal rate and regular rhythm. Pulses: Normal pulses. Heart sounds: Normal heart sounds. No murmur heard. Pulmonary:      Effort: No respiratory distress. Breath sounds: Wheezing and rales present. Abdominal:      General: There is no distension. Palpations: Abdomen is soft. Tenderness: There is no abdominal tenderness. Musculoskeletal:         General: No swelling. Normal range of motion. Cervical back: Normal range of motion. No rigidity. Right lower leg: No edema. Left lower leg: No edema. Skin:     General: Skin is warm. Coloration: Skin is not jaundiced. Findings: No bruising or rash. Neurological:      General: No focal deficit present. Mental Status: She is alert and oriented to person, place, and time.    Psychiatric:         Mood and Affect: Mood normal.         Behavior: Behavior normal.           Medications:  Scheduled Medications:    insulin glargine  20 Units SubCUTAneous Nightly    apixaban  5 mg Oral BID    metoprolol tartrate  12.5 mg Oral BID    sodium chloride flush  5-40 mL IntraVENous 2 times per day    clopidogrel  75 mg Oral Daily    atorvastatin  40 mg Oral Nightly    influenza virus vaccine  0.5 mL IntraMUSCular Prior to discharge    famotidine  20 mg Oral BID    busPIRone  15 mg Oral TID    predniSONE  20 mg Oral Daily    Followed by   Hum[place] Do ON 10/7/2021] predniSONE  15 mg Oral Daily    Followed by   Hum[place] Do ON 10/10/2021] predniSONE  10 mg Oral Daily    Followed by   Humacao Do ON 10/13/2021] predniSONE  5 mg Oral Daily    lisinopril  10 mg Oral Daily    budesonide-formoterol  2 puff Inhalation BID    ipratropium-albuterol  1 ampule Inhalation 4x daily    sodium chloride flush  5-40 mL IntraVENous 2 times per day    aspirin  81 mg Oral Daily    insulin lispro  0-18 Units SubCUTAneous TID WC    insulin lispro  0-9 Units SubCUTAneous Nightly     Continuous Infusions:    sodium chloride      sodium chloride      dextrose       PRN Medicationssodium chloride flush, 5-40 mL, PRN  sodium chloride, 25 mL, PRN  acetaminophen, 650 mg, Q4H PRN  albuterol, 2.5 mg, Q4H PRN  benzocaine-menthol, 1 lozenge, Q2H PRN  sodium chloride flush, 5-40 mL, PRN  sodium chloride, 25 mL, PRN  ondansetron, 4 mg, Q8H PRN   Or  ondansetron, 4 mg, Q6H PRN  polyethylene glycol, 17 g, Daily PRN  acetaminophen, 650 mg, Q6H PRN   Or  acetaminophen, 650 mg, Q6H PRN  glucose, 15 g, PRN  dextrose, 12.5 g, PRN  glucagon (rDNA), 1 mg, PRN  dextrose, 100 mL/hr, PRN        Diagnostic Labs:  CBC:   Recent Labs     10/04/21  0501 10/05/21  0522   WBC 10.0 10.0   RBC 3.72* 3.81*   HGB 9.9* 10.1*   HCT 33.2* 33.5*   MCV 89.2 87.9   RDW 16.0* 16.4*    See Reflexed IPF Result     BMP:   Recent Labs     10/04/21  0501 10/05/21  0522    137   K 3.6* 4.9    102   CO2 33* 24   PHOS 3.3 4.0   BUN 27* 23*   CREATININE 1.02* 0.98*     BNP: No results for input(s): BNP in the last 72 hours. PT/INR: No results for input(s): PROTIME, INR in the last 72 hours.   APTT:   Recent Labs 10/03/21  2249 10/04/21  0501 10/04/21  1047   APTT 74.2* 56.2* 40.7*     CARDIAC ENZYMES: No results for input(s): CKMB, CKMBINDEX, TROPONINI in the last 72 hours. Invalid input(s): CKTOTAL;3  FASTING LIPID PANEL:  Lab Results   Component Value Date    CHOL 122 06/16/2020    HDL 35 (L) 06/16/2020    TRIG 152 (H) 06/16/2020     LIVER PROFILE:   Recent Labs     10/04/21  0501 10/05/21  0522   AST 13 20   ALT 16 18   BILITOT 0.29* 0.38   ALKPHOS 62 63      MICROBIOLOGY:   Lab Results   Component Value Date/Time    CULTURE NORMAL RESPIRATORY MARYSOL MODERATE GROWTH 09/29/2021 09:45 AM       Imaging:    XR CHEST PORTABLE    Result Date: 10/2/2021  Asymmetric airspace opacification in the perihilar right lower lung zone. Pattern may represent worsening edema or underlying pneumonitis. ASSESSMENT & PLAN     Assessment and Plan: Active Problems:    Respiratory distress    Acute respiratory failure with hypoxia and hypercapnia (HCC)  Resolved Problems:    * No resolved hospital problems. *      1. Acute hypoxic hypercapnic respiratory failure secondary to COPD exacerbation -  - continue prednisolone taper   - continue Symbicort inhalation. - continue Duoneb inhaltion 4 times daily. - completed 7 day antibiotic course  - Wean off oxygen as tolerated.     2. Acute toxic metabolic encephalopathy from hypercapnia from respiratory failure -   - Resolved     3. NSTEMI -   - previous h/o CAD s/p PCI   - Patient off heparin drip  - continue aspirin 81 mg daily  - continue Lipitor 40 mg daily  - continue lopressor 12.5 mg BID  - continue lisinopril 10 mg daily  - Underwent cath on 10/4/2021, found to have restenosis of mid RCA, underwent PTCA/JOSE MARTIN.     - keep mag > 2 and K > 4     4. New onset atrial fibrillation with RVR - currently NSR, rate controlled   - On beta-blocker  - On full dose Lovenox for anticoagulation. possible switch  - Follow-up on cardiology recommendations for anticoagulation.     5.  Acute kidney injury, likely secondary to tissue hypoxia and shock - improving  - Continue fluids  - continue to monitor I/O closely  - follow up repeat BMP tomorrow am.     6. DM type 2 -  - continue LANTUS 20 units subcu nightly  - continue high dose sliding scale  - hypoglycemia protocol in place    Diet: cardiac diet, low sodium  DVT ppx : pt already on anticoagulation  GI ppx: pepcid    PT/OT: recommends continued therapy after discharge  Discharge Planning / SW: pt wants to go home with family    Yung Membreno  PGY-1  Internal Medicine  [facility], [facility]   6:40 AM 10/6/2021     Attending Physician Statement  I have discussed the care of Chelsy Santos and I have examined the patient myselft and taken ros and hpi , including pertinent history and exam findings,  with the resident. I have reviewed the key elements of all parts of the encounter with the resident. I agree with the assessment, plan and orders as documented by the resident.       Electronically signed by Ashely Bernal MD

## 2021-10-06 NOTE — PROGRESS NOTES
ICU PATIENT TRANSFER NOTE        Patient:  Cony Leger  YOB: 1960    MRN: [id number]     Acct: [de-identified]     Admit date: 9/29/2021    Code Status:-  Full code    Reason for ICU Admission:-   Acute hypoxic respiratory failure    SUPPORT DEVICES: [] Ventilator [] BIPAP  [x] Nasal Cannula [] Room Air    Consultations:- [x] Cardiology [] Nephrology  [] Hemo onco  [] GI                               [] ID [] ENT  [] Rheum [] Endo   []Physiotherapy                                    NUTRITION:  [] NPO [] Tube Feeding  [] TPN  [x] PO    Central Lines:- [] No   [x] Yes      09/29 - intubation, central line and cardiac cath        Pt seen and Chart reviewed. ICU COURSE :-      Patient initially presented to the ER in respiratory distress. Patient was hypoxic saturating in 60s and hypotensive. Patient was given albuterol, Solu-Medrol and magnesium in route to the hospital.  Patient was placed on CPAP. On arrival to the ED patient was in acute distress nonverbal and not following any commands. Rapid Covid test was negative patient was intubated as she had altered mental status and continued to be hypoxic. Patient was also started on Levophed. Patient was started on prednisone , Symbicort and DuoNeb , antibiotic for 7 days . troponins trending up was started on heparin drip, concern for myocardial infarction. Echo showed 44% EF, cardiology was consulted. Cardiac cath on 10/05, successful PTCA - JOSE MARTIN to RCA. Patient developed new onset atrial fibrillation with RVR was started beta-blocker and full dose Lovenox full anticoagulation. Patient developed HARVEY post cath fluids were given. Review of Systems   Constitutional: Negative. Negative for activity change, appetite change, chills, fatigue and fever. HENT: Negative. Negative for ear discharge, ear pain, trouble swallowing and voice change. Eyes: Negative.   Negative for photophobia and visual disturbance. Respiratory: Negative. Negative for cough, chest tightness, shortness of breath and wheezing. Cardiovascular: Negative. Negative for chest pain, palpitations and leg swelling. Gastrointestinal: Negative. Negative for abdominal pain, blood in stool, constipation, diarrhea, nausea and vomiting. Endocrine: Negative. Genitourinary: Negative. Negative for dysuria. Musculoskeletal: Negative. Negative for joint swelling and myalgias. Skin: Negative. Negative for rash and wound. Neurological: Negative. Negative for dizziness, tremors, seizures, weakness, light-headedness, numbness and headaches. Psychiatric/Behavioral: Negative. Negative for confusion and sleep disturbance. Physical Exam:  Vitals: BP (!) 119/94   Pulse 80   Temp 97.9 °F (36.6 °C) (Oral)   Resp 19   Ht 5' 9\" (1.753 m)   Wt 224 lb 6.9 oz (101.8 kg)   SpO2 100%   BMI 33.14 kg/m²   24 hour intake/output:  Intake/Output Summary (Last 24 hours) at 10/5/2021 2231  Last data filed at 10/5/2021 0900  Gross per 24 hour   Intake 894 ml   Output 1700 ml   Net -806 ml     Last 3 weights: Wt Readings from Last 3 Encounters:   10/05/21 224 lb 6.9 oz (101.8 kg)   07/27/21 216 lb 6.4 oz (98.2 kg)   05/08/21 210 lb (95.3 kg)       Physical Exam  Constitutional:       Appearance: She is obese. HENT:      Head: Normocephalic and atraumatic. Nose: Nose normal.      Mouth/Throat:      Mouth: Mucous membranes are moist.   Eyes:      Extraocular Movements: Extraocular movements intact. Cardiovascular:      Rate and Rhythm: Normal rate. Rhythm irregular. Pulses: Normal pulses. Heart sounds: Normal heart sounds. No murmur heard. Pulmonary:      Breath sounds: Wheezing present. Abdominal:      General: There is no distension. Palpations: Abdomen is soft. Tenderness: There is no abdominal tenderness. Musculoskeletal:         General: No swelling. Normal range of motion.       Cervical back: Normal range of motion. No rigidity. Right lower leg: No edema. Left lower leg: No edema. Skin:     General: Skin is warm. Coloration: Skin is not jaundiced. Findings: No bruising or rash. Neurological:      General: No focal deficit present. Mental Status: She is alert and oriented to person, place, and time.    Psychiatric:         Mood and Affect: Mood normal.         Behavior: Behavior normal.           Medications:Current Inpatient  Scheduled Meds:   insulin glargine  20 Units SubCUTAneous Nightly    apixaban  5 mg Oral BID    metoprolol tartrate  12.5 mg Oral BID    sodium chloride flush  5-40 mL IntraVENous 2 times per day    clopidogrel  75 mg Oral Daily    atorvastatin  40 mg Oral Nightly    influenza virus vaccine  0.5 mL IntraMUSCular Prior to discharge    famotidine  20 mg Oral BID    busPIRone  15 mg Oral TID    predniSONE  20 mg Oral Daily    Followed by   Kayy Toribio ON 10/7/2021] predniSONE  15 mg Oral Daily    Followed by   Kayy Toribio ON 10/10/2021] predniSONE  10 mg Oral Daily    Followed by   Kayy Toribio ON 10/13/2021] predniSONE  5 mg Oral Daily    lisinopril  10 mg Oral Daily    budesonide-formoterol  2 puff Inhalation BID    ipratropium-albuterol  1 ampule Inhalation 4x daily    sodium chloride flush  5-40 mL IntraVENous 2 times per day    aspirin  81 mg Oral Daily    insulin lispro  0-18 Units SubCUTAneous TID WC    insulin lispro  0-9 Units SubCUTAneous Nightly     Continuous Infusions:   sodium chloride      sodium chloride      dextrose       PRN Meds:sodium chloride flush, sodium chloride, acetaminophen, albuterol, benzocaine-menthol, sodium chloride flush, sodium chloride, ondansetron **OR** ondansetron, polyethylene glycol, acetaminophen **OR** acetaminophen, glucose, dextrose, glucagon (rDNA), dextrose    Objective:    CBC:   Recent Labs     10/03/21  0437 10/04/21  0501 10/05/21  0522   WBC 12.9* 10.0 10.0   HGB 11.0* 9.9* 10.1*    164 See Reflexed IPF Result     BMP:    Recent Labs     10/03/21  0437 10/04/21  0501 10/05/21  0522    142 137   K 3.8 3.6* 4.9    102 102   CO2 30 33* 24   BUN 33* 27* 23*   CREATININE 0.99* 1.02* 0.98*   GLUCOSE 106* 93 74     Calcium:  Recent Labs     10/05/21  0522   CALCIUM 8.7     Ionized Calcium:No results for input(s): IONCA in the last 72 hours. Magnesium:  Recent Labs     10/05/21  0522   MG 2.8*     Phosphorus:  Recent Labs     10/05/21  0522   PHOS 4.0     BNP:No results for input(s): BNP in the last 72 hours. Glucose:  Recent Labs     10/05/21  1538 10/05/21  1752 10/05/21  2031   POCGLU 246* 173* 157*     HgbA1C: No results for input(s): LABA1C in the last 72 hours. INR: No results for input(s): INR in the last 72 hours. Hepatic:   Recent Labs     10/05/21  0522   ALKPHOS 63   ALT 18   AST 20   PROT 5.5*   BILITOT 0.38   LABALBU 2.7*     Amylase and Lipase:No results for input(s): LACTA, AMYLASE in the last 72 hours. Lactic Acid: No results for input(s): LACTA in the last 72 hours. CARDIAC ENZYMES:No results for input(s): CKTOTAL, CKMB, CKMBINDEX, TROPONINI in the last 72 hours. BNP: No results for input(s): BNP in the last 72 hours. Lipids: No results for input(s): CHOL, TRIG, HDL, LDLCALC in the last 72 hours. Invalid input(s): LDL  ABGs: No results found for: PH, PCO2, PO2, HCO3, O2SAT  Thyroid:   Lab Results   Component Value Date    TSH 1.24 06/16/2020      Urinalysis: No results for input(s): BACTERIA, BLOODU, CLARITYU, COLORU, PHUR, PROTEINU, RBCUA, SPECGRAV, BILIRUBINUR, NITRU, WBCUA, LEUKOCYTESUR, GLUCOSEU in the last 72 hours. CULTURES:   MRSA DNA detected on nasal swab  Respiratory panel positive for rhino virus  Respiratory culture showed mixed bacterial morphotype's on gram stain    Assessment:  Active Problems:    Respiratory distress    Acute respiratory failure with hypoxia and hypercapnia (HCC)  Resolved Problems:    * No resolved hospital problems.  *      1. Acute hypoxic hypercapnic respiratory failure secondary to COPD exacerbation -  - continue prednisolone taper   - continue Symbicort inhalation. - continue Duoneb inhaltion 4 times daily. - Continue rocephin for 7 days in total.   - Wean off oxygen as tolerated.     2. Acute toxic metabolic encephalopathy from hypercapnia from respiratory failure -   - Resolved    3. NSTEMI -   - previous h/o CAD s/p PCI   - Patient off heparin drip  - continue aspirin 81 mg daily  - continue Lipitor 40 mg daily  - continue lopressor 12.5 mg BID  - continue lisinopril 10 mg daily  - Underwent cath on 10/4/2021, found to have restenosis of mid RCA, underwent PTCA/JOSE MARTIN.     - keep mag > 2 and K > 4     4. New onset atrial fibrillation with RVR - currently NSR, rate controlled   - On beta-blocker  - On full dose Lovenox for anticoagulation.    - Follow-up on cardiology recommendations for anticoagulation.     5. Acute kidney injury, likely secondary to tissue hypoxia and shock - improving  - Continue fluids  - continue to monitor I/O closely  - follow up repeat BMP tomorrow am.    6. DM type 2 -  - continue LANTUS 20 units subcu nightly  - continue high dose sliding scale  - hypoglycemia protocol in place    Diet - cardiac diet, low sodium   DVT ppx - pt already on anticoagulation  GI ppx - pepcid    PT/OT - consulted  Discharge planning - consulted case management    Yung Membreno  PGY-1  Internal Medicine  [facility]   10:31 PM 10/5/2021

## 2021-10-07 LAB
ABSOLUTE EOS #: 0.23 K/UL (ref 0–0.44)
ABSOLUTE IMMATURE GRANULOCYTE: 0.1 K/UL (ref 0–0.3)
ABSOLUTE LYMPH #: 1.83 K/UL (ref 1.1–3.7)
ABSOLUTE MONO #: 0.77 K/UL (ref 0.1–1.2)
ALBUMIN SERPL-MCNC: 3.2 G/DL (ref 3.5–5.2)
ALBUMIN/GLOBULIN RATIO: 1.2 (ref 1–2.5)
ALP BLD-CCNC: 64 U/L (ref 35–104)
ALT SERPL-CCNC: 18 U/L (ref 5–33)
ANION GAP SERPL CALCULATED.3IONS-SCNC: 12 MMOL/L (ref 9–17)
AST SERPL-CCNC: 14 U/L
BASOPHILS # BLD: 0 % (ref 0–2)
BASOPHILS ABSOLUTE: <0.03 K/UL (ref 0–0.2)
BILIRUB SERPL-MCNC: 0.5 MG/DL (ref 0.3–1.2)
BUN BLDV-MCNC: 22 MG/DL (ref 6–20)
BUN/CREAT BLD: ABNORMAL (ref 9–20)
CALCIUM SERPL-MCNC: 9 MG/DL (ref 8.6–10.4)
CHLORIDE BLD-SCNC: 102 MMOL/L (ref 98–107)
CO2: 26 MMOL/L (ref 20–31)
CREAT SERPL-MCNC: 0.99 MG/DL (ref 0.5–0.9)
DIFFERENTIAL TYPE: ABNORMAL
EOSINOPHILS RELATIVE PERCENT: 3 % (ref 1–4)
ESTIMATED AVERAGE GLUCOSE: 148 MG/DL
GFR AFRICAN AMERICAN: >60 ML/MIN
GFR NON-AFRICAN AMERICAN: 58 ML/MIN
GFR SERPL CREATININE-BSD FRML MDRD: ABNORMAL ML/MIN/{1.73_M2}
GFR SERPL CREATININE-BSD FRML MDRD: ABNORMAL ML/MIN/{1.73_M2}
GLUCOSE BLD-MCNC: 148 MG/DL (ref 65–105)
GLUCOSE BLD-MCNC: 156 MG/DL (ref 65–105)
GLUCOSE BLD-MCNC: 168 MG/DL (ref 65–105)
GLUCOSE BLD-MCNC: 261 MG/DL (ref 65–105)
GLUCOSE BLD-MCNC: 80 MG/DL (ref 65–105)
GLUCOSE BLD-MCNC: 85 MG/DL (ref 70–99)
HBA1C MFR BLD: 6.8 % (ref 4–6)
HCT VFR BLD CALC: 34.9 % (ref 36.3–47.1)
HEMOGLOBIN: 10.4 G/DL (ref 11.9–15.1)
IMMATURE GRANULOCYTES: 1 %
LYMPHOCYTES # BLD: 21 % (ref 24–43)
MAGNESIUM: 2.2 MG/DL (ref 1.6–2.6)
MCH RBC QN AUTO: 26.7 PG (ref 25.2–33.5)
MCHC RBC AUTO-ENTMCNC: 29.8 G/DL (ref 28.4–34.8)
MCV RBC AUTO: 89.7 FL (ref 82.6–102.9)
MONOCYTES # BLD: 9 % (ref 3–12)
NRBC AUTOMATED: 0 PER 100 WBC
PDW BLD-RTO: 16.3 % (ref 11.8–14.4)
PHOSPHORUS: 4.3 MG/DL (ref 2.6–4.5)
PLATELET # BLD: 172 K/UL (ref 138–453)
PLATELET ESTIMATE: ABNORMAL
PMV BLD AUTO: 11.5 FL (ref 8.1–13.5)
POTASSIUM SERPL-SCNC: 3.9 MMOL/L (ref 3.7–5.3)
RBC # BLD: 3.89 M/UL (ref 3.95–5.11)
RBC # BLD: ABNORMAL 10*6/UL
SEG NEUTROPHILS: 66 % (ref 36–65)
SEGMENTED NEUTROPHILS ABSOLUTE COUNT: 5.7 K/UL (ref 1.5–8.1)
SODIUM BLD-SCNC: 140 MMOL/L (ref 135–144)
TOTAL PROTEIN: 5.9 G/DL (ref 6.4–8.3)
WBC # BLD: 8.6 K/UL (ref 3.5–11.3)
WBC # BLD: ABNORMAL 10*3/UL

## 2021-10-07 PROCEDURE — 6370000000 HC RX 637 (ALT 250 FOR IP): Performed by: STUDENT IN AN ORGANIZED HEALTH CARE EDUCATION/TRAINING PROGRAM

## 2021-10-07 PROCEDURE — 2580000003 HC RX 258: Performed by: STUDENT IN AN ORGANIZED HEALTH CARE EDUCATION/TRAINING PROGRAM

## 2021-10-07 PROCEDURE — 6370000000 HC RX 637 (ALT 250 FOR IP): Performed by: INTERNAL MEDICINE

## 2021-10-07 PROCEDURE — 83036 HEMOGLOBIN GLYCOSYLATED A1C: CPT

## 2021-10-07 PROCEDURE — 1200000000 HC SEMI PRIVATE

## 2021-10-07 PROCEDURE — 84100 ASSAY OF PHOSPHORUS: CPT

## 2021-10-07 PROCEDURE — 36415 COLL VENOUS BLD VENIPUNCTURE: CPT

## 2021-10-07 PROCEDURE — 99232 SBSQ HOSP IP/OBS MODERATE 35: CPT | Performed by: INTERNAL MEDICINE

## 2021-10-07 PROCEDURE — 2700000000 HC OXYGEN THERAPY PER DAY

## 2021-10-07 PROCEDURE — 97530 THERAPEUTIC ACTIVITIES: CPT

## 2021-10-07 PROCEDURE — 99233 SBSQ HOSP IP/OBS HIGH 50: CPT | Performed by: INTERNAL MEDICINE

## 2021-10-07 PROCEDURE — 85025 COMPLETE CBC W/AUTO DIFF WBC: CPT

## 2021-10-07 PROCEDURE — 6370000000 HC RX 637 (ALT 250 FOR IP)

## 2021-10-07 PROCEDURE — 94640 AIRWAY INHALATION TREATMENT: CPT

## 2021-10-07 PROCEDURE — 80053 COMPREHEN METABOLIC PANEL: CPT

## 2021-10-07 PROCEDURE — 82947 ASSAY GLUCOSE BLOOD QUANT: CPT

## 2021-10-07 PROCEDURE — 83735 ASSAY OF MAGNESIUM: CPT

## 2021-10-07 PROCEDURE — 94761 N-INVAS EAR/PLS OXIMETRY MLT: CPT

## 2021-10-07 PROCEDURE — 97110 THERAPEUTIC EXERCISES: CPT

## 2021-10-07 RX ORDER — MECLIZINE HCL 12.5 MG/1
12.5 TABLET ORAL
Status: COMPLETED | OUTPATIENT
Start: 2021-10-07 | End: 2021-10-07

## 2021-10-07 RX ORDER — INSULIN GLARGINE 100 [IU]/ML
15 INJECTION, SOLUTION SUBCUTANEOUS NIGHTLY
Status: DISCONTINUED | OUTPATIENT
Start: 2021-10-07 | End: 2021-10-08 | Stop reason: HOSPADM

## 2021-10-07 RX ADMIN — APIXABAN 5 MG: 5 TABLET, FILM COATED ORAL at 09:32

## 2021-10-07 RX ADMIN — IPRATROPIUM BROMIDE AND ALBUTEROL SULFATE 1 AMPULE: .5; 2.5 SOLUTION RESPIRATORY (INHALATION) at 15:18

## 2021-10-07 RX ADMIN — SODIUM CHLORIDE, PRESERVATIVE FREE 10 ML: 5 INJECTION INTRAVENOUS at 22:04

## 2021-10-07 RX ADMIN — DESMOPRESSIN ACETATE 40 MG: 0.2 TABLET ORAL at 20:37

## 2021-10-07 RX ADMIN — BUDESONIDE AND FORMOTEROL FUMARATE DIHYDRATE 2 PUFF: 160; 4.5 AEROSOL RESPIRATORY (INHALATION) at 08:43

## 2021-10-07 RX ADMIN — LISINOPRIL 10 MG: 10 TABLET ORAL at 09:33

## 2021-10-07 RX ADMIN — METOPROLOL TARTRATE 12.5 MG: 25 TABLET ORAL at 09:33

## 2021-10-07 RX ADMIN — MECLIZINE HCL 12.5 MG 12.5 MG: 12.5 TABLET ORAL at 22:02

## 2021-10-07 RX ADMIN — IPRATROPIUM BROMIDE AND ALBUTEROL SULFATE 1 AMPULE: .5; 2.5 SOLUTION RESPIRATORY (INHALATION) at 08:41

## 2021-10-07 RX ADMIN — ASPIRIN 81 MG: 81 TABLET, CHEWABLE ORAL at 09:33

## 2021-10-07 RX ADMIN — SODIUM CHLORIDE, PRESERVATIVE FREE 10 ML: 5 INJECTION INTRAVENOUS at 22:03

## 2021-10-07 RX ADMIN — INSULIN LISPRO 3 UNITS: 100 INJECTION, SOLUTION INTRAVENOUS; SUBCUTANEOUS at 12:16

## 2021-10-07 RX ADMIN — METOPROLOL TARTRATE 12.5 MG: 25 TABLET ORAL at 20:37

## 2021-10-07 RX ADMIN — IPRATROPIUM BROMIDE AND ALBUTEROL SULFATE 1 AMPULE: .5; 2.5 SOLUTION RESPIRATORY (INHALATION) at 11:41

## 2021-10-07 RX ADMIN — CLOPIDOGREL 75 MG: 75 TABLET, FILM COATED ORAL at 09:33

## 2021-10-07 RX ADMIN — IPRATROPIUM BROMIDE AND ALBUTEROL SULFATE 1 AMPULE: .5; 2.5 SOLUTION RESPIRATORY (INHALATION) at 20:05

## 2021-10-07 RX ADMIN — INSULIN GLARGINE 15 UNITS: 100 INJECTION, SOLUTION SUBCUTANEOUS at 20:40

## 2021-10-07 RX ADMIN — APIXABAN 5 MG: 5 TABLET, FILM COATED ORAL at 20:37

## 2021-10-07 RX ADMIN — SODIUM CHLORIDE, PRESERVATIVE FREE 10 ML: 5 INJECTION INTRAVENOUS at 09:36

## 2021-10-07 RX ADMIN — FAMOTIDINE 20 MG: 20 TABLET, FILM COATED ORAL at 20:37

## 2021-10-07 RX ADMIN — BUDESONIDE AND FORMOTEROL FUMARATE DIHYDRATE 2 PUFF: 160; 4.5 AEROSOL RESPIRATORY (INHALATION) at 20:05

## 2021-10-07 RX ADMIN — BUSPIRONE HYDROCHLORIDE 15 MG: 15 TABLET ORAL at 14:11

## 2021-10-07 RX ADMIN — BUSPIRONE HYDROCHLORIDE 15 MG: 15 TABLET ORAL at 09:33

## 2021-10-07 RX ADMIN — INSULIN LISPRO 5 UNITS: 100 INJECTION, SOLUTION INTRAVENOUS; SUBCUTANEOUS at 20:39

## 2021-10-07 RX ADMIN — BUSPIRONE HYDROCHLORIDE 15 MG: 15 TABLET ORAL at 20:37

## 2021-10-07 RX ADMIN — FAMOTIDINE 20 MG: 20 TABLET, FILM COATED ORAL at 09:33

## 2021-10-07 RX ADMIN — SODIUM CHLORIDE, PRESERVATIVE FREE 10 ML: 5 INJECTION INTRAVENOUS at 09:37

## 2021-10-07 RX ADMIN — PREDNISONE 15 MG: 5 TABLET ORAL at 09:32

## 2021-10-07 ASSESSMENT — ENCOUNTER SYMPTOMS
ABDOMINAL PAIN: 0
CONSTIPATION: 0
SORE THROAT: 0
VOMITING: 0
COUGH: 1
NAUSEA: 0
WHEEZING: 0
SHORTNESS OF BREATH: 1
DIARRHEA: 0
TROUBLE SWALLOWING: 0

## 2021-10-07 ASSESSMENT — PAIN SCALES - GENERAL: PAINLEVEL_OUTOF10: 0

## 2021-10-07 NOTE — PLAN OF CARE
Problem: Respiratory  Intervention: Administer medication as ordered  Note: BRONCHOSPASM/BRONCHOCONSTRICTION     [x]         IMPROVE AERATION/BREATH SOUNDS  [x]   ADMINISTER BRONCHODILATOR THERAPY AS APPROPRIATE  [x]   ASSESS BREATH SOUNDS  [x]   IMPLEMENT AEROSOL/MDI PROTOCOL  [x]   PATIENT EDUCATION AS NEEDED

## 2021-10-07 NOTE — PROGRESS NOTES
PULMONARY & CRITICAL CARE MEDICINE PROGRESS NOTE     Patient:  Lashon Avalos  MRN: [id number]  Admit date: 2021  Primary Care Physician: Alba Laurent MD  Consulting Physician: Indra Smith MD  CODE Status: Full Code  LOS: 7     SUBJECTIVE     CHIEF COMPLAINT/REASON FOR INITIAL CONSULT: Acute respiratory failure    BRIEF HOSPITAL COURSE:   The patient is a 64 y.o. female with multiple comorbidities including hypertension, diabetes, coronary artery disease, s/p stent, COPD and peripheral artery disease presented to the emergency room with complaints of worsening shortness of breath. She was hypoxemic and hypotensive on presentation. Initially started on CPAP. Her Covid test was negative. Admission labs  showed pH of 6.99, PCO2 106, bicarb 25.7 and lactic acid of 8.4, potassium 5.2. Patient required intubation due to poor mentation. She was extubated on 10/2. Underwent PTCA/JOSE MARTIN to in-stent stenosis of RCA on 10/4. Transfer out of ICU yesterday. INTERVAL HISTORY:  10/06/21  Patient is currently on O2 Flow Rate (L/min)  Av L/min  Min: 1 L/min  Max: 1 L/min  Afebrile  Hemodynamically stable  White count is 8.4  She is completed course of ceftriaxone and is on prednisone taper    REVIEW OF SYSTEMS:  Review of Systems   Constitutional: Negative for appetite change, chills, fever and unexpected weight change. HENT: Negative for congestion, postnasal drip, sore throat and trouble swallowing. Eyes: Negative for visual disturbance. Respiratory: Positive for cough and shortness of breath. Negative for wheezing. Cardiovascular: Negative for chest pain, palpitations and leg swelling. Gastrointestinal: Negative for abdominal pain, constipation, diarrhea, nausea and vomiting. Genitourinary: Negative for difficulty urinating, dysuria and frequency. Musculoskeletal: Negative for arthralgias and joint swelling. Skin: Negative for rash.    Allergic/Immunologic: Negative for immunocompromised state.   Neurological: Positive for weakness. Negative for dizziness, speech difficulty and headaches. Hematological: Negative for adenopathy. Psychiatric/Behavioral: Negative for behavioral problems and sleep disturbance. OBJECTIVE     PaO2/FiO2 RATIO:  No results for input(s): POCPO2 in the last 72 hours.    FiO2 : 40 %     VITAL SIGNS:   LAST:  BP (!) 104/54   Pulse 80   Temp 97.3 °F (36.3 °C) (Oral)   Resp 16   Ht 5' 9\" (1.753 m)   Wt 224 lb 6.9 oz (101.8 kg)   SpO2 92%   BMI 33.14 kg/m²   8-24 HR RANGE:  TEMP Temp  Av.8 °F (36.6 °C)  Min: 97.3 °F (36.3 °C)  Max: 98.2 °F (22.9 °C)   BP Systolic (48QNB), CRK:659 , Min:104 , JQQ:790      Diastolic (11EJR), FUQ:36, Min:54, Max:88     PULSE Pulse  Av  Min: 74  Max: 88   RR No data recorded   O2 SAT SpO2  Av.7 %  Min: 86 %  Max: 92 %   OXYGEN DELIVERY O2 Flow Rate (L/min)  Av L/min  Min: 1 L/min  Max: 1 L/min        SYSTEMIC EXAMINATION:   General appearance -comfortable, no acute distress  Mental status - awake & alert, follows commands  Eyes - pupils equal and reactive, sclera anicteric  Mouth - mucous membranes moist  Neck - supple, no significant adenopathy, carotids upstroke normal bilaterally, no bruits  Chest - Breath sounds bilaterally were dimnished to auscultation with prolonged expiratory phase and expiratory wheezing  Heart - normal rate, regular rhythm, normal S1, S2, no murmurs, rubs, clicks or gallops  Abdomen - soft, nontender, nondistended, no masses or organomegaly  Neurological - non-focal  Extremities - peripheral pulses normal, 1+ pedal edema, no clubbing or cyanosis  Skin - normal coloration and turgor, no rashes, no suspicious skin lesions noted     DATA REVIEW     Medications: Current Inpatient  Scheduled Meds:   insulin glargine  20 Units SubCUTAneous Nightly    apixaban  5 mg Oral BID    metoprolol tartrate  12.5 mg Oral BID    sodium chloride flush  5-40 mL IntraVENous 2 times per day    clopidogrel 75 mg Oral Daily    atorvastatin  40 mg Oral Nightly    influenza virus vaccine  0.5 mL IntraMUSCular Prior to discharge    famotidine  20 mg Oral BID    busPIRone  15 mg Oral TID    [START ON 10/7/2021] predniSONE  15 mg Oral Daily    Followed by   Doretha Rodrigez ON 10/10/2021] predniSONE  10 mg Oral Daily    Followed by   Doretha Rodrigez ON 10/13/2021] predniSONE  5 mg Oral Daily    lisinopril  10 mg Oral Daily    budesonide-formoterol  2 puff Inhalation BID    ipratropium-albuterol  1 ampule Inhalation 4x daily    sodium chloride flush  5-40 mL IntraVENous 2 times per day    aspirin  81 mg Oral Daily    insulin lispro  0-18 Units SubCUTAneous TID WC    insulin lispro  0-9 Units SubCUTAneous Nightly     Continuous Infusions:   sodium chloride      sodium chloride      dextrose         INPUT/OUTPUT:  In: 540 [P.O.:520; I.V.:20]  Out: 250 [Urine:250]  Date 10/06/21 0000 - 10/06/21 2359   Shift 0377-5281 0363-8848 5607-6167 24 Hour Total   INTAKE   P.O.(mL/kg/hr) 120(0.1) 200(0.2) 200 520   I. V.(mL/kg) 10(0.1) 10(0.1)  20(0.2)   Shift Total(mL/kg) 130(1.3) 210(2.1) 200(2) 540(5.3)   OUTPUT   Urine(mL/kg/hr)  250(0.3)  250   Shift Total(mL/kg)  250(2.5)  250(2.5)   Weight (kg) 101.8 101.8 101.8 101.8        LABS:  ABGs:   No results for input(s): POCPH, POCPCO2, POCPO2, POCHCO3, AOYV5PHE in the last 72 hours. CBC:   Recent Labs     10/04/21  0501 10/05/21  0522 10/06/21  0614   WBC 10.0 10.0 8.4   HGB 9.9* 10.1* 10.1*   HCT 33.2* 33.5* 35.2*   MCV 89.2 87.9 91.7    See Reflexed IPF Result 158   LYMPHOPCT 16* 15* 18*   RBC 3.72* 3.81* 3.84*   MCH 26.6 26.5 26.3   MCHC 29.8 30.1 28.7   RDW 16.0* 16.4* 16.3*     CRP:   No results for input(s): CRP in the last 72 hours. LDH:   No results for input(s): LDH in the last 72 hours.   BMP:   Recent Labs     10/04/21  0501 10/05/21  0522 10/06/21  0614    137 140   K 3.6* 4.9 4.0    102 103   CO2 33* 24 28   BUN 27* 23* 19   CREATININE 1.02* 0.98* 1.02* GLUCOSE 93 74 75   PHOS 3.3 4.0 4.0     Liver Function Test:   Recent Labs     10/04/21  0501 10/05/21  0522 10/06/21  0614   PROT 5.4* 5.5* 5.6*   LABALBU 3.1* 2.7* 3.1*   ALT 16 18 16   AST 13 20 13   ALKPHOS 62 63 58   BILITOT 0.29* 0.38 0.47     Coagulation Profile:   Recent Labs     10/03/21  2249 10/04/21  0501 10/04/21  1047   APTT 74.2* 56.2* 40.7*     D-Dimer:  No results for input(s): DDIMER in the last 72 hours. Lactic Acid:  No results for input(s): LACTA in the last 72 hours. Cardiac Enzymes:  No results for input(s): CKTOTAL, CKMB, CKMBINDEX, TROPONINI in the last 72 hours. Invalid input(s): TROPONIN, HSTROP  BNP/ProBNP:   No results for input(s): BNP, PROBNP in the last 72 hours. Triglycerides:  No results for input(s): TRIG in the last 72 hours. Microbiology:  Urine Culture:  No components found for: CURINE  Blood Culture:  No components found for: CBLOOD, CFUNGUSBL  Sputum Culture:  No components found for: CSPUTUM  No results for input(s): SPECDESC, SPECIAL, CULTURE, STATUS, ORG, CDIFFTOXPCR, CAMPYLOBPCR, SALMONELLAPC, SHIGAPCR, SHIGELLAPCR, MPNEUG, MPNEUM, LACTOQL in the last 72 hours. No results for input(s): SPUTUM, SPECDESC, SPECIAL, CULTURE, STATUS, ORG, CDIFFTOXPCR, MPNEUM, MPNEUG in the last 72 hours. Invalid input(s): CURINE, CBLOOD, CFUNGUSBL     Pathology:    Radiology Reports:  XR CHEST PORTABLE   Final Result   Asymmetric airspace opacification in the perihilar right lower lung zone. Pattern may represent worsening edema or underlying pneumonitis. VL DUP LOWER EXTREMITY VENOUS BILATERAL   Final Result      XR ABDOMEN FOR NG/OG/NE TUBE PLACEMENT   Final Result   Enteric tube terminating in the body of the stomach. The side port is distal   to the GE junction. XR CHEST PORTABLE   Final Result   1. Endotracheal tube terminating 6 cm above the [place]. 2. Persistent diffuse bilateral airspace opacities suggestive of an atypical   bronchopneumonia. taper   Physical/occupational therapy    I would like to thank you for allowing me to participate in the care of this patient. Please feel free to call with any further questions or concerns. José Marcos MD  Pulmonary and Critical Care Medicine           10/6/2021     Please note that this chart was generated using voice recognition Dragon dictation software. Although every effort was made to ensure the accuracy of this automated transcription, some errors in transcription may have occurred.

## 2021-10-07 NOTE — PLAN OF CARE
Problem: OXYGENATION/RESPIRATORY FUNCTION  Goal: Patient will maintain patent airway  Outcome: Ongoing  Goal: Patient will achieve/maintain normal respiratory rate/effort  Description: Respiratory rate and effort will be within normal limits for the patient  Outcome: Ongoing     Problem: SKIN INTEGRITY  Goal: Skin integrity is maintained or improved  Outcome: Ongoing     Problem: Discharge Planning:  Goal: Participates in care planning  Description: Participates in care planning  Outcome: Ongoing  Goal: Discharged to appropriate level of care  Description: Discharged to appropriate level of care  Outcome: Ongoing  Goal: Ability to perform activities of daily living will improve  Description: Ability to perform activities of daily living will improve  Outcome: Ongoing     Problem: Airway Clearance - Ineffective:  Goal: Ability to maintain a clear airway will improve  Description: Ability to maintain a clear airway will improve  Outcome: Ongoing     Problem: Anxiety/Stress:  Goal: Level of anxiety will decrease  Description: Level of anxiety will decrease  Outcome: Ongoing     Problem: Aspiration:  Goal: Absence of aspiration  Description: Absence of aspiration  Outcome: Ongoing     Problem:  Bowel Function - Altered:  Goal: Bowel elimination is within specified parameters  Description: Bowel elimination is within specified parameters  Outcome: Ongoing     Problem: Cardiac Output - Decreased:  Goal: Hemodynamic stability will improve  Description: Hemodynamic stability will improve  Outcome: Ongoing     Problem: Fluid Volume - Imbalance:  Goal: Absence of imbalanced fluid volume signs and symptoms  Description: Absence of imbalanced fluid volume signs and symptoms  Outcome: Ongoing     Problem: Gas Exchange - Impaired:  Goal: Levels of oxygenation will improve  Description: Levels of oxygenation will improve  Outcome: Ongoing     Problem: Nutrition Deficit:  Goal: Ability to achieve adequate nutritional intake will improve  Description: Ability to achieve adequate nutritional intake will improve  Outcome: Ongoing     Problem: Pain:  Goal: Pain level will decrease  Description: Pain level will decrease  Outcome: Ongoing  Goal: Recognizes and communicates pain  Description: Recognizes and communicates pain  Outcome: Ongoing  Goal: Control of acute pain  Description: Control of acute pain  Outcome: Ongoing  Goal: Control of chronic pain  Description: Control of chronic pain  Outcome: Ongoing     Problem: Serum Glucose Level - Abnormal:  Goal: Ability to maintain appropriate glucose levels will improve to within specified parameters  Description: Ability to maintain appropriate glucose levels will improve to within specified parameters  Outcome: Ongoing     Problem: Skin Integrity - Impaired:  Goal: Will show no infection signs and symptoms  Description: Will show no infection signs and symptoms  Outcome: Ongoing  Goal: Absence of new skin breakdown  Description: Absence of new skin breakdown  Outcome: Ongoing     Problem: Sleep Pattern Disturbance:  Goal: Appears well-rested  Description: Appears well-rested  Outcome: Ongoing     Problem: Tissue Perfusion - Cardiopulmonary, Altered:  Goal: Absence of angina  Description: Absence of angina  Outcome: Ongoing  Goal: Hemodynamic stability will improve  Description: Hemodynamic stability will improve  Outcome: Ongoing     Problem: Injury - Risk of, Physical Injury:  Goal: Absence of physical injury  Description: Absence of physical injury  Outcome: Ongoing  Goal: Will remain free from falls  Description: Will remain free from falls  Outcome: Ongoing     Problem: Falls - Risk of:  Goal: Absence of physical injury  Description: Absence of physical injury  Outcome: Ongoing  Goal: Will remain free from falls  Description: Will remain free from falls  Outcome: Ongoing     Problem: Skin Integrity:  Goal: Will show no infection signs and symptoms  Description: Will show no infection signs

## 2021-10-07 NOTE — PROGRESS NOTES
Home Oxygen Evaluation    Home Oxygen Evaluation completed. Patient is on room air  Resting SpO2 on room air = 96%    SpO2 on room air with exercise = 91%      Nocturnal Oximetry with patient on room air is recommended is SpO2 is between 89% and 95% (requires additional order).     Akira Huerta RCP  11:45 AM

## 2021-10-07 NOTE — PROGRESS NOTES
Physical Therapy  Facility/Department: [facility]  Daily Treatment Note  NAME: Vannessa Youssef  : 1960  MRN: [id number]    Date of Service: 10/7/2021    Discharge Recommendations: Further therapy recommended at discharge. PT Equipment Recommendations  Equipment Needed: No    Assessment   Body structures, Functions, Activity limitations: Decreased functional mobility ; Decreased ROM; Decreased strength;Decreased endurance;Decreased balance;Decreased posture; Increased pain  Assessment: Pt ambulates 100 ft x2 with no AD and SBA. Pt should be safe to return to prior living situation with intermittent support as needed. Pt would benefit from continued therapy to promote endurance. Prognosis: Good  Decision Making: Medium Complexity  PT Education: Goals;PT Role;Plan of Care;Home Exercise Program;General Safety; Functional Mobility Training;Energy Conservation;Transfer Training  Barriers to Learning: talkative  REQUIRES PT FOLLOW UP: Yes  Activity Tolerance  Activity Tolerance: Patient limited by fatigue;Patient limited by endurance     Patient Diagnosis(es): The primary encounter diagnosis was Acute respiratory failure with hypoxia and hypercapnia ([facility] [address] A diagnosis of Viral pneumonia was also pertinent to this visit. has a past medical history of Asthma, Blood circulation, collateral, CAD (coronary artery disease), COPD (chronic obstructive pulmonary disease) ([address] Diabetes mellitus ([address] Hyperlipidemia, Hypertension, Movement disorder, Neuropathy, and PAD (peripheral artery disease) ([address] has a past surgical history that includes Coronary angioplasty with stent; Tonsillectomy; Inner ear surgery (Right); and Coronary angioplasty with stent (2019).     Restrictions  Restrictions/Precautions  Restrictions/Precautions: Fall Risk, Up as Tolerated, Contact Precautions  Required Braces or Orthoses?: No  Position Activity Restriction  Other position/activity restrictions: 10/4 cardiac cath  Subjective   General  Response To Previous Treatment: Patient with no complaints from previous session. Family / Caregiver Present: No  Subjective  Subjective: RN and pt agreeable to PT. pt agreeable and pleasant, very talkative. Pt supine in bed at start of session. Pain Screening  Patient Currently in Pain: Denies  Pain Assessment  Pain Assessment: 0-10  Pain Level: 0  Vital Signs  Patient Currently in Pain: Denies       Orientation  Orientation  Overall Orientation Status: Within Normal Limits  Cognition   Cognition  Overall Cognitive Status: WFL  Objective   Bed mobility  Supine to Sit: Modified independent  Sit to Supine: Unable to assess  Scooting: Modified independent  Comment: HOB slightly elevated. Pt ends in recliner  Transfers  Sit to Stand: Supervision  Stand to sit: Supervision  Comment: No AD used  Ambulation  Ambulation?: Yes  More Ambulation?: No  Ambulation 1  Surface: level tile  Device: No Device  Assistance: Stand by assistance  Gait Deviations: Slow Paty;Decreased step length;Decreased step height  Distance: 100ft x2  Comments: No LOB noted, 1x standing rest break x30s with each bout of gait. Sitting rest break between 100ft bouts of gait. Stairs/Curb  Stairs?: No     Balance  Posture: Good  Sitting - Static: Good  Sitting - Dynamic: Good  Standing - Static: Good;-  Standing - Dynamic: Fair;+  Comments: Assessed without AD              Seated LE exercise program: Long Arc Quads, hip abduction/adduction, heel/toe raises, and marches.  Reps: 15x AROM BLE                                              AM-PAC Score  AM-PAC Inpatient Mobility Raw Score : 19 (10/07/21 1518)  AM-PAC Inpatient T-Scale Score : 45.44 (10/07/21 1518)  Mobility Inpatient CMS 0-100% Score: 41.77 (10/07/21 1518)  Mobility Inpatient CMS G-Code Modifier : CK (10/07/21 1518)          Goals  Short term goals  Time Frame for Short term goals: 8 visits  Short term goal 1: prevent loss of strength, mobility, independence while in the hospital  Short term goal 2: independent bed mobility without use of bed rails  Short term goal 3: independent transfers  Short term goal 4: independent gait x 100' with appropriate device vs none (history of claudication)  Patient Goals   Patient goals : eat!     Plan    Plan  Times per week: 5 visits weekly  Times per day: Daily  Current Treatment Recommendations: Strengthening, Functional Mobility Training, ROM, Balance Training, Transfer Training, Endurance Training, Gait Training, Stair training, Pain Management, Home Exercise Program, Safety Education & Training, Patient/Caregiver Education & Training, Equipment Evaluation, Education, & procurement, Positioning  Safety Devices  Type of devices: Call light within reach, Gait belt, Left in chair, Nurse notified, All fall risk precautions in place  Restraints  Initially in place: No     Therapy Time   Individual Concurrent Group Co-treatment   Time In 1438         Time Out 1505         Minutes 27         Timed Code Treatment Minutes: 2017 Pedro Borden, PT

## 2021-10-07 NOTE — PROGRESS NOTES
[facility]  Internal Medicine Teaching Residency Program  Inpatient Daily Progress Note  ______________________________________________________________________________    Patient: Chelsy Santos  YOB: 1960   Formerly [facility]:[id number]    Acct: [de-identified]     Room: [address] [place] [address]  Admit date: 9/29/2021  Today's date: 10/07/21  Number of days in the hospital: 8    SUBJECTIVE   Admitting Diagnosis: <principal problem not specified>  CC: respiratory distress    Pt examined at bedside. No acute issues overnight. Pt feels lot better, denies nay chest pain/shortness of breath  Chart & results reviewed. Patient is eating ok, sleeping ok, normal bowel/ bladder movements. Patient is able to ambulate with assistance. Patient on 3 L NC      ROS:  Constitutional:  negative for chills, fevers, sweats  Respiratory:  negative for cough, dyspnea on exertion, hemoptysis, shortness of breath, wheezing  Cardiovascular:  negative for chest pain, chest pressure/discomfort, lower extremity edema, palpitations  Gastrointestinal:  negative for abdominal pain, constipation, diarrhea, nausea, vomiting  Neurological:  negative for dizziness, headache    BRIEF HISTORY     Patient initially presented to the ER in respiratory distress.  Patient was hypoxic saturating in 60s and hypotensive.  Patient was given albuterol, Solu-Medrol and magnesium in route to the hospital.  Patient was placed on CPAP.  On arrival to the ED patient was in acute distress nonverbal and not following any commands.  Rapid Covid test was negative patient was intubated as she had altered mental status and continued to be hypoxic.  Patient was also started on Levophed.  Patient was started on prednisone , Symbicort and DuoNeb , antibiotic for 7 days . troponins trending up was started on heparin drip, concern for myocardial infarction.  Echo showed 44% EF, cardiology was consulted.    Cardiac cath on 10/05, successful PTCA - JOSE MARTIN to RCA.  Patient developed new onset atrial fibrillation with RVR was started beta-blocker and full dose Lovenox full anticoagulation.  Patient developed HARVEY post cath fluids were given. OBJECTIVE     Vital Signs:  /67   Pulse 78   Temp 98.2 °F (36.8 °C) (Oral)   Resp 16   Ht 5' 9\" (1.753 m)   Wt 224 lb 6.9 oz (101.8 kg)   SpO2 92%   BMI 33.14 kg/m²     Temp (24hrs), Av.9 °F (36.6 °C), Min:97.3 °F (36.3 °C), Max:98.2 °F (36.8 °C)    In: 330   Out: -     Physical Exam:  Physical Exam  Constitutional:       Appearance: She is obese. HENT:      Head: Normocephalic and atraumatic. Nose: Nose normal.      Mouth/Throat:      Mouth: Mucous membranes are moist.   Eyes:      Extraocular Movements: Extraocular movements intact. Cardiovascular:      Rate and Rhythm: Normal rate and regular rhythm. Pulses: Normal pulses. Heart sounds: Normal heart sounds. No murmur heard. Pulmonary:      Breath sounds: Wheezing and rales present. Abdominal:      General: There is no distension. Palpations: Abdomen is soft. Tenderness: There is no abdominal tenderness. Musculoskeletal:         General: No swelling. Normal range of motion. Cervical back: Normal range of motion. No rigidity. Right lower leg: No edema. Left lower leg: No edema. Skin:     General: Skin is warm. Coloration: Skin is not jaundiced. Findings: No bruising or rash. Neurological:      General: No focal deficit present. Mental Status: She is alert and oriented to person, place, and time.    Psychiatric:         Mood and Affect: Mood normal.         Behavior: Behavior normal.           Medications:  Scheduled Medications:    insulin glargine  15 Units SubCUTAneous Nightly    apixaban  5 mg Oral BID    metoprolol tartrate  12.5 mg Oral BID    sodium chloride flush  5-40 mL IntraVENous 2 times per day    clopidogrel  75 mg Oral Daily    atorvastatin  40 mg Oral Nightly    influenza virus vaccine  0.5 mL IntraMUSCular Prior to discharge    famotidine  20 mg Oral BID    busPIRone  15 mg Oral TID    predniSONE  15 mg Oral Daily    Followed by   Svetlana Harris ON 10/10/2021] predniSONE  10 mg Oral Daily    Followed by   Svetlana Harris ON 10/13/2021] predniSONE  5 mg Oral Daily    lisinopril  10 mg Oral Daily    budesonide-formoterol  2 puff Inhalation BID    ipratropium-albuterol  1 ampule Inhalation 4x daily    sodium chloride flush  5-40 mL IntraVENous 2 times per day    aspirin  81 mg Oral Daily    insulin lispro  0-18 Units SubCUTAneous TID WC    insulin lispro  0-9 Units SubCUTAneous Nightly     Continuous Infusions:    sodium chloride      sodium chloride      dextrose       PRN Medicationssodium chloride flush, 5-40 mL, PRN  sodium chloride, 25 mL, PRN  acetaminophen, 650 mg, Q4H PRN  albuterol, 2.5 mg, Q4H PRN  benzocaine-menthol, 1 lozenge, Q2H PRN  sodium chloride flush, 5-40 mL, PRN  sodium chloride, 25 mL, PRN  ondansetron, 4 mg, Q8H PRN   Or  ondansetron, 4 mg, Q6H PRN  polyethylene glycol, 17 g, Daily PRN  acetaminophen, 650 mg, Q6H PRN   Or  acetaminophen, 650 mg, Q6H PRN  glucose, 15 g, PRN  dextrose, 12.5 g, PRN  glucagon (rDNA), 1 mg, PRN  dextrose, 100 mL/hr, PRN        Diagnostic Labs:  CBC:   Recent Labs     10/05/21  0522 10/06/21  0614 10/07/21  0548   WBC 10.0 8.4 8.6   RBC 3.81* 3.84* 3.89*   HGB 10.1* 10.1* 10.4*   HCT 33.5* 35.2* 34.9*   MCV 87.9 91.7 89.7   RDW 16.4* 16.3* 16.3*   PLT See Reflexed IPF Result 158 172     BMP:   Recent Labs     10/05/21  0522 10/06/21  0614 10/07/21  0548    140 140   K 4.9 4.0 3.9    103 102   CO2 24 28 26   PHOS 4.0 4.0 4.3   BUN 23* 19 22*   CREATININE 0.98* 1.02* 0.99*     BNP: No results for input(s): BNP in the last 72 hours. PT/INR: No results for input(s): PROTIME, INR in the last 72 hours. APTT: No results for input(s): APTT in the last 72 hours.   CARDIAC ENZYMES: No results for input(s): CKMB, CKMBINDEX, TROPONINI in the last 72 hours. Invalid input(s): CKTOTAL;3  FASTING LIPID PANEL:  Lab Results   Component Value Date    CHOL 122 06/16/2020    HDL 35 (L) 06/16/2020    TRIG 152 (H) 06/16/2020     LIVER PROFILE:   Recent Labs     10/05/21  0522 10/06/21  0614 10/07/21  0548   AST 20 13 14   ALT 18 16 18   BILITOT 0.38 0.47 0.50   ALKPHOS 63 58 64      MICROBIOLOGY:   Lab Results   Component Value Date/Time    CULTURE NORMAL RESPIRATORY MARYSOL MODERATE GROWTH 09/29/2021 09:45 AM       Imaging:    XR CHEST PORTABLE    Result Date: 10/2/2021  Asymmetric airspace opacification in the perihilar right lower lung zone. Pattern may represent worsening edema or underlying pneumonitis. ASSESSMENT & PLAN     Assessment and Plan: Active Problems:    Respiratory distress    Acute respiratory failure with hypoxia and hypercapnia (HCC)  Resolved Problems:    * No resolved hospital problems. *      1. Acute hypoxic hypercapnic respiratory failure secondary to COPD exacerbation -  - continue prednisolone taper   - continue Symbicort inhalation. - continue Duoneb inhaltion 4 times daily. - completed 7 day antibiotic course  - Wean off oxygen as tolerated.     2. Acute toxic metabolic encephalopathy from hypercapnia from respiratory failure -   - Resolved     3. NSTEMI -   - previous h/o CAD s/p PCI   - Patient off heparin drip  - continue aspirin 81 mg daily  - continue Lipitor 40 mg daily  - continue lopressor 12.5 mg BID  - continue lisinopril 10 mg daily  - Underwent cath on 10/4/2021, found to have restenosis of mid RCA, underwent PTCA/JOSE MARTIN.     - keep mag > 2 and K > 4     4. New onset atrial fibrillation with RVR - currently NSR, rate controlled   - On beta-blocker  - On full dose Lovenox for anticoagulation. possible switch  - Follow-up on cardiology recommendations for anticoagulation.     5.  Acute kidney injury, likely secondary to tissue hypoxia and shock - improving  - Continue fluids  - continue to monitor I/O

## 2021-10-08 VITALS
WEIGHT: 224.43 LBS | BODY MASS INDEX: 33.24 KG/M2 | HEART RATE: 75 BPM | TEMPERATURE: 97.7 F | SYSTOLIC BLOOD PRESSURE: 102 MMHG | RESPIRATION RATE: 18 BRPM | DIASTOLIC BLOOD PRESSURE: 63 MMHG | OXYGEN SATURATION: 97 % | HEIGHT: 69 IN

## 2021-10-08 PROBLEM — R06.03 RESPIRATORY DISTRESS: Status: RESOLVED | Noted: 2021-09-29 | Resolved: 2021-10-08

## 2021-10-08 LAB
ABSOLUTE EOS #: 0.16 K/UL (ref 0–0.44)
ABSOLUTE IMMATURE GRANULOCYTE: 0.06 K/UL (ref 0–0.3)
ABSOLUTE LYMPH #: 1.74 K/UL (ref 1.1–3.7)
ABSOLUTE MONO #: 0.53 K/UL (ref 0.1–1.2)
ALBUMIN SERPL-MCNC: 3.5 G/DL (ref 3.5–5.2)
ALBUMIN/GLOBULIN RATIO: 1.3 (ref 1–2.5)
ALP BLD-CCNC: 65 U/L (ref 35–104)
ALT SERPL-CCNC: 25 U/L (ref 5–33)
ANION GAP SERPL CALCULATED.3IONS-SCNC: 12 MMOL/L (ref 9–17)
AST SERPL-CCNC: 20 U/L
BASOPHILS # BLD: 0 % (ref 0–2)
BASOPHILS ABSOLUTE: 0.03 K/UL (ref 0–0.2)
BILIRUB SERPL-MCNC: 0.6 MG/DL (ref 0.3–1.2)
BUN BLDV-MCNC: 18 MG/DL (ref 6–20)
BUN/CREAT BLD: ABNORMAL (ref 9–20)
CALCIUM SERPL-MCNC: 9 MG/DL (ref 8.6–10.4)
CHLORIDE BLD-SCNC: 104 MMOL/L (ref 98–107)
CO2: 25 MMOL/L (ref 20–31)
CREAT SERPL-MCNC: 1.09 MG/DL (ref 0.5–0.9)
DIFFERENTIAL TYPE: ABNORMAL
EOSINOPHILS RELATIVE PERCENT: 2 % (ref 1–4)
GFR AFRICAN AMERICAN: >60 ML/MIN
GFR NON-AFRICAN AMERICAN: 52 ML/MIN
GFR SERPL CREATININE-BSD FRML MDRD: ABNORMAL ML/MIN/{1.73_M2}
GFR SERPL CREATININE-BSD FRML MDRD: ABNORMAL ML/MIN/{1.73_M2}
GLUCOSE BLD-MCNC: 160 MG/DL (ref 65–105)
GLUCOSE BLD-MCNC: 86 MG/DL (ref 70–99)
HCT VFR BLD CALC: 36.6 % (ref 36.3–47.1)
HEMOGLOBIN: 11.1 G/DL (ref 11.9–15.1)
IMMATURE GRANULOCYTES: 1 %
LYMPHOCYTES # BLD: 25 % (ref 24–43)
MAGNESIUM: 2.2 MG/DL (ref 1.6–2.6)
MCH RBC QN AUTO: 26.7 PG (ref 25.2–33.5)
MCHC RBC AUTO-ENTMCNC: 30.3 G/DL (ref 28.4–34.8)
MCV RBC AUTO: 88.2 FL (ref 82.6–102.9)
MONOCYTES # BLD: 8 % (ref 3–12)
NRBC AUTOMATED: 0 PER 100 WBC
PDW BLD-RTO: 16.1 % (ref 11.8–14.4)
PHOSPHORUS: 4.6 MG/DL (ref 2.6–4.5)
PLATELET # BLD: 194 K/UL (ref 138–453)
PLATELET ESTIMATE: ABNORMAL
PMV BLD AUTO: 11 FL (ref 8.1–13.5)
POTASSIUM SERPL-SCNC: 4 MMOL/L (ref 3.7–5.3)
RBC # BLD: 4.15 M/UL (ref 3.95–5.11)
RBC # BLD: ABNORMAL 10*6/UL
SEG NEUTROPHILS: 64 % (ref 36–65)
SEGMENTED NEUTROPHILS ABSOLUTE COUNT: 4.54 K/UL (ref 1.5–8.1)
SODIUM BLD-SCNC: 141 MMOL/L (ref 135–144)
TOTAL PROTEIN: 6.2 G/DL (ref 6.4–8.3)
WBC # BLD: 7.1 K/UL (ref 3.5–11.3)
WBC # BLD: ABNORMAL 10*3/UL

## 2021-10-08 PROCEDURE — 6360000002 HC RX W HCPCS: Performed by: INTERNAL MEDICINE

## 2021-10-08 PROCEDURE — 6370000000 HC RX 637 (ALT 250 FOR IP): Performed by: STUDENT IN AN ORGANIZED HEALTH CARE EDUCATION/TRAINING PROGRAM

## 2021-10-08 PROCEDURE — G0008 ADMIN INFLUENZA VIRUS VAC: HCPCS | Performed by: INTERNAL MEDICINE

## 2021-10-08 PROCEDURE — 2580000003 HC RX 258: Performed by: STUDENT IN AN ORGANIZED HEALTH CARE EDUCATION/TRAINING PROGRAM

## 2021-10-08 PROCEDURE — 85025 COMPLETE CBC W/AUTO DIFF WBC: CPT

## 2021-10-08 PROCEDURE — 80053 COMPREHEN METABOLIC PANEL: CPT

## 2021-10-08 PROCEDURE — 99232 SBSQ HOSP IP/OBS MODERATE 35: CPT | Performed by: INTERNAL MEDICINE

## 2021-10-08 PROCEDURE — 94640 AIRWAY INHALATION TREATMENT: CPT

## 2021-10-08 PROCEDURE — 84100 ASSAY OF PHOSPHORUS: CPT

## 2021-10-08 PROCEDURE — 36415 COLL VENOUS BLD VENIPUNCTURE: CPT

## 2021-10-08 PROCEDURE — 6370000000 HC RX 637 (ALT 250 FOR IP): Performed by: INTERNAL MEDICINE

## 2021-10-08 PROCEDURE — 90686 IIV4 VACC NO PRSV 0.5 ML IM: CPT | Performed by: INTERNAL MEDICINE

## 2021-10-08 PROCEDURE — 83735 ASSAY OF MAGNESIUM: CPT

## 2021-10-08 PROCEDURE — 82947 ASSAY GLUCOSE BLOOD QUANT: CPT

## 2021-10-08 PROCEDURE — 6370000000 HC RX 637 (ALT 250 FOR IP)

## 2021-10-08 RX ORDER — PREDNISONE 1 MG/1
15 TABLET ORAL DAILY
Qty: 3 TABLET | Refills: 0 | Status: SHIPPED | OUTPATIENT
Start: 2021-10-09 | End: 2021-10-10

## 2021-10-08 RX ORDER — LISINOPRIL 10 MG/1
10 TABLET ORAL DAILY
Qty: 30 TABLET | Refills: 3 | Status: SHIPPED | OUTPATIENT
Start: 2021-10-09

## 2021-10-08 RX ORDER — CLOPIDOGREL BISULFATE 75 MG/1
75 TABLET ORAL DAILY
Qty: 30 TABLET | Refills: 3 | Status: SHIPPED | OUTPATIENT
Start: 2021-10-09

## 2021-10-08 RX ORDER — BUSPIRONE HYDROCHLORIDE 15 MG/1
15 TABLET ORAL 3 TIMES DAILY
Qty: 90 TABLET | Refills: 0 | Status: SHIPPED | OUTPATIENT
Start: 2021-10-08 | End: 2021-11-07

## 2021-10-08 RX ORDER — BUDESONIDE AND FORMOTEROL FUMARATE DIHYDRATE 160; 4.5 UG/1; UG/1
2 AEROSOL RESPIRATORY (INHALATION) 2 TIMES DAILY
Qty: 10.2 G | Refills: 3 | Status: SHIPPED | OUTPATIENT
Start: 2021-10-08 | End: 2022-05-18

## 2021-10-08 RX ORDER — PREDNISONE 10 MG/1
10 TABLET ORAL DAILY
Qty: 3 TABLET | Refills: 0 | Status: SHIPPED | OUTPATIENT
Start: 2021-10-10 | End: 2021-10-13

## 2021-10-08 RX ORDER — PREDNISONE 1 MG/1
5 TABLET ORAL DAILY
Qty: 3 TABLET | Refills: 0 | Status: SHIPPED | OUTPATIENT
Start: 2021-10-13 | End: 2021-10-16

## 2021-10-08 RX ADMIN — SODIUM CHLORIDE, PRESERVATIVE FREE 10 ML: 5 INJECTION INTRAVENOUS at 08:24

## 2021-10-08 RX ADMIN — IPRATROPIUM BROMIDE AND ALBUTEROL SULFATE 1 AMPULE: .5; 2.5 SOLUTION RESPIRATORY (INHALATION) at 16:23

## 2021-10-08 RX ADMIN — APIXABAN 5 MG: 5 TABLET, FILM COATED ORAL at 08:24

## 2021-10-08 RX ADMIN — BUSPIRONE HYDROCHLORIDE 15 MG: 15 TABLET ORAL at 12:42

## 2021-10-08 RX ADMIN — LISINOPRIL 10 MG: 10 TABLET ORAL at 08:24

## 2021-10-08 RX ADMIN — FAMOTIDINE 20 MG: 20 TABLET, FILM COATED ORAL at 08:24

## 2021-10-08 RX ADMIN — SODIUM CHLORIDE, PRESERVATIVE FREE 10 ML: 5 INJECTION INTRAVENOUS at 08:25

## 2021-10-08 RX ADMIN — IPRATROPIUM BROMIDE AND ALBUTEROL SULFATE 1 AMPULE: .5; 2.5 SOLUTION RESPIRATORY (INHALATION) at 08:41

## 2021-10-08 RX ADMIN — CLOPIDOGREL 75 MG: 75 TABLET, FILM COATED ORAL at 08:24

## 2021-10-08 RX ADMIN — IPRATROPIUM BROMIDE AND ALBUTEROL SULFATE 1 AMPULE: .5; 2.5 SOLUTION RESPIRATORY (INHALATION) at 13:04

## 2021-10-08 RX ADMIN — BUDESONIDE AND FORMOTEROL FUMARATE DIHYDRATE 2 PUFF: 160; 4.5 AEROSOL RESPIRATORY (INHALATION) at 08:41

## 2021-10-08 RX ADMIN — METOPROLOL TARTRATE 12.5 MG: 25 TABLET ORAL at 08:24

## 2021-10-08 RX ADMIN — INFLUENZA A VIRUS A/VICTORIA/2570/2019 IVR-215 (H1N1) ANTIGEN (PROPIOLACTONE INACTIVATED), INFLUENZA A VIRUS A/CAMBODIA/E0826360/2020 IVR-224 (H3N2) ANTIGEN (PROPIOLACTONE INACTIVATED), INFLUENZA B VIRUS B/VICTORIA/705/2018 BVR-11 ANTIGEN (PROPIOLACTONE INACTIVATED), INFLUENZA B VIRUS B/PHUKET/3073/2013 BVR-1B ANTIGEN (PROPIOLACTONE INACTIVATED) 0.5 ML: 15; 15; 15; 15 INJECTION, SUSPENSION INTRAMUSCULAR at 15:50

## 2021-10-08 RX ADMIN — BUSPIRONE HYDROCHLORIDE 15 MG: 15 TABLET ORAL at 08:24

## 2021-10-08 RX ADMIN — PREDNISONE 15 MG: 5 TABLET ORAL at 08:23

## 2021-10-08 RX ADMIN — ASPIRIN 81 MG: 81 TABLET, CHEWABLE ORAL at 08:24

## 2021-10-08 RX ADMIN — INSULIN LISPRO 3 UNITS: 100 INJECTION, SOLUTION INTRAVENOUS; SUBCUTANEOUS at 12:41

## 2021-10-08 ASSESSMENT — ENCOUNTER SYMPTOMS
WHEEZING: 0
SHORTNESS OF BREATH: 0
ABDOMINAL PAIN: 0
COUGH: 0
DIARRHEA: 0
VOMITING: 0
NAUSEA: 0
TROUBLE SWALLOWING: 0
CONSTIPATION: 0
SORE THROAT: 0

## 2021-10-08 ASSESSMENT — PAIN SCALES - GENERAL: PAINLEVEL_OUTOF10: 0

## 2021-10-08 ASSESSMENT — PAIN DESCRIPTION - PROGRESSION: CLINICAL_PROGRESSION: NOT CHANGED

## 2021-10-08 NOTE — PLAN OF CARE
Problem: OXYGENATION/RESPIRATORY FUNCTION  Goal: Patient will maintain patent airway  10/8/2021 0927 by Kole Morales RN  Outcome: Ongoing  10/8/2021 0336 by Emmanuel Hernandez RN  Outcome: Ongoing  Goal: Patient will achieve/maintain normal respiratory rate/effort  Description: Respiratory rate and effort will be within normal limits for the patient  10/8/2021 0927 by Kole Morales RN  Outcome: Ongoing  10/8/2021 0336 by Emmanuel Hernandez RN  Outcome: Ongoing     Problem: SKIN INTEGRITY  Goal: Skin integrity is maintained or improved  10/8/2021 0927 by Kole Morales RN  Outcome: Ongoing  10/8/2021 0336 by Emmanuel Hernandez RN  Outcome: Ongoing     Problem: Discharge Planning:  Goal: Participates in care planning  Description: Participates in care planning  10/8/2021 0927 by Kole Morales RN  Outcome: Ongoing  10/8/2021 0336 by Emmanuel Hernandez RN  Outcome: Ongoing  Goal: Discharged to appropriate level of care  Description: Discharged to appropriate level of care  10/8/2021 0927 by Kole Morales RN  Outcome: Ongoing  10/8/2021 0336 by Emmanuel Hernandez RN  Outcome: Ongoing  Goal: Ability to perform activities of daily living will improve  Description: Ability to perform activities of daily living will improve  10/8/2021 0927 by Kole Morales RN  Outcome: Ongoing  10/8/2021 0336 by Emmanuel Hernandez RN  Outcome: Ongoing     Problem: Airway Clearance - Ineffective:  Goal: Ability to maintain a clear airway will improve  Description: Ability to maintain a clear airway will improve  10/8/2021 0927 by Kole Morales RN  Outcome: Ongoing  10/8/2021 0336 by Emmanuel Hernandez RN  Outcome: Ongoing     Problem: Anxiety/Stress:  Goal: Level of anxiety will decrease  Description: Level of anxiety will decrease  10/8/2021 0927 by Kole Morales RN  Outcome: Ongoing  10/8/2021 0336 by Emmanuel Hernandez RN  Outcome: Ongoing     Problem: Aspiration:  Goal: Absence of aspiration  Description: Absence of aspiration  10/8/2021 0927 by Maren Beckham RN  Outcome: Ongoing  10/8/2021 0336 by Tatiana Hdez RN  Outcome: Ongoing     Problem:  Bowel Function - Altered:  Goal: Bowel elimination is within specified parameters  Description: Bowel elimination is within specified parameters  10/8/2021 0927 by Maren Beckham RN  Outcome: Ongoing  10/8/2021 0336 by Tatiana Hdez RN  Outcome: Ongoing     Problem: Cardiac Output - Decreased:  Goal: Hemodynamic stability will improve  Description: Hemodynamic stability will improve  10/8/2021 0927 by Maren Beckham RN  Outcome: Ongoing  10/8/2021 0336 by Tatiana Hdez RN  Outcome: Ongoing     Problem: Fluid Volume - Imbalance:  Goal: Absence of imbalanced fluid volume signs and symptoms  Description: Absence of imbalanced fluid volume signs and symptoms  10/8/2021 0927 by Maren Beckham RN  Outcome: Ongoing  10/8/2021 0336 by Tatiana Hdez RN  Outcome: Ongoing     Problem: Gas Exchange - Impaired:  Goal: Levels of oxygenation will improve  Description: Levels of oxygenation will improve  10/8/2021 0927 by Maren Beckham RN  Outcome: Ongoing  10/8/2021 0336 by Tatiana Hdez RN  Outcome: Ongoing     Problem: Nutrition Deficit:  Goal: Ability to achieve adequate nutritional intake will improve  Description: Ability to achieve adequate nutritional intake will improve  10/8/2021 0927 by Maren Beckham RN  Outcome: Ongoing  10/8/2021 0336 by Tatiana Hdez RN  Outcome: Ongoing     Problem: Pain:  Goal: Pain level will decrease  Description: Pain level will decrease  10/8/2021 0927 by Maren Beckham RN  Outcome: Ongoing  10/8/2021 0336 by Tatiana Hdez RN  Outcome: Ongoing  Goal: Recognizes and communicates pain  Description: Recognizes and communicates pain  10/8/2021 0927 by Maren Beckham RN  Outcome: Ongoing  10/8/2021 0336 by Tatiana Hdez RN  Outcome: Ongoing  Goal: Control of acute pain  Description: Control of acute pain  10/8/2021 0927 by Maren Beckham RN  Outcome: Ongoing  10/8/2021 [id number] by New Almazan RN  Outcome: Ongoing  Goal: Control of chronic pain  Description: Control of chronic pain  10/8/2021 0927 by Dion Magallanes RN  Outcome: Ongoing  10/8/2021 0336 by New Almazan RN  Outcome: Ongoing

## 2021-10-08 NOTE — PROGRESS NOTES
PULMONARY & CRITICAL CARE MEDICINE PROGRESS NOTE     Patient:  Annia Davis  MRN: [id number]  Admit date: 9/29/2021  Primary Care Physician: Jeronimo Ortiz MD  Consulting Physician: Cathy Gupta MD  CODE Status: Full Code  LOS: 8     SUBJECTIVE     CHIEF COMPLAINT/REASON FOR INITIAL CONSULT: Acute respiratory failure    BRIEF HOSPITAL COURSE:   The patient is a 64 y.o. female with multiple comorbidities including hypertension, diabetes, coronary artery disease, s/p stent, COPD and peripheral artery disease presented to the emergency room with complaints of worsening shortness of breath. She was hypoxemic and hypotensive on presentation. Initially started on CPAP. Her Covid test was negative. Admission labs  showed pH of 6.99, PCO2 106, bicarb 25.7 and lactic acid of 8.4, potassium 5.2. Patient required intubation due to poor mentation. She was extubated on 10/2. Underwent PTCA/JOSE MARTIN to in-stent stenosis of RCA on 10/4. Transfer out of ICU yesterday. INTERVAL HISTORY:  10/07/21  Patient is currently on room air  Afebrile  Hemodynamically stable  Awaiting discharge  No overnight issues reported  Already completed course of ceftriaxone  Currently on prednisone taper    REVIEW OF SYSTEMS:  Review of Systems   Constitutional: Negative for appetite change, chills, fever and unexpected weight change. HENT: Negative for congestion, postnasal drip, sore throat and trouble swallowing. Eyes: Negative for visual disturbance. Respiratory: Positive for cough and shortness of breath. Negative for wheezing. Cardiovascular: Negative for chest pain, palpitations and leg swelling. Gastrointestinal: Negative for abdominal pain, constipation, diarrhea, nausea and vomiting. Genitourinary: Negative for difficulty urinating, dysuria and frequency. Musculoskeletal: Negative for arthralgias and joint swelling. Skin: Negative for rash. Allergic/Immunologic: Negative for immunocompromised state.    Neurological: Positive for weakness. Negative for dizziness, speech difficulty and headaches. Hematological: Negative for adenopathy. Psychiatric/Behavioral: Negative for behavioral problems and sleep disturbance. OBJECTIVE     PaO2/FiO2 RATIO:  No results for input(s): POCPO2 in the last 72 hours.    FiO2 : 40 %     VITAL SIGNS:   LAST:  /79   Pulse 78   Temp 98.5 °F (36.9 °C) (Axillary)   Resp 18   Ht 5' 9\" (1.753 m)   Wt 224 lb 6.9 oz (101.8 kg)   SpO2 95%   BMI 33.14 kg/m²   8-24 HR RANGE:  TEMP Temp  Av.3 °F (36.8 °C)  Min: 98.2 °F (36.8 °C)  Max: 98.5 °F (36.2 °C)   BP Systolic (59AAK), JWP:057 , Min:121 , SMART:961      Diastolic (45OEL), HWN:73, Min:67, Max:85     PULSE Pulse  Av  Min: 78  Max: 84   RR Resp  Av  Min: 18  Max: 20   O2 SAT SpO2  Av.5 %  Min: 94 %  Max: 95 %   OXYGEN DELIVERY No data recorded        SYSTEMIC EXAMINATION:   General appearance -comfortable, no acute distress  Mental status - awake & alert, follows commands  Eyes - pupils equal and reactive, sclera anicteric  Mouth - mucous membranes moist  Neck - supple, no significant adenopathy, carotids upstroke normal bilaterally, no bruits  Chest - Breath sounds bilaterally were dimnished to auscultation with prolonged expiratory phase and expiratory wheezing  Heart - normal rate, regular rhythm, normal S1, S2, no murmurs, rubs, clicks or gallops  Abdomen - soft, nontender, nondistended, no masses or organomegaly  Neurological - non-focal  Extremities - peripheral pulses normal, 1+ pedal edema, no clubbing or cyanosis  Skin - normal coloration and turgor, no rashes, no suspicious skin lesions noted     DATA REVIEW     Medications: Current Inpatient  Scheduled Meds:   insulin glargine  15 Units SubCUTAneous Nightly    apixaban  5 mg Oral BID    metoprolol tartrate  12.5 mg Oral BID    sodium chloride flush  5-40 mL IntraVENous 2 times per day    clopidogrel  75 mg Oral Daily    atorvastatin  40 mg Oral Nightly  influenza virus vaccine  0.5 mL IntraMUSCular Prior to discharge    famotidine  20 mg Oral BID    busPIRone  15 mg Oral TID    predniSONE  15 mg Oral Daily    Followed by   Kayy Toribio ON 10/10/2021] predniSONE  10 mg Oral Daily    Followed by   Kayy Toribio ON 10/13/2021] predniSONE  5 mg Oral Daily    lisinopril  10 mg Oral Daily    budesonide-formoterol  2 puff Inhalation BID    ipratropium-albuterol  1 ampule Inhalation 4x daily    sodium chloride flush  5-40 mL IntraVENous 2 times per day    aspirin  81 mg Oral Daily    insulin lispro  0-18 Units SubCUTAneous TID WC    insulin lispro  0-9 Units SubCUTAneous Nightly     Continuous Infusions:   sodium chloride      sodium chloride      dextrose         INPUT/OUTPUT:  In: 130 [P.O.:120; I.V.:10]  Out: -   Date 10/07/21 0000 - 10/07/21 2359   Shift 3295-7485 2547-2863 4677-2904 24 Hour Total   INTAKE   P.O.(mL/kg/hr) 120(0.1)   120   I. V.(mL/kg) 10(0.1)   10(0.1)   Shift Total(mL/kg) 130(1.3)   130(1.3)   OUTPUT   Shift Total(mL/kg)       Weight (kg) 101.8 101.8 101.8 101.8        LABS:  ABGs:   No results for input(s): POCPH, POCPCO2, POCPO2, POCHCO3, RPHS4WXU in the last 72 hours. CBC:   Recent Labs     10/05/21  0522 10/06/21  0614 10/07/21  0548   WBC 10.0 8.4 8.6   HGB 10.1* 10.1* 10.4*   HCT 33.5* 35.2* 34.9*   MCV 87.9 91.7 89.7   PLT See Reflexed IPF Result 158 172   LYMPHOPCT 15* 18* 21*   RBC 3.81* 3.84* 3.89*   MCH 26.5 26.3 26.7   MCHC 30.1 28.7 29.8   RDW 16.4* 16.3* 16.3*     CRP:   No results for input(s): CRP in the last 72 hours. LDH:   No results for input(s): LDH in the last 72 hours.   BMP:   Recent Labs     10/05/21  0522 10/06/21  0614 10/07/21  0548    140 140   K 4.9 4.0 3.9    103 102   CO2 24 28 26   BUN 23* 19 22*   CREATININE 0.98* 1.02* 0.99*   GLUCOSE 74 75 85   PHOS 4.0 4.0 4.3     Liver Function Test:   Recent Labs     10/05/21  0522 10/06/21  0614 10/07/21  0548   PROT 5.5* 5.6* 5.9*   LABALBU 2.7* 3.1* 3.2* ALT 18 16 18   AST 20 13 14   ALKPHOS 63 58 64   BILITOT 0.38 0.47 0.50     Coagulation Profile:   No results for input(s): INR, PROTIME, APTT in the last 72 hours. D-Dimer:  No results for input(s): DDIMER in the last 72 hours. Lactic Acid:  No results for input(s): LACTA in the last 72 hours. Cardiac Enzymes:  No results for input(s): CKTOTAL, CKMB, CKMBINDEX, TROPONINI in the last 72 hours. Invalid input(s): TROPONIN, HSTROP  BNP/ProBNP:   No results for input(s): BNP, PROBNP in the last 72 hours. Triglycerides:  No results for input(s): TRIG in the last 72 hours. Microbiology:  Urine Culture:  No components found for: CURINE  Blood Culture:  No components found for: CBLOOD, CFUNGUSBL  Sputum Culture:  No components found for: CSPUTUM  No results for input(s): SPECDESC, SPECIAL, CULTURE, STATUS, ORG, CDIFFTOXPCR, CAMPYLOBPCR, SALMONELLAPC, SHIGAPCR, SHIGELLAPCR, MPNEUG, MPNEUM, LACTOQL in the last 72 hours. No results for input(s): SPUTUM, SPECDESC, SPECIAL, CULTURE, STATUS, ORG, CDIFFTOXPCR, MPNEUM, MPNEUG in the last 72 hours. Invalid input(s): CURINE, CBLOOD, CFUNGUSBL     Pathology:    Radiology Reports:  XR CHEST PORTABLE   Final Result   Asymmetric airspace opacification in the perihilar right lower lung zone. Pattern may represent worsening edema or underlying pneumonitis. VL DUP LOWER EXTREMITY VENOUS BILATERAL   Final Result      XR ABDOMEN FOR NG/OG/NE TUBE PLACEMENT   Final Result   Enteric tube terminating in the body of the stomach. The side port is distal   to the GE junction. XR CHEST PORTABLE   Final Result   1. Endotracheal tube terminating 6 cm above the [place]. 2. Persistent diffuse bilateral airspace opacities suggestive of an atypical   bronchopneumonia. XR CHEST PORTABLE   Final Result   Multifocal airspace opacities concerning for an atypical bronchopneumonia. COVID-19 pneumonia should be excluded.               Echocardiogram:   Results for orders placed during the hospital encounter of 09/29/21    ECHO Complete 2D W Doppler W Color    Summary  Left ventricle is in the upper limits of normal size. Mildly reduced LV function with Calculated ejection fraction is 44%  Anterior and posterolateral wall motion abnormality. Abnormal global longitudinal strain average of -7%. Grade I (mild) left ventricular diastolic dysfunction. Left atrium is moderately dilated. Aortic sclerosis without stenosis. Calcification of mitral valve leaflet tips with moderate stenosis. Mean  gradient is 7 mmHg  Mild mitral regurgitation. Mild tricuspid regurgitation. Estimated right ventricular systolic pressure  is 36 mmHg. ASSESSMENT AND PLAN     Assessment:    // Acute hypoxic respiratory failure, s/p extubation  // Acute exacerbation of COPD  // Acute CHF decompensation  // Coronary artery disease, s/p PTCA/JOSE MARTIN to RCA in-stent restenosis  // Combined diastolic and systolic CHF  // COPD, severity to be determined  // New onset A. fib  // Mild pulmonary hypertension  // HARVEY resolved  // Tobacco abuse  // Obesity    Plan:    I personally interviewed/examined the patient; reviewed interval history, interpreted all available radiographic and laboratory data at the time of service.  Patient is hemodynamically stable and is currently saturating well room air   Use supplemental oxygen if needed, to keep oxygen saturation >90%   Encourage incentive spirometry   Continue pulmonary toilet, aspiration precautions and bronchodilators (Symbicort/DuoNeb)   Monitor I/O, electrolytes with a goal of even/negative fluid balance   Tolerating oral diet   Stress ulcer   Continue Eliquis   Antimicrobials reviewed; completed course of ceftriaxone   Continue prednisone taper   Physical/occupational therapy    She will need outpatient follow-up in the pulmonary clinic for PFT and sleep study.     I would like to thank you for allowing me to participate in the care of this patient. Please feel free to call with any further questions or concerns. Valentino Shawl, MD  Pulmonary and Critical Care Medicine           10/7/2021     Please note that this chart was generated using voice recognition Dragon dictation software. Although every effort was made to ensure the accuracy of this automated transcription, some errors in transcription may have occurred.

## 2021-10-08 NOTE — PLAN OF CARE
Problem: OXYGENATION/RESPIRATORY FUNCTION  Goal: Patient will maintain patent airway  10/8/2021 0336 by Colton Olvera RN  Outcome: Ongoing     Problem: OXYGENATION/RESPIRATORY FUNCTION  Goal: Patient will achieve/maintain normal respiratory rate/effort  Description: Respiratory rate and effort will be within normal limits for the patient  10/8/2021 0336 by Colton Olvera RN  Outcome: Ongoing     Problem: SKIN INTEGRITY  Goal: Skin integrity is maintained or improved  10/8/2021 0336 by Colton Olvera RN  Outcome: Ongoing     Problem: Discharge Planning:  Goal: Participates in care planning  Description: Participates in care planning  10/8/2021 0336 by Colton Olvera RN  Outcome: Ongoing     Problem: Discharge Planning:  Goal: Discharged to appropriate level of care  Description: Discharged to appropriate level of care  10/8/2021 0336 by Colton Olvera RN  Outcome: Ongoing     Problem: Discharge Planning:  Goal: Ability to perform activities of daily living will improve  Description: Ability to perform activities of daily living will improve  10/8/2021 0336 by Colton Olvera RN  Outcome: Ongoing     Problem: Airway Clearance - Ineffective:  Goal: Ability to maintain a clear airway will improve  Description: Ability to maintain a clear airway will improve  10/8/2021 0336 by Colton Olvera RN  Outcome: Ongoing     Problem: Anxiety/Stress:  Goal: Level of anxiety will decrease  Description: Level of anxiety will decrease  10/8/2021 0336 by Colton Olvera RN  Outcome: Ongoing     Problem: Aspiration:  Goal: Absence of aspiration  Description: Absence of aspiration  10/8/2021 0336 by Colton Olvera RN  Outcome: Ongoing     Problem:  Bowel Function - Altered:  Goal: Bowel elimination is within specified parameters  Description: Bowel elimination is within specified parameters  10/8/2021 0336 by Colton Olvera RN  Outcome: Ongoing     Problem: Cardiac Output - Decreased:  Goal: Hemodynamic stability will improve  Description: Hemodynamic stability will improve  10/8/2021 0336 by Tatiana Hdez RN  Outcome: Ongoing     Problem: Fluid Volume - Imbalance:  Goal: Absence of imbalanced fluid volume signs and symptoms  Description: Absence of imbalanced fluid volume signs and symptoms  10/8/2021 0336 by Tatiana Hdez RN  Outcome: Ongoing     Problem: Gas Exchange - Impaired:  Goal: Levels of oxygenation will improve  Description: Levels of oxygenation will improve  10/8/2021 0336 by Tatiana Hdez RN  Outcome: Ongoing     Problem: Nutrition Deficit:  Goal: Ability to achieve adequate nutritional intake will improve  Description: Ability to achieve adequate nutritional intake will improve  10/8/2021 0336 by Tatiana Hdez RN  Outcome: Ongoing     Problem: Pain:  Goal: Pain level will decrease  Description: Pain level will decrease  10/8/2021 0336 by Tatiana Hdez RN  Outcome: Ongoing     Problem: Pain:  Goal: Recognizes and communicates pain  Description: Recognizes and communicates pain  10/8/2021 0336 by Tatiana Hdez RN  Outcome: Ongoing     Problem: Pain:  Goal: Control of acute pain  Description: Control of acute pain  10/8/2021 0336 by Tatiana Hdez RN  Outcome: Ongoing     Problem: Pain:  Goal: Control of chronic pain  Description: Control of chronic pain  10/8/2021 0336 by Tatiana Hdez RN  Outcome: Ongoing     Problem: Serum Glucose Level - Abnormal:  Goal: Ability to maintain appropriate glucose levels will improve to within specified parameters  Description: Ability to maintain appropriate glucose levels will improve to within specified parameters  10/8/2021 0336 by Tatiana Hdez RN  Outcome: Ongoing     Problem: Skin Integrity - Impaired:  Goal: Will show no infection signs and symptoms  Description: Will show no infection signs and symptoms  10/8/2021 0336 by Tatiana Hdez RN  Outcome: Ongoing     Problem: Skin Integrity - Impaired:  Goal: Absence of new skin breakdown  Description: Absence of new skin breakdown  10/8/2021 0336 by Yovany Gregg RN  Outcome: Ongoing     Problem: Sleep Pattern Disturbance:  Goal: Appears well-rested  Description: Appears well-rested  10/8/2021 0336 by Yovany Gregg RN  Outcome: Ongoing     Problem: Tissue Perfusion - Cardiopulmonary, Altered:  Goal: Absence of angina  Description: Absence of angina  10/8/2021 0336 by Yovany Gregg RN  Outcome: Ongoing     Problem: Tissue Perfusion - Cardiopulmonary, Altered:  Goal: Hemodynamic stability will improve  Description: Hemodynamic stability will improve  10/8/2021 0336 by Yovany Gregg RN  Outcome: Ongoing     Problem: Injury - Risk of, Physical Injury:  Goal: Absence of physical injury  Description: Absence of physical injury  10/8/2021 0336 by Yovany Gregg RN  Outcome: Ongoing     Problem: Injury - Risk of, Physical Injury:  Goal: Will remain free from falls  Description: Will remain free from falls  10/8/2021 0336 by Yovany Gregg RN  Outcome: Ongoing     Problem: Falls - Risk of:  Goal: Absence of physical injury  Description: Absence of physical injury  10/8/2021 0336 by Yovany Gregg RN  Outcome: Ongoing     Problem: Falls - Risk of:  Goal: Will remain free from falls  Description: Will remain free from falls  10/8/2021 0336 by Yovany Gregg RN  Outcome: Ongoing     Problem: Skin Integrity:  Goal: Will show no infection signs and symptoms  Description: Will show no infection signs and symptoms  10/8/2021 0336 by Yovany Gregg RN  Outcome: Ongoing     Problem: Skin Integrity:  Goal: Absence of new skin breakdown  Description: Absence of new skin breakdown  10/8/2021 0336 by Yovany Gregg RN  Outcome: Ongoing     Problem: Musculor/Skeletal Functional Status  Goal: Highest potential functional level  10/8/2021 0336 by Yovany Gregg RN  Outcome: Ongoing     Problem: Musculor/Skeletal Functional Status  Goal: Absence of falls  10/8/2021 0336 by Yovany Gregg RN  Outcome: Ongoing     Problem: Pain:  Goal: Pain level will decrease  Description: Pain level will decrease  10/8/2021 0336 by Chato Peña RN  Outcome: Ongoing     Problem: Pain:  Goal: Control of acute pain  Description: Control of acute pain  10/8/2021 0336 by Chato Peña RN  Outcome: Ongoing     Problem: Pain:  Goal: Control of chronic pain  Description: Control of chronic pain  10/8/2021 0336 by Chato Peña RN  Outcome: Ongoing

## 2021-10-08 NOTE — PROGRESS NOTES
PULMONARY & CRITICAL CARE MEDICINE PROGRESS NOTE     Patient:  Berto Anderson  MRN: [id number]  Admit date: 9/29/2021  Primary Care Physician: Jose Stauffer MD  Consulting Physician: Renella Boeck, MD  CODE Status: Full Code  LOS: 9     SUBJECTIVE     CHIEF COMPLAINT/REASON FOR INITIAL CONSULT: Acute respiratory failure    BRIEF HOSPITAL COURSE:   The patient is a 64 y.o. female with multiple comorbidities including hypertension, diabetes, coronary artery disease, s/p stent, COPD and peripheral artery disease presented to the emergency room with complaints of worsening shortness of breath. She was hypoxemic and hypotensive on presentation. Initially started on CPAP. Her Covid test was negative. Admission labs  showed pH of 6.99, PCO2 106, bicarb 25.7 and lactic acid of 8.4, potassium 5.2. Patient required intubation due to poor mentation. She was extubated on 10/2. Underwent PTCA/JOSE MARTIN to in-stent stenosis of RCA on 10/4. Transfer out of ICU yesterday. INTERVAL HISTORY:  10/08/21  Patient is currently on room air  Afebrile  Hemodynamically stable  Awaiting discharge  No overnight issues reported  Already completed course of ceftriaxone  Currently on prednisone taper    REVIEW OF SYSTEMS:  Review of Systems   Constitutional: Negative for appetite change, chills, fever and unexpected weight change. HENT: Negative for congestion, postnasal drip, sore throat and trouble swallowing. Eyes: Negative for visual disturbance. Respiratory: Negative for cough, shortness of breath and wheezing. Cardiovascular: Negative for chest pain, palpitations and leg swelling. Gastrointestinal: Negative for abdominal pain, constipation, diarrhea, nausea and vomiting. Genitourinary: Negative for difficulty urinating, dysuria and frequency. Musculoskeletal: Negative for arthralgias and joint swelling. Skin: Negative for rash. Allergic/Immunologic: Negative for immunocompromised state.    Neurological: Negative for dizziness, speech difficulty, weakness and headaches. Hematological: Negative for adenopathy. Psychiatric/Behavioral: Negative for behavioral problems and sleep disturbance. OBJECTIVE     PaO2/FiO2 RATIO:  No results for input(s): POCPO2 in the last 72 hours.    FiO2 : 40 %     VITAL SIGNS:   LAST:  /66   Pulse 72   Temp 97.9 °F (36.6 °C) (Oral)   Resp 18   Ht 5' 9\" (1.753 m)   Wt 224 lb 6.9 oz (101.8 kg)   SpO2 93%   BMI 33.14 kg/m²   8-24 HR RANGE:  TEMP Temp  Av.2 °F (36.8 °C)  Min: 97.9 °F (36.6 °C)  Max: 98.5 °F (41.1 °C)   BP Systolic (82ZQS), JLF:363 , Min:121 , UUR:147      Diastolic (71ANM), UCI:92, Min:66, Max:85     PULSE Pulse  Av  Min: 72  Max: 84   RR Resp  Av  Min: 18  Max: 18   O2 SAT SpO2  Av %  Min: 93 %  Max: 93 %   OXYGEN DELIVERY No data recorded        SYSTEMIC EXAMINATION:   General appearance -comfortable, no acute distress  Mental status - awake & alert, follows commands  Eyes - pupils equal and reactive, sclera anicteric  Mouth - mucous membranes moist  Neck - supple, no significant adenopathy, carotids upstroke normal bilaterally, no bruits  Chest -no expiratory rhonchi or inspiratory rales  Heart - normal rate, regular rhythm, normal S1, S2, no murmurs, rubs, clicks or gallops  Abdomen - soft, nontender, nondistended, no masses or organomegaly  Neurological - non-focal  Extremities - peripheral pulses normal, 1+ pedal edema, no clubbing or cyanosis  Skin - normal coloration and turgor, no rashes, no suspicious skin lesions noted     DATA REVIEW     Medications: Current Inpatient  Scheduled Meds:   insulin glargine  15 Units SubCUTAneous Nightly    apixaban  5 mg Oral BID    metoprolol tartrate  12.5 mg Oral BID    sodium chloride flush  5-40 mL IntraVENous 2 times per day    clopidogrel  75 mg Oral Daily    atorvastatin  40 mg Oral Nightly    influenza virus vaccine  0.5 mL IntraMUSCular Prior to discharge    famotidine  20 mg Oral BID    busPIRone  15 mg Oral TID    predniSONE  15 mg Oral Daily    Followed by   Robbin Moore ON 10/10/2021] predniSONE  10 mg Oral Daily    Followed by   Robbin Moore ON 10/13/2021] predniSONE  5 mg Oral Daily    lisinopril  10 mg Oral Daily    budesonide-formoterol  2 puff Inhalation BID    ipratropium-albuterol  1 ampule Inhalation 4x daily    sodium chloride flush  5-40 mL IntraVENous 2 times per day    aspirin  81 mg Oral Daily    insulin lispro  0-18 Units SubCUTAneous TID WC    insulin lispro  0-9 Units SubCUTAneous Nightly     Continuous Infusions:   sodium chloride      sodium chloride      dextrose         INPUT/OUTPUT:  No intake/output data recorded. LABS:  ABGs:   No results for input(s): POCPH, POCPCO2, POCPO2, POCHCO3, MSKL1EAC in the last 72 hours. CBC:   Recent Labs     10/06/21  0614 10/07/21  0548 10/08/21  0459   WBC 8.4 8.6 7.1   HGB 10.1* 10.4* 11.1*   HCT 35.2* 34.9* 36.6   MCV 91.7 89.7 88.2    172 194   LYMPHOPCT 18* 21* 25   RBC 3.84* 3.89* 4.15   MCH 26.3 26.7 26.7   MCHC 28.7 29.8 30.3   RDW 16.3* 16.3* 16.1*     CRP:   No results for input(s): CRP in the last 72 hours. LDH:   No results for input(s): LDH in the last 72 hours. BMP:   Recent Labs     10/06/21  0614 10/07/21  0548 10/08/21  0459    140 141   K 4.0 3.9 4.0    102 104   CO2 28 26 25   BUN 19 22* 18   CREATININE 1.02* 0.99* 1.09*   GLUCOSE 75 85 86   PHOS 4.0 4.3 4.6*     Liver Function Test:   Recent Labs     10/06/21  0614 10/07/21  0548 10/08/21  0459   PROT 5.6* 5.9* 6.2*   LABALBU 3.1* 3.2* 3.5   ALT 16 18 25   AST 13 14 20   ALKPHOS 58 64 65   BILITOT 0.47 0.50 0.60     Coagulation Profile:   No results for input(s): INR, PROTIME, APTT in the last 72 hours. D-Dimer:  No results for input(s): DDIMER in the last 72 hours. Lactic Acid:  No results for input(s): LACTA in the last 72 hours.   Cardiac Enzymes:  No results for input(s): CKTOTAL, CKMB, CKMBINDEX, TROPONINI in the last 72 hours.    Invalid input(s): TROPONIN, HSTROP  BNP/ProBNP:   No results for input(s): BNP, PROBNP in the last 72 hours. Triglycerides:  No results for input(s): TRIG in the last 72 hours. Microbiology:  Urine Culture:  No components found for: CURINE  Blood Culture:  No components found for: CBLOOD, CFUNGUSBL  Sputum Culture:  No components found for: CSPUTUM  No results for input(s): SPECDESC, SPECIAL, CULTURE, STATUS, ORG, CDIFFTOXPCR, CAMPYLOBPCR, SALMONELLAPC, SHIGAPCR, SHIGELLAPCR, MPNEUG, MPNEUM, LACTOQL in the last 72 hours. No results for input(s): SPUTUM, SPECDESC, SPECIAL, CULTURE, STATUS, ORG, CDIFFTOXPCR, MPNEUM, MPNEUG in the last 72 hours. Invalid input(s): CURINE, CBLOOD, CFUNGUSBL     Pathology:    Radiology Reports:  XR CHEST PORTABLE   Final Result   Asymmetric airspace opacification in the perihilar right lower lung zone. Pattern may represent worsening edema or underlying pneumonitis. VL DUP LOWER EXTREMITY VENOUS BILATERAL   Final Result      XR ABDOMEN FOR NG/OG/NE TUBE PLACEMENT   Final Result   Enteric tube terminating in the body of the stomach. The side port is distal   to the GE junction. XR CHEST PORTABLE   Final Result   1. Endotracheal tube terminating 6 cm above the [place]. 2. Persistent diffuse bilateral airspace opacities suggestive of an atypical   bronchopneumonia. XR CHEST PORTABLE   Final Result   Multifocal airspace opacities concerning for an atypical bronchopneumonia. COVID-19 pneumonia should be excluded. Echocardiogram:   Results for orders placed during the hospital encounter of 09/29/21    ECHO Complete 2D W Doppler W Color    Summary  Left ventricle is in the upper limits of normal size. Mildly reduced LV function with Calculated ejection fraction is 44%  Anterior and posterolateral wall motion abnormality. Abnormal global longitudinal strain average of -7%. Grade I (mild) left ventricular diastolic dysfunction.   Left atrium is moderately dilated. Aortic sclerosis without stenosis. Calcification of mitral valve leaflet tips with moderate stenosis. Mean  gradient is 7 mmHg  Mild mitral regurgitation. Mild tricuspid regurgitation. Estimated right ventricular systolic pressure  is 36 mmHg. ASSESSMENT AND PLAN     Assessment:    // Acute hypoxic respiratory failure, s/p extubation  // Acute exacerbation of COPD  // Acute CHF decompensation  // Coronary artery disease, s/p PTCA/JOSE MARTIN to RCA in-stent restenosis  // Combined diastolic and systolic CHF  // COPD, severity to be determined  // New onset A. fib  // Mild pulmonary hypertension  // HARVEY resolved  // Tobacco abuse  // Obesity    Plan:    I personally interviewed/examined the patient; reviewed interval history, interpreted all available radiographic and laboratory data at the time of service.  Patient is hemodynamically stable and is currently saturating well room air   Use supplemental oxygen if needed, to keep oxygen saturation >90%   Encourage incentive spirometry   Continue pulmonary toilet, aspiration precautions and bronchodilators (Symbicort/DuoNeb)   Monitor I/O, electrolytes with a goal of even/negative fluid balance   Tolerating oral diet   Stress ulcer   Continue Eliquis   Antimicrobials reviewed; completed course of ceftriaxone   Continue prednisone taper   Physical/occupational therapy    She will need outpatient follow-up in the pulmonary clinic for PFT and sleep study. I would like to thank you for allowing me to participate in the care of this patient. Please feel free to call with any further questions or concerns. Fabby Garcia MD  Pulmonary and Critical Care Medicine           10/8/2021     Please note that this chart was generated using voice recognition Dragon dictation software. Although every effort was made to ensure the accuracy of this automated transcription, some errors in transcription may have occurred.

## 2021-10-08 NOTE — PROGRESS NOTES
[facility]  Internal Medicine Teaching Residency Program  Inpatient Daily Progress Note  ______________________________________________________________________________    Patient: Jc Royal  YOB: 1960   HHB:[id number]    Acct: [de-identified]     Room: 88/0465-74  Admit date: 9/29/2021  Today's date: 10/08/21  Number of days in the hospital: 9    SUBJECTIVE   Admitting Diagnosis: <principal problem not specified>  CC: respiratory distress    Pt examined at bedside. No acute issues overnight. Chart and labs reviewed. Pt saturating well on room air. Hemodynamically stable  Home O2 eval and nocturnal oxymetry done. Pt doesn't need home O2. Pt likely going home today. ROS:  Constitutional:  negative for chills, fevers, sweats  Respiratory:  negative for cough, dyspnea on exertion, hemoptysis, shortness of breath, wheezing  Cardiovascular:  negative for chest pain, chest pressure/discomfort, lower extremity edema, palpitations  Gastrointestinal:  negative for abdominal pain, constipation, diarrhea, nausea, vomiting  Neurological:  negative for dizziness, headache    BRIEF HISTORY     Patient initially presented to the ER in respiratory distress.  Patient was hypoxic saturating in 60s and hypotensive.  Patient was given albuterol, Solu-Medrol and magnesium in route to the hospital.  Patient was placed on CPAP.  On arrival to the ED patient was in acute distress nonverbal and not following any commands.  Rapid Covid test was negative patient was intubated as she had altered mental status and continued to be hypoxic.  Patient was also started on Levophed.  Patient was started on prednisone , Symbicort and DuoNeb , antibiotic for 7 days . troponins trending up was started on heparin drip, concern for myocardial infarction.  Echo showed 44% EF, cardiology was consulted.    Cardiac cath on 10/05, successful PTCA - JOSE MARTIN to RCA.  Patient developed new onset atrial fibrillation with RVR was started beta-blocker and full dose Lovenox full anticoagulation.  Patient developed HARVEY post cath fluids were given. OBJECTIVE     Vital Signs:  /79   Pulse 78   Temp 98.5 °F (36.9 °C) (Axillary)   Resp 18   Ht 5' 9\" (1.753 m)   Wt 224 lb 6.9 oz (101.8 kg)   SpO2 95%   BMI 33.14 kg/m²     Temp (24hrs), Av.5 °F (36.9 °C), Min:98.5 °F (36.9 °C), Max:98.5 °F (36.9 °C)    No intake/output data recorded. Physical Exam:  Physical Exam  Constitutional:       Appearance: She is obese. HENT:      Head: Normocephalic and atraumatic. Nose: Nose normal.      Mouth/Throat:      Mouth: Mucous membranes are moist.   Eyes:      Extraocular Movements: Extraocular movements intact. Cardiovascular:      Rate and Rhythm: Normal rate and regular rhythm. Pulses: Normal pulses. Heart sounds: Normal heart sounds. No murmur heard. Pulmonary:      Breath sounds: Wheezing and rales present. Abdominal:      General: There is no distension. Palpations: Abdomen is soft. Tenderness: There is no abdominal tenderness. Musculoskeletal:         General: No swelling. Normal range of motion. Cervical back: Normal range of motion. No rigidity. Right lower leg: No edema. Left lower leg: No edema. Skin:     General: Skin is warm. Coloration: Skin is not jaundiced. Findings: No bruising or rash. Neurological:      General: No focal deficit present. Mental Status: She is alert and oriented to person, place, and time.    Psychiatric:         Mood and Affect: Mood normal.         Behavior: Behavior normal.           Medications:  Scheduled Medications:    insulin glargine  15 Units SubCUTAneous Nightly    apixaban  5 mg Oral BID    metoprolol tartrate  12.5 mg Oral BID    sodium chloride flush  5-40 mL IntraVENous 2 times per day    clopidogrel  75 mg Oral Daily    atorvastatin  40 mg Oral Nightly    influenza virus vaccine  0.5 mL IntraMUSCular Prior to discharge    famotidine  20 mg Oral BID    busPIRone  15 mg Oral TID    predniSONE  15 mg Oral Daily    Followed by   Jordi  ON 10/10/2021] predniSONE  10 mg Oral Daily    Followed by   Jordi  ON 10/13/2021] predniSONE  5 mg Oral Daily    lisinopril  10 mg Oral Daily    budesonide-formoterol  2 puff Inhalation BID    ipratropium-albuterol  1 ampule Inhalation 4x daily    sodium chloride flush  5-40 mL IntraVENous 2 times per day    aspirin  81 mg Oral Daily    insulin lispro  0-18 Units SubCUTAneous TID WC    insulin lispro  0-9 Units SubCUTAneous Nightly     Continuous Infusions:    sodium chloride      sodium chloride      dextrose       PRN Medicationssodium chloride flush, 5-40 mL, PRN  sodium chloride, 25 mL, PRN  acetaminophen, 650 mg, Q4H PRN  albuterol, 2.5 mg, Q4H PRN  benzocaine-menthol, 1 lozenge, Q2H PRN  sodium chloride flush, 5-40 mL, PRN  sodium chloride, 25 mL, PRN  ondansetron, 4 mg, Q8H PRN   Or  ondansetron, 4 mg, Q6H PRN  polyethylene glycol, 17 g, Daily PRN  acetaminophen, 650 mg, Q6H PRN   Or  acetaminophen, 650 mg, Q6H PRN  glucose, 15 g, PRN  dextrose, 12.5 g, PRN  glucagon (rDNA), 1 mg, PRN  dextrose, 100 mL/hr, PRN        Diagnostic Labs:  CBC:   Recent Labs     10/06/21  0614 10/07/21  0548 10/08/21  0459   WBC 8.4 8.6 7.1   RBC 3.84* 3.89* 4.15   HGB 10.1* 10.4* 11.1*   HCT 35.2* 34.9* 36.6   MCV 91.7 89.7 88.2   RDW 16.3* 16.3* 16.1*    172 194     BMP:   Recent Labs     10/06/21  0614 10/07/21  0548 10/08/21  0459    140 141   K 4.0 3.9 4.0    102 104   CO2 28 26 25   PHOS 4.0 4.3 4.6*   BUN 19 22* 18   CREATININE 1.02* 0.99* 1.09*     BNP: No results for input(s): BNP in the last 72 hours. PT/INR: No results for input(s): PROTIME, INR in the last 72 hours. APTT: No results for input(s): APTT in the last 72 hours. CARDIAC ENZYMES: No results for input(s): CKMB, CKMBINDEX, TROPONINI in the last 72 hours.     Invalid input(s): CKTOTAL;3  FASTING LIPID PANEL:  Lab Results   Component Value Date    CHOL 122 06/16/2020    HDL 35 (L) 06/16/2020    TRIG 152 (H) 06/16/2020     LIVER PROFILE:   Recent Labs     10/06/21  0614 10/07/21  0548 10/08/21  0459   AST 13 14 20   ALT 16 18 25   BILITOT 0.47 0.50 0.60   ALKPHOS 58 64 65      MICROBIOLOGY:   Lab Results   Component Value Date/Time    CULTURE NORMAL RESPIRATORY MARYSOL MODERATE GROWTH 09/29/2021 09:45 AM       Imaging:    XR CHEST PORTABLE    Result Date: 10/2/2021  Asymmetric airspace opacification in the perihilar right lower lung zone. Pattern may represent worsening edema or underlying pneumonitis. ASSESSMENT & PLAN     Assessment and Plan: Active Problems:    Respiratory distress    Acute respiratory failure with hypoxia and hypercapnia (HCC)  Resolved Problems:    * No resolved hospital problems. *      1. Acute hypoxic hypercapnic respiratory failure secondary to COPD exacerbation -  - continue prednisolone taper   - continue Symbicort inhalation. - continue Duoneb inhaltion 4 times daily. - completed 7 day antibiotic course  - Wean off oxygen as tolerated.     2. Acute toxic metabolic encephalopathy from hypercapnia from respiratory failure -   - Resolved     3. NSTEMI -   - previous h/o CAD s/p PCI   - Patient off heparin drip  - continue aspirin 81 mg daily  - continue Lipitor 40 mg daily  - continue lopressor 12.5 mg BID  - continue lisinopril 10 mg daily  - Underwent cath on 10/4/2021, found to have restenosis of mid RCA, underwent PTCA/JOSE MARTIN.     - keep mag > 2 and K > 4     4. New onset atrial fibrillation with RVR - currently NSR, rate controlled   - On beta-blocker  - On Eliquis     5.  Acute kidney injury, likely secondary to tissue hypoxia and shock - improving  - Continue fluids  - continue to monitor I/O closely  - follow up repeat BMP tomorrow am.     6. DM type 2 -  - continue LANTUS 20 units subcu nightly  - continue high dose sliding scale  - hypoglycemia protocol in place    7.  Sleep study out patient follow up upon discharge    Diet: cardiac diet  DVT ppx : pt already on anticoagulation  GI ppx: pepcid    PT/OT: recommends continued therapy after discharge  Discharge Planning / SW: possible discharge today    Radha Thornton MD  Internal Medicine Resident, PGY-2  [id number] [facility]         10/8/2021, 2:27 PM

## 2021-10-08 NOTE — PROGRESS NOTES
CLINICAL PHARMACY NOTE: MEDS TO BEDS    Total # of Prescriptions Filled: 5   The following medications were delivered to the patient:  · eliquis  · Plavix  · Lisinopril  · buspirone  · prednisone    Additional Documentation:

## 2021-10-11 ENCOUNTER — TELEPHONE (OUTPATIENT)
Dept: PULMONOLOGY | Age: 61
End: 2021-10-11

## 2021-10-11 NOTE — TELEPHONE ENCOUNTER
----- Message from Rebel Ching sent at 10/11/2021  7:07 AM EDT -----  Patricia Marroquin, please see message from Dr Talib Joe and call to schedule. Thank you.   ----- Message -----  From: Omid Almazan MD  Sent: 10/8/2021   5:19 PM EDT  To: [facility] Respiratory Spec Clinical Staff     Please have the patient follow-up in our office in 4 to 6 weeks.   Thank you

## 2021-10-17 NOTE — DISCHARGE SUMMARY
[address]     Department of Internal Medicine - Staff Internal Medicine Teaching Service    INPATIENT DISCHARGE SUMMARY      Patient Identification:  Vannessa Youssef is a 64 y.o. female. :  1960  MRN: [id number]     Acct: [de-identified]   PCP: Jeremy Ridley MD  Admit Date:  2021  Discharge date and time: 10/08/2021  Attending Provider: Dr Harlee Mortimer Problem Lists:  Principal Problem (Resolved):    Acute respiratory failure with hypoxia and hypercapnia (HCC)  Active Problems:    Chronic bronchitis (HCC)    Tobacco abuse    Obesity (BMI 30.0-34. 9)    Essential hypertension    Type 2 diabetes mellitus with hyperglycemia, without long-term current use of insulin ([facility] [address]  Resolved Problems:    Respiratory distress      HOSPITAL STAY     Brief Inpatient course:   Vannessa Youssef is a 64 y.o. female who was admitted for the management of Acute respiratory failure with hypoxia and hypercapnia ([address] presented to the emergency department with respiratory distress. Patient was given albuterol, Solu-Medrol and magnesium in route to the hospital, was placed on CPAP. On arrival patient was nonverbal and not following commands, rapid Covid test negative. Patient had altered mental status and continued to be hypoxic , patient was intubated, also started on Levophed. Patient was started on prednisone Symbicort and DuoNeb and antibiotics. Troponins were elevated and trending up was started on heparin drip. Echo showed EF of 44%. Cardiac catheter unsuccessful PTCA - JOSE MARTIN to RCA. Patient developed new onset atrial fibrillation with RVR and was started on beta-blocker and full dose Lovenox for anticoagulation. Developed HARVEY post cath fluids were given. For type 2 diabetes patient was placed on Lantus 20 subcu nightly and high-dose sliding scale. Patient was continued on aspirin Lipitor Lopressor lisinopril. Patient improved and stable for discharge. Procedures/ Significant Interventions:      No results found. Consults:     Consults:     Final Specialist Recommendations/Findings:   IP CONSULT TO CRITICAL CARE  IP CONSULT TO CARDIOLOGY  IP CONSULT TO CASE MANAGEMENT Follow up out patient     Any Hospital Acquired Infections: none    Discharge Functional Status:  stable    DISCHARGE PLAN     Disposition: home    Patient Instructions:   Discharge Medication List as of 10/8/2021  3:53 PM      START taking these medications    Details   budesonide-formoterol (SYMBICORT) 160-4.5 MCG/ACT AERO Inhale 2 puffs into the lungs 2 times daily, Disp-10.2 g, R-3Normal      !! predniSONE (DELTASONE) 5 MG tablet Take 3 tablets by mouth daily for 1 dose, Disp-3 tablet, R-0Normal      !! predniSONE (DELTASONE) 10 MG tablet Take 1 tablet by mouth daily for 3 doses, Disp-3 tablet, R-0Normal      clopidogrel (PLAVIX) 75 MG tablet Take 1 tablet by mouth daily, Disp-30 tablet, R-3Normal      !! predniSONE (DELTASONE) 5 MG tablet Take 1 tablet by mouth daily for 3 doses, Disp-3 tablet, R-0Normal      apixaban (ELIQUIS) 5 MG TABS tablet Take 1 tablet by mouth 2 times daily, Disp-180 tablet, R-1Print       !! - Potential duplicate medications found. Please discuss with provider.       CONTINUE these medications which have CHANGED    Details   busPIRone (BUSPAR) 15 MG tablet Take 15 mg by mouth 3 times daily, Disp-90 tablet, R-0Normal      lisinopril (PRINIVIL;ZESTRIL) 10 MG tablet Take 1 tablet by mouth daily, Disp-30 tablet, R-3Normal         CONTINUE these medications which have NOT CHANGED    Details   famotidine (PEPCID) 20 MG tablet Take 20 mg by mouth 2 times dailyHistorical Med      albuterol sulfate HFA (VENTOLIN HFA) 108 (90 Base) MCG/ACT inhaler Inhale 2 puffs into the lungs 3 times dailyHistorical Med      traZODone (DESYREL) 50 MG tablet Take 50 mg by mouth as needed for Sleep (takes sometimes to help sleep)Historical Med lidocaine (LIDODERM) 5 % Place 1 patch onto the skin as needed for Pain (as needed for rotator cuff pain) 12 hours on, 12 hours off. Historical Med      atorvastatin (LIPITOR) 40 MG tablet Take 1 tablet by mouth daily, Disp-30 tablet, R-3Normal      Dulaglutide (TRULICITY) 1.5 XX/6.6UJ SOPN Inject 1.5 mg into the skin once a weekHistorical Med      aspirin 81 MG chewable tablet Take 1 tablet by mouth daily, Disp-30 tablet, R-3Normal      nitroGLYCERIN (NITROSTAT) 0.4 MG SL tablet up to max of 3 total doses.  If no relief after 1 dose, call 911., Disp-25 tablet, R-3Normal      metoprolol tartrate (LOPRESSOR) 25 MG tablet Take 0.5 tablets by mouth 2 times daily, Disp-60 tablet, R-3Normal      albuterol (PROVENTIL) (5 MG/ML) 0.5% nebulizer solution Take 0.5 mLs by nebulization every 6 hours as needed for Wheezing, Disp-120 each, R-0Print      tiotropium (SPIRIVA) 18 MCG inhalation capsule Inhale 1 capsule into the lungs daily Pt told that she was using this medication at home., Disp-1 capsule, R-0Print      acetaminophen (TYLENOL) 500 MG tablet Take 1 tablet by mouth every 6 hours as needed for Pain, Disp-60 tablet, R-2Print      Lancets MISC DAILY Starting Tue 2/12/2019, Disp-100 each, R-3, Print      glipiZIDE (GLUCOTROL) 5 MG tablet Take 1 tablet by mouth 2 times daily, Disp-60 tablet, R-3Print      metFORMIN (GLUCOPHAGE) 1000 MG tablet Take 1 tablet by mouth 2 times daily (with meals), Disp-60 tablet, R-3Print      blood glucose monitor strips Test 3 times a day & as needed for symptoms of irregular blood glucose., Disp-100 strip, R-3, Normal      glucose monitoring kit (FREESTYLE) monitoring kit DAILY Starting Tue 2/12/2019, Disp-1 kit, R-0, Normal      albuterol-ipratropium (COMBIVENT RESPIMAT)  MCG/ACT AERS inhaler Inhale 1 puff into the lungs every 6 hours, Disp-1 Inhaler, R-0Print         STOP taking these medications       ticagrelor (BRILINTA) 90 MG TABS tablet Comments:   Reason for Stopping: Activity: activity as tolerated    Diet: cardiac diet    Follow-up:    [facility]  [facility] [phone]  On 10/19/2021  for hospital f/u with cardiology at 2:30pm    Brennan Koyanagi, MD  [address] [facility]  [phone]    In 2 weeks  Hospital follow-up, PFT, COPD, Sleep study    Gautam Kelly MD  [address] [facility] [address]  [address]  [address]  [phone]    In 1 week  post hosp follow up visit      Patient Instructions: Follow up with PCP in 1-2 wks  Follow up with Pulmonology in 2 wks  Pt would be going on Steroids taper dose. Recommend PFTs and out patient sleep study. BP soft and pts Cr bumped up 0.99 10 1.09. Baseline is around 1. PCP to follow up on BMP in 1 wk. Pt advised to check BP daily and maintain adequate hydration. Brilinta discontinued as advised by Cardiology.     Note that over 30 minutes was spent in preparing discharge papers, discussing discharge with patient, medication review, etc.      Yung Membreno  PGY-1  Internal Medicine  [facility]   7:49 AM 10/17/2021

## 2021-10-27 ENCOUNTER — APPOINTMENT (OUTPATIENT)
Dept: CT IMAGING | Age: 61
DRG: 246 | End: 2021-10-27
Payer: MEDICARE

## 2021-10-27 ENCOUNTER — HOSPITAL ENCOUNTER (INPATIENT)
Age: 61
LOS: 1 days | Discharge: HOME | DRG: 246 | End: 2021-10-28
Attending: EMERGENCY MEDICINE
Payer: MEDICARE

## 2021-10-27 DIAGNOSIS — K59.00 CONSTIPATION, UNSPECIFIED CONSTIPATION TYPE: ICD-10-CM

## 2021-10-27 DIAGNOSIS — K52.9 ILEITIS: Primary | ICD-10-CM

## 2021-10-27 LAB
-: NORMAL
ABSOLUTE EOS #: 0.04 K/UL (ref 0–0.44)
ABSOLUTE IMMATURE GRANULOCYTE: 0.08 K/UL (ref 0–0.3)
ABSOLUTE LYMPH #: 0.83 K/UL (ref 1.1–3.7)
ABSOLUTE MONO #: 0.5 K/UL (ref 0.1–1.2)
ALBUMIN SERPL-MCNC: 3.8 G/DL (ref 3.5–5.2)
ALBUMIN/GLOBULIN RATIO: 1.3 (ref 1–2.5)
ALP BLD-CCNC: 82 U/L (ref 35–104)
ALT SERPL-CCNC: 13 U/L (ref 5–33)
AMORPHOUS: NORMAL
ANION GAP SERPL CALCULATED.3IONS-SCNC: 16 MMOL/L (ref 9–17)
AST SERPL-CCNC: 14 U/L
BACTERIA: NORMAL
BASOPHILS # BLD: 0 % (ref 0–2)
BASOPHILS ABSOLUTE: 0.03 K/UL (ref 0–0.2)
BILIRUB SERPL-MCNC: 0.26 MG/DL (ref 0.3–1.2)
BILIRUBIN URINE: NEGATIVE
BUN BLDV-MCNC: 13 MG/DL (ref 6–20)
BUN/CREAT BLD: ABNORMAL (ref 9–20)
CALCIUM SERPL-MCNC: 9.9 MG/DL (ref 8.6–10.4)
CASTS UA: NORMAL /LPF (ref 0–8)
CHLORIDE BLD-SCNC: 105 MMOL/L (ref 98–107)
CO2: 18 MMOL/L (ref 20–31)
COLOR: YELLOW
COMMENT UA: ABNORMAL
CREAT SERPL-MCNC: 1.03 MG/DL (ref 0.5–0.9)
CRYSTALS, UA: NORMAL /HPF
DIFFERENTIAL TYPE: ABNORMAL
EOSINOPHILS RELATIVE PERCENT: 1 % (ref 1–4)
EPITHELIAL CELLS UA: NORMAL /HPF (ref 0–5)
GFR AFRICAN AMERICAN: >60 ML/MIN
GFR NON-AFRICAN AMERICAN: 55 ML/MIN
GFR SERPL CREATININE-BSD FRML MDRD: ABNORMAL ML/MIN/{1.73_M2}
GFR SERPL CREATININE-BSD FRML MDRD: ABNORMAL ML/MIN/{1.73_M2}
GLUCOSE BLD-MCNC: 142 MG/DL (ref 70–99)
GLUCOSE URINE: NEGATIVE
HCT VFR BLD CALC: 36.8 % (ref 36.3–47.1)
HEMOGLOBIN: 11.4 G/DL (ref 11.9–15.1)
IMMATURE GRANULOCYTES: 1 %
INR BLD: 0.9
KETONES, URINE: NEGATIVE
LACTIC ACID, WHOLE BLOOD: 2.2 MMOL/L (ref 0.7–2.1)
LACTIC ACID: ABNORMAL MMOL/L
LEUKOCYTE ESTERASE, URINE: NEGATIVE
LIPASE: 27 U/L (ref 13–60)
LYMPHOCYTES # BLD: 10 % (ref 24–43)
MCH RBC QN AUTO: 27 PG (ref 25.2–33.5)
MCHC RBC AUTO-ENTMCNC: 31 G/DL (ref 28.4–34.8)
MCV RBC AUTO: 87.2 FL (ref 82.6–102.9)
MONOCYTES # BLD: 6 % (ref 3–12)
MUCUS: NORMAL
NITRITE, URINE: NEGATIVE
NRBC AUTOMATED: 0 PER 100 WBC
OTHER OBSERVATIONS UA: NORMAL
PARTIAL THROMBOPLASTIN TIME: 23.8 SEC (ref 20.5–30.5)
PDW BLD-RTO: 15.4 % (ref 11.8–14.4)
PH UA: 6 (ref 5–8)
PLATELET # BLD: 240 K/UL (ref 138–453)
PLATELET ESTIMATE: ABNORMAL
PMV BLD AUTO: 11.1 FL (ref 8.1–13.5)
POTASSIUM SERPL-SCNC: 4.1 MMOL/L (ref 3.7–5.3)
PROTEIN UA: NEGATIVE
PROTHROMBIN TIME: 9.8 SEC (ref 9.1–12.3)
RBC # BLD: 4.22 M/UL (ref 3.95–5.11)
RBC # BLD: ABNORMAL 10*6/UL
RBC UA: NORMAL /HPF (ref 0–4)
RENAL EPITHELIAL, UA: NORMAL /HPF
SEG NEUTROPHILS: 82 % (ref 36–65)
SEGMENTED NEUTROPHILS ABSOLUTE COUNT: 7.13 K/UL (ref 1.5–8.1)
SODIUM BLD-SCNC: 139 MMOL/L (ref 135–144)
SPECIFIC GRAVITY UA: 1.01 (ref 1–1.03)
TOTAL PROTEIN: 6.8 G/DL (ref 6.4–8.3)
TRICHOMONAS: NORMAL
TROPONIN INTERP: ABNORMAL
TROPONIN INTERP: ABNORMAL
TROPONIN T: ABNORMAL NG/ML
TROPONIN T: ABNORMAL NG/ML
TROPONIN, HIGH SENSITIVITY: 15 NG/L (ref 0–14)
TROPONIN, HIGH SENSITIVITY: 16 NG/L (ref 0–14)
TURBIDITY: CLEAR
URINE HGB: ABNORMAL
UROBILINOGEN, URINE: NORMAL
WBC # BLD: 8.6 K/UL (ref 3.5–11.3)
WBC # BLD: ABNORMAL 10*3/UL
WBC UA: NORMAL /HPF (ref 0–5)
YEAST: NORMAL

## 2021-10-27 PROCEDURE — 2580000003 HC RX 258: Performed by: EMERGENCY MEDICINE

## 2021-10-27 PROCEDURE — 96376 TX/PRO/DX INJ SAME DRUG ADON: CPT

## 2021-10-27 PROCEDURE — 80053 COMPREHEN METABOLIC PANEL: CPT

## 2021-10-27 PROCEDURE — 83605 ASSAY OF LACTIC ACID: CPT

## 2021-10-27 PROCEDURE — 6360000002 HC RX W HCPCS: Performed by: EMERGENCY MEDICINE

## 2021-10-27 PROCEDURE — 85610 PROTHROMBIN TIME: CPT

## 2021-10-27 PROCEDURE — 96374 THER/PROPH/DIAG INJ IV PUSH: CPT

## 2021-10-27 PROCEDURE — 85025 COMPLETE CBC W/AUTO DIFF WBC: CPT

## 2021-10-27 PROCEDURE — 74174 CTA ABD&PLVS W/CONTRAST: CPT

## 2021-10-27 PROCEDURE — 84484 ASSAY OF TROPONIN QUANT: CPT

## 2021-10-27 PROCEDURE — 81001 URINALYSIS AUTO W/SCOPE: CPT

## 2021-10-27 PROCEDURE — 51702 INSERT TEMP BLADDER CATH: CPT

## 2021-10-27 PROCEDURE — 93005 ELECTROCARDIOGRAM TRACING: CPT | Performed by: EMERGENCY MEDICINE

## 2021-10-27 PROCEDURE — 83690 ASSAY OF LIPASE: CPT

## 2021-10-27 PROCEDURE — 85730 THROMBOPLASTIN TIME PARTIAL: CPT

## 2021-10-27 PROCEDURE — 6360000004 HC RX CONTRAST MEDICATION: Performed by: EMERGENCY MEDICINE

## 2021-10-27 PROCEDURE — 99283 EMERGENCY DEPT VISIT LOW MDM: CPT

## 2021-10-27 RX ORDER — SODIUM CHLORIDE, SODIUM LACTATE, POTASSIUM CHLORIDE, AND CALCIUM CHLORIDE .6; .31; .03; .02 G/100ML; G/100ML; G/100ML; G/100ML
1000 INJECTION, SOLUTION INTRAVENOUS ONCE
Status: COMPLETED | OUTPATIENT
Start: 2021-10-27 | End: 2021-10-28

## 2021-10-27 RX ORDER — ONDANSETRON 2 MG/ML
4 INJECTION INTRAMUSCULAR; INTRAVENOUS ONCE
Status: COMPLETED | OUTPATIENT
Start: 2021-10-27 | End: 2021-10-27

## 2021-10-27 RX ORDER — ONDANSETRON 2 MG/ML
4 INJECTION INTRAMUSCULAR; INTRAVENOUS ONCE
Status: COMPLETED | OUTPATIENT
Start: 2021-10-27 | End: 2021-10-28

## 2021-10-27 RX ADMIN — SODIUM CHLORIDE, POTASSIUM CHLORIDE, SODIUM LACTATE AND CALCIUM CHLORIDE 1000 ML: 600; 310; 30; 20 INJECTION, SOLUTION INTRAVENOUS at 18:35

## 2021-10-27 RX ADMIN — ONDANSETRON 4 MG: 2 INJECTION INTRAMUSCULAR; INTRAVENOUS at 18:41

## 2021-10-27 RX ADMIN — IOPAMIDOL 100 ML: 755 INJECTION, SOLUTION INTRAVENOUS at 21:25

## 2021-10-27 ASSESSMENT — PAIN DESCRIPTION - LOCATION
LOCATION: ABDOMEN

## 2021-10-27 ASSESSMENT — ENCOUNTER SYMPTOMS
DIARRHEA: 0
SHORTNESS OF BREATH: 0
CONSTIPATION: 1
CHEST TIGHTNESS: 0
COLOR CHANGE: 0
VOMITING: 0
NAUSEA: 1
ABDOMINAL PAIN: 1

## 2021-10-27 ASSESSMENT — PAIN DESCRIPTION - ORIENTATION: ORIENTATION: RIGHT;LEFT;LOWER

## 2021-10-27 ASSESSMENT — PAIN SCALES - GENERAL
PAINLEVEL_OUTOF10: 8
PAINLEVEL_OUTOF10: 8

## 2021-10-27 ASSESSMENT — PAIN DESCRIPTION - PAIN TYPE: TYPE: ACUTE PAIN

## 2021-10-27 NOTE — ED PROVIDER NOTES
history that includes Coronary angioplasty with stent; Tonsillectomy; Inner ear surgery (Right); and Coronary angioplasty with stent (02/11/2019). No additional pertinent    Social History     Socioeconomic History    Marital status:      Spouse name: Not on file    Number of children: Not on file    Years of education: Not on file    Highest education level: Not on file   Occupational History    Not on file   Tobacco Use    Smoking status: Current Every Day Smoker     Packs/day: 0.50     Years: 45.00     Pack years: 22.50     Types: Cigarettes    Smokeless tobacco: Current User   Substance and Sexual Activity    Alcohol use: No    Drug use: Yes     Types: Marijuana    Sexual activity: Never   Other Topics Concern    Not on file   Social History Narrative    Not on file     Social Determinants of Health     Financial Resource Strain:     Difficulty of Paying Living Expenses:    Food Insecurity:     Worried About Running Out of Food in the Last Year:     [address] in the Last Year:    Transportation Needs:     Lack of Transportation (Medical):      Lack of Transportation (Non-Medical):    Physical Activity:     Days of Exercise per Week:     Minutes of Exercise per Session:    Stress:     Feeling of Stress :    Social Connections:     Frequency of Communication with Friends and Family:     Frequency of Social Gatherings with Friends and Family:     Attends Sikh Services:     Active Member of Clubs or Organizations:     Attends Club or Organization Meetings:     Marital Status:    Intimate Partner Violence:     Fear of Current or Ex-Partner:     Emotionally Abused:     Physically Abused:     Sexually Abused:        Family History   Problem Relation Age of Onset    Hypertension Mother     Hypertension Father     Diabetes Sister     Diabetes Brother     Stroke Brother        Allergies:  Codeine and Morphine    Home Medications:  Prior to Admission medications Medication Sig Start Date End Date Taking? Authorizing Provider   busPIRone (BUSPAR) 15 MG tablet Take 15 mg by mouth 3 times daily 10/8/21 11/7/21  Chidi Mayfield MD   budesonide-formoterol (SYMBICORT) 160-4.5 MCG/ACT AERO Inhale 2 puffs into the lungs 2 times daily 10/8/21   Cinda Carballo MD   lisinopril (PRINIVIL;ZESTRIL) 10 MG tablet Take 1 tablet by mouth daily 10/9/21   Cinda Carballo MD   clopidogrel (PLAVIX) 75 MG tablet Take 1 tablet by mouth daily 10/9/21   Chidi Mayfield MD   famotidine (PEPCID) 20 MG tablet Take 20 mg by mouth 2 times daily    Historical Provider, MD   albuterol sulfate HFA (VENTOLIN HFA) 108 (90 Base) MCG/ACT inhaler Inhale 2 puffs into the lungs 3 times daily    Historical Provider, MD   traZODone (DESYREL) 50 MG tablet Take 50 mg by mouth as needed for Sleep (takes sometimes to help sleep)    Historical Provider, MD   lidocaine (LIDODERM) 5 % Place 1 patch onto the skin as needed for Pain (as needed for rotator cuff pain) 12 hours on, 12 hours off. Historical Provider, MD   apixaban (ELIQUIS) 5 MG TABS tablet Take 1 tablet by mouth 2 times daily 10/5/21   Alexander Elliott MD   atorvastatin (LIPITOR) 40 MG tablet Take 1 tablet by mouth daily 7/27/21   Ke Plascencia MD   Dulaglutide (TRULICITY) 1.5 KO/2.4OP SOPN Inject 1.5 mg into the skin once a week    Historical Provider, MD   aspirin 81 MG chewable tablet Take 1 tablet by mouth daily 2/13/19   BINDU Coreas CNP   nitroGLYCERIN (NITROSTAT) 0.4 MG SL tablet up to max of 3 total doses.  If no relief after 1 dose, call 911. 2/12/19   BINDU Coreas CNP   metoprolol tartrate (LOPRESSOR) 25 MG tablet Take 0.5 tablets by mouth 2 times daily 2/12/19   BINDU Coreas CNP   Lancets MISC 1 each by Does not apply route daily 2/12/19   Ava Serrato MD   glipiZIDE (GLUCOTROL) 5 MG tablet Take 1 tablet by mouth 2 times daily 2/12/19   Ava Serrato MD   metFORMIN (GLUCOPHAGE) 1000 MG tablet Take 1 tablet by mouth 2 times daily (with meals) 2/12/19   Ana Anthony MD   blood glucose monitor strips Test 3 times a day & as needed for symptoms of irregular blood glucose. 2/12/19   Ana Anthony MD   glucose monitoring kit (FREESTYLE) monitoring kit 1 kit by Does not apply route daily 2/12/19   Ana Anthony MD   albuterol (PROVENTIL) (5 MG/ML) 0.5% nebulizer solution Take 0.5 mLs by nebulization every 6 hours as needed for Wheezing  Patient taking differently: Take 2.5 mg by nebulization 2 times daily  3/16/18   Latesha Nazario MD   tiotropium (SPIRIVA) 18 MCG inhalation capsule Inhale 1 capsule into the lungs daily Pt told that she was using this medication at home. 3/16/18   Latesha Nazario MD   albuterol-ipratropium (COMBIVENT RESPIMAT)  MCG/ACT AERS inhaler Inhale 1 puff into the lungs every 6 hours 3/16/18   Latesha Nazario MD   acetaminophen (TYLENOL) 500 MG tablet Take 1 tablet by mouth every 6 hours as needed for Pain 1/9/18   Bhavin Mccracken MD       REVIEW OF SYSTEMS    (2-9 systems for level 4, 10 or more for level 5)      Review of Systems   Constitutional: Negative for chills and fever. HENT: Negative for congestion. Respiratory: Negative for chest tightness and shortness of breath. Cardiovascular: Negative for chest pain and leg swelling. Gastrointestinal: Positive for abdominal pain, constipation and nausea. Negative for diarrhea and vomiting. Bright red blood per rectum. Endocrine: Negative for polyuria. Genitourinary: Positive for difficulty urinating. Negative for hematuria. Skin: Negative for color change. Neurological: Negative for dizziness, weakness, light-headedness and headaches. Psychiatric/Behavioral: Negative for confusion.        PHYSICAL EXAM   (up to 7 for level 4, 8 or more for level 5)      INITIAL VITALS:   /67   Pulse 92   Temp 98.2 °F (36.8 °C)   Resp (!) 36   SpO2 95%     Physical Exam  Constitutional:       Appearance: Normal appearance. HENT:      Head: Normocephalic and atraumatic. Mouth/Throat:      Mouth: Mucous membranes are moist.      Pharynx: Oropharynx is clear. Eyes:      Extraocular Movements: Extraocular movements intact. Conjunctiva/sclera: Conjunctivae normal.   Cardiovascular:      Rate and Rhythm: Normal rate and regular rhythm. Pulses: Normal pulses. Heart sounds: Normal heart sounds. No murmur heard. Pulmonary:      Effort: Pulmonary effort is normal.      Breath sounds: Normal breath sounds. Abdominal:      General: Bowel sounds are normal. There is no distension. Tenderness: There is no abdominal tenderness. There is no guarding. Genitourinary:     Rectum: Normal. Guaiac result positive. Comments: Digital rectal exam performed with nurse Nadine in the room. Stool is brown in color, stool noted in the rectum on digital rectal exam, no hemorrhoids noted, patient does state she has a history of lung when pregnant, guaiac test positive, no gross blood noted  Musculoskeletal:         General: Normal range of motion. Comments: Range of motion noted to be normal with patient's natural movements   Skin:     General: Skin is warm and dry. Findings: No rash (On exposed skin). Neurological:      General: No focal deficit present. Mental Status: She is alert and oriented to person, place, and time.    Psychiatric:         Mood and Affect: Mood normal.         Behavior: Behavior normal.         DIFFERENTIAL  DIAGNOSIS     PLAN (LABS / IMAGING / EKG):  Orders Placed This Encounter   Procedures    CTA ABDOMEN PELVIS W CONTRAST    CBC Auto Differential    Comprehensive Metabolic Panel w/ Reflex to MG    Lipase    Troponin    Protime-INR    APTT    Lactic Acid, Plasma    Urinalysis Reflex to Culture    Troponin    Microscopic Urinalysis    Inpatient consult to General Surgery    Inpatient consult to Hospitalist    EKG 12 Lead    Saline lock IV    Place CT Compatible peripheral IV    Insert/Change Shook Catheter       MEDICATIONS ORDERED:  Orders Placed This Encounter   Medications    lactated ringers bolus    ondansetron (ZOFRAN) injection 4 mg    iopamidol (ISOVUE-370) 76 % injection 100 mL    ondansetron (ZOFRAN) injection 4 mg    polyethylene glycol (GLYCOLAX) packet 17 g       DDX: GI obstruction, could be secondary to mass or ileus. Mesenteric ischemia with patient's peripheral artery disease history, urinary retention, hemorrhoids.     DIAGNOSTIC RESULTS / EMERGENCY DEPARTMENT COURSE / MDM   LAB RESULTS:  Results for orders placed or performed during the hospital encounter of 10/27/21   CBC Auto Differential   Result Value Ref Range    WBC 8.6 3.5 - 11.3 k/uL    RBC 4.22 3.95 - 5.11 m/uL    Hemoglobin 11.4 (L) 11.9 - 15.1 g/dL    Hematocrit 36.8 36.3 - 47.1 %    MCV 87.2 82.6 - 102.9 fL    MCH 27.0 25.2 - 33.5 pg    MCHC 31.0 28.4 - 34.8 g/dL    RDW 15.4 (H) 11.8 - 14.4 %    Platelets 624 849 - 657 k/uL    MPV 11.1 8.1 - 13.5 fL    NRBC Automated 0.0 0.0 per 100 WBC    Differential Type NOT REPORTED     Seg Neutrophils 82 (H) 36 - 65 %    Lymphocytes 10 (L) 24 - 43 %    Monocytes 6 3 - 12 %    Eosinophils % 1 1 - 4 %    Basophils 0 0 - 2 %    Immature Granulocytes 1 (H) 0 %    Segs Absolute 7.13 1.50 - 8.10 k/uL    Absolute Lymph # 0.83 (L) 1.10 - 3.70 k/uL    Absolute Mono # 0.50 0.10 - 1.20 k/uL    Absolute Eos # 0.04 0.00 - 0.44 k/uL    Basophils Absolute 0.03 0.00 - 0.20 k/uL    Absolute Immature Granulocyte 0.08 0.00 - 0.30 k/uL    WBC Morphology NOT REPORTED     RBC Morphology ANISOCYTOSIS PRESENT     Platelet Estimate NOT REPORTED    Comprehensive Metabolic Panel w/ Reflex to MG   Result Value Ref Range    Glucose 142 (H) 70 - 99 mg/dL    BUN 13 6 - 20 mg/dL    CREATININE 1.03 (H) 0.50 - 0.90 mg/dL    Bun/Cre Ratio NOT REPORTED 9 - 20    Calcium 9.9 8.6 - 10.4 mg/dL    Sodium 139 135 - 144 mmol/L Potassium 4.1 3.7 - 5.3 mmol/L    Chloride 105 98 - 107 mmol/L    CO2 18 (L) 20 - 31 mmol/L    Anion Gap 16 9 - 17 mmol/L    Alkaline Phosphatase 82 35 - 104 U/L    ALT 13 5 - 33 U/L    AST 14 <32 U/L    Total Bilirubin 0.26 (L) 0.3 - 1.2 mg/dL    Total Protein 6.8 6.4 - 8.3 g/dL    Albumin 3.8 3.5 - 5.2 g/dL    Albumin/Globulin Ratio 1.3 1.0 - 2.5    GFR Non-African American 55 (L) >60 mL/min    GFR African American >60 >60 mL/min    GFR Comment          GFR Staging NOT REPORTED    Lipase   Result Value Ref Range    Lipase 27 13 - 60 U/L   Troponin   Result Value Ref Range    Troponin, High Sensitivity 16 (H) 0 - 14 ng/L    Troponin T NOT REPORTED <0.03 ng/mL    Troponin Interp NOT REPORTED    Protime-INR   Result Value Ref Range    Protime 9.8 9.1 - 12.3 sec    INR 0.9    APTT   Result Value Ref Range    PTT 23.8 20.5 - 30.5 sec   Lactic Acid, Plasma   Result Value Ref Range    Lactic Acid NOT REPORTED mmol/L    Lactic Acid, Whole Blood 2.2 (H) 0.7 - 2.1 mmol/L   Urinalysis Reflex to Culture    Specimen: Urine, clean catch   Result Value Ref Range    Color, UA Yellow Yellow    Turbidity UA Clear Clear    Glucose, Ur NEGATIVE NEGATIVE    Bilirubin Urine NEGATIVE NEGATIVE    Ketones, Urine NEGATIVE NEGATIVE    Specific Gravity, UA 1.012 1.005 - 1.030    Urine Hgb TRACE (A) NEGATIVE    pH, UA 6.0 5.0 - 8.0    Protein, UA NEGATIVE NEGATIVE    Urobilinogen, Urine Normal Normal    Nitrite, Urine NEGATIVE NEGATIVE    Leukocyte Esterase, Urine NEGATIVE NEGATIVE    Urinalysis Comments NOT REPORTED    Troponin   Result Value Ref Range    Troponin, High Sensitivity 15 (H) 0 - 14 ng/L    Troponin T NOT REPORTED <0.03 ng/mL    Troponin Interp NOT REPORTED    Microscopic Urinalysis   Result Value Ref Range    -          WBC, UA None 0 - 5 /HPF    RBC, UA 5 TO 10 0 - 4 /HPF    Casts UA NOT REPORTED 0 - 8 /LPF    Crystals, UA NOT REPORTED None /HPF    Epithelial Cells UA None 0 - 5 /HPF    Renal Epithelial, UA NOT REPORTED 0 /HPF Bacteria, UA NOT REPORTED None    Mucus, UA NOT REPORTED None    Trichomonas, UA NOT REPORTED None    Amorphous, UA NOT REPORTED None    Other Observations UA NOT REPORTED NOT REQ. Yeast, UA NOT REPORTED None       RADIOLOGY:  CTA ABDOMEN PELVIS W CONTRAST   Final Result   Moderate severe calcific aorto iliac atherosclerotic disease. There are   moderate to high-grade stenoses at the origins of both common iliac arteries,   right greater than left. High-grade stenosis suspected in the proximal left   renal artery. Calcified plaque in branches of the celiac artery and superior mesenteric   artery. Both vessels are patent. An NIKOLAY was not visualized and is presumed   occluded. Mural thickening in the distal ileum similar to the prior study. Findings   may reflect an acute recurrent or chronic nonspecific terminal ileitis. Inflammatory, infectious and chronic ischemic etiologies are all   possibilities. Moderate rectosigmoid fecal impaction. EKG  EKG Interpretation    Interpreted by emergency department physician    Rhythm: normal sinus   Rate: normal  Axis: normal  Ectopy: premature ventricular contractions (frequent)  Conduction: normal  ST Segments: nonspecific changes  T Waves: inversion in  v2, v3 and v4  Q Waves: none    Clinical Impression: non-specific EKG    Randolph Gonzales,      All EKG's are interpreted by the Emergency Department Physician who either signs or Co-signs this chart in the absence of a cardiologist.    EMERGENCY DEPARTMENT COURSE:  ED Course as of Oct 28 1408   Wed Oct 27, 2021   1902 Patient feeling much better after Shook catheter placement. 900 mL of urine obtained. Lactic acid noted to be 2.2, previously patient noted to have elevated lactic acidosis, plan to continue giving fluids. [CR]   1903 Troponin noted to be 16, this is decreased from previous lab values.     [CR]   2003 Concern for patient having bedbugs, patient was evaluated and informed about this concern. Will provide shower to patient prior to CT scan.    [CR]   2118 Patient noted to have nausea and abdominal pain continuing, patient did have a bowel movement while in the shower.    [CR]   2235 Troponins stable not increasing    [WK]   Thu Oct 28, 2021   0055 Concern for ileitis on CT scan, general surgery consulted for evaluation and further work-up. Patient has had a bowel movement and abdominal pain is improved. We will plan to either discharge or admit patient after general surgery recommendations. [CR]   0142 General surgery evaluated patient, recommend enema, repeat lactic acid in the a.m., and admission to the floor for medical management. [CR]   1005 Surgery signing off per resident dr. Husam Franco and attending dr. Luigi Morocho    [BG]      ED Course User Index  [BG] Manuel Roman DO  [CR] Ananda Dickson DO  [WK] Darline Arnold DO          PROCEDURES:  None    CONSULTS:  IP CONSULT TO GENERAL SURGERY  IP CONSULT TO HOSPITALIST    MEDICAL DECISION MAKING:  Patient presenting with constipation, urinary retention, and bright red blood per rectum. Patient CT scan did demonstrate ileitis, general surgery was consulted they recommend further evaluation tomorrow after enema, MiraLAX and bowel treatment. Patient noted to have lactic acidosis, will repeat lactic acidosis in the a.m. to ensure resolving. Patient's guaiac test was positive, patient does have bright red blood per stool, will trend hemoglobins while admitted. Plan to admit patient for further evaluation and treatment. Patient states her abdominal pain did resolve and did have a bowel movement while here in the emergency department, will continue MiraLAX treatment and continue to medically manage patient. Patient had two troponins that were noted to be negative, both were decreased from previous troponins in the 200s.  Concern the patient may have bedbugs, patient was showered and cleansed in bed clothing was placed in bags for decontamination of room and prevention of bedbug transportation to hospital. Patient will be [facility] to [facility]. Patient signed out to Dr. Xenia Mcmahon for admission. CRITICAL CARE:  Please see attending note    FINAL IMPRESSION      1. Ileitis    2. Constipation, unspecified constipation type          DISPOSITION / PLAN     DISPOSITION Decision To Admit 10/28/2021 12:09:46 AM      PATIENT REFERRED TO:  No follow-up provider specified.     DISCHARGE MEDICATIONS:  New Prescriptions    No medications on file       Scott Crawley, [address], DO  Emergency Medicine Resident    (Please note that portions of thisnote were completed with a voice recognition program.  Efforts were made to edit the dictations but occasionally words are mis-transcribed.)           Enrique Cooney DO  Resident  10/28/21 [address] DO  Resident  10/28/21 [id number]

## 2021-10-27 NOTE — Clinical Note
Patient Class: Inpatient [101]   REQUIRED: Diagnosis: Colitis [671348]   Estimated Length of Stay: Estimated stay of less than 2 midnights   Telemetry/Cardiac Monitoring Required?: Yes

## 2021-10-27 NOTE — ED NOTES
Patient states she feels much better now that bladder is drained     Bria Velázquez RN  10/27/21 3160

## 2021-10-27 NOTE — ED NOTES
Assumed care of pt c/o nausea, constipation and pain lower abdomen.       Juli Vieira RN  10/27/21 3522

## 2021-10-27 NOTE — ED PROVIDER NOTES
[facility]     Emergency Department     Faculty Note/ Attestation      Pt Name: Daisy Acosta                                       MRN: [id number]  [place] 1960  Date of evaluation: 10/27/2021    Patients PCP:    Pedro Friedman MD    Attestation  I performed a history and physical examination of the patient and discussed management with the resident. I reviewed the residents note and agree with the documented findings and plan of care. Any areas of disagreement are noted on the chart. I was personally present for the key portions of any procedures. I have documented in the chart those procedures where I was not present during the key portions. I have reviewed the emergency nurses triage note. I agree with the chief complaint, past medical history, past surgical history, allergies, medications, social and family history as documented unless otherwise noted below. For Physician Assistant/ Nurse Practitioner cases/documentation I have personally evaluated this patient and have completed at least one if not all key elements of the E/M (history, physical exam, and MDM). Additional findings are as noted. Initial Screens:             Vitals:    Vitals:    10/27/21 1551   BP: (!) 161/87   Pulse: 92   Resp: 20   Temp: 98.2 °F (36.8 °C)   SpO2: 98%       CHIEF COMPLAINT       Chief Complaint   Patient presents with    Constipation     pt reports constipoation since Thursday; OTC meds with no relief; endorses nausea     Rectal Bleeding     also reports bright red blood while cleaning rectum xs 30 min; pt on blood thinners       The pt is a 63 YO F with constipation x 1 week bowel movement was complete at that time pt now having bright red blood per rectum. Hx of PAD on apixiban, clopidogrel and ASA. The pt has significant weakness and fatigue. Unable to urinate since yesterday.           EMERGENCY DEPARTMENT COURSE:     -------------------------  BP: (!) 161/87, Temp: 98.2 °F (36.8 °C), Pulse: 92, Resp: 20  Physical Exam  Constitutional:       Appearance: She is well-developed. She is not diaphoretic. HENT:      Head: Normocephalic and atraumatic. Right Ear: External ear normal.      Left Ear: External ear normal.   Eyes:      General: No scleral icterus. Right eye: No discharge. Left eye: No discharge. Neck:      Trachea: No tracheal deviation. Pulmonary:      Effort: Pulmonary effort is normal. No respiratory distress. Breath sounds: No stridor. Abdominal:      Comments: After jeronimo pt abdomen paiin improved still no BM   Musculoskeletal:         General: Normal range of motion. Cervical back: Normal range of motion. Skin:     General: Skin is warm and dry. Neurological:      Mental Status: She is alert and oriented to person, place, and time. Coordination: Coordination normal.   Psychiatric:         Behavior: Behavior normal.           Comments  Patient's abdominal pain is concerning given the lack of bowel movement and patient's PAD history concerning will need CT angio for possible poor blood flow also concerning with the diffuse abdominal pain is bowel obstruction patient will need evaluation for this. Concerning as to why the patient is having urinary retention will need further evaluation    ED Course as of Oct 28 0028   Wed Oct 27, 2021   1902 Patient feeling much better after Jeronimo catheter placement. 900 mL of urine obtained. Lactic acid noted to be 2.2, previously patient noted to have elevated lactic acidosis, plan to continue giving fluids. [CR]   1903 Troponin noted to be 16, this is decreased from previous lab values. [CR]   2003 Concern for patient having bedbugs, patient was evaluated and informed about this concern.   Will provide shower to patient prior to CT scan.    [CR]   2118 Patient noted to have nausea and abdominal pain continuing, patient did have a bowel movement while in the shower.    [CR]   2235 Troponins stable not increasing    [WK]      ED Course User Index  [CR] Malka Dickson DO  [WK] Marcelino Carrizales DO   EKG Interpretation    Interpreted by emergency department physician    Rhythm: normal sinus   Rate: normal  Axis: normal  Ectopy: none  Conduction: normal  ST Segments: nonspecific changes  T Waves: inversion in  v2 and v3  Q Waves: nonspecific    EKG  Impression: non-specific EKG    Patient CT shows possible ischemic changes vascular surgery consulted given the lactate and PAD history    Marcelino Carrizales DO,, , RDMS.   Attending Emergency Physician          Marcelino Carrizales DO  10/28/21 6796

## 2021-10-28 VITALS
RESPIRATION RATE: 19 BRPM | SYSTOLIC BLOOD PRESSURE: 112 MMHG | OXYGEN SATURATION: 96 % | TEMPERATURE: 98.2 F | DIASTOLIC BLOOD PRESSURE: 55 MMHG | HEART RATE: 72 BPM

## 2021-10-28 PROBLEM — K52.9 COLITIS: Status: ACTIVE | Noted: 2021-10-28

## 2021-10-28 PROBLEM — R33.9 URINE RETENTION: Status: ACTIVE | Noted: 2021-10-28

## 2021-10-28 PROBLEM — K55.9 ISCHEMIC COLITIS (HCC): Status: ACTIVE | Noted: 2021-10-28

## 2021-10-28 PROBLEM — K59.09 OTHER CONSTIPATION: Status: ACTIVE | Noted: 2021-10-28

## 2021-10-28 LAB
EKG ATRIAL RATE: 89 BPM
EKG P AXIS: 66 DEGREES
EKG P-R INTERVAL: 158 MS
EKG Q-T INTERVAL: 376 MS
EKG QRS DURATION: 80 MS
EKG QTC CALCULATION (BAZETT): 457 MS
EKG R AXIS: 44 DEGREES
EKG T AXIS: 30 DEGREES
EKG VENTRICULAR RATE: 89 BPM
LACTIC ACID, WHOLE BLOOD: 0.8 MMOL/L (ref 0.7–2.1)

## 2021-10-28 PROCEDURE — 6370000000 HC RX 637 (ALT 250 FOR IP): Performed by: NURSE PRACTITIONER

## 2021-10-28 PROCEDURE — 99223 1ST HOSP IP/OBS HIGH 75: CPT | Performed by: INTERNAL MEDICINE

## 2021-10-28 PROCEDURE — 6360000002 HC RX W HCPCS: Performed by: EMERGENCY MEDICINE

## 2021-10-28 PROCEDURE — 2580000003 HC RX 258: Performed by: INTERNAL MEDICINE

## 2021-10-28 PROCEDURE — C9113 INJ PANTOPRAZOLE SODIUM, VIA: HCPCS | Performed by: NURSE PRACTITIONER

## 2021-10-28 PROCEDURE — 94640 AIRWAY INHALATION TREATMENT: CPT

## 2021-10-28 PROCEDURE — 83605 ASSAY OF LACTIC ACID: CPT

## 2021-10-28 PROCEDURE — 2580000003 HC RX 258: Performed by: NURSE PRACTITIONER

## 2021-10-28 PROCEDURE — 36415 COLL VENOUS BLD VENIPUNCTURE: CPT

## 2021-10-28 PROCEDURE — 6370000000 HC RX 637 (ALT 250 FOR IP): Performed by: EMERGENCY MEDICINE

## 2021-10-28 PROCEDURE — 6370000000 HC RX 637 (ALT 250 FOR IP): Performed by: SURGERY

## 2021-10-28 PROCEDURE — 93010 ELECTROCARDIOGRAM REPORT: CPT | Performed by: INTERNAL MEDICINE

## 2021-10-28 PROCEDURE — 1200000000 HC SEMI PRIVATE

## 2021-10-28 PROCEDURE — 6360000002 HC RX W HCPCS: Performed by: NURSE PRACTITIONER

## 2021-10-28 PROCEDURE — 6370000000 HC RX 637 (ALT 250 FOR IP): Performed by: INTERNAL MEDICINE

## 2021-10-28 RX ORDER — BUDESONIDE AND FORMOTEROL FUMARATE DIHYDRATE 160; 4.5 UG/1; UG/1
2 AEROSOL RESPIRATORY (INHALATION) 2 TIMES DAILY
Status: DISCONTINUED | OUTPATIENT
Start: 2021-10-28 | End: 2021-10-28 | Stop reason: HOSPADM

## 2021-10-28 RX ORDER — SODIUM CHLORIDE 0.9 % (FLUSH) 0.9 %
5-40 SYRINGE (ML) INJECTION PRN
Status: DISCONTINUED | OUTPATIENT
Start: 2021-10-28 | End: 2021-10-28 | Stop reason: HOSPADM

## 2021-10-28 RX ORDER — SODIUM CHLORIDE 9 MG/ML
INJECTION, SOLUTION INTRAVENOUS CONTINUOUS
Status: DISCONTINUED | OUTPATIENT
Start: 2021-10-28 | End: 2021-10-28 | Stop reason: HOSPADM

## 2021-10-28 RX ORDER — ONDANSETRON 4 MG/1
4 TABLET, ORALLY DISINTEGRATING ORAL EVERY 8 HOURS PRN
Status: DISCONTINUED | OUTPATIENT
Start: 2021-10-28 | End: 2021-10-28 | Stop reason: HOSPADM

## 2021-10-28 RX ORDER — ACETAMINOPHEN 650 MG/1
650 SUPPOSITORY RECTAL EVERY 6 HOURS PRN
Status: DISCONTINUED | OUTPATIENT
Start: 2021-10-28 | End: 2021-10-28 | Stop reason: HOSPADM

## 2021-10-28 RX ORDER — ACETAMINOPHEN 325 MG/1
650 TABLET ORAL EVERY 6 HOURS PRN
Status: DISCONTINUED | OUTPATIENT
Start: 2021-10-28 | End: 2021-10-28 | Stop reason: HOSPADM

## 2021-10-28 RX ORDER — PANTOPRAZOLE SODIUM 40 MG/10ML
40 INJECTION, POWDER, LYOPHILIZED, FOR SOLUTION INTRAVENOUS EVERY 12 HOURS
Status: DISCONTINUED | OUTPATIENT
Start: 2021-10-28 | End: 2021-10-28 | Stop reason: HOSPADM

## 2021-10-28 RX ORDER — SODIUM CHLORIDE 9 MG/ML
25 INJECTION, SOLUTION INTRAVENOUS PRN
Status: DISCONTINUED | OUTPATIENT
Start: 2021-10-28 | End: 2021-10-28 | Stop reason: HOSPADM

## 2021-10-28 RX ORDER — TRAMADOL HYDROCHLORIDE 50 MG/1
100 TABLET ORAL EVERY 6 HOURS PRN
Status: DISCONTINUED | OUTPATIENT
Start: 2021-10-28 | End: 2021-10-28 | Stop reason: HOSPADM

## 2021-10-28 RX ORDER — ALBUTEROL SULFATE 90 UG/1
1 AEROSOL, METERED RESPIRATORY (INHALATION) EVERY 6 HOURS
Status: DISCONTINUED | OUTPATIENT
Start: 2021-10-28 | End: 2021-10-28 | Stop reason: HOSPADM

## 2021-10-28 RX ORDER — POLYETHYLENE GLYCOL 3350 17 G/17G
17 POWDER, FOR SOLUTION ORAL DAILY PRN
Qty: 1530 G | Refills: 1 | Status: SHIPPED | OUTPATIENT
Start: 2021-10-28 | End: 2021-11-27

## 2021-10-28 RX ORDER — TRAMADOL HYDROCHLORIDE 50 MG/1
50 TABLET ORAL EVERY 6 HOURS PRN
Status: DISCONTINUED | OUTPATIENT
Start: 2021-10-28 | End: 2021-10-28 | Stop reason: HOSPADM

## 2021-10-28 RX ORDER — POLYETHYLENE GLYCOL 3350 17 G/17G
17 POWDER, FOR SOLUTION ORAL 2 TIMES DAILY
Status: DISCONTINUED | OUTPATIENT
Start: 2021-10-28 | End: 2021-10-28 | Stop reason: HOSPADM

## 2021-10-28 RX ORDER — ONDANSETRON 2 MG/ML
4 INJECTION INTRAMUSCULAR; INTRAVENOUS EVERY 6 HOURS PRN
Status: DISCONTINUED | OUTPATIENT
Start: 2021-10-28 | End: 2021-10-28 | Stop reason: HOSPADM

## 2021-10-28 RX ORDER — SODIUM CHLORIDE 0.9 % (FLUSH) 0.9 %
5-40 SYRINGE (ML) INJECTION EVERY 12 HOURS SCHEDULED
Status: DISCONTINUED | OUTPATIENT
Start: 2021-10-28 | End: 2021-10-28 | Stop reason: HOSPADM

## 2021-10-28 RX ORDER — SODIUM CHLORIDE 9 MG/ML
10 INJECTION INTRAVENOUS EVERY 12 HOURS
Status: DISCONTINUED | OUTPATIENT
Start: 2021-10-28 | End: 2021-10-28 | Stop reason: HOSPADM

## 2021-10-28 RX ADMIN — ONDANSETRON 4 MG: 2 INJECTION, SOLUTION INTRAMUSCULAR; INTRAVENOUS at 00:09

## 2021-10-28 RX ADMIN — ACETAMINOPHEN 650 MG: 325 TABLET ORAL at 10:21

## 2021-10-28 RX ADMIN — PANTOPRAZOLE SODIUM 40 MG: 40 INJECTION, POWDER, FOR SOLUTION INTRAVENOUS at 10:32

## 2021-10-28 RX ADMIN — BUDESONIDE AND FORMOTEROL FUMARATE DIHYDRATE 2 PUFF: 160; 4.5 AEROSOL RESPIRATORY (INHALATION) at 07:30

## 2021-10-28 RX ADMIN — SODIUM CHLORIDE 10 ML: 9 INJECTION INTRAMUSCULAR; INTRAVENOUS; SUBCUTANEOUS at 10:25

## 2021-10-28 RX ADMIN — TIOTROPIUM BROMIDE INHALATION SPRAY 2 PUFF: 3.12 SPRAY, METERED RESPIRATORY (INHALATION) at 07:30

## 2021-10-28 RX ADMIN — POLYETHYLENE GLYCOL 3350 17 G: 17 POWDER, FOR SOLUTION ORAL at 10:24

## 2021-10-28 RX ADMIN — ALBUTEROL SULFATE 2 PUFF: 90 AEROSOL, METERED RESPIRATORY (INHALATION) at 07:29

## 2021-10-28 RX ADMIN — TRAMADOL HYDROCHLORIDE 100 MG: 50 TABLET, FILM COATED ORAL at 13:58

## 2021-10-28 RX ADMIN — SODIUM CHLORIDE 1 ML: 9 INJECTION, SOLUTION INTRAVENOUS at 10:33

## 2021-10-28 RX ADMIN — SODIUM CHLORIDE, PRESERVATIVE FREE 10 ML: 5 INJECTION INTRAVENOUS at 10:20

## 2021-10-28 ASSESSMENT — PAIN SCALES - GENERAL
PAINLEVEL_OUTOF10: 2
PAINLEVEL_OUTOF10: 3
PAINLEVEL_OUTOF10: 10

## 2021-10-28 ASSESSMENT — PAIN DESCRIPTION - LOCATION: LOCATION: BACK

## 2021-10-28 NOTE — ED PROVIDER NOTES
[facility] ED  Emergency Department  Emergency Medicine Resident Sign-out     Care of Ivania Mccullough was assumed from Dr. Syd Fernandes and is being seen for Constipation (pt reports constipoation since Thursday; OTC meds with no relief; endorses nausea ) and Rectal Bleeding (also reports bright red blood while cleaning rectum xs 30 min; pt on blood thinners)  . The patient's initial evaluation and plan have been discussed with the prior provider who initially evaluated the patient.      EMERGENCY DEPARTMENT COURSE / MEDICAL DECISION MAKING:       MEDICATIONS GIVEN:  Orders Placed This Encounter   Medications    lactated ringers bolus    ondansetron (ZOFRAN) injection 4 mg    iopamidol (ISOVUE-370) 76 % injection 100 mL    ondansetron (ZOFRAN) injection 4 mg    polyethylene glycol (GLYCOLAX) packet 17 g       LABS / RADIOLOGY:     Labs Reviewed   CBC WITH AUTO DIFFERENTIAL - Abnormal; Notable for the following components:       Result Value    Hemoglobin 11.4 (*)     RDW 15.4 (*)     Seg Neutrophils 82 (*)     Lymphocytes 10 (*)     Immature Granulocytes 1 (*)     Absolute Lymph # 0.83 (*)     All other components within normal limits   COMPREHENSIVE METABOLIC PANEL W/ REFLEX TO MG FOR LOW K - Abnormal; Notable for the following components:    Glucose 142 (*)     CREATININE 1.03 (*)     CO2 18 (*)     Total Bilirubin 0.26 (*)     GFR Non- 55 (*)     All other components within normal limits   TROPONIN - Abnormal; Notable for the following components:    Troponin, High Sensitivity 16 (*)     All other components within normal limits   LACTIC ACID, PLASMA - Abnormal; Notable for the following components:    Lactic Acid, Whole Blood 2.2 (*)     All other components within normal limits   URINE RT REFLEX TO CULTURE - Abnormal; Notable for the following components:    Urine Hgb TRACE (*)     All other components within normal limits   TROPONIN - Abnormal; Notable for the following components:    Troponin, High Sensitivity 15 (*)     All other components within normal limits   LIPASE   PROTIME-INR   APTT   MICROSCOPIC URINALYSIS       ECHO Complete 2D W Doppler W Color    Result Date: 9/30/2021  Transthoracic Echocardiography Report (TTE)  Patient Name Lashae Wooten Date of Study               09/30/2021               L   Date of      1960  Gender                      Female  Birth   Age          64 year(s)  Race                           Room Number  127         Height:                     69 inch, 175.26 cm   Corporate ID [id number]    Weight:                     231 pounds, 104.8 kg  #   Patient Acct [de-identified]   BSA:          2.2 m^2       BMI:      34.11  #                                                              kg/m^2   MR #         [id number]     Sonographer                 [facility]                                                       Kathryn Núñez   Accession #  [id number]  Interpreting Physician      [address] [place] [address]   Fellow                   Referring Nurse                           Practitioner   Interpreting             Referring Physician         Amelia Desai  Fellow  Type of Study   TTE procedure:2D Echocardiogram, M-Mode, Doppler, Color Doppler,  Myocardial Strain. Procedure Date Date: 09/30/2021 Start: 09:34 AM Study Location: [facility] / [facility]. Comments: CAD/Stent, HTN, Smoker Patient Status: Inpatient Height: 69 inches Weight: 231.01 pounds BSA: 2.2 m^2 BMI: 34.11 kg/m^2 HR: 71 bpm CONCLUSIONS Summary Left ventricle is in the upper limits of normal size. Mildly reduced LV function with Calculated ejection fraction is 44% Anterior and posterolateral wall motion abnormality. Abnormal global longitudinal strain average of -7%. Grade I (mild) left ventricular diastolic dysfunction. Left atrium is moderately dilated. Aortic sclerosis without stenosis. Calcification of mitral valve leaflet tips with moderate stenosis.  Mean gradient is 7 mmHg Mild mitral regurgitation. Mild tricuspid regurgitation. Estimated right ventricular systolic pressure is 36 mmHg. Signature ----------------------------------------------------------------------------  Electronically signed by Bridget Anaya(Sonographer) on 09/30/2021 02:04  PM ---------------------------------------------------------------------------- ----------------------------------------------------------------------------  Electronically signed by Kimani Mccarty(Interpreting physician) on 09/30/2021  02:37 PM ---------------------------------------------------------------------------- FINDINGS Left Atrium Left atrium is moderately dilated. Left Ventricle Left ventricle is in the upper limits of normal size. Anterior and posterolateral wall motion abnormality. Calculated ejection fraction is 44% Abnormal global longitudinal strain average of -7%. Grade I (mild) left ventricular diastolic dysfunction. Right Atrium Right atrium is normal in size. Right Ventricle Normal right ventricular size and function. TAPSE measures 28mmHg Mitral Valve Calcification of mitral valve leaflet tips with moderate stenosis. Mean gradient across the mitral valve is 7 mmHg Mild mitral regurgitation. Aortic Valve Aortic valve is trileaflet. Aortic sclerosis without stenosis. No aortic insufficiency. Tricuspid Valve Tricuspid valve structure is normal. Mild tricuspid regurgitation. Estimated right ventricular systolic pressure is 36 mmHg. Pulmonic Valve The pulmonic valve is normal in structure. Pericardial Effusion No significant pericardial effusion is seen. Miscellaneous Normal aortic root dimension. E/E' average = 25.  M-mode / 2D Measurements & Calculations:   LVIDd:5.7 cm(3.7 - 5.6 cm)       Diastolic EHSOIK:78.79 ml  LVIDs:4.38 cm(2.2 - 4.0 cm)      Systolic AXTBCA:58.3 ml  RMEL:7.4 cm(0.6 - 1.1 cm)        Aortic Root:3.6 cm(2.0 - 3.7 cm)  LVPWd:1.1 cm(0.6 - 1.1 cm)       LA Dimension: 4.9 cm(1.9 - 4.0 cm)  Fractional Shortenin.16 %    LA volume/Index: 59.7 ml /27m^2  Calculated LVEF (%): 44.52 %     LVOT:2 cm                                   RVDd:2.5 cm   Mitral:                                 Aortic   Valve Area (P1/2-Time): 2.82 cm^2       Peak Velocity: 1.77 m/s  Peak E-Wave: 1.21 m/s                   Mean Velocity: 1.17 m/s  Peak A-Wave: 1.53 m/s                   Peak Gradient: 12.53 mmHg  E/A Ratio: 0.79                         Mean Gradient: 6 mmHg  Peak Gradient: 5.86 mmHg  Mean Gradient: 7 mmHg  Deceleration Time: 315 msec             Area (continuity): 1.65 cm^2  P1/2t: 78 msec                          AV VTI: 40.8 cm   Area (continuity): 1.61 cm^2  Mean Velocity: 1.23 m/s   Tricuspid:                              Pulmonic:   Estimated RVSP: 36 mmHg                 Peak Velocity: 0.91 m/s  Peak TR Velocity: 2.59 m/s              Peak Gradient: 3.31 mmHg  Peak TR Gradient: 26.8324 mmHg  Estimated RA Pressure: 10 mmHg                                           Estimated PASP: 36.83 mmHg  Diastology / Tissue Doppler Septal Wall E' velocity:1.23 m/s Septal Wall E/E':28 Lateral Wall E' velocity:0.05 m/s Lateral Wall E/E':23    XR CHEST PORTABLE    Result Date: 10/2/2021  EXAMINATION: ONE XRAY VIEW OF THE CHEST 10/2/2021 10:10 am COMPARISON: 2021 radiograph HISTORY: ORDERING SYSTEM PROVIDED HISTORY: Diminished lung sounds on the right side TECHNOLOGIST PROVIDED HISTORY: Diminished lung sounds on the right side Reason for Exam: Diminished lung sounds on the right side FINDINGS: Supportive devices are stable. The heart and mediastinum are normal. Asymmetric airspace opacification in the perihilar right lower lung zone. Mild ground-glass opacities are also observed in the upper lung zones bilaterally. No skeletal finding. Asymmetric airspace opacification in the perihilar right lower lung zone. Pattern may represent worsening edema or underlying pneumonitis.      XR CHEST PORTABLE    Result Date: 9/29/2021  EXAMINATION: ONE X-RAY VIEW OF THE CHEST 9/29/2021 1:39 am COMPARISON: 05/08/2020 HISTORY: ORDERING SYSTEM PROVIDED HISTORY: dyspnea TECHNOLOGIST PROVIDED HISTORY: dyspnea Reason for Exam: upr,diff breathing,pt on bipap,copd FINDINGS: The mediastinal and cardiac contours are stable. There are diffuse bilateral perihilar airspace opacities extending into the upper and lower lobes. There is no pleural effusion or pneumothorax. Multifocal airspace opacities concerning for an atypical bronchopneumonia. COVID-19 pneumonia should be excluded. XR CHEST PORTABLE    Result Date: 9/29/2021  EXAMINATION: ONE XRAY VIEW OF THE CHEST 9/29/2021 3:16 am COMPARISON: Earlier same day HISTORY: ORDERING SYSTEM PROVIDED HISTORY: s/p intubation TECHNOLOGIST PROVIDED HISTORY: s/p intubation Reason for Exam: upr,et placement FINDINGS: An endotracheal tube terminates 6 cm above the [place]. An enteric tube courses to the abdomen, beyond the field of view. Diffuse bilateral airspace opacities are unchanged. There is no pleural effusion or pneumothorax. 1. Endotracheal tube terminating 6 cm above the [place]. 2. Persistent diffuse bilateral airspace opacities suggestive of an atypical bronchopneumonia.      VL DUP LOWER EXTREMITY VENOUS BILATERAL    Result Date: 9/30/2021    [facility]  Vascular Lower Extremities DVT Study Procedure   Patient Name  Lenora Hickey Date of Study         09/30/2021                L   Date of Birth 1960  Gender                Female   Age           64 year(s)  Race                     Room Number   127         Height:               69 inch, 175.26 cm   Corporate ID  [id number]    Weight:               231 pounds, 104.8 kg  #   Patient Acct  [de-identified]   BSA:       2.2 m^2    BMI:         34.11 kg/m^2  #   MR #          [id number]     Sonographer           Verónica Ocampo RVT, [facility]   Accession #   [id number]  Interpreting          Ben Figures Physician   Referring                 Referring Physician   Eileen Liu MD  Nurse  Practitioner  Procedure Type of Study:   Veins: Lower Extremities DVT Study, Venous Scan Lower Bilateral.  Indications for Study:R/O DVT. Patient Status: In Patient. Conclusions   Summary   Simultaneous real time imaging utilizing B-Mode, color doppler and  spectral waveform analysis was performed on the bilateral lower  extremities for venous examination of the deep and superficial systems. Findings are:   Right:  No evidence of deep or superficial venous thrombosis. Left:  No evidence of deep or superficial venous thrombosis. Signature   ----------------------------------------------------------------  Electronically signed by Melia Velasquez RVT, RDMS(Sonographer) on 09/30/2021 02:26 PM  ----------------------------------------------------------------   ----------------------------------------------------------------  Electronically signed by Silvia Gibbs(Interpreting  physician) on 09/30/2021 06:26 PM  ----------------------------------------------------------------  Findings:   Right Impression:                    Left Impression:  The common femoral, femoral,         The common femoral, femoral,  popliteal and tibial veins           popliteal and tibial veins  demonstrate normal compressibility   demonstrate normal compressibility  and augmentation. and augmentation. Normal compressibility of the great  Normal compressibility of the great  saphenous vein. saphenous vein. Normal compressibility of the small  Normal compressibility of the small  saphenous vein. saphenous vein. Risk Factors   - The patient's risk factor(s) include: diabetes mellitus, dyslipidemia     and arterial hypertension.  Velocities are measured in cm/s ; Diameters are measured in cm Right Lower Extremities DVT Study Measurements Right 2D Measurements +------------------------------------+----------+---------------+----------+ ! Location                            ! Visualized! Compressibility! Thrombosis! +------------------------------------+----------+---------------+----------+ ! Common Femoral                      !Partial   !               !          ! +------------------------------------+----------+---------------+----------+ ! Prox Femoral                        !Partial   !Yes            ! None      ! +------------------------------------+----------+---------------+----------+ ! Mid Femoral                         !Yes       ! Yes            ! None      ! +------------------------------------+----------+---------------+----------+ ! Dist Femoral                        !Yes       ! Yes            ! None      ! +------------------------------------+----------+---------------+----------+ ! Popliteal                           !Yes       ! Yes            ! None      ! +------------------------------------+----------+---------------+----------+ ! Sapheno Femoral Junction            ! No        !               !          ! +------------------------------------+----------+---------------+----------+ ! PTV                                 ! Yes       ! Yes            ! None      ! +------------------------------------+----------+---------------+----------+ ! Peroneal                            !Partial   !Yes            ! None      ! +------------------------------------+----------+---------------+----------+ ! Gastroc                             ! Yes       ! Yes            ! None      ! +------------------------------------+----------+---------------+----------+ ! GSV Thigh                           ! Yes       ! Yes            ! None      ! +------------------------------------+----------+---------------+----------+ ! GSV Knee                            ! Yes       ! Yes            ! None      ! +------------------------------------+----------+---------------+----------+ ! GSV Ankle !Yes       !Yes            ! None      ! +------------------------------------+----------+---------------+----------+ ! SSV                                 ! Yes       ! Yes            ! None      ! +------------------------------------+----------+---------------+----------+ Right Doppler Measurements +---------------------------+------+------+--------------------------------+ ! Location                   ! Signal!Reflux! Reflux (msec)                   ! +---------------------------+------+------+--------------------------------+ ! Common Femoral             !Phasic!      !                                ! +---------------------------+------+------+--------------------------------+ ! Prox Femoral               !Phasic!      !                                ! +---------------------------+------+------+--------------------------------+ ! Popliteal                  !Phasic!      !                                ! +---------------------------+------+------+--------------------------------+ Left Lower Extremities DVT Study Measurements Left 2D Measurements +------------------------------------+----------+---------------+----------+ ! Location                            ! Visualized! Compressibility! Thrombosis! +------------------------------------+----------+---------------+----------+ ! Common Femoral                      !Yes       ! Yes            ! None      ! +------------------------------------+----------+---------------+----------+ ! Prox Femoral                        !Yes       ! Yes            ! None      ! +------------------------------------+----------+---------------+----------+ ! Mid Femoral                         !Yes       ! Yes            ! None      ! +------------------------------------+----------+---------------+----------+ ! Dist Femoral                        !Yes       ! Yes            ! None      ! +------------------------------------+----------+---------------+----------+ ! Popliteal !Yes       !Yes            ! None      ! +------------------------------------+----------+---------------+----------+ ! Sapheno Femoral Junction            ! Yes       ! Yes            ! None      ! +------------------------------------+----------+---------------+----------+ ! PTV                                 ! Yes       ! Yes            ! None      ! +------------------------------------+----------+---------------+----------+ ! Peroneal                            !Partial   !Yes            ! None      ! +------------------------------------+----------+---------------+----------+ ! Gastroc                             ! Yes       ! Yes            ! None      ! +------------------------------------+----------+---------------+----------+ ! GSV Thigh                           ! Yes       ! Yes            ! None      ! +------------------------------------+----------+---------------+----------+ ! GSV Knee                            ! Yes       ! Yes            ! None      ! +------------------------------------+----------+---------------+----------+ ! GSV Ankle                           ! Yes       ! Yes            ! None      ! +------------------------------------+----------+---------------+----------+ ! SSV                                 ! Yes       ! Yes            ! None      ! +------------------------------------+----------+---------------+----------+ Left Doppler Measurements +---------------------------+------+------+--------------------------------+ ! Location                   ! Signal!Reflux! Reflux (msec)                   ! +---------------------------+------+------+--------------------------------+ ! Common Femoral             !Phasic!      !                                ! +---------------------------+------+------+--------------------------------+ ! Prox Femoral               !Phasic!      !                                ! +---------------------------+------+------+--------------------------------+ ! Popliteal !Phasic!      !                                ! +---------------------------+------+------+--------------------------------+    CTA ABDOMEN PELVIS W CONTRAST    Result Date: 10/27/2021  EXAMINATION: CTA OF THE ABDOMEN AND PELVIS WITH CONTRAST 10/27/2021 9:17 pm: TECHNIQUE: CTA of the abdomen and pelvis was performed with the administration of intravenous contrast. Multiplanar reformatted images are provided for review. MIP images are provided for review. Dose modulation, iterative reconstruction, and/or weight based adjustment of the mA/kV was utilized to reduce the radiation dose to as low as reasonably achievable. COMPARISON: 06/25/2021 HISTORY: ORDERING SYSTEM PROVIDED HISTORY: abdominal pain with concern for mesenteric ischemia TECHNOLOGIST PROVIDED HISTORY: abdominal pain with concern for mesenteric ischemia Decision Support Exception - unselect if not a suspected or confirmed emergency medical condition->Emergency Medical Condition (MA) Reason for Exam: Constipation with Rectal Bleeding - Concern for mesentric ischemia. Acuity: Unknown Type of Exam: Unknown FINDINGS: CTA ABDOMEN: There is moderate to severe calcific aorto iliac atherosclerotic disease with no evidence of an abdominal aortic aneurysm. Moderate to severe stenoses are suspected in the proximal common iliac arteries bilaterally, right greater than left. There is calcified plaque in celiac artery and SMA branches. Both vessels are patent. An inferior mesenteric artery is not visualized and is presumed occluded. High-grade stenosis in the proximal left renal artery. Calcific coronary artery disease with stents. Heart size is normal.  Lung bases are clear. There is mild dependent right basilar atelectasis. The liver, spleen pancreas, adrenal glands and kidneys are normal.  2 cm right renal cyst.  No calcified gallstones. There is a moderate stool load in the rectosigmoid colon. Scattered left colon diverticula. No evidence diverticulitis. There are fluid levels in right and transverse colon. There is mild concentric mural thickening involving the distal ileum similar to the prior study. The appendix is normal. CTA PELVIS: Calcified plaque in the external iliac arteries and common femoral arteries without evidence of a stenosis. There is a Shook catheter decompressing the urinary bladder. The uterus and ovaries are atrophic. There is no free air or free fluid. No acute bone finding. Moderate severe calcific aorto iliac atherosclerotic disease. There are moderate to high-grade stenoses at the origins of both common iliac arteries, right greater than left. High-grade stenosis suspected in the proximal left renal artery. Calcified plaque in branches of the celiac artery and superior mesenteric artery. Both vessels are patent. An NIKOLAY was not visualized and is presumed occluded. Mural thickening in the distal ileum similar to the prior study. Findings may reflect an acute recurrent or chronic nonspecific terminal ileitis. Inflammatory, infectious and chronic ischemic etiologies are all possibilities. Moderate rectosigmoid fecal impaction. XR ABDOMEN FOR NG/OG/NE TUBE PLACEMENT    Result Date: 9/29/2021  EXAMINATION: ONE SUPINE XRAY VIEW(S) OF THE ABDOMEN 9/29/2021 4:34 am COMPARISON: 07/25/2020 HISTORY: ORDERING SYSTEM PROVIDED HISTORY: NG/OG tube placement TECHNOLOGIST PROVIDED HISTORY: NG/OG tube placement Portable? ->Yes Reason for Exam: supine FINDINGS: An enteric tube terminates in the body of the stomach. The side port is distal to the GE junction. Enteric tube terminating in the body of the stomach. The side port is distal to the GE junction. RECENT VITALS:     Temp: 98.2 °F (36.8 °C),  Pulse: 92, Resp: (!) 36, BP: 126/67, SpO2: 95 %    This patient is a 64 y.o.  Female with the patient relative correction for this emergency department course of problems patient also complaining urinary tension most likely secondary to constipation. Patient had Shook placed 900 mL were alleviated abdominal pain and abdominal pain had resolved. She is noted to have a lactic acidosis of 2.2 given fluids and CT scan was ordered. CT is noted to have ileitis, general surgery consulted recommended admission enemas for constipation. Repeat lactic acid, and repeat evaluation by general surgery. They recommend admission to [facility]. ED Course as of Oct 28 0208   Wed Oct 27, 2021   1902 Patient feeling much better after Shook catheter placement. 900 mL of urine obtained. Lactic acid noted to be 2.2, previously patient noted to have elevated lactic acidosis, plan to continue giving fluids. [CR]   1903 Troponin noted to be 16, this is decreased from previous lab values. [CR]   2003 Concern for patient having bedbugs, patient was evaluated and informed about this concern. Will provide shower to patient prior to CT scan.    [CR]   2118 Patient noted to have nausea and abdominal pain continuing, patient did have a bowel movement while in the shower.    [CR]   2235 Troponins stable not increasing    [WK]   u Oct 28, 2021   0055 Concern for ileitis on CT scan, general surgery consulted for evaluation and further work-up. Patient has had a bowel movement and abdominal pain is improved. We will plan to either discharge or admit patient after general surgery recommendations. [CR]   0142 General surgery evaluated patient, recommend enema, repeat lactic acid in the a.m., and admission to the floor for medical management. [CR]      ED Course User Index  [CR] Johnnie Schwab Ruettinger, DO  [WK] James Mccurdy DO       OUTSTANDING TASKS / RECOMMENDATIONS:    1. Admission  2. Bed waiting     FINAL IMPRESSION:     1. Ileitis    2. Constipation, unspecified constipation type        DISPOSITION:         DISPOSITION:  []  Discharge   []  Transfer -    [x]  Admission -     []  Against Medical Advice   []  Eloped   FOLLOW-UP: No follow-up provider specified. DISCHARGE MEDICATIONS: New Prescriptions    No medications on file           Latoya Miller MD  Emergency Medicine Resident  [facility]        Latoya Miller MD  Resident  10/28/21 0059

## 2021-10-28 NOTE — CARE COORDINATION
Case Management Initial Discharge Plan  Ivania Mccullough,             Met with:patient to discuss discharge plans. Information verified: address, contacts, phone number, , insurance Yes  Insurance Provider:   Ramón Altman  Emergency Contact/Next of Kin name & number:   1. Haase,Tony 2. Gus Hopping      Child  Child [phone]       Who are involved in patient's support system? son    PCP: Elena Walters MD  Date of last visit: 2 months      Discharge Planning    Living Arrangements:        Home has 1 stories  2 stairs to climb to get into front door, 0stairs to climb to reach second floor  Location of bedroom/bathroom in home main    Patient able to perform ADL's:Independent    Current Services (outpatient & in home) none  DME equipment: none  DME provider: none    Is patient receiving oral anticoagulation therapy? Yes eliquis     If indicated:   Physician managing anticoagulation treatment: [facility]  Where does patient obtain lab work for ATC treatment? Potential Assistance Needed:       Patient agreeable to home care:  No  [place] of choice provided:  n/a    Prior SNF/Rehab Placement and Facility: none  Agreeable to SNF/Rehab: No  [place] of choice provided: n/a     Evaluation: no    Expected Discharge date:       Patient expects to be discharged to: If home: is the family and/or caregiver wiling & able to provide support at home? yes  Who will be providing this support? son    Follow Up Appointment: Best Day/ Time:      Transportation provider: friends, family, bus  Transportation arrangements needed for discharge: No    Readmission Risk              Risk of Unplanned Readmission:  27             Does patient have a readmission risk score greater than 14?: Yes  If yes, follow-up appointment must be made within 7 days of discharge.      Goals of Care: to get better      Educated pt on transitional options, provided freedom of choice and are agreeable with plan      Discharge Plan: Home independently,lives with son, pcp established, has transportation          Electronically signed by Bobby York RN on 10/28/21 at 9:50 AM EDT

## 2021-10-28 NOTE — ED NOTES
Pt transported to [address] via [address] [place] [address] Rajesh, RN  10/27/21 2127

## 2021-10-28 NOTE — DISCHARGE SUMMARY
[facility]  Office: [address], DO, Saji Bah, DO, Celeste Christopher, DO, Janet Garcia Blood, DO, Corby Hassan MD, Henri Santos MD, Antoine Diaz MD, Tootie Terrell MD, Abby Sherman MD, Blayne Ferrell MD, Opal Herrera MD, Judy Zafar MD, Annabel Kauffman DO, Celestino Flynn DO, Nik Gaytan MD,  Greg Hammer DO, Jairo Lewis MD, Paco Hubbard MD, Nella Arnold MD, Magaly Guerrero MD, Calvin Brooks MD, Negar Rosales MD, Donna Isaac [facility], [facility], CNP, Haylee Osuna, CNP, Jose Enrique Doll, CNS, Asia Carranza, [facility], Sarika Liao, Esthela Mckeon, CNP, Paula Paz, CNP, Shane Correa, CNP, Horace Nieves, CNP, Romaine Hall PA-C, Cleone Denver, [facility], Jocelynn Roy, CNP, Byron Vale, CNP, Lidia Bates, CNP, Stephane Valdez, CNP, Faheem Aguirre, CNP, Govind Hurley, CNP, Kendallvalorie Holley, [facility]    Discharge Summary     Patient ID: Paty Caicedo  :  1960   MRN: [id number]     ACCOUNT:  [de-identified]   Patient's PCP: Althea Melton MD  Admit Date: 10/27/2021   Discharge Date: 10/28/2021     Length of Stay: 0  Code Status:  Full Code  Admitting Physician: No admitting provider for patient encounter. Discharge Physician: Margie Meza DO     Active Discharge Diagnoses:     Hospital Problem Lists:  Principal Problem:    Ischemic colitis [facility])  Active Problems:    Essential hypertension    Coronary artery disease involving native coronary artery of native heart without angina pectoris    Type 2 diabetes mellitus with hyperglycemia, without long-term current use of insulin (HCC)    Other constipation    Urine retention  Resolved Problems:    * No resolved hospital problems.  *      Admission Condition:  fair     Discharged Condition: stable    Hospital Stay:     Hospital Course:  Paty Caicedo is a 64 y.o. female who was admitted for the management of   Ischemic colitis ([facility] [address] , presented to ER with Constipation (pt reports constipoation since Thursday; OTC meds with no relief; endorses nausea ) and Rectal Bleeding (also reports bright red blood while cleaning rectum xs 30 min; pt on blood thinners)    This is a 26-year-old female presents with a complaint of abdominal pain with cramping and constipation since last Thursday. She has a history of chronic diarrhea related to her diabetic medications and for the last 6 or 7 days has not been able to have a BM. She started developing abdominal cramping and diarrhea and presented to the emergency room when she developed rectal bleeding. Upon evaluation she was found to have calcification of the stenosis of the mesenteric vessels on CT as well as a significant fecal impaction with evidence of colitis. She underwent surgical consultation with recommendations for enema which was completed with resolution of her constipation. At that point her Shook catheter was able to be removed and she was able to successfully void. Her symptoms had resolved with no further bleeding with a stable H&H as she was cleared for discharge. She will be discharged home on MiraLAX daily as needed in addition to her usual home medications.       Significant therapeutic interventions: Surgery evaluation, soapsuds enema    Significant Diagnostic Studies:   Labs / Micro:  CBC:   Lab Results   Component Value Date    WBC 8.6 10/27/2021    RBC 4.22 10/27/2021    RBC 4.10 06/02/2012    HGB 11.4 10/27/2021    HCT 36.8 10/27/2021    MCV 87.2 10/27/2021    MCH 27.0 10/27/2021    MCHC 31.0 10/27/2021    RDW 15.4 10/27/2021     10/27/2021     06/02/2012     BMP:    Lab Results   Component Value Date    GLUCOSE 142 10/27/2021    GLUCOSE 94 06/02/2012     10/27/2021    K 4.1 10/27/2021     10/27/2021    CO2 18 10/27/2021    ANIONGAP 16 10/27/2021    BUN 13 10/27/2021    CREATININE 1.03 10/27/2021    BUNCRER NOT REPORTED 10/27/2021    CALCIUM 9.9 10/27/2021    LABGLOM 55 10/27/2021    GFRAA >60 10/27/2021    GFR      10/27/2021    GFR NOT REPORTED 10/27/2021        Radiology:  CTA ABDOMEN PELVIS W CONTRAST    Result Date: 10/27/2021  Moderate severe calcific aorto iliac atherosclerotic disease. There are moderate to high-grade stenoses at the origins of both common iliac arteries, right greater than left. High-grade stenosis suspected in the proximal left renal artery. Calcified plaque in branches of the celiac artery and superior mesenteric artery. Both vessels are patent. An NIKOLAY was not visualized and is presumed occluded. Mural thickening in the distal ileum similar to the prior study. Findings may reflect an acute recurrent or chronic nonspecific terminal ileitis. Inflammatory, infectious and chronic ischemic etiologies are all possibilities. Moderate rectosigmoid fecal impaction. Consultations:    Consults:     Final Specialist Recommendations/Findings:   IP CONSULT TO GENERAL SURGERY  IP CONSULT TO HOSPITALIST      The patient was seen and examined on day of discharge and this discharge summary is in conjunction with any daily progress note from day of discharge. Discharge plan:     Disposition: Home    Physician Follow Up:   PCP 1 week, surgery as directed  No follow-up provider specified. Requiring Further Evaluation/Follow Up POST HOSPITALIZATION/Incidental Findings: None    Diet: cardiac diet and diabetic diet    Activity: As tolerated    Instructions to Patient: Take medication as prescribed    Discharge Medications:      Medication List      START taking these medications    polyethylene glycol 17 GM/SCOOP powder  Commonly known as: GLYCOLAX  Take 17 g by mouth daily as needed (Constipation)        CHANGE how you take these medications    * Ventolin  (90 Base) MCG/ACT inhaler  Generic drug: albuterol sulfate HFA  What changed: Another medication with the same name was changed. Make sure you understand how and when to take each.      * albuterol (5 MG/ML) 0.5% nebulizer solution  Commonly known as: PROVENTIL  Take 0.5 mLs by nebulization every 6 hours as needed for Wheezing  What changed: when to take this         * This list has 2 medication(s) that are the same as other medications prescribed for you. Read the directions carefully, and ask your doctor or other care provider to review them with you. CONTINUE taking these medications    acetaminophen 500 MG tablet  Commonly known as: TYLENOL  Take 1 tablet by mouth every 6 hours as needed for Pain     albuterol-ipratropium  MCG/ACT Aers inhaler  Commonly known as: COMBIVENT RESPIMAT  Inhale 1 puff into the lungs every 6 hours     apixaban 5 MG Tabs tablet  Commonly known as: Eliquis  Take 1 tablet by mouth 2 times daily     aspirin 81 MG chewable tablet  Take 1 tablet by mouth daily     atorvastatin 40 MG tablet  Commonly known as: Lipitor  Take 1 tablet by mouth daily     blood glucose test strips  Test 3 times a day & as needed for symptoms of irregular blood glucose.      budesonide-formoterol 160-4.5 MCG/ACT Aero  Commonly known as: SYMBICORT  Inhale 2 puffs into the lungs 2 times daily     busPIRone 15 MG tablet  Commonly known as: BUSPAR  Take 15 mg by mouth 3 times daily     clopidogrel 75 MG tablet  Commonly known as: PLAVIX  Take 1 tablet by mouth daily     famotidine 20 MG tablet  Commonly known as: PEPCID     glipiZIDE 5 MG tablet  Commonly known as: GLUCOTROL  Take 1 tablet by mouth 2 times daily     glucose monitoring kit  1 kit by Does not apply route daily     Lancets Misc  1 each by Does not apply route daily     lidocaine 5 %  Commonly known as: LIDODERM     lisinopril 10 MG tablet  Commonly known as: PRINIVIL;ZESTRIL  Take 1 tablet by mouth daily     metFORMIN 1000 MG tablet  Commonly known as: GLUCOPHAGE  Take 1 tablet by mouth 2 times daily (with meals)     metoprolol tartrate 25 MG tablet  Commonly known as: LOPRESSOR  Take 0.5 tablets by mouth 2 times daily nitroGLYCERIN 0.4 MG SL tablet  Commonly known as: NITROSTAT  up to max of 3 total doses. If no relief after 1 dose, call 911. tiotropium 18 MCG inhalation capsule  Commonly known as: SPIRIVA  Inhale 1 capsule into the lungs daily Pt told that she was using this medication at home. traZODone 50 MG tablet  Commonly known as: DESYREL     Trulicity 1.5 ELBERT/3.1FP Sopn  Generic drug: Dulaglutide           Where to Get Your Medications      These medications were sent to [facility] [address] [facility], [address] [place] [address], [address] [address]    Phone: [phone]   · polyethylene glycol 17 GM/SCOOP powder         No discharge procedures on file. Time Spent on discharge is  28 minutes in patient examination, evaluation, counseling as well as medication reconciliation, prescriptions for required medications, discharge plan and follow up. Electronically signed by   Iza Choe DO  10/28/2021  3:06 PM      Thank you Dr. Mahesh Salas MD for the opportunity to be involved in this patient's care.

## 2021-10-28 NOTE — PROGRESS NOTES
Patient seen and evaluated. No acute events overnight. Recommend enema with bowel regimen. No surgical intervention at this time. General surgery will be signing off. Please contact with any questions or concerns.      Karthik Howard,  PGY-2

## 2021-10-28 NOTE — ED PROVIDER NOTES
[facility]   Emergency Department  Emergency Medicine Attending Sign-out     Care of Parth Davis was assumed from previous attending Dr. Bouchra Verde and is being seen for Constipation (pt reports constipoation since Thursday; OTC meds with no relief; endorses nausea ) and Rectal Bleeding (also reports bright red blood while cleaning rectum xs 30 min; pt on blood thinners)  . The patient's initial evaluation and plan have been discussed with the prior provider who initially evaluated the patient. Attestation  I was available and discussed any additional care issues that arose and coordinated the management plans with the resident(s) caring for the patient during my duty period. Any areas of disagreement with resident's documentation of care or procedures are noted on the chart. I was personally present for the key portions of any/all procedures, during my duty period. I have documented in the chart those procedures where I was not present during the key portions. BRIEF PATIENT SUMMARY/MDM COURSE PER INITIAL PROVIDER:   RECENT VITALS:     Temp: 98.2 °F (36.8 °C),  Pulse: 92, Resp: (!) 36, BP: 126/67, SpO2: 95 %    This patient is a 64 y.o. Female with patient with arterial disease, coming in with severe abdominal pain and constipation. There is concern for but initial mesenteric ischemia. CT scan shows chronic ischemia. Awaiting vascular surgery.     DIAGNOSTICS/MEDICATIONS:     MEDICATIONS GIVEN:  ED Medication Orders (From admission, onward)    Start Ordered     Status Ordering Provider    10/27/21 2130 10/27/21 2119  ondansetron (ZOFRAN) injection 4 mg  ONCE      Last MAR action: Given - by Bogdan Ortega on 10/28/21 at [address], [address]    10/27/21 1934 10/27/21 1935  iopamidol (ISOVUE-370) 76 % injection 100 mL  IMG ONCE PRN      Last MAR action: Given - by Eliane King on 10/27/21 at [address]    10/27/21 1830 10/27/21 1815  ondansetron ([place]) injection 4 mg  ONCE      Last MAR action: Given - by Sonny Rater on 10/27/21 at [address]    10/27/21 1800 10/27/21 1751  lactated ringers bolus  ONCE      Last MAR action: New Bag - by Sonny Rater on 10/27/21 at [address] [place] [address], [address] [place] [address]. Reviewed   CBC WITH AUTO DIFFERENTIAL - Abnormal; Notable for the following components:       Result Value    Hemoglobin 11.4 (*)     RDW 15.4 (*)     Seg Neutrophils 82 (*)     Lymphocytes 10 (*)     Immature Granulocytes 1 (*)     Absolute Lymph # 0.83 (*)     All other components within normal limits   COMPREHENSIVE METABOLIC PANEL W/ REFLEX TO MG FOR LOW K - Abnormal; Notable for the following components:    Glucose 142 (*)     CREATININE 1.03 (*)     CO2 18 (*)     Total Bilirubin 0.26 (*)     GFR Non- 55 (*)     All other components within normal limits   TROPONIN - Abnormal; Notable for the following components:    Troponin, High Sensitivity 16 (*)     All other components within normal limits   LACTIC ACID, PLASMA - Abnormal; Notable for the following components:    Lactic Acid, Whole Blood 2.2 (*)     All other components within normal limits   URINE RT REFLEX TO CULTURE - Abnormal; Notable for the following components:    Urine Hgb TRACE (*)     All other components within normal limits   TROPONIN - Abnormal; Notable for the following components:    Troponin, High Sensitivity 15 (*)     All other components within normal limits   LIPASE   PROTIME-INR   APTT   MICROSCOPIC URINALYSIS       RADIOLOGY  CTA ABDOMEN PELVIS W CONTRAST    Result Date: 10/27/2021  EXAMINATION: CTA OF THE ABDOMEN AND PELVIS WITH CONTRAST 10/27/2021 9:17 pm: TECHNIQUE: CTA of the abdomen and pelvis was performed with the administration of intravenous contrast. Multiplanar reformatted images are provided for review. MIP images are provided for review.  Dose modulation, iterative reconstruction, and/or weight based adjustment of the mA/kV was utilized to reduce the radiation dose to as low as reasonably achievable. COMPARISON: 06/25/2021 HISTORY: ORDERING SYSTEM PROVIDED HISTORY: abdominal pain with concern for mesenteric ischemia TECHNOLOGIST PROVIDED HISTORY: abdominal pain with concern for mesenteric ischemia Decision Support Exception - unselect if not a suspected or confirmed emergency medical condition->Emergency Medical Condition (MA) Reason for Exam: Constipation with Rectal Bleeding - Concern for mesentric ischemia. Acuity: Unknown Type of Exam: Unknown FINDINGS: CTA ABDOMEN: There is moderate to severe calcific aorto iliac atherosclerotic disease with no evidence of an abdominal aortic aneurysm. Moderate to severe stenoses are suspected in the proximal common iliac arteries bilaterally, right greater than left. There is calcified plaque in celiac artery and SMA branches. Both vessels are patent. An inferior mesenteric artery is not visualized and is presumed occluded. High-grade stenosis in the proximal left renal artery. Calcific coronary artery disease with stents. Heart size is normal.  Lung bases are clear. There is mild dependent right basilar atelectasis. The liver, spleen pancreas, adrenal glands and kidneys are normal.  2 cm right renal cyst.  No calcified gallstones. There is a moderate stool load in the rectosigmoid colon. Scattered left colon diverticula. No evidence diverticulitis. There are fluid levels in right and transverse colon. There is mild concentric mural thickening involving the distal ileum similar to the prior study. The appendix is normal. CTA PELVIS: Calcified plaque in the external iliac arteries and common femoral arteries without evidence of a stenosis. There is a Shook catheter decompressing the urinary bladder. The uterus and ovaries are atrophic. There is no free air or free fluid. No acute bone finding. Moderate severe calcific aorto iliac atherosclerotic disease. There are moderate to high-grade stenoses at the origins of both common iliac arteries, right greater than left. High-grade stenosis suspected in the proximal left renal artery. Calcified plaque in branches of the celiac artery and superior mesenteric artery. Both vessels are patent. An NIKOLAY was not visualized and is presumed occluded. Mural thickening in the distal ileum similar to the prior study. Findings may reflect an acute recurrent or chronic nonspecific terminal ileitis. Inflammatory, infectious and chronic ischemic etiologies are all possibilities. Moderate rectosigmoid fecal impaction. OUTSTANDING TASKS / ADDITIONAL COMMENTS   1. Vascular surgery recommendations  2.  Vlad Mckenna MD  Emergency Medicine Attending  [address] Rob Pepper MD  10/28/21 5763

## 2021-10-28 NOTE — CONSULTS
General Surgery  Consult    PATIENT NAME: Germania Hewitt  AGE: 64 y.o. MEDICAL RECORD NO. [id number]  DATE: 10/28/2021  SURGEON: Dr. Cherise Dillon: Rk Joshi MD    Patient evaluated at the request of  Dr. Wu Villeda, Dr. Omar Mace  Reason for evaluation: Terminal ileitus    Patient information was obtained from patient. History/Exam limitations: none. Patient presented to the Emergency Department by private vehicle. IMPRESSION:     Patient Active Problem List   Diagnosis    SOB (shortness of breath)    Chronic bronchitis (HCC)    Chest pain    Tobacco abuse    Obesity (BMI 30.0-34. 9)    Lung nodule    ACS (acute coronary syndrome) ([facility])    Essential hypertension    S/P drug eluting coronary stent placement - Mid RCA () 2/11/19 (Dr. Denise Martinez)    Gastroenteritis    HARVEY (acute kidney injury) ([address]    High anion gap metabolic acidosis    Coronary artery disease involving native coronary artery of native heart without angina pectoris    Tobacco abuse counseling    Lactic acidosis    Vitamin D deficiency    Marijuana abuse    Type 2 diabetes mellitus with hyperglycemia, without long-term current use of insulin ([address]   1. Constipation  2. Terminal ileitis, possibly chronic      PLAN:   1. Admit to medicine due to previous history of pneumonia admission  2. We recommend IV hydration and repeat lactate later in the morning  3. We recommend soap suds enema and bowel regimen  4. Will monitor her abdominal exams   5. No general surgery intervention scheduled at this time    HISTORY:   History of Chief Complaint:    Germania Hewitt is a 64 y.o. female who presents with constipation and abdominal pain for about one week. She was able to have a bowel movement here in the ED that had bright red blood on the toilet paper. Patient states that her last bowel movement was on Thursday, patient has tried laxatives without any benefit.  She describes having some mild lightheadedness, but denies dizziness no headache. Patient denies any chest pain, shortness of breath. Patient reports she has not had a colonoscopy. She was recently admitted in the hospital for pneumonia, covid negative. She has a history of diabetes, COPD, HTN and peripheral artery disease. Surgical history includes coronary angioplasty with stent. She denies smoking tobacco or drinking alcohol. Past Medical History   has a past medical history of Asthma, Blood circulation, collateral, CAD (coronary artery disease), COPD (chronic obstructive pulmonary disease) ([facility] Diabetes mellitus ([facility] Hyperlipidemia, Hypertension, Movement disorder, Neuropathy, and PAD (peripheral artery disease) ([facility] Past Surgical History   has a past surgical history that includes Coronary angioplasty with stent; Tonsillectomy; Inner ear surgery (Right); and Coronary angioplasty with stent (02/11/2019). Medications  Prior to Admission medications    Medication Sig Start Date End Date Taking? Authorizing Provider   busPIRone (BUSPAR) 15 MG tablet Take 15 mg by mouth 3 times daily 10/8/21 11/7/21  Chidi Briscoe MD   budesonide-formoterol (SYMBICORT) 160-4.5 MCG/ACT AERO Inhale 2 puffs into the lungs 2 times daily 10/8/21   Joanie Lara MD   lisinopril (PRINIVIL;ZESTRIL) 10 MG tablet Take 1 tablet by mouth daily 10/9/21   Joanie Lara MD   clopidogrel (PLAVIX) 75 MG tablet Take 1 tablet by mouth daily 10/9/21   Chidi Briscoe MD   famotidine (PEPCID) 20 MG tablet Take 20 mg by mouth 2 times daily    Historical Provider, MD   albuterol sulfate HFA (VENTOLIN HFA) 108 (90 Base) MCG/ACT inhaler Inhale 2 puffs into the lungs 3 times daily    Historical Provider, MD   traZODone (DESYREL) 50 MG tablet Take 50 mg by mouth as needed for Sleep (takes sometimes to help sleep)    Historical Provider, MD   lidocaine (LIDODERM) 5 % Place 1 patch onto the skin as needed for Pain (as needed for rotator cuff pain) 12 hours on, 12 hours off.     Historical Provider, MD   apixaban (ELIQUIS) 5 MG TABS tablet Take 1 tablet by mouth 2 times daily 10/5/21   Gunjan Mathias MD   atorvastatin (LIPITOR) 40 MG tablet Take 1 tablet by mouth daily 7/27/21   Renella Boeck, MD   Dulaglutide (TRULICITY) 1.5 TT/2.1XW SOPN Inject 1.5 mg into the skin once a week    Historical Provider, MD   aspirin 81 MG chewable tablet Take 1 tablet by mouth daily 2/13/19   BINDU Cohn CNP   nitroGLYCERIN (NITROSTAT) 0.4 MG SL tablet up to max of 3 total doses. If no relief after 1 dose, call 911. 2/12/19   BINDU Cohn CNP   metoprolol tartrate (LOPRESSOR) 25 MG tablet Take 0.5 tablets by mouth 2 times daily 2/12/19   BINDU Cohn CNP   Lancets MISC 1 each by Does not apply route daily 2/12/19   Roslyn Bruno MD   glipiZIDE (GLUCOTROL) 5 MG tablet Take 1 tablet by mouth 2 times daily 2/12/19   Roslyn Bruno MD   metFORMIN (GLUCOPHAGE) 1000 MG tablet Take 1 tablet by mouth 2 times daily (with meals) 2/12/19   Roslyn Bruno MD   blood glucose monitor strips Test 3 times a day & as needed for symptoms of irregular blood glucose. 2/12/19   Roslyn Bruno MD   glucose monitoring kit (FREESTYLE) monitoring kit 1 kit by Does not apply route daily 2/12/19   Roslyn Bruno MD   albuterol (PROVENTIL) (5 MG/ML) 0.5% nebulizer solution Take 0.5 mLs by nebulization every 6 hours as needed for Wheezing  Patient taking differently: Take 2.5 mg by nebulization 2 times daily  3/16/18   Demian Kaba MD   tiotropium (SPIRIVA) 18 MCG inhalation capsule Inhale 1 capsule into the lungs daily Pt told that she was using this medication at home.  3/16/18   Demian Kaba MD   albuterol-ipratropium (COMBIVENT RESPIMAT)  MCG/ACT AERS inhaler Inhale 1 puff into the lungs every 6 hours 3/16/18   Demian Kaba MD   acetaminophen (TYLENOL) 500 MG tablet Take 1 tablet by mouth every 6 hours as needed for Pain 1/9/18   Deandre Saxena MD Scheduled Meds:   polyethylene glycol  17 g Oral BID     Continuous Infusions:  PRN Meds:. Allergies  is allergic to codeine and morphine. Family History  family history includes Diabetes in her brother and sister; Hypertension in her father and mother; Stroke in her brother. Social History   reports that she has been smoking cigarettes. She has a 22.50 pack-year smoking history. She uses smokeless tobacco.   reports no history of alcohol use. reports current drug use. Drug: Marijuana. Review of Systems  General Reports light headedness  HEENT  Denies any diplopia, tinnitus or vertigo  Resp Denies any shortness of breath, cough or wheezing  Cardiac Denies any chest pain, palpitations, claudication or edema  GI Reports abdominal pain, constipation, some bright red blood per rectum   Denies any frequency, urgency, hesitancy or incontinence  Heme Denies bruising or bleeding easily  Endocrine Reports history of diabetes  Neuro Denies any focal motor or sensory deficits    PHYSICAL:   VITALS:  temperature is 98.2 °F (36.8 °C). Her blood pressure is 126/67 and her pulse is 92. Her respiration is 36 (abnormal) and oxygen saturation is 95%. CONSTITUTIONAL: Alert and oriented times 3, no acute distress and cooperative to examination. HEENT: Head is normocephalic, atraumatic. EOMI, PERRLA  NECK: Soft, trachea midline and straight  LUNGS: Chest expands equally bilaterally upon respiration, no accessory muscle used. Ausculation reveals no wheezes, rales or rhonchi. CARDIOVASCULAR: Heart sounds are normal.  Regular rate and rhythm without murmur, gallop or rub. ABDOMEN: soft, nondistended, tender in the LLQ, no masses or organomegaly, no guarding or peritoneal signs, Rectal exam with some stool in rectal vault, no blood, very tender to palpation  NEUROLOGIC: CN II-XII are grossly intact.  There are no focalizing motor or sensory deficits  EXTREMITIES: no cyanosis, clubbing or edema    LABS:     Recent Labs 10/27/21  1820 10/27/21  1856   WBC 8.6  --    HGB 11.4*  --    HCT 36.8  --      --      --    K 4.1  --      --    CO2 18*  --    BUN 13  --    CREATININE 1.03*  --    CALCIUM 9.9  --    INR 0.9  --    AST 14  --    ALT 13  --    BILITOT 0.26*  --    NITRU  --  NEGATIVE   COLORU  --  Yellow   BACTERIA  --  NOT REPORTED     Recent Labs     10/27/21  1820   ALKPHOS 82   ALT 13   AST 14   BILITOT 0.26*   LABALBU 3.8   LIPASE 27     CBC with Differential:    Lab Results   Component Value Date    WBC 8.6 10/27/2021    RBC 4.22 10/27/2021    RBC 4.10 06/02/2012    HGB 11.4 10/27/2021    HCT 36.8 10/27/2021     10/27/2021     06/02/2012    MCV 87.2 10/27/2021    MCH 27.0 10/27/2021    MCHC 31.0 10/27/2021    RDW 15.4 10/27/2021    LYMPHOPCT 10 10/27/2021    MONOPCT 6 10/27/2021    BASOPCT 0 10/27/2021    MONOSABS 0.50 10/27/2021    LYMPHSABS 0.83 10/27/2021    EOSABS 0.04 10/27/2021    BASOSABS 0.03 10/27/2021    DIFFTYPE NOT REPORTED 10/27/2021     BMP:    Lab Results   Component Value Date     10/27/2021    K 4.1 10/27/2021     10/27/2021    CO2 18 10/27/2021    BUN 13 10/27/2021    LABALBU 3.8 10/27/2021    LABALBU 4.0 06/01/2012    CREATININE 1.03 10/27/2021    CALCIUM 9.9 10/27/2021    GFRAA >60 10/27/2021    LABGLOM 55 10/27/2021    GLUCOSE 142 10/27/2021    GLUCOSE 94 06/02/2012       RADIOLOGY:     CTA ABDOMEN PELVIS W CONTRAST   Final Result   Moderate severe calcific aorto iliac atherosclerotic disease. There are   moderate to high-grade stenoses at the origins of both common iliac arteries,   right greater than left. High-grade stenosis suspected in the proximal left   renal artery. Calcified plaque in branches of the celiac artery and superior mesenteric   artery. Both vessels are patent. An NIKOLAY was not visualized and is presumed   occluded. Mural thickening in the distal ileum similar to the prior study.   Findings   may reflect an acute recurrent or chronic nonspecific terminal ileitis. Inflammatory, infectious and chronic ischemic etiologies are all   possibilities. Moderate rectosigmoid fecal impaction. Thank you for the interesting evaluation. Further recommendations to follow. Ellis Duran DO  10/28/2021, 1:57 AM              Trauma Attending Attestation      I have reviewed the above [facility] note(s) and confirmed the key elements of the medical history and physical exam. I have seen and examined the pt. I have discussed the findings, established the care plan and recommendations with Resident, [facility] RN, bedside nurse.         Ruth Slaughter DO  10/28/2021  7:14 AM

## 2021-10-28 NOTE — ED NOTES
Assumed care of patient asleep, awakes easily. Is in no distress. Is NPO for surgery by GI     Bernadine Perez, RN  10/28/21 [id number]

## 2021-10-28 NOTE — H&P
[facility]  Office: [address], DO, Imelda Pelayo DO, Tamara Rios DO, Shiv Presley, DO, Shaye Soto MD, Jace Parker MD, Emanuel Davalos MD, Ivet Zendejas MD, Candelaria Inman MD, Gege Taylor MD, Michael Gama MD, Laly Sandoval MD, Soheila Guo DO, Alannah Fernández DO, Melvina Da Silva MD,  Navarro Garcia DO, Josy House MD, Bryan Valentine MD, Miguel Neves MD, Edward Jackson MD, Stefani Toro MD, Toyin Etienne MD, Cami Saldana CNP, [facility], CNP, Michaelle Patton, CNP, Kanu Madera, CNS, Kenia Mixon, CNP, Larry Porter, CNP, Lauren Patrick, CNP, Sarah Amato, CNP, Julian Pham, CNP, Shauna Lr, CNP, Lalitha Villa PAJosieC, Minerva Chambers, [facility], Lilia Valles, CNP, Komal Pena, CNP, Diogenes Thompson, CNP, Johnathan Campos, CNP, Marquise Grady, CNP, Kavin Love, CNP, Emelai Garcia, CNP         [facility] [address]    HISTORY AND PHYSICAL EXAMINATION            Date:   10/28/2021  Patient name:  Gabi Ascencio  Date of admission:  10/27/2021  5:18 PM  MRN:   [id number]  Account:  [de-identified]  YOB: 1960  PCP:    Malka Alicia MD  Room:   04/04  Code Status:    Full Code    Chief Complaint:     Chief Complaint   Patient presents with    Constipation     pt reports constipoation since Thursday; OTC meds with no relief; endorses nausea     Rectal Bleeding     also reports bright red blood while cleaning rectum xs 30 min; pt on blood thinners       History Obtained From:     patient    History of Present Illness:     Gabi Ascencio is a 64 y.o. Non- / non  female who presents with Constipation (pt reports constipoation since Thursday; OTC meds with no relief; endorses nausea ) and Rectal Bleeding (also reports bright red blood while cleaning rectum xs 30 min; pt on blood thinners)   and is admitted to the hospital for the management of Ischemic colitis ([facility] [address]     This is a 62-year-old female that presents with a complaint of abdominal pain and constipation. She reports that she has not had a BM in approximately 1 week was unusual for her. Her diabetic medications usually cause her to have chronic diarrhea. Over the last 24 hours she has developed abdominal cramping with diarrhea and rectal bleeding. Upon imaging here she is found to have calcification and stenosis of the mesenteric vessels as well as evidence of fecal impaction. Surgical consultation obtained. She has not markedly improved with initial measures overnight and awaiting surgical input. Denies any nausea or vomiting, fevers or chills or other associated symptoms. Past Medical History:     Past Medical History:   Diagnosis Date    Asthma     Blood circulation, collateral     CAD (coronary artery disease)     COPD [facility]chronic obstructive pulmonary disease) ([facility])     Diabetes mellitus ([facility])     Hyperlipidemia     Hypertension     Movement disorder     B/L torn rotator cuff.  Neuropathy     PAD (peripheral artery disease) ([facility])         Past Surgical History:     Past Surgical History:   Procedure Laterality Date    CORONARY ANGIOPLASTY WITH STENT PLACEMENT      x2    CORONARY ANGIOPLASTY WITH STENT PLACEMENT  02/11/2019    JOSE MARTIN to RCA per Dr. Amelia Glaser radial approuch    INNER EAR SURGERY Right     TONSILLECTOMY          Medications Prior to Admission:     Prior to Admission medications    Medication Sig Start Date End Date Taking?  Authorizing Provider   busPIRone (BUSPAR) 15 MG tablet Take 15 mg by mouth 3 times daily 10/8/21 11/7/21  Chidi Sam MD   budesonide-formoterol [facility]) 160-4.5 MCG/ACT AERO Inhale 2 puffs into the lungs 2 times daily 10/8/21   Chidi aSm MD   lisinopril (PRINIVIL;ZESTRIL) 10 MG tablet Take 1 tablet by mouth daily 10/9/21   Monica Gutierrez MD   clopidogrel (PLAVIX) 75 MG tablet Take 1 tablet by mouth daily 10/9/21   Chidi Sam MD   famotidine (PEPCID) 20 MG tablet Take 20 mg by mouth 2 times daily    Historical Provider, MD   albuterol sulfate HFA (VENTOLIN HFA) 108 (90 Base) MCG/ACT inhaler Inhale 2 puffs into the lungs 3 times daily    Historical Provider, MD   traZODone (DESYREL) 50 MG tablet Take 50 mg by mouth as needed for Sleep (takes sometimes to help sleep)    Historical Provider, MD   lidocaine (LIDODERM) 5 % Place 1 patch onto the skin as needed for Pain (as needed for rotator cuff pain) 12 hours on, 12 hours off. Historical Provider, MD   apixaban (ELIQUIS) 5 MG TABS tablet Take 1 tablet by mouth 2 times daily 10/5/21   Litzy Almonte MD   atorvastatin (LIPITOR) 40 MG tablet Take 1 tablet by mouth daily 7/27/21   Joanie Vargas MD   Dulaglutide (TRULICITY) 1.5 VI/9.3YG SOPN Inject 1.5 mg into the skin once a week    Historical Provider, MD   aspirin 81 MG chewable tablet Take 1 tablet by mouth daily 2/13/19   Ira Bryant APRN - CNP   nitroGLYCERIN (NITROSTAT) 0.4 MG SL tablet up to max of 3 total doses. If no relief after 1 dose, call 911. 2/12/19   Ira Bryant, APRN - JOSE   metoprolol tartrate (LOPRESSOR) 25 MG tablet Take 0.5 tablets by mouth 2 times daily 2/12/19   Ira Bryant, APRN - CNP   Lancets MISC 1 each by Does not apply route daily 2/12/19   Daniela Bernal MD   glipiZIDE (GLUCOTROL) 5 MG tablet Take 1 tablet by mouth 2 times daily 2/12/19   Daniela Bernal MD   metFORMIN (GLUCOPHAGE) 1000 MG tablet Take 1 tablet by mouth 2 times daily (with meals) 2/12/19   Daniela Bernal MD   blood glucose monitor strips Test 3 times a day & as needed for symptoms of irregular blood glucose.  2/12/19   Daniela Bernal MD   glucose monitoring kit (FREESTYLE) monitoring kit 1 kit by Does not apply route daily 2/12/19   Daniela Bernal MD   albuterol (PROVENTIL) (5 MG/ML) 0.5% nebulizer solution Take 0.5 mLs by nebulization every 6 hours as needed for Wheezing  Patient taking differently: Take 2.5 mg by nebulization 2 times daily  3/16/18   Latesha Nazario MD   tiotropium (SPIRIVA) 18 MCG inhalation capsule Inhale 1 capsule into the lungs daily Pt told that she was using this medication at home. 3/16/18   Latesha Nazario MD   albuterol-ipratropium (COMBIVENT RESPIMAT)  MCG/ACT AERS inhaler Inhale 1 puff into the lungs every 6 hours 3/16/18   Latesha Naazrio MD   acetaminophen (TYLENOL) 500 MG tablet Take 1 tablet by mouth every 6 hours as needed for Pain 1/9/18   Bhavin Mccracken MD        Allergies:     Codeine and Morphine    Social History:     Tobacco:    reports that she has been smoking cigarettes. She has a 22.50 pack-year smoking history. She uses smokeless tobacco.  Alcohol:      reports no history of alcohol use. Drug Use:  reports current drug use. Drug: Marijuana. Family History:     Family History   Problem Relation Age of Onset    Hypertension Mother     Hypertension Father     Diabetes Sister     Diabetes Brother     Stroke Brother        Review of Systems:     Positive and Negative as described in HPI.     CONSTITUTIONAL:  negative for fevers, chills, sweats, fatigue, weight loss  HEENT:  negative for vision, hearing changes, runny nose, throat pain  RESPIRATORY:  negative for shortness of breath, cough, congestion, wheezing  CARDIOVASCULAR:  negative for chest pain, palpitations  GASTROINTESTINAL:  negative for nausea, vomiting, diarrhea, constipation, change in bowel habits, abdominal pain   GENITOURINARY:  negative for difficulty of urination, burning with urination, frequency   INTEGUMENT:  negative for rash, skin lesions, easy bruising   HEMATOLOGIC/LYMPHATIC:  negative for swelling/edema   ALLERGIC/IMMUNOLOGIC:  negative for urticaria , itching  ENDOCRINE:  negative increase in drinking, increase in urination, hot or cold intolerance  MUSCULOSKELETAL:  negative joint pains, muscle aches, swelling of joints  NEUROLOGICAL:  negative for headaches, dizziness, lightheadedness, numbness, pain, tingling extremities  BEHAVIOR/PSYCH:  negative for depression, anxiety    Physical Exam:   BP (!) 112/55   Pulse 72   Temp 98.2 °F (36.8 °C)   Resp 19   SpO2 96%   Temp (24hrs), Av.2 °F (36.8 °C), Min:98.2 °F (36.8 °C), Max:98.2 °F (36.8 °C)    No results for input(s): POCGLU in the last 72 hours.     Intake/Output Summary (Last 24 hours) at 10/28/2021 1503  Last data filed at 10/28/2021 1401  Gross per 24 hour   Intake    Output 4900 ml   Net -4900 ml       General Appearance: alert, acutely ill appearing, and in no acute distress  Mental status: oriented to person, place, and time  Head: normocephalic, atraumatic  Eye: no icterus, redness, pupils equal and reactive, extraocular eye movements intact, conjunctiva clear  Ear: normal external ear, no discharge, hearing intact  Nose: no drainage noted  Mouth: mucous membranes moist  Neck: supple, no carotid bruits, thyroid not palpable  Lungs: Bilateral equal air entry, clear to ausculation, no wheezing, rales or rhonchi, normal effort  Cardiovascular: normal rate, regular rhythm, no murmur, gallop, rub  Abdomen: Soft, generalized tenderness, nondistended, normal bowel sounds, no hepatomegaly or splenomegaly  Neurologic: There are no new focal motor or sensory deficits, normal muscle tone and bulk, no abnormal sensation, normal speech, cranial nerves II through XII grossly intact  Skin: No gross lesions, rashes, bruising or bleeding on exposed skin area  Extremities: peripheral pulses palpable, no pedal edema or calf pain with palpation  Psych: normal affect    Investigations:      Laboratory Testing:  Recent Results (from the past 24 hour(s))   EKG 12 Lead    Collection Time: 10/27/21  6:12 PM   Result Value Ref Range    Ventricular Rate 89 BPM    Atrial Rate 89 BPM    P-R Interval 158 ms    QRS Duration 80 ms    Q-T Interval 376 ms    QTc Calculation (Bazett) 457 ms    P Axis 66 degrees    R Axis 44 degrees    T Axis 30 degrees   CBC Auto Differential    Collection Time: 10/27/21  6:20 PM   Result Value Ref Range    WBC 8.6 3.5 - 11.3 k/uL    RBC 4.22 3.95 - 5.11 m/uL    Hemoglobin 11.4 (L) 11.9 - 15.1 g/dL    Hematocrit 36.8 36.3 - 47.1 %    MCV 87.2 82.6 - 102.9 fL    MCH 27.0 25.2 - 33.5 pg    MCHC 31.0 28.4 - 34.8 g/dL    RDW 15.4 (H) 11.8 - 14.4 %    Platelets 067 610 - 470 k/uL    MPV 11.1 8.1 - 13.5 fL    NRBC Automated 0.0 0.0 per 100 WBC    Differential Type NOT REPORTED     Seg Neutrophils 82 (H) 36 - 65 %    Lymphocytes 10 (L) 24 - 43 %    Monocytes 6 3 - 12 %    Eosinophils % 1 1 - 4 %    Basophils 0 0 - 2 %    Immature Granulocytes 1 (H) 0 %    Segs Absolute 7.13 1.50 - 8.10 k/uL    Absolute Lymph # 0.83 (L) 1.10 - 3.70 k/uL    Absolute Mono # 0.50 0.10 - 1.20 k/uL    Absolute Eos # 0.04 0.00 - 0.44 k/uL    Basophils Absolute 0.03 0.00 - 0.20 k/uL    Absolute Immature Granulocyte 0.08 0.00 - 0.30 k/uL    WBC Morphology NOT REPORTED     RBC Morphology ANISOCYTOSIS PRESENT     Platelet Estimate NOT REPORTED    Comprehensive Metabolic Panel w/ Reflex to MG    Collection Time: 10/27/21  6:20 PM   Result Value Ref Range    Glucose 142 (H) 70 - 99 mg/dL    BUN 13 6 - 20 mg/dL    CREATININE 1.03 (H) 0.50 - 0.90 mg/dL    Bun/Cre Ratio NOT REPORTED 9 - 20    Calcium 9.9 8.6 - 10.4 mg/dL    Sodium 139 135 - 144 mmol/L    Potassium 4.1 3.7 - 5.3 mmol/L    Chloride 105 98 - 107 mmol/L    CO2 18 (L) 20 - 31 mmol/L    Anion Gap 16 9 - 17 mmol/L    Alkaline Phosphatase 82 35 - 104 U/L    ALT 13 5 - 33 U/L    AST 14 <32 U/L    Total Bilirubin 0.26 (L) 0.3 - 1.2 mg/dL    Total Protein 6.8 6.4 - 8.3 g/dL    Albumin 3.8 3.5 - 5.2 g/dL    Albumin/Globulin Ratio 1.3 1.0 - 2.5    GFR Non-African American 55 (L) >60 mL/min    GFR African American >60 >60 mL/min    GFR Comment          GFR Staging NOT REPORTED    Lipase    Collection Time: 10/27/21  6:20 PM   Result Value Ref Range    Lipase 27 13 - 60 U/L   Troponin    Collection Time: 10/27/21  6:20 PM   Result Value Ref Range Troponin, High Sensitivity 16 (H) 0 - 14 ng/L    Troponin T NOT REPORTED <0.03 ng/mL    Troponin Interp NOT REPORTED    Protime-INR    Collection Time: 10/27/21  6:20 PM   Result Value Ref Range    Protime 9.8 9.1 - 12.3 sec    INR 0.9    APTT    Collection Time: 10/27/21  6:20 PM   Result Value Ref Range    PTT 23.8 20.5 - 30.5 sec   Lactic Acid, Plasma    Collection Time: 10/27/21  6:20 PM   Result Value Ref Range    Lactic Acid NOT REPORTED mmol/L    Lactic Acid, Whole Blood 2.2 (H) 0.7 - 2.1 mmol/L   Urinalysis Reflex to Culture    Collection Time: 10/27/21  6:56 PM    Specimen: Urine, clean catch   Result Value Ref Range    Color, UA Yellow Yellow    Turbidity UA Clear Clear    Glucose, Ur NEGATIVE NEGATIVE    Bilirubin Urine NEGATIVE NEGATIVE    Ketones, Urine NEGATIVE NEGATIVE    Specific Gravity, UA 1.012 1.005 - 1.030    Urine Hgb TRACE (A) NEGATIVE    pH, UA 6.0 5.0 - 8.0    Protein, UA NEGATIVE NEGATIVE    Urobilinogen, Urine Normal Normal    Nitrite, Urine NEGATIVE NEGATIVE    Leukocyte Esterase, Urine NEGATIVE NEGATIVE    Urinalysis Comments NOT REPORTED    Microscopic Urinalysis    Collection Time: 10/27/21  6:56 PM   Result Value Ref Range    -          WBC, UA None 0 - 5 /HPF    RBC, UA 5 TO 10 0 - 4 /HPF    Casts UA NOT REPORTED 0 - 8 /LPF    Crystals, UA NOT REPORTED None /HPF    Epithelial Cells UA None 0 - 5 /HPF    Renal Epithelial, UA NOT REPORTED 0 /HPF    Bacteria, UA NOT REPORTED None    Mucus, UA NOT REPORTED None    Trichomonas, UA NOT REPORTED None    Amorphous, UA NOT REPORTED None    Other Observations UA NOT REPORTED NOT REQ.     Yeast, UA NOT REPORTED None   Troponin    Collection Time: 10/27/21  8:31 PM   Result Value Ref Range    Troponin, High Sensitivity 15 (H) 0 - 14 ng/L    Troponin T NOT REPORTED <0.03 ng/mL    Troponin Interp NOT REPORTED    LACTIC ACID, WHOLE BLOOD    Collection Time: 10/28/21  4:37 AM   Result Value Ref Range    Lactic Acid, Whole Blood 0.8 0.7 - 2.1 mmol/L

## 2021-11-17 ENCOUNTER — OFFICE VISIT (OUTPATIENT)
Dept: PULMONOLOGY | Age: 61
End: 2021-11-17
Payer: MEDICARE

## 2021-11-17 VITALS
OXYGEN SATURATION: 99 % | RESPIRATION RATE: 16 BRPM | DIASTOLIC BLOOD PRESSURE: 73 MMHG | SYSTOLIC BLOOD PRESSURE: 121 MMHG | HEART RATE: 62 BPM | HEIGHT: 68 IN | BODY MASS INDEX: 34.1 KG/M2 | TEMPERATURE: 96.8 F | WEIGHT: 225 LBS

## 2021-11-17 DIAGNOSIS — F17.200 SMOKING: ICD-10-CM

## 2021-11-17 DIAGNOSIS — I50.42 CHRONIC COMBINED SYSTOLIC (CONGESTIVE) AND DIASTOLIC (CONGESTIVE) HEART FAILURE (HCC): ICD-10-CM

## 2021-11-17 DIAGNOSIS — Z87.891 PERSONAL HISTORY OF TOBACCO USE: ICD-10-CM

## 2021-11-17 DIAGNOSIS — J44.9 CHRONIC OBSTRUCTIVE PULMONARY DISEASE, UNSPECIFIED COPD TYPE (HCC): Primary | ICD-10-CM

## 2021-11-17 DIAGNOSIS — I27.20 MILD PULMONARY HYPERTENSION (HCC): ICD-10-CM

## 2021-11-17 PROCEDURE — 1111F DSCHRG MED/CURRENT MED MERGE: CPT | Performed by: INTERNAL MEDICINE

## 2021-11-17 PROCEDURE — 90471 IMMUNIZATION ADMIN: CPT | Performed by: INTERNAL MEDICINE

## 2021-11-17 PROCEDURE — 4004F PT TOBACCO SCREEN RCVD TLK: CPT | Performed by: INTERNAL MEDICINE

## 2021-11-17 PROCEDURE — G8482 FLU IMMUNIZE ORDER/ADMIN: HCPCS | Performed by: INTERNAL MEDICINE

## 2021-11-17 PROCEDURE — 90732 PPSV23 VACC 2 YRS+ SUBQ/IM: CPT | Performed by: INTERNAL MEDICINE

## 2021-11-17 PROCEDURE — 3017F COLORECTAL CA SCREEN DOC REV: CPT | Performed by: INTERNAL MEDICINE

## 2021-11-17 PROCEDURE — G0296 VISIT TO DETERM LDCT ELIG: HCPCS | Performed by: INTERNAL MEDICINE

## 2021-11-17 PROCEDURE — 99214 OFFICE O/P EST MOD 30 MIN: CPT | Performed by: INTERNAL MEDICINE

## 2021-11-17 PROCEDURE — G8417 CALC BMI ABV UP PARAM F/U: HCPCS | Performed by: INTERNAL MEDICINE

## 2021-11-17 PROCEDURE — G8926 SPIRO NO PERF OR DOC: HCPCS | Performed by: INTERNAL MEDICINE

## 2021-11-17 PROCEDURE — G8427 DOCREV CUR MEDS BY ELIG CLIN: HCPCS | Performed by: INTERNAL MEDICINE

## 2021-11-17 PROCEDURE — 3023F SPIROM DOC REV: CPT | Performed by: INTERNAL MEDICINE

## 2021-11-18 ENCOUNTER — TELEPHONE (OUTPATIENT)
Dept: ONCOLOGY | Age: 61
End: 2021-11-18

## 2021-11-18 NOTE — LETTER
11/18/2021        [address] [facility] [id number]    Dear Annia Davis: Your healthcare provider has ordered a low dose CT scan of the chest for lung cancer screening. You will find enclosed, information about CT lung screening. Please review the statement of understanding, you will be asked to sign a copy of this at the time of your CT scan    If you have not already been contacted to make the appointment for your scan, please call our scheduling department at [phone]    Keep in mind that CT lung screening does not take the place of smoking cessation. If you are a current smoker, you will find enclosed smoking cessation resources. Please do not hesitate to contact me if you have any questions or concerns.     [address] [place] [address] Lung Screening Program  [phone]

## 2021-11-24 NOTE — PROGRESS NOTES
PULMONARY OP  PROGRESS NOTE      Patient:  Heaven Henao  YOB: 1960    MRN: [id number]     Acct:        Pt seen and Chart reviewed. Heaven Henao is here in followup for   1. Chronic obstructive pulmonary disease, unspecified COPD type ([address]    2. Chronic combined systolic (congestive) and diastolic (congestive) heart failure ([facility] [address]    3. Mild pulmonary hypertension (HCC)    4. Personal history of tobacco use    5. Smoking          Pt  has not been hospitalizedor been to er since last evaluated in the hospital.    Has been using meds as recommended.   Does not use any oxygen  Rarely needs any albuterol  Has occasional cough that is productive of mucoid sputum  No fever chills or night sweats  No orthopnea or PND  No chest pain or pressure  No palpitations  No fever chills or night sweats  No shortness of breath or wheezing  No increasing pedal edema    Subjective:  Review of Systems    Review of Systems -   General ROS: Completed and except as mentioned above were negative   Psychological ROS:  Completed and except as mentioned above were negative  Ophthalmic ROS:  Completed and except as mentioned above were negative  ENT ROS:  Completed and except as mentioned above were negative  Allergy and Immunology ROS:  Completed and except as mentioned above werenegative  Hematological and Lymphatic ROS:  Completed and except as mentioned above were negative  Endocrine ROS: Completed and except as mentioned above were negative  Respiratory ROS:  Completed and except asmentioned above were negative  Cardiovascular ROS:  Completed and except as mentioned above were negative  Gastrointestinal ROS: Completed and except as mentioned above were negative  Genito-Urinary ROS:  Completedand except as mentioned above were negative  Musculoskeletal ROS:  Completed and except as mentioned above were negative  Neurological ROS:  Completed and except as mentioned above were negative  Dermatological ROS:Completed and except as mentioned above were negative    SLEEP  No epistaxis or sore throat. does not have fatigue, tiredness or sleepiness in am.  No MVA. No edema. Allergies: Allergies   Allergen Reactions    Codeine     Morphine        Medications:    Current Outpatient Medications:     polyethylene glycol (GLYCOLAX) 17 GM/SCOOP powder, Take 17 g by mouth daily as needed (Constipation), Disp: 1530 g, Rfl: 1    budesonide-formoterol (SYMBICORT) 160-4.5 MCG/ACT AERO, Inhale 2 puffs into the lungs 2 times daily, Disp: 10.2 g, Rfl: 3    lisinopril (PRINIVIL;ZESTRIL) 10 MG tablet, Take 1 tablet by mouth daily, Disp: 30 tablet, Rfl: 3    clopidogrel (PLAVIX) 75 MG tablet, Take 1 tablet by mouth daily, Disp: 30 tablet, Rfl: 3    famotidine (PEPCID) 20 MG tablet, Take 20 mg by mouth 2 times daily, Disp: , Rfl:     albuterol sulfate HFA (VENTOLIN HFA) 108 (90 Base) MCG/ACT inhaler, Inhale 2 puffs into the lungs 3 times daily, Disp: , Rfl:     traZODone (DESYREL) 50 MG tablet, Take 50 mg by mouth as needed for Sleep (takes sometimes to help sleep), Disp: , Rfl:     lidocaine (LIDODERM) 5 %, Place 1 patch onto the skin as needed for Pain (as needed for rotator cuff pain) 12 hours on, 12 hours off., Disp: , Rfl:     apixaban (ELIQUIS) 5 MG TABS tablet, Take 1 tablet by mouth 2 times daily, Disp: 180 tablet, Rfl: 1    atorvastatin (LIPITOR) 40 MG tablet, Take 1 tablet by mouth daily, Disp: 30 tablet, Rfl: 3    Dulaglutide (TRULICITY) 1.5 EB/8.0LZ SOPN, Inject 1.5 mg into the skin once a week, Disp: , Rfl:     aspirin 81 MG chewable tablet, Take 1 tablet by mouth daily, Disp: 30 tablet, Rfl: 3    nitroGLYCERIN (NITROSTAT) 0.4 MG SL tablet, up to max of 3 total doses.  If no relief after 1 dose, call 911., Disp: 25 tablet, Rfl: 3    metoprolol tartrate (LOPRESSOR) 25 MG tablet, Take 0.5 tablets by mouth 2 times daily, Disp: 60 tablet, Rfl: 3    Lancets MISC, 1 each by Does not apply route daily, Disp: 100 each, Rfl: 3   glipiZIDE (GLUCOTROL) 5 MG tablet, Take 1 tablet by mouth 2 times daily, Disp: 60 tablet, Rfl: 3    metFORMIN (GLUCOPHAGE) 1000 MG tablet, Take 1 tablet by mouth 2 times daily (with meals), Disp: 60 tablet, Rfl: 3    blood glucose monitor strips, Test 3 times a day & as needed for symptoms of irregular blood glucose., Disp: 100 strip, Rfl: 3    glucose monitoring kit (FREESTYLE) monitoring kit, 1 kit by Does not apply route daily, Disp: 1 kit, Rfl: 0    albuterol (PROVENTIL) (5 MG/ML) 0.5% nebulizer solution, Take 0.5 mLs by nebulization every 6 hours as needed for Wheezing (Patient taking differently: Take 2.5 mg by nebulization 2 times daily ), Disp: 120 each, Rfl: 0    tiotropium (SPIRIVA) 18 MCG inhalation capsule, Inhale 1 capsule into the lungs daily Pt told that she was using this medication at home., Disp: 1 capsule, Rfl: 0    acetaminophen (TYLENOL) 500 MG tablet, Take 1 tablet by mouth every 6 hours as needed for Pain, Disp: 60 tablet, Rfl: 2    albuterol-ipratropium (COMBIVENT RESPIMAT)  MCG/ACT AERS inhaler, Inhale 1 puff into the lungs every 6 hours, Disp: 1 Inhaler, Rfl: 0      Objective:    Physical Exam:  Vitals: /73 (Site: Left Lower Arm)   Pulse 62   Temp 96.8 °F (36 °C)   Resp 16   Ht 5' 8\" (1.727 m)   Wt 225 lb (102.1 kg)   SpO2 99% Comment: Room air at rest  BMI 34.21 kg/m²   Last 3 weights: Wt Readings from Last 3 Encounters:   11/17/21 225 lb (102.1 kg)   10/05/21 224 lb 6.9 oz (101.8 kg)   07/27/21 216 lb 6.4 oz (98.2 kg)     Body mass index is 34.21 kg/m². Physical Examination:   PHYSICAL EXAMINATION:    Vitals:    11/17/21 0911   BP: 121/73   Site: Left Lower Arm   Pulse: 62   Resp: 16   Temp: 96.8 °F (36 °C)   SpO2: 99%   Weight: 225 lb (102.1 kg)   Height: 5' 8\" (1.727 m)       Constitutional: This is a well developed, well nourished, 30-34.9 - Obesity Grade I 63y.o. year old female who is alert, oriented, cooperative and in no apparent distress. Head:normocephalic and atraumatic. EENT:  LEDA. No conjunctival injections. Septum was midline, mucosa was without erythema, exudates or cobblestoning. No thrush was noted. MallampatiIII (soft palate, base of uvula visible)  Neck: Supple without thyromegaly. No elevated JVP. Trachea was midline. Respiratory: Chest was symmetrical without dullness to percussion. Breath sounds bilaterally were clear to auscultation. There were no wheezes, rhonchi or rales. There is no intercostal retraction or use of accessory muscles. No egophony noted. Cardiovascular: Regular without murmur, clicks, gallops or rubs. Abdomen: Slightly rounded and soft without organomegaly. No rebound, rigidity or guarding was appreciated. Lymphatic: No lymphadenopathy. Musculoskeletal: Normal curvature of the spine. No gross muscle weakness. Extremities:  does not have lower extremity edema,  no ulcerations, tenderness, varicosities or erythema. Muscle size, tone and strength are normal.  No involuntary movements are noted. Skin:  Warm and dry. Good color, turgor and pigmentation. No lesions or scars. No cyanosis or clubbing  Neurological/Psychiatric: The patient's general behavior, level of consciousness, thought content and emotional status is normal.          DATA:     Pulmonary function tests: none and were ordered      Chest x-ray on October 2, 2021 showed changes history of heart failure      IMPRESSION:   1. Chronic obstructive pulmonary disease, unspecified COPD type ([address]    2. Chronic combined systolic (congestive) and diastolic (congestive) heart failure ([address]    3. Mild pulmonary hypertension (HCC)    4. Personal history of tobacco use    5.  Smoking                   PLAN:       Reviewed the chest x-ray with the patient  Ordered pulmonary function tests, CT scan of chest to screen for lung cancer and Pneumovax 23  Refills were provided -none  Patient was recommended to have prednisone and an antibiotic available for use during an exacerbation  Educated and clarified the medication use. Discussed use, benefit, and side effects of prescribed medications. Barriers to medication compliance addressed. Rosalba Menendez received counseling on the following healthy behaviors: nutrition, exercise and medication adherence  Recommend flu vaccination in the fall annually. Patient had the flu vaccination  Recommendations given regarding pneumococcal vaccinations. Recommended COVID-19 vaccination  Patient is not uptodate with vaccinations from pulmonary perspective. Maintain an active lifestyle. recommend smoking cessation. Rosalba Menendez was instructed on smoking cessation for greater than 10 minutes. I discussed with this patient today the risks and benefits of various tobacco cessation strategies  Recommend pulmonary rehabilitation. Patient was explained importance of pulmonary rehabilitation with regards to decreasing the hospitalization risk and also help with his dyspnea  Patient was educated on how to use the respiratory medications. All the questions that the patient has had were answered to   his satisfaction. Home O2 evaluation was done. Supplemental oxygen was not indicated  Patient was informed of the need for using oxygen. Patient was educated on the importance of compliance and the benefits of oxygen use. Complications of oxygen use, including dryness of the nostrils, epistaxis and also the fire hazard were explained to the patient. Patient willingly accepts to use  the oxygen as recommended. Pt met the criteria for lung cancer screening and was explained the possibility of having findings that would need monitoring such as lung nodules and the need for more than yearly screening ct chest. Pt acknowledges understanding and questions answered to their satisfaction. We'll see the patient back in 3 months or earlier if needed. Patient will call us if she is sick, so she can be seen sooner.   Thank you for having us involved in the care of your patient. Please call us if you have any questions or concerns.           Hart Leventhal, MD, MD             11/23/2021, 10:17 PM

## 2021-12-18 ENCOUNTER — HOSPITAL ENCOUNTER (INPATIENT)
Age: 61
LOS: 2 days | Discharge: HOME | DRG: 291 | End: 2021-12-20
Attending: STUDENT IN AN ORGANIZED HEALTH CARE EDUCATION/TRAINING PROGRAM | Admitting: INTERNAL MEDICINE
Payer: MEDICARE

## 2021-12-18 ENCOUNTER — APPOINTMENT (OUTPATIENT)
Dept: GENERAL RADIOLOGY | Age: 61
DRG: 291 | End: 2021-12-18
Payer: MEDICARE

## 2021-12-18 DIAGNOSIS — J96.21 ACUTE ON CHRONIC RESPIRATORY FAILURE WITH HYPOXIA AND HYPERCAPNIA (HCC): Primary | ICD-10-CM

## 2021-12-18 DIAGNOSIS — I50.23 ACUTE ON CHRONIC SYSTOLIC CHF (CONGESTIVE HEART FAILURE) (HCC): ICD-10-CM

## 2021-12-18 DIAGNOSIS — J18.9 PNEUMONIA DUE TO INFECTIOUS ORGANISM, UNSPECIFIED LATERALITY, UNSPECIFIED PART OF LUNG: ICD-10-CM

## 2021-12-18 DIAGNOSIS — J96.22 ACUTE ON CHRONIC RESPIRATORY FAILURE WITH HYPOXIA AND HYPERCAPNIA (HCC): Primary | ICD-10-CM

## 2021-12-18 PROBLEM — J18.1 LOBAR PNEUMONIA (HCC): Status: ACTIVE | Noted: 2021-12-18

## 2021-12-18 PROBLEM — J44.1 COPD EXACERBATION (HCC): Status: ACTIVE | Noted: 2021-12-18

## 2021-12-18 LAB
ABSOLUTE EOS #: 0.2 K/UL (ref 0–0.4)
ABSOLUTE IMMATURE GRANULOCYTE: ABNORMAL K/UL (ref 0–0.3)
ABSOLUTE LYMPH #: 3.4 K/UL (ref 1–4.8)
ABSOLUTE MONO #: 0.7 K/UL (ref 0.1–1.3)
ALBUMIN SERPL-MCNC: 4.1 G/DL (ref 3.5–5.2)
ALBUMIN/GLOBULIN RATIO: ABNORMAL (ref 1–2.5)
ALLEN TEST: ABNORMAL
ALLEN TEST: ABNORMAL
ALP BLD-CCNC: 118 U/L (ref 35–104)
ALT SERPL-CCNC: 20 U/L (ref 5–33)
ANION GAP SERPL CALCULATED.3IONS-SCNC: 13 MMOL/L (ref 9–17)
AST SERPL-CCNC: 21 U/L
BASOPHILS # BLD: 1 % (ref 0–2)
BASOPHILS ABSOLUTE: 0.1 K/UL (ref 0–0.2)
BILIRUB SERPL-MCNC: 0.24 MG/DL (ref 0.3–1.2)
BNP INTERPRETATION: ABNORMAL
BUN BLDV-MCNC: 27 MG/DL (ref 6–20)
BUN/CREAT BLD: ABNORMAL (ref 9–20)
CALCIUM SERPL-MCNC: 8.8 MG/DL (ref 8.6–10.4)
CARBOXYHEMOGLOBIN: 1.3 % (ref 0–5)
CARBOXYHEMOGLOBIN: 1.3 % (ref 0–5)
CHLORIDE BLD-SCNC: 107 MMOL/L (ref 98–107)
CO2: 21 MMOL/L (ref 20–31)
CREAT SERPL-MCNC: 1.37 MG/DL (ref 0.5–0.9)
DIFFERENTIAL TYPE: ABNORMAL
EKG ATRIAL RATE: 153 BPM
EKG P-R INTERVAL: 156 MS
EKG Q-T INTERVAL: 318 MS
EKG QRS DURATION: 76 MS
EKG QTC CALCULATION (BAZETT): 507 MS
EKG R AXIS: 75 DEGREES
EKG T AXIS: -32 DEGREES
EKG VENTRICULAR RATE: 153 BPM
EOSINOPHILS RELATIVE PERCENT: 2 % (ref 0–4)
FIO2: ABNORMAL
FIO2: ABNORMAL
GFR AFRICAN AMERICAN: 48 ML/MIN
GFR NON-AFRICAN AMERICAN: 39 ML/MIN
GFR SERPL CREATININE-BSD FRML MDRD: ABNORMAL ML/MIN/{1.73_M2}
GFR SERPL CREATININE-BSD FRML MDRD: ABNORMAL ML/MIN/{1.73_M2}
GLUCOSE BLD-MCNC: 228 MG/DL (ref 65–105)
GLUCOSE BLD-MCNC: 235 MG/DL (ref 65–105)
GLUCOSE BLD-MCNC: 235 MG/DL (ref 70–99)
GLUCOSE BLD-MCNC: 243 MG/DL (ref 65–105)
HCO3 ARTERIAL: 22.1 MMOL/L (ref 22–26)
HCO3 ARTERIAL: 22.2 MMOL/L (ref 22–26)
HCT VFR BLD CALC: 37.7 % (ref 36–46)
HEMOGLOBIN: 12.1 G/DL (ref 12–16)
IMMATURE GRANULOCYTES: ABNORMAL %
INFLUENZA A: NOT DETECTED
INFLUENZA B: NOT DETECTED
INR BLD: 1
LYMPHOCYTES # BLD: 38 % (ref 24–44)
MCH RBC QN AUTO: 27.3 PG (ref 26–34)
MCHC RBC AUTO-ENTMCNC: 32.1 G/DL (ref 31–37)
MCV RBC AUTO: 85 FL (ref 80–100)
METHEMOGLOBIN: 0.6 % (ref 0–1.9)
METHEMOGLOBIN: 0.7 % (ref 0–1.9)
MODE: ABNORMAL
MODE: ABNORMAL
MONOCYTES # BLD: 8 % (ref 1–7)
NEGATIVE BASE EXCESS, ART: 3 MMOL/L (ref 0–2)
NEGATIVE BASE EXCESS, ART: 4.8 MMOL/L (ref 0–2)
NOTIFICATION TIME: ABNORMAL
NOTIFICATION TIME: ABNORMAL
NOTIFICATION: ABNORMAL
NOTIFICATION: ABNORMAL
NRBC AUTOMATED: ABNORMAL PER 100 WBC
O2 DEVICE/FLOW/%: ABNORMAL
O2 DEVICE/FLOW/%: ABNORMAL
O2 SAT, ARTERIAL: 96.2 % (ref 95–98)
O2 SAT, ARTERIAL: 98.2 % (ref 95–98)
OXYHEMOGLOBIN: ABNORMAL % (ref 95–98)
OXYHEMOGLOBIN: ABNORMAL % (ref 95–98)
PARTIAL THROMBOPLASTIN TIME: 27.9 SEC (ref 24–36)
PATIENT TEMP: 37
PATIENT TEMP: 37
PCO2 ARTERIAL: 38.2 MMHG (ref 35–45)
PCO2 ARTERIAL: 48.8 MMHG (ref 35–45)
PCO2, ART, TEMP ADJ: ABNORMAL (ref 35–45)
PCO2, ART, TEMP ADJ: ABNORMAL (ref 35–45)
PDW BLD-RTO: 16 % (ref 11.5–14.9)
PEEP/CPAP: ABNORMAL
PEEP/CPAP: ABNORMAL
PH ARTERIAL: 7.27 (ref 7.35–7.45)
PH ARTERIAL: 7.37 (ref 7.35–7.45)
PH, ART, TEMP ADJ: ABNORMAL (ref 7.35–7.45)
PH, ART, TEMP ADJ: ABNORMAL (ref 7.35–7.45)
PLATELET # BLD: 316 K/UL (ref 150–450)
PLATELET ESTIMATE: ABNORMAL
PMV BLD AUTO: 8.4 FL (ref 6–12)
PO2 ARTERIAL: 104 MMHG (ref 80–100)
PO2 ARTERIAL: 488 MMHG (ref 80–100)
PO2, ART, TEMP ADJ: ABNORMAL MMHG (ref 80–100)
PO2, ART, TEMP ADJ: ABNORMAL MMHG (ref 80–100)
POSITIVE BASE EXCESS, ART: ABNORMAL MMOL/L (ref 0–2)
POSITIVE BASE EXCESS, ART: ABNORMAL MMOL/L (ref 0–2)
POTASSIUM SERPL-SCNC: 3.9 MMOL/L (ref 3.7–5.3)
PRO-BNP: 1167 PG/ML
PROCALCITONIN: 0.07 NG/ML
PROTHROMBIN TIME: 13.5 SEC (ref 11.8–14.6)
PSV: ABNORMAL
PSV: ABNORMAL
PT. POSITION: ABNORMAL
PT. POSITION: ABNORMAL
RBC # BLD: 4.44 M/UL (ref 4–5.2)
RBC # BLD: ABNORMAL 10*6/UL
RESPIRATORY RATE: 20
RESPIRATORY RATE: 27
SAMPLE SITE: ABNORMAL
SAMPLE SITE: ABNORMAL
SARS-COV-2 RNA, RT PCR: NOT DETECTED
SEG NEUTROPHILS: 51 % (ref 36–66)
SEGMENTED NEUTROPHILS ABSOLUTE COUNT: 4.6 K/UL (ref 1.3–9.1)
SET RATE: ABNORMAL
SET RATE: ABNORMAL
SODIUM BLD-SCNC: 141 MMOL/L (ref 135–144)
SOURCE: NORMAL
SPECIMEN DESCRIPTION: NORMAL
TEXT FOR RESPIRATORY: ABNORMAL
TEXT FOR RESPIRATORY: ABNORMAL
TOTAL HB: ABNORMAL G/DL (ref 12–16)
TOTAL HB: ABNORMAL G/DL (ref 12–16)
TOTAL PROTEIN: 7.4 G/DL (ref 6.4–8.3)
TOTAL RATE: ABNORMAL
TOTAL RATE: ABNORMAL
TROPONIN INTERP: NORMAL
TROPONIN T: NORMAL NG/ML
TROPONIN, HIGH SENSITIVITY: 9 NG/L (ref 0–14)
VT: ABNORMAL
VT: ABNORMAL
WBC # BLD: 8.9 K/UL (ref 3.5–11)
WBC # BLD: ABNORMAL 10*3/UL

## 2021-12-18 PROCEDURE — 87636 SARSCOV2 & INF A&B AMP PRB: CPT

## 2021-12-18 PROCEDURE — 94640 AIRWAY INHALATION TREATMENT: CPT

## 2021-12-18 PROCEDURE — 83880 ASSAY OF NATRIURETIC PEPTIDE: CPT

## 2021-12-18 PROCEDURE — 6360000002 HC RX W HCPCS: Performed by: INTERNAL MEDICINE

## 2021-12-18 PROCEDURE — 85025 COMPLETE CBC W/AUTO DIFF WBC: CPT

## 2021-12-18 PROCEDURE — 96375 TX/PRO/DX INJ NEW DRUG ADDON: CPT

## 2021-12-18 PROCEDURE — 6370000000 HC RX 637 (ALT 250 FOR IP): Performed by: INTERNAL MEDICINE

## 2021-12-18 PROCEDURE — 36600 WITHDRAWAL OF ARTERIAL BLOOD: CPT

## 2021-12-18 PROCEDURE — 2700000000 HC OXYGEN THERAPY PER DAY

## 2021-12-18 PROCEDURE — 94667 MNPJ CHEST WALL 1ST: CPT

## 2021-12-18 PROCEDURE — 84145 PROCALCITONIN (PCT): CPT

## 2021-12-18 PROCEDURE — 96365 THER/PROPH/DIAG IV INF INIT: CPT

## 2021-12-18 PROCEDURE — 2580000003 HC RX 258: Performed by: INTERNAL MEDICINE

## 2021-12-18 PROCEDURE — 6370000000 HC RX 637 (ALT 250 FOR IP): Performed by: STUDENT IN AN ORGANIZED HEALTH CARE EDUCATION/TRAINING PROGRAM

## 2021-12-18 PROCEDURE — 85610 PROTHROMBIN TIME: CPT

## 2021-12-18 PROCEDURE — 6360000002 HC RX W HCPCS: Performed by: STUDENT IN AN ORGANIZED HEALTH CARE EDUCATION/TRAINING PROGRAM

## 2021-12-18 PROCEDURE — 93005 ELECTROCARDIOGRAM TRACING: CPT | Performed by: STUDENT IN AN ORGANIZED HEALTH CARE EDUCATION/TRAINING PROGRAM

## 2021-12-18 PROCEDURE — 87040 BLOOD CULTURE FOR BACTERIA: CPT

## 2021-12-18 PROCEDURE — 80053 COMPREHEN METABOLIC PANEL: CPT

## 2021-12-18 PROCEDURE — 94761 N-INVAS EAR/PLS OXIMETRY MLT: CPT

## 2021-12-18 PROCEDURE — 99223 1ST HOSP IP/OBS HIGH 75: CPT | Performed by: INTERNAL MEDICINE

## 2021-12-18 PROCEDURE — 6360000002 HC RX W HCPCS

## 2021-12-18 PROCEDURE — 82805 BLOOD GASES W/O2 SATURATION: CPT

## 2021-12-18 PROCEDURE — 36415 COLL VENOUS BLD VENIPUNCTURE: CPT

## 2021-12-18 PROCEDURE — 85730 THROMBOPLASTIN TIME PARTIAL: CPT

## 2021-12-18 PROCEDURE — 2060000000 HC ICU INTERMEDIATE R&B

## 2021-12-18 PROCEDURE — 82947 ASSAY GLUCOSE BLOOD QUANT: CPT

## 2021-12-18 PROCEDURE — 84484 ASSAY OF TROPONIN QUANT: CPT

## 2021-12-18 PROCEDURE — 87449 NOS EACH ORGANISM AG IA: CPT

## 2021-12-18 PROCEDURE — 86738 MYCOPLASMA ANTIBODY: CPT

## 2021-12-18 PROCEDURE — 93010 ELECTROCARDIOGRAM REPORT: CPT | Performed by: INTERNAL MEDICINE

## 2021-12-18 PROCEDURE — 87641 MR-STAPH DNA AMP PROBE: CPT

## 2021-12-18 PROCEDURE — 71045 X-RAY EXAM CHEST 1 VIEW: CPT

## 2021-12-18 PROCEDURE — 99285 EMERGENCY DEPT VISIT HI MDM: CPT

## 2021-12-18 PROCEDURE — 2580000003 HC RX 258: Performed by: STUDENT IN AN ORGANIZED HEALTH CARE EDUCATION/TRAINING PROGRAM

## 2021-12-18 PROCEDURE — 94660 CPAP INITIATION&MGMT: CPT

## 2021-12-18 RX ORDER — ACETAMINOPHEN 325 MG/1
650 TABLET ORAL EVERY 4 HOURS PRN
Status: DISCONTINUED | OUTPATIENT
Start: 2021-12-18 | End: 2021-12-20 | Stop reason: HOSPADM

## 2021-12-18 RX ORDER — ALBUTEROL SULFATE 2.5 MG/3ML
SOLUTION RESPIRATORY (INHALATION)
Status: COMPLETED
Start: 2021-12-18 | End: 2021-12-19

## 2021-12-18 RX ORDER — ONDANSETRON 4 MG/1
4 TABLET, ORALLY DISINTEGRATING ORAL EVERY 8 HOURS PRN
Status: DISCONTINUED | OUTPATIENT
Start: 2021-12-18 | End: 2021-12-20 | Stop reason: HOSPADM

## 2021-12-18 RX ORDER — SODIUM CHLORIDE 0.9 % (FLUSH) 0.9 %
5-40 SYRINGE (ML) INJECTION PRN
Status: DISCONTINUED | OUTPATIENT
Start: 2021-12-18 | End: 2021-12-20 | Stop reason: HOSPADM

## 2021-12-18 RX ORDER — DEXTROSE MONOHYDRATE 25 G/50ML
12.5 INJECTION, SOLUTION INTRAVENOUS PRN
Status: DISCONTINUED | OUTPATIENT
Start: 2021-12-18 | End: 2021-12-20 | Stop reason: HOSPADM

## 2021-12-18 RX ORDER — MAGNESIUM SULFATE 1 G/100ML
INJECTION INTRAVENOUS
Status: COMPLETED
Start: 2021-12-18 | End: 2021-12-18

## 2021-12-18 RX ORDER — ASPIRIN 81 MG/1
81 TABLET, CHEWABLE ORAL DAILY
Status: DISCONTINUED | OUTPATIENT
Start: 2021-12-18 | End: 2021-12-20 | Stop reason: HOSPADM

## 2021-12-18 RX ORDER — METHYLPREDNISOLONE SODIUM SUCCINATE 125 MG/2ML
125 INJECTION, POWDER, LYOPHILIZED, FOR SOLUTION INTRAMUSCULAR; INTRAVENOUS ONCE
Status: COMPLETED | OUTPATIENT
Start: 2021-12-18 | End: 2021-12-18

## 2021-12-18 RX ORDER — METHYLPREDNISOLONE SODIUM SUCCINATE 125 MG/2ML
INJECTION, POWDER, LYOPHILIZED, FOR SOLUTION INTRAMUSCULAR; INTRAVENOUS
Status: COMPLETED
Start: 2021-12-18 | End: 2021-12-18

## 2021-12-18 RX ORDER — DEXTROSE MONOHYDRATE 50 MG/ML
100 INJECTION, SOLUTION INTRAVENOUS PRN
Status: DISCONTINUED | OUTPATIENT
Start: 2021-12-18 | End: 2021-12-20 | Stop reason: HOSPADM

## 2021-12-18 RX ORDER — LANOLIN ALCOHOL/MO/W.PET/CERES
3 CREAM (GRAM) TOPICAL NIGHTLY PRN
Status: DISCONTINUED | OUTPATIENT
Start: 2021-12-18 | End: 2021-12-20 | Stop reason: HOSPADM

## 2021-12-18 RX ORDER — DEXTROMETHORPHAN POLISTIREX 30 MG/5ML
60 SUSPENSION ORAL EVERY 12 HOURS SCHEDULED
Status: DISCONTINUED | OUTPATIENT
Start: 2021-12-18 | End: 2021-12-20 | Stop reason: HOSPADM

## 2021-12-18 RX ORDER — METHYLPREDNISOLONE SODIUM SUCCINATE 40 MG/ML
40 INJECTION, POWDER, LYOPHILIZED, FOR SOLUTION INTRAMUSCULAR; INTRAVENOUS EVERY 6 HOURS
Status: DISCONTINUED | OUTPATIENT
Start: 2021-12-18 | End: 2021-12-19

## 2021-12-18 RX ORDER — LISINOPRIL 10 MG/1
10 TABLET ORAL DAILY
Status: DISCONTINUED | OUTPATIENT
Start: 2021-12-18 | End: 2021-12-20 | Stop reason: HOSPADM

## 2021-12-18 RX ORDER — FUROSEMIDE 10 MG/ML
40 INJECTION INTRAMUSCULAR; INTRAVENOUS ONCE
Status: COMPLETED | OUTPATIENT
Start: 2021-12-18 | End: 2021-12-18

## 2021-12-18 RX ORDER — BENZONATATE 100 MG/1
200 CAPSULE ORAL 3 TIMES DAILY
Status: DISCONTINUED | OUTPATIENT
Start: 2021-12-18 | End: 2021-12-20 | Stop reason: HOSPADM

## 2021-12-18 RX ORDER — ALBUTEROL SULFATE 2.5 MG/3ML
2.5 SOLUTION RESPIRATORY (INHALATION) EVERY 4 HOURS
Status: DISCONTINUED | OUTPATIENT
Start: 2021-12-18 | End: 2021-12-20 | Stop reason: HOSPADM

## 2021-12-18 RX ORDER — SODIUM CHLORIDE 0.9 % (FLUSH) 0.9 %
5-40 SYRINGE (ML) INJECTION EVERY 12 HOURS SCHEDULED
Status: DISCONTINUED | OUTPATIENT
Start: 2021-12-18 | End: 2021-12-20 | Stop reason: HOSPADM

## 2021-12-18 RX ORDER — INSULIN GLARGINE 100 [IU]/ML
10 INJECTION, SOLUTION SUBCUTANEOUS NIGHTLY
Status: DISCONTINUED | OUTPATIENT
Start: 2021-12-18 | End: 2021-12-19

## 2021-12-18 RX ORDER — ONDANSETRON 2 MG/ML
4 INJECTION INTRAMUSCULAR; INTRAVENOUS EVERY 6 HOURS PRN
Status: DISCONTINUED | OUTPATIENT
Start: 2021-12-18 | End: 2021-12-20 | Stop reason: HOSPADM

## 2021-12-18 RX ORDER — NICOTINE POLACRILEX 4 MG
15 LOZENGE BUCCAL PRN
Status: DISCONTINUED | OUTPATIENT
Start: 2021-12-18 | End: 2021-12-20 | Stop reason: HOSPADM

## 2021-12-18 RX ORDER — IPRATROPIUM BROMIDE AND ALBUTEROL SULFATE 2.5; .5 MG/3ML; MG/3ML
1 SOLUTION RESPIRATORY (INHALATION) ONCE
Status: COMPLETED | OUTPATIENT
Start: 2021-12-18 | End: 2021-12-18

## 2021-12-18 RX ORDER — SODIUM CHLORIDE 9 MG/ML
25 INJECTION, SOLUTION INTRAVENOUS PRN
Status: DISCONTINUED | OUTPATIENT
Start: 2021-12-18 | End: 2021-12-20 | Stop reason: HOSPADM

## 2021-12-18 RX ORDER — CLOPIDOGREL BISULFATE 75 MG/1
75 TABLET ORAL DAILY
Status: DISCONTINUED | OUTPATIENT
Start: 2021-12-18 | End: 2021-12-20 | Stop reason: HOSPADM

## 2021-12-18 RX ORDER — ATORVASTATIN CALCIUM 40 MG/1
40 TABLET, FILM COATED ORAL EVERY EVENING
Status: DISCONTINUED | OUTPATIENT
Start: 2021-12-18 | End: 2021-12-20 | Stop reason: HOSPADM

## 2021-12-18 RX ORDER — BUDESONIDE AND FORMOTEROL FUMARATE DIHYDRATE 160; 4.5 UG/1; UG/1
2 AEROSOL RESPIRATORY (INHALATION) 2 TIMES DAILY
Status: DISCONTINUED | OUTPATIENT
Start: 2021-12-18 | End: 2021-12-20 | Stop reason: HOSPADM

## 2021-12-18 RX ORDER — FUROSEMIDE 10 MG/ML
40 INJECTION INTRAMUSCULAR; INTRAVENOUS DAILY
Status: DISCONTINUED | OUTPATIENT
Start: 2021-12-19 | End: 2021-12-20

## 2021-12-18 RX ADMIN — CEFEPIME HYDROCHLORIDE 2000 MG: 2 INJECTION, POWDER, FOR SOLUTION INTRAVENOUS at 04:08

## 2021-12-18 RX ADMIN — VANCOMYCIN HYDROCHLORIDE 2500 MG: 1 INJECTION, POWDER, LYOPHILIZED, FOR SOLUTION INTRAVENOUS at 05:00

## 2021-12-18 RX ADMIN — BUDESONIDE AND FORMOTEROL FUMARATE DIHYDRATE 2 PUFF: 160; 4.5 AEROSOL RESPIRATORY (INHALATION) at 08:26

## 2021-12-18 RX ADMIN — ACETAMINOPHEN 650 MG: 325 TABLET, FILM COATED ORAL at 21:41

## 2021-12-18 RX ADMIN — Medication 60 MG: at 09:41

## 2021-12-18 RX ADMIN — ATORVASTATIN CALCIUM 40 MG: 40 TABLET, FILM COATED ORAL at 17:45

## 2021-12-18 RX ADMIN — CEFEPIME HYDROCHLORIDE 2000 MG: 2 INJECTION, POWDER, FOR SOLUTION INTRAVENOUS at 16:43

## 2021-12-18 RX ADMIN — TIOTROPIUM BROMIDE INHALATION SPRAY 2 PUFF: 3.12 SPRAY, METERED RESPIRATORY (INHALATION) at 08:26

## 2021-12-18 RX ADMIN — METHYLPREDNISOLONE SODIUM SUCCINATE 40 MG: 40 INJECTION, POWDER, FOR SOLUTION INTRAMUSCULAR; INTRAVENOUS at 09:41

## 2021-12-18 RX ADMIN — ALBUTEROL SULFATE 2.5 MG: 2.5 SOLUTION RESPIRATORY (INHALATION) at 11:28

## 2021-12-18 RX ADMIN — ALBUTEROL SULFATE 2.5 MG: 2.5 SOLUTION RESPIRATORY (INHALATION) at 23:44

## 2021-12-18 RX ADMIN — Medication 3 MG: at 21:41

## 2021-12-18 RX ADMIN — BENZONATATE 200 MG: 100 CAPSULE ORAL at 21:41

## 2021-12-18 RX ADMIN — ONDANSETRON 4 MG: 4 TABLET, ORALLY DISINTEGRATING ORAL at 10:34

## 2021-12-18 RX ADMIN — ALBUTEROL SULFATE 2.5 MG: 2.5 SOLUTION RESPIRATORY (INHALATION) at 20:00

## 2021-12-18 RX ADMIN — ALBUTEROL SULFATE 2.5 MG: 2.5 SOLUTION RESPIRATORY (INHALATION) at 08:27

## 2021-12-18 RX ADMIN — SODIUM CHLORIDE, PRESERVATIVE FREE 10 ML: 5 INJECTION INTRAVENOUS at 09:41

## 2021-12-18 RX ADMIN — METHYLPREDNISOLONE SODIUM SUCCINATE 125 MG: 125 INJECTION, POWDER, FOR SOLUTION INTRAMUSCULAR; INTRAVENOUS at 02:58

## 2021-12-18 RX ADMIN — SODIUM CHLORIDE, PRESERVATIVE FREE 10 ML: 5 INJECTION INTRAVENOUS at 22:27

## 2021-12-18 RX ADMIN — ALBUTEROL SULFATE 2.5 MG: 2.5 SOLUTION RESPIRATORY (INHALATION) at 15:32

## 2021-12-18 RX ADMIN — METHYLPREDNISOLONE SODIUM SUCCINATE 40 MG: 40 INJECTION, POWDER, FOR SOLUTION INTRAMUSCULAR; INTRAVENOUS at 14:09

## 2021-12-18 RX ADMIN — ASPIRIN 81 MG: 81 TABLET, CHEWABLE ORAL at 10:34

## 2021-12-18 RX ADMIN — METHYLPREDNISOLONE SODIUM SUCCINATE 40 MG: 40 INJECTION, POWDER, FOR SOLUTION INTRAMUSCULAR; INTRAVENOUS at 21:41

## 2021-12-18 RX ADMIN — INSULIN GLARGINE 10 UNITS: 100 INJECTION, SOLUTION SUBCUTANEOUS at 22:11

## 2021-12-18 RX ADMIN — Medication 60 MG: at 21:41

## 2021-12-18 RX ADMIN — CLOPIDOGREL BISULFATE 75 MG: 75 TABLET ORAL at 10:34

## 2021-12-18 RX ADMIN — INSULIN LISPRO 2 UNITS: 100 INJECTION, SOLUTION INTRAVENOUS; SUBCUTANEOUS at 17:45

## 2021-12-18 RX ADMIN — IPRATROPIUM BROMIDE AND ALBUTEROL SULFATE 1 AMPULE: .5; 2.5 SOLUTION RESPIRATORY (INHALATION) at 03:17

## 2021-12-18 RX ADMIN — APIXABAN 5 MG: 5 TABLET, FILM COATED ORAL at 10:34

## 2021-12-18 RX ADMIN — BUDESONIDE AND FORMOTEROL FUMARATE DIHYDRATE 2 PUFF: 160; 4.5 AEROSOL RESPIRATORY (INHALATION) at 20:14

## 2021-12-18 RX ADMIN — BENZONATATE 200 MG: 100 CAPSULE ORAL at 14:09

## 2021-12-18 RX ADMIN — METOPROLOL TARTRATE 12.5 MG: 25 TABLET, FILM COATED ORAL at 21:40

## 2021-12-18 RX ADMIN — FUROSEMIDE 40 MG: 10 INJECTION, SOLUTION INTRAVENOUS at 09:41

## 2021-12-18 RX ADMIN — INSULIN LISPRO 1 UNITS: 100 INJECTION, SOLUTION INTRAVENOUS; SUBCUTANEOUS at 22:11

## 2021-12-18 RX ADMIN — MAGNESIUM SULFATE HEPTAHYDRATE 1000 MG: 1 INJECTION, SOLUTION INTRAVENOUS at 02:58

## 2021-12-18 RX ADMIN — APIXABAN 5 MG: 5 TABLET, FILM COATED ORAL at 21:41

## 2021-12-18 RX ADMIN — METHYLPREDNISOLONE SODIUM SUCCINATE 125 MG: 125 INJECTION, POWDER, LYOPHILIZED, FOR SOLUTION INTRAMUSCULAR; INTRAVENOUS at 02:58

## 2021-12-18 RX ADMIN — METOPROLOL TARTRATE 12.5 MG: 25 TABLET, FILM COATED ORAL at 10:34

## 2021-12-18 RX ADMIN — LISINOPRIL 10 MG: 10 TABLET ORAL at 10:34

## 2021-12-18 RX ADMIN — BENZONATATE 200 MG: 100 CAPSULE ORAL at 09:41

## 2021-12-18 RX ADMIN — INSULIN LISPRO 2 UNITS: 100 INJECTION, SOLUTION INTRAVENOUS; SUBCUTANEOUS at 12:04

## 2021-12-18 ASSESSMENT — ENCOUNTER SYMPTOMS
NAUSEA: 0
CONSTIPATION: 0
CHEST TIGHTNESS: 0
COUGH: 0
SHORTNESS OF BREATH: 1
VOMITING: 0
ABDOMINAL PAIN: 0
DIARRHEA: 0

## 2021-12-18 ASSESSMENT — PAIN DESCRIPTION - PAIN TYPE: TYPE: ACUTE PAIN

## 2021-12-18 ASSESSMENT — PAIN DESCRIPTION - DESCRIPTORS: DESCRIPTORS: HEADACHE;NAGGING

## 2021-12-18 ASSESSMENT — PAIN DESCRIPTION - LOCATION: LOCATION: HEAD

## 2021-12-18 ASSESSMENT — PAIN SCALES - GENERAL
PAINLEVEL_OUTOF10: 2
PAINLEVEL_OUTOF10: 2

## 2021-12-18 ASSESSMENT — PAIN DESCRIPTION - ORIENTATION: ORIENTATION: MID

## 2021-12-18 NOTE — H&P
[address] [place] [address]     HISTORY AND PHYSICAL EXAMINATION            Date:   12/18/2021  Patient name:  Soumya Schroeder  Date of admission:  12/18/2021  2:49 AM  MRN:   [id number]  Account:  [de-identified]  YOB: 1960  PCP:    Polly Dennison MD  Room:   2011/2011-01  Code Status:    Full Code    Chief Complaint:     Chief Complaint   Patient presents with    Shortness of Breath       History Obtained From:     patient, electronic medical record    History of Present Illness: The patient is a 61-year-old female history of COPD, HLD, CAD status post stents x2, HTN, PAD who presents with increasing shortness of breath for the past 24 hours. Patient was recently admitted to [facility] in September of this year for pneumonia, at which time she was briefly intubated due to hypercarbia. She also had recent cardiac catheterization with stent placement performed in October of this year, currently on Plavix, Eliquis, aspirin. Patient states that on the night of 12/17, she was getting up to use the restroom and experienced shortness of breath that was not relieved by home breathing treatments. She denies the presence of any fever, NVD. Does report subjective chills. In the ED, patient was tachypneic RR 28, tachycardic . Initial laboratory work-up significant for BUN/creatinine 27/1.37, proBNP 1167, WBC 8.9. Additional work-up included:     ABG pH 7.373, PCO2 38.2, HCO3 22.2, O2 sat 96.2   CXR findings suggestive of right lower lobe pneumonia   EKG showing sinus tach, QTC prolonged 507    Patient was placed on BiPAP with FiO2 40%, given breathing treatments in ER. Blood cultures collected. She was also started on vancomycin and cefepime, which was continued on admission. Lasix 40 mg x1 administered.  Decision was made to admit patient to intermediate ICU for further management of lobar pneumonia. Past Medical History:     Past Medical History:   Diagnosis Date    Asthma     Blood circulation, collateral     CAD (coronary artery disease)     [facility]ch[facility] disease) ([facility])     Diabetes mellitus ([facility])     Hyperlipidemia     Hypertension     Movement disorder     B/L torn rotator cuff.  Neuropathy     PAD (peripheral artery disease) ([facility])         Past SurgicalHistory:     Past Surgical History:   Procedure Laterality Date    CORONARY ANGIOPLASTY WITH STENT PLACEMENT      x2    CORONARY ANGIOPLASTY WITH STENT PLACEMENT  02/11/2019    JOSE MARTIN to RCA per Dr. Chinyere Lange radial approuch    INNER EAR SURGERY Right     TONSILLECTOMY          Medications Prior to Admission:     Prior to Admission medications    Medication Sig Start Date End Date Taking? Authorizing Provider   budesonide-formoterol (SYMBICORT) 160-4.5 MCG/ACT AERO Inhale 2 puffs into the lungs 2 times daily 10/8/21   Estephania James MD   lisinopril (PRINIVIL;ZESTRIL) 10 MG tablet Take 1 tablet by mouth daily 10/9/21   Estephania James MD   clopidogrel (PLAVIX) 75 MG tablet Take 1 tablet by mouth daily 10/9/21   Chidi Galicia MD   famotidine (PEPCID) 20 MG tablet Take 20 mg by mouth 2 times daily    Historical Provider, MD   albuterol sulfate HFA (VENTOLIN HFA) 108 (90 Base) MCG/ACT inhaler Inhale 2 puffs into the lungs 3 times daily    Historical Provider, MD   traZODone (DESYREL) 50 MG tablet Take 50 mg by mouth as needed for Sleep (takes sometimes to help sleep)    Historical Provider, MD   lidocaine (LIDODERM) 5 % Place 1 patch onto the skin as needed for Pain (as needed for rotator cuff pain) 12 hours on, 12 hours off.     Historical Provider, MD   apixaban (ELIQUIS) 5 MG TABS tablet Take 1 tablet by mouth 2 times daily 10/5/21   Denise Boswell MD   atorvastatin (LIPITOR) 40 MG tablet Take 1 tablet by mouth daily 7/27/21   Pamela Choe MD   Dulaglutide (TRULICITY) 1.5 PB/5.2OT SOPN Inject 1.5 mg into the skin once a week    Historical Provider, MD   aspirin 81 MG chewable tablet Take 1 tablet by mouth daily 2/13/19   BINDU Anthony CNP   nitroGLYCERIN (NITROSTAT) 0.4 MG SL tablet up to max of 3 total doses. If no relief after 1 dose, call 911. 2/12/19   BINDU Anthony CNP   metoprolol tartrate (LOPRESSOR) 25 MG tablet Take 0.5 tablets by mouth 2 times daily 2/12/19   BINDU Anthony CNP   Lancets MISC 1 each by Does not apply route daily 2/12/19   Felipe Yap MD   glipiZIDE (GLUCOTROL) 5 MG tablet Take 1 tablet by mouth 2 times daily 2/12/19   Felipe Yap MD   metFORMIN (GLUCOPHAGE) 1000 MG tablet Take 1 tablet by mouth 2 times daily (with meals) 2/12/19   Felipe Yap MD   blood glucose monitor strips Test 3 times a day & as needed for symptoms of irregular blood glucose. 2/12/19   Felipe Yap MD   glucose monitoring kit (FREESTYLE) monitoring kit 1 kit by Does not apply route daily 2/12/19   Felipe Yap MD   albuterol (PROVENTIL) (5 MG/ML) 0.5% nebulizer solution Take 0.5 mLs by nebulization every 6 hours as needed for Wheezing  Patient taking differently: Take 2.5 mg by nebulization 2 times daily  3/16/18   Richard Waddell MD   tiotropium (SPIRIVA) 18 MCG inhalation capsule Inhale 1 capsule into the lungs daily Pt told that she was using this medication at home. 3/16/18   Richard Waddell MD   albuterol-ipratropium (COMBIVENT RESPIMAT)  MCG/ACT AERS inhaler Inhale 1 puff into the lungs every 6 hours 3/16/18   Richard Waddell MD   acetaminophen (TYLENOL) 500 MG tablet Take 1 tablet by mouth every 6 hours as needed for Pain 1/9/18   Leah Dela Cruz MD        Allergies:     Codeine and Morphine    Social History:     Tobacco:    reports that she has been smoking cigarettes. She has a 22.50 pack-year smoking history.  She uses smokeless tobacco.  Alcohol:      reports no history of alcohol use. Drug Use:  reports current drug use. Drug: Marijuana Lucianne Bars). Family History:     Family History   Problem Relation Age of Onset    Hypertension Mother     Hypertension Father     Diabetes Sister     Diabetes Brother     Stroke Brother        Review of Systems:     Positive and Negative as described in HPI. Review of Systems   Constitutional: Positive for chills and fatigue. Negative for fever. Respiratory: Positive for shortness of breath. Negative for cough and chest tightness. Cardiovascular: Negative for chest pain and leg swelling. Gastrointestinal: Negative for abdominal pain, constipation, diarrhea, nausea and vomiting. Genitourinary: Negative for difficulty urinating and dysuria. Musculoskeletal: Negative for arthralgias. Neurological: Negative for dizziness and headaches. Psychiatric/Behavioral: Negative for agitation and behavioral problems. Physical Exam:   BP (!) 143/70   Pulse 94   Temp 98 °F (36.7 °C) (Oral)   Resp 18   Ht 5' 9\" (1.753 m)   Wt 220 lb (99.8 kg)   SpO2 97%   BMI 32.49 kg/m²   Temp (24hrs), Av °F (36.7 °C), Min:98 °F (36.7 °C), Max:98 °F (36.7 °C)    No results for input(s): POCGLU in the last 72 hours. No intake or output data in the 24 hours ending 21 1113    Physical Exam  Constitutional:       Appearance: Normal appearance. HENT:      Head: Normocephalic and atraumatic. Eyes:      Extraocular Movements: Extraocular movements intact. Conjunctiva/sclera: Conjunctivae normal.   Cardiovascular:      Rate and Rhythm: Regular rhythm. Tachycardia present. Pulses: Normal pulses. Heart sounds: Normal heart sounds. Pulmonary:      Effort: Pulmonary effort is normal.      Breath sounds: No wheezing. Comments: Diffuse wheezing appreciated in the posterior lung fields bilaterally. On 2 L NC. Abdominal:      General: Abdomen is flat. Palpations: Abdomen is soft.    Musculoskeletal:         General: Normal range of motion. Neurological:      General: No focal deficit present. Mental Status: She is alert and oriented to person, place, and time. Psychiatric:         Mood and Affect: Mood normal.         Behavior: Behavior normal.         Investigations:     Laboratory Testing:  Recent Results (from the past 24 hour(s))   EKG 12 Lead    Collection Time: 12/18/21  2:54 AM   Result Value Ref Range    Ventricular Rate 153 BPM    Atrial Rate 153 BPM    P-R Interval 156 ms    QRS Duration 76 ms    Q-T Interval 318 ms    QTc Calculation (Bazett) 507 ms    R Axis 75 degrees    T Axis -32 degrees   Blood Gas, Arterial    Collection Time: 12/18/21  3:05 AM   Result Value Ref Range    pH, Arterial 7.266 (LL) 7.350 - 7.450    pCO2, Arterial 48.8 (HH) 35.0 - 45.0 mmHg    pO2, Arterial 488.0 (H) 80.0 - 100.0 mmHg    HCO3, Arterial 22.1 22.0 - 26.0 mmol/L    Positive Base Excess, Art NOT REPORTED 0.0 - 2.0 mmol/L    Negative Base Excess, Art 4.8 (H) 0.0 - 2.0 mmol/L    O2 Sat, Arterial 98.2 (H) 95 - 98 %    Total Hb NOT REPORTED 12.0 - 16.0 g/dl    Oxyhemoglobin NOT REPORTED 95.0 - 98.0 %    Carboxyhemoglobin 1.3 0 - 5 %    Methemoglobin 0.6 0.0 - 1.9 %    Pt Temp 37.0     pH, Art, Temp Adj NOT REPORTED 7.350 - 7.450    pCO2, Art, Temp Adj NOT REPORTED 35.0 - 45.0    pO2, Art, Temp Adj NOT REPORTED 80.0 - 100.0 mmHg    O2 Device/Flow/% BIPAP     Respiratory Rate 27     Jameson Test PASS     Sample Site Right Radial Artery     Pt.  Position SEMI-FOWLERS     Mode BIPAP     Set Rate NOT REPORTED     Total Rate NOT REPORTED     VT NOT REPORTED     FIO2 NOT REPORTED     Peep/Cpap NOT REPORTED     PSV NOT REPORTED     Text for Respiratory RESULTS TO MD     NOTIFICATION NOT REPORTED     NOTIFICATION TIME NOT REPORTED    CBC Auto Differential    Collection Time: 12/18/21  3:06 AM   Result Value Ref Range    WBC 8.9 3.5 - 11.0 k/uL    RBC 4.44 4.0 - 5.2 m/uL    Hemoglobin 12.1 12.0 - 16.0 g/dL    Hematocrit 37.7 36 - 46 %    MCV 85.0 80 - 100 fL    MCH 27.3 26 - 34 pg    MCHC 32.1 31 - 37 g/dL    RDW 16.0 (H) 11.5 - 14.9 %    Platelets 117 056 - 852 k/uL    MPV 8.4 6.0 - 12.0 fL    NRBC Automated NOT REPORTED per 100 WBC    Differential Type NOT REPORTED     Seg Neutrophils 51 36 - 66 %    Lymphocytes 38 24 - 44 %    Monocytes 8 (H) 1 - 7 %    Eosinophils % 2 0 - 4 %    Basophils 1 0 - 2 %    Immature Granulocytes NOT REPORTED 0 %    Segs Absolute 4.60 1.3 - 9.1 k/uL    Absolute Lymph # 3.40 1.0 - 4.8 k/uL    Absolute Mono # 0.70 0.1 - 1.3 k/uL    Absolute Eos # 0.20 0.0 - 0.4 k/uL    Basophils Absolute 0.10 0.0 - 0.2 k/uL    Absolute Immature Granulocyte NOT REPORTED 0.00 - 0.30 k/uL    WBC Morphology NOT REPORTED     RBC Morphology NOT REPORTED     Platelet Estimate NOT REPORTED    Comprehensive Metabolic Panel w/ Reflex to MG    Collection Time: 12/18/21  3:06 AM   Result Value Ref Range    Glucose 235 (H) 70 - 99 mg/dL    BUN 27 (H) 6 - 20 mg/dL    CREATININE 1.37 (H) 0.50 - 0.90 mg/dL    Bun/Cre Ratio NOT REPORTED 9 - 20    Calcium 8.8 8.6 - 10.4 mg/dL    Sodium 141 135 - 144 mmol/L    Potassium 3.9 3.7 - 5.3 mmol/L    Chloride 107 98 - 107 mmol/L    CO2 21 20 - 31 mmol/L    Anion Gap 13 9 - 17 mmol/L    Alkaline Phosphatase 118 (H) 35 - 104 U/L    ALT 20 5 - 33 U/L    AST 21 <32 U/L    Total Bilirubin 0.24 (L) 0.3 - 1.2 mg/dL    Total Protein 7.4 6.4 - 8.3 g/dL    Albumin 4.1 3.5 - 5.2 g/dL    Albumin/Globulin Ratio NOT REPORTED 1.0 - 2.5    GFR Non- 39 (L) >60 mL/min    GFR  48 (L) >60 mL/min    GFR Comment          GFR Staging NOT REPORTED    Troponin    Collection Time: 12/18/21  3:06 AM   Result Value Ref Range    Troponin, High Sensitivity 9 0 - 14 ng/L    Troponin T NOT REPORTED <0.03 ng/mL    Troponin Interp NOT REPORTED    APTT    Collection Time: 12/18/21  3:06 AM   Result Value Ref Range    PTT 27.9 24.0 - 36.0 sec   Protime-INR    Collection Time: 12/18/21  3:06 AM   Result Value Ref Range    Protime 13.5 11.8 - 14.6 sec    INR 1.0    Brain Natriuretic Peptide    Collection Time: 12/18/21  3:06 AM   Result Value Ref Range    Pro-BNP 1,167 (H) <300 pg/mL    BNP Interpretation NOT REPORTED    Culture, Blood 2    Collection Time: 12/18/21  3:55 AM    Specimen: Blood   Result Value Ref Range    Specimen Description . BLOOD     Special Requests RT HAND     Culture NO GROWTH <24 HRS    Culture, Blood 1    Collection Time: 12/18/21  4:00 AM    Specimen: Blood   Result Value Ref Range    Specimen Description . BLOOD     Special Requests LT FOREARM     Culture NO GROWTH <24 HRS    Arterial Blood Gases    Collection Time: 12/18/21  4:35 AM   Result Value Ref Range    pH, Arterial 7.373 7.350 - 7.450    pCO2, Arterial 38.2 35.0 - 45.0 mmHg    pO2, Arterial 104.0 (H) 80.0 - 100.0 mmHg    HCO3, Arterial 22.2 22.0 - 26.0 mmol/L    Positive Base Excess, Art NOT REPORTED 0.0 - 2.0 mmol/L    Negative Base Excess, Art 3.0 (H) 0.0 - 2.0 mmol/L    O2 Sat, Arterial 96.2 95 - 98 %    Total Hb NOT REPORTED 12.0 - 16.0 g/dl    Oxyhemoglobin NOT REPORTED 95.0 - 98.0 %    Carboxyhemoglobin 1.3 0 - 5 %    Methemoglobin 0.7 0.0 - 1.9 %    Pt Temp 37.0     pH, Art, Temp Adj NOT REPORTED 7.350 - 7.450    pCO2, Art, Temp Adj NOT REPORTED 35.0 - 45.0    pO2, Art, Temp Adj NOT REPORTED 80.0 - 100.0 mmHg    O2 Device/Flow/% BIPAP     Respiratory Rate 20     Jameson Test PASS     Sample Site Right Radial Artery     Pt. Position SEMI-FOWLERS     Mode BIPAP     Set Rate NOT REPORTED     Total Rate NOT REPORTED     VT NOT REPORTED     FIO2 NOT REPORTED     Peep/Cpap NOT REPORTED     PSV NOT REPORTED     Text for Respiratory RESULTS TO MD     NOTIFICATION NOT REPORTED     NOTIFICATION TIME NOT REPORTED    COVID-19 & Influenza Combo    Collection Time: 12/18/21  4:48 AM    Specimen: Nasopharyngeal Swab   Result Value Ref Range    Specimen Description . NASOPHARYNGEAL SWAB     Source . NASOPHARYNGEAL SWAB     SARS-CoV-2 RNA, RT PCR Not Detected Not Detected INFLUENZA A Not Detected Not Detected    INFLUENZA B Not Detected Not Detected     Imaging/Diagnostics:  XR CHEST PORTABLE    Result Date: 12/18/2021  Findings are suggestive of pneumonia most significant in the right lower lung. Small right pleural effusion. Pulmonary edema could be a possibility. Assessment :      Primary Problem  Lobar pneumonia [facility])    Active Hospital Problems    Diagnosis Date Noted    COPD exacerbation ([facility] [J44.1] 12/18/2021    Lobar pneumonia ([facility] [J18.1] 12/18/2021    Coronary artery disease involving native coronary artery of native heart without angina pectoris [I25.10] 07/25/2021    Essential hypertension [I10] 02/10/2019    SOB (shortness of breath) [R06.02] 11/19/2016    Tobacco abuse [Z72.0] 11/19/2016    Obesity (BMI 30.0-34. 9) [E66.9] 11/19/2016       Plan:     Patient status Admit as inpatient in the  Medical ICU    Acute respiratory failure 2/2 healthcare associated pneumonia  - CXR concerning for right lower lobe infiltrate,   - Blood, respiratory cultures pending  - Vancomycin, cefepime  - Pulmonology consulted   Legionella, mycoplasma, respiratory viral panel pending   CT chest when more stable     Acute on chronic systolic congestive heart failure  - Last echo from October this year EF 44%  - proBNP 1100s, Lasix 40 mg IV x1 in ED  - Monitor I/Os    Acute exacerbation of COPD  - Continue home Spiriva, Symbicort  - Albuterol nebulization every 4 hours, Solu-Medrol 40 mg every 6 hours    Comorbid conditions  Type 2 diabetes mellitus: LDSS, hypoglycemia protocol, POCT  Hyperlipidemia: Atorvastatin 40 mg daily  Essential hypertension: Lisinopril 10 mg daily  Paroxysmal atrial fibrillation: Eliquis 5 mg twice daily  History of CAD s/p stent x2: Continue aspirin 81 mg daily, Plavix 75 mg daily    DVT prophylaxis: Anticoagulated on Eliquis  GI prophylaxis: None   Code: Full  Dispo: Home    Consultations:   PHARMACY TO DOSE VANCOMYCIN  IP CONSULT TO INTERNAL MEDICINE  IP CONSULT TO PULMONOLOGY    Patient is admitted as inpatient status because of co-morbidities listed above, severity of signs and symptoms as outlined, requirement for current medical therapies and most importantly because of direct risk to patient if care not provided in a hospital setting. Ab Morris MD  12/18/2021  11:13 AM    Copy sent to Dr. Ruthy Francis MD    I have discussed the care of Audra Bennett , including pertinent history and exam findings,    today with the resident. I have seen and examined the patient and the key elements of all parts of the encounter have been performed by me . I agree with the assessment, plan and orders as documented by the resident. Principal Problem:    Lobar pneumonia ([address]  Active Problems:    SOB (shortness of breath)    Tobacco abuse    Obesity (BMI 30.0-34. 9)    ACS (acute coronary syndrome) ([address]    Essential hypertension    Coronary artery disease involving native coronary artery of native heart without angina pectoris    COPD exacerbation (HCC)  Resolved Problems:    * No resolved hospital problems. *        Overall  course ;                                   are improving over time.         Patient has multiple medical problem which include heart failure, combined systolic and diastolic, coronary artery disease s/p stent, diabetes, obesity, patient is a poor historian  Admitted worsening shortness of breath, symptoms are just yesterday  Elevated BNP, chest x-ray seen concerning for bilateral pulmonary infiltrates, I suspect patient has acute exacerbation of CHF  Treating with IV Lasix, continue antibiotics for now, if MRSA swab is negative we will discontinue vancomycin  Ordering procalcitonin  Respiratory viral panel  Patient is Covid negative          Electronically signed by Toyin Schmidt MD

## 2021-12-18 NOTE — PROGRESS NOTES
Vancomycin Dosing by Pharmacy - Initial Note   TODAY'S DATE:  12/18/2021  Patient name, age:  Chelsy Santos, 63 y.o. Indication: Respiratory infection, MRSA suspected  Additional antimicrobials:  cefepime    Allergies:  Codeine and Morphine   Actual Weight:    Wt Readings from Last 1 Encounters:   12/18/21 220 lb (99.8 kg)     Labs/Ancillary Data  Estimated Creatinine Clearance: 56 mL/min (A) (based on SCr of 1.37 mg/dL (H)). Recent Labs     12/18/21  0306   CREATININE 1.37*   BUN 27*   WBC 8.9     Procalcitonin   Date Value Ref Range Status   09/29/2021 0.09 (H) <0.09 ng/mL Final     Comment:           Suspected Sepsis:  <0.50 ng/mL     Low likelihood of sepsis. 0.50-2.00 ng/mL     Increased likelihood of sepsis. Antibiotics encouraged. >2.00 ng/mL     High risk of sepsis/shock. Antibiotics strongly encouraged. Suspected Lower Resp Tract Infections:  <0.24 ng/mL     Low likelihood of bacterial infection. >0.24 ng/mL     Increased likelihood of bacterial infection. Antibiotics encouraged. With successful antibiotic therapy, PCT levels should decrease rapidly. (Half-life of 24 to   36 hours.)        Procalcitonin values from samples collected within the first 6 hours of systemic infection   may still be low. Retesting may be indicated. Values from day 1 and day 4 can be entered into the Change in Procalcitonin Calculator   (www.[employer]-pct-calculator. com) to determine the patient's Mortality Risk Prognosis        In healthy neonates, plasma Procalcitonin (PCT) concentrations increase gradually after   birth, reaching peak values at about 24 hours of age then decrease to normal values below   0.5 ng/mL by 48-72 hours of age. No intake or output data in the 24 hours ending 12/18/21 0353  Temp:     Recent vancomycin administrations        No vancomycin IV orders with administrations found.             Orders not given:            vancomycin (VANCOCIN) intermittent dosing (placeholder) vancomycin (VANCOCIN) 2,500 mg in dextrose 5 % 500 mL IVPB    vancomycin (VANCOCIN) 1,500 mg in dextrose 5 % 250 mL IVPB (ADDAVIAL)                  Culture Date / Source  /  Results  See micro    MRSA Nasal Swab: not ordered. Order placed by pharmacy. Arin Castillo PLAN   Initial loading dose of 25mg/kg (max of 2,500 mg) = 2500  mg  x 1, then  vancomycin 1500 mg IV every 24 hours. Ensured BUN/sCr ordered at baseline and every 48 hours x at least 3 levels, then at least weekly. Vancomycin level ordered for 12/20/2021 @ 0900. Will use bayesian method for dosing. This level will not be a trough. Target AUC/OUSMANE: 400-600. Vancomycin Target Concentration Parameters  Treatment  Population Target AUC/OUSMANE Target Trough   Invasive MRSA Infection (bacteremia, pneumonia, meningitis, endocarditis, osteomyelitis)  Sepsis (undifferentiated) 400-600 N/A   Infection due to non-MRSA pathogen  Empiric treatment of non-invasive MRSA infection  (SSTI, UTI) <500 10-15 mg/L   CrCl < 29 mL/min  Rapidly fluctuating serum creatinine   HARVEY N/A < 15 mg/L     Renal replacement therapy is dosed by levels, per hospital protocol. Abbreviations  * Pauc: probability that AUC is >400 (efficacy); Pconc: probability that Ctrough is above 20 ?g/mL (toxicity); Tox: Probability of nephrotoxicity, based on Carine et al. Clin Infect Dis 2009. Loading dose: 2500 mg at 05:00 12/18/2021. Regimen: 1500 mg IV every 24 hours. Start time: 03:52 on 12/18/2021  Exposure target: AUC24 (range)400-600 mg/L.hr   AUC24,ss: 507 mg/L.hr  Probability of AUC24 > 400: 75 %  Ctrough,ss: 15.3 mg/L  Probability of Ctrough,ss > 20: 28 %  Probability of nephrotoxicity (Lodise SEA 2009): 11 %    Thank you for the consult. Pharmacy will continue to follow.      Tiffanie Leyva [facility]     -12/18/2021 at 3:54 AM

## 2021-12-18 NOTE — FLOWSHEET NOTE
Pt admitted to the unit, resting in bed currently on Bipap. Pt was oriented to her room, wheels locked, and call light within reach.

## 2021-12-18 NOTE — ED PROVIDER NOTES
EMERGENCY DEPARTMENT ENCOUNTER    Pt Name: Paty Caicedo  MRN: [id number]  [place] 1960  Date of evaluation: 12/18/21  CHIEF COMPLAINT       Chief Complaint   Patient presents with    Shortness of Breath     HISTORY OF PRESENT ILLNESS   HPI  42-year-old female history of CAD, COPD, diabetes, A. fib on Eliquis presents for evaluation of shortness of breath. History somewhat limited due to the severity of patient's symptoms. Came in via EMS on CPAP in severe distress. Barely able to speak 1-2 word sentences. REVIEW OF SYSTEMS     Review of Systems   Unable to perform ROS: Severe respiratory distress   Respiratory: Positive for shortness of breath. PASTMEDICAL HISTORY     Past Medical History:   Diagnosis Date    Asthma     Blood circulation, collateral     CAD (coronary artery disease)     COPD [facility]chronic obstructive pulmonary disease) ([facility]     Diabetes mellitus ([facility])     Hyperlipidemia     Hypertension     Movement disorder     B/L torn rotator cuff.  Neuropathy     PAD (peripheral artery disease) ([facility])      Past Problem List  Patient Active Problem List   Diagnosis Code    SOB (shortness of breath) R06.02    Chronic bronchitis ([facility]) J42    Chest pain R07.9    Tobacco abuse Z72.0    Obesity (BMI 30.0-34. 9) E66.9    Lung nodule R91.1    ACS (acute coronary syndrome) ([facility]) I24.9    Essential hypertension I10    S/P drug eluting coronary stent placement - Mid RCA () 2/11/19 (Dr. Martell Boyer) Z95.5    Gastroenteritis K52.9    HARVEY (acute kidney injury) ([facility] [address] N17.9    High anion gap metabolic acidosis D90.9    Coronary artery disease involving native coronary artery of native heart without angina pectoris I25.10    Tobacco abuse counseling Z71.6    Lactic acidosis E87.2    Vitamin D deficiency E55.9    Marijuana abuse F12.10    Type 2 diabetes mellitus with hyperglycemia, without long-term current use of insulin ([facility]) E11.65    Ischemic colitis ([facility]) K55.9    Other constipation K59.09    Urine retention R33.9    COPD exacerbation ([facility] [address] J44.1     SURGICAL HISTORY       Past Surgical History:   Procedure Laterality Date    CORONARY ANGIOPLASTY WITH STENT PLACEMENT      x2    CORONARY ANGIOPLASTY WITH STENT PLACEMENT  02/11/2019    JOSE MARTIN to RCA per Dr. Montalvo Ingalls radial approuch    INNER EAR SURGERY Right     TONSILLECTOMY       CURRENT MEDICATIONS       Previous Medications    ACETAMINOPHEN (TYLENOL) 500 MG TABLET    Take 1 tablet by mouth every 6 hours as needed for Pain    ALBUTEROL (PROVENTIL) (5 MG/ML) 0.5% NEBULIZER SOLUTION    Take 0.5 mLs by nebulization every 6 hours as needed for Wheezing    ALBUTEROL SULFATE HFA (VENTOLIN HFA) 108 (90 BASE) MCG/ACT INHALER    Inhale 2 puffs into the lungs 3 times daily    ALBUTEROL-IPRATROPIUM (COMBIVENT RESPIMAT)  MCG/ACT AERS INHALER    Inhale 1 puff into the lungs every 6 hours    APIXABAN (ELIQUIS) 5 MG TABS TABLET    Take 1 tablet by mouth 2 times daily    ASPIRIN 81 MG CHEWABLE TABLET    Take 1 tablet by mouth daily    ATORVASTATIN (LIPITOR) 40 MG TABLET    Take 1 tablet by mouth daily    BLOOD GLUCOSE MONITOR STRIPS    Test 3 times a day & as needed for symptoms of irregular blood glucose. BUDESONIDE-FORMOTEROL (SYMBICORT) 160-4.5 MCG/ACT AERO    Inhale 2 puffs into the lungs 2 times daily    CLOPIDOGREL (PLAVIX) 75 MG TABLET    Take 1 tablet by mouth daily    DULAGLUTIDE (TRULICITY) 1.5 NM/6.2KV SOPN    Inject 1.5 mg into the skin once a week    FAMOTIDINE (PEPCID) 20 MG TABLET    Take 20 mg by mouth 2 times daily    GLIPIZIDE (GLUCOTROL) 5 MG TABLET    Take 1 tablet by mouth 2 times daily    GLUCOSE MONITORING KIT (FREESTYLE) MONITORING KIT    1 kit by Does not apply route daily    LANCETS MISC    1 each by Does not apply route daily    LIDOCAINE (LIDODERM) 5 %    Place 1 patch onto the skin as needed for Pain (as needed for rotator cuff pain) 12 hours on, 12 hours off.     LISINOPRIL (PRINIVIL;ZESTRIL) 10 MG TABLET    Take 1 tablet by mouth daily    METFORMIN (GLUCOPHAGE) 1000 MG TABLET    Take 1 tablet by mouth 2 times daily (with meals)    METOPROLOL TARTRATE (LOPRESSOR) 25 MG TABLET    Take 0.5 tablets by mouth 2 times daily    NITROGLYCERIN (NITROSTAT) 0.4 MG SL TABLET    up to max of 3 total doses. If no relief after 1 dose, call 911. TIOTROPIUM (SPIRIVA) 18 MCG INHALATION CAPSULE    Inhale 1 capsule into the lungs daily Pt told that she was using this medication at home. TRAZODONE (DESYREL) 50 MG TABLET    Take 50 mg by mouth as needed for Sleep (takes sometimes to help sleep)     ALLERGIES     is allergic to codeine and morphine. FAMILY HISTORY     She indicated that her mother is . She indicated that her father is . She indicated that the status of her sister is unknown. She indicated that the status of her brother is unknown. SOCIAL HISTORY       Social History     Tobacco Use    Smoking status: Current Every Day Smoker     Packs/day: 0.50     Years: 45.00     Pack years: 22.50     Types: Cigarettes    Smokeless tobacco: Current User   Substance Use Topics    Alcohol use: No    Drug use: Yes     Types: Marijuana (Weed)     PHYSICAL EXAM     INITIAL VITALS: /69   Pulse 99   Resp 19   Ht 5' 9\" (1.753 m)   Wt 220 lb (99.8 kg)   SpO2 92%   BMI 32.49 kg/m²    Physical Exam  Vitals and nursing note reviewed. Constitutional:       Appearance: She is normal weight. HENT:      Head: Normocephalic and atraumatic. Eyes:      Extraocular Movements: Extraocular movements intact. Pupils: Pupils are equal, round, and reactive to light. Cardiovascular:      Rate and Rhythm: Tachycardia present. Rhythm irregular. Pulmonary:      Comments: Tachypnea severe distress barely speaking 1 word sentences diminished breath sounds bilaterally  Abdominal:      General: Abdomen is flat. There is no distension. Palpations: There is no mass. Musculoskeletal:         General: No swelling.  Normal range of motion. Cervical back: Normal range of motion and neck supple. Skin:     General: Skin is warm and dry. Neurological:      General: No focal deficit present. Mental Status: She is alert. Mental status is at baseline. MEDICAL DECISION MAKIN-year-old female presents for evaluation in respiratory distress on CPAP with EMS. Continued on BiPAP in the ER here. Initial blood gas was consistent respiratory acidosis with a pH of 7.26 and PCO2 of 48. Started on steroids, nebs, magnesium bolus. Reassessed patient with significant improvement after treatments. Speaking in full sentences normal mental status diffuse wheezing. Covid swab flu swab negative. Pneumonia showing up on chest x-ray will treat with IV antibiotics cover for hospital-acquired pneumonia given recent admission. Culture sent off. Discussed with pulmonology and internal medicine will admit the patient to the intermediate ICU for the morning given the degree of her distress when she came in and initial BiPAP requirement. CRITICAL CARE:   30 minutes for management of acute on chronic respiratory failure requiring bipap respiratory support    PROCEDURES:    Procedures    DIAGNOSTIC RESULTS   EKG:All EKG's are interpreted by the Emergency Department Physician who either signs or Co-signs this chart in the absence of a cardiologist.    Sinus tachycardia rate of 150 normal axis normal intervals no concerning ST or T wave changes    RADIOLOGY:All plain film, CT, MRI, and formal ultrasound images (except ED bedside ultrasound) are read by the radiologist, see reports below, unless otherwisenoted in MDM or here. XR CHEST PORTABLE   Final Result   Findings are suggestive of pneumonia most significant in the right lower   lung. Small right pleural effusion. Pulmonary edema could be a possibility. LABS: All lab results were reviewed by myself, and all abnormals are listed below.   Labs Reviewed   CBC WITH AUTO DIFFERENTIAL - Abnormal; Notable for the following components:       Result Value    RDW 16.0 (*)     Monocytes 8 (*)     All other components within normal limits   COMPREHENSIVE METABOLIC PANEL W/ REFLEX TO MG FOR LOW K - Abnormal; Notable for the following components:    Glucose 235 (*)     BUN 27 (*)     CREATININE 1.37 (*)     Alkaline Phosphatase 118 (*)     Total Bilirubin 0.24 (*)     GFR Non- 39 (*)     GFR  48 (*)     All other components within normal limits   BLOOD GAS, ARTERIAL - Abnormal; Notable for the following components:    pH, Arterial 7.266 (*)     pCO2, Arterial 48.8 (*)     pO2, Arterial 488.0 (*)     Negative Base Excess, Art 4.8 (*)     O2 Sat, Arterial 98.2 (*)     All other components within normal limits   BLOOD GAS, ARTERIAL - Abnormal; Notable for the following components:    pO2, Arterial 104.0 (*)     Negative Base Excess, Art 3.0 (*)     All other components within normal limits   COVID-19 & INFLUENZA COMBO   CULTURE, BLOOD 1   CULTURE, BLOOD 2   MRSA DNA PROBE, NASAL   TROPONIN   APTT   PROTIME-INR       EMERGENCY DEPARTMENTCOURSE:         Vitals:    Vitals:    12/18/21 0459 12/18/21 0517 12/18/21 0530 12/18/21 0545   BP: (!) 141/102 (!) 145/84 101/68 112/69   Pulse: 102 114 92 99   Resp: 14 19 19 19   SpO2: 96%  94% 92%   Weight:       Height:           The patient was given the following medications while in the emergency department:  Orders Placed This Encounter   Medications    methylPREDNISolone sodium (SOLU-MEDROL) 125 MG injection     Jojo MerckRo: cabinet override    magnesium sulfate 1-5 GM/100ML-% IVPB (premix)     Jojo Ro Smith: cabinet override    ipratropium-albuterol (DUONEB) nebulizer solution 1 ampule     Order Specific Question:   Initiate RT Bronchodilator Protocol     Answer:    Yes    methylPREDNISolone sodium (SOLU-MEDROL) injection 125 mg    magnesium sulfate 2,000 mg in dextrose 5 % 100 mL IVPB    cefepime (MAXIPIME) 2000 mg IVPB minibag     Order Specific Question:   Antimicrobial Indications     Answer:   Pneumonia (HAP)    vancomycin (VANCOCIN) intermittent dosing (placeholder)     Order Specific Question:   Antimicrobial Indications     Answer:   Pneumonia (HAP)    vancomycin (VANCOCIN) 2,500 mg in dextrose 5 % 500 mL IVPB     Order Specific Question:   Antimicrobial Indications     Answer:   Pneumonia (HAP)    vancomycin (VANCOCIN) 1,500 mg in dextrose 5 % 250 mL IVPB (ADDAVIAL)     Order Specific Question:   Antimicrobial Indications     Answer:   Pneumonia (HAP)     CONSULTS:  PHARMACY TO DOSE VANCOMYCIN  IP CONSULT TO INTERNAL MEDICINE  IP CONSULT TO PULMONOLOGY    FINAL IMPRESSION      1. Acute on chronic respiratory failure with hypoxia and hypercapnia (HCC)    2. Pneumonia due to infectious organism, unspecified laterality, unspecified part of lung          DISPOSITION/PLAN   DISPOSITION Admitted 12/18/2021 05:46:09 AM      PATIENT REFERRED TO:  No follow-up provider specified. DISCHARGE MEDICATIONS:  New Prescriptions    No medications on file     The care is provided during an unprecedented national emergency due to the novel coronavirus, COVID 19.   Celeste Greene MD  Attending Emergency Physician                    Celeste Greene MD  12/18/21 5678

## 2021-12-18 NOTE — CONSULTS
[facility]   CONSULT NOTE:      DATE OF CONSULT 12/18/2021    REASON FOR CONSULTATION:  Respiratory failure      PCP Bruna Mcqueen MD     CHIEF COMPLAINT: Dyspnea    HISTORY OF PRESENT ILLNESS:     Dylan is 72-year-old obese female with a history of COPD and coronary artery disease status post PTCA/stent of RCA in October. Was recently at [facility] in October where she was intubated for airway protection and hypercapnia. She was eventually extubated and discharged. She saw Dr. Zoe Koch who ordered pulmonary function test in November as well as a low-dose screening CT. However, she has not had this. She is normally not on oxygen but does have a nebulizer machine. She is maintained on Spiriva and Symbicort according to old records. She was in her usual state of health and actually had gone shopping at the [facility] to get some groceries and came home and went to bed. She woke up extremely short of breath. She took a nebulizer treatment but it did not help her. She had denied any fevers, chills, or chest pain prior. EMS placed her on a CPAP machine prior to her coming to the ER. She was quite tachypneic and tachycardic prior to being placed on BiPAP. Her initial ABG showed a pH of 7.26, a PCO2 of 49, and a PO2 of 488. Follow-up ABG showed pH 737 with a PCO2 of 38 and PO2 104. Patient was actually taken off BiPAP but then requested back on. She denies any chest pain, pleurisy, or fevers. She is not vaccinated for Covid and a rapid test was negative. He has never been formally tested for obstructive sleep apnea. She smoked half a pack a day for approximately 50 years.       ALLERGIES:  Allergies   Allergen Reactions    Codeine     Morphine        HOME MEDICATIONS:  Medications Prior to Admission: budesonide-formoterol (SYMBICORT) 160-4.5 MCG/ACT AERO, Inhale 2 puffs into the lungs 2 times daily  lisinopril (PRINIVIL;ZESTRIL) 10 MG tablet, Take 1 tablet by mouth daily  clopidogrel (PLAVIX) 75 MG tablet, Take 1 tablet by mouth daily  famotidine (PEPCID) 20 MG tablet, Take 20 mg by mouth 2 times daily  albuterol sulfate HFA (VENTOLIN HFA) 108 (90 Base) MCG/ACT inhaler, Inhale 2 puffs into the lungs 3 times daily  traZODone (DESYREL) 50 MG tablet, Take 50 mg by mouth as needed for Sleep (takes sometimes to help sleep)  lidocaine (LIDODERM) 5 %, Place 1 patch onto the skin as needed for Pain (as needed for rotator cuff pain) 12 hours on, 12 hours off.  apixaban (ELIQUIS) 5 MG TABS tablet, Take 1 tablet by mouth 2 times daily  atorvastatin (LIPITOR) 40 MG tablet, Take 1 tablet by mouth daily  Dulaglutide (TRULICITY) 1.5 AX/1.3LZ SOPN, Inject 1.5 mg into the skin once a week  aspirin 81 MG chewable tablet, Take 1 tablet by mouth daily  nitroGLYCERIN (NITROSTAT) 0.4 MG SL tablet, up to max of 3 total doses. If no relief after 1 dose, call 911. metoprolol tartrate (LOPRESSOR) 25 MG tablet, Take 0.5 tablets by mouth 2 times daily  Lancets MISC, 1 each by Does not apply route daily  glipiZIDE (GLUCOTROL) 5 MG tablet, Take 1 tablet by mouth 2 times daily  metFORMIN (GLUCOPHAGE) 1000 MG tablet, Take 1 tablet by mouth 2 times daily (with meals)  blood glucose monitor strips, Test 3 times a day & as needed for symptoms of irregular blood glucose. glucose monitoring kit (FREESTYLE) monitoring kit, 1 kit by Does not apply route daily  albuterol (PROVENTIL) (5 MG/ML) 0.5% nebulizer solution, Take 0.5 mLs by nebulization every 6 hours as needed for Wheezing (Patient taking differently: Take 2.5 mg by nebulization 2 times daily )  tiotropium (SPIRIVA) 18 MCG inhalation capsule, Inhale 1 capsule into the lungs daily Pt told that she was using this medication at home.   albuterol-ipratropium (COMBIVENT RESPIMAT)  MCG/ACT AERS inhaler, Inhale 1 puff into the lungs every 6 hours  acetaminophen (TYLENOL) 500 MG tablet, Take 1 tablet by mouth every 6 hours as needed for Pain      PAST MEDICAL HISTORY:  Past Medical History:   Diagnosis Date    Asthma     Blood circulation, collateral     CAD (coronary artery disease)     [facility]chronic obstructive pulmonary disease) ([facility])     Diabetes mellitus ([facility])     Hyperlipidemia     Hypertension     Movement disorder     B/L torn rotator cuff.  Neuropathy     PAD (peripheral artery disease) ([facility])        PAST SURGICAL HISTORY:  Past Surgical History:   Procedure Laterality Date    CORONARY ANGIOPLASTY WITH STENT PLACEMENT      x2    CORONARY ANGIOPLASTY WITH STENT PLACEMENT  02/11/2019    JOSE MARTIN to RCA per Dr. Paris Carmona radial approuch    INNER EAR SURGERY Right     TONSILLECTOMY            SOCIAL HISTORY:  Social History     Socioeconomic History    Marital status:      Spouse name: Not on file    Number of children: Not on file    Years of education: Not on file    Highest education level: Not on file   Occupational History    Not on file   Tobacco Use    Smoking status: Current Every Day Smoker     Packs/day: 0.50     Years: 45.00     Pack years: 22.50     Types: Cigarettes    Smokeless tobacco: Current User   Substance and Sexual Activity    Alcohol use: No    Drug use: Yes     Types: Marijuana Champ Cue)    Sexual activity: Never   Other Topics Concern    Not on file   Social History Narrative    Not on file     Social Determinants of Health     Financial Resource Strain:     Difficulty of Paying Living Expenses: Not on file   Food Insecurity:     Worried About Running Out of Food in the Last Year: Not on file    Lamine of Food in the Last Year: Not on file   Transportation Needs:     Lack of Transportation (Medical): Not on file    Lack of Transportation (Non-Medical):  Not on file   Physical Activity:     Days of Exercise per Week: Not on file    Minutes of Exercise per Session: Not on file   Stress:     Feeling of Stress : Not on file   Social Connections:     Frequency of Communication with Friends and Family: Not on file    Frequency of Social Gatherings with Friends and Family: Not on file    Attends Confucianism Services: Not on file    Active Member of Clubs or Organizations: Not on file    Attends Club or Organization Meetings: Not on file    Marital Status: Not on file   Intimate Partner Violence:     Fear of Current or Ex-Partner: Not on file    Emotionally Abused: Not on file    Physically Abused: Not on file    Sexually Abused: Not on file   Housing Stability:     Unable to Pay for Housing in the Last Year: Not on file    Number of [place] in the Last Year: Not on file    Unstable Housing in the Last Year: Not on file       FAMILY HISTORY:  Family History   Problem Relation Age of Onset    Hypertension Mother     Hypertension Father     Diabetes Sister     Diabetes Brother     Stroke Brother        REVIEW OF SYSTEMS:  All other systems reviewed and are negative. PHYSICAL EXAM:  Vital Signs Blood pressure 112/69, pulse 99, resp. rate 19, height 5' 9\" (1.753 m), weight 220 lb (99.8 kg), SpO2 92 %. Oxygen Amount and Delivery:      Admission Weight Weight: 220 lb (99.8 kg)    General Appearance   Obese female in no acute respiratory stress able to converse on BiPAP  Head  Normocephalic, without obvious abnormality, atraumatic    Eyes  conjunctivae/corneas clear. PERRL, EOM's intact. ENT neck size estimated 18 inches  Neck  no adenopathy, no carotid bruit, no JVD, supple, symmetrical, trachea midline and thyroid not enlarged, symmetric, no tenderness/mass/nodules  Lungs bilateral wheezes and crackles, no rhonchi  Heart: regular rate and rhythm, S1, S2 normal, no murmur, click, rub or gallop  Abdomen  soft, non-tender; bowel sounds normal; no masses,  no organomegaly  Extremities  Trace peripheral edema  Skin  Skin color, texture, turgor normal. No rashes or lesions  Neurologic: Alert and oriented X 3, normal strength and tone.          Imaging  Chest x-ray shows bilateral lower lobe interstitial markings      Lab Review  CBC     Lab Results   Component Value Date    WBC 8.9 12/18/2021    RBC 4.44 12/18/2021    RBC 4.10 06/02/2012    HGB 12.1 12/18/2021    HCT 37.7 12/18/2021     12/18/2021     06/02/2012    MCV 85.0 12/18/2021    MCH 27.3 12/18/2021    MCHC 32.1 12/18/2021    RDW 16.0 12/18/2021    LYMPHOPCT 38 12/18/2021    MONOPCT 8 12/18/2021    BASOPCT 1 12/18/2021    MONOSABS 0.70 12/18/2021    LYMPHSABS 3.40 12/18/2021    EOSABS 0.20 12/18/2021    BASOSABS 0.10 12/18/2021    DIFFTYPE NOT REPORTED 12/18/2021       BMP   Lab Results   Component Value Date     12/18/2021    K 3.9 12/18/2021     12/18/2021    CO2 21 12/18/2021    BUN 27 12/18/2021    CREATININE 1.37 12/18/2021    GLUCOSE 235 12/18/2021    GLUCOSE 94 06/02/2012    CALCIUM 8.8 12/18/2021       LFTS  Lab Results   Component Value Date    ALKPHOS 118 12/18/2021    ALT 20 12/18/2021    AST 21 12/18/2021    PROT 7.4 12/18/2021    BILITOT 0.24 12/18/2021    BILIDIR <0.08 07/25/2021    IBILI CANNOT BE CALCULATED 07/25/2021    LABALBU 4.1 12/18/2021    LABALBU 4.0 06/01/2012       INR  Recent Labs     12/18/21  0306   PROTIME 13.5   INR 1.0       APTT  Recent Labs     12/18/21  0306   APTT 27.9       Lactic Acid  Lab Results   Component Value Date    LACTA NOT REPORTED 10/27/2021    LACTA NOT REPORTED 09/29/2021        PRO-BNP   No results for input(s): PROBNP in the last 72 hours.         ABGs:   Lab Results   Component Value Date    PHART 7.373 12/18/2021    PO2ART 104.0 12/18/2021    IOZ9WBQ 38.2 12/18/2021       Lab Results   Component Value Date    MODE BIPAP 12/18/2021         Impression    Acute hypercapnic respiratory failure  Acute exacerbation COPD  Bilateral lower lobe interstitial infiltrates-has been seen previously on x-rays but seem to be more prominent: Given recent hospitalization will treat as healthcare pneumonia  Probable SILVA  History of A. fib on Eliquis  Coronary artery disease status post stent  History of tobacco use  Nonmorbid obesity      Plan:      Admit to ICU intermediate status  Continue BiPAP as needed during the day but she needs to wear it at night and while sleeping during the day  Placed on albuterol aerosols every 4 hours  Solu-Medrol 40 every 6 hrs  Continue her Symbicort and Spiriva  Continue cefepime and vancomycin  Check respiratory viral panel (does not appear that she has Covid, but may have other respiratory viruses)  Check urine for Legionella antigen, mycoplasma IgM and MRSA swab as well as sputum culture  When more stable, would get CT of chest  And antitussives for cough  Add Acapella/flutter valve therapy  Home oxygen evaluation prior to discharge  Outpatient PFT  Outpatient sleep study

## 2021-12-18 NOTE — ED NOTES
PT UP TO BEDSIDE COMMODE. SOB NOTED WITH MINIMAL EXERTION. SP02 REMAINS 96%@ 3LITERS VIA NC. BACK TO BED. ABLE TO SLOW BREATHING AND RECOVER. PT REPOSITIONED FOR COMFORT AND CALL LIGHT IN REACH.       Abel Blue RN  12/18/21 9024

## 2021-12-18 NOTE — ED NOTES
Report given to ALICJA Jang from ED. Report method bedside   The following was reviewed with receiving RN:   Current vital signs:  /69   Pulse 99   Resp 19   Ht 5' 9\" (1.753 m)   Wt 220 lb (99.8 kg)   SpO2 92%   BMI 32.49 kg/m²                      Any medication or safety alerts were reviewed. Any pending diagnostics and notifications were also reviewed, as well as any safety concerns or issues, abnormal labs, abnormal imaging, and abnormal assessment findings. Questions were answered.           Rock Novak RN  12/18/21 0700

## 2021-12-19 LAB
ABSOLUTE EOS #: 0.15 K/UL (ref 0–0.4)
ABSOLUTE IMMATURE GRANULOCYTE: ABNORMAL K/UL (ref 0–0.3)
ABSOLUTE LYMPH #: 0.62 K/UL (ref 1–4.8)
ABSOLUTE MONO #: 0.31 K/UL (ref 0.1–1.3)
ADENOVIRUS PCR: NOT DETECTED
ANION GAP SERPL CALCULATED.3IONS-SCNC: 9 MMOL/L (ref 9–17)
BASOPHILS # BLD: 0 % (ref 0–2)
BASOPHILS ABSOLUTE: 0 K/UL (ref 0–0.2)
BORDETELLA PARAPERTUSSIS: NOT DETECTED
BORDETELLA PERTUSSIS PCR: NOT DETECTED
BUN BLDV-MCNC: 30 MG/DL (ref 6–20)
BUN/CREAT BLD: ABNORMAL (ref 9–20)
CALCIUM SERPL-MCNC: 9.3 MG/DL (ref 8.6–10.4)
CHLAMYDIA PNEUMONIAE BY PCR: NOT DETECTED
CHLORIDE BLD-SCNC: 103 MMOL/L (ref 98–107)
CO2: 25 MMOL/L (ref 20–31)
CORONAVIRUS 229E PCR: NOT DETECTED
CORONAVIRUS HKU1 PCR: NOT DETECTED
CORONAVIRUS NL63 PCR: NOT DETECTED
CORONAVIRUS OC43 PCR: NOT DETECTED
CREAT SERPL-MCNC: 1.08 MG/DL (ref 0.5–0.9)
DIFFERENTIAL TYPE: ABNORMAL
EOSINOPHILS RELATIVE PERCENT: 1 % (ref 0–4)
GFR AFRICAN AMERICAN: >60 ML/MIN
GFR NON-AFRICAN AMERICAN: 52 ML/MIN
GFR SERPL CREATININE-BSD FRML MDRD: ABNORMAL ML/MIN/{1.73_M2}
GFR SERPL CREATININE-BSD FRML MDRD: ABNORMAL ML/MIN/{1.73_M2}
GLUCOSE BLD-MCNC: 239 MG/DL (ref 65–105)
GLUCOSE BLD-MCNC: 239 MG/DL (ref 70–99)
GLUCOSE BLD-MCNC: 268 MG/DL (ref 65–105)
GLUCOSE BLD-MCNC: 275 MG/DL (ref 65–105)
GLUCOSE BLD-MCNC: 293 MG/DL (ref 65–105)
HCT VFR BLD CALC: 32.7 % (ref 36–46)
HEMOGLOBIN: 10.7 G/DL (ref 12–16)
HUMAN METAPNEUMOVIRUS PCR: NOT DETECTED
IMMATURE GRANULOCYTES: ABNORMAL %
INFLUENZA A BY PCR: NOT DETECTED
INFLUENZA A H1 (2009) PCR: NORMAL
INFLUENZA A H1 PCR: NORMAL
INFLUENZA A H3 PCR: NORMAL
INFLUENZA B BY PCR: NOT DETECTED
LEGIONELLA PNEUMOPHILIA AG, URINE: NEGATIVE
LYMPHOCYTES # BLD: 4 % (ref 24–44)
MCH RBC QN AUTO: 27.4 PG (ref 26–34)
MCHC RBC AUTO-ENTMCNC: 32.8 G/DL (ref 31–37)
MCV RBC AUTO: 83.5 FL (ref 80–100)
MONOCYTES # BLD: 2 % (ref 1–7)
MORPHOLOGY: ABNORMAL
MRSA, DNA, NASAL: ABNORMAL
MRSA, DNA, NASAL: NORMAL
MYCOPLASMA PNEUMONIAE PCR: NOT DETECTED
NRBC AUTOMATED: ABNORMAL PER 100 WBC
PARAINFLUENZA 1 PCR: NOT DETECTED
PARAINFLUENZA 2 PCR: NOT DETECTED
PARAINFLUENZA 3 PCR: NOT DETECTED
PARAINFLUENZA 4 PCR: NOT DETECTED
PDW BLD-RTO: 15.4 % (ref 11.5–14.9)
PLATELET # BLD: 257 K/UL (ref 150–450)
PLATELET ESTIMATE: ABNORMAL
PMV BLD AUTO: 8.5 FL (ref 6–12)
POTASSIUM SERPL-SCNC: 4.6 MMOL/L (ref 3.7–5.3)
RBC # BLD: 3.91 M/UL (ref 4–5.2)
RBC # BLD: ABNORMAL 10*6/UL
RESP SYNCYTIAL VIRUS PCR: NOT DETECTED
RHINO/ENTEROVIRUS PCR: NOT DETECTED
SARS-COV-2, PCR: NOT DETECTED
SEG NEUTROPHILS: 93 % (ref 36–66)
SEGMENTED NEUTROPHILS ABSOLUTE COUNT: 14.32 K/UL (ref 1.3–9.1)
SODIUM BLD-SCNC: 137 MMOL/L (ref 135–144)
SPECIMEN DESCRIPTION: ABNORMAL
SPECIMEN DESCRIPTION: NORMAL
SPECIMEN DESCRIPTION: NORMAL
WBC # BLD: 15.4 K/UL (ref 3.5–11)
WBC # BLD: ABNORMAL 10*3/UL

## 2021-12-19 PROCEDURE — 6360000002 HC RX W HCPCS

## 2021-12-19 PROCEDURE — 6360000002 HC RX W HCPCS: Performed by: STUDENT IN AN ORGANIZED HEALTH CARE EDUCATION/TRAINING PROGRAM

## 2021-12-19 PROCEDURE — 6370000000 HC RX 637 (ALT 250 FOR IP): Performed by: INTERNAL MEDICINE

## 2021-12-19 PROCEDURE — 6370000000 HC RX 637 (ALT 250 FOR IP): Performed by: STUDENT IN AN ORGANIZED HEALTH CARE EDUCATION/TRAINING PROGRAM

## 2021-12-19 PROCEDURE — 2060000000 HC ICU INTERMEDIATE R&B

## 2021-12-19 PROCEDURE — 2580000003 HC RX 258: Performed by: INTERNAL MEDICINE

## 2021-12-19 PROCEDURE — 82947 ASSAY GLUCOSE BLOOD QUANT: CPT

## 2021-12-19 PROCEDURE — 94761 N-INVAS EAR/PLS OXIMETRY MLT: CPT

## 2021-12-19 PROCEDURE — 0202U NFCT DS 22 TRGT SARS-COV-2: CPT

## 2021-12-19 PROCEDURE — 2700000000 HC OXYGEN THERAPY PER DAY

## 2021-12-19 PROCEDURE — 6370000000 HC RX 637 (ALT 250 FOR IP)

## 2021-12-19 PROCEDURE — 6360000002 HC RX W HCPCS: Performed by: INTERNAL MEDICINE

## 2021-12-19 PROCEDURE — 85025 COMPLETE CBC W/AUTO DIFF WBC: CPT

## 2021-12-19 PROCEDURE — 80048 BASIC METABOLIC PNL TOTAL CA: CPT

## 2021-12-19 PROCEDURE — 2580000003 HC RX 258: Performed by: STUDENT IN AN ORGANIZED HEALTH CARE EDUCATION/TRAINING PROGRAM

## 2021-12-19 PROCEDURE — 36415 COLL VENOUS BLD VENIPUNCTURE: CPT

## 2021-12-19 PROCEDURE — 94640 AIRWAY INHALATION TREATMENT: CPT

## 2021-12-19 PROCEDURE — 99233 SBSQ HOSP IP/OBS HIGH 50: CPT | Performed by: INTERNAL MEDICINE

## 2021-12-19 PROCEDURE — 94660 CPAP INITIATION&MGMT: CPT

## 2021-12-19 RX ORDER — METHYLPREDNISOLONE SODIUM SUCCINATE 40 MG/ML
40 INJECTION, POWDER, LYOPHILIZED, FOR SOLUTION INTRAMUSCULAR; INTRAVENOUS EVERY 8 HOURS
Status: DISCONTINUED | OUTPATIENT
Start: 2021-12-19 | End: 2021-12-20

## 2021-12-19 RX ORDER — INSULIN GLARGINE 100 [IU]/ML
15 INJECTION, SOLUTION SUBCUTANEOUS NIGHTLY
Status: DISCONTINUED | OUTPATIENT
Start: 2021-12-19 | End: 2021-12-20

## 2021-12-19 RX ORDER — FAMOTIDINE 20 MG/1
20 TABLET, FILM COATED ORAL 2 TIMES DAILY
Status: DISCONTINUED | OUTPATIENT
Start: 2021-12-19 | End: 2021-12-20 | Stop reason: HOSPADM

## 2021-12-19 RX ADMIN — BENZONATATE 200 MG: 100 CAPSULE ORAL at 12:14

## 2021-12-19 RX ADMIN — METHYLPREDNISOLONE SODIUM SUCCINATE 40 MG: 40 INJECTION, POWDER, FOR SOLUTION INTRAMUSCULAR; INTRAVENOUS at 09:09

## 2021-12-19 RX ADMIN — Medication 60 MG: at 09:09

## 2021-12-19 RX ADMIN — BUDESONIDE AND FORMOTEROL FUMARATE DIHYDRATE 2 PUFF: 160; 4.5 AEROSOL RESPIRATORY (INHALATION) at 08:46

## 2021-12-19 RX ADMIN — CLOPIDOGREL BISULFATE 75 MG: 75 TABLET ORAL at 09:09

## 2021-12-19 RX ADMIN — ALBUTEROL SULFATE 2.5 MG: 2.5 SOLUTION RESPIRATORY (INHALATION) at 08:39

## 2021-12-19 RX ADMIN — FUROSEMIDE 40 MG: 10 INJECTION, SOLUTION INTRAVENOUS at 09:09

## 2021-12-19 RX ADMIN — SODIUM CHLORIDE 25 ML: 9 INJECTION, SOLUTION INTRAVENOUS at 04:41

## 2021-12-19 RX ADMIN — INSULIN LISPRO 2 UNITS: 100 INJECTION, SOLUTION INTRAVENOUS; SUBCUTANEOUS at 21:56

## 2021-12-19 RX ADMIN — VANCOMYCIN HYDROCHLORIDE 1500 MG: 1.5 INJECTION, POWDER, LYOPHILIZED, FOR SOLUTION INTRAVENOUS at 05:50

## 2021-12-19 RX ADMIN — BENZONATATE 200 MG: 100 CAPSULE ORAL at 21:56

## 2021-12-19 RX ADMIN — SODIUM CHLORIDE, PRESERVATIVE FREE 10 ML: 5 INJECTION INTRAVENOUS at 21:57

## 2021-12-19 RX ADMIN — ALBUTEROL SULFATE 2.5 MG: 2.5 SOLUTION RESPIRATORY (INHALATION) at 15:45

## 2021-12-19 RX ADMIN — APIXABAN 5 MG: 5 TABLET, FILM COATED ORAL at 21:56

## 2021-12-19 RX ADMIN — CEFEPIME HYDROCHLORIDE 2000 MG: 2 INJECTION, POWDER, FOR SOLUTION INTRAVENOUS at 17:14

## 2021-12-19 RX ADMIN — INSULIN LISPRO 3 UNITS: 100 INJECTION, SOLUTION INTRAVENOUS; SUBCUTANEOUS at 12:14

## 2021-12-19 RX ADMIN — LISINOPRIL 10 MG: 10 TABLET ORAL at 09:09

## 2021-12-19 RX ADMIN — INSULIN LISPRO 2 UNITS: 100 INJECTION, SOLUTION INTRAVENOUS; SUBCUTANEOUS at 08:00

## 2021-12-19 RX ADMIN — ACETAMINOPHEN 650 MG: 325 TABLET, FILM COATED ORAL at 17:08

## 2021-12-19 RX ADMIN — BENZONATATE 200 MG: 100 CAPSULE ORAL at 09:09

## 2021-12-19 RX ADMIN — SODIUM CHLORIDE, PRESERVATIVE FREE 10 ML: 5 INJECTION INTRAVENOUS at 11:26

## 2021-12-19 RX ADMIN — ATORVASTATIN CALCIUM 40 MG: 40 TABLET, FILM COATED ORAL at 17:08

## 2021-12-19 RX ADMIN — METHYLPREDNISOLONE SODIUM SUCCINATE 40 MG: 40 INJECTION, POWDER, FOR SOLUTION INTRAMUSCULAR; INTRAVENOUS at 04:42

## 2021-12-19 RX ADMIN — SODIUM CHLORIDE 25 ML: 9 INJECTION, SOLUTION INTRAVENOUS at 05:50

## 2021-12-19 RX ADMIN — TIOTROPIUM BROMIDE INHALATION SPRAY 2 PUFF: 3.12 SPRAY, METERED RESPIRATORY (INHALATION) at 08:46

## 2021-12-19 RX ADMIN — FAMOTIDINE 20 MG: 20 TABLET ORAL at 21:56

## 2021-12-19 RX ADMIN — METHYLPREDNISOLONE SODIUM SUCCINATE 40 MG: 40 INJECTION, POWDER, FOR SOLUTION INTRAMUSCULAR; INTRAVENOUS at 17:08

## 2021-12-19 RX ADMIN — BUDESONIDE AND FORMOTEROL FUMARATE DIHYDRATE 2 PUFF: 160; 4.5 AEROSOL RESPIRATORY (INHALATION) at 20:28

## 2021-12-19 RX ADMIN — INSULIN LISPRO 6 UNITS: 100 INJECTION, SOLUTION INTRAVENOUS; SUBCUTANEOUS at 17:15

## 2021-12-19 RX ADMIN — ALBUTEROL SULFATE 2.5 MG: 2.5 SOLUTION RESPIRATORY (INHALATION) at 22:57

## 2021-12-19 RX ADMIN — CEFEPIME HYDROCHLORIDE 2000 MG: 2 INJECTION, POWDER, FOR SOLUTION INTRAVENOUS at 04:41

## 2021-12-19 RX ADMIN — METOPROLOL TARTRATE 12.5 MG: 25 TABLET, FILM COATED ORAL at 21:56

## 2021-12-19 RX ADMIN — ALBUTEROL SULFATE 2.5 MG: 2.5 SOLUTION RESPIRATORY (INHALATION) at 11:30

## 2021-12-19 RX ADMIN — METOPROLOL TARTRATE 12.5 MG: 25 TABLET, FILM COATED ORAL at 09:10

## 2021-12-19 RX ADMIN — ASPIRIN 81 MG: 81 TABLET, CHEWABLE ORAL at 09:10

## 2021-12-19 RX ADMIN — INSULIN GLARGINE 15 UNITS: 100 INJECTION, SOLUTION SUBCUTANEOUS at 21:56

## 2021-12-19 RX ADMIN — METHYLPREDNISOLONE SODIUM SUCCINATE 40 MG: 40 INJECTION, POWDER, FOR SOLUTION INTRAMUSCULAR; INTRAVENOUS at 17:14

## 2021-12-19 RX ADMIN — ALBUTEROL SULFATE 2.5 MG: 2.5 SOLUTION RESPIRATORY (INHALATION) at 20:28

## 2021-12-19 RX ADMIN — APIXABAN 5 MG: 5 TABLET, FILM COATED ORAL at 09:10

## 2021-12-19 RX ADMIN — Medication 60 MG: at 21:56

## 2021-12-19 ASSESSMENT — ENCOUNTER SYMPTOMS
ABDOMINAL PAIN: 0
NAUSEA: 0
CONSTIPATION: 0
VOMITING: 0
COUGH: 0
CHEST TIGHTNESS: 0
DIARRHEA: 0
SHORTNESS OF BREATH: 1

## 2021-12-19 ASSESSMENT — PAIN SCALES - GENERAL: PAINLEVEL_OUTOF10: 7

## 2021-12-19 NOTE — PROGRESS NOTES
[facility] [employer]    PROGRESS NOTE             12/19/2021    7:09 AM    Name:   Chelsy Santos  MRN:     [id number]     Acct:      [de-identified]   Room:   [address] [place] [address] Day:  1  Admit Date:  12/18/2021  2:49 AM    PCP:  Ajith Molina MD  Code Status:  Full Code    Subjective:     C/C:   Chief Complaint   Patient presents with    Shortness of Breath     Interval History Status: improved. Patient seen and examined at bedside. Lower extremity swelling and respiratory status improved from previous. She has been diuresing well on Lasix 40 mg IV  daily. Patient states that she wore BiPAP all night last night. Will decrease steroid frequency from every 6 hours to every 8 hours. Brief History:     Patient is a 61-year-old female history of COPD, HLD, CAD status post stents x2, HTN, PAD who presented with increasing shortness of breath over the past 24 hours and was determined to be experiencing an acute exacerbation of congestive heart failure. Initial chest x-ray concerning for increased interstitial markings, patient received 40 Lasix in ED. Started on broad-spectrum antibiotics due to suspected lobar pneumonia. She was briefly on BiPAP with FiO2 at 40% and given breathing treatments, which resulted in an improvement in respiratory status. Review of Systems:     Review of Systems   Constitutional: Positive for fatigue. Negative for chills and fever. Respiratory: Positive for shortness of breath (Improving). Negative for cough and chest tightness. Cardiovascular: Positive for leg swelling. Negative for chest pain. Gastrointestinal: Negative for abdominal pain, constipation, diarrhea, nausea and vomiting. Genitourinary: Negative for difficulty urinating and dysuria. Musculoskeletal: Negative for arthralgias. Neurological: Negative for dizziness and headaches.    Psychiatric/Behavioral: Negative for agitation and behavioral problems. Medications: Allergies: Allergies   Allergen Reactions    Codeine     Morphine        Current Meds:   Scheduled Meds:    magnesium sulfate  2,000 mg IntraVENous Once    vancomycin (VANCOCIN) intermittent dosing (placeholder)   Other RX Placeholder    vancomycin  1,500 mg IntraVENous Q24H    sodium chloride flush  5-40 mL IntraVENous 2 times per day    methylPREDNISolone  40 mg IntraVENous Q6H    cefepime  2,000 mg IntraVENous Q12H    benzonatate  200 mg Oral TID    dextromethorphan  60 mg Oral 2 times per day    budesonide-formoterol  2 puff Inhalation BID    tiotropium  2 puff Inhalation Daily    albuterol  2.5 mg Nebulization Q4H    apixaban  5 mg Oral BID    aspirin  81 mg Oral Daily    atorvastatin  40 mg Oral QPM    clopidogrel  75 mg Oral Daily    lisinopril  10 mg Oral Daily    metoprolol tartrate  12.5 mg Oral BID    insulin glargine  10 Units SubCUTAneous Nightly    insulin lispro  0-6 Units SubCUTAneous TID WC    insulin lispro  0-3 Units SubCUTAneous Nightly    furosemide  40 mg IntraVENous Daily     Continuous Infusions:    sodium chloride 25 mL (12/19/21 0550)    dextrose       PRN Meds: sodium chloride flush, sodium chloride, acetaminophen, ondansetron **OR** ondansetron, glucose, dextrose, glucagon (rDNA), dextrose, melatonin    Data:     Past Medical History:   has a past medical history of Asthma, Blood circulation, collateral, CAD (coronary artery disease), COPD (chronic obstructive pulmonary disease) ([facility] Diabetes mellitus ([facility] Hyperlipidemia, Hypertension, Movement disorder, Neuropathy, and PAD (peripheral artery disease) ([facility] Social History:   reports that she has been smoking cigarettes. She has a 22.50 pack-year smoking history. She uses smokeless tobacco. She reports current drug use. Drug: Marijuana Berneta Jair). She reports that she does not drink alcohol.      Family History:   Family History   Problem Relation Age of Onset    Hypertension Mother     Hypertension Father     Diabetes Sister     Diabetes Brother     Stroke Brother        Vitals:  /77   Pulse 87   Temp 97.9 °F (36.6 °C) (Oral)   Resp 18   Ht 5' 9\" (1.753 m)   Wt 240 lb 1.3 oz (108.9 kg)   SpO2 100%   BMI 35.45 kg/m²   Temp (24hrs), Av.8 °F (36.6 °C), Min:97.6 °F (36.4 °C), Max:98 °F (36.7 °C)    Recent Labs     21  1124 21  1647 21   POCGLU 243* 228* 235*       I/O(24Hr): Intake/Output Summary (Last 24 hours) at 2021 0709  Last data filed at 2021 0535  Gross per 24 hour   Intake --   Output 200 ml   Net -200 ml       Labs:  [unfilled]    Lab Results   Component Value Date/Time    SPECIAL LT FOREARM 2021 04:00 AM     Lab Results   Component Value Date/Time    CULTURE NO GROWTH 12 HOURS 2021 04:00 AM       [unfilled]    Radiology:    XR CHEST PORTABLE    Result Date: 2021  Findings are suggestive of pneumonia most significant in the right lower lung. Small right pleural effusion. Pulmonary edema could be a possibility. Physical Examination:        Physical Exam  Constitutional:       Appearance: Normal appearance. HENT:      Head: Normocephalic and atraumatic. Eyes:      Extraocular Movements: Extraocular movements intact. Conjunctiva/sclera: Conjunctivae normal.   Cardiovascular:      Rate and Rhythm: Regular rhythm. Tachycardia present. Pulses: Normal pulses. Heart sounds: Normal heart sounds. Pulmonary:      Effort: Pulmonary effort is normal.      Breath sounds: Wheezing present. Comments: Diffuse wheezing appreciated in the posterior lung fields bilaterally, slightly improved. Still on NC. Abdominal:      General: Abdomen is flat. Palpations: Abdomen is soft. Musculoskeletal:         General: Normal range of motion. Neurological:      General: No focal deficit present. Mental Status: She is alert and oriented to person, place, and time.    Psychiatric: Mood and Affect: Mood normal.         Behavior: Behavior normal.       Assessment:        Primary Problem  Lobar pneumonia [facility])    Active Hospital Problems    Diagnosis Date Noted    COPD exacerbation ([facility] [J44.1] 12/18/2021    Lobar pneumonia ([facility] [J18.1] 12/18/2021    Coronary artery disease involving native coronary artery of native heart without angina pectoris [I25.10] 07/25/2021    Essential hypertension [I10] 02/10/2019    ACS (acute coronary syndrome) ([facility] [I24.9] 02/10/2019    SOB (shortness of breath) [R06.02] 11/19/2016    Tobacco abuse [Z72.0] 11/19/2016    Obesity (BMI 30.0-34. 9) [E66.9] 11/19/2016       Plan:        Acute respiratory failure 2/2 healthcare associated pneumonia  - CXR concerning for right lower lobe infiltrate  - Blood, respiratory cultures NGTD  - Vancomycin, cefepime, day 2  - Pulmonology consulted              Legionella, mycoplasma, respiratory viral panel pending              CT chest when more stable                Acute on chronic systolic congestive heart failure  - Last echo from October this year EF 44%  - proBNP 1100s, Lasix 40 mg IV x1 in ED, Lasix 40 mg IV daily  - Monitor I/Os     Acute exacerbation of COPD  - Continue home Spiriva, Symbicort  - Albuterol nebulization every 4 hours, Solu-Medrol 40 mg every 8 hours     Comorbid conditions  Type 2 diabetes mellitus: LDSS, hypoglycemia protocol, POCT, Lantus 15U  Hyperlipidemia: Atorvastatin 40 mg daily  Essential hypertension: Lisinopril 10 mg daily  Paroxysmal atrial fibrillation: Eliquis 5 mg twice daily  History of CAD s/p stent x2: Continue aspirin 81 mg daily, Plavix 75 mg daily     DVT prophylaxis: Anticoagulated on Eliquis  GI prophylaxis: None   Code: Full  Dispo: Home    Isabela Mckeon MD  12/19/2021  7:09 AM       I have discussed the care of Víctor Orosco , including pertinent history and exam findings,    today with the resident.   I have seen and examined the patient and the key elements of all parts of the encounter have been performed by me . I agree with the assessment, plan and orders as documented by the resident. Principal Problem:    Lobar pneumonia ([facility]  Active Problems:    SOB (shortness of breath)    Tobacco abuse    Obesity (BMI 30.0-34. 9)    ACS (acute coronary syndrome) ([facility]    Essential hypertension    Coronary artery disease involving native coronary artery of native heart without angina pectoris    COPD exacerbation (HCC)  Resolved Problems:    * No resolved hospital problems. *        Overall  course ;                                   are improving over time.         Patient is much improved  On IV Lasix  Pro-Davide is negative  Still on antibiotics, will de-escalate antibiotic tomorrow after 48 hours if cultures are negative  DC planning          Electronically signed by Nita Canela MD

## 2021-12-19 NOTE — PROGRESS NOTES
Pulmonary Progress Note  [facility]      Patient - Audra Bennett,  Age - 63 y.o.    - 1960      Room Number - 2123/2123-01   N -  [id number]   [facility] - [de-identified[facility]  Date of Admission -  2021  2:49 AM        Consulting Jonathan Farr MD  Primary Care Physician - Ruthy Francis MD     SUBJECTIVE   Unfortunately, respiratory viral panel was not sent, neither was Legionella antigen; 2 MRSA swabs were sent 1 which was positive and the other one was negative ! OBJECTIVE   VITALS    height is 5' 9\" (1.753 m) and weight is 240 lb 1.3 oz (108.9 kg). Her oral temperature is 98.2 °F (36.8 °C). Her blood pressure is 108/51 (abnormal) and her pulse is 81. Her respiration is 16 and oxygen saturation is 95%. Body mass index is 35.45 kg/m². Temperature Range: Temp: 98.2 °F (36.8 °C) Temp  Av.8 °F (36.6 °C)  Min: 97.6 °F (36.4 °C)  Max: 98.2 °F (36.8 °C)  BP Range:  Systolic (99TPL), TTA:023 , Min:108 , JPO:109     Diastolic (07XBT), YZC:55, Min:51, Max:78    Pulse Range: Pulse  Av.6  Min: 81  Max: 87  Respiration Range: Resp  Av.1  Min: 16  Max: 20  Current Pulse Ox[de-identified]  SpO2: 95 %  24HR Pulse Ox Range:  SpO2  Av.1 %  Min: 95 %  Max: 100 %  Oxygen Amount and Delivery: O2 Flow Rate (L/min): 3 L/min    Wt Readings from Last 3 Encounters:   21 240 lb 1.3 oz (108.9 kg)   21 225 lb (102.1 kg)   10/05/21 224 lb 6.9 oz (101.8 kg)       I/O (24 Hours)    Intake/Output Summary (Last 24 hours) at 2021 1213  Last data filed at 2021 1212  Gross per 24 hour   Intake 300 ml   Output 1350 ml   Net -1050 ml       EXAM     General Appearance  Awake, alert, oriented, in no acute distress  HEENT - normocephalic, atraumatic.  []  Mallampati  [x] Crowded airway   [x] Macroglossia  []  Retrognathia  [] Micrognathia  []  Normal tongue size []  Normal Bite  [] Independence sign positive    Neck - Supple,  trachea midline   Lungs -diffuse wheezes  Cardiovascular - Heart sounds are normal.  Regular rate and rhythm   Abdomen - Soft, nontender, nondistended, no masses or organomegaly  Neurologic - There are no focal motor or sensory deficits  Skin - No bruising or bleeding  Extremities - No clubbing, cyanosis, edema    MEDS      insulin glargine  15 Units SubCUTAneous Nightly    methylPREDNISolone  40 mg IntraVENous Q8H    magnesium sulfate  2,000 mg IntraVENous Once    vancomycin (VANCOCIN) intermittent dosing (placeholder)   Other RX Placeholder    vancomycin  1,500 mg IntraVENous Q24H    sodium chloride flush  5-40 mL IntraVENous 2 times per day    cefepime  2,000 mg IntraVENous Q12H    benzonatate  200 mg Oral TID    dextromethorphan  60 mg Oral 2 times per day    budesonide-formoterol  2 puff Inhalation BID    tiotropium  2 puff Inhalation Daily    albuterol  2.5 mg Nebulization Q4H    apixaban  5 mg Oral BID    aspirin  81 mg Oral Daily    atorvastatin  40 mg Oral QPM    clopidogrel  75 mg Oral Daily    lisinopril  10 mg Oral Daily    metoprolol tartrate  12.5 mg Oral BID    insulin lispro  0-6 Units SubCUTAneous TID WC    insulin lispro  0-3 Units SubCUTAneous Nightly    furosemide  40 mg IntraVENous Daily      sodium chloride 25 mL (12/19/21 0550)    dextrose       sodium chloride flush, sodium chloride, acetaminophen, ondansetron **OR** ondansetron, glucose, dextrose, glucagon (rDNA), dextrose, melatonin    LABS   CBC   Recent Labs     12/19/21  0431   WBC 15.4*   HGB 10.7*   HCT 32.7*   MCV 83.5        BMP:   Lab Results   Component Value Date     12/19/2021    K 4.6 12/19/2021     12/19/2021    CO2 25 12/19/2021    BUN 30 12/19/2021    LABALBU 4.1 12/18/2021    LABALBU 4.0 06/01/2012    CREATININE 1.08 12/19/2021    CALCIUM 9.3 12/19/2021    GFRAA >60 12/19/2021    LABGLOM 52 12/19/2021     ABGs:  Lab Results   Component Value Date    PHART 7.373 12/18/2021 PO2ART 104.0 12/18/2021    UYM9XIE 38.2 12/18/2021      Lab Results   Component Value Date    MODE BIPAP 12/18/2021     Ionized Calcium:  No results found for: IONCA  Magnesium:    Lab Results   Component Value Date    MG 2.2 10/08/2021     Phosphorus:    Lab Results   Component Value Date    PHOS 4.6 10/08/2021        LIVER PROFILE   Recent Labs     12/18/21  0306   AST 21   ALT 20   BILITOT 0.24*   ALKPHOS 118*     INR   Recent Labs     12/18/21  0306   INR 1.0     PTT   Lab Results   Component Value Date    APTT 27.9 12/18/2021         RADIOLOGY     (See actual reports for details)    ASSESSMENT/PLAN   Acute hypercapnic respiratory failure  Acute exacerbation COPD  Bilateral lower lobe interstitial infiltrates-has been seen previously on x-rays but seem to be more prominent: Given recent hospitalization will treat as healthcare pneumonia  Probable SILVA  History of A. fib on Eliquis  Coronary artery disease status post stent  History of tobacco use  Nonmorbid obesity    White count elevated in part due to steroids  I would treat the MRSA screen as positive  The wrong respiratory viral panel was sent out-what I ordered will be sent out again  Legionella antigen was not done-nurse instructed to collect  Continue BiPAP at night and while sleeping during the day  She is still bronchospastic, agree with decreasing steroids but watch for rebound dyspnea  Suggest PT/OT    Electronically signed by Maura Briones MD on 12/19/2021 at 12:13 PM

## 2021-12-19 NOTE — PROGRESS NOTES
Vancomycin Dosing by Pharmacy - Daily Note   Vancomycin Therapy Day:  2  Indication: HAP    Allergies:  Codeine and Morphine   Actual Weight:  108.9 kg  Wt Readings from Last 1 Encounters:   12/18/21 240 lb 1.3 oz (108.9 kg)       Labs/Ancillary Data  Estimated Creatinine Clearance: 74 mL/min (A) (based on SCr of 1.08 mg/dL (H)). Recent Labs     12/18/21  0306 12/19/21  0431   CREATININE 1.37* 1.08*   BUN 27* 30*   WBC 8.9 15.4*     Procalcitonin   Date Value Ref Range Status   12/18/2021 0.07 <0.09 ng/mL Final     Comment:           Suspected Sepsis:  <0.50 ng/mL     Low likelihood of sepsis. 0.50-2.00 ng/mL     Increased likelihood of sepsis. Antibiotics encouraged. >2.00 ng/mL     High risk of sepsis/shock. Antibiotics strongly encouraged. Suspected Lower Resp Tract Infections:  <0.24 ng/mL     Low likelihood of bacterial infection. >0.24 ng/mL     Increased likelihood of bacterial infection. Antibiotics encouraged. With successful antibiotic therapy, PCT levels should decrease rapidly. (Half-life of 24 to   36 hours.)        Procalcitonin values from samples collected within the first 6 hours of systemic infection   may still be low. Retesting may be indicated. Values from day 1 and day 4 can be entered into the Change in Procalcitonin Calculator   (www.[employer]-pct-calculator. [employer]) to determine the patient's Mortality Risk Prognosis        In healthy neonates, plasma Procalcitonin (PCT) concentrations increase gradually after   birth, reaching peak values at about 24 hours of age then decrease to normal values below   0.5 ng/mL by 48-72 hours of age.          Intake/Output Summary (Last 24 hours) at 12/19/2021 1311  Last data filed at 12/19/2021 1218  Gross per 24 hour   Intake 780 ml   Output 1350 ml   Net -570 ml     Temp: 98.2 F    Culture Date / Source  /  Results  12/18    nasal swab MRSA  POSITIVE  Recent vancomycin administrations                     vancomycin (VANCOCIN) 1,500 mg in dextrose 5 % 250 mL IVPB (ADDAVIAL) (mg) 1,500 mg New Bag 12/19/21 0550    vancomycin (VANCOCIN) 2,500 mg in dextrose 5 % 500 mL IVPB (mg) 2,500 mg New Bag 12/18/21 0500    vancomycin (VANCOCIN) intermittent dosing (placeholder) ()  Given 12/18/21 0345                    Vancomycin Concentrations:   TROUGH:  No results for input(s): VANCOTROUGH in the last 72 hours. RANDOM:  No results for input(s): VANCORANDOM in the last 72 hours. MRSA Nasal Swab: showed MRSA positive result on 12/19 . PLAN     Continue current dose of 1500 mg q24h IV  Ensured BUN/sCr ordered at baseline and every 48 hours x at least 3 levels, then at least weekly. Repeat vancomycin concentration ordered for 12/20 @ 0600   Pharmacy will continue to monitor patient and adjust therapy as indicated      Vancomycin Target Concentration Parameters  Treatment  Population Target AUC/OUSMANE Target Trough   Invasive MRSA Infection (bacteremia, pneumonia, meningitis, endocarditis, osteomyelitis)  Sepsis (undifferentiated) 400-600 N/A   Infection due to non-MRSA pathogen  Empiric treatment of non-invasive MRSA infection  (SSTI, UTI) <500 10-15 mg/L   CrCl < 29 mL/min  Rapidly fluctuating serum creatinine   HARVEY N/A < 15 mg/L     Renal replacement therapy is dosed by levels, per hospital protocol. Abbreviations  * Pauc: probability that AUC is >400 (efficacy); Pconc: probability that Ctrough is above 20 ?g/mL (toxicity); Tox: Probability of nephrotoxicity, based on Carine et al. Clin Infect Dis 2009. Thank you for the consult. Pharmacy will continue to follow. Uday Brantley [facility].  [address] [facility] 12/19/2021 1:15 PM

## 2021-12-19 NOTE — PROGRESS NOTES
BRONCHOSPASM/BRONCHOCONSTRICTION     [x]         IMPROVE AERATION/BREATH SOUNDS  [x]   ADMINISTER BRONCHODILATOR THERAPY AS APPROPRIATE  [x]   ASSESS BREATH SOUNDS  []   IMPLEMENT AEROSOL/MDI PROTOCOL  [x]   PATIENT EDUCATION AS NEEDED    PROVIDE ADEQUATE OXYGENATION WITH ACCEPTABLE SP02/ABG'S    [x]  IDENTIFY APPROPRIATE OXYGEN THERAPY  [x]   MONITOR SP02/ABG'S AS NEEDED   [x]   PATIENT EDUCATION AS NEEDED    Pt talking intermittently and on/off phone t/o tx

## 2021-12-19 NOTE — CARE COORDINATION
CASE MANAGEMENT NOTE:    Admission Date:  12/18/2021 Soumya Schroeder is a 63 y.o.  female    Admitted for : COPD exacerbation ([facility] [J44.1]  Acute on chronic respiratory failure with hypoxia and hypercapnia ([facility] [J96.21, J96.22]  Pneumonia due to infectious organism, unspecified laterality, unspecified part of lung [J18.9]    Met with:  Patient    PCP:  Vanessa Tomlinson                                Insurance:  [employer]      Is patient alert and oriented at time of discussion:  Yes    Current Residence/ Living Arrangements:  independently at home, Lives w/ 2 Grandsons & Daughter             Current Services PTA:  No    Does patient go to outpatient dialysis: No  If yes, location and chair time: NA    Is patient agreeable to VNS: If Oxygen, is needed,    Freedom of choice provided:  No    List of [address] provided: No    VNS chosen:  Will follow    DME:  shower chair    Home Oxygen: No    Nebulizer: Yes    CPAP/BIPAP: No    Supplier: N/A    Potential Assistance Needed: Follow for VNS/Home Oxygen needs    SNF needed: No    Freedom of choice and list provided: NA    Pharmacy:  [facility]     Does Patient want to use MEDS to BEDS? No    Is patient currently receiving oral anticoagulation therapy? Yes,[place] PTA    Is the Patient an KENDRA CONTRERAS [facility] [place] with Readmission Risk Score greater than 14%? No  If yes, pt needs a follow up appointment made within 7 days. Family Members/Caregivers that pt would like involved in their care:    Yes    If yes, list name here:  Mirlande Nuñez    Transportation Provider:  [facility]             Discharge Plan:  12/19/21 [employer] Pt. Lives in [place], w/ Daughter & family. DME- [place], [place] Does not wear Oxygen, at home, will need to follow. 95%on 3lNC, Agreeable to VNS, only if O2 is needed, will follow. IV Cefepime/Vanco, IV Lasix. Cameron will need signed/completed, if needed.  Will follow//KB                 Electronically signed by: Carmen Mancuso RN on 12/19/2021 at 12:48 PM

## 2021-12-20 VITALS
RESPIRATION RATE: 16 BRPM | DIASTOLIC BLOOD PRESSURE: 45 MMHG | WEIGHT: 230.16 LBS | BODY MASS INDEX: 34.09 KG/M2 | SYSTOLIC BLOOD PRESSURE: 98 MMHG | OXYGEN SATURATION: 95 % | HEIGHT: 69 IN | TEMPERATURE: 97.7 F | HEART RATE: 82 BPM

## 2021-12-20 PROBLEM — I50.23 ACUTE ON CHRONIC SYSTOLIC CHF (CONGESTIVE HEART FAILURE) (HCC): Status: ACTIVE | Noted: 2021-12-20

## 2021-12-20 LAB
ABSOLUTE BANDS #: 0.14 K/UL (ref 0–1)
ABSOLUTE EOS #: 0 K/UL (ref 0–0.4)
ABSOLUTE IMMATURE GRANULOCYTE: ABNORMAL K/UL (ref 0–0.3)
ABSOLUTE LYMPH #: 0.69 K/UL (ref 1–4.8)
ABSOLUTE MONO #: 0.14 K/UL (ref 0.1–1.3)
ANION GAP SERPL CALCULATED.3IONS-SCNC: 10 MMOL/L (ref 9–17)
BANDS: 1 % (ref 0–10)
BASOPHILS # BLD: 0 % (ref 0–2)
BASOPHILS ABSOLUTE: 0 K/UL (ref 0–0.2)
BUN BLDV-MCNC: 36 MG/DL (ref 6–20)
BUN/CREAT BLD: ABNORMAL (ref 9–20)
CALCIUM SERPL-MCNC: 9 MG/DL (ref 8.6–10.4)
CHLORIDE BLD-SCNC: 101 MMOL/L (ref 98–107)
CO2: 24 MMOL/L (ref 20–31)
CREAT SERPL-MCNC: 1.05 MG/DL (ref 0.5–0.9)
DIFFERENTIAL TYPE: ABNORMAL
EOSINOPHILS RELATIVE PERCENT: 0 % (ref 0–4)
GFR AFRICAN AMERICAN: >60 ML/MIN
GFR NON-AFRICAN AMERICAN: 54 ML/MIN
GFR SERPL CREATININE-BSD FRML MDRD: ABNORMAL ML/MIN/{1.73_M2}
GFR SERPL CREATININE-BSD FRML MDRD: ABNORMAL ML/MIN/{1.73_M2}
GLUCOSE BLD-MCNC: 259 MG/DL (ref 65–105)
GLUCOSE BLD-MCNC: 260 MG/DL (ref 70–99)
GLUCOSE BLD-MCNC: 286 MG/DL (ref 65–105)
HCT VFR BLD CALC: 32.8 % (ref 36–46)
HEMOGLOBIN: 10.5 G/DL (ref 12–16)
IMMATURE GRANULOCYTES: ABNORMAL %
LYMPHOCYTES # BLD: 5 % (ref 24–44)
MCH RBC QN AUTO: 26.8 PG (ref 26–34)
MCHC RBC AUTO-ENTMCNC: 32.1 G/DL (ref 31–37)
MCV RBC AUTO: 83.3 FL (ref 80–100)
MONOCYTES # BLD: 1 % (ref 1–7)
MORPHOLOGY: ABNORMAL
MORPHOLOGY: ABNORMAL
MYCOPLASMA PNEUMONIAE IGM: 0.25
NRBC AUTOMATED: ABNORMAL PER 100 WBC
PDW BLD-RTO: 15.6 % (ref 11.5–14.9)
PLATELET # BLD: 241 K/UL (ref 150–450)
PLATELET ESTIMATE: ABNORMAL
PMV BLD AUTO: 8.3 FL (ref 6–12)
POTASSIUM SERPL-SCNC: 4.6 MMOL/L (ref 3.7–5.3)
RBC # BLD: 3.94 M/UL (ref 4–5.2)
RBC # BLD: ABNORMAL 10*6/UL
SEG NEUTROPHILS: 93 % (ref 36–66)
SEGMENTED NEUTROPHILS ABSOLUTE COUNT: 12.83 K/UL (ref 1.3–9.1)
SODIUM BLD-SCNC: 135 MMOL/L (ref 135–144)
WBC # BLD: 13.8 K/UL (ref 3.5–11)
WBC # BLD: ABNORMAL 10*3/UL

## 2021-12-20 PROCEDURE — 87205 SMEAR GRAM STAIN: CPT

## 2021-12-20 PROCEDURE — 6370000000 HC RX 637 (ALT 250 FOR IP): Performed by: INTERNAL MEDICINE

## 2021-12-20 PROCEDURE — 2700000000 HC OXYGEN THERAPY PER DAY

## 2021-12-20 PROCEDURE — 97530 THERAPEUTIC ACTIVITIES: CPT

## 2021-12-20 PROCEDURE — 6360000002 HC RX W HCPCS: Performed by: STUDENT IN AN ORGANIZED HEALTH CARE EDUCATION/TRAINING PROGRAM

## 2021-12-20 PROCEDURE — 2580000003 HC RX 258: Performed by: STUDENT IN AN ORGANIZED HEALTH CARE EDUCATION/TRAINING PROGRAM

## 2021-12-20 PROCEDURE — 36415 COLL VENOUS BLD VENIPUNCTURE: CPT

## 2021-12-20 PROCEDURE — 94640 AIRWAY INHALATION TREATMENT: CPT

## 2021-12-20 PROCEDURE — 94761 N-INVAS EAR/PLS OXIMETRY MLT: CPT

## 2021-12-20 PROCEDURE — 87070 CULTURE OTHR SPECIMN AEROBIC: CPT

## 2021-12-20 PROCEDURE — 6360000002 HC RX W HCPCS: Performed by: INTERNAL MEDICINE

## 2021-12-20 PROCEDURE — 2580000003 HC RX 258: Performed by: INTERNAL MEDICINE

## 2021-12-20 PROCEDURE — 80048 BASIC METABOLIC PNL TOTAL CA: CPT

## 2021-12-20 PROCEDURE — 94660 CPAP INITIATION&MGMT: CPT

## 2021-12-20 PROCEDURE — 85025 COMPLETE CBC W/AUTO DIFF WBC: CPT

## 2021-12-20 PROCEDURE — 6370000000 HC RX 637 (ALT 250 FOR IP)

## 2021-12-20 PROCEDURE — 6360000002 HC RX W HCPCS

## 2021-12-20 PROCEDURE — 82947 ASSAY GLUCOSE BLOOD QUANT: CPT

## 2021-12-20 PROCEDURE — 97165 OT EVAL LOW COMPLEX 30 MIN: CPT

## 2021-12-20 PROCEDURE — 99239 HOSP IP/OBS DSCHRG MGMT >30: CPT | Performed by: INTERNAL MEDICINE

## 2021-12-20 PROCEDURE — 6370000000 HC RX 637 (ALT 250 FOR IP): Performed by: STUDENT IN AN ORGANIZED HEALTH CARE EDUCATION/TRAINING PROGRAM

## 2021-12-20 PROCEDURE — 97116 GAIT TRAINING THERAPY: CPT

## 2021-12-20 PROCEDURE — 97162 PT EVAL MOD COMPLEX 30 MIN: CPT

## 2021-12-20 RX ORDER — INSULIN GLARGINE 100 [IU]/ML
20 INJECTION, SOLUTION SUBCUTANEOUS NIGHTLY
Status: DISCONTINUED | OUTPATIENT
Start: 2021-12-20 | End: 2021-12-20 | Stop reason: HOSPADM

## 2021-12-20 RX ORDER — PREDNISONE 20 MG/1
40 TABLET ORAL DAILY
Qty: 8 TABLET | Refills: 0 | Status: SHIPPED | OUTPATIENT
Start: 2021-12-21 | End: 2021-12-25

## 2021-12-20 RX ORDER — FUROSEMIDE 40 MG/1
40 TABLET ORAL DAILY
Status: DISCONTINUED | OUTPATIENT
Start: 2021-12-20 | End: 2021-12-20 | Stop reason: HOSPADM

## 2021-12-20 RX ORDER — FUROSEMIDE 40 MG/1
40 TABLET ORAL DAILY
Qty: 60 TABLET | Refills: 3 | Status: ON HOLD | OUTPATIENT
Start: 2021-12-21 | End: 2022-07-22 | Stop reason: SDUPTHER

## 2021-12-20 RX ORDER — DOXYCYCLINE 100 MG/1
100 TABLET ORAL 2 TIMES DAILY
Qty: 14 TABLET | Refills: 0 | Status: SHIPPED | OUTPATIENT
Start: 2021-12-20 | End: 2021-12-27

## 2021-12-20 RX ORDER — PREDNISONE 20 MG/1
40 TABLET ORAL DAILY
Status: DISCONTINUED | OUTPATIENT
Start: 2021-12-20 | End: 2021-12-20 | Stop reason: HOSPADM

## 2021-12-20 RX ADMIN — CEFEPIME HYDROCHLORIDE 2000 MG: 2 INJECTION, POWDER, FOR SOLUTION INTRAVENOUS at 04:23

## 2021-12-20 RX ADMIN — METHYLPREDNISOLONE SODIUM SUCCINATE 40 MG: 40 INJECTION, POWDER, FOR SOLUTION INTRAMUSCULAR; INTRAVENOUS at 01:11

## 2021-12-20 RX ADMIN — BENZONATATE 200 MG: 100 CAPSULE ORAL at 14:20

## 2021-12-20 RX ADMIN — ALBUTEROL SULFATE 2.5 MG: 2.5 SOLUTION RESPIRATORY (INHALATION) at 12:07

## 2021-12-20 RX ADMIN — METOPROLOL TARTRATE 12.5 MG: 25 TABLET, FILM COATED ORAL at 10:11

## 2021-12-20 RX ADMIN — ALBUTEROL SULFATE 2.5 MG: 2.5 SOLUTION RESPIRATORY (INHALATION) at 08:10

## 2021-12-20 RX ADMIN — CLOPIDOGREL BISULFATE 75 MG: 75 TABLET ORAL at 10:10

## 2021-12-20 RX ADMIN — APIXABAN 5 MG: 5 TABLET, FILM COATED ORAL at 10:10

## 2021-12-20 RX ADMIN — Medication 60 MG: at 10:10

## 2021-12-20 RX ADMIN — INSULIN LISPRO 6 UNITS: 100 INJECTION, SOLUTION INTRAVENOUS; SUBCUTANEOUS at 12:11

## 2021-12-20 RX ADMIN — ASPIRIN 81 MG: 81 TABLET, CHEWABLE ORAL at 10:11

## 2021-12-20 RX ADMIN — INSULIN LISPRO 6 UNITS: 100 INJECTION, SOLUTION INTRAVENOUS; SUBCUTANEOUS at 10:13

## 2021-12-20 RX ADMIN — BUDESONIDE AND FORMOTEROL FUMARATE DIHYDRATE 2 PUFF: 160; 4.5 AEROSOL RESPIRATORY (INHALATION) at 08:22

## 2021-12-20 RX ADMIN — LISINOPRIL 10 MG: 10 TABLET ORAL at 10:10

## 2021-12-20 RX ADMIN — FAMOTIDINE 20 MG: 20 TABLET ORAL at 10:10

## 2021-12-20 RX ADMIN — TIOTROPIUM BROMIDE INHALATION SPRAY 2 PUFF: 3.12 SPRAY, METERED RESPIRATORY (INHALATION) at 08:17

## 2021-12-20 RX ADMIN — VANCOMYCIN HYDROCHLORIDE 1500 MG: 1.5 INJECTION, POWDER, LYOPHILIZED, FOR SOLUTION INTRAVENOUS at 05:29

## 2021-12-20 RX ADMIN — FUROSEMIDE 40 MG: 40 TABLET ORAL at 10:11

## 2021-12-20 RX ADMIN — ALBUTEROL SULFATE 2.5 MG: 2.5 SOLUTION RESPIRATORY (INHALATION) at 03:40

## 2021-12-20 RX ADMIN — PREDNISONE 40 MG: 20 TABLET ORAL at 10:11

## 2021-12-20 RX ADMIN — BENZONATATE 200 MG: 100 CAPSULE ORAL at 10:10

## 2021-12-20 RX ADMIN — ACETAMINOPHEN 650 MG: 325 TABLET, FILM COATED ORAL at 10:19

## 2021-12-20 ASSESSMENT — PAIN DESCRIPTION - LOCATION
LOCATION: TEETH
LOCATION: TEETH;MOUTH

## 2021-12-20 ASSESSMENT — ENCOUNTER SYMPTOMS
CONSTIPATION: 0
VOMITING: 0
NAUSEA: 0
DIARRHEA: 0
CHEST TIGHTNESS: 0
ABDOMINAL PAIN: 0
COUGH: 0
SHORTNESS OF BREATH: 0

## 2021-12-20 ASSESSMENT — PAIN DESCRIPTION - PAIN TYPE
TYPE: ACUTE PAIN
TYPE: ACUTE PAIN

## 2021-12-20 ASSESSMENT — PAIN SCALES - GENERAL
PAINLEVEL_OUTOF10: 3
PAINLEVEL_OUTOF10: 4
PAINLEVEL_OUTOF10: 1
PAINLEVEL_OUTOF10: 4

## 2021-12-20 NOTE — DISCHARGE INSTR - DIET

## 2021-12-20 NOTE — PROGRESS NOTES
Dr. Mirian Roberts MD / Dr. Clau Vora MD / Dr. Glenda Jones APRN CNP  Andreas Scale CNP                  Pulmonary Consult Note   [facility]      Patient - Lorin Polk,  Age - 63 y.o.    - 1960      Room Number - 2123/2123-01   MRN -  [id number]   Acct # - [de-identified]  Date of Admission -  2021  2:49 Susi Hutchison MD  Primary Care Physician - Daniella Mohan MD     HPI   Pt did not qualify for home O2. She admits to snoring and agrees for outpt sleep study. SHe feels ready to go home. Denies chest pain. Breathing is better    OBJECTIVE   VITALS    height is 5' 9\" (1.753 m) and weight is 230 lb 2.6 oz (104.4 kg). Her oral temperature is 97.9 °F (36.6 °C). Her blood pressure is 113/55 (abnormal) and her pulse is 75. Her respiration is 20 and oxygen saturation is 97%. Body mass index is 33.99 kg/m².   Temperature Range: Temp: 97.9 °F (36.6 °C) Temp  Av.9 °F (36.6 °C)  Min: 97.7 °F (36.5 °C)  Max: 98.1 °F (36.7 °C)  BP Range:  Systolic (22YDE), PML:763 , Min:113 , JCJ:737     Diastolic (23CNU), BDI:97, Min:55, Max:78    Pulse Range: Pulse  Av.4  Min: 70  Max: 88  Respiration Range: Resp  Av.7  Min: 16  Max: 20  Current Pulse Ox[de-identified]  SpO2: 97 %  24HR Pulse Ox Range:  SpO2  Av.6 %  Min: 96 %  Max: 99 %  Oxygen Amount and Delivery: O2 Flow Rate (L/min): 1.5 L/min    Wt Readings from Last 3 Encounters:   21 230 lb 2.6 oz (104.4 kg)   21 225 lb (102.1 kg)   10/05/21 224 lb 6.9 oz (101.8 kg)       I/O (24 Hours)    Intake/Output Summary (Last 24 hours) at 2021 1140  Last data filed at 2021 0425  Gross per 24 hour   Intake 480 ml   Output 1750 ml   Net -1270 ml       EXAM     General Appearance  Awake, alert, oriented, in no acute distress  HEENT - normocephalic, atraumatic   Neck - Supple,  trachea midline   Lungs - Non labored diminished posterior- no wheezing  Cardiovascular - Heart sounds are normal.  Regular rate and rhythm   Abdomen - Soft, nontender, nondistended, no masses or organomegaly  Neurologic - There are no focal motor or sensory deficits  Skin - No bruising or bleeding  Extremities - No  cyanosis, edema    MEDS      insulin glargine  20 Units SubCUTAneous Nightly    predniSONE  40 mg Oral Daily    furosemide  40 mg Oral Daily    famotidine  20 mg Oral BID    insulin lispro  0-12 Units SubCUTAneous TID WC    insulin lispro  0-6 Units SubCUTAneous Nightly    magnesium sulfate  2,000 mg IntraVENous Once    sodium chloride flush  5-40 mL IntraVENous 2 times per day    benzonatate  200 mg Oral TID    dextromethorphan  60 mg Oral 2 times per day    budesonide-formoterol  2 puff Inhalation BID    tiotropium  2 puff Inhalation Daily    albuterol  2.5 mg Nebulization Q4H    apixaban  5 mg Oral BID    aspirin  81 mg Oral Daily    atorvastatin  40 mg Oral QPM    clopidogrel  75 mg Oral Daily    lisinopril  10 mg Oral Daily    metoprolol tartrate  12.5 mg Oral BID      sodium chloride 25 mL (12/19/21 0550)    dextrose       sodium chloride flush, sodium chloride, acetaminophen, ondansetron **OR** ondansetron, glucose, dextrose, glucagon (rDNA), dextrose, melatonin    LABS   CBC   Recent Labs     12/20/21  0513   WBC 13.8*   HGB 10.5*   HCT 32.8*   MCV 83.3        BMP:   Lab Results   Component Value Date     12/20/2021    K 4.6 12/20/2021     12/20/2021    CO2 24 12/20/2021    BUN 36 12/20/2021    LABALBU 4.1 12/18/2021    LABALBU 4.0 06/01/2012    CREATININE 1.05 12/20/2021    CALCIUM 9.0 12/20/2021    GFRAA >60 12/20/2021    LABGLOM 54 12/20/2021     ABGs:  Lab Results   Component Value Date    PHART 7.373 12/18/2021    PO2ART 104.0 12/18/2021    MVH6HWV 38.2 12/18/2021      Lab Results   Component Value Date    MODE BIPAP 12/18/2021     Ionized Calcium:  No results found for: IONCA  Magnesium:    Lab Results   Component Value Date    MG 2.2 10/08/2021     Phosphorus:    Lab Results   Component Value Date    PHOS 4.6 10/08/2021        LIVER PROFILE   Recent Labs     12/18/21  0306   AST 21   ALT 20   BILITOT 0.24*   ALKPHOS 118*     INR   Recent Labs     12/18/21  0306   INR 1.0     PTT   Lab Results   Component Value Date    APTT 27.9 12/18/2021         RADIOLOGY     (See actual reports for details)    ASSESSMENT/PLAN     Acute hypercapnic respiratory failure  Acute COPD exacerbation  Bilateral lower lobe infiltrate treating for healthcare acquired pneumonia  Probable SILVA  Atrial fibrillation on Eliquis  Coronary artery disease status post stent  History of tobacco abuse  Obesity          Plan:  On room air- did not qualify for home O2  Prednisone script sent by primary service  Will add doxycyline for +MRSA  Leukocytosis is improved  Negative for Legionella  outpt sleep study  No objection to discharge from pulmonary standpoint   Needs follow up in office 4-6 weeks [phone]      Electronically signed by BINDU Mccoy CNP on 12/20/2021 at 11:40 AM

## 2021-12-20 NOTE — PROGRESS NOTES
Physical Therapy    Facility/Department: [facility] PROGRESSIVE CARE  Initial Assessment    NAME: Jc Royal  : 1960  MRN: [id number]    Date of Service: 2021    Discharge Recommendations:  Defer PT at this time   PT Equipment Recommendations  Equipment Needed:  (Tub Transfer bench for improved safety & hygeine)    Assessment   Assessment: pt Independently performs bed mobility and transfers without a device; requires SBA for line management during ambulation and stair climbing; overall SpO2 is maintained within 95-99% range during activity while on 3L O2; Pt does admit to difficulty safely negotiating over wall of bathtub to enter/exit her shower therefore pt may benefit from a tub-transfer bench to faciliate safety and good hygeine at home; At this current functional level pt is safe to return singh; D/C PT; no needs for further skilled PT at this time  Decision Making: Medium Complexity  History: H/O COPD, CAD; Recent R wrist stent placemetn Oct/2021. During recent [facility] V's hospital visiit; pt was intubated. Exam: ROM, MMT, Balance, and functional mobility assessments  Clinical Presentation: Pt is alert, cooperative, and pleasant throughout session; motivated to go home; No Skilled PT: Safe to return home  REQUIRES PT FOLLOW UP: No  Activity Tolerance  Activity Tolerance: Patient Tolerated treatment well       Patient Diagnosis(es): The primary encounter diagnosis was Acute on chronic respiratory failure with hypoxia and hypercapnia ([facility] A diagnosis of Pneumonia due to infectious organism, unspecified laterality, unspecified part of lung was also pertinent to this visit. has a past medical history of Asthma, Blood circulation, collateral, CAD (coronary artery disease), COPD (chronic obstructive pulmonary disease) ([address] Diabetes mellitus ([address] Hyperlipidemia, Hypertension, Movement disorder, Neuropathy, and PAD (peripheral artery disease) ([address]    has a past surgical history that includes Coronary angioplasty with stent; Tonsillectomy; Inner ear surgery (Right); and Coronary angioplasty with stent (02/11/2019). Restrictions  Restrictions/Precautions  Restrictions/Precautions: Fall Risk (Up with A)  Required Braces or Orthoses?: No  Implants present? :  (Stents x2 (R wrist, R LE)  Vision/Hearing  Vision: Impaired  Vision Exceptions: Wears glasses for reading  Hearing: Exceptions to [facility] (R ear [place]; R worse than L)  Hearing Exceptions: Hard of hearing/hearing concerns (Surgery on R ear-bone repair; R ear 2%, L ear 50%)     Subjective  General  Chart Reviewed: Yes  Patient assessed for rehabilitation services?: Yes  Additional Pertinent Hx: pt is a 63 y.o. female with H/O COPD, CAD, Diabetes, HLD, HTN who presents to [facility] with Severe respiratory distress, SOB, tachycardia; pt's symptoms begin to resolve with medical treatment; Imaging identifies COPD and presence of pneumonia  Response To Previous Treatment: Not applicable  Family / Caregiver Present: No  Referring Practitioner: Dr. Ronal Noel  Referral Date : 12/19/21  Diagnosis: COPD Exacerbation, acute on chronic respiratory failure with hypoxia and hypercapnia; pneumonia d/t infectious organism  Follows Commands: Within Functional Limits  General Comment  Comments: Nurse Gurjit Hernandez OK's PT/OT eval  Subjective  Subjective: pt is agreeable to therapy assessments and states, \"I'm ready to go home, I've got Vivian cookies to bake! \"  Pain Screening  Patient Currently in Pain: Yes (No pain in beginning of session)  Pain Assessment  Pain Assessment: 0-10  Pain Level: 4  Patient's Stated Pain Goal: No pain  Pain Type: Acute pain  Pain Location: Teeth;Mouth  Pain Descriptors:  (\"tooth ache\")  Vital Signs  Patient Currently in Pain: Yes (No pain in beginning of session)  Oxygen Therapy  SpO2: 98 % (98% with rest; 95-97% with activity)  Pulse Oximeter Device Mode: Continuous  Pulse Oximeter Device Location: Finger  O2 Device: Nasal cannula  O2 Flow Rate (L/min): 3 L/min  Patient Observation  Observations: pt resting in bed upon arrival; 3L O2, external urinary catheter in place; Continues O2 and HR Monitoring         Orientation  Orientation  Overall Orientation Status: Within Functional Limits  Social/Functional History  Social/Functional History  Lives With: Family (13 and 16 y.o. grandchildren; Dtr only visits)  Type of Home: House  Home Layout: One level ( Lower portion of Duplex)  Home Access: Stairs to enter without rails  Entrance Stairs - Number of Steps: 2 (Railings broken - unsafe to use)  Bathroom Shower/Tub: Tub/Shower unit,Shower chair with back (pt unable to step over tub wall- sponge bathes self)  Bathroom Toilet: Handicap height (sink nearby for support)  Bathroom Equipment: Shower chair (nebulizer)  Bathroom Accessibility: Accessible  Home Equipment: Cane (Nebulizer)  Receives Help From: Family  ADL Assistance: Independent  Homemaking Assistance: Needs assistance  Ambulation Assistance: Independent (no AD)  Transfer Assistance: Independent (no AD)  Active : No  Patient's  Info: TARPS, friend or daughter-in-law  Occupation: On disability  IADL Comments: pt has been sleeping on couch; pt usually sleeps flat  Additional Comments: Pt reports mostly receiving partial assistance prn from 2 grand children who live with her.  Dtr mostly lives with her boyfriend in separate household, Recently quit smoking (October 26th)  Cognition        Objective          AROM RLE (degrees)  RLE AROM: WFL  AROM LLE (degrees)  LLE AROM : WFL  AROM RUE (degrees)  RUE General AROM: See OT assessments  AROM LUE (degrees)  LUE General AROM: See OT assessments  Strength RLE  Comment: Grossly 4+/5  Strength LLE  Comment: Grossly 4+/5  Strength RUE  Comment: See OT assessments  Strength LUE  Comment: See OT assessments     Sensation  Overall Sensation Status: Impaired (Intermittent N/T from neuropathy in both feet)  Bed mobility  Scooting: Independent  Transfers  Sit to Stand: Stand by assistance  Stand to sit: Stand by assistance  Bed to Chair: Stand by assistance  Stand Pivot Transfers: Stand by assistance  Comment: No AD use for all transfers; SBA for line/IV pole management; pt appearing steady with no difficulty performing tasks  Ambulation  Ambulation?: Yes  Ambulation 1  Surface: level tile  Device: No Device  Other Apparatus:  (O2 tubing; IV pole)  Assistance: Stand by assistance  Quality of Gait: Normal Gait pattern observed; steady  Gait Deviations: None  Distance: 20', 15', 25'  Comments: pt ambulates with SBA for safety and line/IV pole management; pt manages her own IV pole as well demonstrating good manueverability throughout room and obstacles. No difficulty with amb  Stairs/Curb  Stairs?: Yes  Stairs  # Steps : 2  Stairs Height: 8\"  Rails: None  Device: No Device  Assistance: Stand by assistance  Comment: SBA for line/IV pole management;  No difficulty with task; pt steady     Balance  Posture: Good  Sitting - Static: Good  Sitting - Dynamic: Good  Standing - Static: Good (no AD)  Standing - Dynamic: Good (no AD)  Comments: Standing balance assessed with no AD; No LOB to report        Plan   Plan  Times per week: D/C PT; no needs for further skilled PT at this time  Plan Comment: D/C PT; no needs for further skilled PT at this time  Safety Devices  Type of devices: Call light within reach,Gait belt,Left in chair,Nurse notified (Nurse Jase Mclean)    G-Code         AM-PAC Score  AM-PAC Inpatient Mobility Raw Score : 21 (12/20/21 0846)  AM-PAC Inpatient T-Scale Score : 50.25 (12/20/21 0846)  Mobility Inpatient CMS 0-100% Score: 28.97 (12/20/21 0846)  Mobility Inpatient CMS G-Code Modifier : CJ (12/20/21 5498)          Goals  Short term goals  Time Frame for Short term goals: D/C PT; no needs for further skilled PT at this time  Patient Goals   Patient goals : to return home       Therapy Time   Individual Concurrent Group Co-treatment   Time In 0846         Time Out 0928         Minutes 42         Timed Code Treatment Minutes: 15 Minutes     Due to my current lifting Restrictions, an OT performed functional mobility and provided appropriate levels of assistance with the patient under my direct supervision in order to best protect the safety of the patient and myself. Assist levels documented in this Evaluation accurately reflect what was provided by my OT partner during the encounter where I was presently observing and assessing the patient's functional level and needs.     Isabela Oneill, PT

## 2021-12-20 NOTE — DISCHARGE SUMMARY
[address]    Discharge Summary     Patient ID: Rosalba Menendez  :  1960   MRN: [id number]     ACCOUNT:  [de-identified]   Patient's PCP: Parrish Phillips MD  Admit Date: 2021   Discharge Date: 2021     Length of Stay: 2  Code Status:  Full Code  Admitting Physician: Gisella Coronado MD  Discharge Physician: Ziyad Benavides MD     Active Discharge Diagnoses:     Primary Problem  Acute on chronic systolic CHF (congestive heart failure) [facility])      [facility] Problems    Diagnosis Date Noted    Acute on chronic systolic CHF (congestive heart failure) ([facility] [I50.23] 2021    COPD exacerbation ([facility] [J44.1] 2021    Lobar pneumonia ([facility] [J18.1] 2021    Coronary artery disease involving native coronary artery of native heart without angina pectoris [I25.10] 2021    Essential hypertension [I10] 02/10/2019    ACS (acute coronary syndrome) ([facility] [I24.9] 02/10/2019    SOB (shortness of breath) [R06.02] 2016    Tobacco abuse [Z72.0] 2016    Obesity (BMI 30.0-34. 9) [E66.9] 2016       Admission Condition:  poor     Discharged Condition: good    Hospital Stay:       Hospital Course:      Patient is a 53-year-old female history of COPD, HLD, CAD status post stents x2, HTN, PAD who presented with increasing shortness of breath over the past 24 hours and was determined to be experiencing an acute exacerbation of congestive heart failure.     Initial chest x-ray concerning for increased interstitial markings, patient received 40 Lasix in ED.  Started on broad-spectrum antibiotics due to suspected lobar pneumonia. She was briefly on BiPAP with FiO2 at 40% and given breathing treatments, which resulted in an improvement in respiratory status.     Procalcitonin was negative, low suspicion for bacterial pneumonia. Antibiotics were discontinued.  Treatment with Solu-Medrol and breathing treatments continued, respiratory status improving over next 48 hours. Steroids converted to oral prednisone. Patient will discharge with new prescription for Lasix 40 mg p.o. daily    At time of discharge, patient is clinically and vitally stable. She has been weaned from nasal cannula and is tolerating room air adequately. Plan is to discharge home. Significant therapeutic interventions: Breathing treatments, IV antibiotics, steroids    Significant Diagnostic Studies:   Labs / Micro:  CBC:   Lab Results   Component Value Date    WBC 13.8 12/20/2021    RBC 3.94 12/20/2021    RBC 4.10 06/02/2012    HGB 10.5 12/20/2021    HCT 32.8 12/20/2021    MCV 83.3 12/20/2021    MCH 26.8 12/20/2021    MCHC 32.1 12/20/2021    RDW 15.6 12/20/2021     12/20/2021     06/02/2012     BMP:    Lab Results   Component Value Date    GLUCOSE 260 12/20/2021    GLUCOSE 94 06/02/2012     12/20/2021    K 4.6 12/20/2021     12/20/2021    CO2 24 12/20/2021    ANIONGAP 10 12/20/2021    BUN 36 12/20/2021    CREATININE 1.05 12/20/2021    BUNCRER NOT REPORTED 12/20/2021    CALCIUM 9.0 12/20/2021    LABGLOM 54 12/20/2021    GFRAA >60 12/20/2021    GFR      12/20/2021    GFR NOT REPORTED 12/20/2021     U/A:    Lab Results   Component Value Date    COLORU Yellow 10/27/2021    TURBIDITY Clear 10/27/2021    SPECGRAV 1.012 10/27/2021    HGBUR TRACE 10/27/2021    PHUR 6.0 10/27/2021    PROTEINU NEGATIVE 10/27/2021    GLUCOSEU NEGATIVE 10/27/2021    GLUCOSEU NEGATIVE 06/01/2012    KETUA NEGATIVE 10/27/2021    BILIRUBINUR NEGATIVE 10/27/2021    BILIRUBINUR NEGATIVE 06/01/2012    UROBILINOGEN Normal 10/27/2021    NITRU NEGATIVE 10/27/2021    LEUKOCYTESUR NEGATIVE 10/27/2021     Blood cultures were negative during this admission. Radiology:  XR CHEST PORTABLE    Result Date: 12/18/2021  Findings are suggestive of pneumonia most significant in the right lower lung. Small right pleural effusion.   Pulmonary edema could be a possibility. Consultations:    Consults:     Final Specialist Recommendations/Findings:   PHARMACY TO DOSE VANCOMYCIN  IP CONSULT TO INTERNAL MEDICINE  IP CONSULT TO PULMONOLOGY      The patient was seen and examined on day of discharge and this discharge summary is in conjunction with any daily progress note from day of discharge. Discharge plan:       Disposition: Home    Physician Follow Up:     No follow-up provider specified. Requiring Further Evaluation/Follow Up POST HOSPITALIZATION/Incidental Findings: None    Diet: regular diet    Activity: As tolerated    Instructions to Patient: Please keep all follow-up appointments and take all medications as directed. Discharge Medications:      Medication List      START taking these medications    furosemide 40 MG tablet  Commonly known as: LASIX  Take 1 tablet by mouth daily  Start taking on: December 21, 2021     predniSONE 20 MG tablet  Commonly known as: DELTASONE  Take 2 tablets by mouth daily for 4 days  Start taking on: December 21, 2021        CHANGE how you take these medications    * Ventolin  (90 Base) MCG/ACT inhaler  Generic drug: albuterol sulfate HFA  What changed: Another medication with the same name was changed. Make sure you understand how and when to take each. * albuterol (5 MG/ML) 0.5% nebulizer solution  Commonly known as: PROVENTIL  Take 0.5 mLs by nebulization every 6 hours as needed for Wheezing  What changed: when to take this         * This list has 2 medication(s) that are the same as other medications prescribed for you. Read the directions carefully, and ask your doctor or other care provider to review them with you.             CONTINUE taking these medications    acetaminophen 500 MG tablet  Commonly known as: TYLENOL  Take 1 tablet by mouth every 6 hours as needed for Pain     albuterol-ipratropium  MCG/ACT Aers inhaler  Commonly known as: COMBIVENT RESPIMAT  Inhale 1 puff into the lungs every 6 hours apixaban 5 MG Tabs tablet  Commonly known as: Eliquis  Take 1 tablet by mouth 2 times daily     aspirin 81 MG chewable tablet  Take 1 tablet by mouth daily     atorvastatin 40 MG tablet  Commonly known as: Lipitor  Take 1 tablet by mouth daily     blood glucose test strips  Test 3 times a day & as needed for symptoms of irregular blood glucose. budesonide-formoterol 160-4.5 MCG/ACT Aero  Commonly known as: SYMBICORT  Inhale 2 puffs into the lungs 2 times daily     clopidogrel 75 MG tablet  Commonly known as: PLAVIX  Take 1 tablet by mouth daily     famotidine 20 MG tablet  Commonly known as: PEPCID     glipiZIDE 5 MG tablet  Commonly known as: GLUCOTROL  Take 1 tablet by mouth 2 times daily     glucose monitoring kit  1 kit by Does not apply route daily     Lancets Misc  1 each by Does not apply route daily     lidocaine 5 %  Commonly known as: LIDODERM     lisinopril 10 MG tablet  Commonly known as: PRINIVIL;ZESTRIL  Take 1 tablet by mouth daily     metFORMIN 1000 MG tablet  Commonly known as: GLUCOPHAGE  Take 1 tablet by mouth 2 times daily (with meals)     metoprolol tartrate 25 MG tablet  Commonly known as: LOPRESSOR  Take 0.5 tablets by mouth 2 times daily     nitroGLYCERIN 0.4 MG SL tablet  Commonly known as: NITROSTAT  up to max of 3 total doses. If no relief after 1 dose, call 911. tiotropium 18 MCG inhalation capsule  Commonly known as: SPIRIVA  Inhale 1 capsule into the lungs daily Pt told that she was using this medication at home.      traZODone 50 MG tablet  Commonly known as: DESYREL     Trulicity 1.5 DZ/3.1CS Sopn  Generic drug: Dulaglutide           Where to Get Your Medications      These medications were sent to [facility]  [address] [facility], [place] [address]    Phone: [phone]   · furosemide 40 MG tablet  · predniSONE 20 MG tablet         Time Spent on discharge is  31 mins in patient examination, evaluation, counseling as well as medication reconciliation, prescriptions for required medications, discharge plan and follow up. Electronically signed by   Abel Duong MD  12/20/2021  11:47 AM      Thank you Dr. Pedro Friedman MD for the opportunity to be involved in this patient's care. Attending Physician Statement  I have discussed the care of Daisy Acosta and I have examined the patient myselft and taken ros and hpi , including pertinent history and exam findings,  with the resident. I have reviewed the key elements of all parts of the encounter with the resident. I agree with the assessment, plan and orders as documented by the resident. I spent over 35 mins in direct patient care as above and reviewing medications and counseling for discharge .         Electronically signed by Vince Henry MD

## 2021-12-20 NOTE — PROGRESS NOTES
[address] [facility]    PROGRESS NOTE             12/20/2021    8:40 AM    Name:   Heaven Henao  MRN:     [id number]     Acct:      [de-identified]   Room:   [facility] [place] [address] Day:  2  Admit Date:  12/18/2021  2:49 AM    PCP:  Lea Cox MD   Code Status:  Full Code    Subjective:     C/C:   Chief Complaint   Patient presents with    Shortness of Breath     Interval History Status: improved. Patient seen and examined at bedside. Respiratory status continues to improve with use of BiPAP at night. Steroids changed from Solu-Medrol to prednisone 40 mg today. Anticipate discharge later today with short course prednisone. Blood cultures have been negative for 2 days. Antibiotics discontinued this morning. Brief History:     Patient is a 79-year-old female history of COPD, HLD, CAD status post stents x2, HTN, PAD who presented with increasing shortness of breath over the past 24 hours and was determined to be experiencing an acute exacerbation of congestive heart failure.     Initial chest x-ray concerning for increased interstitial markings, patient received 40 Lasix in ED. Started on broad-spectrum antibiotics due to suspected lobar pneumonia. She was briefly on BiPAP with FiO2 at 40% and given breathing treatments, which resulted in an improvement in respiratory status. Treatment with Solu-Medrol and breathing treatments continued, respiratory status improving over next 48 hours. Steroids converted to oral prednisone. Review of Systems:     Review of Systems   Constitutional: Positive for fatigue. Negative for chills and fever. Respiratory: Negative for cough, chest tightness and shortness of breath. Cardiovascular: Positive for leg swelling (Improving). Negative for chest pain. Gastrointestinal: Negative for abdominal pain, constipation, diarrhea, nausea and vomiting.    Genitourinary: Negative for difficulty urinating and dysuria. Musculoskeletal: Negative for arthralgias. Neurological: Negative for dizziness and headaches. Psychiatric/Behavioral: Negative for agitation and behavioral problems. Medications: Allergies: Allergies   Allergen Reactions    Codeine     Morphine        Current Meds:   Scheduled Meds:    insulin glargine  20 Units SubCUTAneous Nightly    predniSONE  40 mg Oral Daily    furosemide  40 mg Oral Daily    famotidine  20 mg Oral BID    insulin lispro  0-12 Units SubCUTAneous TID WC    insulin lispro  0-6 Units SubCUTAneous Nightly    magnesium sulfate  2,000 mg IntraVENous Once    vancomycin (VANCOCIN) intermittent dosing (placeholder)   Other RX Placeholder    vancomycin  1,500 mg IntraVENous Q24H    sodium chloride flush  5-40 mL IntraVENous 2 times per day    cefepime  2,000 mg IntraVENous Q12H    benzonatate  200 mg Oral TID    dextromethorphan  60 mg Oral 2 times per day    budesonide-formoterol  2 puff Inhalation BID    tiotropium  2 puff Inhalation Daily    albuterol  2.5 mg Nebulization Q4H    apixaban  5 mg Oral BID    aspirin  81 mg Oral Daily    atorvastatin  40 mg Oral QPM    clopidogrel  75 mg Oral Daily    lisinopril  10 mg Oral Daily    metoprolol tartrate  12.5 mg Oral BID     Continuous Infusions:    sodium chloride 25 mL (12/19/21 0550)    dextrose       PRN Meds: sodium chloride flush, sodium chloride, acetaminophen, ondansetron **OR** ondansetron, glucose, dextrose, glucagon (rDNA), dextrose, melatonin    Data:     Past Medical History:   has a past medical history of Asthma, Blood circulation, collateral, CAD (coronary artery disease), COPD (chronic obstructive pulmonary disease) ([facility] [address] Diabetes mellitus ([facility] [address] Hyperlipidemia, Hypertension, Movement disorder, Neuropathy, and PAD (peripheral artery disease) ([facility] Social History:   reports that she has been smoking cigarettes. She has a 22.50 pack-year smoking history.  She uses smokeless tobacco. She reports current drug use. Drug: Marijuana Brianna Lux). She reports that she does not drink alcohol. Family History:   Family History   Problem Relation Age of Onset    Hypertension Mother     Hypertension Father     Diabetes Sister     Diabetes Brother     Stroke Brother        Vitals:  BP (!) 113/55   Pulse 75   Temp 97.9 °F (36.6 °C) (Oral)   Resp 20   Ht 5' 9\" (1.753 m)   Wt 230 lb 2.6 oz (104.4 kg)   SpO2 97%   BMI 33.99 kg/m²   Temp (24hrs), Av.9 °F (36.6 °C), Min:97.7 °F (36.5 °C), Max:98.1 °F (36.7 °C)    Recent Labs     21  1127 21  1618 21  0725   POCGLU 268* 293* 239* 286*       I/O(24Hr): Intake/Output Summary (Last 24 hours) at 2021 0840  Last data filed at 2021 0425  Gross per 24 hour   Intake 480 ml   Output 1750 ml   Net -1270 ml       Labs:  [unfilled]    Lab Results   Component Value Date/Time    SPECIAL LT FOREARM 2021 04:00 AM     Lab Results   Component Value Date/Time    CULTURE NO GROWTH 1 DAY 2021 04:00 AM       [unfilled]    Radiology:    XR CHEST PORTABLE    Result Date: 2021  Findings are suggestive of pneumonia most significant in the right lower lung. Small right pleural effusion. Pulmonary edema could be a possibility. Physical Examination:        Physical Exam  Constitutional:       Appearance: Normal appearance. HENT:      Head: Normocephalic and atraumatic. Eyes:      Extraocular Movements: Extraocular movements intact. Conjunctiva/sclera: Conjunctivae normal.   Cardiovascular:      Rate and Rhythm: Regular rhythm. Tachycardia present. Pulses: Normal pulses. Heart sounds: Normal heart sounds. Pulmonary:      Effort: Pulmonary effort is normal.      Breath sounds: Wheezing present. Comments: Diffuse wheezing continues to improve. Abdominal:      General: Abdomen is flat. There is no distension. Palpations: Abdomen is soft. Tenderness:  There is no abdominal tenderness. Musculoskeletal:         General: Normal range of motion. Right lower leg: Edema present. Left lower leg: Edema present. Comments: Minimal bilateral lower extremity swelling, improving from previous. Neurological:      General: No focal deficit present. Mental Status: She is alert and oriented to person, place, and time. Psychiatric:         Mood and Affect: Mood normal.         Behavior: Behavior normal.       Assessment:        Primary Problem  Acute on chronic systolic CHF (congestive heart failure) [facility])    Active Hospital Problems    Diagnosis Date Noted    Acute on chronic systolic CHF (congestive heart failure) ([facility]) [I50.23] 12/20/2021    COPD exacerbation ([facility] [address] [J44.1] 12/18/2021    Lobar pneumonia ([facility] [address] [J18.1] 12/18/2021    Coronary artery disease involving native coronary artery of native heart without angina pectoris [I25.10] 07/25/2021    Essential hypertension [I10] 02/10/2019    ACS (acute coronary syndrome) ([facility] [facility] [I24.9] 02/10/2019    SOB (shortness of breath) [R06.02] 11/19/2016    Tobacco abuse [Z72.0] 11/19/2016    Obesity (BMI 30.0-34. 9) [E66.9] 11/19/2016       Plan:        Acute respiratory failure 2/2 possible healthcare associated pneumonia  - CXR concerning for right lower lobe infiltrate  - Blood, respiratory cultures NGTD  - Vancomycin, cefepime discontinued  - Pulmonology consulted              Legionella, respiratory viral panel negative              CT chest when more stable                Acute on chronic systolic congestive heart failure  - Last echo from October this year EF 44%  - proBNP 1100s, Lasix 40 mg IV x1 in ED, Lasix 40 mg PO daily  - Monitor I/Os     Acute exacerbation of COPD  - Continue home Spiriva, Symbicort  - Albuterol nebulization every 4 hours, prednisone 40 mg daily     Comorbid conditions  Type 2 diabetes mellitus: LDSS, hypoglycemia protocol, POCT, Lantus 20U  Hyperlipidemia: Atorvastatin 40 mg daily  Essential hypertension: Lisinopril 10 mg daily  Paroxysmal atrial fibrillation: Eliquis 5 mg twice daily  History of CAD s/p stent x2: Continue aspirin 81 mg daily, Plavix 75 mg daily     DVT prophylaxis: Anticoagulated on Eliquis  GI prophylaxis: None   Code: Full  Dispo: Home    Izabella Sanches MD  12/20/2021  8:40 AM       I have discussed the care of Owen Zurita , including pertinent history and exam findings,    today with the resident. I have seen and examined the patient and the key elements of all parts of the encounter have been performed by me . I agree with the assessment, plan and orders as documented by the resident. Principal Problem:    Acute on chronic systolic CHF (congestive heart failure) (HCC)  Active Problems:    SOB (shortness of breath)    Tobacco abuse    Obesity (BMI 30.0-34. 9)    ACS (acute coronary syndrome) ([facility] [address]    Essential hypertension    Coronary artery disease involving native coronary artery of native heart without angina pectoris    COPD exacerbation (HCC)    Lobar pneumonia ([facility] [address]  Resolved Problems:    * No resolved hospital problems. *        Overall  course ;                                   are improving over time.         Cultures are negative, Procal is low   HFREF  I suspects Symptoms are due to CHF   On lasix   DC Planning   Will wean Oxygen             Electronically signed by Gilbert Duckworth MD

## 2021-12-20 NOTE — PROGRESS NOTES
Home Oxygen Evaluation    Room air SpO2 at Rest =97%    Room air with exercise/exertion =95%    SpO2 on prescribed O2 level at  2  LPM  at rest =  100%    with exercise/exertion =na    Nocturnal Oximetry with patient on room air recommended if the resting SpO2 is 89% to 95%.  (Requires additional order)

## 2021-12-20 NOTE — PROGRESS NOTES
Dr Marce Lancaster and  Alexander Shows notified pt does not qualify for home o2 after completion of home o2 eval. o2 remained 95% or more on RA while walking the halls with RT.

## 2021-12-20 NOTE — PROGRESS NOTES
[address]    Occupational Therapy Evaluation  Date: 21  Patient Name: Danilo Kwon       Room: 6328/5098-32  MRN: [id number]  Account: [de-identified]   : 1960  (63 y.o.) Gender: female     Discharge Recommendations: The patient may need non-skilled ADL assistance after discharge. Equipment Needed: Yes    Referring Practitioner: Kay Landa MD  Diagnosis: Acute on chronic systolic CHF  Additional Pertinent Hx: 63-year-old female history of COPD, HLD, CAD status post stents x2, HTN, PAD who presented with increasing shortness of breath over the past 24 hours and was determined to be experiencing an acute exacerbation of congestive heart failure. Initial chest x-ray concerning for increased interstitial markings, patient received 40 Lasix in ED. Started on broad-spectrum antibiotics due to suspected lobar pneumonia. She was briefly on BiPAP with FiO2 at 40% and given breathing treatments, which resulted in an improvement in respiratory status. Past Medical History:  has a past medical history of Asthma, Blood circulation, collateral, CAD (coronary artery disease), COPD (chronic obstructive pulmonary disease) ([address] Diabetes mellitus ([address] Hyperlipidemia, Hypertension, Movement disorder, Neuropathy, and PAD (peripheral artery disease) ([address] Past Surgical History:   has a past surgical history that includes Coronary angioplasty with stent; Tonsillectomy; Inner ear surgery (Right); and Coronary angioplasty with stent (2019).     Restrictions  Restrictions/Precautions: Fall Risk (Up with A)  Implants present? :  (Stents x2 (R wrist, R LE)  Required Braces or Orthoses?: No     Vitals  Temp: 97.9 °F (36.6 °C)  Pulse: 75  Resp: 20  BP: (!) 113/55  Height: 5' 9\" (175.3 cm)  Weight: 230 lb 2.6 oz (104.4 kg)  BMI (Calculated): 34.1  Oxygen Therapy  SpO2: 94 % (with movement)  Pulse Oximeter Device Mode: Continuous  Pulse Oximeter Device Location: Finger  O2 Device: None (Room air)  Skin Assessment: Clean, dry, & intact  FiO2 : 30 %  O2 Flow Rate (L/min): 1.5 L/min  Blood Gas  Performed?: No  Jameson's Test #1: Collateral flow confirmed  Site #1: Right Radial  Site Prepped #1: Yes  Number of Attempts #1: 1  Pressure Held #1: Yes  Complications #1: None  Post-procedure #1: Standard  Specimen Status #1: Point of care  How Tolerated?: Tolerated well  Level of Consciousness: Alert (0)    Subjective  Subjective: \"I can't get in the tub anymore. So I wash up on the toilet\"  Comments: Per RN adolfo Davis for OT/PT evaluation. Pt pleasant and agreeable for today's session.   Overall Orientation Status: Within Functional Limits  Vision  Vision: Impaired  Vision Exceptions: Wears glasses for reading  Hearing  Hearing: Exceptions to [facility] (R ear [place]; R worse than L)  Hearing Exceptions: Hard of hearing/hearing concerns (Surgery on R ear-bone repair; R ear 2%, L ear 50%)  Social/Functional History  Lives With: Family (13 and 16 y.o. grandchildren; Dtr only visits)  Type of Home: House  Home Layout: One level ( Lower portion of Duplex)  Home Access: Stairs to enter without rails  Entrance Stairs - Number of Steps: 2 (Railings broken - unsafe to use)  Bathroom Shower/Tub: Tub/Shower unit,Shower chair with back (pt unable to step over tub wall- sponge bathes self)  Bathroom Toilet: Handicap height (sink nearby for support)  Bathroom Equipment: Shower chair (nebulizer)  Bathroom Accessibility: Accessible  Home Equipment: Cane (Nebulizer)  Receives Help From: Family  ADL Assistance: Independent  Homemaking Assistance: Needs assistance (Taking out trash, heavy duty cleaning/laundry)  Ambulation Assistance: Independent (no AD)  Transfer Assistance: Independent  Active : No  Patient's  Info: TARPS, friend or daughter-in-law  Occupation: On disability  IADL Comments: pt has been sleeping on couch; pt usually sleeps flat  Additional Comments: Pt reports mostly receiving partial assistance prn from 2 grand children who live with her. Dtr mostly lives with her boyfriend in separate household, Recently quit smoking (October 26th)  Pain Assessment  Pain Assessment: 0-10  Pain Level: 4  Pain Type: Acute pain  Pain Location: Teeth  Pain Descriptors:  (\"tooth ache\")    Objective      Cognition  Overall Cognitive Status: WFL   Sensation  Overall Sensation Status: Impaired (Intermittent N/T from neuropathy in both feet)   ADL  Feeding: Independent  Grooming: Independent  UE Bathing: Modified independent   LE Bathing: Modified independent   UE Dressing: Modified independent   LE Dressing: Modified independent   Toileting: Modified independent   Additional Comments: ADL scores based on skilled observation and clinical reasoning, unless otherwise noted. Pt donned socks seated EOB. UE Function           LUE Strength  L Hand General: 4-/5  LUE Strength Comment: Overall 4-/5     LUE Tone: Normotonic     LUE AROM (degrees)  LUE AROM : WFL     Left Hand AROM (degrees)  Left Hand AROM: WFL  RUE Strength  R Hand General: 4-/5  RUE Strength Comment: Overall 4-/5      RUE Tone: Normotonic     RUE AROM (degrees)  RUE AROM : WFL     Right Hand AROM (degrees)  Right Hand AROM: WFL    Fine Motor Skills  Coordination  Movements Are Fluid And Coordinated: Yes                           Mobility  Supine to Sit: Modified independent       Balance  Sitting Balance: Modified independent   Standing Balance: Supervision  Standing Balance  Time: 3-4 minutes  Activity: functional transfers and mobility  Comment: intermittently navigated IV pole. Pt reported she was not relying on the IV pole for mobility.    Functional Mobility  Functional - Mobility Device: No device  Activity: To/from bathroom (Ambulating throughout patient's room)  Assist Level: Stand by assistance  Functional Mobility Comments: Assist with line management  Bed mobility  Supine to Sit: Modified independent  Scooting: Independent  Comment: HOB slightly elevated. Transfers  Sit to stand: Independent  Stand to sit: Independent  Functional Activity Tolerance  Functional Activity Tolerance: Tolerates 30 min exercise with multiple rests     Assessment  Assessment  Prognosis: Good  Decision Making: Low Complexity  REQUIRES OT FOLLOW UP: No  No Skilled OT: Safe to return home  Discharge Recommendations: Home with assist PRN  Activity Tolerance: Patient Tolerated treatment well         Functional Outcome Measures  AM-PAC Daily Activity Inpatient   How much help for putting on and taking off regular lower body clothing?: None  How much help for Bathing?: None  How much help for Toileting?: None  How much help for putting on and taking off regular upper body clothing?: None  How much help for taking care of personal grooming?: None  How much help for eating meals?: None  AM-[facility] Inpatient Daily Activity Raw Score: 24  AM-PAC Inpatient ADL T-Scale Score : 57.54  ADL Inpatient CMS 0-100% Score: 0  ADL Inpatient CMS G-Code Modifier : [facility]       Goals       Plan  Safety Devices  Safety Devices in place: Yes  Type of devices: All fall risk precautions in place,Call light within reach,Gait belt,Left in chair,Nurse notified             Equipment Recommendations  Equipment Needed: Yes  Transfer Tub Bench: X- Pt reported inability to manuever her lower extremities in/out of tub. She would benefit with a TTB to promote safety and independence with self care routine.      Electronically signed by Vivien Prescott OT on 12/20/21 at 11:47 AM EST       12/20/21 1147   OT Individual Minutes   Time In [address] [place] [address] Loop   Time Out 1005   Minutes 42

## 2021-12-20 NOTE — CARE COORDINATION
ONGOING DISCHARGE PLAN:    Patient is alert and oriented x4. Spoke with patient regarding discharge plan and patient confirms that plan is still to return to home w/ Family. Denies [facility]NS, [address], was following, however, pt only wanted if she was to go home on Oxygen. Pt. Did not qualify. RA w/ Exercise was 95%. Pt. Denies other needs. Anticipate home today. Pulmonary on board. Will continue to follow for additional discharge needs.     Electronically signed by Eben Galeana RN on 12/20/2021 at 12:24 PM

## 2021-12-20 NOTE — DISCHARGE INSTR - COC
Continuity of Care Form    Patient Name: Gabi Ascencio   :  1960  MRN:  [id number]    Admit date:  2021  Discharge date:  ***    Code Status Order: Full Code   Advance Directives:      Admitting Physician:  Salty Samaniego MD  PCP: Malka Alicia MD    Discharging Nurse: [facility] Unit/Room#: 2123/2123-01  Discharging Unit Phone Number: ***    Emergency Contact:   Extended Emergency Contact Information  Primary Emergency Contact: [facility] [address] [facility] Phone: [phone]  Mobile Phone: [phone]  Relation: Child  Secondary Emergency Contact: [address] [place] [address] Phone: [phone]  Mobile Phone: [phone]  Relation: Child    Past Surgical History:  Past Surgical History:   Procedure Laterality Date    CORONARY ANGIOPLASTY WITH STENT PLACEMENT      x2    CORONARY ANGIOPLASTY WITH STENT PLACEMENT  2019    JOSE MARTIN to RCA per Dr. Amelia Glaser radial approuch    INNER EAR SURGERY Right     TONSILLECTOMY         Immunization History:   Immunization History   Administered Date(s) Administered    Influenza, Quadv, IM, PF (6 mo and older Fluzone, Flulaval, Fluarix, and 3 yrs and older Afluria) 10/08/2021    Pneumococcal Polysaccharide (Rybertxbx85) 2021       Active Problems:  Patient Active Problem List   Diagnosis Code    SOB (shortness of breath) R06.02    Chronic bronchitis ([facility] J42    Chest pain R07.9    Tobacco abuse Z72.0    Obesity (BMI 30.0-34. 9) E66.9    Lung nodule R91.1    ACS (acute coronary syndrome) (HCC) I24.9    Essential hypertension I10    S/P drug eluting coronary stent placement - Mid RCA () 19 (Dr. Elvina Boeck) Z95.5    Gastroenteritis K52.9    HARVEY (acute kidney injury) ([facility] N17.9    High anion gap metabolic acidosis R94.8    Coronary artery disease involving native coronary artery of native heart without angina pectoris I25.10    Tobacco abuse counseling Z71.6    Lactic acidosis E87.2    Vitamin D deficiency {CHP DME ZUKT:733407066}  Toileting  {CHP DME UMMZ:125502576}  Feeding  {CHP DME QGWT:115145841}  Med Admin  {CHP DME AORV:787429488}  Med Delivery   { MÓNICA MED Delivery:232006279}    Wound Care Documentation and Therapy:        Elimination:  Continence: Bowel: {YES / LT:59504}  Bladder: {YES / Czech:82366}  Urinary Catheter: {Urinary Catheter:677341522}   Colostomy/Ileostomy/Ileal Conduit: {YES / XW:98586}       Date of Last BM: ***    Intake/Output Summary (Last 24 hours) at 2021 0959  Last data filed at 2021 0425  Gross per 24 hour   Intake 480 ml   Output 1750 ml   Net -1270 ml     I/O last 3 completed shifts:   In: 80 [P.O.:780]  Out: 1750 [Urine:1750]    Safety Concerns:     [address] Safety Concerns:746784985}    Impairments/Disabilities:      [address] Impairments/Disabilities:934379449}    Nutrition Therapy:  Current Nutrition Therapy:   [address] Diet List:620055721}    Routes of Feeding: {[facility] DME Other Feedings:525867951}  Liquids: {Slp liquid thickness:37780}  Daily Fluid Restriction: {CHP DME Yes amt example:699857558}  Last Modified Barium Swallow with Video (Video Swallowing Test): {Done Not Done PKRC:413684550}    Treatments at the Time of Hospital Discharge:   Respiratory Treatments: ***  Oxygen Therapy:  {Therapy; copd oxygen:32577}  Ventilator:    { CC Vent HJF}    Rehab Therapies: {THERAPEUTIC INTERVENTION:5739958465}  Weight Bearing Status/Restrictions: [employer]  Weight Bearin}  Other Medical Equipment (for information only, NOT a DME order):  {EQUIPMENT:770024505}  Other Treatments: ***    Patient's personal belongings (please select all that are sent with patient):  {[facility] DME Belongings:467316972}    RN SIGNATURE:  {Esignature:81962}    CASE MANAGEMENT/SOCIAL WORK SECTION    Inpatient Status Date: ***    Readmission Risk Assessment Score:  Readmission Risk              Risk of Unplanned Readmission:  40           Discharging to Facility/ Agency   Name: Address:  Phone:  Fax:    Dialysis Facility (if applicable)   Name:  Address:  Dialysis Schedule:  Phone:  Fax:    / signature: {Esignature:263373844}    PHYSICIAN SECTION    Prognosis: {Prognosis:8822987524}    Condition at Discharge: Caro Beverly Patient Condition:315461049}    Rehab Potential (if transferring to Rehab): {Prognosis:9800882721}    Recommended Labs or Other Treatments After Discharge: ***    Physician Certification: I certify the above information and transfer of Jannet Modi  is necessary for the continuing treatment of the diagnosis listed and that she requires {Admit to Appropriate Level of Care:17657} for {GREATER/LESS:975649620} 30 days.      Update Admission H&P: {CHP DME Changes in REHDV:622781109}    PHYSICIAN SIGNATURE:  Electronically signed by Yennifer Wilson MD on 12/20/21 at 9:59 AM EST

## 2021-12-20 NOTE — PLAN OF CARE
Problem: Falls - Risk of:  Goal: Will remain free from falls  Description: Will remain free from falls  Outcome: Ongoing  Note: Pt free of falls. Call light and bedside table within reach. Bed locked and in lowest position.      Problem: Falls - Risk of:  Goal: Absence of physical injury  Description: Absence of physical injury  Outcome: Ongoing  Note: Pt absent of any physical injury     Problem: Gas Exchange - Impaired:  Goal: Levels of oxygenation will improve  Description: Levels of oxygenation will improve  Outcome: Ongoing  Note: Pt wearing 3L nasal cannula and a bipap at night

## 2021-12-21 LAB
CULTURE: NORMAL
DIRECT EXAM: NORMAL
Lab: NORMAL
SPECIMEN DESCRIPTION: NORMAL

## 2021-12-23 LAB
CULTURE: NORMAL
CULTURE: NORMAL
Lab: NORMAL
Lab: NORMAL
SPECIMEN DESCRIPTION: NORMAL
SPECIMEN DESCRIPTION: NORMAL

## 2022-02-14 ENCOUNTER — HOSPITAL ENCOUNTER (OUTPATIENT)
Dept: CT IMAGING | Age: 62
Discharge: HOME | End: 2022-02-16
Payer: MEDICARE

## 2022-02-14 ENCOUNTER — HOSPITAL ENCOUNTER (OUTPATIENT)
Dept: OTHER | Age: 62
Discharge: HOME | End: 2022-02-14
Payer: MEDICARE

## 2022-02-14 VITALS
DIASTOLIC BLOOD PRESSURE: 80 MMHG | OXYGEN SATURATION: 100 % | RESPIRATION RATE: 18 BRPM | SYSTOLIC BLOOD PRESSURE: 110 MMHG | HEART RATE: 92 BPM | BODY MASS INDEX: 34.85 KG/M2 | WEIGHT: 236 LBS

## 2022-02-14 DIAGNOSIS — Z87.891 FORMER SMOKER: ICD-10-CM

## 2022-02-14 DIAGNOSIS — Z72.0 TOBACCO ABUSE: ICD-10-CM

## 2022-02-14 DIAGNOSIS — I50.42 CHRONIC COMBINED SYSTOLIC (CONGESTIVE) AND DIASTOLIC (CONGESTIVE) HEART FAILURE (HCC): ICD-10-CM

## 2022-02-14 DIAGNOSIS — F41.9 ANXIETY: ICD-10-CM

## 2022-02-14 PROBLEM — I50.23 ACUTE ON CHRONIC SYSTOLIC CHF (CONGESTIVE HEART FAILURE) (HCC): Status: RESOLVED | Noted: 2021-12-20 | Resolved: 2022-02-14

## 2022-02-14 PROBLEM — Z71.6 TOBACCO ABUSE COUNSELING: Status: RESOLVED | Noted: 2021-07-25 | Resolved: 2022-02-14

## 2022-02-14 PROCEDURE — 99212 OFFICE O/P EST SF 10 MIN: CPT

## 2022-02-14 PROCEDURE — 99215 OFFICE O/P EST HI 40 MIN: CPT | Performed by: NURSE PRACTITIONER

## 2022-02-14 PROCEDURE — 71250 CT THORAX DX C-: CPT

## 2022-02-14 RX ORDER — BUSPIRONE HYDROCHLORIDE 15 MG/1
30 TABLET ORAL 3 TIMES DAILY
COMMUNITY

## 2022-02-14 ASSESSMENT — ENCOUNTER SYMPTOMS
ABDOMINAL PAIN: 0
SHORTNESS OF BREATH: 1
COUGH: 0
EYE DISCHARGE: 0
BLOOD IN STOOL: 0

## 2022-02-14 NOTE — PROGRESS NOTES
CHF Clinic at [facility]    Office: [phone] Fax: [phone] [facility]  [phone]  Dept: [phone]  Loc: [phone]    Alin Coleman is a 63 y.o. female who presents today for CHF evaluation. HPI:     + occas  shortness of breath, notices with lifting. Better since smoking cessation. None at rest.   +   Fatigue, due to poor sleep. She sleeps on couch b/c has given her beds to her grandsons.   +  Edema , she notices with band of socks. +  chest pain , that feels like cysts in her breasts. Denies  palpitations   No orthopnea , nor PND     Pt admits to drinking a fair amt of fluids. She uses sea salt. Past Medical History:   Diagnosis Date    Acute on chronic systolic CHF (congestive heart failure) ([facility] 12/20/2021    Asthma     Blood circulation, collateral     CAD (coronary artery disease)     COPD [facility]chronic obstructive pulmonary disease) ([facility])     Diabetes mellitus (HCC)     Hyperlipidemia     Hypertension     Movement disorder     B/L torn rotator cuff.     Neuropathy     PAD (peripheral artery disease) ([facility] [address]     Tobacco abuse 11/19/2016    Tobacco abuse counseling 7/25/2021     Past Surgical History:   Procedure Laterality Date    CORONARY ANGIOPLASTY WITH STENT PLACEMENT      x2    CORONARY ANGIOPLASTY WITH STENT PLACEMENT  02/11/2019    JOSE MARTIN to RCA per Dr. Temi Dooley radial approuch    INNER EAR SURGERY Right     TONSILLECTOMY         Family History   Problem Relation Age of Onset    Hypertension Mother     Hypertension Father     Diabetes Sister     Diabetes Brother     Stroke Brother        Social History     Tobacco Use    Smoking status: Current Every Day Smoker     Packs/day: 0.50     Years: 45.00     Pack years: 22.50     Types: Cigarettes    Smokeless tobacco: Current User   Substance Use Topics    Alcohol use: No      Current Outpatient Medications   Medication Sig Dispense Refill    busPIRone (BUSPAR) 15 MG tablet Take 30 mg by mouth 3 times daily      furosemide (LASIX) 40 MG tablet Take 1 tablet by mouth daily 60 tablet 3    budesonide-formoterol (SYMBICORT) 160-4.5 MCG/ACT AERO Inhale 2 puffs into the lungs 2 times daily 10.2 g 3    lisinopril (PRINIVIL;ZESTRIL) 10 MG tablet Take 1 tablet by mouth daily 30 tablet 3    clopidogrel (PLAVIX) 75 MG tablet Take 1 tablet by mouth daily 30 tablet 3    famotidine (PEPCID) 20 MG tablet Take 20 mg by mouth 2 times daily      albuterol sulfate HFA (VENTOLIN HFA) 108 (90 Base) MCG/ACT inhaler Inhale 2 puffs into the lungs 3 times daily      lidocaine (LIDODERM) 5 % Place 1 patch onto the skin as needed for Pain (as needed for rotator cuff pain) 12 hours on, 12 hours off.  apixaban (ELIQUIS) 5 MG TABS tablet Take 1 tablet by mouth 2 times daily 180 tablet 1    atorvastatin (LIPITOR) 40 MG tablet Take 1 tablet by mouth daily 30 tablet 3    Dulaglutide (TRULICITY) 1.5 CR/7.5QT SOPN Inject 1.5 mg into the skin once a week      aspirin 81 MG chewable tablet Take 1 tablet by mouth daily 30 tablet 3    metoprolol tartrate (LOPRESSOR) 25 MG tablet Take 0.5 tablets by mouth 2 times daily 60 tablet 3    Lancets MISC 1 each by Does not apply route daily 100 each 3    blood glucose monitor strips Test 3 times a day & as needed for symptoms of irregular blood glucose. 100 strip 3    glucose monitoring kit (FREESTYLE) monitoring kit 1 kit by Does not apply route daily 1 kit 0    albuterol (PROVENTIL) (5 MG/ML) 0.5% nebulizer solution Take 0.5 mLs by nebulization every 6 hours as needed for Wheezing (Patient taking differently: Take 2.5 mg by nebulization 2 times daily ) 120 each 0    tiotropium (SPIRIVA) 18 MCG inhalation capsule Inhale 1 capsule into the lungs daily Pt told that she was using this medication at home.  1 capsule 0    acetaminophen (TYLENOL) 500 MG tablet Take 1 tablet by mouth every 6 hours as needed for Pain 60 tablet 2    nitroGLYCERIN (NITROSTAT) 0.4 MG SL tablet up to max of 3 total doses. If no relief after 1 dose, call 911. 25 tablet 3     No current facility-administered medications for this encounter. Allergies   Allergen Reactions    Codeine     Morphine          Subjective:      Review of Systems   Constitutional: Positive for fatigue. Negative for activity change, chills and fever. Eyes: Negative for discharge and visual disturbance. Respiratory: Positive for shortness of breath. Negative for cough. Cardiovascular: Positive for leg swelling. Negative for chest pain. Gastrointestinal: Negative for abdominal pain and blood in stool. Endocrine: Negative for cold intolerance and heat intolerance. Genitourinary: Negative for dysuria and flank pain. Musculoskeletal: Negative for joint swelling and myalgias. Skin: Negative for pallor and rash. Neurological: Negative for dizziness and headaches. Psychiatric/Behavioral: Negative for hallucinations and suicidal ideas. Objective:     Physical Exam  Vitals and nursing note reviewed. Constitutional:       Appearance: She is obese. Comments:      HENT:      Head: Normocephalic and atraumatic. Eyes:      General: No scleral icterus. Conjunctiva/sclera: Conjunctivae normal.   Cardiovascular:      Rate and Rhythm: Normal rate and regular rhythm. Heart sounds: Normal heart sounds. Pulmonary:      Effort: Pulmonary effort is normal.      Breath sounds: Normal breath sounds. No wheezing or rales. Abdominal:      General: Bowel sounds are normal.      Palpations: Abdomen is soft. Musculoskeletal:         General: Normal range of motion. Cervical back: Normal range of motion. Right lower leg: Edema (1+ pitting) present. Left lower leg: Edema (trace) present. Skin:     General: Skin is warm and dry.    Neurological:      Mental Status: She is alert and oriented to person, place, and time. /80   Pulse 92   Resp 18   Wt 236 lb (107 kg)   SpO2 100%   BMI 34.85 kg/m²            Conclusions      Procedure Summary      Restenosis mid RCA stent   Successful PTCA -JOSE MARTIN   Preserved LV function      Recommendations       Post stent protocol   Stop smoking      Signature      ----------------------------------------------------------------   Electronically signed by Jacob Calix(Performing Physician) on   10/04/2021 18:03        CONCLUSIONS     Summary  Left ventricle is in the upper limits of normal size. Mildly reduced LV function with Calculated ejection fraction is 44%  Anterior and posterolateral wall motion abnormality. Abnormal global longitudinal strain average of -7%. Grade I (mild) left ventricular diastolic dysfunction. Left atrium is moderately dilated. Aortic sclerosis without stenosis. Calcification of mitral valve leaflet tips with moderate stenosis. Mean  gradient is 7 mmHg  Mild mitral regurgitation. Mild tricuspid regurgitation. Estimated right ventricular systolic pressure  is 36 mmHg.     Signature  ----------------------------------------------------------------------------   Electronically signed by Bridget Velazco(Sonographer) on 09/30/2021 02:04          Lower Extremity Measurements in cm.    R Calf Circumference (cm): 34 cm  L Calf Circumference (cm): 34 cm  R Ankle Circumference (cm): 22 cm  L Ankle Circumference (cm): 22 cm    CBC:   Lab Results   Component Value Date    WBC 13.8 12/20/2021    RBC 3.94 12/20/2021    RBC 4.10 06/02/2012    HGB 10.5 12/20/2021    HCT 32.8 12/20/2021    MCV 83.3 12/20/2021    MCH 26.8 12/20/2021    MCHC 32.1 12/20/2021    RDW 15.6 12/20/2021     12/20/2021     06/02/2012    MPV 8.3 12/20/2021     CMP:    Lab Results   Component Value Date     12/20/2021    K 4.6 12/20/2021     12/20/2021    CO2 24 12/20/2021    BUN 36 12/20/2021    CREATININE 1.05 12/20/2021    GFRAA >60 12/20/2021 LABGLOM 54 12/20/2021    GLUCOSE 260 12/20/2021    GLUCOSE 94 06/02/2012    PROT 7.4 12/18/2021    LABALBU 4.1 12/18/2021    LABALBU 4.0 06/01/2012    CALCIUM 9.0 12/20/2021    BILITOT 0.24 12/18/2021    ALKPHOS 118 12/18/2021    AST 21 12/18/2021    ALT 20 12/18/2021     Lab Results   Component Value Date    LABA1C 6.8 (H) 10/07/2021           :Assessment      1. Chronic combined systolic (congestive) and diastolic (congestive) heart failure ([address]    2. Former smoker    3. Anxiety        :Plan      1. Chronic combined systolic (congestive) and diastolic (congestive) heart failure ([address]    2. Former smoker    3. Anxiety          Initial visit today. Wt taken,  leg measurements taken. Pt has mild edema on exam.    On Guideline-Directed Medication Therapy:     ACEI / ARB / ARNi:  Beta - blocker  Diuretic Therapy      No f/u w/ CARD[facility] since 2019, when stent was placed. Cath in 2021 reveals in stent stenosis. Pt also with know combined S/D CHF. Recommend to start with [facility] appts and appt made for pt at [facility] location, 3/2/22 at 1300. CHF Education completed. Reviewed all educational  material in the new pt education folder. Educational materials  given including multiple pages on Low Sodium food choices, how to read a food label, a Weight and Heart Failure Zone log, and illustrated Signs and Symptoms of Heart Failure                   RTC 1 month     No orders of the defined types were placed in this encounter. No orders of the defined types were placed in this encounter. Patientgiven verbal and/or written educational instructions. Follow up as directed. I have reviewed and agree with the nursing documentation. Verbally reviewed medication list with patient; patient verbalized understanding. Discussed 2000mg/day sodium restricted diet; patient verbalized understanding. Moderate daily exercise encouraged as tolerated. Discussed rest breaks as needed; patient verbalized understanding. Patient instructed to weigh self at the same time of each day, using same clothes and same scale; reinforced teaching to monitor for 3-5 lb weight increase over 1-2 days, and to notify the CHF clinic at [phone] or physician office if weight change noted. Patient verbalized understanding. Risks of smoking discussed with the patient if applicable; patient strongly discouraged to smoke. Patient verbalized understanding. Signs and symptoms of CHF discussed with patient, such as feeling more tired than normal, feeling short of breath, coughing that increases when you lie down, sudden weight gain, swelling of your feet, legs or belly. Patient verbalized understanding to notify the CHF clinic at [phone] or physician office if these symptoms occur. Compliance with plan of care and further disease process causes discussed with patient, patient encouraged to keep all follow up appointments. Patient verbalized understanding. Echocardiogram reviewed. Labs reviewed. Medications reviewed. Patient was seen with total face to face time o > 45 minutes. More than 50% of this visit was counseling and education, & coordination of care.            Electronically signedby BINDU Wilcox CNP on 2/14/2022 at 11:50 AM

## 2022-02-25 ENCOUNTER — TELEPHONE (OUTPATIENT)
Dept: PULMONOLOGY | Age: 62
End: 2022-02-25

## 2022-02-25 NOTE — TELEPHONE ENCOUNTER
Pt is scheduled to see Dr. King Sender and to have a PFT done on  3/3/22. COVID swab scheduled for 2/28/22. She called to report that she relies on [facility] and will be unable to keep the appt for the COVID swab. Please advise. Thank you. Pt has not been vaccinated against COVID.

## 2022-02-28 ENCOUNTER — HOSPITAL ENCOUNTER (OUTPATIENT)
Dept: LAB | Age: 62
Setting detail: SPECIMEN
Discharge: HOME | End: 2022-02-28

## 2022-02-28 DIAGNOSIS — Z20.822 COVID-19 RULED OUT BY LABORATORY TESTING: Primary | ICD-10-CM

## 2022-02-28 NOTE — TELEPHONE ENCOUNTER
PFT was cancelled due to the pt informing me that she is unable to keep her scheduled COVID swab on 2/28/22. She kept the appt with Dr. Boone Christianson as a COPD f/u.

## 2022-03-07 ENCOUNTER — TELEPHONE (OUTPATIENT)
Dept: PULMONOLOGY | Age: 62
End: 2022-03-07

## 2022-03-15 ENCOUNTER — HOSPITAL ENCOUNTER (OUTPATIENT)
Dept: SLEEP CENTER | Age: 62
Discharge: HOME | End: 2022-03-17
Payer: MEDICARE

## 2022-03-15 DIAGNOSIS — G47.33 OSA (OBSTRUCTIVE SLEEP APNEA): Primary | ICD-10-CM

## 2022-03-15 PROCEDURE — 95810 POLYSOM 6/> YRS 4/> PARAM: CPT

## 2022-03-16 VITALS
RESPIRATION RATE: 16 BRPM | HEIGHT: 69 IN | BODY MASS INDEX: 35.4 KG/M2 | HEART RATE: 59 BPM | OXYGEN SATURATION: 94 % | WEIGHT: 239 LBS

## 2022-03-16 ASSESSMENT — SLEEP AND FATIGUE QUESTIONNAIRES
ESS TOTAL SCORE: 3
HOW LIKELY ARE YOU TO NOD OFF OR FALL ASLEEP WHILE SITTING QUIETLY AFTER LUNCH WITHOUT ALCOHOL: 0
HOW LIKELY ARE YOU TO NOD OFF OR FALL ASLEEP WHILE SITTING AND TALKING TO SOMEONE: 0
HOW LIKELY ARE YOU TO NOD OFF OR FALL ASLEEP WHILE WATCHING TV: 1
HOW LIKELY ARE YOU TO NOD OFF OR FALL ASLEEP WHILE SITTING AND READING: 1
HOW LIKELY ARE YOU TO NOD OFF OR FALL ASLEEP WHILE SITTING INACTIVE IN A PUBLIC PLACE: 0
HOW LIKELY ARE YOU TO NOD OFF OR FALL ASLEEP WHEN YOU ARE A PASSENGER IN A CAR FOR AN HOUR WITHOUT A BREAK: 0
HOW LIKELY ARE YOU TO NOD OFF OR FALL ASLEEP IN A CAR, WHILE STOPPED FOR A FEW MINUTES IN TRAFFIC: 0
HOW LIKELY ARE YOU TO NOD OFF OR FALL ASLEEP WHILE LYING DOWN TO REST IN THE AFTERNOON WHEN CIRCUMSTANCES PERMIT: 1

## 2022-03-21 ENCOUNTER — HOSPITAL ENCOUNTER (OUTPATIENT)
Dept: OTHER | Age: 62
Discharge: HOME | End: 2022-03-21
Payer: MEDICARE

## 2022-03-21 VITALS
SYSTOLIC BLOOD PRESSURE: 114 MMHG | BODY MASS INDEX: 35.32 KG/M2 | RESPIRATION RATE: 20 BRPM | WEIGHT: 239.2 LBS | HEART RATE: 74 BPM | DIASTOLIC BLOOD PRESSURE: 80 MMHG

## 2022-03-21 PROCEDURE — 99212 OFFICE O/P EST SF 10 MIN: CPT

## 2022-03-21 NOTE — PROGRESS NOTES
Date:  3/21/2022  Time:  12:58 PM    CHF Clinic at [facility]    Office: [phone] Fax: [phone]    Re:  Warden Gutiérrez   Patient : 1960    Vital Signs: /80   Pulse 74   Resp 20   Wt 239 lb 3.2 oz (108.5 kg)   BMI 35.32 kg/m²                                                   No results for input(s): CBC, HGB, HCT, WBC, PLATELET, NA, K, CL, CO2, BUN, CREATININE, GLUCOSE, BNP, INR in the last 72 hours. Respiratory:         Breath Sounds  Right Upper Lobe: Expiratory Wheezes (few wheezes, clear with cough)  Right Middle Lobe: Clear  Right Lower Lobe: Clear  Left Upper Lobe: Expiratory Wheezes (few wheezes, clear with coughing)  Left Lower Lobe: Clear              Peripheral Vascular  LUE Edema: Trace  RLE Edema: Trace      Complaints: wt up 3.2 lbs    Comment : Wt is up 3.2 lbs in one month. She states she's been eating more desserts, had pizza last night and eats a lot of pork rinds daily. Pt states she forgot to take home all the dietary teaching info from her first visit here last month and requested the written materials again. Writer reviewed all dietary information about 2 gm sodium diet and fluid restriction of 2 liters daily today. She states since she quit smoking 6 months ago she  feels her \"breathing is better\" . Denies increased shortness of breath, fatigue, dizziness or chest pain. She has some trace edema to lower extremities and  wheezing in her upper lobes that improve with coughing. She states she has only been using aerosol treatments twice daily. Instructed pt that respiratory treatments are ordered every 6 hours prn so it is  OK if she increases use while experiencing wheezing. Current diuretic is Lasix 40 mg once daily. She urinates several times a day after taking diuretic. She had a recent sleep study completed and is waiting for results. No other issues or acute S/S of CHF noted today.   She plans to make dietary changes and will return to CHF Clinic in one month for reassessment.      Electronically signed by Richard Ji RN on 3/21/2022 at 12:58 PM

## 2022-03-25 LAB — STATUS: NORMAL

## 2022-05-18 ENCOUNTER — HOSPITAL ENCOUNTER (OUTPATIENT)
Dept: OTHER | Age: 62
Discharge: HOME | End: 2022-05-18
Payer: MEDICARE

## 2022-05-18 VITALS
HEART RATE: 96 BPM | BODY MASS INDEX: 35.88 KG/M2 | WEIGHT: 243 LBS | OXYGEN SATURATION: 98 % | DIASTOLIC BLOOD PRESSURE: 74 MMHG | SYSTOLIC BLOOD PRESSURE: 122 MMHG | RESPIRATION RATE: 20 BRPM

## 2022-05-18 PROCEDURE — 99212 OFFICE O/P EST SF 10 MIN: CPT

## 2022-05-18 ASSESSMENT — PAIN SCALES - GENERAL: PAINLEVEL_OUTOF10: 0

## 2022-05-18 NOTE — PROGRESS NOTES
Date:  2022  Time:  2:46 PM    CHF Clinic at [facility]    Office: [phone] Fax: [phone]    Re:  Belkis Felix   Patient : 1960    Vital Signs: /74   Pulse 96   Resp 20   Wt 243 lb (110.2 kg)   SpO2 98%   BMI 35.88 kg/m²                       O2 Device: Nasal cannula                           No results for input(s): CBC, HGB, HCT, WBC, PLATELET, NA, K, CL, CO2, BUN, CREATININE, GLUCOSE, BNP, INR in the last 72 hours. Respiratory:    Assessment  Charting Type: Reassessment    Breath Sounds  Right Upper Lobe: Clear  Right Middle Lobe: Clear  Right Lower Lobe: Clear  Left Upper Lobe: Clear  Left Lower Lobe: Clear              Peripheral Vascular  LUE Edema: None  RLE Edema: None      Complaints: generalized aches and pains    Comment : Discussed with patient her increase in weight of 3+ lbs and possible correlation with the increase in leg calf measurements. No obvious edema noted. Lungs are clear throughout except noted bronchial expiratory wheezes which clear with cough. Has not been weighing daily since she thought her scale was not working,brought it in, I moved the  Switch and it works fine now. States she will use and discussed if there would be a increase In weight suddenly, she needs to be calling CHF or her cardiac Dr. Reviewed medications/allergies/2 gram sodium diet and 64 oz fluid restrictions. States she\" had soup from [employer] recently, but that shouldn't have much salt in it\" found sodium amount online and gave to her to review. Acknowledged the high sodium amount was more than she realized.    Next appointment scheduled for 6/15/22       Electronically signed by Lilliana Pope RN on 2022 at 2:46 PM

## 2022-07-20 ENCOUNTER — HOSPITAL ENCOUNTER (OUTPATIENT)
Age: 62
Setting detail: OBSERVATION
LOS: 1 days | Discharge: HOME | End: 2022-07-22
Attending: EMERGENCY MEDICINE | Admitting: INTERNAL MEDICINE
Payer: MEDICARE

## 2022-07-20 ENCOUNTER — APPOINTMENT (OUTPATIENT)
Dept: GENERAL RADIOLOGY | Age: 62
End: 2022-07-20
Payer: MEDICARE

## 2022-07-20 DIAGNOSIS — N17.9 AKI (ACUTE KIDNEY INJURY) (HCC): Primary | ICD-10-CM

## 2022-07-20 DIAGNOSIS — E87.20 LACTIC ACIDOSIS: ICD-10-CM

## 2022-07-20 LAB
ABSOLUTE EOS #: <0.03 K/UL (ref 0–0.44)
ABSOLUTE IMMATURE GRANULOCYTE: 0.1 K/UL (ref 0–0.3)
ABSOLUTE LYMPH #: 0.79 K/UL (ref 1.1–3.7)
ABSOLUTE MONO #: 0.48 K/UL (ref 0.1–1.2)
ALBUMIN SERPL-MCNC: 4.3 G/DL (ref 3.5–5.2)
ALBUMIN/GLOBULIN RATIO: 1.2 (ref 1–2.5)
ALP BLD-CCNC: 104 U/L (ref 35–104)
ALT SERPL-CCNC: 13 U/L (ref 5–33)
ANION GAP SERPL CALCULATED.3IONS-SCNC: 18 MMOL/L (ref 9–17)
AST SERPL-CCNC: 13 U/L
BASOPHILS # BLD: 0 % (ref 0–2)
BASOPHILS ABSOLUTE: 0.06 K/UL (ref 0–0.2)
BILIRUB SERPL-MCNC: 0.37 MG/DL (ref 0.3–1.2)
BILIRUBIN DIRECT: 0.09 MG/DL
BILIRUBIN URINE: NEGATIVE
BILIRUBIN, INDIRECT: 0.28 MG/DL (ref 0–1)
BUN BLDV-MCNC: 43 MG/DL (ref 6–20)
CALCIUM SERPL-MCNC: 9.9 MG/DL (ref 8.6–10.4)
CHLORIDE BLD-SCNC: 102 MMOL/L (ref 98–107)
CO2: 15 MMOL/L (ref 20–31)
COLOR: YELLOW
CREAT SERPL-MCNC: 2.13 MG/DL (ref 0.5–0.9)
EOSINOPHILS RELATIVE PERCENT: 0 % (ref 1–4)
GFR AFRICAN AMERICAN: 29 ML/MIN
GFR NON-AFRICAN AMERICAN: 24 ML/MIN
GFR SERPL CREATININE-BSD FRML MDRD: ABNORMAL ML/MIN/{1.73_M2}
GLUCOSE BLD-MCNC: 248 MG/DL (ref 70–99)
GLUCOSE URINE: NEGATIVE
HCT VFR BLD CALC: 38.8 % (ref 36.3–47.1)
HEMOGLOBIN: 12.2 G/DL (ref 11.9–15.1)
IMMATURE GRANULOCYTES: 1 %
INR BLD: 1
KETONES, URINE: ABNORMAL
LACTIC ACID, SEPSIS WHOLE BLOOD: 2.7 MMOL/L (ref 0.5–1.9)
LEUKOCYTE ESTERASE, URINE: ABNORMAL
LIPASE: 38 U/L (ref 13–60)
LYMPHOCYTES # BLD: 6 % (ref 24–43)
MCH RBC QN AUTO: 26 PG (ref 25.2–33.5)
MCHC RBC AUTO-ENTMCNC: 31.4 G/DL (ref 28.4–34.8)
MCV RBC AUTO: 82.6 FL (ref 82.6–102.9)
MONOCYTES # BLD: 4 % (ref 3–12)
NITRITE, URINE: NEGATIVE
NRBC AUTOMATED: 0 PER 100 WBC
PARTIAL THROMBOPLASTIN TIME: 25.2 SEC (ref 20.5–30.5)
PDW BLD-RTO: 16.5 % (ref 11.8–14.4)
PH UA: 5 (ref 5–8)
PLATELET # BLD: 286 K/UL (ref 138–453)
PMV BLD AUTO: 10.3 FL (ref 8.1–13.5)
POTASSIUM SERPL-SCNC: 4.5 MMOL/L (ref 3.7–5.3)
PROTEIN UA: NEGATIVE
PROTHROMBIN TIME: 10.4 SEC (ref 9.1–12.3)
RBC # BLD: 4.7 M/UL (ref 3.95–5.11)
RBC # BLD: ABNORMAL 10*6/UL
SEG NEUTROPHILS: 89 % (ref 36–65)
SEGMENTED NEUTROPHILS ABSOLUTE COUNT: 12.26 K/UL (ref 1.5–8.1)
SODIUM BLD-SCNC: 135 MMOL/L (ref 135–144)
SPECIFIC GRAVITY UA: 1.02 (ref 1–1.03)
TOTAL PROTEIN: 7.8 G/DL (ref 6.4–8.3)
TROPONIN, HIGH SENSITIVITY: 17 NG/L (ref 0–14)
TROPONIN, HIGH SENSITIVITY: 18 NG/L (ref 0–14)
TURBIDITY: ABNORMAL
URINE HGB: NEGATIVE
UROBILINOGEN, URINE: NORMAL
WBC # BLD: 13.7 K/UL (ref 3.5–11.3)

## 2022-07-20 PROCEDURE — 83605 ASSAY OF LACTIC ACID: CPT

## 2022-07-20 PROCEDURE — 6360000002 HC RX W HCPCS: Performed by: STUDENT IN AN ORGANIZED HEALTH CARE EDUCATION/TRAINING PROGRAM

## 2022-07-20 PROCEDURE — 71045 X-RAY EXAM CHEST 1 VIEW: CPT

## 2022-07-20 PROCEDURE — 83036 HEMOGLOBIN GLYCOSYLATED A1C: CPT

## 2022-07-20 PROCEDURE — 87154 CUL TYP ID BLD PTHGN 6+ TRGT: CPT

## 2022-07-20 PROCEDURE — 83690 ASSAY OF LIPASE: CPT

## 2022-07-20 PROCEDURE — 87205 SMEAR GRAM STAIN: CPT

## 2022-07-20 PROCEDURE — 96374 THER/PROPH/DIAG INJ IV PUSH: CPT

## 2022-07-20 PROCEDURE — 2580000003 HC RX 258: Performed by: STUDENT IN AN ORGANIZED HEALTH CARE EDUCATION/TRAINING PROGRAM

## 2022-07-20 PROCEDURE — 93005 ELECTROCARDIOGRAM TRACING: CPT | Performed by: STUDENT IN AN ORGANIZED HEALTH CARE EDUCATION/TRAINING PROGRAM

## 2022-07-20 PROCEDURE — 96361 HYDRATE IV INFUSION ADD-ON: CPT

## 2022-07-20 PROCEDURE — 87040 BLOOD CULTURE FOR BACTERIA: CPT

## 2022-07-20 PROCEDURE — 84156 ASSAY OF PROTEIN URINE: CPT

## 2022-07-20 PROCEDURE — 84300 ASSAY OF URINE SODIUM: CPT

## 2022-07-20 PROCEDURE — 96375 TX/PRO/DX INJ NEW DRUG ADDON: CPT

## 2022-07-20 PROCEDURE — 85025 COMPLETE CBC W/AUTO DIFF WBC: CPT

## 2022-07-20 PROCEDURE — 85610 PROTHROMBIN TIME: CPT

## 2022-07-20 PROCEDURE — 99285 EMERGENCY DEPT VISIT HI MDM: CPT

## 2022-07-20 PROCEDURE — 84484 ASSAY OF TROPONIN QUANT: CPT

## 2022-07-20 PROCEDURE — 36415 COLL VENOUS BLD VENIPUNCTURE: CPT

## 2022-07-20 PROCEDURE — 80048 BASIC METABOLIC PNL TOTAL CA: CPT

## 2022-07-20 PROCEDURE — 85730 THROMBOPLASTIN TIME PARTIAL: CPT

## 2022-07-20 PROCEDURE — 80076 HEPATIC FUNCTION PANEL: CPT

## 2022-07-20 PROCEDURE — 81001 URINALYSIS AUTO W/SCOPE: CPT

## 2022-07-20 RX ORDER — SODIUM CHLORIDE 9 MG/ML
INJECTION, SOLUTION INTRAVENOUS CONTINUOUS
Status: DISCONTINUED | OUTPATIENT
Start: 2022-07-20 | End: 2022-07-21

## 2022-07-20 RX ORDER — ONDANSETRON 2 MG/ML
4 INJECTION INTRAMUSCULAR; INTRAVENOUS ONCE
Status: COMPLETED | OUTPATIENT
Start: 2022-07-20 | End: 2022-07-20

## 2022-07-20 RX ADMIN — ONDANSETRON 4 MG: 2 INJECTION INTRAMUSCULAR; INTRAVENOUS at 21:36

## 2022-07-20 RX ADMIN — SODIUM CHLORIDE: 9 INJECTION, SOLUTION INTRAVENOUS at 23:39

## 2022-07-20 ASSESSMENT — PAIN SCALES - GENERAL: PAINLEVEL_OUTOF10: 4

## 2022-07-20 ASSESSMENT — PAIN DESCRIPTION - LOCATION
LOCATION: ABDOMEN
LOCATION: ABDOMEN;BACK

## 2022-07-20 ASSESSMENT — PAIN - FUNCTIONAL ASSESSMENT: PAIN_FUNCTIONAL_ASSESSMENT: 0-10

## 2022-07-21 ENCOUNTER — APPOINTMENT (OUTPATIENT)
Dept: ULTRASOUND IMAGING | Age: 62
End: 2022-07-21
Payer: MEDICARE

## 2022-07-21 PROBLEM — E87.20 METABOLIC ACIDOSIS: Status: RESOLVED | Noted: 2022-07-21 | Resolved: 2022-07-21

## 2022-07-21 PROBLEM — E66.812 CLASS 2 OBESITY WITH BODY MASS INDEX (BMI) OF 36.0 TO 36.9 IN ADULT: Status: ACTIVE | Noted: 2022-07-21

## 2022-07-21 PROBLEM — E87.20 LACTIC ACIDOSIS: Status: RESOLVED | Noted: 2021-07-25 | Resolved: 2022-07-21

## 2022-07-21 PROBLEM — E87.29 HIGH ANION GAP METABOLIC ACIDOSIS: Status: RESOLVED | Noted: 2021-07-25 | Resolved: 2022-07-21

## 2022-07-21 PROBLEM — E66.9 CLASS 2 OBESITY WITH BODY MASS INDEX (BMI) OF 36.0 TO 36.9 IN ADULT: Status: ACTIVE | Noted: 2022-07-21

## 2022-07-21 PROBLEM — K55.9 ISCHEMIC COLITIS (HCC): Status: RESOLVED | Noted: 2021-10-28 | Resolved: 2022-07-21

## 2022-07-21 PROBLEM — E87.20 METABOLIC ACIDOSIS: Status: ACTIVE | Noted: 2022-07-21

## 2022-07-21 PROBLEM — N17.9 AKI (ACUTE KIDNEY INJURY) (HCC): Status: RESOLVED | Noted: 2021-07-25 | Resolved: 2022-07-21

## 2022-07-21 PROBLEM — J44.1 COPD EXACERBATION (HCC): Status: RESOLVED | Noted: 2021-12-18 | Resolved: 2022-07-21

## 2022-07-21 LAB
-: NORMAL
ANION GAP SERPL CALCULATED.3IONS-SCNC: 10 MMOL/L (ref 9–17)
ANION GAP SERPL CALCULATED.3IONS-SCNC: 12 MMOL/L (ref 9–17)
BUN BLDV-MCNC: 38 MG/DL (ref 6–20)
BUN BLDV-MCNC: 39 MG/DL (ref 6–20)
CALCIUM SERPL-MCNC: 8.9 MG/DL (ref 8.6–10.4)
CALCIUM SERPL-MCNC: 9.1 MG/DL (ref 8.6–10.4)
CASTS UA: NORMAL /LPF (ref 0–8)
CHLORIDE BLD-SCNC: 106 MMOL/L (ref 98–107)
CHLORIDE BLD-SCNC: 107 MMOL/L (ref 98–107)
CHP ED QC CHECK: YES
CO2: 20 MMOL/L (ref 20–31)
CO2: 22 MMOL/L (ref 20–31)
CREAT SERPL-MCNC: 1.87 MG/DL (ref 0.5–0.9)
CREAT SERPL-MCNC: 1.92 MG/DL (ref 0.5–0.9)
EKG ATRIAL RATE: 102 BPM
EKG P AXIS: 46 DEGREES
EKG P-R INTERVAL: 170 MS
EKG Q-T INTERVAL: 378 MS
EKG QRS DURATION: 72 MS
EKG QTC CALCULATION (BAZETT): 492 MS
EKG R AXIS: 31 DEGREES
EKG T AXIS: 14 DEGREES
EKG VENTRICULAR RATE: 102 BPM
EPITHELIAL CELLS UA: NORMAL /HPF (ref 0–5)
ESTIMATED AVERAGE GLUCOSE: 183 MG/DL
GFR AFRICAN AMERICAN: 32 ML/MIN
GFR AFRICAN AMERICAN: 33 ML/MIN
GFR NON-AFRICAN AMERICAN: 27 ML/MIN
GFR NON-AFRICAN AMERICAN: 28 ML/MIN
GFR SERPL CREATININE-BSD FRML MDRD: ABNORMAL ML/MIN/{1.73_M2}
GFR SERPL CREATININE-BSD FRML MDRD: ABNORMAL ML/MIN/{1.73_M2}
GLUCOSE BLD-MCNC: 140 MG/DL
GLUCOSE BLD-MCNC: 140 MG/DL (ref 65–105)
GLUCOSE BLD-MCNC: 159 MG/DL (ref 65–105)
GLUCOSE BLD-MCNC: 181 MG/DL (ref 65–105)
GLUCOSE BLD-MCNC: 210 MG/DL (ref 70–99)
GLUCOSE BLD-MCNC: 212 MG/DL (ref 70–99)
HBA1C MFR BLD: 8 % (ref 4–6)
HCT VFR BLD CALC: 31.9 % (ref 36.3–47.1)
HEMOGLOBIN: 10.2 G/DL (ref 11.9–15.1)
LACTIC ACID, SEPSIS WHOLE BLOOD: 2 MMOL/L (ref 0.5–1.9)
MCH RBC QN AUTO: 26.1 PG (ref 25.2–33.5)
MCHC RBC AUTO-ENTMCNC: 32 G/DL (ref 28.4–34.8)
MCV RBC AUTO: 81.6 FL (ref 82.6–102.9)
NRBC AUTOMATED: 0 PER 100 WBC
PDW BLD-RTO: 16.8 % (ref 11.8–14.4)
PLATELET # BLD: 229 K/UL (ref 138–453)
PMV BLD AUTO: 10.4 FL (ref 8.1–13.5)
POTASSIUM SERPL-SCNC: 4 MMOL/L (ref 3.7–5.3)
POTASSIUM SERPL-SCNC: 4.1 MMOL/L (ref 3.7–5.3)
RBC # BLD: 3.91 M/UL (ref 3.95–5.11)
RBC UA: NORMAL /HPF (ref 0–4)
SODIUM BLD-SCNC: 138 MMOL/L (ref 135–144)
SODIUM BLD-SCNC: 139 MMOL/L (ref 135–144)
SODIUM,UR: 30 MMOL/L
TOTAL PROTEIN, URINE: 14 MG/DL
WBC # BLD: 8.6 K/UL (ref 3.5–11.3)
WBC UA: NORMAL /HPF (ref 0–5)

## 2022-07-21 PROCEDURE — 94664 DEMO&/EVAL PT USE INHALER: CPT

## 2022-07-21 PROCEDURE — 94640 AIRWAY INHALATION TREATMENT: CPT

## 2022-07-21 PROCEDURE — 99219 PR INITIAL OBSERVATION CARE/DAY 50 MINUTES: CPT | Performed by: NURSE PRACTITIONER

## 2022-07-21 PROCEDURE — 76770 US EXAM ABDO BACK WALL COMP: CPT

## 2022-07-21 PROCEDURE — 80048 BASIC METABOLIC PNL TOTAL CA: CPT

## 2022-07-21 PROCEDURE — 6370000000 HC RX 637 (ALT 250 FOR IP): Performed by: NURSE PRACTITIONER

## 2022-07-21 PROCEDURE — 83605 ASSAY OF LACTIC ACID: CPT

## 2022-07-21 PROCEDURE — G0378 HOSPITAL OBSERVATION PER HR: HCPCS

## 2022-07-21 PROCEDURE — 85027 COMPLETE CBC AUTOMATED: CPT

## 2022-07-21 PROCEDURE — 96365 THER/PROPH/DIAG IV INF INIT: CPT

## 2022-07-21 PROCEDURE — 2500000003 HC RX 250 WO HCPCS: Performed by: NURSE PRACTITIONER

## 2022-07-21 PROCEDURE — 93010 ELECTROCARDIOGRAM REPORT: CPT | Performed by: INTERNAL MEDICINE

## 2022-07-21 PROCEDURE — 82947 ASSAY GLUCOSE BLOOD QUANT: CPT

## 2022-07-21 PROCEDURE — 96366 THER/PROPH/DIAG IV INF ADDON: CPT

## 2022-07-21 PROCEDURE — 2580000003 HC RX 258: Performed by: NURSE PRACTITIONER

## 2022-07-21 PROCEDURE — 83036 HEMOGLOBIN GLYCOSYLATED A1C: CPT

## 2022-07-21 RX ORDER — ATORVASTATIN CALCIUM 80 MG/1
40 TABLET, FILM COATED ORAL DAILY
Status: DISCONTINUED | OUTPATIENT
Start: 2022-07-21 | End: 2022-07-22 | Stop reason: HOSPADM

## 2022-07-21 RX ORDER — SODIUM CHLORIDE 0.9 % (FLUSH) 0.9 %
5-40 SYRINGE (ML) INJECTION PRN
Status: DISCONTINUED | OUTPATIENT
Start: 2022-07-21 | End: 2022-07-22 | Stop reason: HOSPADM

## 2022-07-21 RX ORDER — DEXTROSE MONOHYDRATE 100 MG/ML
INJECTION, SOLUTION INTRAVENOUS CONTINUOUS PRN
Status: DISCONTINUED | OUTPATIENT
Start: 2022-07-21 | End: 2022-07-22 | Stop reason: HOSPADM

## 2022-07-21 RX ORDER — SODIUM CHLORIDE 9 MG/ML
INJECTION, SOLUTION INTRAVENOUS PRN
Status: DISCONTINUED | OUTPATIENT
Start: 2022-07-21 | End: 2022-07-22 | Stop reason: HOSPADM

## 2022-07-21 RX ORDER — CIPROFLOXACIN 500 MG/1
500 TABLET, FILM COATED ORAL EVERY 12 HOURS SCHEDULED
Status: DISCONTINUED | OUTPATIENT
Start: 2022-07-21 | End: 2022-07-22 | Stop reason: HOSPADM

## 2022-07-21 RX ORDER — FAMOTIDINE 20 MG/1
20 TABLET, FILM COATED ORAL DAILY
Status: DISCONTINUED | OUTPATIENT
Start: 2022-07-21 | End: 2022-07-21

## 2022-07-21 RX ORDER — INSULIN LISPRO 100 [IU]/ML
0-4 INJECTION, SOLUTION INTRAVENOUS; SUBCUTANEOUS NIGHTLY
Status: DISCONTINUED | OUTPATIENT
Start: 2022-07-21 | End: 2022-07-22 | Stop reason: HOSPADM

## 2022-07-21 RX ORDER — ALBUTEROL SULFATE 2.5 MG/3ML
2.5 SOLUTION RESPIRATORY (INHALATION) EVERY 6 HOURS PRN
Status: DISCONTINUED | OUTPATIENT
Start: 2022-07-21 | End: 2022-07-22 | Stop reason: HOSPADM

## 2022-07-21 RX ORDER — ACETAMINOPHEN 325 MG/1
650 TABLET ORAL EVERY 6 HOURS PRN
Status: DISCONTINUED | OUTPATIENT
Start: 2022-07-21 | End: 2022-07-22 | Stop reason: HOSPADM

## 2022-07-21 RX ORDER — SODIUM POLYSTYRENE SULFONATE 15 G/60ML
15 SUSPENSION ORAL; RECTAL
Status: DISPENSED | OUTPATIENT
Start: 2022-07-21 | End: 2022-07-21

## 2022-07-21 RX ORDER — ONDANSETRON 2 MG/ML
4 INJECTION INTRAMUSCULAR; INTRAVENOUS EVERY 6 HOURS PRN
Status: DISCONTINUED | OUTPATIENT
Start: 2022-07-21 | End: 2022-07-22 | Stop reason: HOSPADM

## 2022-07-21 RX ORDER — SODIUM CHLORIDE 9 MG/ML
INJECTION, SOLUTION INTRAVENOUS CONTINUOUS
Status: DISCONTINUED | OUTPATIENT
Start: 2022-07-21 | End: 2022-07-22

## 2022-07-21 RX ORDER — SODIUM CHLORIDE 0.9 % (FLUSH) 0.9 %
5-40 SYRINGE (ML) INJECTION EVERY 12 HOURS SCHEDULED
Status: DISCONTINUED | OUTPATIENT
Start: 2022-07-21 | End: 2022-07-22 | Stop reason: HOSPADM

## 2022-07-21 RX ORDER — BUSPIRONE HYDROCHLORIDE 15 MG/1
30 TABLET ORAL 3 TIMES DAILY
Status: DISCONTINUED | OUTPATIENT
Start: 2022-07-21 | End: 2022-07-22 | Stop reason: HOSPADM

## 2022-07-21 RX ORDER — FAMOTIDINE 20 MG/1
10 TABLET, FILM COATED ORAL DAILY
Status: DISCONTINUED | OUTPATIENT
Start: 2022-07-21 | End: 2022-07-22 | Stop reason: HOSPADM

## 2022-07-21 RX ORDER — ONDANSETRON 4 MG/1
4 TABLET, ORALLY DISINTEGRATING ORAL EVERY 8 HOURS PRN
Status: DISCONTINUED | OUTPATIENT
Start: 2022-07-21 | End: 2022-07-22 | Stop reason: HOSPADM

## 2022-07-21 RX ORDER — SODIUM CHLORIDE 9 MG/ML
INJECTION, SOLUTION INTRAVENOUS CONTINUOUS
Status: DISCONTINUED | OUTPATIENT
Start: 2022-07-21 | End: 2022-07-21

## 2022-07-21 RX ORDER — FUROSEMIDE 20 MG/1
40 TABLET ORAL DAILY
Status: DISCONTINUED | OUTPATIENT
Start: 2022-07-21 | End: 2022-07-22 | Stop reason: HOSPADM

## 2022-07-21 RX ORDER — CLOPIDOGREL BISULFATE 75 MG/1
75 TABLET ORAL DAILY
Status: DISCONTINUED | OUTPATIENT
Start: 2022-07-21 | End: 2022-07-22 | Stop reason: HOSPADM

## 2022-07-21 RX ORDER — INSULIN LISPRO 100 [IU]/ML
0-4 INJECTION, SOLUTION INTRAVENOUS; SUBCUTANEOUS
Status: DISCONTINUED | OUTPATIENT
Start: 2022-07-21 | End: 2022-07-22 | Stop reason: HOSPADM

## 2022-07-21 RX ORDER — BUDESONIDE AND FORMOTEROL FUMARATE DIHYDRATE 160; 4.5 UG/1; UG/1
2 AEROSOL RESPIRATORY (INHALATION) 2 TIMES DAILY
Status: DISCONTINUED | OUTPATIENT
Start: 2022-07-21 | End: 2022-07-22 | Stop reason: HOSPADM

## 2022-07-21 RX ORDER — ACETAMINOPHEN 650 MG/1
650 SUPPOSITORY RECTAL EVERY 6 HOURS PRN
Status: DISCONTINUED | OUTPATIENT
Start: 2022-07-21 | End: 2022-07-22 | Stop reason: HOSPADM

## 2022-07-21 RX ORDER — LISINOPRIL 10 MG/1
10 TABLET ORAL DAILY
Status: DISCONTINUED | OUTPATIENT
Start: 2022-07-21 | End: 2022-07-22 | Stop reason: HOSPADM

## 2022-07-21 RX ORDER — CIPROFLOXACIN 500 MG/1
500 TABLET, FILM COATED ORAL EVERY 12 HOURS SCHEDULED
Qty: 6 TABLET | Refills: 0 | Status: CANCELLED | OUTPATIENT
Start: 2022-07-21 | End: 2022-07-24

## 2022-07-21 RX ADMIN — CIPROFLOXACIN 500 MG: 500 TABLET, FILM COATED ORAL at 21:09

## 2022-07-21 RX ADMIN — APIXABAN 5 MG: 5 TABLET, FILM COATED ORAL at 08:32

## 2022-07-21 RX ADMIN — TIOTROPIUM BROMIDE INHALATION SPRAY 2 PUFF: 3.12 SPRAY, METERED RESPIRATORY (INHALATION) at 08:18

## 2022-07-21 RX ADMIN — METOPROLOL TARTRATE 12.5 MG: 25 TABLET ORAL at 20:24

## 2022-07-21 RX ADMIN — ATORVASTATIN CALCIUM 40 MG: 80 TABLET, FILM COATED ORAL at 08:31

## 2022-07-21 RX ADMIN — ACETAMINOPHEN 325MG 650 MG: 325 TABLET ORAL at 13:58

## 2022-07-21 RX ADMIN — CLOPIDOGREL 75 MG: 75 TABLET, FILM COATED ORAL at 08:30

## 2022-07-21 RX ADMIN — SODIUM BICARBONATE: 84 INJECTION, SOLUTION INTRAVENOUS at 10:40

## 2022-07-21 RX ADMIN — BUSPIRONE HYDROCHLORIDE 30 MG: 15 TABLET ORAL at 15:22

## 2022-07-21 RX ADMIN — FAMOTIDINE 10 MG: 20 TABLET, FILM COATED ORAL at 13:53

## 2022-07-21 RX ADMIN — BUSPIRONE HYDROCHLORIDE 30 MG: 15 TABLET ORAL at 20:24

## 2022-07-21 RX ADMIN — APIXABAN 5 MG: 5 TABLET, FILM COATED ORAL at 20:15

## 2022-07-21 RX ADMIN — METOPROLOL TARTRATE 12.5 MG: 25 TABLET ORAL at 08:30

## 2022-07-21 RX ADMIN — SODIUM CHLORIDE, PRESERVATIVE FREE 10 ML: 5 INJECTION INTRAVENOUS at 20:58

## 2022-07-21 RX ADMIN — BUSPIRONE HYDROCHLORIDE 30 MG: 15 TABLET ORAL at 08:30

## 2022-07-21 RX ADMIN — SODIUM CHLORIDE: 9 INJECTION, SOLUTION INTRAVENOUS at 20:10

## 2022-07-21 RX ADMIN — BUDESONIDE AND FORMOTEROL FUMARATE DIHYDRATE 2 PUFF: 160; 4.5 AEROSOL RESPIRATORY (INHALATION) at 08:18

## 2022-07-21 RX ADMIN — SODIUM CHLORIDE: 9 INJECTION, SOLUTION INTRAVENOUS at 08:00

## 2022-07-21 ASSESSMENT — ENCOUNTER SYMPTOMS
VOMITING: 1
SHORTNESS OF BREATH: 0
ABDOMINAL PAIN: 1
DIARRHEA: 1
EYE PAIN: 0
EYE REDNESS: 0
NAUSEA: 1
COUGH: 0

## 2022-07-21 ASSESSMENT — PAIN SCALES - GENERAL
PAINLEVEL_OUTOF10: 3
PAINLEVEL_OUTOF10: 0

## 2022-07-21 ASSESSMENT — PAIN - FUNCTIONAL ASSESSMENT: PAIN_FUNCTIONAL_ASSESSMENT: 0-10

## 2022-07-21 ASSESSMENT — PAIN DESCRIPTION - LOCATION: LOCATION: HEAD

## 2022-07-21 NOTE — ED NOTES
Pt. Returns from [address] via stretcher   Admitting team notified pt.  Returned      Tramaine Mendoza RN  07/21/22 9837

## 2022-07-21 NOTE — ED NOTES
Report to [facility], RN with verbal understanding of the patients needs and concerns      Sherron Bravo RN  07/21/22 5614

## 2022-07-21 NOTE — ED NOTES
Pt. Resting on stretcher, eyes open, RR even and non-labored  Pt.  Updated on POC  Will continue to monitor        Robb Mansfield RN  07/21/22 5749

## 2022-07-21 NOTE — ED PROVIDER NOTES
[facility] ED     Emergency Department     Faculty Attestation        I performed a history and physical examination of the patient and discussed management with the resident. I reviewed the residents note and agree with the documented findings and plan of care. Any areas of disagreement are noted on the chart. I was personally present for the key portions of any procedures. I have documented in the chart those procedures where I was not present during the key portions. I have reviewed the emergency nurses triage note. I agree with the chief complaint, past medical history, past surgical history, allergies, medications, social and family history as documented unless otherwise noted below. For mid-level providers such as nurse practitioners as well as physicians assistants:    I have personally seen and evaluated the patient. I find the patient's history and physical exam are consistent with NP/PA documentation. I agree with the care provided, treatment rendered, disposition, & follow-up plan. Additional findings are as noted. Vital Signs: Pulse (!) 105   Temp 98.6 °F (37 °C) (Oral)   Resp 16   Ht 5' 8\" (1.727 m)   Wt 240 lb (108.9 kg)   SpO2 99%   BMI 36.49 kg/m²   PCP:  Jayden Davalos MD    Pertinent Comments:     Patient presents with abrupt onset of nausea vomiting and diarrhea. Vomitus nonbilious nonbloody in nature and worse clear and watery. He also has some suprapubic abdominal pain some dysuria is concerned she may have a UTI.   She is slightly tachycardic but nontoxic appearance will obtain labs, gentle hydration, EKG chest x-ray electrolyte studies, anticipated admission    Critical Care  None          Harish Vazquez MD    Attending Emergency Medicine Physician            Lacey Pinto MD  07/20/22 2136       Lacey Pinto MD  07/20/22 2138

## 2022-07-21 NOTE — DISCHARGE INSTRUCTIONS
Discuss criteria for Eliquis continuation with your primary care provider    Need to have blood work done on July 27th    Hold Lasix on Saturday and Sunday     Return to Emergency Room and call Dr  For Severe nausea or vomiting  For dizzy or light headness  Severe weakness

## 2022-07-21 NOTE — ED NOTES
The following labs labeled with pt sticker and tubed to lab:     [] Blue     [] Lavender   [] on ice  [] Green/yellow  [] Green/black [] on ice  [] Yellow  [] Red  [] Pink      [] COVID-19 swab    [] Rapid  [] PCR  [] Flu swab  [] Peds Viral Panel     [] Urine Sample  [] Pelvic Cultures  [x] Blood Cultures          Davy Cruz RN  07/20/22 1017

## 2022-07-21 NOTE — ED TRIAGE NOTES
Pt states she here for nausea vomiting, loose stools and abdominal pain, that started a bit after 2 pm after she got up from a nap and had eaten a blueberry muffin she made     Rates her abdominal pain 8/10   She had multiple loose stools and vomited 4 times

## 2022-07-21 NOTE — ED NOTES
The following labs labeled with pt sticker and tubed to lab:     [] Blue     [] Jayla Trejo   [] on ice  [] Green/yellow  [] Green/black [] on ice  [] Yellow  [] Red  [] Pink      [] COVID-19 swab    [] Rapid  [] PCR  [] Flu swab  [] Peds Viral Panel     [] Urine Sample  [] Pelvic Cultures  [x] Blood Cultures 2 of 2         Areli Keys RN  07/20/22 3018

## 2022-07-21 NOTE — ED NOTES
Pt. Provided boxed lunch, pt. Refused requesting turkey sandwich  [place] citizen [place] [facility] sandwich provided to pt.    Pt. Requesting tylenol for slight HA, provided   Pt .denies further needs at this time, will continue to monitor      Donna Bernla RN  07/21/22 6608

## 2022-07-21 NOTE — ED PROVIDER NOTES
[facility]  ED  Emergency Department Encounter  Emergency Medicine Resident     Pt Name: Vicky Pike  MRN: [id number]  [place] 1960  Date of evaluation: 7/20/22  PCP:  Donya Stevens MD    [address] [place] [address]       Chief Complaint   Patient presents with    Abdominal Pain    Emesis    Diarrhea       HISTORY OFPRESENT ILLNESS  (Location/Symptom, Timing/Onset, Context/Setting, Quality, Duration, Modifying Factors,Severity.)      Vicky Pike is a 63 y. o.yo female who presents with generalized abdominal pain, nausea, vomiting, diarrhea. Patient states symptoms started suddenly this afternoon after eating a muffin. Other people have eaten the muffins without any issue. States has been having a hard time keeping anything down, including water. Denies any fevers. Denies any chest pain or shortness of breath. States she does have a past cardiac history including a pacemaker in place as well as a history of CHF. States she is on a strict fluid restriction daily of 96 ounces. States she has been compliant with this. Additionally states she feels like she may have a UTI, has been urinating more frequently than usual.  States she is never had pain like this previously. PAST MEDICAL / SURGICAL / SOCIAL / FAMILY HISTORY      has a past medical history of Acute on chronic systolic CHF (congestive heart failure) ([address] Asthma, Blood circulation, collateral, CAD (coronary artery disease), COPD (chronic obstructive pulmonary disease) ([address] Diabetes mellitus ([address] Hyperlipidemia, Hypertension, Movement disorder, Neuropathy, PAD (peripheral artery disease) ([address] Tobacco abuse, and Tobacco abuse counseling.     has a past surgical history that includes Coronary angioplasty with stent; Tonsillectomy; Inner ear surgery (Right); and Coronary angioplasty with stent (02/11/2019). Social History     Socioeconomic History    Marital status:       Spouse name: Not on file    Number of children: Not on file    Years of education: Not on file    Highest education level: Not on file   Occupational History    Not on file   Tobacco Use    Smoking status: Every Day     Packs/day: 0.50     Years: 45.00     Pack years: 22.50     Types: Cigarettes    Smokeless tobacco: Current   Substance and Sexual Activity    Alcohol use: No    Drug use: Yes     Types: Marijuana Garrison Yaneth)    Sexual activity: Never   Other Topics Concern    Not on file   Social History Narrative    Not on file     Social Determinants of Health     Financial Resource Strain: Not on file   Food Insecurity: Not on file   Transportation Needs: Not on file   Physical Activity: Not on file   Stress: Not on file   Social Connections: Not on file   Intimate Partner Violence: Not on file   Housing Stability: Not on file       Family History   Problem Relation Age of Onset    Hypertension Mother     Hypertension Father     Diabetes Sister     Diabetes Brother     Stroke Brother         Allergies:  Codeine and Morphine    Home Medications:  Prior to Admission medications    Medication Sig Start Date End Date Taking?  Authorizing Provider   mometasone-formoterol [facility]) 200-5 MCG/ACT inhaler Inhale into the lungs 3/3/22   Historical Provider, MD   busPIRone (BUSPAR) 15 MG tablet Take 30 mg by mouth 3 times daily    Historical Provider, MD   furosemide (LASIX) 40 MG tablet Take 1 tablet by mouth daily 12/21/21   Komal Alaniz MD   lisinopril (PRINIVIL;ZESTRIL) 10 MG tablet Take 1 tablet by mouth daily 10/9/21   Chidi Foster MD   clopidogrel (PLAVIX) 75 MG tablet Take 1 tablet by mouth daily 10/9/21   Chidi Foster MD   famotidine (PEPCID) 20 MG tablet Take 20 mg by mouth 2 times daily    Historical Provider, MD   albuterol sulfate HFA (VENTOLIN HFA) 108 (90 Base) MCG/ACT inhaler Inhale 2 puffs into the lungs 3 times daily    Historical Provider, MD   lidocaine (LIDODERM) 5 % Place 1 patch onto the skin as needed for Pain (as needed for rotator cuff pain) 12 hours on, 12 hours off. Historical Provider, MD   apixaban (ELIQUIS) 5 MG TABS tablet Take 1 tablet by mouth 2 times daily 10/5/21   Honey Hubbard MD   atorvastatin (LIPITOR) 40 MG tablet Take 1 tablet by mouth daily 7/27/21   Cathy Gupta MD   Dulaglutide (TRULICITY) 1.5 HO/6.9AC SOPN Inject 1.5 mg into the skin once a week    Historical Provider, MD   aspirin 81 MG chewable tablet Take 1 tablet by mouth daily 2/13/19   BINDU Townsend CNP   nitroGLYCERIN (NITROSTAT) 0.4 MG SL tablet up to max of 3 total doses. If no relief after 1 dose, call 911. 2/12/19   BINDU Townsend CNP   metoprolol tartrate (LOPRESSOR) 25 MG tablet Take 0.5 tablets by mouth 2 times daily 2/12/19   BINDU Townsend CNP   Lancets MISC 1 each by Does not apply route daily 2/12/19   Star Holley MD   blood glucose monitor strips Test 3 times a day & as needed for symptoms of irregular blood glucose. 2/12/19   Star Holley MD   glucose monitoring kit (FREESTYLE) monitoring kit 1 kit by Does not apply route daily 2/12/19   Star Holley MD   albuterol (PROVENTIL) (5 MG/ML) 0.5% nebulizer solution Take 0.5 mLs by nebulization every 6 hours as needed for Wheezing  Patient taking differently: Take 2.5 mg by nebulization 2 times daily  3/16/18   Triny Forde MD   tiotropium (SPIRIVA) 18 MCG inhalation capsule Inhale 1 capsule into the lungs daily Pt told that she was using this medication at home. 3/16/18   Triny Forde MD   acetaminophen (TYLENOL) 500 MG tablet Take 1 tablet by mouth every 6 hours as needed for Pain 1/9/18   Mk Chappell MD       REVIEW OFSYSTEMS    (2-9 systems for level 4, 10 or more for level 5)      Review of Systems   Constitutional:  Negative for chills and fever. HENT:  Negative for congestion. Eyes:  Negative for pain and redness. Respiratory:  Negative for cough and shortness of breath.     Cardiovascular:  Negative for chest pain and palpitations. Gastrointestinal:  Positive for abdominal pain, diarrhea, nausea and vomiting. Genitourinary:  Positive for frequency. Negative for difficulty urinating, dysuria, vaginal bleeding and vaginal discharge. Musculoskeletal:  Negative for arthralgias, joint swelling, myalgias and neck pain. Skin:  Negative for rash and wound. Neurological:  Negative for dizziness and headaches. Psychiatric/Behavioral:  Negative for behavioral problems and confusion. PHYSICAL EXAM   (up to 7 for level 4, 8 or more forlevel 5)      INITIAL VITALS:   Vitals:    07/21/22 0100 07/21/22 0200 07/21/22 0330 07/21/22 0600   BP: (!) 101/50 99/71  123/85   Pulse: 77 80 76 81   Resp: 16      Temp:    98.1 °F (36.7 °C)   TempSrc:    Oral   SpO2: 97% 96% 95%    Weight:       Height:             Physical Exam  Vitals reviewed. Constitutional:       Comments: Appears uncomfortable, actively vomiting   HENT:      Head: Normocephalic and atraumatic. Right Ear: External ear normal.      Left Ear: External ear normal.      Nose: Nose normal.      Mouth/Throat:      Mouth: Mucous membranes are dry. Eyes:      General:         Right eye: No discharge. Left eye: No discharge. Extraocular Movements: Extraocular movements intact. Cardiovascular:      Rate and Rhythm: Normal rate and regular rhythm. Pulmonary:      Effort: Pulmonary effort is normal. No respiratory distress. Abdominal:      Comments: Abdomen soft, mild generalized tenderness palpation, no rebound or guarding, nondistended   Musculoskeletal:      Cervical back: Normal range of motion. Comments: Moving all 4 extremities   Skin:     General: Skin is warm. Capillary Refill: Capillary refill takes less than 2 seconds. Neurological:      General: No focal deficit present. Mental Status: She is alert and oriented to person, place, and time. Cranial Nerves: No cranial nerve deficit.    Psychiatric:         Mood and Affect: Mood normal.       DIFFERENTIAL  DIAGNOSIS     PLAN (LABS / IMAGING / EKG):  Orders Placed This Encounter   Procedures    Blood Culture 1    Blood Culture 2    XR CHEST PORTABLE    Urinalysis with Reflex to Culture    CBC with Auto Differential    Basic Metabolic Panel w/ Reflex to MG    Troponin    Lipase    Hepatic Function Panel    Troponin    Lactate, Sepsis    APTT    Lactate, Sepsis    Protime-INR    Microscopic Urinalysis    Comprehensive Metabolic Panel w/ Reflex to MG    Magnesium    Protime-INR    Hemoglobin A1C    Sodium, urine, random    Protein, urine, random    ADULT DIET;  Regular; 4 carb choices (60 gm/meal); 1500 ml    HYPOGLYCEMIA TREATMENT: blood glucose LESS THAN 70 mg/dL and patient ALERT and TOLERATING PO    HYPOGLYCEMIA TREATMENT: blood glucose LESS THAN 70 mg/dL and patient NOT ALERT or NPO    Vital signs per unit routine    Up with assistance    Daily weights    Intake and output    Telemetry monitoring - continuous duration    Notify Provider    Notify physician    Full Code    Inpatient consult to Hospitalist    Contact isolation    Initiate Oxygen Therapy Protocol    Pulse Oximetry Spot Check    POCT glucose    POCT Glucose    POC Glucose Fingerstick    EKG 12 Lead    ADMIT TO INPATIENT       MEDICATIONS ORDERED:  Orders Placed This Encounter   Medications    ondansetron (ZOFRAN) injection 4 mg    0.9 % sodium chloride infusion    apixaban (ELIQUIS) tablet 5 mg     Order Specific Question:   Indication of Use     Answer:   A Fib/A Flutter    atorvastatin (LIPITOR) tablet 40 mg    busPIRone (BUSPAR) tablet 30 mg    clopidogrel (PLAVIX) tablet 75 mg    famotidine (PEPCID) tablet 20 mg    metoprolol tartrate (LOPRESSOR) tablet 12.5 mg    glucose chewable tablet 16 g    OR Linked Order Group     dextrose bolus 10% 125 mL     dextrose bolus 10% 250 mL    glucagon (rDNA) injection 1 mg    dextrose 10 % infusion    0.9 % sodium chloride infusion    sodium chloride flush 0.9 % injection 5-40 mL    sodium chloride flush 0.9 % injection 5-40 mL    0.9 % sodium chloride infusion    OR Linked Order Group     ondansetron (ZOFRAN-ODT) disintegrating tablet 4 mg     ondansetron (ZOFRAN) injection 4 mg    OR Linked Order Group     acetaminophen (TYLENOL) tablet 650 mg     acetaminophen (TYLENOL) suppository 650 mg    sodium polystyrene (KAYEXALATE) 15 GM/60ML suspension 15 g    insulin lispro (HUMALOG) injection vial 0-4 Units    insulin lispro (HUMALOG) injection vial 0-4 Units       DDX: Viral gastroenteritis, gastritis, dehydration, pancreatitis, food poisoning, UTI    Initial MDM/Plan: 63 y.o. female who presents with nausea, vomiting, diarrhea, abdominal pain. Began suddenly this afternoon after eating a muffin. Unable to tolerate any oral intake at this time. Patient appears uncomfortable initial evaluation, actively vomiting, afebrile, tachycardic but otherwise stable vital signs. Abdomen is mildly tender to palpation however no rebound or guarding, soft. Will obtain broad laboratory work-up including full sepsis labs as patient is tachycardic. We will hold off on fluids at this time as patient is a strict fluid limit. Will provide antiemetics. Hold off on any antibiotics until we identify a source of infection. Will reassess. Anticipate admission.     DIAGNOSTIC RESULTS / EMERGENCYDEPARTMENT COURSE / MDM     LABS:  Labs Reviewed   URINALYSIS WITH REFLEX TO CULTURE - Abnormal; Notable for the following components:       Result Value    Turbidity UA Cloudy (*)     Ketones, Urine TRACE (*)     Leukocyte Esterase, Urine SMALL (*)     All other components within normal limits   CBC WITH AUTO DIFFERENTIAL - Abnormal; Notable for the following components:    WBC 13.7 (*)     RDW 16.5 (*)     Seg Neutrophils 89 (*)     Lymphocytes 6 (*)     Eosinophils % 0 (*)     Immature Granulocytes 1 (*)     Segs Absolute 12.26 (*)     Absolute Lymph # 0.79 (*)     All other components within normal limits BASIC METABOLIC PANEL W/ REFLEX TO MG FOR LOW K - Abnormal; Notable for the following components:    Glucose 248 (*)     BUN 43 (*)     Creatinine 2.13 (*)     CO2 15 (*)     Anion Gap 18 (*)     GFR Non- 24 (*)     GFR  29 (*)     All other components within normal limits   TROPONIN - Abnormal; Notable for the following components:    Troponin, High Sensitivity 18 (*)     All other components within normal limits   TROPONIN - Abnormal; Notable for the following components:    Troponin, High Sensitivity 17 (*)     All other components within normal limits   LACTATE, SEPSIS - Abnormal; Notable for the following components:    Lactic Acid, Sepsis, Whole Blood 2.7 (*)     All other components within normal limits   POC GLUCOSE FINGERSTICK - Abnormal; Notable for the following components:    POC Glucose 140 (*)     All other components within normal limits   POCT GLUCOSE - Normal   CULTURE, BLOOD 1   CULTURE, BLOOD 1   LIPASE   HEPATIC FUNCTION PANEL   APTT   PROTIME-INR   MICROSCOPIC URINALYSIS   LACTATE, SEPSIS   HEMOGLOBIN A1C   SODIUM, URINE, RANDOM   PROTEIN, URINE, RANDOM   POCT GLUCOSE   POCT GLUCOSE         RADIOLOGY:  XR CHEST PORTABLE    Result Date: 7/20/2022  EXAMINATION: ONE XRAY VIEW OF THE CHEST 7/20/2022 9:36 pm COMPARISON: December 18, 2021 HISTORY: ORDERING SYSTEM PROVIDED HISTORY: shortness of breath TECHNOLOGIST PROVIDED HISTORY: shortness of breath FINDINGS: The cardiomediastinal silhouette is stable. There are increased lung markings bilaterally, may be related to mild pulmonary vascular congestion versus bronchitis. There is no pleural effusion. There is no pneumothorax. There is no acute osseous abnormality. Increased lung markings bilaterally, may be related to mild pulmonary vascular congestion versus bronchitis.         EKG  EKG Interpretation    Interpreted by me    Rhythm: Sinus tachycardia  Rate: 102  Axis: normal  Ectopy: none  Conduction: normal  ST Segments: no acute change  T Waves: no acute change  Q Waves: none    Clinical Impression: Sinus tachycardia, nonspecific EKG    All EKG's are interpreted by the Emergency Department Physicianwho either signs or Co-signs this chart in the absence of a cardiologist.    EMERGENCY DEPARTMENT COURSE:  ED Course as of 07/21/22 0654   Wed Jul 20, 2022   2150 WBC(!): 13.7 [AB]   2207 Lactic Acid, Sepsis, Whole Blood(!): 2.7 [AB]   2207 PTT: 25.2 [AB]   2208 XR CHEST PORTABLE  IMPRESSION:  Increased lung markings bilaterally, may be related to mild pulmonary  vascular congestion versus bronchitis. [AB]   9099 Troponin, High Sensitivity(!): 18  Will trend [AB]   2216 Creatinine(!): 2.13  HARVEY [AB]   2245 Troponin, High Sensitivity(!): 17 [AB]   Thu Jul 21, 2022   0010 Reevaluated patient. States she is feeling improved. Awaiting urinalysis and remainder of work-up. [AB]   0016 Urinalysis not convincing for UTI. No concerns for sepsis at this time as patient has no source. Suspect lactate likely elevated from dehydration. We will hold off on antibiotics at this time. Patient will be admitted for HARVEY and continued evaluation. [facility] consulted. [AB]   0023 Discussed case with [facility]. They agree to admit patient. [AB]      ED Course User Index  [AB] Kamron Ghosh DO          PROCEDURES:  None    CONSULTS:  IP CONSULT TO HOSPITALIST    CRITICAL CARE:  See attending physician note    FINAL IMPRESSION      1. HARVEY (acute kidney injury) ([facility] [address]    2. Lactic acidosis          DISPOSITION / PLAN     DISPOSITION Admitted 07/21/2022 12:24:55 AM      PATIENT REFERRED TO:  No follow-up provider specified.     DISCHARGE MEDICATIONS:  New Prescriptions    No medications on file       Corrinne Daub, DO  Emergency Medicine Resident    (Please note that portions of this note were completed with a voice recognition program.Efforts were made to edit the dictations but occasionally words are mis-transcribed.)       Eliecer Nice Michael Dies, DO  Resident  07/21/22 [id number]

## 2022-07-21 NOTE — ED NOTES
The following labs labeled with pt sticker and tubed to lab:     [] Blue     [] [place] Pleasure   [] on ice  [] Green/yellow  [] Green/black [] on ice  [] Yellow  [] Red  [] Pink      [] COVID-19 swab    [] Rapid  [] PCR  [] Flu swab  [] Peds Viral Panel     [x] Urine Sample  [] Pelvic Cultures  [] Blood Cultures            Manuela Mendoza RN  07/20/22 [id number]

## 2022-07-21 NOTE — ED NOTES
The following labs labeled with pt sticker and tubed to lab:     [] Blue     [x] Lavender   [] on ice  [x] Green/yellow  [] Green/black [] on ice  [] Yellow  [] Red  [] Pink      [] COVID-19 swab    [] Rapid  [] PCR  [] Flu swab  [] Peds Viral Panel     [] Urine Sample  [] Pelvic Cultures  [] Blood Cultures            Marques Bloom RN  07/20/22 [id number]

## 2022-07-21 NOTE — H&P
[facility]  Office: [address], DO, Francisco Javier Colindres, DO, Govind Lind, DO, Andrews Ilir Presley, DO, Mukesh Villela MD, Maira Henriquez MD, Greg Ballard MD, Charlie Torres MD,  Sharon Patton MD, Corey Frausto MD, Ryan Bingham, DO, Lindsay Wheat MD,  Haim Fields MD, Enedina Doss MD, Nawaf Curiel DO, Jose Larios MD, Yevgeniy Whitaker MD, Jeferson Artis MD, Sherry Hughes DO, Claudeen Hook, MD, Nilo Thornton MD, Zhen Browne, CNP,  Val Jernigan, CNP, Toño Acevedo, CNP, Pati Miller, CNP, Greg Julio PA-C, Rosario Hawkins, [facility], Jael Gutierrez, CNP, David Ceballos, CNP, Ramesh Deras, CNP, Deisy Patel, CNP, Marylene Bucy, CNS, Blayne Gonsales, [facility], Daniela Barnes, CNP, Tad Lee, CNP, Jyoti Wynn, CNP, Tarsha Green, [facility]    HISTORY AND PHYSICAL EXAMINATION            Date:   7/21/2022  Patient name:  Jade Pritchard  Date of admission:  7/20/2022  8:43 PM  MRN:   [id number]  Account:  [de-identified]  YOB: 1960  PCP:    Paulo Fonseca MD  Room:   30/30  Code Status:    Full Code    Chief Complaint:     Chief Complaint   Patient presents with    Abdominal Pain    Emesis    Diarrhea       History Obtained From:     patient, electronic medical record    History of Present Illness:     Jade Pritchard is a 63 y.o. Non- / non  female who presents with Abdominal Pain, Emesis, and Diarrhea   and is admitted to the hospital for the management of HARVEY (acute kidney injury) ([facility] [address] This is a 42-year-old female who initially presented to the emergency department for evaluation of 1 day history of nausea, vomiting, emesis. Patient states she ate a homemade muffin when she encountered almost immediate nausea with emesis and violent diarrhea.   Due to the fact that she could not stop with her output her son encouraged her to come to the emergency 02/11/2019    JOSE MARTIN to RCA per Dr. Arleen Pemberton radial approuch    INNER EAR SURGERY Right     TONSILLECTOMY          Medications Prior to Admission:     Prior to Admission medications    Medication Sig Start Date End Date Taking? Authorizing Provider   mometasone-formoterol [facility]) 200-5 MCG/ACT inhaler Inhale into the lungs 3/3/22   Historical Provider, MD   busPIRone (BUSPAR) 15 MG tablet Take 30 mg by mouth 3 times daily    Historical Provider, MD   furosemide (LASIX) 40 MG tablet Take 1 tablet by mouth daily 12/21/21   Florian Guerrero MD   lisinopril (PRINIVIL;ZESTRIL) 10 MG tablet Take 1 tablet by mouth daily 10/9/21   Chidi Delong MD   clopidogrel (PLAVIX) 75 MG tablet Take 1 tablet by mouth daily 10/9/21   Chidi Delong MD   famotidine (PEPCID) 20 MG tablet Take 20 mg by mouth 2 times daily    Historical Provider, MD   albuterol sulfate HFA (VENTOLIN HFA) 108 (90 Base) MCG/ACT inhaler Inhale 2 puffs into the lungs 3 times daily    Historical Provider, MD   lidocaine (LIDODERM) 5 % Place 1 patch onto the skin as needed for Pain (as needed for rotator cuff pain) 12 hours on, 12 hours off. Historical Provider, MD   apixaban (ELIQUIS) 5 MG TABS tablet Take 1 tablet by mouth 2 times daily 10/5/21   Gagandeep Gonzalez MD   atorvastatin (LIPITOR) 40 MG tablet Take 1 tablet by mouth daily 7/27/21   Lety Marr MD   Dulaglutide (TRULICITY) 1.5 AU/5.2ZH SOPN Inject 1.5 mg into the skin once a week    Historical Provider, MD   aspirin 81 MG chewable tablet Take 1 tablet by mouth daily 2/13/19   BINDU Castro CNP   nitroGLYCERIN (NITROSTAT) 0.4 MG SL tablet up to max of 3 total doses.  If no relief after 1 dose, call 911. 2/12/19   BINDU Castro CNP   metoprolol tartrate (LOPRESSOR) 25 MG tablet Take 0.5 tablets by mouth 2 times daily 2/12/19   BINDU Castro CNP   Lancets MISC 1 each by Does not apply route daily 2/12/19   Sofia Rice MD   blood glucose monitor strips Test 3 times a day & as needed for symptoms of irregular blood glucose. 2/12/19   Vishnu Su MD   glucose monitoring kit (FREESTYLE) monitoring kit 1 kit by Does not apply route daily 2/12/19   Vishnu Su MD   albuterol (PROVENTIL) (5 MG/ML) 0.5% nebulizer solution Take 0.5 mLs by nebulization every 6 hours as needed for Wheezing  Patient taking differently: Take 2.5 mg by nebulization 2 times daily  3/16/18   Loel Goodpasture, MD   tiotropium (SPIRIVA) 18 MCG inhalation capsule Inhale 1 capsule into the lungs daily Pt told that she was using this medication at home. 3/16/18   Loel Goodpasture, MD   acetaminophen (TYLENOL) 500 MG tablet Take 1 tablet by mouth every 6 hours as needed for Pain 1/9/18   Sayda Lockhart MD        Allergies:     Codeine and Morphine    Social History:     Tobacco:    reports that she has been smoking cigarettes. She has a 22.50 pack-year smoking history. She uses smokeless tobacco.  Alcohol:      reports no history of alcohol use. Drug Use:  reports current drug use. Drug: Marijuana Tena Patel). Family History:     Family History   Problem Relation Age of Onset    Hypertension Mother     Hypertension Father     Diabetes Sister     Diabetes Brother     Stroke Brother        Review of Systems:     Positive and Negative as described in HPI.     CONSTITUTIONAL:  negative for fevers, chills, sweats, fatigue, weight loss  HEENT:  negative for vision, hearing changes, runny nose, throat pain  RESPIRATORY:  negative for shortness of breath, cough, congestion, wheezing  CARDIOVASCULAR:  negative for chest pain, palpitations  GASTROINTESTINAL:  negative for nausea, vomiting, diarrhea, constipation, change in bowel habits, abdominal pain   GENITOURINARY:  negative for difficulty of urination, burning with urination, frequency   INTEGUMENT:  negative for rash, skin lesions, easy bruising   HEMATOLOGIC/LYMPHATIC:  negative for swelling/edema   ALLERGIC/IMMUNOLOGIC:  negative for urticaria , itching  ENDOCRINE:  negative increase in drinking, increase in urination, hot or cold intolerance  MUSCULOSKELETAL:  negative joint pains, muscle aches, swelling of joints  NEUROLOGICAL:  negative for headaches, dizziness, lightheadedness, numbness, pain, tingling extremities  BEHAVIOR/PSYCH:  negative for depression, anxiety    Physical Exam:   /85   Pulse 81   Temp 98.1 °F (36.7 °C) (Oral)   Resp 16   Ht 5' 8\" (1.727 m)   Wt 240 lb (108.9 kg)   SpO2 95%   BMI 36.49 kg/m²   Temp (24hrs), Av.4 °F (36.9 °C), Min:98.1 °F (36.7 °C), Max:98.6 °F (37 °C)    Recent Labs     22  0624   POCGLU 140*       Intake/Output Summary (Last 24 hours) at 2022 1233  Last data filed at 2022 0452  Gross per 24 hour   Intake 1000 ml   Output --   Net 1000 ml       General Appearance: alert, well appearing, and in no acute distress  Mental status: oriented to person, place, and time  Head: normocephalic, atraumatic  Eye: no icterus, redness, pupils equal and reactive, extraocular eye movements intact, conjunctiva clear  Ear: normal external ear, no discharge, hearing intact  Nose: no drainage noted  Mouth: mucous membranes moist  Neck: supple, no carotid bruits, thyroid not palpable  Lungs: Bilateral equal air entry, clear to ausculation, no wheezing, rales or rhonchi, normal effort  Cardiovascular: normal rate, regular rhythm, no murmur, gallop, rub  Abdomen: obese, soft, nontender, nondistended, normal bowel sounds, no hepatomegaly or splenomegaly  Neurologic: There are no new focal motor or sensory deficits, normal muscle tone and bulk, no abnormal sensation, normal speech, cranial nerves II through XII grossly intact  Skin: No gross lesions, rashes, bruising or bleeding on exposed skin area  Extremities: peripheral pulses palpable, no pedal edema or calf pain with palpation  Psych: normal affect    Investigations:      Laboratory Testing:  Recent Results (from the past 24 hour(s)) EKG 12 Lead    Collection Time: 07/20/22  9:07 PM   Result Value Ref Range    Ventricular Rate 102 BPM    Atrial Rate 102 BPM    P-R Interval 170 ms    QRS Duration 72 ms    Q-T Interval 378 ms    QTc Calculation (Bazett) 492 ms    P Axis 46 degrees    R Axis 31 degrees    T Axis 14 degrees   APTT    Collection Time: 07/20/22  9:22 PM   Result Value Ref Range    PTT 25.2 20.5 - 30.5 sec   CBC with Auto Differential    Collection Time: 07/20/22  9:25 PM   Result Value Ref Range    WBC 13.7 (H) 3.5 - 11.3 k/uL    RBC 4.70 3.95 - 5.11 m/uL    Hemoglobin 12.2 11.9 - 15.1 g/dL    Hematocrit 38.8 36.3 - 47.1 %    MCV 82.6 82.6 - 102.9 fL    MCH 26.0 25.2 - 33.5 pg    MCHC 31.4 28.4 - 34.8 g/dL    RDW 16.5 (H) 11.8 - 14.4 %    Platelets 446 671 - 779 k/uL    MPV 10.3 8.1 - 13.5 fL    NRBC Automated 0.0 0.0 per 100 WBC    Seg Neutrophils 89 (H) 36 - 65 %    Lymphocytes 6 (L) 24 - 43 %    Monocytes 4 3 - 12 %    Eosinophils % 0 (L) 1 - 4 %    Basophils 0 0 - 2 %    Immature Granulocytes 1 (H) 0 %    Segs Absolute 12.26 (H) 1.50 - 8.10 k/uL    Absolute Lymph # 0.79 (L) 1.10 - 3.70 k/uL    Absolute Mono # 0.48 0.10 - 1.20 k/uL    Absolute Eos # <0.03 0.00 - 0.44 k/uL    Basophils Absolute 0.06 0.00 - 0.20 k/uL    Absolute Immature Granulocyte 0.10 0.00 - 0.30 k/uL    RBC Morphology ANISOCYTOSIS PRESENT    Basic Metabolic Panel w/ Reflex to MG    Collection Time: 07/20/22  9:25 PM   Result Value Ref Range    Glucose 248 (H) 70 - 99 mg/dL    BUN 43 (H) 6 - 20 mg/dL    Creatinine 2.13 (H) 0.50 - 0.90 mg/dL    Calcium 9.9 8.6 - 10.4 mg/dL    Sodium 135 135 - 144 mmol/L    Potassium 4.5 3.7 - 5.3 mmol/L    Chloride 102 98 - 107 mmol/L    CO2 15 (L) 20 - 31 mmol/L    Anion Gap 18 (H) 9 - 17 mmol/L    GFR Non-African American 24 (L) >60 mL/min    GFR  29 (L) >60 mL/min    GFR Comment         Troponin    Collection Time: 07/20/22  9:25 PM   Result Value Ref Range    Troponin, High Sensitivity 18 (H) 0 - 14 ng/L Lipase    Collection Time: 07/20/22  9:25 PM   Result Value Ref Range    Lipase 38 13 - 60 U/L   Hepatic Function Panel    Collection Time: 07/20/22  9:25 PM   Result Value Ref Range    Albumin 4.3 3.5 - 5.2 g/dL    Alkaline Phosphatase 104 35 - 104 U/L    ALT 13 5 - 33 U/L    AST 13 <32 U/L    Total Bilirubin 0.37 0.3 - 1.2 mg/dL    Bilirubin, Direct 0.09 <0.31 mg/dL    Bilirubin, Indirect 0.28 0.00 - 1.00 mg/dL    Total Protein 7.8 6.4 - 8.3 g/dL    Albumin/Globulin Ratio 1.2 1.0 - 2.5   Blood Culture 1    Collection Time: 07/20/22  9:31 PM    Specimen: Blood   Result Value Ref Range    Specimen Description . BLOOD     Special Requests LT AC 20ML     Culture NO GROWTH 12 HOURS    Lactate, Sepsis    Collection Time: 07/20/22  9:32 PM   Result Value Ref Range    Lactic Acid, Sepsis, Whole Blood 2.7 (H) 0.5 - 1.9 mmol/L   Protime-INR    Collection Time: 07/20/22  9:32 PM   Result Value Ref Range    Protime 10.4 9.1 - 12.3 sec    INR 1.0    Blood Culture 2    Collection Time: 07/20/22 10:02 PM    Specimen: Blood   Result Value Ref Range    Specimen Description . BLOOD     Special Requests RT ACUB 20ML     Culture NO GROWTH 12 HOURS    Troponin    Collection Time: 07/20/22 10:13 PM   Result Value Ref Range    Troponin, High Sensitivity 17 (H) 0 - 14 ng/L   Urinalysis with Reflex to Culture    Collection Time: 07/20/22 11:35 PM    Specimen: Urine   Result Value Ref Range    Color, UA Yellow Yellow    Turbidity UA Cloudy (A) Clear    Glucose, Ur NEGATIVE NEGATIVE    Bilirubin Urine NEGATIVE NEGATIVE    Ketones, Urine TRACE (A) NEGATIVE    Specific Gravity, UA 1.020 1.005 - 1.030    Urine Hgb NEGATIVE NEGATIVE    pH, UA 5.0 5.0 - 8.0    Protein, UA NEGATIVE NEGATIVE    Urobilinogen, Urine Normal Normal    Nitrite, Urine NEGATIVE NEGATIVE    Leukocyte Esterase, Urine SMALL (A) NEGATIVE   Microscopic Urinalysis    Collection Time: 07/20/22 11:35 PM   Result Value Ref Range    -          WBC, UA 5 TO 10 0 - 5 /HPF    RBC, UA 5 TO 10 0 - 4 /HPF    Casts UA  0 - 8 /LPF     5 TO 10 HYALINE Reference range defined for non-centrifuged specimen. Epithelial Cells UA 10 TO 20 0 - 5 /HPF   Sodium, urine, random    Collection Time: 07/20/22 11:35 PM   Result Value Ref Range    Sodium,Ur 30 mmol/L   Protein, urine, random    Collection Time: 07/20/22 11:35 PM   Result Value Ref Range    Total Protein, Urine 14 mg/dL   POC Glucose Fingerstick    Collection Time: 07/21/22  6:24 AM   Result Value Ref Range    POC Glucose 140 (H) 65 - 105 mg/dL   POCT glucose    Collection Time: 07/21/22  6:26 AM   Result Value Ref Range    Glucose 140 mg/dL    QC OK? yes        Imaging/Diagnostics:  XR CHEST PORTABLE    Result Date: 7/20/2022  Increased lung markings bilaterally, may be related to mild pulmonary vascular congestion versus bronchitis. Assessment :      Hospital Problems             Last Modified POA    * (Principal) HARVEY (acute kidney injury) ([facility] [address] 7/21/2022 Yes    S/P drug eluting coronary stent placement - Mid RCA () 2/11/19 (Dr. Martell Boyer) 7/21/2022 Yes    Overview Signed 2/11/2019  5:16 PM by Elizabeth Johnson MD     RCA stent 100% required PTCA-JOSE MARTIN  Patent LAd stent         Metabolic acidosis 6/02/2703 Yes    Class 2 obesity with body mass index (BMI) of 36.0 to 36.9 in adult 7/21/2022 Yes    Lactic acidosis 7/21/2022 Yes    Type 2 diabetes mellitus with hyperglycemia, without long-term current use of insulin ([address] 7/21/2022 Yes    Chronic combined systolic (congestive) and diastolic (congestive) heart failure ([address] 7/21/2022 Yes    Anxiety 7/21/2022 Yes       Plan:     Patient status observation in the Med/Surge    HARVEY: CRT 2.13 on admission, typically runs 0.9-1.1. Renal ultrasound without obstruction or hydro. Start gentle IVF, repeat BMP this a.m. recheck BMP, if downtrending around 1.5 or less patient can be discharged home    Metabolic/lactic acidosis: Secondary to dehydration.   Continue IVF, NS @ 50 ml/hr    Neutrophilic leukocytosis: Without urinary symptoms present on admission. UA collected only showing 5-10 WBCs with 10-20 epithelial cells, culture pending. BC X2 NGTD. Will do 3-day course of Cipro po    Chronic systolic and diastolic CHF: Without exacerbation. Continue metoprolol twice daily, fluid resuscitation with caution. Lasix on hold taking 40 mg daily, ACE-I on hold secondary to HARVEY. chest x-ray reviewed    CAD: Without active signs or symptoms. Patient on Plavix, beta-blocker, Lipitor, ASA. Status post PCI to JOSE MARTIN with noted  of RCA in 2019    COPD: Without exacerbation. Continue home inhalers    Anxiety with depression: Mood appropriate and stable, continue BuSpar    DMT2: A.m. blood sugar stable. Patient taking Trulicity weekly in the outpatient setting. Continue LI-ISS. Hypoglycemic protocol in place. Check AC/HS. A1c from 10/7/2021 reviewed at 6.8    Adult Class 2 obesity with SILVA: BMI 36.4 on admission.   Lifestyle modification encouraged    GI/DVT prophylaxis: Pepcid, decreased dose to 10 mg, continue Eliquis, with primary care provider need for continuation as there is no atrial fibrillation or valvular abnormalities    Discharge home later today if renal function is improving with fluid resuscitation    Consultations:   Sheryl Purdy HOSPITALIST      BINDU Davis NP  7/21/2022  12:33 PM    Copy sent to Dr. Malka Alicia MD

## 2022-07-21 NOTE — PROGRESS NOTES
Pharmacy Note     Renal Dose Adjustment    Jade Pritchard is a 63 y.o. female. Pharmacist assessment of renally cleared medications. Recent Labs     07/20/22 2125   BUN 43*       Recent Labs     07/20/22 2125   CREATININE 2.13*       Estimated Creatinine Clearance: 37 mL/min (A) (based on SCr of 2.13 mg/dL (H)). Estimated CrCl using Ideal Body Weight: 28.7 mL/min (based on IBW 63.8 kg)    Height:   Ht Readings from Last 1 Encounters:   07/20/22 5' 8\" (1.727 m)     Weight:  Wt Readings from Last 1 Encounters:   07/20/22 240 lb (108.9 kg)       The following medication dose has been adjusted based upon renal function per P&T Guidelines:             Famotidine 20 mg by mouth twice daily changed to 20 mg once daily.     [facility]  7/21/2022 2:57 AM

## 2022-07-21 NOTE — ED NOTES
Pt. Resting on stretcher, eyes open, RR even and non-labored  Pt. Updated on POC  Will continue to monitor   Pt.  Ambulates self to bathroom with steady gait   Pt denies any further needs at this time      Linda Angel RN  07/21/22 [id number]

## 2022-07-21 NOTE — ED NOTES
Repeat blood work obtained, labeled and sent to lab  Pt. Lunch tray ordered, pt.  Denies further needs at this time   Will continue to monitor      Kristina Holley RN  07/21/22 [id number]

## 2022-07-21 NOTE — ED NOTES
Pt. Provided turkey sandwich as ordered  Pt.  Denies any further needs at this time      [facility] Press, RN  07/21/22 0270

## 2022-07-22 VITALS
TEMPERATURE: 97.6 F | HEIGHT: 68 IN | OXYGEN SATURATION: 98 % | DIASTOLIC BLOOD PRESSURE: 63 MMHG | SYSTOLIC BLOOD PRESSURE: 117 MMHG | RESPIRATION RATE: 16 BRPM | HEART RATE: 85 BPM | WEIGHT: 240 LBS | BODY MASS INDEX: 36.37 KG/M2

## 2022-07-22 PROBLEM — R82.81 PYURIA: Status: ACTIVE | Noted: 2022-07-22

## 2022-07-22 LAB
ALBUMIN SERPL-MCNC: 3.8 G/DL (ref 3.5–5.2)
ALBUMIN/GLOBULIN RATIO: 1.4 (ref 1–2.5)
ALP BLD-CCNC: 86 U/L (ref 35–104)
ALT SERPL-CCNC: 12 U/L (ref 5–33)
ANION GAP SERPL CALCULATED.3IONS-SCNC: 12 MMOL/L (ref 9–17)
ANION GAP SERPL CALCULATED.3IONS-SCNC: 13 MMOL/L (ref 9–17)
AST SERPL-CCNC: 14 U/L
BILIRUB SERPL-MCNC: 0.26 MG/DL (ref 0.3–1.2)
BUN BLDV-MCNC: 30 MG/DL (ref 6–20)
BUN BLDV-MCNC: 30 MG/DL (ref 6–20)
CALCIUM SERPL-MCNC: 9.2 MG/DL (ref 8.6–10.4)
CALCIUM SERPL-MCNC: 9.4 MG/DL (ref 8.6–10.4)
CHLORIDE BLD-SCNC: 107 MMOL/L (ref 98–107)
CHLORIDE BLD-SCNC: 107 MMOL/L (ref 98–107)
CO2: 19 MMOL/L (ref 20–31)
CO2: 20 MMOL/L (ref 20–31)
CREAT SERPL-MCNC: 1.51 MG/DL (ref 0.5–0.9)
CREAT SERPL-MCNC: 1.52 MG/DL (ref 0.5–0.9)
CULTURE: ABNORMAL
ESTIMATED AVERAGE GLUCOSE: 180 MG/DL
GFR AFRICAN AMERICAN: 42 ML/MIN
GFR AFRICAN AMERICAN: 43 ML/MIN
GFR NON-AFRICAN AMERICAN: 35 ML/MIN
GFR NON-AFRICAN AMERICAN: 35 ML/MIN
GFR SERPL CREATININE-BSD FRML MDRD: ABNORMAL ML/MIN/{1.73_M2}
GFR SERPL CREATININE-BSD FRML MDRD: ABNORMAL ML/MIN/{1.73_M2}
GLUCOSE BLD-MCNC: 151 MG/DL (ref 65–105)
GLUCOSE BLD-MCNC: 153 MG/DL (ref 70–99)
GLUCOSE BLD-MCNC: 175 MG/DL (ref 65–105)
GLUCOSE BLD-MCNC: 225 MG/DL (ref 70–99)
HBA1C MFR BLD: 7.9 % (ref 4–6)
HCT VFR BLD CALC: 34.1 % (ref 36.3–47.1)
HEMOGLOBIN: 10.1 G/DL (ref 11.9–15.1)
INR BLD: 1
Lab: ABNORMAL
MAGNESIUM: 1.9 MG/DL (ref 1.6–2.6)
MCH RBC QN AUTO: 25.4 PG (ref 25.2–33.5)
MCHC RBC AUTO-ENTMCNC: 29.6 G/DL (ref 28.4–34.8)
MCV RBC AUTO: 85.7 FL (ref 82.6–102.9)
NRBC AUTOMATED: 0 PER 100 WBC
PDW BLD-RTO: 16.5 % (ref 11.8–14.4)
PLATELET # BLD: 232 K/UL (ref 138–453)
PMV BLD AUTO: 10.5 FL (ref 8.1–13.5)
POTASSIUM SERPL-SCNC: 4 MMOL/L (ref 3.7–5.3)
POTASSIUM SERPL-SCNC: 4.1 MMOL/L (ref 3.7–5.3)
PROTHROMBIN TIME: 10.4 SEC (ref 9.1–12.3)
RBC # BLD: 3.98 M/UL (ref 3.95–5.11)
SODIUM BLD-SCNC: 139 MMOL/L (ref 135–144)
SODIUM BLD-SCNC: 139 MMOL/L (ref 135–144)
SPECIMEN DESCRIPTION: ABNORMAL
TOTAL PROTEIN: 6.6 G/DL (ref 6.4–8.3)
WBC # BLD: 6.9 K/UL (ref 3.5–11.3)

## 2022-07-22 PROCEDURE — 36415 COLL VENOUS BLD VENIPUNCTURE: CPT

## 2022-07-22 PROCEDURE — 83735 ASSAY OF MAGNESIUM: CPT

## 2022-07-22 PROCEDURE — 85027 COMPLETE CBC AUTOMATED: CPT

## 2022-07-22 PROCEDURE — 85610 PROTHROMBIN TIME: CPT

## 2022-07-22 PROCEDURE — 6370000000 HC RX 637 (ALT 250 FOR IP): Performed by: NURSE PRACTITIONER

## 2022-07-22 PROCEDURE — 6360000002 HC RX W HCPCS

## 2022-07-22 PROCEDURE — 82947 ASSAY GLUCOSE BLOOD QUANT: CPT

## 2022-07-22 PROCEDURE — 94761 N-INVAS EAR/PLS OXIMETRY MLT: CPT

## 2022-07-22 PROCEDURE — 99217 PR OBSERVATION CARE DISCHARGE MANAGEMENT: CPT | Performed by: INTERNAL MEDICINE

## 2022-07-22 PROCEDURE — 96366 THER/PROPH/DIAG IV INF ADDON: CPT

## 2022-07-22 PROCEDURE — 80048 BASIC METABOLIC PNL TOTAL CA: CPT

## 2022-07-22 PROCEDURE — 2580000003 HC RX 258: Performed by: NURSE PRACTITIONER

## 2022-07-22 PROCEDURE — G0378 HOSPITAL OBSERVATION PER HR: HCPCS

## 2022-07-22 PROCEDURE — 80053 COMPREHEN METABOLIC PANEL: CPT

## 2022-07-22 PROCEDURE — 94640 AIRWAY INHALATION TREATMENT: CPT

## 2022-07-22 RX ORDER — ALBUTEROL SULFATE 2.5 MG/3ML
SOLUTION RESPIRATORY (INHALATION)
Status: COMPLETED
Start: 2022-07-22 | End: 2022-07-22

## 2022-07-22 RX ORDER — FUROSEMIDE 40 MG/1
40 TABLET ORAL DAILY
Qty: 60 TABLET | Refills: 3
Start: 2022-07-22

## 2022-07-22 RX ORDER — CIPROFLOXACIN 500 MG/1
500 TABLET, FILM COATED ORAL EVERY 12 HOURS SCHEDULED
Qty: 6 TABLET | Refills: 0 | Status: SHIPPED | OUTPATIENT
Start: 2022-07-22 | End: 2022-07-25

## 2022-07-22 RX ADMIN — CIPROFLOXACIN 500 MG: 500 TABLET, FILM COATED ORAL at 08:37

## 2022-07-22 RX ADMIN — BUDESONIDE AND FORMOTEROL FUMARATE DIHYDRATE 2 PUFF: 160; 4.5 AEROSOL RESPIRATORY (INHALATION) at 09:16

## 2022-07-22 RX ADMIN — ALBUTEROL SULFATE 2.5 MG: 2.5 SOLUTION RESPIRATORY (INHALATION) at 05:38

## 2022-07-22 RX ADMIN — ATORVASTATIN CALCIUM 40 MG: 80 TABLET, FILM COATED ORAL at 08:37

## 2022-07-22 RX ADMIN — BUSPIRONE HYDROCHLORIDE 30 MG: 15 TABLET ORAL at 08:37

## 2022-07-22 RX ADMIN — ACETAMINOPHEN 325MG 650 MG: 325 TABLET ORAL at 09:54

## 2022-07-22 RX ADMIN — APIXABAN 5 MG: 5 TABLET, FILM COATED ORAL at 08:37

## 2022-07-22 RX ADMIN — BUSPIRONE HYDROCHLORIDE 30 MG: 15 TABLET ORAL at 14:09

## 2022-07-22 RX ADMIN — TIOTROPIUM BROMIDE INHALATION SPRAY 2 PUFF: 3.12 SPRAY, METERED RESPIRATORY (INHALATION) at 09:16

## 2022-07-22 RX ADMIN — SODIUM CHLORIDE, PRESERVATIVE FREE 10 ML: 5 INJECTION INTRAVENOUS at 08:38

## 2022-07-22 RX ADMIN — FAMOTIDINE 10 MG: 20 TABLET, FILM COATED ORAL at 08:37

## 2022-07-22 RX ADMIN — CLOPIDOGREL 75 MG: 75 TABLET, FILM COATED ORAL at 08:37

## 2022-07-22 ASSESSMENT — PAIN SCALES - GENERAL: PAINLEVEL_OUTOF10: 3

## 2022-07-22 NOTE — PROGRESS NOTES
[facility]  Office: [address], DO, Hyacinth Brown, DO, Asif Blackmon, DO, Odette [facility], DO, Shanita Meneses MD, Jesus Alberto Bullock MD, Salima Gonzalez MD, Francisco Smith MD,  John Arnold MD, Van Monge MD, Mikhail Borja DO, Halie Vaughn MD,  Elizabeth Kimbrough MD, Anson Jo MD, Anuradha Tong DO, Sylvia Henry MD, Usha Carey MD, Kofi Yip MD, Yrn Lowe, DO, Tammy Marquez MD, Mahnaz Solis MD, Shaye Kolb, CNP,  Mira Colbert, CNP, Susi Bello, CNP, Orville Cole, CNP, Adi Brown PA-C, Cj Maciel, [facility], Prasad Last, CNP, Kelley Moya, CNP, Froylan Hopper, CNP, Kapil Adams, CNP, Cathleen Balderas, CNS, Alex Urbina, [facility], Jose A Saldaña, CNP, Remy Liao, CNP, Lorna Ruiz, CNP, Barbie Sauceben, CNP           [address]    Progress Note    7/22/2022    9:54 AM    Name:   Saji Payan  MRN:     [id number]     Acct:      [de-identified]   Room:   7597/0409-51   Day:  1  Admit Date:  7/20/2022  8:43 PM    PCP:   Carolin Sanches MD  Code Status:  Full Code    Subjective:     C/C:   Chief Complaint   Patient presents with    Abdominal Pain    Emesis    Diarrhea     Interval History Status: improved     Patient denies any more N/V or diarrhea. She really thinks that she had food poisioning. She wants to go home. No chest pain or SOB. Brief History:     Per the NP:  \"Pina Palma is a 63 y.o. Non- / non  female who presents with Abdominal Pain, Emesis, and Diarrhea   and is admitted to the hospital for the management of HARVEY (acute kidney injury) ([address] This is a 68-year-old female who initially presented to the emergency department for evaluation of 1 day history of nausea, vomiting, emesis. Patient states she ate a homemade muffin when she encountered almost immediate nausea with emesis and violent diarrhea.   Due to the fact that she could not stop with her output her son encouraged her to come to the emergency department for evaluation. Metabolic and hematologic abnormalities on presentation included CRT of 2.13 up from her baseline of 0.9-1.1     On assessment patient is awake and alert sitting in bed in no acute distress. Vital signs are stable. IV fluids are infusing. Patient states nausea and emesis has since dissipated since she came to the emergency department, was able to tolerate p.o. intake well this morning. Has also been tolerating fluids and ice chips without difficulty or further episodes of emesis with diarrhea. Denies chest pain, shortness of breath, nausea, vomiting, diarrhea, constipation, fever, chills, urinary symptoms or pain. She has a past medical history significant for chronic systolic and diastolic CHF, CAD, COPD current everyday smoking status, DMT2, dyslipidemia, primary hypertension, PAD. Follow-up lab work has been ordered, waiting on results\"    Review of Systems:     Constitutional:  negative for chills, fevers, sweats  Respiratory:  negative for cough, dyspnea on exertion, shortness of breath, wheezing  Cardiovascular:  negative for chest pain, chest pressure/discomfort, lower extremity edema, palpitations  Gastrointestinal:  negative for abdominal pain, constipation, diarrhea, nausea, vomiting  Neurological:  negative for dizziness, headache    Medications: Allergies:     Allergies   Allergen Reactions    Codeine     Morphine        Current Meds:   Scheduled Meds:    apixaban  5 mg Oral BID    atorvastatin  40 mg Oral Daily    busPIRone  30 mg Oral TID    clopidogrel  75 mg Oral Daily    [Held by provider] metoprolol tartrate  12.5 mg Oral BID    sodium chloride flush  5-40 mL IntraVENous 2 times per day    insulin lispro  0-4 Units SubCUTAneous TID     insulin lispro  0-4 Units SubCUTAneous Nightly    Dulaglutide  1.5 mg SubCUTAneous Weekly    [Held by provider] furosemide  40 mg Oral Daily [Held by provider] lisinopril  10 mg Oral Daily    budesonide-formoterol  2 puff Inhalation BID    tiotropium  2 puff Inhalation Daily    famotidine  10 mg Oral Daily    ciprofloxacin  500 mg Oral 2 times per day     Continuous Infusions:    dextrose      sodium chloride       PRN Meds: glucose, dextrose bolus **OR** dextrose bolus, glucagon (rDNA), dextrose, sodium chloride flush, sodium chloride, ondansetron **OR** ondansetron, acetaminophen **OR** acetaminophen, albuterol    Data:     Past Medical History:   has a past medical history of Acute on chronic systolic CHF (congestive heart failure) ([facility] Asthma, Blood circulation, collateral, CAD (coronary artery disease), COPD (chronic obstructive pulmonary disease) ([facility] COPD exacerbation ([facility] Diabetes mellitus ([facility] Hyperlipidemia, Hypertension, Ischemic colitis ([facility] Movement disorder, Neuropathy, PAD (peripheral artery disease) ([facility] Tobacco abuse, and Tobacco abuse counseling. Social History:   reports that she has been smoking cigarettes. She has a 22.50 pack-year smoking history. She uses smokeless tobacco. She reports current drug use. Drug: Marijuana Sharon Tang). She reports that she does not drink alcohol. Family History:   Family History   Problem Relation Age of Onset    Hypertension Mother     Hypertension Father     Diabetes Sister     Diabetes Brother     Stroke Brother        Vitals:  /63   Pulse 89   Temp 97.8 °F (36.6 °C) (Oral)   Resp 18   Ht 5' 8\" (1.727 m)   Wt 240 lb (108.9 kg)   SpO2 98%   BMI 36.49 kg/m²   Temp (24hrs), Av.2 °F (36.8 °C), Min:97.8 °F (36.6 °C), Max:98.7 °F (37.1 °C)    Recent Labs     22  0624 22  1321 22  1951 22  0558   POCGLU 140* 181* 159* 151*       I/O (24Hr):     Intake/Output Summary (Last 24 hours) at 2022 0954  Last data filed at 2022 4280  Gross per 24 hour   Intake 500 ml   Output 1200 ml   Net -700 ml       Labs:  Hematology:  Recent Labs 07/20/22 2125 07/20/22 2132 07/21/22  1237 07/22/22  0740   WBC 13.7*  --  8.6 6.9   RBC 4.70  --  3.91* 3.98   HGB 12.2  --  10.2* 10.1*   HCT 38.8  --  31.9* 34.1*   MCV 82.6  --  81.6* 85.7   MCH 26.0  --  26.1 25.4   MCHC 31.4  --  32.0 29.6   RDW 16.5*  --  16.8* 16.5*     --  229 232   MPV 10.3  --  10.4 10.5   INR  --  1.0  --  1.0     Chemistry:  Recent Labs     07/20/22 2125 07/20/22 2213 07/21/22 0626 07/21/22  1240 07/21/22  1716 07/22/22  0740     --   --  138 139 139   K 4.5  --   --  4.1 4.0 4.0     --   --  106 107 107   CO2 15*  --   --  22 20 20   GLUCOSE 248*  --    < > 212* 210* 153*   BUN 43*  --   --  38* 39* 30*   CREATININE 2.13*  --   --  1.92* 1.87* 1.51*   MG  --   --   --   --   --  1.9   ANIONGAP 18*  --   --  10 12 12   LABGLOM 24*  --   --  27* 28* 35*   GFRAA 29*  --   --  32* 33* 43*   CALCIUM 9.9  --   --  9.1 8.9 9.4   TROPHS 18* 17*  --   --   --   --     < > = values in this interval not displayed.      Recent Labs     07/20/22 2125 07/21/22 0624 07/21/22  1321 07/21/22  1951 07/22/22  0558 07/22/22  0740   PROT 7.8  --   --   --   --  6.6   LABALBU 4.3  --   --   --   --  3.8   LABA1C 8.0*  --   --   --   --   --    AST 13  --   --   --   --  14   ALT 13  --   --   --   --  12   ALKPHOS 104  --   --   --   --  86   BILITOT 0.37  --   --   --   --  0.26*   BILIDIR 0.09  --   --   --   --   --    LIPASE 38  --   --   --   --   --    POCGLU  --  140* 181* 159* 151*  --      ABG:  Lab Results   Component Value Date/Time    POCPH 7.381 10/02/2021 04:57 AM    PHART 7.373 12/18/2021 04:35 AM    POCPCO2 50.7 10/02/2021 04:57 AM    SZY0ZMB 38.2 12/18/2021 04:35 AM    POCPO2 68.3 10/02/2021 04:57 AM    PO2ART 104.0 12/18/2021 04:35 AM    POCHCO3 30.0 10/02/2021 04:57 AM    JLW2QFD 22.2 12/18/2021 04:35 AM    NBEA 3.0 12/18/2021 04:35 AM    PBEA NOT REPORTED 12/18/2021 04:35 AM    LNC4TLH NOT REPORTED 10/02/2021 04:57 AM    WGUJ4AWO 93 10/02/2021 04:57 AM Z8OJQVVG 96.2 12/18/2021 04:35 AM    FIO2 NOT REPORTED 12/18/2021 04:35 AM     Lab Results   Component Value Date/Time    SPECIAL RT ACUB 20ML 07/20/2022 10:02 PM     Lab Results   Component Value Date/Time    CULTURE NO GROWTH 1 DAY 07/20/2022 10:02 PM       Radiology:  US RENAL COMPLETE    Result Date: 7/21/2022  1. Moderate postvoid residual. 2. Fatty liver. 3. Bilateral renal calculi and/or vascular calcifications. 4. No hydronephrosis. 5. Simple cyst at the upper pole right kidney measuring 2 cm. XR CHEST PORTABLE    Result Date: 7/20/2022  Increased lung markings bilaterally, may be related to mild pulmonary vascular congestion versus bronchitis. Physical Examination:        General appearance:  alert, cooperative and no distress  Mental Status:  oriented to person, place and time and normal affect  Lungs:  clear to auscultation bilaterally, normal effort  Heart:  regular rate and rhythm, no murmur  Abdomen:  soft, nontender, obese, normal bowel sounds, no masses, hepatomegaly, splenomegaly  Extremities:  no edema, redness, tenderness in the calves  Skin:  no gross lesions, rashes, induration    Assessment:        Hospital Problems             Last Modified POA    S/P drug eluting coronary stent placement - Mid RCA () 2/11/19 (Dr. Don Yu) 7/21/2022 Yes    Overview Signed 2/11/2019  5:16 PM by Valencia Mccoy MD     RCA stent 100% required PTCA-JOSE MARTIN  Patent LAd stent         Class 2 obesity with body mass index (BMI) of 36.0 to 36.9 in adult 7/21/2022 Yes    Type 2 diabetes mellitus with hyperglycemia, without long-term current use of insulin ([address] 7/21/2022 Yes    Chronic combined systolic (congestive) and diastolic (congestive) heart failure ([address] 7/21/2022 Yes    Anxiety 7/21/2022 Yes       Plan:        HARVEY- Cr 1.5- improved with IVF, hold Lasix for 2 more days. Encouraged po intake of fluids, resume fluid restriction on Monday.   Check a BMP in 5 days  DM2- BS mildly elevated, resume Trulicity, HbA1C- 7.9, f/u with PCP to start an oral hypoglycemic, counseled patient on low carb diet  Chronic systolic and diastolic CHF- euvolemic, resume Lasix in 2 days  COPD- stable on home inhalers  Obesity- diet, exercise and weight loss  Gi proph    Okay to discharge home today, f/u with PCP in 1 week    Benigno Combs MD  7/22/2022  9:54 AM

## 2022-07-22 NOTE — PROGRESS NOTES
Pt's meds to beds delivered and pt dc home with all belongings .  Pt left by private car and a friend to home

## 2022-07-22 NOTE — PLAN OF CARE
Problem: Respiratory - Adult  Goal: Achieves optimal ventilation and oxygenation  Outcome: Progressing  Flowsheets (Taken 7/22/2022 0894)  Achieves optimal ventilation and oxygenation:   Assess for changes in respiratory status   Respiratory therapy support as indicated   Encourage broncho-pulmonary hygiene including cough, deep breathe, incentive spirometry   Oxygen supplementation based on oxygen saturation or arterial blood gases   Assess and instruct to report shortness of breath or any respiratory difficulty

## 2022-07-22 NOTE — PROGRESS NOTES
Congestive Heart Failure Education completed and charted. CHF booklet given. Patient was receptive to education. Discussed the  importance of medication compliance. Discussed the importance of a heart healthy diet. Discussed 2000 mg sodium-restricted daily diet. Patient instructed to limit fluid intake to  1.5 to 2 liters per day. Patient instructed to weigh self at the same time of each day each morning, reinforced teaching to monitor for 3-5 lb weight increase over 1-2 days notify physician if change noted. Signs and symptoms of CHF discussed with patient, such as feeling more tired than normal, feeling short of breath, coughing that increases when lying down, sudden weight gain, swelling of the feet, legs or belly. Patient verbalized understanding to notify physician office if these symptoms occur. EF 44%     Pt is established with  CHF Clinic  Appt will be made for pt following this admission.

## 2022-07-22 NOTE — PROGRESS NOTES
Pt given dc instructions.  Pt instructed on to get blood work drawn July 27th  pt instructed to not take lasix on Saturday or Sunday Pt verbalized understanding pt waiting on meds to beds for cipro and ride home

## 2022-07-22 NOTE — PROGRESS NOTES
CLINICAL PHARMACY NOTE: MEDS TO BEDS    Total # of Prescriptions Filled: 1   The following medications were delivered to the patient:  Cipro 500    Additional Documentation:

## 2022-07-22 NOTE — PROGRESS NOTES
I have reviewed the notes, assessments, and/or procedures performed by Dr. Ardon, I concur with her/his documentation of Luther Irwin Jr..    pts bp 103/63 89 pt has lopressor 12.5mg po ordered bid  perfect served Dr Tyrell Dickens she stated to hold lopressor for now lopressor held

## 2022-07-22 NOTE — DISCHARGE SUMMARY
management of   HARVEY (acute kidney injury) ([address] , presented to ER with Abdominal Pain, Emesis, and Diarrhea      Per the NP:  \"Pina Sena is a 63 y.o. Non- / non  female who presents with Abdominal Pain, Emesis, and Diarrhea   and is admitted to the hospital for the management of HARVEY (acute kidney injury) ([facility] This is a 53-year-old female who initially presented to the emergency department for evaluation of 1 day history of nausea, vomiting, emesis. Patient states she ate a homemade muffin when she encountered almost immediate nausea with emesis and violent diarrhea. Due to the fact that she could not stop with her output her son encouraged her to come to the emergency department for evaluation. Metabolic and hematologic abnormalities on presentation included CRT of 2.13 up from her baseline of 0.9-1.1     On assessment patient is awake and alert sitting in bed in no acute distress. Vital signs are stable. IV fluids are infusing. Patient states nausea and emesis has since dissipated since she came to the emergency department, was able to tolerate p.o. intake well this morning. Has also been tolerating fluids and ice chips without difficulty or further episodes of emesis with diarrhea. Denies chest pain, shortness of breath, nausea, vomiting, diarrhea, constipation, fever, chills, urinary symptoms or pain. She has a past medical history significant for chronic systolic and diastolic CHF, CAD, COPD current everyday smoking status, DMT2, dyslipidemia, primary hypertension, PAD. Patient felt that her symptoms were likely related to food poisoning. She started tolerating a po diet. Assessment/ Plan  HARVEY- Cr 1.5- improved with IVF, hold Lasix for 2 more days. Encouraged po intake of fluids, resume fluid restriction on Monday.   Check a BMP in 5 days  DM2- BS mildly elevated, resume Trulicity, HIG9D- 7.9, f/u with PCP to start an oral hypoglycemic, counseled patient on low carb diet  Chronic systolic and diastolic CHF- euvolemic, resume Lasix in 2 days  COPD- stable on home inhalers  Obesity- diet, exercise and weight loss  Pyuria- no urine culture added on, will treat for possible UTI x 3 days Cipro  Gi proph     Okay to discharge home today, f/u with PCP in 1 week      Significant therapeutic interventions: Gentle IVF hydration    Significant Diagnostic Studies:   Labs / Micro:    Radiology:  US RENAL COMPLETE    Result Date: 7/21/2022  1. Moderate postvoid residual. 2. Fatty liver. 3. Bilateral renal calculi and/or vascular calcifications. 4. No hydronephrosis. 5. Simple cyst at the upper pole right kidney measuring 2 cm. XR CHEST PORTABLE    Result Date: 7/20/2022  Increased lung markings bilaterally, may be related to mild pulmonary vascular congestion versus bronchitis. Consultations:    Consults:     Final Specialist Recommendations/Findings:   IP CONSULT TO HOSPITALIST      The patient was seen and examined on day of discharge and this discharge summary is in conjunction with any daily progress note from day of discharge. Discharge plan:     Disposition: Home    Physician Follow Up: Daniella Mohan MD  [facility] [address]  [phone]    Schedule an appointment as soon as possible for a visit  for follow up after hospitalization    Daniella Mohan MD  [address] [facility] [address] [phone]    Call in 1 week(s)       Diet: cardiac diet and diabetic diet    Activity: As tolerated    Instructions to Patient: Check a BMP in 5 days    Discharge Medications:      Medication List        START taking these medications      ciprofloxacin 500 MG tablet  Commonly known as: CIPRO  Take 1 tablet by mouth in the morning and 1 tablet before bedtime. Do all this for 3 days.             CHANGE how you take these medications      * Ventolin  (90 Base) MCG/ACT inhaler  Generic drug: albuterol sulfate HFA  What changed: Another medication with the same name was changed. Make sure you understand how and when to take each. * albuterol (5 MG/ML) 0.5% nebulizer solution  Commonly known as: PROVENTIL  Take 0.5 mLs by nebulization every 6 hours as needed for Wheezing  What changed: when to take this     furosemide 40 MG tablet  Commonly known as: LASIX  Take 1 tablet by mouth in the morning. Hold for 2 more days. What changed: additional instructions           * This list has 2 medication(s) that are the same as other medications prescribed for you. Read the directions carefully, and ask your doctor or other care provider to review them with you. CONTINUE taking these medications      acetaminophen 500 MG tablet  Commonly known as: TYLENOL  Take 1 tablet by mouth every 6 hours as needed for Pain     apixaban 5 MG Tabs tablet  Commonly known as: Eliquis  Take 1 tablet by mouth 2 times daily     atorvastatin 40 MG tablet  Commonly known as: Lipitor  Take 1 tablet by mouth daily     blood glucose test strips  Test 3 times a day & as needed for symptoms of irregular blood glucose. busPIRone 15 MG tablet  Commonly known as: BUSPAR     clopidogrel 75 MG tablet  Commonly known as: PLAVIX  Take 1 tablet by mouth daily     famotidine 20 MG tablet  Commonly known as: PEPCID     glucose monitoring kit  1 kit by Does not apply route daily     Lancets Misc  1 each by Does not apply route daily     lidocaine 5 %  Commonly known as: LIDODERM     lisinopril 10 MG tablet  Commonly known as: PRINIVIL;ZESTRIL  Take 1 tablet by mouth daily     metoprolol tartrate 25 MG tablet  Commonly known as: LOPRESSOR  Take 0.5 tablets by mouth 2 times daily     mometasone-formoterol 200-5 MCG/ACT inhaler  Commonly known as: DULERA     nitroGLYCERIN 0.4 MG SL tablet  Commonly known as: NITROSTAT  up to max of 3 total doses. If no relief after 1 dose, call 911.      tiotropium 18 MCG inhalation capsule  Commonly known as: SPIRIVA  Inhale 1 capsule into the lungs daily Pt told that she was using this medication at home. Trulicity 1.5 ZP/3.0MV Sopn  Generic drug: Dulaglutide            STOP taking these medications      aspirin 81 MG chewable tablet               Where to Get Your Medications        You can get these medications from any pharmacy    Bring a paper prescription for each of these medications  ciprofloxacin 500 MG tablet       Information about where to get these medications is not yet available    Ask your nurse or doctor about these medications  furosemide 40 MG tablet         Discharge Procedure Orders   Basic Metabolic Panel   Standing Status: Future Standing Exp. Date: 07/21/23   Order Comments: Send results to Radha Romero MD       Time Spent on discharge is  20 mins in patient examination, evaluation, counseling as well as medication reconciliation, prescriptions for required medications, discharge plan and follow up. Electronically signed by   Satya Armstrong MD  7/22/2022  1:57 PM      Thank you Dr. Radha Romero MD for the opportunity to be involved in this patient's care.

## 2022-07-22 NOTE — PLAN OF CARE
Problem: Discharge Planning  Goal: Discharge to home or other facility with appropriate resources  7/22/2022 1205 by Tess Larios RN  Outcome: Completed  7/22/2022 1205 by Tess Larios RN  Outcome: Progressing  7/21/2022 2205 by Gabriele Fink RN  Outcome: Progressing     Problem: Respiratory - Adult  Goal: Achieves optimal ventilation and oxygenation  7/22/2022 1205 by Tess Larios RN  Outcome: Completed  7/22/2022 1205 by Tess Larios RN  Outcome: Progressing  7/22/2022 0541 by La Nicholas RCP  Outcome: Progressing  Flowsheets (Taken 7/22/2022 0541)  Achieves optimal ventilation and oxygenation:   Assess for changes in respiratory status   Respiratory therapy support as indicated   Encourage broncho-pulmonary hygiene including cough, deep breathe, incentive spirometry   Oxygen supplementation based on oxygen saturation or arterial blood gases   Assess and instruct to report shortness of breath or any respiratory difficulty     Problem: Chronic Conditions and Co-morbidities  Goal: Patient's chronic conditions and co-morbidity symptoms are monitored and maintained or improved  7/22/2022 1205 by Tess Larios RN  Outcome: Completed  7/22/2022 1205 by Tess Larios RN  Outcome: Progressing

## 2022-07-25 LAB
CULTURE: NORMAL
Lab: NORMAL
SPECIMEN DESCRIPTION: NORMAL

## 2022-08-11 ENCOUNTER — HOSPITAL ENCOUNTER (OUTPATIENT)
Dept: OTHER | Age: 62
Discharge: HOME | End: 2022-08-11
Payer: MEDICARE

## 2022-08-11 ENCOUNTER — HOSPITAL ENCOUNTER (OUTPATIENT)
Age: 62
Discharge: HOME | End: 2022-08-11
Payer: MEDICARE

## 2022-08-11 VITALS
HEART RATE: 64 BPM | DIASTOLIC BLOOD PRESSURE: 66 MMHG | SYSTOLIC BLOOD PRESSURE: 120 MMHG | OXYGEN SATURATION: 98 % | WEIGHT: 242 LBS | BODY MASS INDEX: 36.8 KG/M2 | RESPIRATION RATE: 20 BRPM

## 2022-08-11 LAB
ANION GAP SERPL CALCULATED.3IONS-SCNC: 15 MMOL/L (ref 9–17)
BUN BLDV-MCNC: 33 MG/DL (ref 6–20)
CALCIUM SERPL-MCNC: 9.7 MG/DL (ref 8.6–10.4)
CHLORIDE BLD-SCNC: 102 MMOL/L (ref 98–107)
CO2: 22 MMOL/L (ref 20–31)
CREAT SERPL-MCNC: 1.84 MG/DL (ref 0.5–0.9)
GFR AFRICAN AMERICAN: 34 ML/MIN
GFR NON-AFRICAN AMERICAN: 28 ML/MIN
GFR SERPL CREATININE-BSD FRML MDRD: ABNORMAL ML/MIN/{1.73_M2}
GLUCOSE BLD-MCNC: 107 MG/DL (ref 70–99)
POTASSIUM SERPL-SCNC: 4.2 MMOL/L (ref 3.7–5.3)
SODIUM BLD-SCNC: 139 MMOL/L (ref 135–144)

## 2022-08-11 PROCEDURE — 99212 OFFICE O/P EST SF 10 MIN: CPT

## 2022-08-11 PROCEDURE — 80048 BASIC METABOLIC PNL TOTAL CA: CPT

## 2022-08-11 NOTE — PROGRESS NOTES
Date:  2022  Time:  3:19 PM    CHF Clinic at [address] [facility]    Office: [phone] Fax: [phone]    Re:  Cony Leger   Patient : 1960    Vital Signs: /66   Pulse 64   Resp 20   Wt 242 lb (109.8 kg)   SpO2 98%   BMI 36.80 kg/m²                       O2 Device: None (Room air)                           No results for input(s): CBC, HGB, HCT, WBC, PLATELET, NA, K, CL, CO2, BUN, CREATININE, GLUCOSE, BNP, INR in the last 72 hours. Respiratory:    Assessment  Charting Type: Reassessment    Breath Sounds  Right Upper Lobe: Clear  Right Middle Lobe: Clear  Right Lower Lobe: Clear  Left Upper Lobe: Clear  Left Lower Lobe: Clear    Cough/Sputum  Cough: None       Peripheral Vascular  RLE Edema: Trace  LLE Edema: None      Complaints: Nothing new      Comment : Patient here per wheelchair for routine visit. Weight decreased one pound in 3 months. Patient had a hospitalization last month for food poisoning. Labs were ordered for after discharge but patient was unaware and labs never drawn. Phlebotomist phoned and labs drawn here. Note on order to forward to Dr Erickson Dangelo. Today no s/s CHF. Discussed low salt diet and fluid limits. States compliance with medications. Lasix is 40 mg daily. No other needs voiced. Next visit here 4 weeks 2022.     Electronically signed by Leighton Amin RN on 2022 at 3:19 PM

## 2022-12-14 ENCOUNTER — HOSPITAL ENCOUNTER (INPATIENT)
Age: 62
LOS: 1 days | Discharge: HOME | DRG: 291 | End: 2022-12-14
Attending: EMERGENCY MEDICINE | Admitting: EMERGENCY MEDICINE
Payer: MEDICARE

## 2022-12-14 ENCOUNTER — APPOINTMENT (OUTPATIENT)
Dept: GENERAL RADIOLOGY | Age: 62
DRG: 291 | End: 2022-12-14
Payer: MEDICARE

## 2022-12-14 VITALS
WEIGHT: 240 LBS | OXYGEN SATURATION: 96 % | SYSTOLIC BLOOD PRESSURE: 138 MMHG | RESPIRATION RATE: 18 BRPM | DIASTOLIC BLOOD PRESSURE: 92 MMHG | HEART RATE: 85 BPM | HEIGHT: 68 IN | TEMPERATURE: 98.2 F | BODY MASS INDEX: 36.37 KG/M2

## 2022-12-14 DIAGNOSIS — R73.9 HYPERGLYCEMIA: ICD-10-CM

## 2022-12-14 DIAGNOSIS — R06.02 SHORTNESS OF BREATH: Primary | ICD-10-CM

## 2022-12-14 DIAGNOSIS — J41.0 SIMPLE CHRONIC BRONCHITIS (HCC): ICD-10-CM

## 2022-12-14 PROBLEM — R06.00 DYSPNEA: Status: ACTIVE | Noted: 2022-12-14

## 2022-12-14 LAB
ALBUMIN SERPL-MCNC: 3.5 G/DL (ref 3.5–5.2)
ALBUMIN/GLOBULIN RATIO: 1.1 (ref 1–2.5)
ALP BLD-CCNC: 123 U/L (ref 35–104)
ALT SERPL-CCNC: 10 U/L (ref 5–33)
ANION GAP SERPL CALCULATED.3IONS-SCNC: 11 MMOL/L (ref 9–17)
ANION GAP SERPL CALCULATED.3IONS-SCNC: 15 MMOL/L (ref 9–17)
AST SERPL-CCNC: 11 U/L
BETA-HYDROXYBUTYRATE: 0.17 MMOL/L (ref 0.02–0.27)
BILIRUB SERPL-MCNC: 0.2 MG/DL (ref 0.3–1.2)
BUN BLDV-MCNC: 16 MG/DL (ref 8–23)
BUN BLDV-MCNC: 18 MG/DL (ref 8–23)
CALCIUM SERPL-MCNC: 8.9 MG/DL (ref 8.6–10.4)
CALCIUM SERPL-MCNC: 9.2 MG/DL (ref 8.6–10.4)
CARBOXYHEMOGLOBIN: 4 % (ref 0–5)
CHLORIDE BLD-SCNC: 101 MMOL/L (ref 98–107)
CHLORIDE BLD-SCNC: 98 MMOL/L (ref 98–107)
CO2: 22 MMOL/L (ref 20–31)
CO2: 26 MMOL/L (ref 20–31)
CREAT SERPL-MCNC: 1.5 MG/DL (ref 0.5–0.9)
CREAT SERPL-MCNC: 1.58 MG/DL (ref 0.5–0.9)
D-DIMER QUANTITATIVE: 0.79 MG/L FEU
EKG ATRIAL RATE: 110 BPM
EKG ATRIAL RATE: 91 BPM
EKG P AXIS: 38 DEGREES
EKG P-R INTERVAL: 192 MS
EKG Q-T INTERVAL: 364 MS
EKG Q-T INTERVAL: 420 MS
EKG QRS DURATION: 80 MS
EKG QRS DURATION: 82 MS
EKG QTC CALCULATION (BAZETT): 492 MS
EKG QTC CALCULATION (BAZETT): 513 MS
EKG R AXIS: 138 DEGREES
EKG R AXIS: 42 DEGREES
EKG T AXIS: -21 DEGREES
EKG T AXIS: -9 DEGREES
EKG VENTRICULAR RATE: 110 BPM
EKG VENTRICULAR RATE: 90 BPM
ESTIMATED AVERAGE GLUCOSE: 232 MG/DL
ESTIMATED AVERAGE GLUCOSE: 237 MG/DL
FIO2: ABNORMAL
GFR SERPL CREATININE-BSD FRML MDRD: 37 ML/MIN/1.73M2
GFR SERPL CREATININE-BSD FRML MDRD: 40 ML/MIN/1.73M2
GLUCOSE BLD-MCNC: 350 MG/DL (ref 70–99)
GLUCOSE BLD-MCNC: 466 MG/DL (ref 70–99)
HBA1C MFR BLD: 9.7 % (ref 4–6)
HBA1C MFR BLD: 9.9 % (ref 4–6)
HCO3 VENOUS: 25.1 MMOL/L (ref 24–30)
LACTIC ACID, WHOLE BLOOD: 2.2 MMOL/L (ref 0.7–2.1)
LACTIC ACID, WHOLE BLOOD: 3.8 MMOL/L (ref 0.7–2.1)
LIPASE: 42 U/L (ref 13–60)
LV EF: 55 %
LVEF MODALITY: NORMAL
MAGNESIUM: 1.9 MG/DL (ref 1.6–2.6)
O2 SAT, VEN: 87.1 % (ref 60–85)
PATIENT TEMP: 37
PCO2, VEN: 39.1 MM HG (ref 39–55)
PH VENOUS: 7.42 (ref 7.32–7.42)
PO2, VEN: 52.3 MM HG (ref 30–50)
POSITIVE BASE EXCESS, VEN: 1.2 MMOL/L (ref 0–2)
POTASSIUM SERPL-SCNC: 3.3 MMOL/L (ref 3.7–5.3)
POTASSIUM SERPL-SCNC: 3.9 MMOL/L (ref 3.7–5.3)
PRO-BNP: 1269 PG/ML
SARS-COV-2, RAPID: NOT DETECTED
SODIUM BLD-SCNC: 135 MMOL/L (ref 135–144)
SODIUM BLD-SCNC: 138 MMOL/L (ref 135–144)
SPECIMEN DESCRIPTION: NORMAL
TOTAL PROTEIN: 6.7 G/DL (ref 6.4–8.3)
TROPONIN, HIGH SENSITIVITY: 15 NG/L (ref 0–14)
TROPONIN, HIGH SENSITIVITY: 19 NG/L (ref 0–14)
TROPONIN, HIGH SENSITIVITY: 20 NG/L (ref 0–14)
TROPONIN, HIGH SENSITIVITY: 23 NG/L (ref 0–14)

## 2022-12-14 PROCEDURE — 83036 HEMOGLOBIN GLYCOSYLATED A1C: CPT

## 2022-12-14 PROCEDURE — 82010 KETONE BODYS QUAN: CPT

## 2022-12-14 PROCEDURE — 99285 EMERGENCY DEPT VISIT HI MDM: CPT

## 2022-12-14 PROCEDURE — 93010 ELECTROCARDIOGRAM REPORT: CPT | Performed by: INTERNAL MEDICINE

## 2022-12-14 PROCEDURE — 93306 TTE W/DOPPLER COMPLETE: CPT

## 2022-12-14 PROCEDURE — 83605 ASSAY OF LACTIC ACID: CPT

## 2022-12-14 PROCEDURE — 93005 ELECTROCARDIOGRAM TRACING: CPT | Performed by: STUDENT IN AN ORGANIZED HEALTH CARE EDUCATION/TRAINING PROGRAM

## 2022-12-14 PROCEDURE — 87635 SARS-COV-2 COVID-19 AMP PRB: CPT

## 2022-12-14 PROCEDURE — 96361 HYDRATE IV INFUSION ADD-ON: CPT

## 2022-12-14 PROCEDURE — 6370000000 HC RX 637 (ALT 250 FOR IP): Performed by: STUDENT IN AN ORGANIZED HEALTH CARE EDUCATION/TRAINING PROGRAM

## 2022-12-14 PROCEDURE — 82805 BLOOD GASES W/O2 SATURATION: CPT

## 2022-12-14 PROCEDURE — 85379 FIBRIN DEGRADATION QUANT: CPT

## 2022-12-14 PROCEDURE — 94640 AIRWAY INHALATION TREATMENT: CPT

## 2022-12-14 PROCEDURE — 80048 BASIC METABOLIC PNL TOTAL CA: CPT

## 2022-12-14 PROCEDURE — 96374 THER/PROPH/DIAG INJ IV PUSH: CPT

## 2022-12-14 PROCEDURE — 96375 TX/PRO/DX INJ NEW DRUG ADDON: CPT

## 2022-12-14 PROCEDURE — 1200000000 HC SEMI PRIVATE

## 2022-12-14 PROCEDURE — G0378 HOSPITAL OBSERVATION PER HR: HCPCS

## 2022-12-14 PROCEDURE — 36415 COLL VENOUS BLD VENIPUNCTURE: CPT

## 2022-12-14 PROCEDURE — 71045 X-RAY EXAM CHEST 1 VIEW: CPT

## 2022-12-14 PROCEDURE — 6370000000 HC RX 637 (ALT 250 FOR IP): Performed by: EMERGENCY MEDICINE

## 2022-12-14 PROCEDURE — 80053 COMPREHEN METABOLIC PANEL: CPT

## 2022-12-14 PROCEDURE — 83880 ASSAY OF NATRIURETIC PEPTIDE: CPT

## 2022-12-14 PROCEDURE — 84484 ASSAY OF TROPONIN QUANT: CPT

## 2022-12-14 PROCEDURE — 83735 ASSAY OF MAGNESIUM: CPT

## 2022-12-14 PROCEDURE — 2580000003 HC RX 258: Performed by: STUDENT IN AN ORGANIZED HEALTH CARE EDUCATION/TRAINING PROGRAM

## 2022-12-14 PROCEDURE — 6360000002 HC RX W HCPCS: Performed by: STUDENT IN AN ORGANIZED HEALTH CARE EDUCATION/TRAINING PROGRAM

## 2022-12-14 PROCEDURE — 83690 ASSAY OF LIPASE: CPT

## 2022-12-14 RX ORDER — DEXTROSE MONOHYDRATE 100 MG/ML
INJECTION, SOLUTION INTRAVENOUS CONTINUOUS PRN
Status: DISCONTINUED | OUTPATIENT
Start: 2022-12-14 | End: 2022-12-14 | Stop reason: SDUPTHER

## 2022-12-14 RX ORDER — BUSPIRONE HYDROCHLORIDE 15 MG/1
30 TABLET ORAL 3 TIMES DAILY
Status: DISCONTINUED | OUTPATIENT
Start: 2022-12-14 | End: 2022-12-14 | Stop reason: HOSPADM

## 2022-12-14 RX ORDER — METHYLPREDNISOLONE SODIUM SUCCINATE 125 MG/2ML
125 INJECTION, POWDER, LYOPHILIZED, FOR SOLUTION INTRAMUSCULAR; INTRAVENOUS ONCE
Status: COMPLETED | OUTPATIENT
Start: 2022-12-14 | End: 2022-12-14

## 2022-12-14 RX ORDER — CLOPIDOGREL BISULFATE 75 MG/1
75 TABLET ORAL DAILY
Status: DISCONTINUED | OUTPATIENT
Start: 2022-12-14 | End: 2022-12-14 | Stop reason: HOSPADM

## 2022-12-14 RX ORDER — POTASSIUM CHLORIDE 20 MEQ/1
40 TABLET, EXTENDED RELEASE ORAL 2 TIMES DAILY WITH MEALS
Status: DISCONTINUED | OUTPATIENT
Start: 2022-12-14 | End: 2022-12-14 | Stop reason: HOSPADM

## 2022-12-14 RX ORDER — DEXTROSE MONOHYDRATE 100 MG/ML
INJECTION, SOLUTION INTRAVENOUS CONTINUOUS PRN
Status: DISCONTINUED | OUTPATIENT
Start: 2022-12-14 | End: 2022-12-14 | Stop reason: HOSPADM

## 2022-12-14 RX ORDER — INSULIN LISPRO 100 [IU]/ML
0-4 INJECTION, SOLUTION INTRAVENOUS; SUBCUTANEOUS NIGHTLY
Status: DISCONTINUED | OUTPATIENT
Start: 2022-12-14 | End: 2022-12-14 | Stop reason: HOSPADM

## 2022-12-14 RX ORDER — ONDANSETRON 2 MG/ML
4 INJECTION INTRAMUSCULAR; INTRAVENOUS EVERY 6 HOURS PRN
Status: DISCONTINUED | OUTPATIENT
Start: 2022-12-14 | End: 2022-12-14 | Stop reason: HOSPADM

## 2022-12-14 RX ORDER — 0.9 % SODIUM CHLORIDE 0.9 %
1000 INTRAVENOUS SOLUTION INTRAVENOUS ONCE
Status: COMPLETED | OUTPATIENT
Start: 2022-12-14 | End: 2022-12-14

## 2022-12-14 RX ORDER — ACETAMINOPHEN 325 MG/1
650 TABLET ORAL EVERY 4 HOURS PRN
Status: DISCONTINUED | OUTPATIENT
Start: 2022-12-14 | End: 2022-12-14 | Stop reason: HOSPADM

## 2022-12-14 RX ORDER — INSULIN LISPRO 100 [IU]/ML
0-4 INJECTION, SOLUTION INTRAVENOUS; SUBCUTANEOUS
Status: DISCONTINUED | OUTPATIENT
Start: 2022-12-14 | End: 2022-12-14 | Stop reason: HOSPADM

## 2022-12-14 RX ORDER — SODIUM CHLORIDE 0.9 % (FLUSH) 0.9 %
5-40 SYRINGE (ML) INJECTION EVERY 12 HOURS SCHEDULED
Status: DISCONTINUED | OUTPATIENT
Start: 2022-12-14 | End: 2022-12-14 | Stop reason: HOSPADM

## 2022-12-14 RX ORDER — SODIUM CHLORIDE 0.9 % (FLUSH) 0.9 %
5-40 SYRINGE (ML) INJECTION PRN
Status: DISCONTINUED | OUTPATIENT
Start: 2022-12-14 | End: 2022-12-14 | Stop reason: HOSPADM

## 2022-12-14 RX ORDER — SODIUM CHLORIDE 9 MG/ML
INJECTION, SOLUTION INTRAVENOUS PRN
Status: DISCONTINUED | OUTPATIENT
Start: 2022-12-14 | End: 2022-12-14 | Stop reason: HOSPADM

## 2022-12-14 RX ORDER — AZITHROMYCIN 250 MG/1
500 TABLET, FILM COATED ORAL ONCE
Status: COMPLETED | OUTPATIENT
Start: 2022-12-14 | End: 2022-12-14

## 2022-12-14 RX ORDER — LISINOPRIL 10 MG/1
10 TABLET ORAL DAILY
Status: DISCONTINUED | OUTPATIENT
Start: 2022-12-14 | End: 2022-12-14 | Stop reason: HOSPADM

## 2022-12-14 RX ORDER — LIDOCAINE 4 G/G
1 PATCH TOPICAL DAILY PRN
Status: DISCONTINUED | OUTPATIENT
Start: 2022-12-14 | End: 2022-12-14 | Stop reason: HOSPADM

## 2022-12-14 RX ORDER — ONDANSETRON 2 MG/ML
4 INJECTION INTRAMUSCULAR; INTRAVENOUS ONCE
Status: COMPLETED | OUTPATIENT
Start: 2022-12-14 | End: 2022-12-14

## 2022-12-14 RX ORDER — ONDANSETRON 4 MG/1
4 TABLET, ORALLY DISINTEGRATING ORAL EVERY 8 HOURS PRN
Status: DISCONTINUED | OUTPATIENT
Start: 2022-12-14 | End: 2022-12-14 | Stop reason: HOSPADM

## 2022-12-14 RX ORDER — PREDNISONE 20 MG/1
60 TABLET ORAL DAILY
Qty: 15 TABLET | Refills: 0 | Status: SHIPPED | OUTPATIENT
Start: 2022-12-15 | End: 2022-12-20

## 2022-12-14 RX ORDER — ATORVASTATIN CALCIUM 40 MG/1
40 TABLET, FILM COATED ORAL DAILY
Status: DISCONTINUED | OUTPATIENT
Start: 2022-12-14 | End: 2022-12-14 | Stop reason: HOSPADM

## 2022-12-14 RX ORDER — IPRATROPIUM BROMIDE AND ALBUTEROL SULFATE 2.5; .5 MG/3ML; MG/3ML
1 SOLUTION RESPIRATORY (INHALATION)
Status: DISCONTINUED | OUTPATIENT
Start: 2022-12-14 | End: 2022-12-14 | Stop reason: HOSPADM

## 2022-12-14 RX ORDER — AZITHROMYCIN 250 MG/1
250 TABLET, FILM COATED ORAL SEE ADMIN INSTRUCTIONS
Qty: 6 TABLET | Refills: 0 | Status: ON HOLD | OUTPATIENT
Start: 2022-12-14 | End: 2022-12-18 | Stop reason: HOSPADM

## 2022-12-14 RX ORDER — BUDESONIDE AND FORMOTEROL FUMARATE DIHYDRATE 80; 4.5 UG/1; UG/1
2 AEROSOL RESPIRATORY (INHALATION) 2 TIMES DAILY
Status: DISCONTINUED | OUTPATIENT
Start: 2022-12-14 | End: 2022-12-14 | Stop reason: HOSPADM

## 2022-12-14 RX ORDER — FAMOTIDINE 20 MG/1
20 TABLET, FILM COATED ORAL 2 TIMES DAILY
Status: DISCONTINUED | OUTPATIENT
Start: 2022-12-14 | End: 2022-12-14 | Stop reason: HOSPADM

## 2022-12-14 RX ORDER — ENOXAPARIN SODIUM 100 MG/ML
30 INJECTION SUBCUTANEOUS 2 TIMES DAILY
Status: DISCONTINUED | OUTPATIENT
Start: 2022-12-14 | End: 2022-12-14 | Stop reason: HOSPADM

## 2022-12-14 RX ORDER — ALBUTEROL SULFATE 90 UG/1
2 AEROSOL, METERED RESPIRATORY (INHALATION) 3 TIMES DAILY
Status: DISCONTINUED | OUTPATIENT
Start: 2022-12-14 | End: 2022-12-14 | Stop reason: HOSPADM

## 2022-12-14 RX ADMIN — AZITHROMYCIN 500 MG: 250 TABLET, FILM COATED ORAL at 10:35

## 2022-12-14 RX ADMIN — PREDNISONE 60 MG: 50 TABLET ORAL at 12:53

## 2022-12-14 RX ADMIN — METHYLPREDNISOLONE SODIUM SUCCINATE 125 MG: 125 INJECTION, POWDER, FOR SOLUTION INTRAMUSCULAR; INTRAVENOUS at 01:35

## 2022-12-14 RX ADMIN — ONDANSETRON 4 MG: 2 INJECTION INTRAMUSCULAR; INTRAVENOUS at 00:35

## 2022-12-14 RX ADMIN — SODIUM CHLORIDE 1000 ML: 9 INJECTION, SOLUTION INTRAVENOUS at 00:35

## 2022-12-14 RX ADMIN — IPRATROPIUM BROMIDE AND ALBUTEROL SULFATE 1 AMPULE: .5; 3 SOLUTION RESPIRATORY (INHALATION) at 16:04

## 2022-12-14 RX ADMIN — POTASSIUM CHLORIDE 40 MEQ: 1500 TABLET, EXTENDED RELEASE ORAL at 08:57

## 2022-12-14 RX ADMIN — IPRATROPIUM BROMIDE AND ALBUTEROL SULFATE 1 AMPULE: .5; 3 SOLUTION RESPIRATORY (INHALATION) at 09:48

## 2022-12-14 RX ADMIN — ALBUTEROL SULFATE 2 PUFF: 90 AEROSOL, METERED RESPIRATORY (INHALATION) at 16:03

## 2022-12-14 RX ADMIN — ACETAMINOPHEN 650 MG: 325 TABLET ORAL at 12:53

## 2022-12-14 RX ADMIN — ALBUTEROL SULFATE 2 PUFF: 90 AEROSOL, METERED RESPIRATORY (INHALATION) at 09:51

## 2022-12-14 RX ADMIN — SODIUM CHLORIDE 1000 ML: 9 INJECTION, SOLUTION INTRAVENOUS at 02:46

## 2022-12-14 RX ADMIN — INSULIN LISPRO 4 UNITS: 100 INJECTION, SOLUTION INTRAVENOUS; SUBCUTANEOUS at 11:30

## 2022-12-14 RX ADMIN — BUDESONIDE AND FORMOTEROL FUMARATE DIHYDRATE 2 PUFF: 80; 4.5 AEROSOL RESPIRATORY (INHALATION) at 09:50

## 2022-12-14 RX ADMIN — TIOTROPIUM BROMIDE INHALATION SPRAY 2 PUFF: 3.12 SPRAY, METERED RESPIRATORY (INHALATION) at 09:51

## 2022-12-14 ASSESSMENT — PAIN SCALES - GENERAL
PAINLEVEL_OUTOF10: 0
PAINLEVEL_OUTOF10: 3

## 2022-12-14 ASSESSMENT — ENCOUNTER SYMPTOMS
NAUSEA: 1
BACK PAIN: 0
SHORTNESS OF BREATH: 1
DIARRHEA: 1
COLOR CHANGE: 0
TROUBLE SWALLOWING: 0
ABDOMINAL PAIN: 0
VOMITING: 0
COUGH: 0
TACHYPNEA: 1
CHEST TIGHTNESS: 1

## 2022-12-14 NOTE — ED NOTES
Pt. Arrives to ED via EMS for c/o SOB, palpitations and excessive phlegm. Pt states that this began today with the SOB and then she began to feel like her heart was racing. Pt is also c/o excessive phlegm. Pt has a hx of COPD and asthma but states that her phlegm is much more copious lately. PTA Pt gave herself 3 albuterol treatments and EMS gave her combi vent. Upon arrival pt is tachycardic but A&O X4 and speaking in complete sentences and on room air.          Heaven Kent RN  12/14/22 3230

## 2022-12-14 NOTE — CARE COORDINATION
Case Management Assessment  Initial Evaluation    Date/Time of Evaluation: 12/14/2022 12:57 PM  Assessment Completed by: Camacho Leija RN    If patient is discharged prior to next notation, then this note serves as note for discharge by case management. Patient Name: Johnathan South                   YOB: 1960  Diagnosis: Shortness of breath [R06.02]  Dyspnea [R06.00]  Hyperglycemia [R73.9]                   Date / Time: 12/14/2022 12:05 AM    Patient Admission Status: Inpatient   Readmission Risk (Low < 19, Mod (19-27), High > 27): Readmission Risk Score: 11.6    Current PCP: Cinda Zapien MD  PCP verified by CM? Yes Stephan Welch MD)      History Provided by: Patient  Patient Orientation: Alert and Oriented    Patient Cognition: Alert    Hospitalization in the last 30 days (Readmission):  No    If yes, Readmission Assessment in CM Navigator will be completed. Advance Directives:      Code Status: Full Code   Patient's Primary Decision Maker is:        Discharge Planning:    Patient lives with: Children (son lives upstairs duplex) Type of Home: House  Primary Care Giver: Family  Patient Support Systems include: Family Members   Current Financial resources: Medicare, Medicaid  Current community resources: Transportation  Current services prior to admission: None            Current DME:              Type of Home Care services:       ADLS  Prior functional level: Assistance with the following:, Housework, Shopping  Current functional level: Assistance with the following:, Housework, Shopping    PT AM-PAC:   /24  OT AM-PAC:   /24    Family can provide assistance at DC:  Yes    Plans to Return to Present Housing: Yes  Other Identified Issues/Barriers to RETURNING to current housing: none  Potential Assistance needed at discharge: N/A            Potential DME:    Patient expects to discharge to: [address] [place] [address] for transportation at discharge: Family    Financial    Payor: Shaina Fajardo / Plan: MEDICARE PART A AND B / Product Type: *No Product type* /         Potential assistance Purchasing Medications: No  Meds-to-Beds request: Yes      [phone] [address]  Phone: [phone] Fax: Walter [phone] [facility]  [address]  [address]  Phone: [phone] Fax: [phone]      Notes:    Factors facilitating achievement of predicted outcomes: Family support    Barriers to discharge: none    Additional Case Management Notes: return home with family support     The Plan for Transition of Care is related to the following treatment goals of Shortness of breath [R06.02]  Dyspnea [R06.00]  Hyperglycemia [R73.9]        The Patient and/or Patient Representative Agree with the Discharge Plan?  yes    Dominik Torrez RN  Case Management Department    16:14 order recv'd for neb  Met with patient no preference faxed order iza davila spoke to [facility]

## 2022-12-14 NOTE — ED PROVIDER NOTES
[facility]     Emergency Department     Faculty Attestation    I performed a history and physical examination of the patient and discussed management with the resident. I have reviewed and agree with the residents findings including all diagnostic interpretations, and treatment plans as written. Any areas of disagreement are noted on the chart. I was personally present for the key portions of any procedures. I have documented in the chart those procedures where I was not present during the key portions. I have reviewed the emergency nurses triage note. I agree with the chief complaint, past medical history, past surgical history, allergies, medications, social and family history as documented unless otherwise noted below. Documentation of the HPI, Physical Exam and Medical Decision Making performed by emelyibcristiano is based on my personal performance of the HPI, PE and MDM. For Physician Assistant/ Nurse Practitioner cases/documentation I have personally evaluated this patient and have completed at least one if not all key elements of the E/M (history, physical exam, and MDM). Additional findings are as noted. 64 yo F c/o sob, & nausea, no fever, no cp, hx dm,   PE hr 116, gcs 15 neck supple, abdomen non tender, no distension, no rigidity, no calf swelling, no calf tenderness,     D dimer years -, admitted    EKG Interpretation    Interpreted by me  Sinus tachycardia, hr 110, no ischemia, normal axis, qtc 492    CRITICAL CARE: There was a high probability of clinically significant/life threatening deterioration in this patient's condition which required my urgent intervention. Total critical care time was 10 minutes. This excludes any time for separately reportable procedures.        [address] [facility], DO  12/14/22 [address] [address], DO  12/14/22 [address] [facility],   12/14/22 1681

## 2022-12-14 NOTE — ED NOTES
The following labs were labeled with appropriate pt sticker and tubed to lab:     [] Blue     [] Lavender   [] on ice  [] Green/yellow  [] Green/black [] on ice  [] Rochelle Blood  [] on ice  [] Yellow  [] Red  [] Type/ Screen  [] ABG  [] VBG    [x] COVID-19 swab    [x] Rapid  [] PCR  [] Flu swab  [] Peds Viral Panel     [] Urine Sample  [] Fecal Sample  [] Pelvic Cultures  [] Blood Cultures  [] X 2  [] STREP Cultures         Tammy Hayes RN  12/14/22 4073

## 2022-12-14 NOTE — ED PROVIDER NOTES
[facility] ED  Emergency Department Encounter  EmergencyMedicine Resident     Pt Name:Pina Yan  MRN: [id number]  [place] 1960  Date of evaluation: 12/14/22  PCP:  Hermann Baum MD    This patient was evaluated in the Emergency Department for symptoms described in the history of present illness. The patient was evaluated in the context of the global COVID-19 pandemic, which necessitated consideration that the patient might be at risk for infection with the SARS-CoV-2 virus that causes COVID-19. Institutional protocols and algorithms that pertain to the evaluation of patients at risk for COVID-19 are in a state of rapid change based on information released by regulatory bodies including the CDC and federal and state organizations. These policies and algorithms were followed during the patient's care in the ED. CHIEF COMPLAINT       Chief Complaint   Patient presents with    Shortness of Breath       HISTORY OF PRESENT ILLNESS  (Location/Symptom, Timing/Onset, Context/Setting, Quality, Duration, Modifying Factors, Severity.)      Jessy Chandler is a 63 y.o. female who presents with shortness of breath. Patient has a history of COPD, CAD. Patient has not had a stent before. Patient states that her shortness of breath was acute onset. Patient is currently in mild to moderate distress. Patient's initial blood glucose was greater than 400, initial beta hydroxybutyrate was negative. Patient states that she missed her dose of Trulicity this morning, she has been having some diarrhea, some nausea, no vomiting. Patient has been afebrile, she has a history of morbid obesity.         PAST MEDICAL / SURGICAL / SOCIAL / FAMILY HISTORY      has a past medical history of Acute on chronic systolic CHF (congestive heart failure) ([address] Asthma, Blood circulation, collateral, CAD (coronary artery disease), COPD (chronic obstructive pulmonary disease) ([address] COPD exacerbation ([address] Diabetes mellitus ([facility] Hyperlipidemia, Hypertension, Ischemic colitis ([facility] Movement disorder, Neuropathy, PAD [facility]peripheral artery disease) [facility] Tobacco abuse, and Tobacco abuse counseling.     has a past surgical history that includes Coronary angioplasty with stent; Tonsillectomy; Inner ear surgery (Right); and Coronary angioplasty with stent (02/11/2019). Social History     Socioeconomic History    Marital status:      Spouse name: Not on file    Number of children: Not on file    Years of education: Not on file    Highest education level: Not on file   Occupational History    Not on file   Tobacco Use    Smoking status: Every Day     Packs/day: 0.50     Years: 45.00     Pack years: 22.50     Types: Cigarettes    Smokeless tobacco: Current   Substance and Sexual Activity    Alcohol use: No    Drug use: Yes     Types: Marijuana Katnakul Fuchs)    Sexual activity: Never   Other Topics Concern    Not on file   Social History Narrative    Not on file     Social Determinants of Health     Financial Resource Strain: Not on file   Food Insecurity: Not on file   Transportation Needs: Not on file   Physical Activity: Not on file   Stress: Not on file   Social Connections: Not on file   Intimate Partner Violence: Not on file   Housing Stability: Not on file       Family History   Problem Relation Age of Onset    Hypertension Mother     Hypertension Father     Diabetes Sister     Diabetes Brother     Stroke Brother        Allergies:  Codeine and Morphine    Home Medications:  Prior to Admission medications    Medication Sig Start Date End Date Taking? Authorizing Provider   furosemide (LASIX) 40 MG tablet Take 1 tablet by mouth in the morning. Hold for 2 more days.  7/22/22   Loulou Munson MD   mometasone-formoterol (DULERA) 200-5 MCG/ACT inhaler Inhale into the lungs 3/3/22   Historical Provider, MD   busPIRone (BUSPAR) 15 MG tablet Take 30 mg by mouth 3 times daily    Historical Provider, MD   lisinopril (PRINIVIL;ZESTRIL) 10 MG tablet Take 1 tablet by mouth daily 10/9/21   Chidi Graham MD   clopidogrel (PLAVIX) 75 MG tablet Take 1 tablet by mouth daily 10/9/21   Chidi Graham MD   famotidine (PEPCID) 20 MG tablet Take 20 mg by mouth 2 times daily    Historical Provider, MD   albuterol sulfate HFA (PROVENTIL;VENTOLIN;PROAIR) 108 (90 Base) MCG/ACT inhaler Inhale 2 puffs into the lungs 3 times daily    Historical Provider, MD   lidocaine (LIDODERM) 5 % Place 1 patch onto the skin as needed for Pain (as needed for rotator cuff pain) 12 hours on, 12 hours off. Historical Provider, MD   apixaban (ELIQUIS) 5 MG TABS tablet Take 1 tablet by mouth 2 times daily 10/5/21   Jennifer Shoemaker MD   atorvastatin (LIPITOR) 40 MG tablet Take 1 tablet by mouth daily 7/27/21   Roel Gavin MD   Dulaglutide (TRULICITY) 1.5 AW/0.2YT SOPN Inject 1.5 mg into the skin once a week    Historical Provider, MD   nitroGLYCERIN (NITROSTAT) 0.4 MG SL tablet up to max of 3 total doses. If no relief after 1 dose, call 911. 2/12/19   Thelda [place], APRN - CNP   metoprolol tartrate (LOPRESSOR) 25 MG tablet Take 0.5 tablets by mouth 2 times daily 2/12/19   Thelda [place], APRN - CNP   Lancets MISC 1 each by Does not apply route daily 2/12/19   Andre Whittington MD   blood glucose monitor strips Test 3 times a day & as needed for symptoms of irregular blood glucose. 2/12/19   Andre Whittington MD   glucose monitoring kit (FREESTYLE) monitoring kit 1 kit by Does not apply route daily 2/12/19   Andre Whittington MD   aspirin 81 MG chewable tablet Take 1 tablet by mouth daily 2/13/19 7/22/22  Thelda [place], APRN - CNP   albuterol (PROVENTIL) (5 MG/ML) 0.5% nebulizer solution Take 0.5 mLs by nebulization every 6 hours as needed for Wheezing 3/16/18   Melvin Kemp MD   tiotropium (SPIRIVA) 18 MCG inhalation capsule Inhale 1 capsule into the lungs daily Pt told that she was using this medication at home.  3/16/18   Melvin Kemp MD acetaminophen (TYLENOL) 500 MG tablet Take 1 tablet by mouth every 6 hours as needed for Pain 1/9/18   Amadeo Nicole MD       REVIEW OF SYSTEMS    (2-9 systems for level 4, 10 or more for level 5)      Review of Systems   Constitutional:  Negative for chills, fatigue and fever. HENT:  Negative for congestion and trouble swallowing. Eyes:  Negative for visual disturbance. Respiratory:  Positive for chest tightness and shortness of breath. Negative for cough. Gastrointestinal:  Positive for diarrhea and nausea. Negative for abdominal pain and vomiting. Endocrine: Negative for polyuria. Genitourinary:  Negative for dysuria, flank pain, hematuria and urgency. Musculoskeletal:  Negative for back pain and myalgias. Skin:  Negative for color change. Allergic/Immunologic: Negative for immunocompromised state. Neurological:  Positive for light-headedness. Negative for dizziness, syncope, weakness and headaches. PHYSICAL EXAM   (up to 7 for level 4, 8 or more for level 5)      INITIAL VITALS:   /86   Pulse (!) 115   Temp 98.5 °F (36.9 °C) (Oral)   Resp 23   Ht 5' 8\" (1.727 m)   Wt 240 lb (108.9 kg)   SpO2 97%   BMI 36.49 kg/m²     Physical Exam  Constitutional:       General: She is in acute distress. Appearance: Normal appearance. She is obese. She is not ill-appearing or toxic-appearing. HENT:      Head: Normocephalic and atraumatic. Nose: No congestion. Mouth/Throat:      Mouth: Mucous membranes are moist.   Eyes:      Conjunctiva/sclera: Conjunctivae normal.   Cardiovascular:      Rate and Rhythm: Regular rhythm. Tachycardia present. Pulses: Normal pulses. Heart sounds: Normal heart sounds. No murmur heard. No gallop. Pulmonary:      Effort: Pulmonary effort is normal. No respiratory distress. Breath sounds: No stridor. Examination of the right-middle field reveals rhonchi. Examination of the left-middle field reveals rhonchi.  Examination of the right-lower field reveals rhonchi. Examination of the left-lower field reveals rhonchi. Rhonchi present. No wheezing. Chest:      Chest wall: No tenderness. Abdominal:      General: Abdomen is flat. There is no distension. Palpations: There is no mass. Tenderness: There is no abdominal tenderness. There is no guarding or rebound. Musculoskeletal:         General: No swelling, tenderness or deformity. Skin:     General: Skin is warm. Coloration: Skin is not jaundiced. Findings: No bruising. Neurological:      General: No focal deficit present. Mental Status: She is alert and oriented to person, place, and time.        DIFFERENTIAL  DIAGNOSIS     PLAN (LABS / IMAGING / EKG):  Orders Placed This Encounter   Procedures    XR CHEST PORTABLE    Troponin    Comprehensive Metabolic Panel    Lipase    Beta-Hydroxybutyrate    BLOOD GAS, VENOUS    Lactic Acid    Brain Natriuretic Peptide    D-Dimer, Quantitative    Lactic Acid    Troponin    EKG 12 Lead       MEDICATIONS ORDERED:  Orders Placed This Encounter   Medications    0.9 % sodium chloride bolus    ondansetron (ZOFRAN) injection 4 mg    ipratropium-albuterol (DUONEB) nebulizer solution 1 ampule     Order Specific Question:   Initiate RT Bronchodilator Protocol     Answer:   Yes - ED protocol    methylPREDNISolone sodium (SOLU-MEDROL) injection 125 mg    0.9 % sodium chloride bolus           DIAGNOSTIC RESULTS / EMERGENCY DEPARTMENT COURSE / MDM   LAB RESULTS:  Results for orders placed or performed during the hospital encounter of 12/14/22   Troponin   Result Value Ref Range    Troponin, High Sensitivity 20 (H) 0 - 14 ng/L   Comprehensive Metabolic Panel   Result Value Ref Range    Glucose 350 (H) 70 - 99 mg/dL    BUN 16 8 - 23 mg/dL    Creatinine 1.58 (H) 0.50 - 0.90 mg/dL    Est, Glom Filt Rate 37 (L) >60 mL/min/1.73m2    Calcium 9.2 8.6 - 10.4 mg/dL    Sodium 135 135 - 144 mmol/L    Potassium 3.3 (L) 3.7 - 5.3 mmol/L    Chloride 98 98 - 107 mmol/L    CO2 26 20 - 31 mmol/L    Anion Gap 11 9 - 17 mmol/L    Alkaline Phosphatase 123 (H) 35 - 104 U/L    ALT 10 5 - 33 U/L    AST 11 <32 U/L    Total Bilirubin 0.2 (L) 0.3 - 1.2 mg/dL    Total Protein 6.7 6.4 - 8.3 g/dL    Albumin 3.5 3.5 - 5.2 g/dL    Albumin/Globulin Ratio 1.1 1.0 - 2.5   Lipase   Result Value Ref Range    Lipase 42 13 - 60 U/L   Beta-Hydroxybutyrate   Result Value Ref Range    Beta-Hydroxybutyrate 0.17 0.02 - 0.27 mmol/L   BLOOD GAS, VENOUS   Result Value Ref Range    pH, Adolph 7.423 (H) 7.320 - 7.420    pCO2, Adolph 39.1 39 - 55 mm Hg    pO2, Adolph 52.3 (H) 30 - 50 mm Hg    HCO3, Venous 25.1 24 - 30 mmol/L    Positive Base Excess, Adolph 1.2 0.0 - 2.0 mmol/L    O2 Sat, Adolph 87.1 (H) 60.0 - 85.0 %    Carboxyhemoglobin 4.0 0 - 5 %    Pt Temp 37.0     FIO2 INFORMATION NOT PROVIDED    Lactic Acid   Result Value Ref Range    Lactic Acid, Whole Blood 3.8 (H) 0.7 - 2.1 mmol/L   Brain Natriuretic Peptide   Result Value Ref Range    Pro-BNP 1,269 (H) <300 pg/mL   D-Dimer, Quantitative   Result Value Ref Range    D-Dimer, Quant 0.79 mg/L FEU   Lactic Acid   Result Value Ref Range    Lactic Acid, Whole Blood 2.2 (H) 0.7 - 2.1 mmol/L   Troponin   Result Value Ref Range    Troponin, High Sensitivity 23 (H) 0 - 14 ng/L         RADIOLOGY:  XR CHEST PORTABLE    Result Date: 12/14/2022  She streaky bibasilar opacities, atelectasis favored over infection. EMERGENCY DEPARTMENT COURSE:  ED Course as of 12/14/22 0356   Wed Dec 14, 2022   0126 Dimer at 0.79, PE excluded per years criteria. Ketones negative, BNP elevated at apparent baseline [KK]   0128 Will give breathing treatment and IV solumedrol [KK]   0210 Evaluated at bedside, markedly improved symptoms, no longer tachycardic.   [KK]   0246 Repeat lactate 2.2 [KK]      ED Course User Index  [KK] Rajinder Lim DO       Assessment/Plan: Patient is a 68-year-old female, history of diabetes, missed a dose of Trulicity, presented with hyperglycemia, in apparent DKA initially however beta hydroxybutyrate was negative. Patient is not acidotic, shortness of breath treated as a COPD exacerbation, given dose of Solu-Medrol, duo nebs. Patient symptomatically improved, initial troponin was around 20, up trended to 23, the setting of patient's concerning history, patient will be admitted to observation unit for cardiology evaluation, trending troponins. If patient is stable and troponin started to trend downward, patient may be safely discharged with outpatient follow-up. 1.  Troponin elevation, dyspnea   -Trend troponin   -Cardiology evaluation   +/- Stress test      FINAL IMPRESSION      1. Shortness of breath    2. Hyperglycemia          DISPOSITION / PLAN     DISPOSITION Decision To Admit 12/14/2022 03:08:47 AM      PATIENT REFERRED TO:  No follow-up provider specified.     DISCHARGE MEDICATIONS:  New Prescriptions    No medications on file       Yu Marrufo DO  Emergency Medicine Resident    (Please note that portions of thisnote were completed with a voice recognition program.  Efforts were made to edit the dictations but occasionally words are mis-transcribed.)       Austin Bourgeois DO  Resident  12/14/22 6427

## 2022-12-14 NOTE — CONSULTS
[facility]     Consult / H&P               Today's Date: 12/14/2022  Patient Name: Marcio Calderon  Date of admission: 12/14/2022 12:05 AM  Patient's age: 63 y.o., 1960  Admission Dx: Shortness of breath [R06.02]  Dyspnea [R06.00]  Hyperglycemia [R73.9]    Reason for Consult:  Cardiac evaluation    Requesting Physician: Uri Murray MD    CHIEF COMPLAINT:  Chest pain     History Obtained From:  patient, electronic medical record    HISTORY OF PRESENT ILLNESS:      The patient is a 63 y.o.  female who is admitted to the hospital for chest pain, shortness of breath. The patient has PMH of CAD s/p PCI to RCA in 09/2021, EF 44% at that time, paroxysmal atrial fibrillation with RVR, on eliquis for anticoagulation, severe COPD, SILVA, peripheral arterial disease, obesity, SILVA who presented to the hospital with c/c of left focal chest pain and shortness of breath. The patient reports she woke up to go to the restroom when she started experiencing sudden onset shortness of breath associated with left infra-axillary discomfort. Troponin 20-23. Initial EKG revealed concern for atrial fibrillation with RVR. Cardiology was consulted for further recommendations. Past Medical History:   has a past medical history of Acute on chronic systolic CHF (congestive heart failure) ([address] Asthma, Blood circulation, collateral, CAD (coronary artery disease), COPD (chronic obstructive pulmonary disease) ([address] COPD exacerbation ([address] Diabetes mellitus ([address] Hyperlipidemia, Hypertension, Ischemic colitis ([address] Movement disorder, Neuropathy, PAD (peripheral artery disease) ([address] Tobacco abuse, and Tobacco abuse counseling. Past Surgical History:   has a past surgical history that includes Coronary angioplasty with stent; Tonsillectomy; Inner ear surgery (Right); and Coronary angioplasty with stent (02/11/2019).      Home Medications:    Prior to Admission medications    Medication Sig Start Date End Date Taking? Authorizing Provider   furosemide (LASIX) 40 MG tablet Take 1 tablet by mouth in the morning. Hold for 2 more days. 7/22/22   Jan Grier MD   mometasone-formoterol (DULERA) 200-5 MCG/ACT inhaler Inhale into the lungs 3/3/22   Historical Provider, MD   busPIRone (BUSPAR) 15 MG tablet Take 30 mg by mouth 3 times daily    Historical Provider, MD   lisinopril (PRINIVIL;ZESTRIL) 10 MG tablet Take 1 tablet by mouth daily 10/9/21   Chidi Ivory MD   clopidogrel (PLAVIX) 75 MG tablet Take 1 tablet by mouth daily 10/9/21   Chidi Ivory MD   famotidine (PEPCID) 20 MG tablet Take 20 mg by mouth 2 times daily    Historical Provider, MD   albuterol sulfate HFA (PROVENTIL;VENTOLIN;PROAIR) 108 (90 Base) MCG/ACT inhaler Inhale 2 puffs into the lungs 3 times daily    Historical Provider, MD   lidocaine (LIDODERM) 5 % Place 1 patch onto the skin as needed for Pain (as needed for rotator cuff pain) 12 hours on, 12 hours off. Historical Provider, MD   apixaban (ELIQUIS) 5 MG TABS tablet Take 1 tablet by mouth 2 times daily 10/5/21   Yanique Cho MD   atorvastatin (LIPITOR) 40 MG tablet Take 1 tablet by mouth daily 7/27/21   Sally Davenport MD   Dulaglutide (TRULICITY) 1.5 PL/9.6VH SOPN Inject 1.5 mg into the skin once a week    Historical Provider, MD   nitroGLYCERIN (NITROSTAT) 0.4 MG SL tablet up to max of 3 total doses. If no relief after 1 dose, call 911. 2/12/19   BINDU Paulino CNP   metoprolol tartrate (LOPRESSOR) 25 MG tablet Take 0.5 tablets by mouth 2 times daily 2/12/19   BINDU Paulino CNP   Lancets MISC 1 each by Does not apply route daily 2/12/19   Yunior Braga MD   blood glucose monitor strips Test 3 times a day & as needed for symptoms of irregular blood glucose.  2/12/19   Yunior Braga MD   glucose monitoring kit (FREESTYLE) monitoring kit 1 kit by Does not apply route daily 2/12/19   Yunior Braga MD   aspirin 81 MG chewable tablet Take 1 tablet by mouth daily 2/13/19 7/22/22  BINDU Hyman - CNP   albuterol (PROVENTIL) (5 MG/ML) 0.5% nebulizer solution Take 0.5 mLs by nebulization every 6 hours as needed for Wheezing 3/16/18   Danya Salas MD   tiotropium (SPIRIVA) 18 MCG inhalation capsule Inhale 1 capsule into the lungs daily Pt told that she was using this medication at home.  3/16/18   Danya Salas MD   acetaminophen (TYLENOL) 500 MG tablet Take 1 tablet by mouth every 6 hours as needed for Pain 1/9/18   Ailyn Murillo MD      Current Facility-Administered Medications: ipratropium-albuterol (DUONEB) nebulizer solution 1 ampule, 1 ampule, Inhalation, Q4H WA  albuterol (PROVENTIL) nebulizer solution 2.5 mg, 2.5 mg, Nebulization, Q6H PRN  albuterol sulfate HFA (PROVENTIL;VENTOLIN;PROAIR) 108 (90 Base) MCG/ACT inhaler 2 puff, 2 puff, Inhalation, TID  atorvastatin (LIPITOR) tablet 40 mg, 40 mg, Oral, Daily  busPIRone (BUSPAR) tablet 30 mg, 30 mg, Oral, TID  clopidogrel (PLAVIX) tablet 75 mg, 75 mg, Oral, Daily  [START ON 12/20/2022] dulaglutide (TRULICITY) SC injection 1.5 mg (Patient Supplied), 1.5 mg, SubCUTAneous, Weekly  famotidine (PEPCID) tablet 20 mg, 20 mg, Oral, BID  lidocaine 4 % external patch 1 patch, 1 patch, TransDERmal, Daily PRN  lisinopril (PRINIVIL;ZESTRIL) tablet 10 mg, 10 mg, Oral, Daily  metoprolol tartrate (LOPRESSOR) tablet 12.5 mg, 12.5 mg, Oral, BID  budesonide-formoterol (SYMBICORT) 80-4.5 MCG/ACT inhaler 2 puff, 2 puff, Inhalation, BID  tiotropium (SPIRIVA RESPIMAT) 2.5 MCG/ACT inhaler 2 puff, 2 puff, Inhalation, Daily  glucose chewable tablet 16 g, 4 tablet, Oral, PRN  sodium chloride flush 0.9 % injection 5-40 mL, 5-40 mL, IntraVENous, 2 times per day  sodium chloride flush 0.9 % injection 5-40 mL, 5-40 mL, IntraVENous, PRN  0.9 % sodium chloride infusion, , IntraVENous, PRN  enoxaparin Sodium (LOVENOX) injection 30 mg, 30 mg, SubCUTAneous, BID  acetaminophen (TYLENOL) tablet 650 mg, 650 mg, Oral, Q4H PRN  ondansetron (ZOFRAN-ODT) disintegrating tablet 4 mg, 4 mg, Oral, Q8H PRN **OR** ondansetron (ZOFRAN) injection 4 mg, 4 mg, IntraVENous, Q6H PRN  potassium chloride (KLOR-CON M) extended release tablet 40 mEq, 40 mEq, Oral, BID WC  predniSONE (DELTASONE) tablet 60 mg, 60 mg, Oral, Daily  insulin lispro (HUMALOG) injection vial 0-4 Units, 0-4 Units, SubCUTAneous, TID WC  insulin lispro (HUMALOG) injection vial 0-4 Units, 0-4 Units, SubCUTAneous, Nightly  dextrose bolus 10% 125 mL, 125 mL, IntraVENous, PRN **OR** dextrose bolus 10% 250 mL, 250 mL, IntraVENous, PRN  glucagon (rDNA) injection 1 mg, 1 mg, SubCUTAneous, PRN  dextrose 10 % infusion, , IntraVENous, Continuous PRN    Allergies:  Codeine and Morphine    Social History:   reports that she has been smoking cigarettes. She has a 22.50 pack-year smoking history. She uses smokeless tobacco. She reports current drug use. Drug: Marijuana [place]). She reports that she does not drink alcohol. Family History: family history includes Diabetes in her brother and sister; Hypertension in her father and mother; Stroke in her brother. No h/o sudden cardiac death. No for premature CAD    REVIEW OF SYSTEMS:    Constitutional: there has been no unanticipated weight loss. There's been No change in energy level, No change in activity level. Eyes: No visual changes or diplopia. No scleral icterus. ENT: No Headaches  Cardiovascular: As above   Respiratory: No previous pulmonary problems, No cough  Gastrointestinal: No abdominal pain. No change in bowel or bladder habits. Genitourinary: No dysuria, trouble voiding, or hematuria. Musculoskeletal:  No gait disturbance, No weakness or joint complaints. Integumentary: No rash or pruritis. Neurological: No headache, diplopia, change in muscle strength, numbness or tingling. No change in gait, balance, coordination, mood, affect, memory, mentation, behavior. Psychiatric: No anxiety, or depression.   Endocrine: No temperature intolerance. No excessive thirst, fluid intake, or urination. No tremor. Hematologic/Lymphatic: No abnormal bruising or bleeding, blood clots or swollen lymph nodes. Allergic/Immunologic: No nasal congestion or hives. PHYSICAL EXAM:      BP (!) 138/92   Pulse 85   Temp 98.2 °F (36.8 °C) (Oral)   Resp 18   Ht 5' 8\" (1.727 m)   Wt 240 lb (108.9 kg)   SpO2 96%   BMI 36.49 kg/m²    Constitutional and General Appearance: alert, cooperative, no distress and appears stated age  HEENT: PERRL, no cervical lymphadenopathy. No masses palpable. Normal oral mucosa  Respiratory:  Normal excursion and expansion without use of accessory muscles  Resp Auscultation: Good respiratory effort. No for increased work of breathing. On auscultation: Bilateral wheezing present   Cardiovascular: The apical impulse is not displaced  Heart tones are crisp and normal. regular S1 and S2.  Jugular venous pulsation Normal  The carotid upstroke is normal in amplitude and contour without delay or bruit  Peripheral pulses are symmetrical and full   Abdomen:   No masses or tenderness  Bowel sounds present  Extremities:   No Cyanosis or Clubbing   Lower extremity edema: Yes, 1+   Skin: Warm and dry  Neurological:  Alert and oriented. Moves all extremities well  No abnormalities of mood, affect, memory, mentation, or behavior are noted    DATA:    Diagnostics:    EKG: A Fib with RVR     ECHO: 9/29/2021  Left ventricle is in the upper limits of normal size. Mildly reduced LV function with Calculated ejection fraction is 44%  Anterior and posterolateral wall motion abnormality. Abnormal global longitudinal strain average of -7%. Grade I (mild) left ventricular diastolic dysfunction. Left atrium is moderately dilated. Aortic sclerosis without stenosis. Calcification of mitral valve leaflet tips with moderate stenosis. Mean  gradient is 7 mmHg  Mild mitral regurgitation. Mild tricuspid regurgitation.  Estimated right ventricular systolic pressure  is 36 mmHg. Stress Test: Not obtained. Cardiac Angiography: 9/29/2021    1. Cath 2/2019 RCA chronic total occlusion (PTCA/JOSE MARTIN), RPDA with mild disease, Patent LAD stent, moderate LCx disease (same as prior)  2. Oct 2021 [facility] NSTEMI  3. Cath 10/4/21 LM normal, LAD 20-30%, LCx 20-30%, RCA mid instent stenosis 90% (PTCA/JOSE MARTIN) - Plavix  4. Echo 9/30/21 LVEF 44%, anerior and posteriolateral WMA, Grade I DD, Moderate MS, mean gradient 7mmHg, Mild MR, Mild TR, RVSP 36mmHg  5. New onset Afib - converted to SR - Eliquis     Angiographic Findings      Cardiac Arteries and Lesion Findings     LMCA: Normal 0% stenosis. LAD: Mild irregularities 20-30%. LCx: Mild irregularities 20-30%. RCA: Mid stent stenosis 90%       Lesion on Mid RCA: Mid subsection. 90% stenosis 20 mm length reduced to    0%. Pre procedure MARY II flow was noted. Post Procedure MARY III flow    was present. Good runoff was present. The lesion was diagnosed as    Moderate Risk (B). Conclusions      Procedure Summary      Restenosis mid RCA stent   Successful PTCA -JOSE MARTIN   Preserved LV function      Recommendations      Post stent protocol   Stop smoking    Labs:     CBC: No results for input(s): WBC, HGB, HCT, PLT in the last 72 hours. BMP:   Recent Labs     12/14/22  0027      K 3.3*   CO2 26   BUN 16   CREATININE 1.58*   LABGLOM 37*   GLUCOSE 350*     BNP: No results for input(s): BNP in the last 72 hours. PT/INR: No results for input(s): PROTIME, INR in the last 72 hours. APTT:No results for input(s): APTT in the last 72 hours. CARDIAC ENZYMES:No results for input(s): CKTOTAL, CKMB, CKMBINDEX, TROPONINI in the last 72 hours.   FASTING LIPID PANEL:  Lab Results   Component Value Date/Time    HDL 35 06/16/2020 07:53 PM    LDLDIRECT 123 06/02/2012 05:17 AM    TRIG 152 06/16/2020 07:53 PM     LIVER PROFILE:  Recent Labs     12/14/22  0027   AST 11   ALT 10   LABALBU 3.5       IMPRESSION:    Patient Active Problem List   Diagnosis    SOB (shortness of breath)    Chronic bronchitis (HCC)    Chest pain    Lung nodule    ACS (acute coronary syndrome) (HCC)    Essential hypertension    S/P drug eluting coronary stent placement - Mid RCA () 2/11/19 (Dr. Janice Espino)    Gastroenteritis    Coronary artery disease involving native coronary artery of native heart without angina pectoris    Vitamin D deficiency    Marijuana abuse    Type 2 diabetes mellitus with hyperglycemia, without long-term current use of insulin (HCC)    Other constipation    Urine retention    Lobar pneumonia ([facility] [address]    Chronic combined systolic (congestive) and diastolic (congestive) heart failure (HCC)    Former smoker    Anxiety    Class 2 obesity with body mass index (BMI) of 36.0 to 36.9 in adult    Pyuria    Dyspnea     Noncardiac left axillary chest pain   Acute COPD exacerbation   CAD s/p PCI to RCA in 2021  Chronic Combined systolic and diastolic CHF with EF 61%, compensated clinically   HTN  HLD  Type II DM  COPD  SILVA  Former smoker    RECOMMENDATIONS:  The patient's symptoms are likely related to COPD exacerbation given significant wheezing and shortness of breath - treat per primary   will obtain 2D ECHO for evaluation of EF and eval of WMAs. Continue lipitor, plavix, eliquis, lisinopril. Give an additional dose of lasix  20 mg iv and continue 40 mg BID. Start lopressor 12.5 mg BID. Continue rest of management of COPD exacerbation per Obs team.  Replace electrolytes to keep K>4 and Mg>2. Lynne Angel MD  Fellow, [address] [place] [address]        Attending Physician Statement  I have discussed the care of [facility], including pertinent history and exam findings,  with the cardiology fellow/resident. I have seen and examined the patient and the key elements of all parts of the encounter have been performed by me.  I have completed at least one if not all key elements of the E/M (history, physical exam, and MDM).         Krupa Patel MD, [facility]

## 2022-12-14 NOTE — H&P
[address]  CDU / OBSERVATION ENCOUNTER  ATTENDING NOTE     Pt Name: Savannah Denson  MRN: [id number]  [place] 1960  Date of evaluation: 12/14/22  Patient's PCP is :  Thomas Ramirez MD    CHIEF COMPLAINT       Chief Complaint   Patient presents with    Shortness of [address] Aaron Webster is a 63 y.o. female who presents complaints of shortness of breath. Patient has had issues with cough and purulent sputum. Patient has had bilateral wheeze. Patient presented to the emergency department with initial ongoing shortness of breath and dyspnea on exertion     Patient has a nebulizer at home. Patient has had purulent sputum which is more clear now. Patient does not have fevers or chills. Patient has improvement with aerosols in the department. Patient is hyperglycemic likely secondary to steroid use in addition to stress. Patient much improved from presentation to the ED for her initial visit.     Location/Symptom: Dyspnea and dyspnea on exertion  Timing/Onset: Patient has had symptoms over several weeks which have been exacerbated over the last several days  Provocation: Unclear  Quality: Dyspnea  Radiation: None  Severity: Moderate initially improved now  Timing/Duration: Days  Modifying Factors: Unclear    REVIEW OF SYSTEMS        General ROS - No fevers, No malaise   Ophthalmic ROS - No discharge, No changes in vision  ENT ROS -  No sore throat, No rhinorrhea,   Respiratory ROS -cough shortness of breath wheeze  Cardiovascular ROS -  chest pain, no dyspnea on exertion  Gastrointestinal ROS - No abdominal pain, no nausea or vomiting, no change in bowel habits, no black or bloody stools  Genito-Urinary ROS - No dysuria, trouble voiding, or hematuria  Musculoskeletal ROS - No myalgias, No arthalgias  Neurological ROS - No headache, no dizziness/lightheadedness, No focal weakness, no loss of sensation  Dermatological ROS - No lesions, No rash (PQRS) Advance directives on face sheet per hospital policy. No change unless specifically mentioned in chart    [address]    has a past medical history of Acute on chronic systolic CHF (congestive heart failure) ([facility] Asthma, Blood circulation, collateral, CAD (coronary artery disease), COPD (chronic obstructive pulmonary disease) ([facility] [address] COPD exacerbation ([facility] [address] Diabetes mellitus ([facility] [address] Hyperlipidemia, Hypertension, Ischemic colitis ([facility] Movement disorder, Neuropathy, PAD (peripheral artery disease) ([facility] Tobacco abuse, and Tobacco abuse counseling. I have reviewed the past medical history with the patient and it is  pertinent to this complaint. SURGICAL HISTORY      has a past surgical history that includes Coronary angioplasty with stent; Tonsillectomy; Inner ear surgery (Right); and Coronary angioplasty with stent (02/11/2019). I have reviewed and agree with Surgical History entered and it is  pertinent to this complaint.      CURRENT MEDICATIONS     ipratropium-albuterol (DUONEB) nebulizer solution 1 ampule, Q4H WA  albuterol (PROVENTIL) nebulizer solution 2.5 mg, Q6H PRN  albuterol sulfate HFA (PROVENTIL;VENTOLIN;PROAIR) 108 (90 Base) MCG/ACT inhaler 2 puff, TID  atorvastatin (LIPITOR) tablet 40 mg, Daily  busPIRone (BUSPAR) tablet 30 mg, TID  clopidogrel (PLAVIX) tablet 75 mg, Daily  [START ON 12/20/2022] dulaglutide (TRULICITY) SC injection 1.5 mg (Patient Supplied), Weekly  famotidine (PEPCID) tablet 20 mg, BID  lidocaine 4 % external patch 1 patch, Daily PRN  lisinopril (PRINIVIL;ZESTRIL) tablet 10 mg, Daily  metoprolol tartrate (LOPRESSOR) tablet 12.5 mg, BID  budesonide-formoterol (SYMBICORT) 80-4.5 MCG/ACT inhaler 2 puff, BID  tiotropium (SPIRIVA RESPIMAT) 2.5 MCG/ACT inhaler 2 puff, Daily  glucose chewable tablet 16 g, PRN  dextrose bolus 10% 125 mL, PRN   Or  dextrose bolus 10% 250 mL, PRN  glucagon (rDNA) injection 1 mg, PRN  dextrose 10 % infusion, Continuous PRN  sodium chloride flush 0.9 % injection 5-40 mL, 2 times per day  sodium chloride flush 0.9 % injection 5-40 mL, PRN  0.9 % sodium chloride infusion, PRN  enoxaparin Sodium (LOVENOX) injection 30 mg, BID  acetaminophen (TYLENOL) tablet 650 mg, Q4H PRN  ondansetron (ZOFRAN-ODT) disintegrating tablet 4 mg, Q8H PRN   Or  ondansetron (ZOFRAN) injection 4 mg, Q6H PRN  potassium chloride (KLOR-CON M) extended release tablet 40 mEq, BID WC  glucose chewable tablet 16 g, PRN  dextrose bolus 10% 125 mL, PRN   Or  dextrose bolus 10% 250 mL, PRN  glucagon (rDNA) injection 1 mg, PRN  dextrose 10 % infusion, Continuous PRN  predniSONE (DELTASONE) tablet 60 mg, Daily  insulin lispro (HUMALOG) injection vial 0-4 Units, TID WC  insulin lispro (HUMALOG) injection vial 0-4 Units, Nightly  glucose chewable tablet 16 g, PRN  dextrose bolus 10% 125 mL, PRN   Or  dextrose bolus 10% 250 mL, PRN  glucagon (rDNA) injection 1 mg, PRN  dextrose 10 % infusion, Continuous PRN        All medication charted and reviewed. ALLERGIES     is allergic to codeine and morphine. FAMILY HISTORY     She indicated that her mother is . She indicated that her father is . She indicated that the status of her sister is unknown. She indicated that the status of her brother is unknown.     family history includes Diabetes in her brother and sister; Hypertension in her father and mother; Stroke in her brother. The patient denies any pertinent family history. I have reviewed and agree with the family history entered. I have reviewed the Family History and it is not significant to the case    SOCIAL HISTORY      reports that she has been smoking cigarettes. She has a 22.50 pack-year smoking history. She uses smokeless tobacco. She reports current drug use. Drug: Marijuana Nan Cory). She reports that she does not drink alcohol. I have reviewed and agree with all Social.  There are no  concerns for substance abuse/use.     PHYSICAL EXAM     INITIAL VITALS:  height is 5' 8\" (1.727 m) and weight is 240 lb (108.9 kg). Her oral temperature is 98.2 °F (36.8 °C). Her blood pressure is 138/92 (abnormal) and her pulse is 85. Her respiration is 18 and oxygen saturation is 96%. CONSTITUTIONAL: AOx4, no apparent distress, appears stated age slight discomfort secondary to wheeze and slight dyspnea with exertion   HEAD: normocephalic, atraumatic   EYES: PERRLA, EOMI    ENT: moist mucous membranes, uvula midline   NECK: supple, symmetric   BACK: symmetric   LUNGS: Patient with slight wheeze with left slightly worse than right. Patient prolonged expiratory phase   CARDIOVASCULAR: regular rate and rhythm, no murmurs, rubs or gallops   ABDOMEN: soft, non-tender, non-distended with normal active bowel sounds   NEUROLOGIC:  MAEx4, no focal sensory or motor deficits   MUSCULOSKELETAL: no clubbing, cyanosis or edema   SKIN: no rash or wounds       DIFFERENTIAL DIAGNOSIS/MDM:     From ED decision making:   Assessment/Plan: Patient is a 29-year-old female, history of diabetes, missed a dose of Trulicity, presented with hyperglycemia, in apparent DKA initially however beta hydroxybutyrate was negative. Patient is not acidotic, shortness of breath treated as a COPD exacerbation, given dose of Solu-Medrol, duo nebs. Patient symptomatically improved, initial troponin was around 20, up trended to 23, the setting of patient's concerning history, patient will be admitted to observation unit for cardiology evaluation, trending troponins. If patient is stable and troponin started to trend downward, patient may be safely discharged with outpatient follow-up.      DIAGNOSTIC RESULTS     EKG: All EKG's are interpreted by the Observation Physician who either signs or Co-signs this chart in the absence of a cardiologist.    EKG Interpretation    Interpreted by observation physician    Rhythm: normal sinus   Rate: tachycardia  Axis: normal  Ectopy: none  Conduction: normal  ST Segments: no acute change  T Waves: no acute change  Q Waves: none    Clinical Impression: no acute changes    Maddison Slater MD         RADIOLOGY:   I directly visualized the following  images and reviewed the radiologist interpretations:    XR CHEST PORTABLE    Result Date: 12/14/2022  EXAMINATION: ONE XRAY VIEW OF THE CHEST 12/14/2022 12:49 am COMPARISON: Chest x-ray 07/20/2022 HISTORY: ORDERING SYSTEM PROVIDED HISTORY: dyspnea TECHNOLOGIST PROVIDED HISTORY: dyspnea FINDINGS: She streaky bibasilar opacities, atelectasis favored over infection. Cardiomediastinal silhouette is within normal limits. No pleural effusion. No pneumothorax. Bony structures are unremarkable. She streaky bibasilar opacities, atelectasis favored over infection. LABS:  I have reviewed and interpreted all available lab results.   Labs Reviewed   TROPONIN - Abnormal; Notable for the following components:       Result Value    Troponin, High Sensitivity 20 (*)     All other components within normal limits   COMPREHENSIVE METABOLIC PANEL - Abnormal; Notable for the following components:    Glucose 350 (*)     Creatinine 1.58 (*)     Est, Glom Filt Rate 37 (*)     Potassium 3.3 (*)     Alkaline Phosphatase 123 (*)     Total Bilirubin 0.2 (*)     All other components within normal limits   BLOOD GAS, VENOUS - Abnormal; Notable for the following components:    pH, Adolph 7.423 (*)     pO2, Adolph 52.3 (*)     O2 Sat, Adolph 87.1 (*)     All other components within normal limits   LACTIC ACID - Abnormal; Notable for the following components:    Lactic Acid, Whole Blood 3.8 (*)     All other components within normal limits   BRAIN NATRIURETIC PEPTIDE - Abnormal; Notable for the following components:    Pro-BNP 1,269 (*)     All other components within normal limits   LACTIC ACID - Abnormal; Notable for the following components:    Lactic Acid, Whole Blood 2.2 (*)     All other components within normal limits   TROPONIN - Abnormal; Notable for the following components:    Troponin, High Sensitivity 23 (*)     All other components within normal limits   BASIC METABOLIC PANEL W/ REFLEX TO MG FOR LOW K - Abnormal; Notable for the following components:    Glucose 466 (*)     Creatinine 1.50 (*)     Est, Glom Filt Rate 40 (*)     All other components within normal limits   TROPONIN - Abnormal; Notable for the following components:    Troponin, High Sensitivity 19 (*)     All other components within normal limits   COVID-19, RAPID   LIPASE   BETA-HYDROXYBUTYRATE   D-DIMER, QUANTITATIVE   MAGNESIUM   TROPONIN   TROPONIN   HEMOGLOBIN A1C   HEMOGLOBIN A1C   TROPONIN   POCT GLUCOSE   POCT GLUCOSE   POCT GLUCOSE   POCT GLUCOSE   POCT GLUCOSE         CDU IMPRESSION / PLAN      Jaida Carpio is a 63 y.o. female who presents with dyspnea and dyspnea on exertion. Cough    Dyspnea on exertion. Rule out CHF. Patient with slight elevation of troponins. Cardiology evaluation  Echocardiogram  Concern for potential CHF equivalent. Patient without swelling to lower extremities. Patient with some orthopnea. Will recheck for congestive heart failure. Suspect COPD however  Suspected COPD exacerbation. Patient responded to initial treatment for COPD. Will continue steroids. Will continue aerosols. Patient placed on respiratory protocol. DME order for home nebulizer  Antibiotics  Repeat evaluation  Continue home medications and pain control  Monitor vitals, labs, and imaging  DISPO: pending consults and clinical improvement    CONSULTS:    IP CONSULT TO CARDIOLOGY  IP CONSULT TO RESPIRATORY CARE    PROCEDURES:  Not indicated        PATIENT REFERRED TO:    No follow-up provider specified. --  Jovon Oliva MD   Emergency Medicine Attending    This dictation was generated by voice recognition computer software. Although all attempts are made to edit the dictation for accuracy, there may be errors in the transcription that are not intended.

## 2022-12-14 NOTE — PROGRESS NOTES
[address]  CDU / OBSERVATION ENCOUNTER  ATTENDING NOTE     Ayan Eric was evaluated today and a DME order was entered for a nebulizer compressor in order to administer Albuterol due to the diagnosis of COPD. The need for this equipment and treatment was discussed with the patient and she understands and is in agreement.        Bertell Curling MD  Attending Emergency  Physician

## 2022-12-14 NOTE — DISCHARGE SUMMARY
CDU Discharge Summary        Patient:  Steven Parkinson  YOB: 1960    MRN: [id number]   Acct: [de-identified]    Primary Care Physician: Gunnar Santos MD    Admit date:  12/14/2022 12:05 AM  Discharge date: 12/14/2022  5:23 PM     Discharge Diagnoses:     1.)  Acute shortness of breath secondary to COPD exacerbation. Improved with aerosols, steroids and antibiotics    Follow-up:  Call today/tomorrow for a follow up appointment with Gunnar Santos MD , or return to the Emergency Room with worsening symptoms    Stressed to patient the importance of following up with primary care doctor for further workup/management of symptoms. Pt verbalizes understanding and agrees with plan. Discharge Medication Changes:       Medication List        START taking these medications      azithromycin 250 MG tablet  Commonly known as: ZITHROMAX  Take 1 tablet by mouth See Admin Instructions for 5 days 500mg on day 1 followed by 250mg on days 2 - 5     predniSONE 20 MG tablet  Commonly known as: DELTASONE  Take 3 tablets by mouth daily for 5 days  Start taking on: December 15, 2022            Pola Kenney taking these medications      acetaminophen 500 MG tablet  Commonly known as: TYLENOL  Take 1 tablet by mouth every 6 hours as needed for Pain     * albuterol sulfate  (90 Base) MCG/ACT inhaler  Commonly known as: PROVENTIL;VENTOLIN;PROAIR     * albuterol (5 MG/ML) 0.5% nebulizer solution  Commonly known as: PROVENTIL  Take 0.5 mLs by nebulization every 6 hours as needed for Wheezing     apixaban 5 MG Tabs tablet  Commonly known as: Eliquis  Take 1 tablet by mouth 2 times daily     atorvastatin 40 MG tablet  Commonly known as: Lipitor  Take 1 tablet by mouth daily     blood glucose test strips  Test 3 times a day & as needed for symptoms of irregular blood glucose.      busPIRone 15 MG tablet  Commonly known as: BUSPAR     clopidogrel 75 MG tablet  Commonly known as: PLAVIX  Take 1 tablet by mouth daily famotidine 20 MG tablet  Commonly known as: PEPCID     furosemide 40 MG tablet  Commonly known as: LASIX  Take 1 tablet by mouth in the morning. Hold for 2 more days. glucose monitoring kit  1 kit by Does not apply route daily     Lancets Misc  1 each by Does not apply route daily     lidocaine 5 %  Commonly known as: LIDODERM     lisinopril 10 MG tablet  Commonly known as: PRINIVIL;ZESTRIL  Take 1 tablet by mouth daily     metoprolol tartrate 25 MG tablet  Commonly known as: LOPRESSOR  Take 0.5 tablets by mouth 2 times daily     mometasone-formoterol 200-5 MCG/ACT inhaler  Commonly known as: DULERA     nitroGLYCERIN 0.4 MG SL tablet  Commonly known as: NITROSTAT  up to max of 3 total doses. If no relief after 1 dose, call 911. tiotropium 18 MCG inhalation capsule  Commonly known as: SPIRIVA  Inhale 1 capsule into the lungs daily Pt told that she was using this medication at home. Trulicity 1.5 CS/1.7KD SC injection  Generic drug: dulaglutide           * This list has 2 medication(s) that are the same as other medications prescribed for you. Read the directions carefully, and ask your doctor or other care provider to review them with you. STOP taking these medications      aspirin 81 MG chewable tablet               Where to Get Your Medications        You can get these medications from any pharmacy    Bring a paper prescription for each of these medications  azithromycin 250 MG tablet  predniSONE 20 MG tablet         Diet:  ADULT DIET; Regular, advance as tolerated     Activity:  As tolerated    Consultants: IP CONSULT TO CARDIOLOGY  IP CONSULT TO RESPIRATORY CARE    Procedures:  Not indicated     Diagnostic Test:   Results for orders placed or performed during the hospital encounter of 12/14/22   COVID-19, Rapid    Specimen: Nasopharyngeal Swab   Result Value Ref Range    Specimen Description . NASOPHARYNGEAL SWAB     SARS-CoV-2, Rapid Not Detected Not Detected   Troponin   Result Value Ref Range    Troponin, High Sensitivity 20 (H) 0 - 14 ng/L   Comprehensive Metabolic Panel   Result Value Ref Range    Glucose 350 (H) 70 - 99 mg/dL    BUN 16 8 - 23 mg/dL    Creatinine 1.58 (H) 0.50 - 0.90 mg/dL    Est, Glom Filt Rate 37 (L) >60 mL/min/1.73m2    Calcium 9.2 8.6 - 10.4 mg/dL    Sodium 135 135 - 144 mmol/L    Potassium 3.3 (L) 3.7 - 5.3 mmol/L    Chloride 98 98 - 107 mmol/L    CO2 26 20 - 31 mmol/L    Anion Gap 11 9 - 17 mmol/L    Alkaline Phosphatase 123 (H) 35 - 104 U/L    ALT 10 5 - 33 U/L    AST 11 <32 U/L    Total Bilirubin 0.2 (L) 0.3 - 1.2 mg/dL    Total Protein 6.7 6.4 - 8.3 g/dL    Albumin 3.5 3.5 - 5.2 g/dL    Albumin/Globulin Ratio 1.1 1.0 - 2.5   Lipase   Result Value Ref Range    Lipase 42 13 - 60 U/L   Beta-Hydroxybutyrate   Result Value Ref Range    Beta-Hydroxybutyrate 0.17 0.02 - 0.27 mmol/L   BLOOD GAS, VENOUS   Result Value Ref Range    pH, Adolph 7.423 (H) 7.320 - 7.420    pCO2, Adolph 39.1 39 - 55 mm Hg    pO2, Adolph 52.3 (H) 30 - 50 mm Hg    HCO3, Venous 25.1 24 - 30 mmol/L    Positive Base Excess, Adolph 1.2 0.0 - 2.0 mmol/L    O2 Sat, Adolph 87.1 (H) 60.0 - 85.0 %    Carboxyhemoglobin 4.0 0 - 5 %    Pt Temp 37.0     FIO2 INFORMATION NOT PROVIDED    Lactic Acid   Result Value Ref Range    Lactic Acid, Whole Blood 3.8 (H) 0.7 - 2.1 mmol/L   Brain Natriuretic Peptide   Result Value Ref Range    Pro-BNP 1,269 (H) <300 pg/mL   D-Dimer, Quantitative   Result Value Ref Range    D-Dimer, Quant 0.79 mg/L FEU   Lactic Acid   Result Value Ref Range    Lactic Acid, Whole Blood 2.2 (H) 0.7 - 2.1 mmol/L   Troponin   Result Value Ref Range    Troponin, High Sensitivity 23 (H) 0 - 14 ng/L   Troponin   Result Value Ref Range    Troponin, High Sensitivity 15 (H) 0 - 14 ng/L   Basic Metabolic Panel w/ Reflex to MG   Result Value Ref Range    Glucose 466 (HH) 70 - 99 mg/dL    BUN 18 8 - 23 mg/dL    Creatinine 1.50 (H) 0.50 - 0.90 mg/dL    Est, Glom Filt Rate 40 (L) >60 mL/min/1.73m2    Calcium 8.9 8.6 - 10.4 mg/dL    Sodium 138 135 - 144 mmol/L    Potassium 3.9 3.7 - 5.3 mmol/L    Chloride 101 98 - 107 mmol/L    CO2 22 20 - 31 mmol/L    Anion Gap 15 9 - 17 mmol/L   Troponin   Result Value Ref Range    Troponin, High Sensitivity 19 (H) 0 - 14 ng/L   Magnesium   Result Value Ref Range    Magnesium 1.9 1.6 - 2.6 mg/dL   Hemoglobin A1C   Result Value Ref Range    Hemoglobin A1C 9.9 (H) 4.0 - 6.0 %    Estimated Avg Glucose 237 mg/dL   EKG 12 Lead   Result Value Ref Range    Ventricular Rate 110 BPM    Atrial Rate 110 BPM    P-R Interval 192 ms    QRS Duration 80 ms    Q-T Interval 364 ms    QTc Calculation (Bazett) 492 ms    P Axis 38 degrees    R Axis 42 degrees    T Axis -21 degrees   EKG 12 Lead   Result Value Ref Range    Ventricular Rate 90 BPM    Atrial Rate 91 BPM    QRS Duration 82 ms    Q-T Interval 420 ms    QTc Calculation (Bazett) 513 ms    R Axis 138 degrees    T Axis -9 degrees     XR CHEST PORTABLE    Result Date: 12/14/2022  EXAMINATION: ONE XRAY VIEW OF THE CHEST 12/14/2022 12:49 am COMPARISON: Chest x-ray 07/20/2022 HISTORY: ORDERING SYSTEM PROVIDED HISTORY: dyspnea TECHNOLOGIST PROVIDED HISTORY: dyspnea FINDINGS: She streaky bibasilar opacities, atelectasis favored over infection. Cardiomediastinal silhouette is within normal limits. No pleural effusion. No pneumothorax. Bony structures are unremarkable. She streaky bibasilar opacities, atelectasis favored over infection. Physical Exam:    General appearance - NAD, AOx 3   Lungs -CTAB, no R/R/R  Heart - RRR, no M/R/G  Abdomen - Soft, NT/ND  Neurological:  MAEx4, No focal motor deficit, sensory loss  Extremities - Cap refil <2 sec in all ext., no edema  Skin -warm, dry      Hospital Course:  Clinical course has improved, labs and imaging reviewed. Jimmie Menendez originally presented to the hospital on 12/14/2022 12:05 AM with complaints of shortness of breath with a productive cough.   At that time it was determined that She required further observation and cardiac evaluation to rule out a cardiac etiology. Labs and imaging were followed daily. Imaging results as above. She is medically stable to be discharged. Discussed echo results with patient. Patient states that she feels comfortable to be discharged home. Prescriptions given with education. Patient educated to follow-up with PCP within 1 week and to return to the emergency department with any returning or worsening symptoms. Disposition: Home    Patient stated that they will not drive themselves home from the hospital if they have gotten pain killers/ narcotics earlier that day and that they will arrange for transportation on their own or work with the  for a ride. Patient counseled NOT to drive while under the influence of narcotics/ pain killers. Condition: Good    Patient stable and ready for discharge home. I have discussed plan of care with patient and they are in understanding. They were instructed to read discharge paperwork. All of their questions and concerns were addressed. Time Spent: 1 day      --  My Supervising Physician for today is  Dr. Read Duverney  We discussed and agreed upon a treatment plan   Yarelis Wylie, [address] [facility]  Emergency Medicine Attending Physician    This dictation was generated by voice recognition computer software. Although all attempts are made to edit the dictation for accuracy, there may be errors in the transcription that are not intended.

## 2022-12-15 PROCEDURE — 1800000000 HC LEAVE OF ABSENCE

## 2022-12-16 ENCOUNTER — APPOINTMENT (OUTPATIENT)
Dept: GENERAL RADIOLOGY | Age: 62
DRG: 291 | End: 2022-12-16
Payer: MEDICARE

## 2022-12-16 ENCOUNTER — APPOINTMENT (OUTPATIENT)
Dept: CT IMAGING | Age: 62
DRG: 291 | End: 2022-12-16
Payer: MEDICARE

## 2022-12-16 ENCOUNTER — HOSPITAL ENCOUNTER (INPATIENT)
Age: 62
LOS: 2 days | Discharge: HOME | DRG: 291 | End: 2022-12-18
Attending: EMERGENCY MEDICINE | Admitting: STUDENT IN AN ORGANIZED HEALTH CARE EDUCATION/TRAINING PROGRAM
Payer: MEDICARE

## 2022-12-16 DIAGNOSIS — R73.9 HYPERGLYCEMIA: ICD-10-CM

## 2022-12-16 DIAGNOSIS — J44.1 COPD EXACERBATION (HCC): Primary | ICD-10-CM

## 2022-12-16 PROBLEM — N18.30 CKD STAGE 3 DUE TO TYPE 2 DIABETES MELLITUS (HCC): Status: ACTIVE | Noted: 2022-12-16

## 2022-12-16 PROBLEM — I48.0 PAROXYSMAL A-FIB (HCC): Status: ACTIVE | Noted: 2022-12-16

## 2022-12-16 PROBLEM — E11.22 CKD STAGE 3 DUE TO TYPE 2 DIABETES MELLITUS (HCC): Status: ACTIVE | Noted: 2022-12-16

## 2022-12-16 PROBLEM — D50.9 MICROCYTIC ANEMIA: Status: ACTIVE | Noted: 2022-12-16

## 2022-12-16 PROBLEM — I50.23 ACUTE ON CHRONIC SYSTOLIC CHF (CONGESTIVE HEART FAILURE) (HCC): Status: RESOLVED | Noted: 2021-12-20 | Resolved: 2022-12-16

## 2022-12-16 PROBLEM — I50.43 ACUTE ON CHRONIC COMBINED SYSTOLIC AND DIASTOLIC CHF (CONGESTIVE HEART FAILURE) (HCC): Status: ACTIVE | Noted: 2022-02-14

## 2022-12-16 LAB
ABSOLUTE EOS #: 0.04 K/UL (ref 0–0.44)
ABSOLUTE IMMATURE GRANULOCYTE: 0.06 K/UL (ref 0–0.3)
ABSOLUTE LYMPH #: 1.45 K/UL (ref 1.1–3.7)
ABSOLUTE MONO #: 0.53 K/UL (ref 0.1–1.2)
ADENOVIRUS PCR: NOT DETECTED
ALBUMIN SERPL-MCNC: 3.6 G/DL (ref 3.5–5.2)
ALBUMIN/GLOBULIN RATIO: 1.2 (ref 1–2.5)
ALP BLD-CCNC: 99 U/L (ref 35–104)
ALT SERPL-CCNC: 18 U/L (ref 5–33)
ANION GAP SERPL CALCULATED.3IONS-SCNC: 15 MMOL/L (ref 9–17)
AST SERPL-CCNC: 17 U/L
BASOPHILS # BLD: 1 % (ref 0–2)
BASOPHILS ABSOLUTE: 0.06 K/UL (ref 0–0.2)
BETA-HYDROXYBUTYRATE: 0.14 MMOL/L (ref 0.02–0.27)
BILIRUB SERPL-MCNC: 0.3 MG/DL (ref 0.3–1.2)
BILIRUBIN URINE: NEGATIVE
BORDETELLA PARAPERTUSSIS: NOT DETECTED
BORDETELLA PERTUSSIS PCR: NOT DETECTED
BUN BLDV-MCNC: 23 MG/DL (ref 8–23)
CALCIUM SERPL-MCNC: 8.8 MG/DL (ref 8.6–10.4)
CHLAMYDIA PNEUMONIAE BY PCR: NOT DETECTED
CHLORIDE BLD-SCNC: 99 MMOL/L (ref 98–107)
CHP ED QC CHECK: YES
CO2: 23 MMOL/L (ref 20–31)
COLOR: YELLOW
COMMENT UA: ABNORMAL
CORONAVIRUS 229E PCR: NOT DETECTED
CORONAVIRUS HKU1 PCR: NOT DETECTED
CORONAVIRUS NL63 PCR: NOT DETECTED
CORONAVIRUS OC43 PCR: NOT DETECTED
CREAT SERPL-MCNC: 1.4 MG/DL (ref 0.5–0.9)
D-DIMER QUANTITATIVE: 0.8 MG/L FEU
EOSINOPHILS RELATIVE PERCENT: 0 % (ref 1–4)
GFR SERPL CREATININE-BSD FRML MDRD: 43 ML/MIN/1.73M2
GLUCOSE BLD-MCNC: 298 MG/DL
GLUCOSE BLD-MCNC: 298 MG/DL (ref 65–105)
GLUCOSE BLD-MCNC: 300 MG/DL (ref 65–105)
GLUCOSE BLD-MCNC: 312 MG/DL (ref 70–99)
GLUCOSE BLD-MCNC: 401 MG/DL (ref 65–105)
GLUCOSE BLD-MCNC: 456 MG/DL (ref 65–105)
GLUCOSE URINE: ABNORMAL
HCT VFR BLD CALC: 35.8 % (ref 36.3–47.1)
HEMOGLOBIN: 10.6 G/DL (ref 11.9–15.1)
HUMAN METAPNEUMOVIRUS PCR: NOT DETECTED
IMMATURE GRANULOCYTES: 1 %
INFLUENZA A BY PCR: NOT DETECTED
INFLUENZA B BY PCR: NOT DETECTED
KETONES, URINE: NEGATIVE
LEUKOCYTE ESTERASE, URINE: NEGATIVE
LYMPHOCYTES # BLD: 16 % (ref 24–43)
MCH RBC QN AUTO: 23.3 PG (ref 25.2–33.5)
MCHC RBC AUTO-ENTMCNC: 29.6 G/DL (ref 28.4–34.8)
MCV RBC AUTO: 78.7 FL (ref 82.6–102.9)
MONOCYTES # BLD: 6 % (ref 3–12)
MYCOPLASMA PNEUMONIAE PCR: NOT DETECTED
NITRITE, URINE: NEGATIVE
NRBC AUTOMATED: 0 PER 100 WBC
PARAINFLUENZA 1 PCR: NOT DETECTED
PARAINFLUENZA 2 PCR: NOT DETECTED
PARAINFLUENZA 3 PCR: NOT DETECTED
PARAINFLUENZA 4 PCR: NOT DETECTED
PDW BLD-RTO: 16.3 % (ref 11.8–14.4)
PH UA: 6.5 (ref 5–8)
PLATELET # BLD: 291 K/UL (ref 138–453)
PMV BLD AUTO: 10.6 FL (ref 8.1–13.5)
POTASSIUM SERPL-SCNC: 3.4 MMOL/L (ref 3.7–5.3)
PRO-BNP: 4739 PG/ML
PROTEIN UA: NEGATIVE
RBC # BLD: 4.55 M/UL (ref 3.95–5.11)
RBC # BLD: ABNORMAL 10*6/UL
RESP SYNCYTIAL VIRUS PCR: NOT DETECTED
RHINO/ENTEROVIRUS PCR: NOT DETECTED
SARS-COV-2, PCR: NOT DETECTED
SEG NEUTROPHILS: 76 % (ref 36–65)
SEGMENTED NEUTROPHILS ABSOLUTE COUNT: 6.92 K/UL (ref 1.5–8.1)
SODIUM BLD-SCNC: 137 MMOL/L (ref 135–144)
SPECIFIC GRAVITY UA: 1.01 (ref 1–1.03)
SPECIMEN DESCRIPTION: NORMAL
TOTAL PROTEIN: 6.7 G/DL (ref 6.4–8.3)
TROPONIN, HIGH SENSITIVITY: 21 NG/L (ref 0–14)
TROPONIN, HIGH SENSITIVITY: 22 NG/L (ref 0–14)
TURBIDITY: CLEAR
URINE HGB: NEGATIVE
UROBILINOGEN, URINE: NORMAL
WBC # BLD: 9.1 K/UL (ref 3.5–11.3)

## 2022-12-16 PROCEDURE — 6360000004 HC RX CONTRAST MEDICATION: Performed by: HEALTH CARE PROVIDER

## 2022-12-16 PROCEDURE — 6360000002 HC RX W HCPCS: Performed by: HEALTH CARE PROVIDER

## 2022-12-16 PROCEDURE — 80053 COMPREHEN METABOLIC PANEL: CPT

## 2022-12-16 PROCEDURE — 6370000000 HC RX 637 (ALT 250 FOR IP): Performed by: STUDENT IN AN ORGANIZED HEALTH CARE EDUCATION/TRAINING PROGRAM

## 2022-12-16 PROCEDURE — 1200000000 HC SEMI PRIVATE

## 2022-12-16 PROCEDURE — 71046 X-RAY EXAM CHEST 2 VIEWS: CPT

## 2022-12-16 PROCEDURE — 94640 AIRWAY INHALATION TREATMENT: CPT

## 2022-12-16 PROCEDURE — 2580000003 HC RX 258: Performed by: STUDENT IN AN ORGANIZED HEALTH CARE EDUCATION/TRAINING PROGRAM

## 2022-12-16 PROCEDURE — 85379 FIBRIN DEGRADATION QUANT: CPT

## 2022-12-16 PROCEDURE — 82010 KETONE BODYS QUAN: CPT

## 2022-12-16 PROCEDURE — 93005 ELECTROCARDIOGRAM TRACING: CPT | Performed by: HEALTH CARE PROVIDER

## 2022-12-16 PROCEDURE — 83880 ASSAY OF NATRIURETIC PEPTIDE: CPT

## 2022-12-16 PROCEDURE — 99223 1ST HOSP IP/OBS HIGH 75: CPT | Performed by: STUDENT IN AN ORGANIZED HEALTH CARE EDUCATION/TRAINING PROGRAM

## 2022-12-16 PROCEDURE — 81003 URINALYSIS AUTO W/O SCOPE: CPT

## 2022-12-16 PROCEDURE — 0202U NFCT DS 22 TRGT SARS-COV-2: CPT

## 2022-12-16 PROCEDURE — 85025 COMPLETE CBC W/AUTO DIFF WBC: CPT

## 2022-12-16 PROCEDURE — 82947 ASSAY GLUCOSE BLOOD QUANT: CPT

## 2022-12-16 PROCEDURE — 96374 THER/PROPH/DIAG INJ IV PUSH: CPT

## 2022-12-16 PROCEDURE — 71260 CT THORAX DX C+: CPT | Performed by: HEALTH CARE PROVIDER

## 2022-12-16 PROCEDURE — 84484 ASSAY OF TROPONIN QUANT: CPT

## 2022-12-16 PROCEDURE — 6370000000 HC RX 637 (ALT 250 FOR IP): Performed by: NURSE PRACTITIONER

## 2022-12-16 PROCEDURE — 6360000002 HC RX W HCPCS

## 2022-12-16 PROCEDURE — 99285 EMERGENCY DEPT VISIT HI MDM: CPT

## 2022-12-16 PROCEDURE — 6360000002 HC RX W HCPCS: Performed by: STUDENT IN AN ORGANIZED HEALTH CARE EDUCATION/TRAINING PROGRAM

## 2022-12-16 RX ORDER — SODIUM CHLORIDE 9 MG/ML
INJECTION, SOLUTION INTRAVENOUS PRN
Status: DISCONTINUED | OUTPATIENT
Start: 2022-12-16 | End: 2022-12-18 | Stop reason: HOSPADM

## 2022-12-16 RX ORDER — ALBUTEROL SULFATE 90 UG/1
2 AEROSOL, METERED RESPIRATORY (INHALATION) 3 TIMES DAILY PRN
Status: DISCONTINUED | OUTPATIENT
Start: 2022-12-16 | End: 2022-12-18

## 2022-12-16 RX ORDER — MAGNESIUM SULFATE 1 G/100ML
1000 INJECTION INTRAVENOUS PRN
Status: DISCONTINUED | OUTPATIENT
Start: 2022-12-16 | End: 2022-12-18 | Stop reason: HOSPADM

## 2022-12-16 RX ORDER — ACETAMINOPHEN 650 MG/1
650 SUPPOSITORY RECTAL EVERY 6 HOURS PRN
Status: DISCONTINUED | OUTPATIENT
Start: 2022-12-16 | End: 2022-12-18 | Stop reason: HOSPADM

## 2022-12-16 RX ORDER — LEVOFLOXACIN 500 MG/1
500 TABLET, FILM COATED ORAL DAILY
Status: DISCONTINUED | OUTPATIENT
Start: 2022-12-16 | End: 2022-12-16

## 2022-12-16 RX ORDER — ONDANSETRON 2 MG/ML
4 INJECTION INTRAMUSCULAR; INTRAVENOUS EVERY 6 HOURS PRN
Status: DISCONTINUED | OUTPATIENT
Start: 2022-12-16 | End: 2022-12-18 | Stop reason: HOSPADM

## 2022-12-16 RX ORDER — ATORVASTATIN CALCIUM 40 MG/1
40 TABLET, FILM COATED ORAL DAILY
Status: DISCONTINUED | OUTPATIENT
Start: 2022-12-16 | End: 2022-12-18 | Stop reason: HOSPADM

## 2022-12-16 RX ORDER — ACETAMINOPHEN 500 MG
500 TABLET ORAL EVERY 6 HOURS PRN
Status: DISCONTINUED | OUTPATIENT
Start: 2022-12-16 | End: 2022-12-16 | Stop reason: SDUPTHER

## 2022-12-16 RX ORDER — POLYETHYLENE GLYCOL 3350 17 G/17G
17 POWDER, FOR SOLUTION ORAL DAILY PRN
Status: DISCONTINUED | OUTPATIENT
Start: 2022-12-16 | End: 2022-12-18 | Stop reason: HOSPADM

## 2022-12-16 RX ORDER — FUROSEMIDE 10 MG/ML
40 INJECTION INTRAMUSCULAR; INTRAVENOUS DAILY
Status: DISCONTINUED | OUTPATIENT
Start: 2022-12-16 | End: 2022-12-18 | Stop reason: HOSPADM

## 2022-12-16 RX ORDER — ONDANSETRON 2 MG/ML
4 INJECTION INTRAMUSCULAR; INTRAVENOUS ONCE
Status: COMPLETED | OUTPATIENT
Start: 2022-12-16 | End: 2022-12-16

## 2022-12-16 RX ORDER — BUDESONIDE AND FORMOTEROL FUMARATE DIHYDRATE 160; 4.5 UG/1; UG/1
2 AEROSOL RESPIRATORY (INHALATION) 2 TIMES DAILY
Status: DISCONTINUED | OUTPATIENT
Start: 2022-12-16 | End: 2022-12-18 | Stop reason: HOSPADM

## 2022-12-16 RX ORDER — FUROSEMIDE 20 MG/1
40 TABLET ORAL DAILY
Status: CANCELLED | OUTPATIENT
Start: 2022-12-16

## 2022-12-16 RX ORDER — ONDANSETRON 2 MG/ML
INJECTION INTRAMUSCULAR; INTRAVENOUS
Status: COMPLETED
Start: 2022-12-16 | End: 2022-12-16

## 2022-12-16 RX ORDER — METHYLPREDNISOLONE SODIUM SUCCINATE 125 MG/2ML
40 INJECTION, POWDER, LYOPHILIZED, FOR SOLUTION INTRAMUSCULAR; INTRAVENOUS EVERY 12 HOURS
Status: DISCONTINUED | OUTPATIENT
Start: 2022-12-16 | End: 2022-12-18 | Stop reason: HOSPADM

## 2022-12-16 RX ORDER — BUSPIRONE HYDROCHLORIDE 30 MG/1
30 TABLET ORAL 3 TIMES DAILY PRN
COMMUNITY
Start: 2022-12-07

## 2022-12-16 RX ORDER — DOXYCYCLINE HYCLATE 100 MG
100 TABLET ORAL EVERY 12 HOURS SCHEDULED
Status: DISCONTINUED | OUTPATIENT
Start: 2022-12-16 | End: 2022-12-18 | Stop reason: HOSPADM

## 2022-12-16 RX ORDER — BUSPIRONE HYDROCHLORIDE 15 MG/1
30 TABLET ORAL NIGHTLY
Status: DISCONTINUED | OUTPATIENT
Start: 2022-12-16 | End: 2022-12-17

## 2022-12-16 RX ORDER — ACETAMINOPHEN 325 MG/1
650 TABLET ORAL EVERY 6 HOURS PRN
Status: DISCONTINUED | OUTPATIENT
Start: 2022-12-16 | End: 2022-12-18 | Stop reason: HOSPADM

## 2022-12-16 RX ORDER — IPRATROPIUM BROMIDE AND ALBUTEROL SULFATE 2.5; .5 MG/3ML; MG/3ML
1 SOLUTION RESPIRATORY (INHALATION)
Status: DISCONTINUED | OUTPATIENT
Start: 2022-12-16 | End: 2022-12-18

## 2022-12-16 RX ORDER — CLOPIDOGREL BISULFATE 75 MG/1
75 TABLET ORAL DAILY
Status: DISCONTINUED | OUTPATIENT
Start: 2022-12-16 | End: 2022-12-18 | Stop reason: HOSPADM

## 2022-12-16 RX ORDER — INSULIN GLARGINE 100 [IU]/ML
25 INJECTION, SOLUTION SUBCUTANEOUS DAILY
Status: DISCONTINUED | OUTPATIENT
Start: 2022-12-16 | End: 2022-12-17

## 2022-12-16 RX ORDER — INSULIN LISPRO 100 [IU]/ML
15 INJECTION, SOLUTION INTRAVENOUS; SUBCUTANEOUS ONCE
Status: COMPLETED | OUTPATIENT
Start: 2022-12-16 | End: 2022-12-16

## 2022-12-16 RX ORDER — METHYLPREDNISOLONE SODIUM SUCCINATE 125 MG/2ML
125 INJECTION, POWDER, LYOPHILIZED, FOR SOLUTION INTRAMUSCULAR; INTRAVENOUS ONCE
Status: COMPLETED | OUTPATIENT
Start: 2022-12-16 | End: 2022-12-16

## 2022-12-16 RX ORDER — INSULIN GLARGINE 100 [IU]/ML
25 INJECTION, SOLUTION SUBCUTANEOUS DAILY
Status: DISCONTINUED | OUTPATIENT
Start: 2022-12-16 | End: 2022-12-16

## 2022-12-16 RX ORDER — LISINOPRIL 10 MG/1
10 TABLET ORAL DAILY
Status: DISCONTINUED | OUTPATIENT
Start: 2022-12-16 | End: 2022-12-18 | Stop reason: HOSPADM

## 2022-12-16 RX ORDER — INSULIN LISPRO 100 [IU]/ML
0-4 INJECTION, SOLUTION INTRAVENOUS; SUBCUTANEOUS NIGHTLY
Status: DISCONTINUED | OUTPATIENT
Start: 2022-12-16 | End: 2022-12-18 | Stop reason: HOSPADM

## 2022-12-16 RX ORDER — DEXTROSE MONOHYDRATE 100 MG/ML
INJECTION, SOLUTION INTRAVENOUS CONTINUOUS PRN
Status: DISCONTINUED | OUTPATIENT
Start: 2022-12-16 | End: 2022-12-18 | Stop reason: HOSPADM

## 2022-12-16 RX ORDER — ONDANSETRON 4 MG/1
4 TABLET, ORALLY DISINTEGRATING ORAL EVERY 8 HOURS PRN
Status: DISCONTINUED | OUTPATIENT
Start: 2022-12-16 | End: 2022-12-18 | Stop reason: HOSPADM

## 2022-12-16 RX ORDER — ACETAMINOPHEN 325 MG/1
650 TABLET ORAL
COMMUNITY
Start: 2022-11-22

## 2022-12-16 RX ORDER — INSULIN LISPRO 100 [IU]/ML
0-16 INJECTION, SOLUTION INTRAVENOUS; SUBCUTANEOUS
Status: DISCONTINUED | OUTPATIENT
Start: 2022-12-16 | End: 2022-12-18 | Stop reason: HOSPADM

## 2022-12-16 RX ORDER — SODIUM CHLORIDE 0.9 % (FLUSH) 0.9 %
5-40 SYRINGE (ML) INJECTION EVERY 12 HOURS SCHEDULED
Status: DISCONTINUED | OUTPATIENT
Start: 2022-12-16 | End: 2022-12-18 | Stop reason: HOSPADM

## 2022-12-16 RX ORDER — POTASSIUM CHLORIDE 20 MEQ/1
40 TABLET, EXTENDED RELEASE ORAL PRN
Status: DISCONTINUED | OUTPATIENT
Start: 2022-12-16 | End: 2022-12-18 | Stop reason: HOSPADM

## 2022-12-16 RX ORDER — POTASSIUM CHLORIDE 7.45 MG/ML
10 INJECTION INTRAVENOUS PRN
Status: DISCONTINUED | OUTPATIENT
Start: 2022-12-16 | End: 2022-12-18 | Stop reason: HOSPADM

## 2022-12-16 RX ORDER — SODIUM CHLORIDE 9 MG/ML
INJECTION, SOLUTION INTRAVENOUS CONTINUOUS
Status: CANCELLED | OUTPATIENT
Start: 2022-12-16

## 2022-12-16 RX ORDER — SODIUM CHLORIDE 0.9 % (FLUSH) 0.9 %
10 SYRINGE (ML) INJECTION PRN
Status: DISCONTINUED | OUTPATIENT
Start: 2022-12-16 | End: 2022-12-18 | Stop reason: HOSPADM

## 2022-12-16 RX ADMIN — INSULIN LISPRO 15 UNITS: 100 INJECTION, SOLUTION INTRAVENOUS; SUBCUTANEOUS at 19:50

## 2022-12-16 RX ADMIN — IPRATROPIUM BROMIDE AND ALBUTEROL SULFATE 1 AMPULE: .5; 3 SOLUTION RESPIRATORY (INHALATION) at 15:51

## 2022-12-16 RX ADMIN — APIXABAN 5 MG: 5 TABLET, FILM COATED ORAL at 21:47

## 2022-12-16 RX ADMIN — ONDANSETRON 4 MG: 2 INJECTION INTRAMUSCULAR; INTRAVENOUS at 11:54

## 2022-12-16 RX ADMIN — IPRATROPIUM BROMIDE 0.5 MG: 0.5 SOLUTION RESPIRATORY (INHALATION) at 07:57

## 2022-12-16 RX ADMIN — METHYLPREDNISOLONE SODIUM SUCCINATE 125 MG: 125 INJECTION, POWDER, FOR SOLUTION INTRAMUSCULAR; INTRAVENOUS at 11:27

## 2022-12-16 RX ADMIN — SODIUM CHLORIDE, PRESERVATIVE FREE 10 ML: 5 INJECTION INTRAVENOUS at 22:16

## 2022-12-16 RX ADMIN — ONDANSETRON 4 MG: 2 INJECTION INTRAMUSCULAR; INTRAVENOUS at 19:47

## 2022-12-16 RX ADMIN — METHYLPREDNISOLONE SODIUM SUCCINATE 40 MG: 125 INJECTION, POWDER, FOR SOLUTION INTRAMUSCULAR; INTRAVENOUS at 23:17

## 2022-12-16 RX ADMIN — IPRATROPIUM BROMIDE AND ALBUTEROL SULFATE 1 AMPULE: .5; 3 SOLUTION RESPIRATORY (INHALATION) at 20:06

## 2022-12-16 RX ADMIN — METOPROLOL TARTRATE 12.5 MG: 25 TABLET ORAL at 21:47

## 2022-12-16 RX ADMIN — INSULIN LISPRO 4 UNITS: 100 INJECTION, SOLUTION INTRAVENOUS; SUBCUTANEOUS at 22:16

## 2022-12-16 RX ADMIN — DOXYCYCLINE HYCLATE 100 MG: 100 TABLET, COATED ORAL at 21:47

## 2022-12-16 RX ADMIN — INSULIN LISPRO 16 UNITS: 100 INJECTION, SOLUTION INTRAVENOUS; SUBCUTANEOUS at 18:31

## 2022-12-16 RX ADMIN — INSULIN GLARGINE 25 UNITS: 100 INJECTION, SOLUTION SUBCUTANEOUS at 18:35

## 2022-12-16 RX ADMIN — ALBUTEROL SULFATE 5 MG: 5 SOLUTION RESPIRATORY (INHALATION) at 07:57

## 2022-12-16 RX ADMIN — BUSPIRONE HYDROCHLORIDE 30 MG: 15 TABLET ORAL at 23:16

## 2022-12-16 RX ADMIN — IOPAMIDOL 75 ML: 755 INJECTION, SOLUTION INTRAVENOUS at 10:26

## 2022-12-16 ASSESSMENT — ENCOUNTER SYMPTOMS
ABDOMINAL DISTENTION: 0
COUGH: 1
SHORTNESS OF BREATH: 1
TACHYPNEA: 1
NAUSEA: 0
EYE PAIN: 0
WHEEZING: 1
DIARRHEA: 0
COUGH: 0
ABDOMINAL PAIN: 0
VOMITING: 0
NAUSEA: 1
TROUBLE SWALLOWING: 0
BACK PAIN: 0
EYE DISCHARGE: 0

## 2022-12-16 ASSESSMENT — PAIN SCALES - GENERAL: PAINLEVEL_OUTOF10: 0

## 2022-12-16 NOTE — ED PROVIDER NOTES
[facility] ED  Emergency Department Encounter  Emergency Medicine Resident     Pt Name: Johnathan South  MRN: [id number]  [place] 1960  Date of evaluation: 12/16/22  PCP:  Cinda Zapien MD    [address] [place] [address]       Chief Complaint   Patient presents with    Shortness of Breath    Hyperglycemia       HISTORY OFPRESENT ILLNESS  (Location/Symptom, Timing/Onset, Context/Setting, Quality, Duration, Modifying Bettye Pock.)      Johnathan South is a 63 y.o. female with history of COPD, CAD, CHF and diabetes who presents with shortness of breath and high blood glucose readings. Symptoms started last night. She complains of worsening shortness of breath, particularly on exertion. Also states that she had unreasonably high blood glucose readings with last night, but after skipping dinner and breakfast, her last reading was 347 at home. Patient was seen in the emergency department and placed in observation for COPD exacerbation and hyperglycemia 2 days ago. She was discharged with azithromycin and prednisone. Patient states that she took her first dose of prednisone yesterday. States she skipped this morning's dose, due to concerns for high blood sugar readings. She denies any fevers, chills, headache, dizziness, kayce chest pain, nausea, vomiting, diarrhea, or weakness. PAST MEDICAL / SURGICAL / SOCIAL / FAMILY HISTORY      has a past medical history of Acute on chronic systolic CHF (congestive heart failure) ([address] Asthma, Blood circulation, collateral, CAD (coronary artery disease), COPD (chronic obstructive pulmonary disease) ([address] COPD exacerbation ([address] Diabetes mellitus ([address] Hyperlipidemia, Hypertension, Ischemic colitis ([address] Movement disorder, Neuropathy, PAD (peripheral artery disease) ([address] Tobacco abuse, and Tobacco abuse counseling.     has a past surgical history that includes Coronary angioplasty with stent; Tonsillectomy;  Inner ear surgery (Right); and Coronary angioplasty with stent (02/11/2019). Social History     Socioeconomic History    Marital status:      Spouse name: Not on file    Number of children: Not on file    Years of education: Not on file    Highest education level: Not on file   Occupational History    Not on file   Tobacco Use    Smoking status: Every Day     Packs/day: 0.50     Years: 45.00     Pack years: 22.50     Types: Cigarettes    Smokeless tobacco: Former   Substance and Sexual Activity    Alcohol use: No    Drug use: Yes     Types: Marijuana Joanna Francisco)    Sexual activity: Never   Other Topics Concern    Not on file   Social History Narrative    Not on file     Social Determinants of Health     Financial Resource Strain: Not on file   Food Insecurity: Not on file   Transportation Needs: Not on file   Physical Activity: Not on file   Stress: Not on file   Social Connections: Not on file   Intimate Partner Violence: Not on file   Housing Stability: Not on file       Family History   Problem Relation Age of Onset    Hypertension Mother     Hypertension Father     Diabetes Sister     Diabetes Brother     Stroke Brother         Allergies:  Codeine and Morphine    Home Medications:  Prior to Admission medications    Medication Sig Start Date End Date Taking? Authorizing Provider   predniSONE (DELTASONE) 20 MG tablet Take 3 tablets by mouth daily for 5 days 12/15/22 12/20/22  BINDU Mejia - CNP   azithromycin (ZITHROMAX) 250 MG tablet Take 1 tablet by mouth See Admin Instructions for 5 days 500mg on day 1 followed by 250mg on days 2 - 5 12/14/22 12/19/22  BINDU Mejia CNP   furosemide (LASIX) 40 MG tablet Take 1 tablet by mouth in the morning. Hold for 2 more days.  7/22/22   Jesse Bruno MD   mometasone-formoterol (DULERA) 200-5 MCG/ACT inhaler Inhale into the lungs 3/3/22   Historical Provider, MD   busPIRone (BUSPAR) 15 MG tablet Take 30 mg by mouth 3 times daily    Historical Provider, MD   lisinopril (PRINIVIL;ZESTRIL) 10 MG tablet Take 1 tablet by mouth daily 10/9/21   Chidi Munoz MD   clopidogrel (PLAVIX) 75 MG tablet Take 1 tablet by mouth daily 10/9/21   Chidi Munoz MD   famotidine (PEPCID) 20 MG tablet Take 20 mg by mouth 2 times daily    Historical Provider, MD   albuterol sulfate HFA (PROVENTIL;VENTOLIN;PROAIR) 108 (90 Base) MCG/ACT inhaler Inhale 2 puffs into the lungs 3 times daily    Historical Provider, MD   lidocaine (LIDODERM) 5 % Place 1 patch onto the skin as needed for Pain (as needed for rotator cuff pain) 12 hours on, 12 hours off. Historical Provider, MD   apixaban (ELIQUIS) 5 MG TABS tablet Take 1 tablet by mouth 2 times daily 10/5/21   Margherita Goltz, MD   atorvastatin (LIPITOR) 40 MG tablet Take 1 tablet by mouth daily 7/27/21   Nicole Iyer MD   Dulaglutide (TRULICITY) 1.5 ZK/6.5JD SOPN Inject 1.5 mg into the skin once a week    Historical Provider, MD   nitroGLYCERIN (NITROSTAT) 0.4 MG SL tablet up to max of 3 total doses. If no relief after 1 dose, call 911. 2/12/19   BINDU Almanza CNP   metoprolol tartrate (LOPRESSOR) 25 MG tablet Take 0.5 tablets by mouth 2 times daily 2/12/19   BINDU Almanza CNP   Lancets MISC 1 each by Does not apply route daily 2/12/19   Snow Rabago MD   blood glucose monitor strips Test 3 times a day & as needed for symptoms of irregular blood glucose. 2/12/19   Snow Rabago MD   glucose monitoring kit (FREESTYLE) monitoring kit 1 kit by Does not apply route daily 2/12/19   Snow Rabago MD   aspirin 81 MG chewable tablet Take 1 tablet by mouth daily 2/13/19 7/22/22  BINDU Almanza CNP   albuterol (PROVENTIL) (5 MG/ML) 0.5% nebulizer solution Take 0.5 mLs by nebulization every 6 hours as needed for Wheezing 3/16/18   Juan F Young MD   tiotropium (SPIRIVA) 18 MCG inhalation capsule Inhale 1 capsule into the lungs daily Pt told that she was using this medication at home.  3/16/18   Juan F Young MD acetaminophen (TYLENOL) 500 MG tablet Take 1 tablet by mouth every 6 hours as needed for Pain 1/9/18   Mina Figueroa MD       REVIEW OFSYSTEMS    (2-9 systems for level 4, 10 or more for level 5)      Review of Systems   Constitutional:  Negative for chills, diaphoresis and fever. HENT:  Negative for trouble swallowing. Eyes:  Negative for visual disturbance. Respiratory:  Positive for shortness of breath and wheezing. Negative for cough. Cardiovascular:  Negative for chest pain and leg swelling. Gastrointestinal:  Negative for diarrhea, nausea and vomiting. Genitourinary:  Negative for dysuria. Musculoskeletal:  Negative for neck pain and neck stiffness. Allergic/Immunologic: Negative for immunocompromised state. Neurological:  Negative for dizziness, light-headedness and headaches. Hematological:  Does not bruise/bleed easily. PHYSICAL EXAM   (up to 7 for level 4, 8 or more forlevel 5)      INITIAL VITALS:   ED Triage Vitals   BP Temp Temp Source Heart Rate Resp SpO2 Height Weight   12/16/22 0738 12/16/22 0738 12/16/22 0738 12/16/22 0718 12/16/22 0738 12/16/22 0718 -- --   (!) 160/86 97.7 °F (36.5 °C) Oral (!) 114 18 97 %         Physical Exam  Vitals reviewed. Constitutional:       General: She is not in acute distress. Appearance: She is not ill-appearing. HENT:      Head: Normocephalic and atraumatic. Mouth/Throat:      Mouth: Mucous membranes are moist.      Pharynx: Oropharynx is clear. Eyes:      Extraocular Movements: Extraocular movements intact. Pupils: Pupils are equal, round, and reactive to light. Cardiovascular:      Rate and Rhythm: Normal rate and regular rhythm. Pulses: Normal pulses. Heart sounds: Normal heart sounds. Pulmonary:      Effort: Tachypnea present. No accessory muscle usage or respiratory distress. Breath sounds: Examination of the right-upper field reveals decreased breath sounds and wheezing.  Examination of the left-upper field reveals decreased breath sounds and wheezing. Examination of the right-middle field reveals decreased breath sounds and wheezing. Examination of the left-middle field reveals decreased breath sounds and wheezing. Examination of the right-lower field reveals decreased breath sounds and wheezing. Examination of the left-lower field reveals decreased breath sounds and wheezing. Decreased breath sounds and wheezing present. No rhonchi. Chest:      Chest wall: No tenderness. Abdominal:      Palpations: Abdomen is soft. Tenderness: There is no abdominal tenderness. Musculoskeletal:      Cervical back: Normal range of motion and neck supple. Right lower leg: No edema. Left lower leg: No edema. Skin:     General: Skin is warm and dry. Capillary Refill: Capillary refill takes less than 2 seconds. Neurological:      General: No focal deficit present. Mental Status: She is oriented to person, place, and time.        DIFFERENTIAL  DIAGNOSIS     PLAN (LABS / IMAGING / EKG):  Orders Placed This Encounter   Procedures    Respiratory Panel, Molecular, with COVID-19 (Restricted: peds pts or suitable admitted adults)    XR CHEST (2 VW)    CT CHEST PULMONARY EMBOLISM W CONTRAST    CMP    CBC with Auto Differential    Beta-Hydroxybutyrate    Urinalysis with Reflex to Culture    Troponin    Brain Natriuretic Peptide    D-Dimer, Quantitative    Inpatient consult to Hospitalist    Respiratory care evaluation only    Initiate ED RT Bronchspasm Protocol    POCT glucose    POC Glucose Fingerstick    EKG 12 Lead    ADMIT TO INPATIENT       MEDICATIONS ORDERED:  Orders Placed This Encounter   Medications    albuterol (PROVENTIL) nebulizer solution 5 mg     Order Specific Question:   Initiate RT Bronchodilator Protocol     Answer:   Yes - ED protocol    ipratropium (ATROVENT) 0.02 % nebulizer solution 0.5 mg     Order Specific Question:   Initiate RT Bronchodilator Protocol     Answer:   Yes - ED protocol    iopamidol (ISOVUE-370) 76 % injection 75 mL    methylPREDNISolone sodium (SOLU-MEDROL) injection 125 mg    ondansetron (ZOFRAN) injection 4 mg    ondansetron (ZOFRAN) 4 MG/2ML injection     Leighton Glaser: cabinet override       DDX: COPD exacerbation, CHF exacerbation, DVT/PE, pneumonia, ACS, DKA    Initial MDM/Plan: 63 y.o. female who presents with shortness of breath and hyperglycemia. Her high blood glucose readings at home likely secondary to steroids given for her recent COPD exacerbation, but will check DKA labs. Will hold on fluid resuscitation until we obtain a chest x-ray to assess for CHF exacerbation. Patient did have an echo 2 days ago that did show EF greater than 55%, so CHF exacerbation less likely than COPD exacerbation. Will give breathing treatment with duo nebs and consult RT. We will also give IV Solu-Medrol. Given cardiac history, will obtain EKG, troponin x2 and chest x-ray. Patient is tachycardic and dyspneic with elevated D-dimer on last visit. , but met years criteria  Will repeat dimer, but will have low threshold for CT angiogram to rule out PE if elevated. Additional work-up and treatment pending the results of the above listed studies. Patient likely will be admitted.     DIAGNOSTIC RESULTS / EMERGENCYDEPARTMENT COURSE / MDM     LABS:  Labs Reviewed   COMPREHENSIVE METABOLIC PANEL - Abnormal; Notable for the following components:       Result Value    Glucose 312 (*)     Creatinine 1.40 (*)     Est, Glom Filt Rate 43 (*)     Potassium 3.4 (*)     All other components within normal limits   CBC WITH AUTO DIFFERENTIAL - Abnormal; Notable for the following components:    Hemoglobin 10.6 (*)     Hematocrit 35.8 (*)     MCV 78.7 (*)     MCH 23.3 (*)     RDW 16.3 (*)     Seg Neutrophils 76 (*)     Lymphocytes 16 (*)     Eosinophils % 0 (*)     Immature Granulocytes 1 (*)     All other components within normal limits   URINALYSIS WITH REFLEX TO CULTURE - Abnormal; Notable for the following components:    Glucose, Ur 1+ (*)     All other components within normal limits   TROPONIN - Abnormal; Notable for the following components:    Troponin, High Sensitivity 22 (*)     All other components within normal limits   TROPONIN - Abnormal; Notable for the following components:    Troponin, High Sensitivity 21 (*)     All other components within normal limits   BRAIN NATRIURETIC PEPTIDE - Abnormal; Notable for the following components:    Pro-BNP 4,739 (*)     All other components within normal limits   POC GLUCOSE FINGERSTICK - Abnormal; Notable for the following components:    POC Glucose 298 (*)     All other components within normal limits   POCT GLUCOSE - Normal   RESPIRATORY PANEL, MOLECULAR, WITH COVID-19   BETA-HYDROXYBUTYRATE   D-DIMER, QUANTITATIVE         RADIOLOGY:  XR CHEST (2 VW)    Result Date: 12/16/2022  EXAMINATION: TWO XRAY VIEWS OF THE CHEST 12/16/2022 8:09 am COMPARISON: Chest radiographs 12/14/2022, 07/20/2022 HISTORY: ORDERING SYSTEM PROVIDED HISTORY: SoB TECHNOLOGIST PROVIDED HISTORY: SoB FINDINGS: Lines/tubes: None. Mediastinum: Calcific atherosclerosis of the aortic arch. No mediastinal widening or shift or cardiomegaly. Lungs/pleura: Mild streaky/linear mid and lower opacities. Blunting of the left posterior costophrenic sulcus. No pneumothorax. Bones: No acute findings. 1. Mild streaky/linear mid and lower lung opacities favored to represent subsegmental atelectasis. 2. Blunting of the left posterior costophrenic sulcus which may represent a tiny effusion or small diaphragmatic hernia. CT CHEST PULMONARY EMBOLISM W CONTRAST    Result Date: 12/16/2022  EXAMINATION: CTA OF THE CHEST, 12/16/2022 10:23 am TECHNIQUE: CTA of the chest was performed after the administration of intravenous contrast.  Multiplanar reformatted images are provided for review. MIP images are provided for review.  Automated exposure control, iterative reconstruction, and/or weight based adjustment of the mA/kV was utilized to reduce the radiation dose to as low as reasonably achievable. COMPARISON: Chest x-ray dated 12/16/2022. CT chest dated 02/14/2022. HISTORY: ORDERING SYSTEM PROVIDED HISTORY:  Rule out PE TECHNOLOGIST PROVIDED HISTORY: Rule out PE Decision Support Exception - unselect if not a suspected or confirmed emergency medical condition->Emergency Medical Condition (MA) Reason for Exam : Shortness of Breath; Hyperglycemia FINDINGS: Pulmonary Arteries: Pulmonary arteries are adequately opacified for evaluation. No evidence of intraluminal filling defect to suggest pulmonary embolism. Main pulmonary artery is normal in caliber. Mediastinum: No evidence of mediastinal lymphadenopathy. The heart and pericardium demonstrate no acute abnormality. There is no acute abnormality of the thoracic aorta. Lungs/pleura: There is a tiny amount of pleural fluid on the right, which is mildly loculated with focal fluid at the right major fissure. There is mild to moderate centrilobular emphysema. There is mild scattered interstitial thickening and atelectasis in the right lung. No airspace consolidation on the right. There is an ill-defined nodular opacity in the left lower lobe, measuring 1.2 cm, which is new when compared to 02/14/2022. No pneumothorax. Upper Abdomen: Limited images of the upper abdomen are unremarkable. Soft Tissues/Bones: No acute bone or soft tissue abnormality. 1. No evidence of pulmonary embolism. 2. Trace right pleural fluid, which is mildly loculated with focal fluid in the right major fissure. 3. Focal ill-defined 1.2 cm nodular opacity in the left lower lobe, new from 02/14/2022, possibly infectious/inflammatory. Short-term follow-up CT chest is recommended in 2-3 months to exclude underlying neoplasm.         EKG    EKG Interpretation    Interpreted by emergency department physician    Rhythm: atrial fibrillation - rapid  Rate: 120-130  Axis: normal  Ectopy: none  Conduction: normal  ST Segments: nonspecific changes  T Waves: no acute change  Q Waves: none    Clinical Impression: Atrial fibrillation with RVR and nonspecific ST abnormalities. Nonspecific EKG. Jesus Ramirez MD      All EKG's are interpreted by the Emergency Department Physicianwho either signs or Co-signs this chart in the absence of a cardiologist.    EMERGENCY DEPARTMENT COURSE:  ED Course as of 12/16/22 1302   Fri Dec 16, 2022   1135 Patient did have an elevated D-dimer and remained tachycardic, so CT angiogram to rule out PE was obtained. No PE on imaging. Troponins were 22 and 21. Patient satting well on room air, but remains tachypneic. Given need for additional treatments and ongoing monitoring, will admit patient on the Intermed service. [GG]      ED Course User Index  [GG] Jesus Ramirez MD          PROCEDURES:  None    CONSULTS:  IP CONSULT TO HOSPITALIST    CRITICAL CARE:  30 minutes    FINAL IMPRESSION      1. COPD exacerbation ([address]    2. Hyperglycemia          DISPOSITION / PLAN     DISPOSITION Admitted 12/16/2022 11:54:40 AM      PATIENT REFERRED TO:  No follow-up provider specified.     DISCHARGE MEDICATIONS:  New Prescriptions    No medications on file       Jesus Ramirez MD  Emergency Medicine Resident    (Please note that portions of this note were completed with a voice recognition program.Efforts were made to edit the dictations but occasionally words are mis-transcribed.)       Jesus Ramirez MD  Resident  12/16/22 1779

## 2022-12-16 NOTE — ED NOTES
Pt A&Ox4, RR even and nonlabored, NAD. Pt resting on cot with full cardiac monitor on and denies needs at this time.      Socorro Sol RN  12/16/22 0906

## 2022-12-16 NOTE — ED NOTES
Pt A&Ox4, RR even and nonlabored, NAD. Pt received pillow. Pt laying on cot with full cardiac monitor on. Pt denies needs at this time.      Imelda Hinton RN  12/16/22 1283

## 2022-12-16 NOTE — ED PROVIDER NOTES
[facility] ED     Emergency Department     Faculty Attestation    I performed a history and physical examination of the patient and discussed management with the resident. I reviewed the residents note and agree with the documented findings and plan of care. Any areas of disagreement are noted on the chart. I was personally present for the key portions of any procedures. I have documented in the chart those procedures where I was not present during the key portions. I have reviewed the emergency nurses triage note. I agree with the chief complaint, past medical history, past surgical history, allergies, medications, social and family history as documented unless otherwise noted below. For Physician Assistant/ Nurse Practitioner cases/documentation I have personally evaluated this patient and have completed at least one if not all key elements of the E/M (history, physical exam, and MDM). Additional findings are as noted. Patient with history of COPD presents with worsening shortness of breath. Patient was just here admitted to the ETU 2 days ago. Patient was seen by cardiology and had an echo done at that time which showed a normal EF. Patient was discharged with prescription for prednisone. She says that yesterday her blood sugar was very high so she did not take the prednisone. She says that her shortness of breath has been worsening since then. She denies any new fever. She says she has had a cough. She denies chest pain. On exam, patient is lying in the bed is mildly tachypneic. There is diffuse wheeze heard on auscultation of lungs bilaterally. Heart sounds are tachycardic. Abdomen is soft and nontender. The bilateral calves are nontender nonswollen. Will get EKG, CT scan of the chest to rule out PE, labs. Will administer nebulizer treatment here and steroids. We will reassess with probable admission.     EKG Interpretation    Interpreted by emergency department physician    Rhythm: atrial fibrillation - controlled  Rate: tachycardia  Axis: normal  Ectopy: none  Conduction: normal  ST Segments: nonspecific changes  T Waves: non specific changes  Q Waves: none    Clinical Impression: non-specific EKG and atrial fibrillation (new onset)    MD Rosario Goode MD  Attending Emergency  Physician            Cecelia Auguste MD  12/16/22 4164

## 2022-12-16 NOTE — H&P
[facility]  Office: [address], DO, Angelina Stain, DO, Aelxsander Swanson, DO, Michael Schulz Blood, DO, Karol Casillas MD, Anastasia Ellsworth MD, Claudia Pihpps MD, Jamel Dietz MD,  Stefani Cordero MD, Dinesh Galeano MD, Jose Armando Davies, DO, Viviane Gonzales MD,  Pippa Prescott MD, Sylvia Najera MD, Rosa Osborn, DO, Radha Bui MD, Abena Harris MD, Oanh Irving, DO, Delmi Adorno MD, Eriberto Coleman MD, Demetris Lew MD, Candace Boyer MD, Mechelle Wiley, DO, Rashida Sorto MD, Genie Vasquez MD, Dawn Tovar, CNP,  Ibrahima Haynes, CNP, Prudencio Guallpa, CNP, Kira Rudolph, CNP,  Yaakov Wooten, DNP, Janice Chen, CNP, Andrei Mina, CNP, Armand Moore, CNP, Easton Cisneros, CNP, Juan Manuel Galan, CNP, Noemi Murray PAKATHI, Elias Briones, CNS, aMrilee Patten, CNP, Aria Hays, CNP         [facility]    HISTORY AND PHYSICAL EXAMINATION            Date:   2022  Patient name:  Johnathan South  Date of admission:  2022  7:19 AM  MRN:   [id number]  Account:  [de-identified]  YOB: 1960  PCP:    Cinda Zapien MD  Room:     Code Status:    Prior    Chief Complaint:     Chief Complaint   Patient presents with    Shortness of Breath    Hyperglycemia       History Obtained From:     patient, electronic medical record    History of Present Illness:     Johnathan South is a 63 y.o. Non- / non  female who presents with Shortness of Breath and Hyperglycemia   and is admitted to the hospital for the management of COPD with exacerbation ([facility] [address] 72-year-old female with past medical history of COPD, smoker, systolic and diastolic heart failure, CAD with stent, type 2 diabetes mellitus with A1c above 9, dyslipidemia, hypertension, peripheral arterial disease, CKD Stage 3 presents to the hospital with shortness of breath.   Patient was recently admitted to observation unit for COPD exacerbation and was given steroids and azithromycin. Patient returned home and continued to feel short of breath and also was worried about her increasing blood sugars above 300. Patient states that she did not take her prednisone as blood sugars were rising. Her glucometer could not read her blood sugars yesterday and she stopped eating to bring her blood sugars down. She was feeling short of breath on exertion and continued wheezing so decided to come back to the ER. In the ER patient was  tachycardic, hypertensive but bp improved, she was very anxious. Wheezing+, received solumedrol and breathing treatments. Blood sugars noted to be >300. Past Medical History:     Past Medical History:   Diagnosis Date    Acute on chronic systolic CHF (congestive heart failure) ([facility] [address] 12/20/2021    Asthma     Blood circulation, collateral     CAD (coronary artery disease)     [facility]ch[facility] obstructive pulmonary disease) ([facility])     COPD exacerbation ([facility] [address] 12/18/2021    Diabetes mellitus ([facility] [address]     Hyperlipidemia     Hypertension     Ischemic colitis ([facility] [address] 10/28/2021    Movement disorder     B/L torn rotator cuff.  Neuropathy     PAD (peripheral artery disease) ([facility] [address]     Tobacco abuse 11/19/2016    Tobacco abuse counseling 7/25/2021        Past Surgical History:     Past Surgical History:   Procedure Laterality Date    CORONARY ANGIOPLASTY WITH STENT PLACEMENT      x2    CORONARY ANGIOPLASTY WITH STENT PLACEMENT  02/11/2019    JOSE MARTIN to RCA per Dr. Rohan garcia approuch    INNER EAR SURGERY Right     TONSILLECTOMY          Medications Prior to Admission:     Prior to Admission medications    Medication Sig Start Date End Date Taking?  Authorizing Provider   predniSONE (DELTASONE) 20 MG tablet Take 3 tablets by mouth daily for 5 days 12/15/22 12/20/22  BINDU Charles - CNP   azithromycin (ZITHROMAX) 250 MG tablet Take 1 tablet by mouth See Admin Instructions for 5 days 500mg on day 1 followed by 250mg on days 2 - 5 12/14/22 12/19/22  BINDU Parrish CNP   furosemide (LASIX) 40 MG tablet Take 1 tablet by mouth in the morning. Hold for 2 more days. 7/22/22   Sangeeta Roca MD   mometasone-formoterol (DULERA) 200-5 MCG/ACT inhaler Inhale into the lungs 3/3/22   Historical Provider, MD   busPIRone (BUSPAR) 15 MG tablet Take 30 mg by mouth 3 times daily    Historical Provider, MD   lisinopril (PRINIVIL;ZESTRIL) 10 MG tablet Take 1 tablet by mouth daily 10/9/21   Chidi Dove MD   clopidogrel (PLAVIX) 75 MG tablet Take 1 tablet by mouth daily 10/9/21   Chidi Dove MD   famotidine (PEPCID) 20 MG tablet Take 20 mg by mouth 2 times daily    Historical Provider, MD   albuterol sulfate HFA (PROVENTIL;VENTOLIN;PROAIR) 108 (90 Base) MCG/ACT inhaler Inhale 2 puffs into the lungs 3 times daily    Historical Provider, MD   lidocaine (LIDODERM) 5 % Place 1 patch onto the skin as needed for Pain (as needed for rotator cuff pain) 12 hours on, 12 hours off. Historical Provider, MD   apixaban (ELIQUIS) 5 MG TABS tablet Take 1 tablet by mouth 2 times daily 10/5/21   Gregory Hutson MD   atorvastatin (LIPITOR) 40 MG tablet Take 1 tablet by mouth daily 7/27/21   Alonzo Hernández MD   Dulaglutide (TRULICITY) 1.5 AC/6.8NV SOPN Inject 1.5 mg into the skin once a week    Historical Provider, MD   nitroGLYCERIN (NITROSTAT) 0.4 MG SL tablet up to max of 3 total doses. If no relief after 1 dose, call 911. 2/12/19   GingBINDU Chatman CNP   metoprolol tartrate (LOPRESSOR) 25 MG tablet Take 0.5 tablets by mouth 2 times daily 2/12/19   GingBINDU Chatman CNP   Lancets MISC 1 each by Does not apply route daily 2/12/19   Fatuma Willis MD   blood glucose monitor strips Test 3 times a day & as needed for symptoms of irregular blood glucose.  2/12/19   Fatuma Willis MD   glucose monitoring kit (FREESTYLE) monitoring kit 1 kit by Does not apply route daily 2/12/19   Fatuma Willis MD   aspirin 81 MG chewable tablet Take 1 tablet by mouth daily 19  Othella Pill, APRN - CNP   albuterol (PROVENTIL) (5 MG/ML) 0.5% nebulizer solution Take 0.5 mLs by nebulization every 6 hours as needed for Wheezing 3/16/18   Blayne Ferguson MD   tiotropium (SPIRIVA) 18 MCG inhalation capsule Inhale 1 capsule into the lungs daily Pt told that she was using this medication at home. 3/16/18   Blayne Ferguson MD   acetaminophen (TYLENOL) 500 MG tablet Take 1 tablet by mouth every 6 hours as needed for Pain 18   Agusto Lara MD        Allergies:     Codeine and Morphine    Social History:     Tobacco quit 1 yr ago  Alcohol:      reports no history of alcohol use. Drug Use:  reports current drug use. Drug: Marijuana [place]). Family History:     Family History   Problem Relation Age of Onset    Hypertension Mother     Hypertension Father     Diabetes Sister     Diabetes Brother     Stroke Brother        Review of Systems:     Positive and Negative as described in HPI. Review of Systems   Constitutional:  Positive for activity change, appetite change, chills and diaphoresis. HENT:  Negative for congestion, dental problem and ear discharge. Eyes:  Negative for pain and discharge. Respiratory:  Positive for cough and shortness of breath. Cardiovascular:  Negative for chest pain and leg swelling. Gastrointestinal:  Positive for nausea. Negative for abdominal distention, abdominal pain and vomiting. Endocrine: Negative for cold intolerance and heat intolerance. Genitourinary:  Negative for difficulty urinating, flank pain, frequency and genital sores. Musculoskeletal:  Negative for arthralgias and back pain. Neurological:  Negative for dizziness and facial asymmetry. Psychiatric/Behavioral:  Negative for agitation and behavioral problems.       Physical Exam:   /72   Pulse 93   Temp 97.7 °F (36.5 °C) (Oral)   Resp 25   SpO2 95%   Temp (24hrs), Av.7 °F (36.5 °C), Min:97.7 °F (36.5 °C), Max:97.7 °F (36.5 °C)    Recent Labs     12/16/22  0718   POCGLU 298*     No intake or output data in the 24 hours ending 12/16/22 1304    Physical Exam  Constitutional:       General: She is not in acute distress. Appearance: She is obese. She is not toxic-appearing or diaphoretic. HENT:      Head: Normocephalic. Right Ear: External ear normal.      Left Ear: External ear normal.      Nose: Nose normal.      Mouth/Throat:      Mouth: Mucous membranes are moist.   Eyes:      Pupils: Pupils are equal, round, and reactive to light. Cardiovascular:      Rate and Rhythm: Regular rhythm. Tachycardia present. Pulses: Normal pulses. Heart sounds: Normal heart sounds. Pulmonary:      Effort: No respiratory distress. Breath sounds: Wheezing present. Comments: No increased effort  Wheezing+ all lung fields  Crackles noted at bases    Abdominal:      General: Bowel sounds are normal. There is distension. Palpations: Abdomen is soft. Tenderness: There is no abdominal tenderness. There is no guarding. Musculoskeletal:      Right lower leg: Edema present. Left lower leg: Edema present. Comments: trace bilateral extremity edema   Skin:     Coloration: Skin is not jaundiced. Findings: No lesion. Neurological:      General: No focal deficit present. Mental Status: She is alert and oriented to person, place, and time. Psychiatric:         Mood and Affect: Mood normal.       Investigations:      Laboratory Testing:  Recent Results (from the past 24 hour(s))   POC Glucose Fingerstick    Collection Time: 12/16/22  7:18 AM   Result Value Ref Range    POC Glucose 298 (H) 65 - 105 mg/dL   POCT glucose    Collection Time: 12/16/22  7:32 AM   Result Value Ref Range    Glucose 298 mg/dL    QC OK?  yes    CMP    Collection Time: 12/16/22  8:06 AM   Result Value Ref Range    Glucose 312 (H) 70 - 99 mg/dL    BUN 23 8 - 23 mg/dL    Creatinine 1.40 (H) 0.50 - 0.90 mg/dL    Est, Glom Filt Rate 43 (L) >60 mL/min/1.73m2    Calcium 8.8 8.6 - 10.4 mg/dL    Sodium 137 135 - 144 mmol/L    Potassium 3.4 (L) 3.7 - 5.3 mmol/L    Chloride 99 98 - 107 mmol/L    CO2 23 20 - 31 mmol/L    Anion Gap 15 9 - 17 mmol/L    Alkaline Phosphatase 99 35 - 104 U/L    ALT 18 5 - 33 U/L    AST 17 <32 U/L    Total Bilirubin 0.3 0.3 - 1.2 mg/dL    Total Protein 6.7 6.4 - 8.3 g/dL    Albumin 3.6 3.5 - 5.2 g/dL    Albumin/Globulin Ratio 1.2 1.0 - 2.5   CBC with Auto Differential    Collection Time: 12/16/22  8:06 AM   Result Value Ref Range    WBC 9.1 3.5 - 11.3 k/uL    RBC 4.55 3.95 - 5.11 m/uL    Hemoglobin 10.6 (L) 11.9 - 15.1 g/dL    Hematocrit 35.8 (L) 36.3 - 47.1 %    MCV 78.7 (L) 82.6 - 102.9 fL    MCH 23.3 (L) 25.2 - 33.5 pg    MCHC 29.6 28.4 - 34.8 g/dL    RDW 16.3 (H) 11.8 - 14.4 %    Platelets 763 709 - 397 k/uL    MPV 10.6 8.1 - 13.5 fL    NRBC Automated 0.0 0.0 per 100 WBC    Seg Neutrophils 76 (H) 36 - 65 %    Lymphocytes 16 (L) 24 - 43 %    Monocytes 6 3 - 12 %    Eosinophils % 0 (L) 1 - 4 %    Basophils 1 0 - 2 %    Immature Granulocytes 1 (H) 0 %    Segs Absolute 6.92 1.50 - 8.10 k/uL    Absolute Lymph # 1.45 1.10 - 3.70 k/uL    Absolute Mono # 0.53 0.10 - 1.20 k/uL    Absolute Eos # 0.04 0.00 - 0.44 k/uL    Basophils Absolute 0.06 0.00 - 0.20 k/uL    Absolute Immature Granulocyte 0.06 0.00 - 0.30 k/uL    RBC Morphology ANISOCYTOSIS PRESENT    Beta-Hydroxybutyrate    Collection Time: 12/16/22  8:06 AM   Result Value Ref Range    Beta-Hydroxybutyrate 0.14 0.02 - 0.27 mmol/L   Troponin    Collection Time: 12/16/22  8:06 AM   Result Value Ref Range    Troponin, High Sensitivity 22 (H) 0 - 14 ng/L   Brain Natriuretic Peptide    Collection Time: 12/16/22  8:06 AM   Result Value Ref Range    Pro-BNP 4,739 (H) <300 pg/mL   D-Dimer, Quantitative    Collection Time: 12/16/22  8:06 AM   Result Value Ref Range    D-Dimer, Quant 0.80 mg/L FEU   Urinalysis with Reflex to Culture    Collection Time: 12/16/22  8:09 AM    Specimen: Urine   Result Value Ref Range    Color, UA Yellow Yellow    Turbidity UA Clear Clear    Glucose, Ur 1+ (A) NEGATIVE    Bilirubin Urine NEGATIVE NEGATIVE    Ketones, Urine NEGATIVE NEGATIVE    Specific Gravity, UA 1.013 1.005 - 1.030    Urine Hgb NEGATIVE NEGATIVE    pH, UA 6.5 5.0 - 8.0    Protein, UA NEGATIVE NEGATIVE    Urobilinogen, Urine Normal Normal    Nitrite, Urine NEGATIVE NEGATIVE    Leukocyte Esterase, Urine NEGATIVE NEGATIVE    Urinalysis Comments       Microscopic exam not performed based on chemical results unless requested in original order. Troponin    Collection Time: 12/16/22  8:44 AM   Result Value Ref Range    Troponin, High Sensitivity 21 (H) 0 - 14 ng/L       Imaging/Diagnostics:  XR CHEST (2 VW)    Result Date: 12/16/2022  1. Mild streaky/linear mid and lower lung opacities favored to represent subsegmental atelectasis. 2. Blunting of the left posterior costophrenic sulcus which may represent a tiny effusion or small diaphragmatic hernia. XR CHEST PORTABLE    Result Date: 12/14/2022  She streaky bibasilar opacities, atelectasis favored over infection. CT CHEST PULMONARY EMBOLISM W CONTRAST    Result Date: 12/16/2022  1. No evidence of pulmonary embolism. 2. Trace right pleural fluid, which is mildly loculated with focal fluid in the right major fissure. 3. Focal ill-defined 1.2 cm nodular opacity in the left lower lobe, new from 02/14/2022, possibly infectious/inflammatory. Short-term follow-up CT chest is recommended in 2-3 months to exclude underlying neoplasm.        Assessment :      Hospital Problems             Last Modified POA    * (Principal) COPD with exacerbation ([address] 12/16/2022 Yes    S/P drug eluting coronary stent placement - Mid RCA () 2/11/19 (Dr. Reinaldo Carter) 12/16/2022 Yes    Overview Signed 2/11/2019  5:16 PM by Sheryle Shropshire, MD     RCA stent 100% required PTCA-JOSE MARTIN  Patent LAd stent         Class 2 obesity with body mass index (BMI) of 36.0 to 36.9 in adult 12/16/2022 Yes    Dyspnea 12/16/2022 Yes    Paroxysmal A-fib ([facility] [address] 12/16/2022 Yes    CKD stage 3 due to type 2 diabetes mellitus ([facility] [address] 12/16/2022 Yes    Microcytic anemia 12/16/2022 Yes    Lung nodule 12/16/2022 Yes    Essential hypertension 12/16/2022 Yes    Coronary artery disease involving native coronary artery of native heart without angina pectoris 12/16/2022 Yes    Overview Signed 7/25/2021 10:30 AM by Jessy Cool, DO     s/p PCI with JOSE MARTIN to a  of RCA in Feb 2019:          Type 2 diabetes mellitus with hyperglycemia, without long-term current use of insulin ([facility] [address] 12/16/2022 Yes    Chronic combined systolic (congestive) and diastolic (congestive) heart failure ([facility] [address] 12/16/2022 Yes    Former smoker 12/16/2022 Yes       Plan:     Patient status inpatient in the Med/Surge    COPD exacerbation-start Solu-Medrol 40 mg every 12, start breathing treatments with DuoNeb and Dulera. Start doxycycline, check viral panel. Acute on Chronic combined systolic and diastolic heart failure- start iv lasix 40mg daily, fluid restriction to 1800 and, monitor intake and output, follow up on small effusion noted on CT scan after diuresis. Type 2 diabetes mellitus-uncontrolled, HbA1c more than 9.5, patient already on Trulicity at home, agreeable to start insulin, start Lantus 25 units daily with high-dose sliding scale, check poct glucose ac, hs.    Paroxysmal A. fib-on lopressor, Eliquis for anticoagulation    CAD with history of PCI to RCA in 2021, continue plavix, statin, stress test 5/2021 was low risk. Hypertension-continue Lopressor    Nodular lung opacity 1.2 cm-new compared to the scan in feb, discussed with patient, she will need repeat CT scan in 2 to 3 months to rule out malignancy.     CKD stage 3- baseline 1.4-1.5, currently creatinine stable    Microcytic anemia- stable    Patient needs lifestyle modifications for weight loss  Replace electrolytes  Dvt prop- already on eliquis    Consultations:   IP CONSULT TO HOSPITALIST     Patient is admitted as inpatient status because of co-morbidities listed above, severity of signs and symptoms as outlined, requirement for current medical therapies and most importantly because of direct risk to patient if care not provided in a hospital setting. Expected length of stay > 48 hours.     Cele Han MD  12/16/2022  1:04 PM    Copy sent to Dr. Charan Skelton MD

## 2022-12-16 NOTE — ED NOTES
Pt arrives to ED for SOB and hyperglycemia. Pt states when she walked to the bathroom this morning she has SOB, weakness, and blood sugar was in 300s. Pt states she was just d/c from hospital 2 days ago for COPD exacerbation and received steroids that she has been taking. Pt denies chest pain. Pt hypertensive and tachy upon arrival. Pt A&Ox4, RR even and nonlabored, NAD.       Luis Daniel Gomez RN  12/16/22 9888

## 2022-12-16 NOTE — ED NOTES
Pt A&Ox4, RR even and nonlabored, NAD. Pt resting on cot with full cardiac monitor on. Pt received crackers and denies other needs at this time.      Deep Fuller RN  12/16/22 4424

## 2022-12-16 NOTE — ED NOTES
Pt A&Ox4, RR even and nonlabored, NAD. Pt received warm blanket and ice chips. Pt laying on cot with full cardiac monitor on. Pt denies needs at this time.      Yovaan Sim RN  12/16/22 2384

## 2022-12-16 NOTE — ED NOTES
Pt A&Ox4, RR even and nonlabored, NAD. Pt had episode of N/V, resident notified, Collette Hoyle given. Pt resting on cot with full cardiac monitor on. Pt denies other needs at this time.      Deep Fuller RN  12/16/22 3731

## 2022-12-16 NOTE — ED NOTES
Pt A&Ox4, RR even and nonlabored, NAD. Pt resting on cot with full cardiac monitor on, pt denies needs at this time.      Aleksandr Tamez RN  12/16/22 1174

## 2022-12-16 NOTE — ED NOTES
Pt A&Ox4, RR even and nonlabored, NAD. Pt assisted to bedside commode, full cardiac monitor is on. Pt denies other needs at this time.      Deep Fuller RN  12/16/22 1924

## 2022-12-16 NOTE — ED NOTES
Pt A&Ox4, RR even and nonlabored, NAD. Flu swab collected, labeled, and sent to lab. Pt received box lunch per request. Pt laying on cot with full cardiac monitor. Pt denies other needs at this time.      Daquan Brandon RN  12/16/22 7502

## 2022-12-16 NOTE — CARE COORDINATION
12/16/22 1813   Service Assessment   Patient Orientation Alert and Oriented   Cognition Alert   History Provided By Patient   Primary Mikey Scott   PCP Verified by CM Yes   Last Visit to PCP   (one month ago)   Prior Functional Level Assistance with the following:;Housework; Shopping   Current Functional Level Independent in ADLs/IADLs   Can patient return to prior living arrangement Yes   Ability to make needs known: Good   Family able to assist with home care needs: Yes   Would you like for me to discuss the discharge plan with any other family members/significant others, and if so, who? No   Financial Resources Medicaid; Medicare   Community Resources Transportation  (uses [employer])   Social/Functional History   Type of Home   (duplex, lives on bottom level)   Home Layout One level   [facility] to enter with rails  (states rails broke)   Bathroom Shower/Tub Tub/Shower unit   Bathroom Toilet Handicap height   Bathroom Equipment Shower chair   Bathroom Accessibility Not accessible   Home Equipment   (nebulizer and glucometer, relates just ordered mobility scooter)   [address]  (states ordered a mobility scooter)   Transfer Assistance Independent   Active  No   Mode of Transportation Bus   Occupation Retired   Type of Occupation [employer]   Discharge Planning   Type of Residence   (duplex lower level)   Living Arrangements Children   Current Services Prior To Admission   (nebulizer, glucometer)   Potential Assistance Needed N/A   DME Ordered? No   Potential Assistance Purchasing Medications No  (fills at [address] center)   Type of [address] None   Patient expects to be discharged to: House   One/Two Story Residence One story   History of falls?  0   Services At/After Discharge   Transition of Care Consult (CM Consult) N/A   Services At/After Discharge None   Mode of Transport at Discharge   (daughter in law to provide transp home)   Condition of Participation: Discharge Planning   The Plan for Transition of Care is related to the following treatment goals: comfort ease of breathing   The Patient and/or Patient Representative was provided with a Choice of Provider? (NA)   Freedom of Choice list was provided with basic dialogue that supports the patient's individualized plan of care/goals, treatment preferences, and shares the quality data associated with the providers? No   Case Management Assessment  Initial Evaluation    Date/Time of Evaluation: 12/16/2022 6:19 PM  Assessment Completed by: Niya Schroeder RN    If patient is discharged prior to next notation, then this note serves as note for discharge by case management. Patient Name: Kisha Johnson                   YOB: 1960  Diagnosis: Hyperglycemia [R73.9]  COPD exacerbation ([facility] [address] [J44.1]  COPD with exacerbation ([facility] [address] [J44.1]                   Date / Time: 12/16/2022  7:19 AM    Patient Admission Status: Inpatient   Readmission Risk (Low < 19, Mod (19-27), High > 27): Readmission Risk Score: 18    Current PCP: Mimi Farias MD  PCP verified by CM? (P) Yes    Chart Reviewed: Yes      History Provided by: (P) Patient  Patient Orientation: Alert and Oriented    Patient Cognition: (P) Alert    Hospitalization in the last 30 days (Readmission):  No    If yes, Readmission Assessment in  Navigator will be completed.     Advance Directives:      Code Status: Full Code   Patient's Primary Decision Maker is:        Discharge Planning:    Patient lives with: (P) Children Type of Home: (P)  (duplex lower level)  Primary Care Giver: (P) Family  Patient Support Systems include: (P) Children   Current Financial resources: (P) Medicaid, Medicare  Current community resources: (P) Transportation (uses tarps)  Current services prior to admission: (P)  (nebulizer, glucometer)            Current DME:              Type of Home Care services:  (P) None    ADLS  Prior functional level: (P) Assistance with the following:, Housework, Shopping  Current functional level: (P) Independent in ADLs/IADLs    PT AM-PAC:   /24  OT AM-PAC:   /24    Family can provide assistance at DC: (P) Yes  Would you like Case Management to discuss the discharge plan with any other family members/significant others, and if so, who? (P) No  Plans to Return to Present Housing: (P) Yes  Other Identified Issues/Barriers to RETURNING to current housing: none  Potential Assistance needed at discharge: (P) N/A            Potential DME:    Patient expects to discharge to: (P) [address] [place] [address] for transportation at discharge:      Financial    Payor: Sasha Harman / Plan: MEDICARE PART A AND B / Product Type: *No Product type* /     Does insurance require precert for SNF: No    Potential assistance Purchasing Medications: (P) No (fills at [address] [facility] center)  Meds-to-Beds request:        [address] [facility], [address] [place] [address]  [address] [address]  Phone: [phone] Fax: Dannie Pedro [address] [place] [address],[address] [address]  Phone: [phone] Fax: [phone]      Notes:    Factors facilitating achievement of predicted outcomes: Family support and Cooperative    Barriers to discharge: Medication managment    Additional Case Management Notes: none    The Plan for Transition of Care is related to the following treatment goals of Hyperglycemia [R73.9]  COPD exacerbation ([address] [J44.1]  COPD with exacerbation ([address] [P36.8]    IF APPLICABLE: The Patient and/or patient representative Estelita Aguayo and her family were provided with a choice of provider and agrees with the discharge plan.  Freedom of choice list with basic

## 2022-12-17 LAB
GLUCOSE BLD-MCNC: 222 MG/DL (ref 65–105)
GLUCOSE BLD-MCNC: 335 MG/DL (ref 65–105)
GLUCOSE BLD-MCNC: 371 MG/DL (ref 65–105)
GLUCOSE BLD-MCNC: 392 MG/DL (ref 65–105)

## 2022-12-17 PROCEDURE — 2580000003 HC RX 258: Performed by: STUDENT IN AN ORGANIZED HEALTH CARE EDUCATION/TRAINING PROGRAM

## 2022-12-17 PROCEDURE — 6370000000 HC RX 637 (ALT 250 FOR IP): Performed by: INTERNAL MEDICINE

## 2022-12-17 PROCEDURE — 6370000000 HC RX 637 (ALT 250 FOR IP): Performed by: STUDENT IN AN ORGANIZED HEALTH CARE EDUCATION/TRAINING PROGRAM

## 2022-12-17 PROCEDURE — 82947 ASSAY GLUCOSE BLOOD QUANT: CPT

## 2022-12-17 PROCEDURE — 94640 AIRWAY INHALATION TREATMENT: CPT

## 2022-12-17 PROCEDURE — 94760 N-INVAS EAR/PLS OXIMETRY 1: CPT

## 2022-12-17 PROCEDURE — 6360000002 HC RX W HCPCS: Performed by: STUDENT IN AN ORGANIZED HEALTH CARE EDUCATION/TRAINING PROGRAM

## 2022-12-17 PROCEDURE — 97161 PT EVAL LOW COMPLEX 20 MIN: CPT

## 2022-12-17 PROCEDURE — 99232 SBSQ HOSP IP/OBS MODERATE 35: CPT | Performed by: INTERNAL MEDICINE

## 2022-12-17 PROCEDURE — 93005 ELECTROCARDIOGRAM TRACING: CPT | Performed by: INTERNAL MEDICINE

## 2022-12-17 PROCEDURE — 1200000000 HC SEMI PRIVATE

## 2022-12-17 RX ORDER — INSULIN GLARGINE 100 [IU]/ML
40 INJECTION, SOLUTION SUBCUTANEOUS DAILY
Status: DISCONTINUED | OUTPATIENT
Start: 2022-12-18 | End: 2022-12-18 | Stop reason: HOSPADM

## 2022-12-17 RX ORDER — INSULIN GLARGINE 100 [IU]/ML
10 INJECTION, SOLUTION SUBCUTANEOUS ONCE
Status: COMPLETED | OUTPATIENT
Start: 2022-12-17 | End: 2022-12-17

## 2022-12-17 RX ORDER — INSULIN GLARGINE 100 [IU]/ML
20 INJECTION, SOLUTION SUBCUTANEOUS NIGHTLY
Status: DISCONTINUED | OUTPATIENT
Start: 2022-12-17 | End: 2022-12-17

## 2022-12-17 RX ORDER — BUSPIRONE HYDROCHLORIDE 15 MG/1
30 TABLET ORAL 3 TIMES DAILY
Status: DISCONTINUED | OUTPATIENT
Start: 2022-12-17 | End: 2022-12-18 | Stop reason: HOSPADM

## 2022-12-17 RX ORDER — MIDODRINE HYDROCHLORIDE 5 MG/1
5 TABLET ORAL ONCE
Status: DISCONTINUED | OUTPATIENT
Start: 2022-12-17 | End: 2022-12-18 | Stop reason: HOSPADM

## 2022-12-17 RX ADMIN — SODIUM CHLORIDE, PRESERVATIVE FREE 10 ML: 5 INJECTION INTRAVENOUS at 09:15

## 2022-12-17 RX ADMIN — LISINOPRIL 10 MG: 10 TABLET ORAL at 16:32

## 2022-12-17 RX ADMIN — METHYLPREDNISOLONE SODIUM SUCCINATE 40 MG: 125 INJECTION, POWDER, FOR SOLUTION INTRAMUSCULAR; INTRAVENOUS at 12:37

## 2022-12-17 RX ADMIN — BUSPIRONE HYDROCHLORIDE 30 MG: 15 TABLET ORAL at 13:49

## 2022-12-17 RX ADMIN — APIXABAN 5 MG: 5 TABLET, FILM COATED ORAL at 09:06

## 2022-12-17 RX ADMIN — IPRATROPIUM BROMIDE AND ALBUTEROL SULFATE 1 AMPULE: .5; 3 SOLUTION RESPIRATORY (INHALATION) at 12:34

## 2022-12-17 RX ADMIN — IPRATROPIUM BROMIDE AND ALBUTEROL SULFATE 1 AMPULE: .5; 3 SOLUTION RESPIRATORY (INHALATION) at 08:51

## 2022-12-17 RX ADMIN — APIXABAN 5 MG: 5 TABLET, FILM COATED ORAL at 20:53

## 2022-12-17 RX ADMIN — IPRATROPIUM BROMIDE AND ALBUTEROL SULFATE 1 AMPULE: .5; 3 SOLUTION RESPIRATORY (INHALATION) at 20:14

## 2022-12-17 RX ADMIN — CLOPIDOGREL 75 MG: 75 TABLET, FILM COATED ORAL at 08:06

## 2022-12-17 RX ADMIN — BUSPIRONE HYDROCHLORIDE 30 MG: 15 TABLET ORAL at 20:53

## 2022-12-17 RX ADMIN — IPRATROPIUM BROMIDE AND ALBUTEROL SULFATE 1 AMPULE: .5; 3 SOLUTION RESPIRATORY (INHALATION) at 15:59

## 2022-12-17 RX ADMIN — DOXYCYCLINE HYCLATE 100 MG: 100 TABLET, COATED ORAL at 09:06

## 2022-12-17 RX ADMIN — BUDESONIDE AND FORMOTEROL FUMARATE DIHYDRATE 2 PUFF: 160; 4.5 AEROSOL RESPIRATORY (INHALATION) at 08:51

## 2022-12-17 RX ADMIN — INSULIN GLARGINE 25 UNITS: 100 INJECTION, SOLUTION SUBCUTANEOUS at 08:05

## 2022-12-17 RX ADMIN — METOPROLOL TARTRATE 12.5 MG: 25 TABLET ORAL at 08:05

## 2022-12-17 RX ADMIN — INSULIN LISPRO 16 UNITS: 100 INJECTION, SOLUTION INTRAVENOUS; SUBCUTANEOUS at 16:32

## 2022-12-17 RX ADMIN — SODIUM CHLORIDE, PRESERVATIVE FREE 10 ML: 5 INJECTION INTRAVENOUS at 20:54

## 2022-12-17 RX ADMIN — BUSPIRONE HYDROCHLORIDE 30 MG: 15 TABLET ORAL at 09:06

## 2022-12-17 RX ADMIN — INSULIN LISPRO 12 UNITS: 100 INJECTION, SOLUTION INTRAVENOUS; SUBCUTANEOUS at 08:05

## 2022-12-17 RX ADMIN — ACETAMINOPHEN 650 MG: 325 TABLET ORAL at 08:06

## 2022-12-17 RX ADMIN — DOXYCYCLINE HYCLATE 100 MG: 100 TABLET, COATED ORAL at 20:52

## 2022-12-17 RX ADMIN — INSULIN LISPRO 4 UNITS: 100 INJECTION, SOLUTION INTRAVENOUS; SUBCUTANEOUS at 20:52

## 2022-12-17 RX ADMIN — INSULIN LISPRO 4 UNITS: 100 INJECTION, SOLUTION INTRAVENOUS; SUBCUTANEOUS at 13:49

## 2022-12-17 RX ADMIN — METHYLPREDNISOLONE SODIUM SUCCINATE 40 MG: 125 INJECTION, POWDER, FOR SOLUTION INTRAMUSCULAR; INTRAVENOUS at 23:31

## 2022-12-17 RX ADMIN — FUROSEMIDE 40 MG: 10 INJECTION, SOLUTION INTRAMUSCULAR; INTRAVENOUS at 08:09

## 2022-12-17 RX ADMIN — DESMOPRESSIN ACETATE 40 MG: 0.2 TABLET ORAL at 08:06

## 2022-12-17 RX ADMIN — INSULIN GLARGINE 10 UNITS: 100 INJECTION, SOLUTION SUBCUTANEOUS at 09:51

## 2022-12-17 NOTE — PROGRESS NOTES
[facility]  PROGRESS NOTE    Shift date: 12/16/22    Shift day: Friday   Shift # 2    Room # 9882/1001-09   Name: Aisha Cline                  Referral: Routine Visit    Admit Date & Time: 12/16/2022  7:19 AM    Assessment:  Aisha Cline is a 63 y.o. female in the hospital for high blood sugar and COPD, as stated by patient.  responded to patient's room while rounding, was welcomed by patient, and observed patient to be experiencing feelings of anxiety; patient seemed to be coping well. Intervention:  Writer introduced self and title as  Writer offered space for the patient  to express feelings, needs, and concerns and provided a ministry presence.  explored sources of anxiety and learned that the patient has three cats at home and is eager to return to care for them. The patient shared that her son lives upstairs and is there to care for the cats, but because their furnace has not yet been replaced, she worries about the animals at home alone while her son works.  explored sources of support and learned that the patient has a good friend, children, and grandchildren who bring her tc and comfort.  offered a blessing for patient. Outcome:  Patient engaged in conversation and expressed gratitude for  visit. Plan:  Chaplains will remain available to offer spiritual and emotional support as needed. 12/16/22 1936   Encounter Summary   Service Provided For: Patient   Referral/Consult From: [facility];Friends/neighbors   Last Encounter  12/16/22   Complexity of Encounter Moderate   Begin Time 1930   End Time  1940   Total Time Calculated 10 min   Spiritual/Emotional needs   Type Spiritual Support   Assessment/Intervention/Outcome   Assessment Anxious; Hopeful;Coping   Intervention Active listening;Explored/Affirmed feelings, thoughts, concerns;Explored Coping Skills/Resources;Sustaining Presence/Ministry of presence;Prayer (assurance of)/[place]   Outcome Expressed Gratitude   Plan and Referrals   Plan/Referrals Continue Support (comment)       Electronically signed by Richardson Pena Resident, on 12/16/2022 at 7:37 PM.  [facility]  [phone]

## 2022-12-17 NOTE — DISCHARGE INSTRUCTIONS
Heart Failure: Care Instructions  Overview     Heart failure occurs when your heart does not pump as much blood as the body needs. Failure does not mean that the heart has stopped pumping but rather that it is not pumping as well as it should. Over time, this causes fluid buildup in your lungs and other parts of your body. Fluid buildup can cause shortness of breath, fatigue, swollen ankles, and other problems. Heart failure is treated with medicines, a heart-healthy lifestyle, and the steps you take to check your symptoms. Treatment can slow the disease, help you feel better, and help keep you out of the hospital. Treatment may also help you live longer. Follow-up care is a key part of your treatment and safety. Be sure to make and go to all appointments, and call your doctor if you are having problems. It's also a good idea to know your test results and keep a list of the medicines you take. How can you care for yourself at home? Medicines    Be safe with medicines. Take your medicines exactly as prescribed. Call your doctor if you think you are having a problem with your medicine. You will get more details on the specific medicines your doctor prescribes. Medicines can help your heart work better, help you feel better, and help keep you out of the hospital. Medicines may also help you live longer. Talk with your doctor or pharmacist before you take a new prescription or over-the-counter medicine. Ask if the medicine is safe for you to take. Some medicines can affect your heart and make heart failure worse. Others may keep your heart failure medicines from working right. Over-the-counter medicines that you may need to avoid include herbal supplements, vitamins, pain relievers called NSAIDs, antacids, laxatives, and cough, cold, flu, or sinus medicine. Diet    Eat heart-healthy foods. These foods include vegetables, fruits, nuts, beans, lean meat, fish, and whole grains.      Your doctor may suggest that you limit sodium. Your doctor can tell you how much sodium is right for you. An example is less than 3,000 mg a day. This includes all the salt you eat in cooking or in packaged foods. People get most of their sodium from processed foods. Fast food and restaurant meals also tend to be very high in sodium. Limit your fluid intake if your doctor tells you to. Your doctor will tell you how much fluid you can have in a day. Symptoms    Weigh yourself without clothing at the same time each day. Record your weight. Call your doctor if you have a sudden weight gain, such as more than 2 to 3 pounds in a day or 5 pounds in a week. (Your doctor may suggest a different range of weight gain.) A sudden weight gain may mean that your heart failure is getting worse. Check your symptoms every day to watch for changes. Know what to do if your symptoms get worse. Activity    Be active. Do not start to exercise until you have talked with your doctor. Together you can make an exercise program that is enjoyable and safe for you. Regular exercise can make your heart and your body stronger. Being active can help you feel better too. With your doctor, plan how often, how long, and how hard you will be active. Don't exercise too hard because it can put stress on your heart. If your doctor has not set you up with a cardiac rehabilitation (rehab) program, ask if it's right for you. Cardiac rehab can give you education and support that help you stay as healthy as possible. When you exercise, watch for signs that your heart is working too hard. You are pushing yourself too hard if you cannot talk while you are exercising. If you become short of breath or dizzy or have chest pain, stop, sit down, and rest.   Heart-healthy lifestyle    Do not smoke. Smoking can make a heart condition worse. If you need help quitting, talk to your doctor about stop-smoking programs and medicines.  These can increase your chances of quitting for good. Quitting smoking may be the most important step you can take to protect your heart. Stay at a healthy weight. Lose weight if you need to. Manage other health problems such as diabetes and high blood pressure. Limit or avoid alcohol. Ask your doctor how much alcohol, if any, is safe for you. If you think you may have a problem with alcohol or drug use, talk to your doctor. Avoid infections such as COVID-19, colds, and the flu. Get the flu vaccine every year. Get a pneumococcal vaccine shot. If you have had one before, ask your doctor whether you need another dose. Stay up to date on your COVID-19 vaccines. When should you call for help? Call 911  if you have symptoms of sudden heart failure such as: You have severe trouble breathing. You cough up pink, foamy mucus. You have a new irregular or rapid heartbeat. Call your doctor now or seek immediate medical care if:    You have new or increased shortness of breath. You are dizzy or lightheaded, or you feel like you may faint. You have sudden weight gain, such as more than 2 to 3 pounds in a day or 5 pounds in a week. (Your doctor may suggest a different range of weight gain.)     You have increased swelling in your legs, ankles, or feet. You are suddenly so tired or weak that you cannot do your usual activities. Watch closely for changes in your health, and be sure to contact your doctor if you develop new symptoms. Where can you learn more? Go to http://www.woods.com/ and enter E597 to learn more about \"Heart Failure: Care Instructions. \"  Current as of: September 7, 2022               Content Version: 13.5  © 5245-2891 [employer]wise, Incorporated. Care instructions adapted under license by [facility] ([facility]).  If you have questions about a medical condition or this instruction, always ask your healthcare professional. [address] any warranty or liability for your use of this information.

## 2022-12-17 NOTE — PROGRESS NOTES
Left in chair, Nurse notified  Restraints  Restraints Initially in Place: No     Restrictions  Restrictions/Precautions  Restrictions/Precautions: Fall Risk, Up as Tolerated  Required Braces or Orthoses?: No     Subjective   General  Patient assessed for rehabilitation services?: Yes  Response To Previous Treatment: Not applicable  Family / Caregiver Present: No  Follows Commands: Within Functional Limits  Subjective  Subjective: 7/10 B LE pain--feet/calves: neuropathy and vascular pain/burning         Social/Functional History  Social/Functional History  Lives With: Son  Type of Home: House  Home Layout: One level  Home Access: Stairs to enter without rails  Entrance Stairs - Number of Steps: 2  Entrance Stairs - Rails: Both (but pt states they are unsafe to use)  Bathroom Shower/Tub: Tub/Shower unit  Bathroom Toilet: Handicap height  Bathroom Equipment: Shower chair  Bathroom Accessibility: Not accessible  Home Equipment:  (nebulizer and glucometer, relates just ordered mobility scooter)  Receives Help From: Family (sons, dtr in law)  ADL Assistance: Independent  Homemaking Assistance: Needs assistance  Homemaking Responsibilities: Yes  Ambulation Assistance: Independent (household distances; uses scooter at [facility])  Transfer Assistance: Independent  Active : No  Patient's  Info: TARPS, friend or daughter-in-law  Mode of Transportation: Bus  Occupation: Retired  Type of Occupation: [employer]  Vision/Hearing  Vision  Vision: Within Functional Limits  Hearing  Hearing: Within functional limits    Cognition   Orientation  Overall Orientation Status: Within Normal Limits  Cognition  Overall Cognitive Status: WFL     Objective   Heart Rate: 79  Heart Rate Source: Monitor  BP: 127/78  BP Location: Right upper arm  BP Method: Automatic  Patient Position: Semi fowlers  MAP (Calculated): 94  Resp: 20  SpO2: 95 %  O2 Device: None (Room air)     Observation/Palpation  Posture: Fair        AROM RLE (degrees)  RLE AROM: WFL  AROM LLE (degrees)  LLE AROM : WFL  AROM RUE (degrees)  RUE AROM : WFL  AROM LUE (degrees)  LUE AROM : WFL  Strength RLE  Strength RLE: WFL  Strength LLE  Strength LLE: WFL  Strength RUE  Strength RUE: WFL  Strength LUE  Strength LUE: WFL           Bed mobility  Rolling to Left: Independent  Rolling to Right: Independent  Supine to Sit: Independent  Scooting: Independent  Transfers  Sit to Stand: Independent  Stand to Sit: Independent  Bed to Chair: Independent  Stand Pivot Transfers: Independent  Ambulation  Surface: Level tile  Device: No Device  Assistance: Independent  Quality of Gait: mildly flexed  Gait Deviations: Slow Paty; Increased BOB; Decreased step length;Decreased step height  Distance: 25'x1  More Ambulation?: No  Stairs/Curb  Stairs?: No     Balance  Posture: Fair  Sitting - Static: Good  Sitting - Dynamic: Good  Standing - Static: Good  Standing - Dynamic: Fair;+      AM-PAC Score  AM-PAC Inpatient Mobility Raw Score : 23 (12/17/22 1442)  AM-PAC Inpatient T-Scale Score : 56.93 (12/17/22 1442)  Mobility Inpatient CMS 0-100% Score: 11.2 (12/17/22 1442)  Mobility Inpatient CMS G-Code Modifier : CI (12/17/22 1442)        Goals  Short Term Goals  Time Frame for Short Term Goals: 1 visit  Short Term Goal 1: evaluate and give recommendations: pt is functioning at her baseline; continued PT not necessary  Patient Goals   Patient Goals : go home tomorrow       Education  Patient Education  Education Given To: Patient  Education Provided: Role of Therapy;Plan of Care  Education Method: Verbal  Barriers to Learning: None  Education Outcome: Verbalized understanding      Therapy Time   Individual Concurrent Group Co-treatment   Time In 1417         Time Out 1439         Minutes 22         Timed Code Treatment Minutes: [facility]       Coby Bruce PT

## 2022-12-17 NOTE — PROGRESS NOTES
[facility]  Office: [address], DO, Dawn Alvarado, DO, Albina Guo, DO, [facility], DO, Mei Sams MD, Sonya Hui MD, Benjamin Hoffman MD, Phill Green MD,  Carlos A House MD, Alicia Agrawal MD, Arabella Manning, DO, Frances Jackson MD,  La Estes MD, Josi Townsend MD, Kris Van, DO, Andrzej Bernardo MD, Pedro Frazier MD, Rinku Viveros, DO, Jose R Skinner MD, Sadiq Ortiz MD, Nory Chan MD, Naomi Sanon MD, Julia Munroe, DO, Nicolasa Murrell MD, Jameson Pompa MD, Luis Angel Marte, Chito Young, CNP, Cathy Barrios, CNP, Yrn Maki, CNP,  Burnett Osgood, [facility], Cortney Lewis, CNP, Lela Safe, CNP, Tracy Mejía, CNP, Chase Camacho, CNP, Cindy March, CNP, Cherie Watkins PA-C, Liz Yip, [facility], Claudia Freeman, [facility], [facility], [address] [place] [address]    Progress Note    12/17/2022    9:30 AM    Name:   Deidre Haney  MRN:     [id number]     Acct:      [de-identified]   Room:   [address] [place] [address] Day:  1  Admit Date:  12/16/2022  7:19 AM    PCP:   Anyi Caicedo MD  Code Status:  Full Code    Subjective:     C/C:   Chief Complaint   Patient presents with    Shortness of Breath    Hyperglycemia     Interval History Status: not changed. No issues overnight  Glucose still elevated  Increase lantus dose   Respiratory status stable   Discussed with RN    Brief History:     81-year-old female with past medical history of COPD, smoker, systolic and diastolic heart failure, CAD with stent, type 2 diabetes mellitus with A1c above 9, dyslipidemia, hypertension, peripheral arterial disease, CKD Stage 3 presents to the hospital with shortness of breath. Patient was recently admitted to observation unit for COPD exacerbation and was given steroids and azithromycin.   Patient returned home and continued to feel short of breath and also was worried about her increasing blood sugars above 300. Patient states that she did not take her prednisone as blood sugars were rising. Her glucometer could not read her blood sugars yesterday and she stopped eating to bring her blood sugars down. She was feeling short of breath on exertion and continued wheezing so decided to come back to the ER. In the ER patient was  tachycardic, hypertensive but bp improved, she was very anxious. Wheezing+, received solumedrol and breathing treatments. Blood sugars noted to be >300. Review of Systems:     Constitutional:  negative for chills, fevers, sweats  Respiratory:  negative for cough, dyspnea on exertion, shortness of breath, wheezing  Cardiovascular:  negative for chest pain, chest pressure/discomfort, lower extremity edema, palpitations  Gastrointestinal:  negative for abdominal pain, constipation, diarrhea, nausea, vomiting  Neurological:  negative for dizziness, headache    Medications: Allergies:     Allergies   Allergen Reactions    Codeine     Morphine        Current Meds:   Scheduled Meds:    busPIRone  30 mg Oral TID    [START ON 12/18/2022] insulin glargine  40 Units SubCUTAneous Daily    insulin glargine  10 Units SubCUTAneous Once    apixaban  5 mg Oral BID    atorvastatin  40 mg Oral Daily    clopidogrel  75 mg Oral Daily    ipratropium-albuterol  1 ampule Inhalation Q4H WA    budesonide-formoterol  2 puff Inhalation BID    methylPREDNISolone  40 mg IntraVENous Q12H    insulin lispro  0-16 Units SubCUTAneous TID WC    insulin lispro  0-4 Units SubCUTAneous Nightly    sodium chloride flush  5-40 mL IntraVENous 2 times per day    metoprolol tartrate  12.5 mg Oral BID    lisinopril  10 mg Oral Daily    furosemide  40 mg IntraVENous Daily    doxycycline hyclate  100 mg Oral 2 times per day     Continuous Infusions:    sodium chloride      dextrose       PRN Meds: albuterol, ipratropium, albuterol sulfate HFA, sodium chloride flush, sodium chloride, potassium chloride **OR** potassium alternative oral replacement **OR** potassium chloride, magnesium sulfate, ondansetron **OR** ondansetron, polyethylene glycol, acetaminophen **OR** acetaminophen, glucose, dextrose bolus **OR** dextrose bolus, glucagon (rDNA), dextrose    Data:     Past Medical History:   has a past medical history of Acute on chronic systolic CH[facility]est[facility]) [facility] Asthma, Blood circulation, collateral, CAD (coronary artery disease), COPD (chronic obstructive pulmonary disease) ([facility] COPD exacerbation ([facility] Diabetes mellitus ([facility] Hyperlipidemia, Hypertension, Ischemic colitis ([facility] Movement disorder, Neuropathy, PAD (peripheral artery disease) ([facility] Tobacco abuse, and Tobacco abuse counseling. Social History:   reports that she has been smoking cigarettes. She has a 22.50 pack-year smoking history. She has quit using smokeless tobacco. She reports current drug use. Drug: Marijuana [facility] [employer]). She reports that she does not drink alcohol. Family History:   Family History   Problem Relation Age of Onset    Hypertension Mother     Hypertension Father     Diabetes Sister     Diabetes Brother     Stroke Brother        Vitals:  /78   Pulse 79   Temp 97.5 °F (36.4 °C) (Oral)   Resp 20   Ht 5' 9\" (1.753 m)   Wt 240 lb (108.9 kg)   SpO2 95%   BMI 35.44 kg/m²   Temp (24hrs), Av.6 °F (36.4 °C), Min:97.3 °F (36.3 °C), Max:97.9 °F (36.6 °C)    Recent Labs     22  1820 22  1946 22  2145 22  0741   POCGLU 456* 401* 300* 335*       I/O (24Hr):   No intake or output data in the 24 hours ending 22 0930    Labs:  Hematology:  Recent Labs     22  0806   WBC 9.1   RBC 4.55   HGB 10.6*   HCT 35.8*   MCV 78.7*   MCH 23.3*   MCHC 29.6   RDW 16.3*      MPV 10.6   DDIMER 0.80     Chemistry:  Recent Labs     22  1635 22  0732 22  0806 22  0844   NA  --   --  137  --    K  --   --  3.4*  --    CL  --   --  99  --    CO2  --   --  23  -- GLUCOSE  --  298 312*  --    BUN  --   --  23  --    CREATININE  --   --  1.40*  --    ANIONGAP  --   --  15  --    LABGLOM  --   --  43*  --    CALCIUM  --   --  8.8  --    PROBNP  --   --  4,739*  --    TROPHS 15*  --  22* 21*     Recent Labs     12/14/22  1637 12/16/22  0718 12/16/22  0806 12/16/22  1820 12/16/22  1946 12/16/22  2145 12/17/22  0741   PROT  --   --  6.7  --   --   --   --    LABALBU  --   --  3.6  --   --   --   --    LABA1C 9.7*  --   --   --   --   --   --    AST  --   --  17  --   --   --   --    ALT  --   --  18  --   --   --   --    ALKPHOS  --   --  99  --   --   --   --    BILITOT  --   --  0.3  --   --   --   --    POCGLU  --  298*  --  456* 401* 300* 335*     ABG:  Lab Results   Component Value Date/Time    POCPH 7.381 10/02/2021 04:57 AM    PHART 7.373 12/18/2021 04:35 AM    POCPCO2 50.7 10/02/2021 04:57 AM    MGU4ORU 38.2 12/18/2021 04:35 AM    POCPO2 68.3 10/02/2021 04:57 AM    PO2ART 104.0 12/18/2021 04:35 AM    POCHCO3 30.0 10/02/2021 04:57 AM    CYI3EIA 22.2 12/18/2021 04:35 AM    NBEA 3.0 12/18/2021 04:35 AM    PBEA NOT REPORTED 12/18/2021 04:35 AM    LRF4EXN NOT REPORTED 10/02/2021 04:57 AM    MMPF3XKV 93 10/02/2021 04:57 AM    L8RBHNXC 96.2 12/18/2021 04:35 AM    FIO2 INFORMATION NOT PROVIDED 12/14/2022 12:27 AM     Lab Results   Component Value Date/Time    SPECIAL RT ACUB 20ML 07/20/2022 10:02 PM     Lab Results   Component Value Date/Time    CULTURE NO GROWTH 5 DAYS 07/20/2022 10:02 PM       Radiology:  XR CHEST (2 VW)    Result Date: 12/16/2022  1. Mild streaky/linear mid and lower lung opacities favored to represent subsegmental atelectasis. 2. Blunting of the left posterior costophrenic sulcus which may represent a tiny effusion or small diaphragmatic hernia. XR CHEST PORTABLE    Result Date: 12/14/2022  She streaky bibasilar opacities, atelectasis favored over infection. CT CHEST PULMONARY EMBOLISM W CONTRAST    Result Date: 12/16/2022  1.  No evidence of pulmonary embolism. 2. Trace right pleural fluid, which is mildly loculated with focal fluid in the right major fissure. 3. Focal ill-defined 1.2 cm nodular opacity in the left lower lobe, new from 02/14/2022, possibly infectious/inflammatory. Short-term follow-up CT chest is recommended in 2-3 months to exclude underlying neoplasm.        Physical Examination:        General appearance:  alert, cooperative and no distress  Mental Status:  oriented to person, place and time and normal affect  Lungs:  clear to auscultation bilaterally, normal effort  Heart:  regular rate and rhythm  Abdomen:  soft, nontender, nondistended, normal bowel sounds  Extremities:  no edema, redness, tenderness in the calves  Skin:  no gross lesions, rashes, induration    Assessment:        Hospital Problems             Last Modified POA    * (Principal) COPD with exacerbation ([address] 12/16/2022 Yes    S/P drug eluting coronary stent placement - Mid RCA () 2/11/19 (Dr. Joe Blanchard) 12/16/2022 Yes    Overview Signed 2/11/2019  5:16 PM by Amalia Pinto MD     RCA stent 100% required PTCA-JOSE MARTIN  Patent LAd stent         Class 2 obesity with body mass index (BMI) of 36.0 to 36.9 in adult 12/16/2022 Yes    Dyspnea 12/16/2022 Yes    Paroxysmal A-fib ([address] 12/16/2022 Yes    CKD stage 3 due to type 2 diabetes mellitus ([address] 12/16/2022 Yes    Microcytic anemia 12/16/2022 Yes    Lung nodule 12/16/2022 Yes    Essential hypertension 12/16/2022 Yes    Coronary artery disease involving native coronary artery of native heart without angina pectoris 12/16/2022 Yes    Overview Signed 7/25/2021 10:30 AM by Bud Shell DO     s/p PCI with JOSE MARTIN to a  of RCA in Feb 2019:          Type 2 diabetes mellitus with hyperglycemia, without long-term current use of insulin ([address] 12/16/2022 Yes    COPD exacerbation ([address] 12/16/2022 Yes    Acute on chronic combined systolic and diastolic CHF (congestive heart failure) ([address] 12/16/2022 Yes    Former smoker 12/16/2022 Yes       Plan:        COPD exacerbation  - continue steroids  - bronchodilators, inahler  - doxycycline     Acute on Chronic combined systolic and diastolic heart failure  - continue diuresis, monitor output      Type 2 diabetes mellitus-uncontrolled  - HbA1c more than 9.5  - patient already on Trulicity at home  - agreeable to start insulin, increase lantus  - monitor glucose      Paroxysmal A. fib-on lopressor, Eliquis for anticoagulation     CAD with history of PCI to RCA in 2021, continue plavix, statin, stress test 5/2021 was low risk. Hypertension-continue Lopressor     Nodular lung opacity 1.2 cm-new compared to the scan in feb, discussed with patient, she will need repeat CT scan in 2 to 3 months to rule out malignancy.      CKD stage 3- baseline 1.4-1.5, currently creatinine stable     Microcytic anemia- stable     Disposition - dc next 24-48 hrs pending improvement in glucose        Christen Mota MD  12/17/2022  9:30 AM

## 2022-12-17 NOTE — PLAN OF CARE
Problem: Discharge Planning  Goal: Discharge to home or other facility with appropriate resources  Outcome: Progressing     Problem: Safety - Adult  Goal: Free from fall injury  Outcome: Progressing     Problem: ABCDS Injury Assessment  Goal: Absence of physical injury  Outcome: Progressing     Problem: Respiratory - Adult  Goal: Achieves optimal ventilation and oxygenation  12/17/2022 0923 by Estephania Albarran RN  Outcome: Progressing  12/17/2022 0856 by Yanique Vargas RCP  Outcome: Progressing

## 2022-12-18 VITALS
OXYGEN SATURATION: 97 % | BODY MASS INDEX: 37.55 KG/M2 | RESPIRATION RATE: 16 BRPM | HEART RATE: 72 BPM | WEIGHT: 253.53 LBS | DIASTOLIC BLOOD PRESSURE: 74 MMHG | SYSTOLIC BLOOD PRESSURE: 154 MMHG | TEMPERATURE: 97.5 F | HEIGHT: 69 IN

## 2022-12-18 LAB
EKG ATRIAL RATE: 144 BPM
EKG ATRIAL RATE: 92 BPM
EKG P AXIS: 47 DEGREES
EKG P-R INTERVAL: 172 MS
EKG Q-T INTERVAL: 328 MS
EKG Q-T INTERVAL: 394 MS
EKG QRS DURATION: 78 MS
EKG QRS DURATION: 80 MS
EKG QTC CALCULATION (BAZETT): 476 MS
EKG QTC CALCULATION (BAZETT): 487 MS
EKG R AXIS: 58 DEGREES
EKG R AXIS: 66 DEGREES
EKG T AXIS: -10 DEGREES
EKG T AXIS: 12 DEGREES
EKG VENTRICULAR RATE: 127 BPM
EKG VENTRICULAR RATE: 92 BPM
GLUCOSE BLD-MCNC: 150 MG/DL (ref 65–105)
GLUCOSE BLD-MCNC: 301 MG/DL (ref 65–105)

## 2022-12-18 PROCEDURE — 93010 ELECTROCARDIOGRAM REPORT: CPT | Performed by: INTERNAL MEDICINE

## 2022-12-18 PROCEDURE — 94640 AIRWAY INHALATION TREATMENT: CPT

## 2022-12-18 PROCEDURE — 6370000000 HC RX 637 (ALT 250 FOR IP): Performed by: INTERNAL MEDICINE

## 2022-12-18 PROCEDURE — 2580000003 HC RX 258: Performed by: STUDENT IN AN ORGANIZED HEALTH CARE EDUCATION/TRAINING PROGRAM

## 2022-12-18 PROCEDURE — 6360000002 HC RX W HCPCS: Performed by: STUDENT IN AN ORGANIZED HEALTH CARE EDUCATION/TRAINING PROGRAM

## 2022-12-18 PROCEDURE — 6370000000 HC RX 637 (ALT 250 FOR IP): Performed by: STUDENT IN AN ORGANIZED HEALTH CARE EDUCATION/TRAINING PROGRAM

## 2022-12-18 PROCEDURE — 82947 ASSAY GLUCOSE BLOOD QUANT: CPT

## 2022-12-18 PROCEDURE — 99232 SBSQ HOSP IP/OBS MODERATE 35: CPT | Performed by: INTERNAL MEDICINE

## 2022-12-18 RX ORDER — ALBUTEROL SULFATE 90 UG/1
2 AEROSOL, METERED RESPIRATORY (INHALATION) EVERY 6 HOURS PRN
Status: DISCONTINUED | OUTPATIENT
Start: 2022-12-18 | End: 2022-12-18 | Stop reason: HOSPADM

## 2022-12-18 RX ORDER — PEN NEEDLE, DIABETIC 30 GX3/16"
1 NEEDLE, DISPOSABLE MISCELLANEOUS DAILY
Qty: 1 EACH | Refills: 0 | Status: SHIPPED | OUTPATIENT
Start: 2022-12-18

## 2022-12-18 RX ORDER — DOXYCYCLINE HYCLATE 100 MG
100 TABLET ORAL EVERY 12 HOURS SCHEDULED
Qty: 8 TABLET | Refills: 0 | Status: SHIPPED | OUTPATIENT
Start: 2022-12-18 | End: 2022-12-22

## 2022-12-18 RX ORDER — IPRATROPIUM BROMIDE AND ALBUTEROL SULFATE 2.5; .5 MG/3ML; MG/3ML
1 SOLUTION RESPIRATORY (INHALATION) 3 TIMES DAILY
Status: DISCONTINUED | OUTPATIENT
Start: 2022-12-18 | End: 2022-12-18 | Stop reason: HOSPADM

## 2022-12-18 RX ORDER — INSULIN DETEMIR 100 [IU]/ML
40 INJECTION, SOLUTION SUBCUTANEOUS DAILY
Qty: 5 ADJUSTABLE DOSE PRE-FILLED PEN SYRINGE | Refills: 3 | Status: SHIPPED | OUTPATIENT
Start: 2022-12-18

## 2022-12-18 RX ADMIN — DOXYCYCLINE HYCLATE 100 MG: 100 TABLET, COATED ORAL at 08:21

## 2022-12-18 RX ADMIN — BUDESONIDE AND FORMOTEROL FUMARATE DIHYDRATE 2 PUFF: 160; 4.5 AEROSOL RESPIRATORY (INHALATION) at 08:43

## 2022-12-18 RX ADMIN — FUROSEMIDE 40 MG: 10 INJECTION, SOLUTION INTRAMUSCULAR; INTRAVENOUS at 08:21

## 2022-12-18 RX ADMIN — CLOPIDOGREL 75 MG: 75 TABLET, FILM COATED ORAL at 09:33

## 2022-12-18 RX ADMIN — BUSPIRONE HYDROCHLORIDE 30 MG: 15 TABLET ORAL at 08:22

## 2022-12-18 RX ADMIN — DESMOPRESSIN ACETATE 40 MG: 0.2 TABLET ORAL at 09:33

## 2022-12-18 RX ADMIN — INSULIN LISPRO 12 UNITS: 100 INJECTION, SOLUTION INTRAVENOUS; SUBCUTANEOUS at 08:21

## 2022-12-18 RX ADMIN — IPRATROPIUM BROMIDE AND ALBUTEROL SULFATE 1 AMPULE: .5; 3 SOLUTION RESPIRATORY (INHALATION) at 08:43

## 2022-12-18 RX ADMIN — INSULIN GLARGINE 40 UNITS: 100 INJECTION, SOLUTION SUBCUTANEOUS at 08:20

## 2022-12-18 RX ADMIN — METOPROLOL TARTRATE 12.5 MG: 25 TABLET ORAL at 08:21

## 2022-12-18 RX ADMIN — APIXABAN 5 MG: 5 TABLET, FILM COATED ORAL at 08:22

## 2022-12-18 RX ADMIN — SODIUM CHLORIDE, PRESERVATIVE FREE 10 ML: 5 INJECTION INTRAVENOUS at 08:24

## 2022-12-18 NOTE — PROGRESS NOTES
CLINICAL PHARMACY NOTE: MEDS TO BEDS    Total # of Prescriptions Filled: 3   The following medications were delivered to the patient:  Doxycyclin  Levemir  Pen needles    Additional Documentation:

## 2022-12-18 NOTE — PROGRESS NOTES
Occupational [address]  Occupational Therapy Not Seen Note    DATE: 2022    NAME: [facility]  MRN: [id number]   : 1960      Patient not seen this date for Occupational Therapy due to:    Patient independent with ADLs and functional tasks with no acute OT needs. Will defer OT evaluation at this time. Please reorder OT if future needs arise.      Next Scheduled Treatment: N/A    Electronically signed by Casandra Romero OT on 2022 at 8:34 AM

## 2022-12-18 NOTE — DISCHARGE SUMMARY
[facility]  Office: [address], DO, Earl Rendon, DO, Ferny Viera, DO, Leida Later Blood, DO, Celestine Ramon MD, Chao Aldana MD, Chato Miguel MD, Renold Galeazzi, MD,  Edin Hill MD, Yuliya Fischer MD, Brandy Oakes, DO, Yariel Darden MD,  Jessy Dior, DO, Daniel Oliva MD, Freada Osgood, MD, Dominic Hess, DO, Demian Kumar MD, Steve Duron MD, Lauren Camacho, DO, Sabrina Golden MD, Manny Glass MD, Claudy Nesbitt MD, Jenny Alvarez MD, Johnathan Myles, DO, Elzbieta Mccabe MD, Julien Cohn MD, Homero Huber, CNP,  Adin Mahajan CNP, Dilcia Gamboa, CNP, Judie Gonzalez, CNP,  Aisha Cazares, [facility], Fco Franklin, CNP, Kanchan Chaudhry, CNP, Karlee Christianson, CNP, Stacia Guerrier, [facility], [facility], CNP, Stephen Arora PA-C, Dominik Zepeda, [facility], Mavis Marquez, [facility], Purcell Bloch, [facility]    Discharge Summary     Patient ID: Lacho Vergara  :  1960   MRN: [id number]     ACCOUNT:  [de-identified]   Patient's PCP: Antonia Sanchez MD  Admit Date: 2022   Discharge Date: 2022  Length of Stay: 2  Code Status:  Prior  Admitting Physician: Daniel Oliva MD  Discharge Physician: Edin Hill MD     Active Discharge Diagnoses:     Hospital Problem Lists:  Principal Problem:    COPD with exacerbation [facility])  Active Problems:    S/P drug eluting coronary stent placement - Mid RCA () 19 (Dr. Wallace Burkitt)    Class 2 obesity with body mass index (BMI) of 36.0 to 36.9 in adult    Dyspnea    [facility]    CKD stage 3 due to type 2 diabetes mellitus ([facility]    Microcytic anemia    Lung nodule    Essential hypertension    Coronary artery disease involving native coronary artery of native heart without angina pectoris    Type 2 diabetes mellitus with hyperglycemia, without long-term current use of insulin (HCC)    COPD exacerbation ([facility]    Acute on chronic combined systolic and diastolic CHF (congestive heart failure) ([facility]    Former smoker  Resolved Problems:    * No resolved hospital problems. *      Admission Condition:  fair     Discharged Condition: stable    Hospital Stay:     Hospital Course:    82-year-old female with past medical history of COPD, smoker, systolic and diastolic heart failure, CAD with stent, type 2 diabetes mellitus with A1c above 9, dyslipidemia, hypertension, peripheral arterial disease, CKD Stage 3 presents to the hospital with shortness of breath. Patient was recently admitted to observation unit for COPD exacerbation and was given steroids and azithromycin. Patient returned home and continued to feel short of breath and also was worried about her increasing blood sugars above 300. Patient states that she did not take her prednisone as blood sugars were rising. Her glucometer could not read her blood sugars yesterday and she stopped eating to bring her blood sugars down. She was feeling short of breath on exertion and continued wheezing so decided to come back to the ER. Admitted for COPD exacerbation, symptoms improved with steroids. Also noted to be hyperglycemic. Started on lantus, glucose improved.  DC home       Significant therapeutic interventions: as above     Significant Diagnostic Studies:   Labs / Micro:  CBC:   Lab Results   Component Value Date/Time    WBC 9.1 12/16/2022 08:06 AM    RBC 4.55 12/16/2022 08:06 AM    RBC 4.10 06/02/2012 05:17 AM    HGB 10.6 12/16/2022 08:06 AM    HCT 35.8 12/16/2022 08:06 AM    MCV 78.7 12/16/2022 08:06 AM    MCH 23.3 12/16/2022 08:06 AM    MCHC 29.6 12/16/2022 08:06 AM    RDW 16.3 12/16/2022 08:06 AM     12/16/2022 08:06 AM     06/02/2012 05:17 AM     BMP:    Lab Results   Component Value Date/Time    GLUCOSE 312 12/16/2022 08:06 AM    GLUCOSE 94 06/02/2012 05:17 AM     12/16/2022 08:06 AM    K 3.4 12/16/2022 08:06 AM    CL 99 12/16/2022 08:06 AM    CO2 23 12/16/2022 08:06 AM    ANIONGAP 15 12/16/2022 08:06 AM    BUN 23 12/16/2022 08:06 AM    CREATININE 1.40 12/16/2022 08:06 AM    BUNCRER NOT REPORTED 12/20/2021 05:13 AM    CALCIUM 8.8 12/16/2022 08:06 AM    LABGLOM 43 12/16/2022 08:06 AM    GFRAA 34 08/11/2022 02:55 PM    GFR      08/11/2022 02:55 PM     HFP:    Lab Results   Component Value Date/Time    PROT 6.7 12/16/2022 08:06 AM     CMP:    Lab Results   Component Value Date/Time    GLUCOSE 312 12/16/2022 08:06 AM    GLUCOSE 94 06/02/2012 05:17 AM     12/16/2022 08:06 AM    K 3.4 12/16/2022 08:06 AM    CL 99 12/16/2022 08:06 AM    CO2 23 12/16/2022 08:06 AM    BUN 23 12/16/2022 08:06 AM    CREATININE 1.40 12/16/2022 08:06 AM    ANIONGAP 15 12/16/2022 08:06 AM    ALKPHOS 99 12/16/2022 08:06 AM    ALT 18 12/16/2022 08:06 AM    AST 17 12/16/2022 08:06 AM    BILITOT 0.3 12/16/2022 08:06 AM    LABALBU 3.6 12/16/2022 08:06 AM    LABALBU 4.0 06/01/2012 08:50 AM    ALBUMIN 1.2 12/16/2022 08:06 AM    LABGLOM 43 12/16/2022 08:06 AM    GFRAA 34 08/11/2022 02:55 PM    GFR      08/11/2022 02:55 PM    PROT 6.7 12/16/2022 08:06 AM    CALCIUM 8.8 12/16/2022 08:06 AM     PT/INR:    Lab Results   Component Value Date/Time    PROTIME 10.4 07/22/2022 07:40 AM    PROTIME 9.9 06/01/2012 04:08 AM    INR 1.0 07/22/2022 07:40 AM     PTT:   Lab Results   Component Value Date/Time    APTT 25.2 07/20/2022 09:22 PM     FLP:    Lab Results   Component Value Date/Time    CHOL 122 06/16/2020 07:53 PM    TRIG 152 06/16/2020 07:53 PM    HDL 35 06/16/2020 07:53 PM     U/A:    Lab Results   Component Value Date/Time    COLORU Yellow 12/16/2022 08:09 AM    TURBIDITY Clear 12/16/2022 08:09 AM    SPECGRAV 1.013 12/16/2022 08:09 AM    HGBUR NEGATIVE 12/16/2022 08:09 AM    PHUR 6.5 12/16/2022 08:09 AM    PROTEINU NEGATIVE 12/16/2022 08:09 AM    GLUCOSEU 1+ 12/16/2022 08:09 AM    GLUCOSEU NEGATIVE 06/01/2012 03:25 AM    KETUA NEGATIVE 12/16/2022 08:09 AM    BILIRUBINUR NEGATIVE 12/16/2022 08:09 AM    BILIRUBINUR NEGATIVE 06/01/2012 03:25 AM    UROBILINOGEN Normal 12/16/2022 08:09 AM    NITRU NEGATIVE 12/16/2022 08:09 AM    LEUKOCYTESUR NEGATIVE 12/16/2022 08:09 AM     TSH:    Lab Results   Component Value Date/Time    TSH 1.24 06/16/2020 07:53 PM        Radiology:  XR CHEST (2 VW)    Result Date: 12/16/2022  1. Mild streaky/linear mid and lower lung opacities favored to represent subsegmental atelectasis. 2. Blunting of the left posterior costophrenic sulcus which may represent a tiny effusion or small diaphragmatic hernia. XR CHEST PORTABLE    Result Date: 12/14/2022  She streaky bibasilar opacities, atelectasis favored over infection. CT CHEST PULMONARY EMBOLISM W CONTRAST    Result Date: 12/16/2022  1. No evidence of pulmonary embolism. 2. Trace right pleural fluid, which is mildly loculated with focal fluid in the right major fissure. 3. Focal ill-defined 1.2 cm nodular opacity in the left lower lobe, new from 02/14/2022, possibly infectious/inflammatory. Short-term follow-up CT chest is recommended in 2-3 months to exclude underlying neoplasm. Consultations:    Consults:     Final Specialist Recommendations/Findings:   IP CONSULT TO HOSPITALIST      The patient was seen and examined on day of discharge and this discharge summary is in conjunction with any daily progress note from day of discharge. Discharge plan:     Disposition: Home    Physician Follow Up:      Jesu Florentino MD  [id number] [facility] [id number] [phone]    Follow up         Requiring Further Evaluation/Follow Up POST HOSPITALIZATION/Incidental Findings:     Diet: regular diet    Activity: As tolerated    Instructions to Patient: follow up with PCP for insulin adjustments     Discharge Medications:      Medication List        START taking these medications      doxycycline hyclate 100 MG tablet  Commonly known as: VIBRA-TABS  Take 1 tablet by mouth every 12 hours for 4 days     Levemir FlexTouch 100 UNIT/ML injection pen  Generic drug: insulin detemir  Inject 40 Units into the skin daily     Pen Needles 31G X 5 MM Misc  1 box by Does not apply route daily            CHANGE how you take these medications      busPIRone 30 MG tablet  Commonly known as: BUSPAR  What changed: Another medication with the same name was removed. Continue taking this medication, and follow the directions you see here. CONTINUE taking these medications      * acetaminophen 500 MG tablet  Commonly known as: TYLENOL  Take 1 tablet by mouth every 6 hours as needed for Pain     * acetaminophen 325 MG tablet  Commonly known as: TYLENOL     * albuterol sulfate  (90 Base) MCG/ACT inhaler  Commonly known as: PROVENTIL;VENTOLIN;PROAIR     * albuterol (5 MG/ML) 0.5% nebulizer solution  Commonly known as: PROVENTIL  Take 0.5 mLs by nebulization every 6 hours as needed for Wheezing     apixaban 5 MG Tabs tablet  Commonly known as: Eliquis  Take 1 tablet by mouth 2 times daily     atorvastatin 40 MG tablet  Commonly known as: Lipitor  Take 1 tablet by mouth daily     blood glucose test strips  Test 3 times a day & as needed for symptoms of irregular blood glucose. clopidogrel 75 MG tablet  Commonly known as: PLAVIX  Take 1 tablet by mouth daily     famotidine 20 MG tablet  Commonly known as: PEPCID     furosemide 40 MG tablet  Commonly known as: LASIX  Take 1 tablet by mouth in the morning. Hold for 2 more days. glucose monitoring kit  1 kit by Does not apply route daily     Lancets Misc  1 each by Does not apply route daily     lidocaine 5 %  Commonly known as: LIDODERM     lisinopril 10 MG tablet  Commonly known as: PRINIVIL;ZESTRIL  Take 1 tablet by mouth daily     metoprolol tartrate 25 MG tablet  Commonly known as: LOPRESSOR  Take 0.5 tablets by mouth 2 times daily     mometasone-formoterol 200-5 MCG/ACT inhaler  Commonly known as: DULERA     nitroGLYCERIN 0.4 MG SL tablet  Commonly known as: NITROSTAT  up to max of 3 total doses.  If no relief after 1 dose, call 911. predniSONE 20 MG tablet  Commonly known as: DELTASONE  Take 3 tablets by mouth daily for 5 days     tiotropium 18 MCG inhalation capsule  Commonly known as: SPIRIVA  Inhale 1 capsule into the lungs daily Pt told that she was using this medication at home. Trulicity 1.5 DG/4.2CJ SC injection  Generic drug: dulaglutide           * This list has 4 medication(s) that are the same as other medications prescribed for you. Read the directions carefully, and ask your doctor or other care provider to review them with you. STOP taking these medications      aspirin 81 MG chewable tablet     azithromycin 250 MG tablet  Commonly known as: Nonda Snare               Where to Get Your Medications        These medications were sent to [facility] [address] [facility], [address] [facility]  [address] [facility], [address]  [address]      Phone: [phone]   doxycycline hyclate 100 MG tablet  Levemir FlexTouch 100 UNIT/ML injection pen  Pen Needles 31G X 5 MM Misc         No discharge procedures on file. Time Spent on discharge is  32 mins in patient examination, evaluation, counseling as well as medication reconciliation, prescriptions for required medications, discharge plan and follow up. Electronically signed by   Apolinar Vera MD  12/18/2022  5:10 PM      Thank you Dr. Jamila Jordan MD for the opportunity to be involved in this patient's care.

## 2022-12-18 NOTE — PROGRESS NOTES
[facility]. Hypergly[facility] [R73.9]  COPD exacerbation (HCC) [J44.1]  COPD with exacerbation ([facility] [J44.1] . Vitals:    12/18/22 0734   BP: (!) 154/74   Pulse: 72   Resp: 16   Temp: 97.5 °F (36.4 °C)   SpO2: 97%   . Patients home meds are   Prior to Admission medications    Medication Sig Start Date End Date Taking? Authorizing Provider   busPIRone (BUSPAR) 30 MG tablet Take 30 mg by mouth 3 times daily as needed 12/7/22  Yes Historical Provider, MD   acetaminophen (TYLENOL) 325 MG tablet Take 325 mg by mouth every 4-6 hours as needed 11/22/22   Historical Provider, MD   predniSONE (DELTASONE) 20 MG tablet Take 3 tablets by mouth daily for 5 days 12/15/22 12/20/22  BINDU Menendez - CNP   azithromycin (ZITHROMAX) 250 MG tablet Take 1 tablet by mouth See Admin Instructions for 5 days 500mg on day 1 followed by 250mg on days 2 - 5 12/14/22 12/19/22  BINDU Menendez - CNP   furosemide (LASIX) 40 MG tablet Take 1 tablet by mouth in the morning. Hold for 2 more days. 7/22/22   García Woodruff MD   mometasone-formoterol (DULERA) 200-5 MCG/ACT inhaler Inhale into the lungs 3/3/22   Historical Provider, MD   busPIRone (BUSPAR) 15 MG tablet Take 30 mg by mouth 3 times daily  Patient not taking: Reported on 12/16/2022    Historical Provider, MD   lisinopril (PRINIVIL;ZESTRIL) 10 MG tablet Take 1 tablet by mouth daily 10/9/21   Chidi Maciel MD   clopidogrel (PLAVIX) 75 MG tablet Take 1 tablet by mouth daily 10/9/21   Chidi Maciel MD   famotidine (PEPCID) 20 MG tablet Take 20 mg by mouth 2 times daily    Historical Provider, MD   albuterol sulfate HFA (PROVENTIL;VENTOLIN;PROAIR) 108 (90 Base) MCG/ACT inhaler Inhale 2 puffs into the lungs 3 times daily    Historical Provider, MD   lidocaine (LIDODERM) 5 % Place 1 patch onto the skin as needed for Pain (as needed for rotator cuff pain) 12 hours on, 12 hours off.     Historical Provider, MD   apixaban (ELIQUIS) 5 MG TABS tablet Take 1 tablet by mouth 2 times daily 10/5/21   Kelsi Davis MD   atorvastatin (LIPITOR) 40 MG tablet Take 1 tablet by mouth daily 7/27/21   Sarah Rose MD   Dulaglutide (TRULICITY) 1.5 QL/5.3FV SOPN Inject 1.5 mg into the skin once a week    Historical Provider, MD   nitroGLYCERIN (NITROSTAT) 0.4 MG SL tablet up to max of 3 total doses. If no relief after 1 dose, call 911. 2/12/19   BINDU Henao CNP   metoprolol tartrate (LOPRESSOR) 25 MG tablet Take 0.5 tablets by mouth 2 times daily 2/12/19   BINDU Henao CNP   Lancets MISC 1 each by Does not apply route daily 2/12/19   Todd Littlejohn MD   blood glucose monitor strips Test 3 times a day & as needed for symptoms of irregular blood glucose. 2/12/19   Todd Littlejohn MD   glucose monitoring kit (FREESTYLE) monitoring kit 1 kit by Does not apply route daily 2/12/19   Todd Littlejohn MD   aspirin 81 MG chewable tablet Take 1 tablet by mouth daily 2/13/19 7/22/22  BINDU Henao CNP   albuterol (PROVENTIL) (5 MG/ML) 0.5% nebulizer solution Take 0.5 mLs by nebulization every 6 hours as needed for Wheezing 3/16/18   Reji Castellanos MD   tiotropium (SPIRIVA) 18 MCG inhalation capsule Inhale 1 capsule into the lungs daily Pt told that she was using this medication at home.  3/16/18   Reji Castellanos MD   acetaminophen (TYLENOL) 500 MG tablet Take 1 tablet by mouth every 6 hours as needed for Pain 1/9/18   Tomi Duff MD   .  Assessment   TID at home    RR 18  Breath Sounds: clear, clear/diminished in bases      Bronchodilator assessment at level  2       [x]    Bronchodilator Assessment  BRONCHODILATOR ASSESSMENT SCORE  Score 0 1 2 3 4 5   Breath Sounds   []  Patient Baseline [x]  No Wheeze good aeration []  Faint, scattered wheezing, good aeration []  Expiratory Wheezing and or moderately diminished []  Insp/Exp wheeze and/or very diminished []  Insp/Exp and/ or marked distress   Respiratory Rate []  Patient Baseline [x]  Less than 20 []  Less than 20 []  20-25 []  Greater than 25 []  Greater than 25   Peak flow % of Pred or PB [x]  NA   []  Greater than 90%  []  81-90% []  71-80% []  Less than or equal to 70%  or unable to perform []  Unable due to Respiratory Distress   Dyspnea re []  Patient Baseline []  No SOB []  No SOB [x]  SOB on exertion []  SOB min activity []  At rest/acute   e FEV% Predicted       [x]  NA []  Above 69%  []  Unable []  Above 60-69%  []  Unable []  Above 50-59%  []  Unable []  Above 35-49%  []  Unable []  Less than 35%  []  Unable

## 2022-12-18 NOTE — PROGRESS NOTES
CLINICAL PHARMACY NOTE: MEDS TO BEDS    Total # of Prescriptions Filled: 3   The following medications were delivered to the patient:  Doxycycline  Pen needles  levemir    Additional Documentation:

## 2022-12-18 NOTE — CARE COORDINATION
Discharge [facility] Case Management Department  Written by: Christopher Howard RN    Patient Name: Kisha Johnson  Attending Provider: No att. providers found  Admit Date: 2022  7:19 AM  MRN: [id number]  Account: [de-identified]                     : 1960  Discharge Date: 2022      Disposition: home    Christopher Howard RN

## 2022-12-18 NOTE — PROGRESS NOTES
[facility]  Office: [address], DO, Roshan Long, DO, Elzbieta Patch, DO, Orville Bah Blood, DO, Aleah Koch MD, Steven Brice MD, Tirso Spivey MD, Joaquin Alfred MD,  Heron Thomas MD, Judy Chapman MD, Savanna Hope, DO, Regino Kilgore MD,  Lyla Pimentel MD, Marce Atkins MD, Manuel Copeland, DO, Candy Ware MD, Mendoza Rubio MD, Jazmin Hernandez DO, Chucho Moon MD, Darren Capellan MD, Jennifer Nunez MD, Satnam Dior MD, Maya Perdue, DO, Nikki Ricci MD, Heaven Bennett MD, Ramon Tam, CNP, Rolando Bray, CNP, Maikel Guevara, CNP,  Rhonda Christiansen, [facility], Danielle Sanchez, CNP, Ira Hughes, CNP, Sofie Paz, CNP, Mary Jo Banerjee, CNP, Arley Kinney, CNP, Honey Auguste PA-C, Robert Rios, CNS, Marko Jaime, CNP, Desi Holt, CNP         Dusty Maciel Pedro [address]    Progress Note    12/18/2022    9:35 AM    Name:   Venus Denton  MRN:     [id number]     Acct:      [de-identified]   Room:   [address] [place] [address] Day:  2  Admit Date:  12/16/2022  7:19 AM    PCP:   Mariama Knutson MD  Code Status:  Full Code    Subjective:     C/C:   Chief Complaint   Patient presents with    Shortness of Breath    Hyperglycemia     Interval History Status: not changed. No issues overnight  Glucose elevated  No other complaints  Dc planning  Has appointment with PCP tomorrow   Discussed with RN    Brief History:     73-year-old female with past medical history of COPD, smoker, systolic and diastolic heart failure, CAD with stent, type 2 diabetes mellitus with A1c above 9, dyslipidemia, hypertension, peripheral arterial disease, CKD Stage 3 presents to the hospital with shortness of breath. Patient was recently admitted to observation unit for COPD exacerbation and was given steroids and azithromycin.   Patient returned home and continued to feel short of breath and also was worried about her increasing blood sugars above 300. Patient states that she did not take her prednisone as blood sugars were rising. Her glucometer could not read her blood sugars yesterday and she stopped eating to bring her blood sugars down. She was feeling short of breath on exertion and continued wheezing so decided to come back to the ER. In the ER patient was  tachycardic, hypertensive but bp improved, she was very anxious. Wheezing+, received solumedrol and breathing treatments. Blood sugars noted to be >300. Review of Systems:     Constitutional:  negative for chills, fevers, sweats  Respiratory:  negative for cough, dyspnea on exertion, shortness of breath, wheezing  Cardiovascular:  negative for chest pain, chest pressure/discomfort, lower extremity edema, palpitations  Gastrointestinal:  negative for abdominal pain, constipation, diarrhea, nausea, vomiting  Neurological:  negative for dizziness, headache    Medications: Allergies:     Allergies   Allergen Reactions    Codeine     Morphine        Current Meds:   Scheduled Meds:    ipratropium-albuterol  1 ampule Inhalation TID    busPIRone  30 mg Oral TID    insulin glargine  40 Units SubCUTAneous Daily    midodrine  5 mg Oral Once    apixaban  5 mg Oral BID    atorvastatin  40 mg Oral Daily    clopidogrel  75 mg Oral Daily    budesonide-formoterol  2 puff Inhalation BID    methylPREDNISolone  40 mg IntraVENous Q12H    insulin lispro  0-16 Units SubCUTAneous TID WC    insulin lispro  0-4 Units SubCUTAneous Nightly    sodium chloride flush  5-40 mL IntraVENous 2 times per day    metoprolol tartrate  12.5 mg Oral BID    lisinopril  10 mg Oral Daily    furosemide  40 mg IntraVENous Daily    doxycycline hyclate  100 mg Oral 2 times per day     Continuous Infusions:    sodium chloride      dextrose       PRN Meds: albuterol sulfate HFA, ipratropium, sodium chloride flush, sodium chloride, potassium chloride **OR** potassium alternative oral replacement **OR** potassium chloride, magnesium sulfate, ondansetron **OR** ondansetron, polyethylene glycol, acetaminophen **OR** acetaminophen, glucose, dextrose bolus **OR** dextrose bolus, glucagon (rDNA), dextrose    Data:     Past Medical History:   has a past medical history of Acute on chronic systolic CHF (congestive heart failure) ([facility] Asthma, Blood circulation, collateral, CAD (coronary artery disease), COPD (chronic obstructive pulmonary disease) ([facility] COPD exacerbation ([facility] Diabetes mellitus ([facility] Hyperlipidemia, Hypertension, Ischemic colitis ([facility] Movement disorder, Neuropathy, PAD (peripheral artery disease) ([facility] Tobacco abuse, and Tobacco abuse counseling. Social History:   reports that she has been smoking cigarettes. She has a 22.50 pack-year smoking history. She has quit using smokeless tobacco. She reports current drug use. Drug: Marijuana Nan Cory). She reports that she does not drink alcohol. Family History:   Family History   Problem Relation Age of Onset    Hypertension Mother     Hypertension Father     Diabetes Sister     Diabetes Brother     Stroke Brother        Vitals:  BP (!) 154/74   Pulse 72   Temp 97.5 °F (36.4 °C) (Oral)   Resp 16   Ht 5' 9\" (1.753 m)   Wt 253 lb 8.5 oz (115 kg)   SpO2 97%   BMI 37.44 kg/m²   Temp (24hrs), Av.4 °F (36.3 °C), Min:97.3 °F (36.3 °C), Max:97.5 °F (36.4 °C)    Recent Labs     22  1241 22  1631 22  2033 22  0744   POCGLU 222* 392* 371* 301*         I/O (24Hr):   No intake or output data in the 24 hours ending 22 0935    Labs:  Hematology:  Recent Labs     22  0806   WBC 9.1   RBC 4.55   HGB 10.6*   HCT 35.8*   MCV 78.7*   MCH 23.3*   MCHC 29.6   RDW 16.3*      MPV 10.6   DDIMER 0.80       Chemistry:  Recent Labs     22  0732 22  0806 22  0844   NA  --  137  --    K  --  3.4*  --    CL  --  99  --    CO2  --  23  --    GLUCOSE 298 312*  --    BUN  --  23  --    CREATININE  --  1.40*  -- ANIONGAP  --  15  --    LABGLOM  --  43*  --    CALCIUM  --  8.8  --    PROBNP  --  4,739*  --    TROPHS  --  22* 21*       Recent Labs     12/16/22  0806 12/16/22  1820 12/16/22  2145 12/17/22  0741 12/17/22  1241 12/17/22  1631 12/17/22  2033 12/18/22  0744   PROT 6.7  --   --   --   --   --   --   --    LABALBU 3.6  --   --   --   --   --   --   --    AST 17  --   --   --   --   --   --   --    ALT 18  --   --   --   --   --   --   --    ALKPHOS 99  --   --   --   --   --   --   --    BILITOT 0.3  --   --   --   --   --   --   --    POCGLU  --    < > 300* 335* 222* 392* 371* 301*    < > = values in this interval not displayed. ABG:  Lab Results   Component Value Date/Time    POCPH 7.381 10/02/2021 04:57 AM    PHART 7.373 12/18/2021 04:35 AM    POCPCO2 50.7 10/02/2021 04:57 AM    WPR8ZAU 38.2 12/18/2021 04:35 AM    POCPO2 68.3 10/02/2021 04:57 AM    PO2ART 104.0 12/18/2021 04:35 AM    POCHCO3 30.0 10/02/2021 04:57 AM    GHT2NRA 22.2 12/18/2021 04:35 AM    NBEA 3.0 12/18/2021 04:35 AM    PBEA NOT REPORTED 12/18/2021 04:35 AM    EAP6NYJ NOT REPORTED 10/02/2021 04:57 AM    NCUB5YYT 93 10/02/2021 04:57 AM    X1HKVNYI 96.2 12/18/2021 04:35 AM    FIO2 INFORMATION NOT PROVIDED 12/14/2022 12:27 AM     Lab Results   Component Value Date/Time    SPECIAL RT ACUB 20ML 07/20/2022 10:02 PM     Lab Results   Component Value Date/Time    CULTURE NO GROWTH 5 DAYS 07/20/2022 10:02 PM       Radiology:  XR CHEST (2 VW)    Result Date: 12/16/2022  1. Mild streaky/linear mid and lower lung opacities favored to represent subsegmental atelectasis. 2. Blunting of the left posterior costophrenic sulcus which may represent a tiny effusion or small diaphragmatic hernia. XR CHEST PORTABLE    Result Date: 12/14/2022  She streaky bibasilar opacities, atelectasis favored over infection. CT CHEST PULMONARY EMBOLISM W CONTRAST    Result Date: 12/16/2022  1. No evidence of pulmonary embolism.  2. Trace right pleural fluid, which is mildly loculated with focal fluid in the right major fissure. 3. Focal ill-defined 1.2 cm nodular opacity in the left lower lobe, new from 02/14/2022, possibly infectious/inflammatory. Short-term follow-up CT chest is recommended in 2-3 months to exclude underlying neoplasm.        Physical Examination:        General appearance:  alert, cooperative and no distress  Mental Status:  oriented to person, place and time and normal affect  Lungs:  clear to auscultation bilaterally, normal effort  Heart:  regular rate and rhythm  Abdomen:  soft, nontender, nondistended, normal bowel sounds  Extremities:  no edema, redness, tenderness in the calves  Skin:  no gross lesions, rashes, induration    Assessment:        Hospital Problems             Last Modified POA    * (Principal) COPD with exacerbation ([address] 12/16/2022 Yes    S/P drug eluting coronary stent placement - Mid RCA () 2/11/19 (Dr. Fernanda Cabrera) 12/16/2022 Yes    Overview Signed 2/11/2019  5:16 PM by Lizeth Schulz MD     RCA stent 100% required PTCA-JOSE MARTIN  Patent LAd stent         Class 2 obesity with body mass index (BMI) of 36.0 to 36.9 in adult 12/16/2022 Yes    Dyspnea 12/16/2022 Yes    Paroxysmal A-fib ([address] 12/16/2022 Yes    CKD stage 3 due to type 2 diabetes mellitus ([address] 12/16/2022 Yes    Microcytic anemia 12/16/2022 Yes    Lung nodule 12/16/2022 Yes    Essential hypertension 12/16/2022 Yes    Coronary artery disease involving native coronary artery of native heart without angina pectoris 12/16/2022 Yes    Overview Signed 7/25/2021 10:30 AM by Sadi Alston DO     s/p PCI with JOSE MARTIN to a  of RCA in Feb 2019:          Type 2 diabetes mellitus with hyperglycemia, without long-term current use of insulin ([address] 12/16/2022 Yes    COPD exacerbation ([address] 12/16/2022 Yes    Acute on chronic combined systolic and diastolic CHF (congestive heart failure) ([address] 12/16/2022 Yes    Former smoker 12/16/2022 Yes     Plan:        COPD exacerbation  - continue steroids  - bronchodilators, inahler  - doxycycline     Acute on Chronic combined systolic and diastolic heart failure  - continue diuresis, monitor output      Type 2 diabetes mellitus-uncontrolled  - HbA1c more than 9.5  - patient already on Trulicity at home  - agreeable to start insulin, increase lantus  - monitor glucose      Paroxysmal A. fib-on lopressor, Eliquis for anticoagulation     CAD with history of PCI to RCA in 2021, continue plavix, statin, stress test 5/2021 was low risk. Hypertension-continue Lopressor     Nodular lung opacity 1.2 cm-new compared to the scan in feb, discussed with patient, she will need repeat CT scan in 2 to 3 months to rule out malignancy.      CKD stage 3- baseline 1.4-1.5, currently creatinine stable     Microcytic anemia- stable     Disposition - DC today, insulin on d/c, follow up with PCP - has appt tomorrow        Paulo Talamantes MD  12/18/2022  9:35 AM

## 2023-01-23 ENCOUNTER — HOSPITAL ENCOUNTER (OUTPATIENT)
Dept: OTHER | Age: 63
Discharge: HOME | End: 2023-01-23

## 2023-02-14 ENCOUNTER — HOSPITAL ENCOUNTER (OUTPATIENT)
Dept: OTHER | Age: 63
Discharge: HOME | End: 2023-02-14
Payer: MEDICARE

## 2023-02-14 VITALS
RESPIRATION RATE: 20 BRPM | OXYGEN SATURATION: 99 % | BODY MASS INDEX: 35.38 KG/M2 | SYSTOLIC BLOOD PRESSURE: 110 MMHG | DIASTOLIC BLOOD PRESSURE: 66 MMHG | WEIGHT: 239.6 LBS | HEART RATE: 95 BPM

## 2023-02-14 PROCEDURE — 99212 OFFICE O/P EST SF 10 MIN: CPT

## 2023-02-14 ASSESSMENT — PAIN SCALES - GENERAL: PAINLEVEL_OUTOF10: 5

## 2023-02-14 ASSESSMENT — PAIN DESCRIPTION - DESCRIPTORS: DESCRIPTORS: ACHING;DISCOMFORT

## 2023-02-14 ASSESSMENT — PAIN DESCRIPTION - LOCATION: LOCATION: ARM;MOUTH

## 2023-02-14 ASSESSMENT — PAIN DESCRIPTION - ORIENTATION: ORIENTATION: RIGHT;LEFT

## 2023-02-14 ASSESSMENT — PAIN DESCRIPTION - PAIN TYPE: TYPE: ACUTE PAIN;CHRONIC PAIN

## 2023-02-14 NOTE — PROGRESS NOTES
Date:  2023  Time:  2:20 PM    CHF Clinic at [address] [facility]    Office: [phone] Fax: [phone]    Re:  Berto Anderson   Patient : 1960    Vital Signs: /66   Pulse 95   Resp 20   Wt 239 lb 9.6 oz (108.7 kg)   SpO2 99%   BMI 35.38 kg/m²                       O2 Device: None (Room air)                           No results for input(s): CBC, HGB, HCT, WBC, PLATELET, NA, K, CL, CO2, BUN, CREATININE, GLUCOSE, BNP, INR in the last 72 hours. Respiratory:    Assessment  Charting Type: Reassessment    Breath Sounds  Right Upper Lobe: Clear  Right Middle Lobe: Clear  Right Lower Lobe: Clear  Left Upper Lobe: Clear  Left Lower Lobe: Clear    Cough/Sputum  Cough: Productive  Frequency: Occasional  Sputum Amount: Moderate  Sputum Color: Yellow         Peripheral Vascular  RLE Edema: None  LLE Edema: None      Complaints: Patient c/o bilateral shoulder pain and tooth pain    Physician Orders None    Comment : Arrived for scheduled visit per ambulatory, walked to clinic from registration area. Patient breathing heavy on arrival, resolves with rest. Says she gets short of breath with moderate exertion, but feels better after sitting down and resting. No leg swelling noted. Weight down 2.6 lbs from last visit. Not eating very well due to tooth pain. Medication list reviewed. Reinforced low sodium diet and fluid restrictions. Missed last appt. With cardiology, rescheduled appt. With Dr. Kavitha Bolden for 23 @ 4700. Card given with appt. Date/time. No signs of fluid overload at this time. Next chf clinic visit 23.     Electronically signed by Sharad Gifford RN on 2023 at 2:20 PM

## 2023-02-14 NOTE — PROGRESS NOTES
Verbally reviewed medication list with patient; patient verbalized understanding. Discussed 2000mg/day sodium restricted diet; patient verbalized understanding. Moderate daily exercise encouraged as tolerated. Discussed rest breaks as needed; patient verbalized understanding. Patient instructed to weigh self at the same time of each day, using same clothes and same scale; reinforced teaching to monitor for 3-5 lb weight increase over 1-2 days, and to notify the CHF clinic at [phone] or physician office if weight change noted. Patient verbalized understanding. Risks of smoking discussed with the patient if applicable; patient strongly discouraged to smoke. Patient verbalized understanding. Signs and symptoms of CHF discussed with patient, such as feeling more tired than normal, feeling short of breath, coughing that increases when you lie down, sudden weight gain, swelling of your feet, legs or belly. Patient verbalized understanding to notify the CHF clinic at [phone] or physician office if these symptoms occur. Compliance with plan of care and further disease process causes discussed with patient, patient encouraged to keep all follow up appointments. Patient verbalized understanding.

## 2023-02-17 ENCOUNTER — HOSPITAL ENCOUNTER (INPATIENT)
Age: 63
LOS: 3 days | Discharge: HOME | DRG: 683 | End: 2023-02-20
Attending: EMERGENCY MEDICINE | Admitting: INTERNAL MEDICINE
Payer: MEDICARE

## 2023-02-17 ENCOUNTER — APPOINTMENT (OUTPATIENT)
Dept: CT IMAGING | Age: 63
DRG: 683 | End: 2023-02-17
Payer: MEDICARE

## 2023-02-17 DIAGNOSIS — N17.9 AKI (ACUTE KIDNEY INJURY) (HCC): Primary | ICD-10-CM

## 2023-02-17 DIAGNOSIS — D50.8 OTHER IRON DEFICIENCY ANEMIA: ICD-10-CM

## 2023-02-17 DIAGNOSIS — R33.9 URINARY RETENTION: ICD-10-CM

## 2023-02-17 LAB
ABSOLUTE EOS #: 0 K/UL (ref 0–0.44)
ABSOLUTE IMMATURE GRANULOCYTE: 0 K/UL (ref 0–0.3)
ABSOLUTE LYMPH #: 0.47 K/UL (ref 1.1–3.7)
ABSOLUTE MONO #: 0.3 K/UL (ref 0.1–1.2)
ALBUMIN SERPL-MCNC: 4.5 G/DL (ref 3.5–5.2)
ALBUMIN/GLOBULIN RATIO: 1.2 (ref 1–2.5)
ALP SERPL-CCNC: 111 U/L (ref 35–104)
ALT SERPL-CCNC: 17 U/L (ref 5–33)
ANION GAP SERPL CALCULATED.3IONS-SCNC: 18 MMOL/L (ref 9–17)
AST SERPL-CCNC: 18 U/L
BASOPHILS # BLD: 0 % (ref 0–2)
BASOPHILS ABSOLUTE: 0 K/UL (ref 0–0.2)
BILIRUB SERPL-MCNC: 0.2 MG/DL (ref 0.3–1.2)
BILIRUBIN URINE: NEGATIVE
BUN SERPL-MCNC: 47 MG/DL (ref 8–23)
CALCIUM SERPL-MCNC: 9.4 MG/DL (ref 8.6–10.4)
CHLORIDE SERPL-SCNC: 99 MMOL/L (ref 98–107)
CO2 SERPL-SCNC: 17 MMOL/L (ref 20–31)
COLOR: YELLOW
COMMENT UA: NORMAL
CREAT SERPL-MCNC: 2.05 MG/DL (ref 0.5–0.9)
EOSINOPHILS RELATIVE PERCENT: 0 % (ref 1–4)
GFR SERPL CREATININE-BSD FRML MDRD: 27 ML/MIN/1.73M2
GLUCOSE BLD-MCNC: 141 MG/DL (ref 65–105)
GLUCOSE SERPL-MCNC: 202 MG/DL (ref 70–99)
GLUCOSE UR STRIP.AUTO-MCNC: NEGATIVE MG/DL
HAV IGM SER QL: NONREACTIVE
HBV CORE IGM SER QL: NONREACTIVE
HBV SURFACE AG SER QL: NONREACTIVE
HCT VFR BLD AUTO: 41.2 % (ref 36.3–47.1)
HCV AB SER QL: NONREACTIVE
HGB BLD-MCNC: 12.5 G/DL (ref 11.9–15.1)
IMMATURE GRANULOCYTES: 0 %
INR PPP: 1
KETONES UR STRIP.AUTO-MCNC: NEGATIVE MG/DL
LACTIC ACID, WHOLE BLOOD: 1.3 MMOL/L (ref 0.7–2.1)
LACTIC ACID, WHOLE BLOOD: 3.5 MMOL/L (ref 0.7–2.1)
LEUKOCYTE ESTERASE UR QL STRIP.AUTO: NEGATIVE
LIPASE SERPL-CCNC: 59 U/L (ref 13–60)
LYMPHOCYTES # BLD: 8 % (ref 24–43)
MCH RBC QN AUTO: 23.5 PG (ref 25.2–33.5)
MCHC RBC AUTO-ENTMCNC: 30.3 G/DL (ref 28.4–34.8)
MCV RBC AUTO: 77.4 FL (ref 82.6–102.9)
MONOCYTES # BLD: 5 % (ref 3–12)
MORPHOLOGY: ABNORMAL
MORPHOLOGY: ABNORMAL
NITRITE UR QL STRIP.AUTO: NEGATIVE
NRBC AUTOMATED: 0 PER 100 WBC
PDW BLD-RTO: 17.1 % (ref 11.8–14.4)
PLATELET # BLD AUTO: 270 K/UL (ref 138–453)
PMV BLD AUTO: 10.4 FL (ref 8.1–13.5)
POTASSIUM SERPL-SCNC: 4.9 MMOL/L (ref 3.7–5.3)
PROT SERPL-MCNC: 8.2 G/DL (ref 6.4–8.3)
PROT UR STRIP.AUTO-MCNC: 5.5 MG/DL (ref 5–8)
PROT UR STRIP.AUTO-MCNC: NEGATIVE MG/DL
PROTHROMBIN TIME: 10.3 SEC (ref 9.1–12.3)
RBC # BLD: 5.32 M/UL (ref 3.95–5.11)
SEG NEUTROPHILS: 87 % (ref 36–65)
SEGMENTED NEUTROPHILS ABSOLUTE COUNT: 5.13 K/UL (ref 1.5–8.1)
SODIUM SERPL-SCNC: 134 MMOL/L (ref 135–144)
SPECIFIC GRAVITY UA: 1.01 (ref 1–1.03)
TSH SERPL-ACNC: 1.72 UIU/ML (ref 0.3–5)
TURBIDITY: CLEAR
URINE HGB: NEGATIVE
UROBILINOGEN, URINE: NORMAL
WBC # BLD AUTO: 5.9 K/UL (ref 3.5–11.3)

## 2023-02-17 PROCEDURE — 84155 ASSAY OF PROTEIN SERUM: CPT

## 2023-02-17 PROCEDURE — 84443 ASSAY THYROID STIM HORMONE: CPT

## 2023-02-17 PROCEDURE — 82947 ASSAY GLUCOSE BLOOD QUANT: CPT

## 2023-02-17 PROCEDURE — 74176 CT ABD & PELVIS W/O CONTRAST: CPT

## 2023-02-17 PROCEDURE — 80053 COMPREHEN METABOLIC PANEL: CPT

## 2023-02-17 PROCEDURE — 99285 EMERGENCY DEPT VISIT HI MDM: CPT

## 2023-02-17 PROCEDURE — 51798 US URINE CAPACITY MEASURE: CPT

## 2023-02-17 PROCEDURE — 6370000000 HC RX 637 (ALT 250 FOR IP)

## 2023-02-17 PROCEDURE — 6360000002 HC RX W HCPCS: Performed by: STUDENT IN AN ORGANIZED HEALTH CARE EDUCATION/TRAINING PROGRAM

## 2023-02-17 PROCEDURE — 36415 COLL VENOUS BLD VENIPUNCTURE: CPT

## 2023-02-17 PROCEDURE — 96374 THER/PROPH/DIAG INJ IV PUSH: CPT

## 2023-02-17 PROCEDURE — 99223 1ST HOSP IP/OBS HIGH 75: CPT | Performed by: INTERNAL MEDICINE

## 2023-02-17 PROCEDURE — 86225 DNA ANTIBODY NATIVE: CPT

## 2023-02-17 PROCEDURE — 94640 AIRWAY INHALATION TREATMENT: CPT

## 2023-02-17 PROCEDURE — 2580000003 HC RX 258: Performed by: STUDENT IN AN ORGANIZED HEALTH CARE EDUCATION/TRAINING PROGRAM

## 2023-02-17 PROCEDURE — 86038 ANTINUCLEAR ANTIBODIES: CPT

## 2023-02-17 PROCEDURE — 1200000000 HC SEMI PRIVATE

## 2023-02-17 PROCEDURE — 85025 COMPLETE CBC W/AUTO DIFF WBC: CPT

## 2023-02-17 PROCEDURE — 80074 ACUTE HEPATITIS PANEL: CPT

## 2023-02-17 PROCEDURE — 85610 PROTHROMBIN TIME: CPT

## 2023-02-17 PROCEDURE — 86334 IMMUNOFIX E-PHORESIS SERUM: CPT

## 2023-02-17 PROCEDURE — 96375 TX/PRO/DX INJ NEW DRUG ADDON: CPT

## 2023-02-17 PROCEDURE — 84165 PROTEIN E-PHORESIS SERUM: CPT

## 2023-02-17 PROCEDURE — 51701 INSERT BLADDER CATHETER: CPT

## 2023-02-17 PROCEDURE — 83605 ASSAY OF LACTIC ACID: CPT

## 2023-02-17 PROCEDURE — 81003 URINALYSIS AUTO W/O SCOPE: CPT

## 2023-02-17 PROCEDURE — 2580000003 HC RX 258

## 2023-02-17 PROCEDURE — 83690 ASSAY OF LIPASE: CPT

## 2023-02-17 RX ORDER — KETOROLAC TROMETHAMINE 30 MG/ML
30 INJECTION, SOLUTION INTRAMUSCULAR; INTRAVENOUS ONCE
Status: COMPLETED | OUTPATIENT
Start: 2023-02-17 | End: 2023-02-17

## 2023-02-17 RX ORDER — DIPHENHYDRAMINE HYDROCHLORIDE 50 MG/ML
25 INJECTION INTRAMUSCULAR; INTRAVENOUS ONCE
Status: COMPLETED | OUTPATIENT
Start: 2023-02-17 | End: 2023-02-17

## 2023-02-17 RX ORDER — METOCLOPRAMIDE HYDROCHLORIDE 5 MG/ML
10 INJECTION INTRAMUSCULAR; INTRAVENOUS ONCE
Status: COMPLETED | OUTPATIENT
Start: 2023-02-17 | End: 2023-02-17

## 2023-02-17 RX ORDER — ACETAMINOPHEN 650 MG/1
650 SUPPOSITORY RECTAL EVERY 6 HOURS PRN
Status: DISCONTINUED | OUTPATIENT
Start: 2023-02-17 | End: 2023-02-20 | Stop reason: HOSPADM

## 2023-02-17 RX ORDER — BUDESONIDE AND FORMOTEROL FUMARATE DIHYDRATE 80; 4.5 UG/1; UG/1
2 AEROSOL RESPIRATORY (INHALATION) 2 TIMES DAILY
Status: DISCONTINUED | OUTPATIENT
Start: 2023-02-17 | End: 2023-02-20 | Stop reason: HOSPADM

## 2023-02-17 RX ORDER — DEXTROSE MONOHYDRATE 100 MG/ML
INJECTION, SOLUTION INTRAVENOUS CONTINUOUS PRN
Status: DISCONTINUED | OUTPATIENT
Start: 2023-02-17 | End: 2023-02-20 | Stop reason: HOSPADM

## 2023-02-17 RX ORDER — GLUCAGON 1 MG/ML
1 KIT INJECTION PRN
Status: DISCONTINUED | OUTPATIENT
Start: 2023-02-17 | End: 2023-02-20 | Stop reason: HOSPADM

## 2023-02-17 RX ORDER — INSULIN LISPRO 100 [IU]/ML
0-8 INJECTION, SOLUTION INTRAVENOUS; SUBCUTANEOUS
Status: DISCONTINUED | OUTPATIENT
Start: 2023-02-18 | End: 2023-02-20 | Stop reason: HOSPADM

## 2023-02-17 RX ORDER — ATORVASTATIN CALCIUM 40 MG/1
40 TABLET, FILM COATED ORAL NIGHTLY
Status: DISCONTINUED | OUTPATIENT
Start: 2023-02-17 | End: 2023-02-20 | Stop reason: HOSPADM

## 2023-02-17 RX ORDER — SODIUM CHLORIDE 9 MG/ML
INJECTION, SOLUTION INTRAVENOUS PRN
Status: DISCONTINUED | OUTPATIENT
Start: 2023-02-17 | End: 2023-02-20 | Stop reason: HOSPADM

## 2023-02-17 RX ORDER — SODIUM CHLORIDE 0.9 % (FLUSH) 0.9 %
5-40 SYRINGE (ML) INJECTION EVERY 12 HOURS SCHEDULED
Status: DISCONTINUED | OUTPATIENT
Start: 2023-02-17 | End: 2023-02-20 | Stop reason: HOSPADM

## 2023-02-17 RX ORDER — ONDANSETRON 4 MG/1
4 TABLET, ORALLY DISINTEGRATING ORAL EVERY 8 HOURS PRN
Status: DISCONTINUED | OUTPATIENT
Start: 2023-02-17 | End: 2023-02-20 | Stop reason: HOSPADM

## 2023-02-17 RX ORDER — IPRATROPIUM BROMIDE AND ALBUTEROL SULFATE 2.5; .5 MG/3ML; MG/3ML
1 SOLUTION RESPIRATORY (INHALATION)
Status: DISCONTINUED | OUTPATIENT
Start: 2023-02-17 | End: 2023-02-20 | Stop reason: HOSPADM

## 2023-02-17 RX ORDER — SODIUM CHLORIDE 9 MG/ML
INJECTION, SOLUTION INTRAVENOUS CONTINUOUS
Status: DISCONTINUED | OUTPATIENT
Start: 2023-02-17 | End: 2023-02-20 | Stop reason: HOSPADM

## 2023-02-17 RX ORDER — ACETAMINOPHEN 325 MG/1
650 TABLET ORAL EVERY 6 HOURS PRN
Status: DISCONTINUED | OUTPATIENT
Start: 2023-02-17 | End: 2023-02-20 | Stop reason: HOSPADM

## 2023-02-17 RX ORDER — POLYETHYLENE GLYCOL 3350 17 G/17G
17 POWDER, FOR SOLUTION ORAL DAILY
Status: DISCONTINUED | OUTPATIENT
Start: 2023-02-17 | End: 2023-02-20 | Stop reason: HOSPADM

## 2023-02-17 RX ORDER — CLOPIDOGREL BISULFATE 75 MG/1
75 TABLET ORAL DAILY
Status: DISCONTINUED | OUTPATIENT
Start: 2023-02-17 | End: 2023-02-20 | Stop reason: HOSPADM

## 2023-02-17 RX ORDER — SENNA PLUS 8.6 MG/1
1 TABLET ORAL DAILY
Status: DISCONTINUED | OUTPATIENT
Start: 2023-02-17 | End: 2023-02-20 | Stop reason: HOSPADM

## 2023-02-17 RX ORDER — INSULIN LISPRO 100 [IU]/ML
0-4 INJECTION, SOLUTION INTRAVENOUS; SUBCUTANEOUS NIGHTLY
Status: DISCONTINUED | OUTPATIENT
Start: 2023-02-17 | End: 2023-02-20 | Stop reason: HOSPADM

## 2023-02-17 RX ORDER — INSULIN GLARGINE 100 [IU]/ML
20 INJECTION, SOLUTION SUBCUTANEOUS NIGHTLY
Status: DISCONTINUED | OUTPATIENT
Start: 2023-02-17 | End: 2023-02-20 | Stop reason: HOSPADM

## 2023-02-17 RX ORDER — SODIUM CHLORIDE 0.9 % (FLUSH) 0.9 %
5-40 SYRINGE (ML) INJECTION PRN
Status: DISCONTINUED | OUTPATIENT
Start: 2023-02-17 | End: 2023-02-20 | Stop reason: HOSPADM

## 2023-02-17 RX ORDER — BUSPIRONE HYDROCHLORIDE 15 MG/1
30 TABLET ORAL 3 TIMES DAILY PRN
Status: DISCONTINUED | OUTPATIENT
Start: 2023-02-17 | End: 2023-02-20 | Stop reason: HOSPADM

## 2023-02-17 RX ORDER — ONDANSETRON 2 MG/ML
4 INJECTION INTRAMUSCULAR; INTRAVENOUS EVERY 6 HOURS PRN
Status: DISCONTINUED | OUTPATIENT
Start: 2023-02-17 | End: 2023-02-20 | Stop reason: HOSPADM

## 2023-02-17 RX ORDER — 0.9 % SODIUM CHLORIDE 0.9 %
1000 INTRAVENOUS SOLUTION INTRAVENOUS ONCE
Status: COMPLETED | OUTPATIENT
Start: 2023-02-17 | End: 2023-02-17

## 2023-02-17 RX ADMIN — BUSPIRONE HYDROCHLORIDE 30 MG: 15 TABLET ORAL at 20:44

## 2023-02-17 RX ADMIN — KETOROLAC TROMETHAMINE 30 MG: 30 INJECTION, SOLUTION INTRAMUSCULAR; INTRAVENOUS at 11:04

## 2023-02-17 RX ADMIN — SODIUM CHLORIDE 1000 ML: 9 INJECTION, SOLUTION INTRAVENOUS at 11:05

## 2023-02-17 RX ADMIN — DIPHENHYDRAMINE HYDROCHLORIDE 25 MG: 50 INJECTION, SOLUTION INTRAMUSCULAR; INTRAVENOUS at 11:04

## 2023-02-17 RX ADMIN — IPRATROPIUM BROMIDE AND ALBUTEROL SULFATE 1 AMPULE: 2.5; .5 SOLUTION RESPIRATORY (INHALATION) at 20:49

## 2023-02-17 RX ADMIN — METOCLOPRAMIDE 10 MG: 5 INJECTION, SOLUTION INTRAMUSCULAR; INTRAVENOUS at 11:05

## 2023-02-17 RX ADMIN — BUDESONIDE AND FORMOTEROL FUMARATE DIHYDRATE 2 PUFF: 80; 4.5 AEROSOL RESPIRATORY (INHALATION) at 20:50

## 2023-02-17 RX ADMIN — SODIUM CHLORIDE, PRESERVATIVE FREE 10 ML: 5 INJECTION INTRAVENOUS at 21:40

## 2023-02-17 RX ADMIN — APIXABAN 5 MG: 5 TABLET, FILM COATED ORAL at 20:44

## 2023-02-17 ASSESSMENT — PAIN SCALES - GENERAL: PAINLEVEL_OUTOF10: 0

## 2023-02-17 ASSESSMENT — ENCOUNTER SYMPTOMS
BACK PAIN: 0
CONSTIPATION: 1
ABDOMINAL DISTENTION: 0
ANAL BLEEDING: 0
VOMITING: 0
RHINORRHEA: 0
ABDOMINAL PAIN: 1
SHORTNESS OF BREATH: 0
NAUSEA: 1
SORE THROAT: 0
PHOTOPHOBIA: 0
CHEST TIGHTNESS: 0
DIARRHEA: 0

## 2023-02-17 NOTE — ED PROVIDER NOTES
[facility] ED  Emergency Department Encounter  EmergencyMedicine Resident     Pt Name:Pina Gil  MRN: [id number]  [place] 1960  Date of evaluation: 2/17/23  PCP:  Hiral Hart MD    [address] [place] [address]       Chief Complaint   Patient presents with    Urinary Frequency    Dysuria       HISTORY OF PRESENT ILLNESS  (Location/Symptom, Timing/Onset, Context/Setting, Quality, Duration, Modifying Factors, Severity.)      Maria Guadalupe Marc is a 63 y.o. female who presents with constipation for 1 week patient is having bowel movements but very infrequently she is on GlycoLax for this. She is passing gas. Patient also states has 1 day of inability urinate. She states takes Lasix and feels as if she really needs to she is having lower abdominal pain due to this. She states she feels nauseous right now but no emesis. PAST MEDICAL / SURGICAL / SOCIAL / FAMILY HISTORY      has a past medical history of Acute on chronic systolic CHF (congestive heart failure) ([address] Asthma, Blood circulation, collateral, CAD (coronary artery disease), COPD (chronic obstructive pulmonary disease) ([address] COPD exacerbation ([address] Diabetes mellitus ([address] Hyperlipidemia, Hypertension, Ischemic colitis ([address] Movement disorder, Neuropathy, PAD (peripheral artery disease) ([address] Tobacco abuse, and Tobacco abuse counseling.       has a past surgical history that includes Coronary angioplasty with stent; Tonsillectomy; Inner ear surgery (Right); and Coronary angioplasty with stent (02/11/2019). Social History     Socioeconomic History    Marital status:       Spouse name: Not on file    Number of children: Not on file    Years of education: Not on file    Highest education level: Not on file   Occupational History    Not on file   Tobacco Use    Smoking status: Every Day     Packs/day: 0.50     Years: 45.00     Pack years: 22.50     Types: Cigarettes    Smokeless tobacco: Former   Substance and Sexual Activity Alcohol use: No    Drug use: Yes     Types: Marijuana Braden Ollie)    Sexual activity: Never   Other Topics Concern    Not on file   Social History Narrative    Not on file     Social Determinants of Health     Financial Resource Strain: Not on file   Food Insecurity: Not on file   Transportation Needs: Not on file   Physical Activity: Not on file   Stress: Not on file   Social Connections: Not on file   Intimate Partner Violence: Not on file   Housing Stability: Not on file       Family History   Problem Relation Age of Onset    Hypertension Mother     Hypertension Father     Diabetes Sister     Diabetes Brother     Stroke Brother        Allergies:  Codeine and Morphine    Home Medications:  Prior to Admission medications    Medication Sig Start Date End Date Taking? Authorizing Provider   insulin detemir (LEVEMIR FLEXTOUCH) 100 UNIT/ML injection pen Inject 40 Units into the skin daily  Patient not taking: Reported on 2/14/2023 12/18/22   Noe Suero MD   Insulin Pen Needle (PEN NEEDLES) 31G X 5 MM MISC 1 box by Does not apply route daily 12/18/22   Noe Suero MD   busPIRone (BUSPAR) 30 MG tablet Take 30 mg by mouth 3 times daily as needed 12/7/22   Historical Provider, MD   acetaminophen (TYLENOL) 325 MG tablet Take 325 mg by mouth every 4-6 hours as needed 11/22/22   Historical Provider, MD   furosemide (LASIX) 40 MG tablet Take 1 tablet by mouth in the morning. Hold for 2 more days.  7/22/22   Sugey Nassar MD   mometasone-formoterol (DULERA) 200-5 MCG/ACT inhaler Inhale into the lungs 3/3/22   Historical Provider, MD   lisinopril (PRINIVIL;ZESTRIL) 10 MG tablet Take 1 tablet by mouth daily 10/9/21   Olegario Knutson MD   clopidogrel (PLAVIX) 75 MG tablet Take 1 tablet by mouth daily 10/9/21   Chidi Bright MD   famotidine (PEPCID) 20 MG tablet Take 20 mg by mouth 2 times daily    Historical Provider, MD   albuterol sulfate HFA (PROVENTIL;VENTOLIN;PROAIR) 108 (90 Base) MCG/ACT inhaler Inhale 2 puffs into the lungs 3 times daily    Historical Provider, MD   lidocaine (LIDODERM) 5 % Place 1 patch onto the skin as needed for Pain (as needed for rotator cuff pain) 12 hours on, 12 hours off. Historical Provider, MD   apixaban (ELIQUIS) 5 MG TABS tablet Take 1 tablet by mouth 2 times daily 10/5/21   Иван Corbett MD   atorvastatin (LIPITOR) 40 MG tablet Take 1 tablet by mouth daily 7/27/21   Harsha Marinelli MD   Dulaglutide (TRULICITY) 1.5 HE/2.5RN SOPN Inject 1.5 mg into the skin once a week    Historical Provider, MD   nitroGLYCERIN (NITROSTAT) 0.4 MG SL tablet up to max of 3 total doses. If no relief after 1 dose, call 911. 2/12/19   BINDU Steven CNP   metoprolol tartrate (LOPRESSOR) 25 MG tablet Take 0.5 tablets by mouth 2 times daily 2/12/19   BINDU Steven CNP   Lancets MISC 1 each by Does not apply route daily 2/12/19   Wendy Bhakta MD   blood glucose monitor strips Test 3 times a day & as needed for symptoms of irregular blood glucose. 2/12/19   Wendy Bhakta MD   glucose monitoring kit (FREESTYLE) monitoring kit 1 kit by Does not apply route daily 2/12/19   Wendy Bhakta MD   aspirin 81 MG chewable tablet Take 1 tablet by mouth daily 2/13/19 7/22/22  BINDU Steven CNP   albuterol (PROVENTIL) (5 MG/ML) 0.5% nebulizer solution Take 0.5 mLs by nebulization every 6 hours as needed for Wheezing 3/16/18   Awais Saucedo MD   tiotropium (SPIRIVA) 18 MCG inhalation capsule Inhale 1 capsule into the lungs daily Pt told that she was using this medication at home. 3/16/18   Awais Saucedo MD   acetaminophen (TYLENOL) 500 MG tablet Take 1 tablet by mouth every 6 hours as needed for Pain 1/9/18   Amaya Tello MD       REVIEW OF SYSTEMS    (2-9 systems for level 4, 10 or more for level 5)      Review of Systems   Constitutional:  Negative for chills and fever. HENT:  Negative for rhinorrhea and sore throat. Eyes:  Negative for photophobia.    Respiratory: Negative for chest tightness and shortness of breath. Cardiovascular:  Negative for chest pain. Gastrointestinal:  Positive for abdominal pain, constipation and nausea. Negative for abdominal distention, anal bleeding, diarrhea and vomiting. Endocrine: Negative for polyuria. Genitourinary:  Positive for difficulty urinating. Negative for flank pain. Musculoskeletal:  Negative for arthralgias, back pain and neck pain. Skin:  Negative for rash. Neurological:  Negative for weakness and headaches. PHYSICAL EXAM   (up to 7 for level 4, 8 or more for level 5)      INITIAL VITALS:   /82   Pulse 94   Temp 97.6 °F (36.4 °C)   Resp 22   SpO2 96%     Physical Exam  Constitutional:       General: She is not in acute distress. Appearance: She is not ill-appearing. HENT:      Head: Normocephalic and atraumatic. Cardiovascular:      Rate and Rhythm: Normal rate. Pulses: Normal pulses. Heart sounds: Normal heart sounds. Pulmonary:      Effort: Pulmonary effort is normal.      Breath sounds: Normal breath sounds. Abdominal:      General: There is no distension. Tenderness: There is no right CVA tenderness, left CVA tenderness, guarding or rebound. Comments: Lower abdominal tenderness   Musculoskeletal:      Right lower leg: No edema. Left lower leg: No edema. Skin:     Capillary Refill: Capillary refill takes less than 2 seconds. Neurological:      General: No focal deficit present. Mental Status: She is oriented to person, place, and time.        DIFFERENTIAL  DIAGNOSIS     PLAN (LABS / IMAGING / EKG):  Orders Placed This Encounter   Procedures    CT ABDOMEN PELVIS WO CONTRAST Additional Contrast? None    CBC with Auto Differential    CMP    Lipase    Lactic Acid    Protime-INR    Urinalysis with Reflex to Culture    Lactic Acid    Bladder scan    Straight cath    Inpatient consult to Internal Medicine    ADMIT TO INPATIENT       MEDICATIONS ORDERED:  Orders Placed This Encounter   Medications    0.9 % sodium chloride bolus    ketorolac (TORADOL) injection 30 mg    metoclopramide (REGLAN) injection 10 mg    diphenhydrAMINE (BENADRYL) injection 25 mg       DIAGNOSTIC RESULTS / EMERGENCY DEPARTMENT COURSE / MDM   LAB RESULTS:  Results for orders placed or performed during the hospital encounter of 02/17/23   CBC with Auto Differential   Result Value Ref Range    WBC 5.9 3.5 - 11.3 k/uL    RBC 5.32 (H) 3.95 - 5.11 m/uL    Hemoglobin 12.5 11.9 - 15.1 g/dL    Hematocrit 41.2 36.3 - 47.1 %    MCV 77.4 (L) 82.6 - 102.9 fL    MCH 23.5 (L) 25.2 - 33.5 pg    MCHC 30.3 28.4 - 34.8 g/dL    RDW 17.1 (H) 11.8 - 14.4 %    Platelets 962 253 - 186 k/uL    MPV 10.4 8.1 - 13.5 fL    NRBC Automated 0.0 0.0 per 100 WBC    Immature Granulocytes 0 0 %    Seg Neutrophils 87 (H) 36 - 65 %    Lymphocytes 8 (L) 24 - 43 %    Monocytes 5 3 - 12 %    Eosinophils % 0 (L) 1 - 4 %    Basophils 0 0 - 2 %    Absolute Immature Granulocyte 0.00 0.00 - 0.30 k/uL    Segs Absolute 5.13 1.50 - 8.10 k/uL    Absolute Lymph # 0.47 (L) 1.10 - 3.70 k/uL    Absolute Mono # 0.30 0.10 - 1.20 k/uL    Absolute Eos # 0.00 0.00 - 0.44 k/uL    Basophils Absolute 0.00 0.00 - 0.20 k/uL    Morphology MICROCYTOSIS PRESENT     Morphology ANISOCYTOSIS PRESENT    CMP   Result Value Ref Range    Glucose 202 (H) 70 - 99 mg/dL    BUN 47 (H) 8 - 23 mg/dL    Creatinine 2.05 (H) 0.50 - 0.90 mg/dL    Est, Glom Filt Rate 27 (L) >60 mL/min/1.73m2    Calcium 9.4 8.6 - 10.4 mg/dL    Sodium 134 (L) 135 - 144 mmol/L    Potassium 4.9 3.7 - 5.3 mmol/L    Chloride 99 98 - 107 mmol/L    CO2 17 (L) 20 - 31 mmol/L    Anion Gap 18 (H) 9 - 17 mmol/L    Alkaline Phosphatase 111 (H) 35 - 104 U/L    ALT 17 5 - 33 U/L    AST 18 <32 U/L    Total Bilirubin 0.2 (L) 0.3 - 1.2 mg/dL    Total Protein 8.2 6.4 - 8.3 g/dL    Albumin 4.5 3.5 - 5.2 g/dL    Albumin/Globulin Ratio 1.2 1.0 - 2.5   Lipase   Result Value Ref Range    Lipase 59 13 - 60 U/L   Lactic Acid Result Value Ref Range    Lactic Acid, Whole Blood 3.5 (H) 0.7 - 2.1 mmol/L   Protime-INR   Result Value Ref Range    Protime 10.3 9.1 - 12.3 sec    INR 1.0    Urinalysis with Reflex to Culture    Specimen: Urine   Result Value Ref Range    Color, UA Yellow Yellow    Turbidity UA Clear Clear    Glucose, Ur NEGATIVE NEGATIVE    Bilirubin Urine NEGATIVE NEGATIVE    Ketones, Urine NEGATIVE NEGATIVE    Specific Gravity, UA 1.015 1.005 - 1.030    Urine Hgb NEGATIVE NEGATIVE    pH, UA 5.5 5.0 - 8.0    Protein, UA NEGATIVE NEGATIVE    Urobilinogen, Urine Normal Normal    Nitrite, Urine NEGATIVE NEGATIVE    Leukocyte Esterase, Urine NEGATIVE NEGATIVE    Urinalysis Comments       Microscopic exam not performed based on chemical results unless requested in original order. Lactic Acid   Result Value Ref Range    Lactic Acid, Whole Blood 1.3 0.7 - 2.1 mmol/L       RADIOLOGY:  CT ABDOMEN PELVIS WO CONTRAST Additional Contrast? None    Result Date: 2/17/2023  1. No evidence of renal stones or obstructive uropathy. 2. Normal appendix. Diverticulosis but no acute diverticulitis. Constipation and stool impaction in the rectum. 3. Mild fatty liver but no focal disease. No evidence of gallbladder or biliary disease. EKG Interpretation  None    MDM  Medical Decision Making  Acute urinary retention likely related to constipation. Abdominal pain improved after straight cath. HARVEY likely related to urinary retention. No bowel obstruction on CT scan or mass that would cause an obstructive affect. Patient not on any opioids or anticholinergics. Patient admitted for IV hydration given HARVEY. Amount and/or Complexity of Data Reviewed  Labs: ordered. Radiology: ordered. Discussion of management or test interpretation with external provider(s): Medicine service regarding admission    Risk  Prescription drug management. Decision regarding hospitalization.         EMERGENCY DEPARTMENT COURSE:  ED Course as of 02/17/23 7448 Fri Feb 17, 2023   1035 Patient value bedside. 1 week of constipation is taking MiraLAX states she is she is having watery bowel movements. Also has 1 day of inability urinate. Nonperitoneal but does have lower abdominal tenderness. We will do labs, imaging and bladder scan and give pain control. Will give fluids. [ZE]   1123 Patient still unable to fully void has 860 cc in her bladder will straight cath. Patient does have HARVEY. Will get Noncon CT scan given low GFR. [ZE]   7413 Patient admitted to medicine service. CT scan showing constipation will need enemas inpatient. Gas is downtrending. Patient symptoms have improved after straight cath. HARVEY likely related to retention [ZE]      ED Course User Index  [ZE] Bela Joseph DO       PROCEDURES:  Procedures     CONSULTS:  IP CONSULT TO INTERNAL MEDICINE  IP CONSULT TO NEPHROLOGY  IP CONSULT TO CASE MANAGEMENT    CRITICAL CARE:  See MDM    FINAL IMPRESSION      1. HARVEY (acute kidney injury) ([facility] [address] [facility]    2. Urinary retention          DISPOSITION / PLAN     DISPOSITION Admitted 02/17/2023 01:54:30 PM      PATIENT REFERRED TO:  No follow-up provider specified.     DISCHARGE MEDICATIONS:  New Prescriptions    No medications on file       Kristen Giraldo DO  Emergency Medicine Resident    (Please note that portions of thisnote were completed with a voice recognition program.  Efforts were made to edit the dictations but occasionally words are mis-transcribed.)        Bela Joseph DO  Resident  02/17/23 [id number]

## 2023-02-17 NOTE — ED NOTES
Soap suds enema administered to pt. Pt tolerating well. Pt has bedside commode and call light.       Debi Phillips RN  02/17/23 [id number]

## 2023-02-17 NOTE — ED PROVIDER NOTES
[facility]  Emergency Department  Faculty Attestation     I performed a history and physical examination of the patient and discussed management with the resident. I reviewed the residents note and agree with the documented findings and plan of care. Any areas of disagreement are noted on the chart. I was personally present for the key portions of any procedures. I have documented in the chart those procedures where I was not present during the key portions. I have reviewed the emergency nurses triage note. I agree with the chief complaint, past medical history, past surgical history, allergies, medications, social and family history as documented unless otherwise noted below. For Physician Assistant/ Nurse Practitioner cases/documentation I have personally evaluated this patient and have completed at least one if not all key elements of the E/M (history, physical exam, and MDM). Additional findings are as noted. Primary Care Physician:  Linus Sandhoff, MD    Screenings:  [unfilled]    CHIEF COMPLAINT     No chief complaint on file. RECENT VITALS:   Temp: 97.6 °F (36.4 °C),  Heart Rate: 94, Resp: 22, BP: 129/82    LABS:  Labs Reviewed   CBC WITH AUTO DIFFERENTIAL   COMPREHENSIVE METABOLIC PANEL   LIPASE   LACTIC ACID   PROTIME-INR   URINALYSIS WITH REFLEX TO CULTURE       Radiology  CT ABDOMEN PELVIS W IV CONTRAST Additional Contrast? None    (Results Pending)         Attending Physician Additional  Notes    Patient has abdominal pain left upper quadrant in nature with feeling of constipation but loose watery stools. She has sensation that she needs to urinate but has been unable to urinate since yesterday. There is some dysuria when she does. There is decreased volume of urine. No nausea vomiting. There is chills and sweats.   On exam she is in moderate distress secondary pain vital signs are normal.  Abdomen has left upper quadrant tenderness but without peritoneal findings or distention. Differential includes constipation, urinary retention, UTI, stone, diverticulitis/proctitis. Plan is analgesics, IV fluids, urinalysis, postvoid residual, CT abdomen, possible enema, reassess. Idania Sharpe.  Herlinda Small MD, [facility]  Attending Emergency  Physician                Charity Tirado MD  02/17/23 [id number]

## 2023-02-17 NOTE — ED NOTES
Pt came in c/o pain urgency, and pain on urination. Pt said, she had difficultly of urinating, that started a couple of days ago. Pt said, she been trying to pee, but only a drop would come out. Felt also burning sensation when she pee. Pt conscious and coherent upon admission, denies chest pain.       Alhaji Brantley, ALICJA  02/17/23 [id number]

## 2023-02-17 NOTE — H&P
[address]     Department of Internal Medicine - Staff Internal Medicine Teaching Service          ADMISSION NOTE/HISTORY AND PHYSICAL EXAMINATION   Date: 2/17/2023  Patient Name: Clem Moran  Date of admission: 2/17/2023 10:00 AM  YOB: 1960  PCP: Сергей Godfrey MD  History Obtained From:  patient, electronic medical record    CHIEF COMPLAINT     Chief complaint:   Acute urinary retention, abdominal pain, severe constipation    HISTORY OF PRESENTING ILLNESS     The patient is a pleasant 63 y.o. female presents with past medical history significant for COPD exacerbation, type II DM, CKD, longstanding history of unknown constipation for a very long time presented to the ED with 1 day history of acute urinary retention and inability to pass urine. Patient said she has chronic constipation and takes on and off laxatives. But for the past 7 days the constipation have progressively increased to severe level and from last night the patient has been unable to pass any urine. And was complaining of suprapubic abdominal pain. With the chief complaint patient came into the ED and bladder scan revealed 870 mL of retained urine. Straight cath was done and significant amount of urine were removed in the ED evaluation, patient was also found to be in HARVEY with significantly elevated creatinine level of 2.05.  UA was unremarkable and lactate was completelY normal.    On my evaluation today, patient was ANO x4. Hemodynamically stable with a BP of 111/79. On room air.       Review of Systems:  General ROS: Completed and except as mentioned above were negative   HEENT ROS: Completed and except as mentioned above were negative   Allergy and Immunology ROS:  Completed and except as mentioned above were negative  Hematological and Lymphatic ROS:  Completed and except as mentioned above were negative  Respiratory ROS:  Completed and except as mentioned above were negative  Cardiovascular ROS:  Completed and except as mentioned above were negative  Gastrointestinal ROS: Completed and except as mentioned above were negative  Genito-Urinary ROS: Complains of severe constipation and inability to pass urine   musculoskeletal ROS:  Completed and except as mentioned above were negative  Neurological ROS:  Completed and except as mentioned above were negative  Skin & Dermatological ROS:  Completed and except as mentioned above were negative  Psychological ROS:  Completed and except as mentioned above were negative    PAST MEDICAL HISTORY     Past Medical History:   Diagnosis Date    Acute on chronic systolic CHF (congestive heart failure) ([facility] [address] 12/20/2021    Asthma     Blood circulation, collateral     CAD (coronary artery disease)     COPD (chronic obstructive pulmonary disease) ([facility])     COPD exacerbation ([facility] [address] 12/18/2021    Diabetes mellitus ([facility] [address]     Hyperlipidemia     Hypertension     Ischemic colitis ([facility] [address] 10/28/2021    Movement disorder     B/L torn rotator cuff. Neuropathy     PAD (peripheral artery disease) ([facility] [address]     Tobacco abuse 11/19/2016    Tobacco abuse counseling 7/25/2021       PAST SURGICAL HISTORY     Past Surgical History:   Procedure Laterality Date    CORONARY ANGIOPLASTY WITH STENT PLACEMENT      x2    CORONARY ANGIOPLASTY WITH STENT PLACEMENT  02/11/2019    JOSE MARTIN to RCA per Dr. Idris Mcmullen radial approuch    INNER EAR SURGERY Right     TONSILLECTOMY         ALLERGIES     Codeine and Morphine    MEDICATIONS PRIOR TO ADMISSION     Prior to Admission medications    Medication Sig Start Date End Date Taking?  Authorizing Provider   insulin detemir (LEVEMIR FLEXTOUCH) 100 UNIT/ML injection pen Inject 40 Units into the skin daily  Patient not taking: Reported on 2/14/2023 12/18/22   Magaly Mixon MD   Insulin Pen Needle (PEN NEEDLES) 31G X 5 MM MISC 1 box by Does not apply route daily 12/18/22   Magaly Mixon MD   busPIRone (BUSPAR) 30 MG tablet Take 30 mg by mouth 3 times daily as needed 12/7/22   Historical Provider, MD   acetaminophen (TYLENOL) 325 MG tablet Take 650 mg by mouth every 4-6 hours as needed 11/22/22   Historical Provider, MD   furosemide (LASIX) 40 MG tablet Take 1 tablet by mouth in the morning. Hold for 2 more days. 7/22/22   Yuriy Grant MD   mometasone-formoterol (DULERA) 200-5 MCG/ACT inhaler Inhale into the lungs 3/3/22   Historical Provider, MD   lisinopril (PRINIVIL;ZESTRIL) 10 MG tablet Take 1 tablet by mouth daily 10/9/21   Lo Caraballo MD   clopidogrel (PLAVIX) 75 MG tablet Take 1 tablet by mouth daily 10/9/21   Chidi Nielsen MD   famotidine (PEPCID) 20 MG tablet Take 20 mg by mouth 2 times daily    Historical Provider, MD   albuterol sulfate HFA (PROVENTIL;VENTOLIN;PROAIR) 108 (90 Base) MCG/ACT inhaler Inhale 2 puffs into the lungs 3 times daily    Historical Provider, MD   lidocaine (LIDODERM) 5 % Place 1 patch onto the skin as needed for Pain (as needed for rotator cuff pain) 12 hours on, 12 hours off. Historical Provider, MD   apixaban (ELIQUIS) 5 MG TABS tablet Take 1 tablet by mouth 2 times daily 10/5/21   Jose Lima MD   atorvastatin (LIPITOR) 40 MG tablet Take 1 tablet by mouth daily 7/27/21   Anabella Ibarra MD   Dulaglutide (TRULICITY) 1.5 MX/6.7CG SOPN Inject 1.5 mg into the skin once a week    Historical Provider, MD   nitroGLYCERIN (NITROSTAT) 0.4 MG SL tablet up to max of 3 total doses. If no relief after 1 dose, call 911. 2/12/19   BINDU Nichols CNP   metoprolol tartrate (LOPRESSOR) 25 MG tablet Take 0.5 tablets by mouth 2 times daily 2/12/19   BINDU Nichols CNP   Lancets MISC 1 each by Does not apply route daily 2/12/19   Roopa Oliveros MD   blood glucose monitor strips Test 3 times a day & as needed for symptoms of irregular blood glucose.  2/12/19   Roopa Oliveros MD   glucose monitoring kit (FREESTYLE) monitoring kit 1 kit by Does not apply route daily 2/12/19   Troy Pradhan Tomasa Martinez MD   aspirin 81 MG chewable tablet Take 1 tablet by mouth daily 19  BINDU Barbour - CNP   albuterol (PROVENTIL) (5 MG/ML) 0.5% nebulizer solution Take 0.5 mLs by nebulization every 6 hours as needed for Wheezing 3/16/18   Jovanni Cummings MD   tiotropium (SPIRIVA) 18 MCG inhalation capsule Inhale 1 capsule into the lungs daily Pt told that she was using this medication at home. 3/16/18   Jovanni Cummings MD       SOCIAL HISTORY     Tobacco: Quitted 2 years ago but smoked more than 25 years before that  Alcohol: Does not use alcohol right now  Illicits: History of marijuana use  Occupation: Currently not working    FAMILY HISTORY     Family History   Problem Relation Age of Onset    Hypertension Mother     Hypertension Father     Diabetes Sister     Diabetes Brother     Stroke Brother        PHYSICAL EXAM     Vitals: /79   Pulse 81   Temp 98 °F (36.7 °C) (Oral)   Resp 20   SpO2 98%   Tmax: Temp (24hrs), Av.8 °F (36.6 °C), Min:97.6 °F (36.4 °C), Max:98 °F (36.7 °C)    Last Body weight:   Wt Readings from Last 3 Encounters:   23 239 lb 9.6 oz (108.7 kg)   22 253 lb 8.5 oz (115 kg)   22 240 lb (108.9 kg)     Body Mass Index : There is no height or weight on file to calculate BMI. PHYSICAL EXAMINATION:  Physical Exam  Constitutional:       Appearance: Normal appearance. HENT:      Head: Normocephalic and atraumatic. Cardiovascular:      Rate and Rhythm: Normal rate and regular rhythm. Pulses: Normal pulses. Heart sounds: Normal heart sounds. No murmur heard. No friction rub. Pulmonary:      Effort: Pulmonary effort is normal. No respiratory distress. Breath sounds: Normal breath sounds. No stridor. Abdominal:      General: Abdomen is flat. Bowel sounds are normal.      Tenderness: There is no abdominal tenderness. There is no guarding. Hernia: No hernia is present. Musculoskeletal:         General: No swelling or tenderness. Cervical back: Normal range of motion and neck supple. No rigidity or tenderness. Skin:     Coloration: Skin is not jaundiced or pale. Neurological:      General: No focal deficit present. Mental Status: She is alert and oriented to person, place, and time. Cranial Nerves: No cranial nerve deficit. Motor: No weakness.    Psychiatric:         Mood and Affect: Mood normal.         Behavior: Behavior normal.           INVESTIGATIONS     Laboratory Testing:     Recent Results (from the past 24 hour(s))   CBC with Auto Differential    Collection Time: 02/17/23 10:39 AM   Result Value Ref Range    WBC 5.9 3.5 - 11.3 k/uL    RBC 5.32 (H) 3.95 - 5.11 m/uL    Hemoglobin 12.5 11.9 - 15.1 g/dL    Hematocrit 41.2 36.3 - 47.1 %    MCV 77.4 (L) 82.6 - 102.9 fL    MCH 23.5 (L) 25.2 - 33.5 pg    MCHC 30.3 28.4 - 34.8 g/dL    RDW 17.1 (H) 11.8 - 14.4 %    Platelets 489 897 - 467 k/uL    MPV 10.4 8.1 - 13.5 fL    NRBC Automated 0.0 0.0 per 100 WBC    Immature Granulocytes 0 0 %    Seg Neutrophils 87 (H) 36 - 65 %    Lymphocytes 8 (L) 24 - 43 %    Monocytes 5 3 - 12 %    Eosinophils % 0 (L) 1 - 4 %    Basophils 0 0 - 2 %    Absolute Immature Granulocyte 0.00 0.00 - 0.30 k/uL    Segs Absolute 5.13 1.50 - 8.10 k/uL    Absolute Lymph # 0.47 (L) 1.10 - 3.70 k/uL    Absolute Mono # 0.30 0.10 - 1.20 k/uL    Absolute Eos # 0.00 0.00 - 0.44 k/uL    Basophils Absolute 0.00 0.00 - 0.20 k/uL    Morphology MICROCYTOSIS PRESENT     Morphology ANISOCYTOSIS PRESENT    CMP    Collection Time: 02/17/23 10:39 AM   Result Value Ref Range    Glucose 202 (H) 70 - 99 mg/dL    BUN 47 (H) 8 - 23 mg/dL    Creatinine 2.05 (H) 0.50 - 0.90 mg/dL    Est, Glom Filt Rate 27 (L) >60 mL/min/1.73m2    Calcium 9.4 8.6 - 10.4 mg/dL    Sodium 134 (L) 135 - 144 mmol/L    Potassium 4.9 3.7 - 5.3 mmol/L    Chloride 99 98 - 107 mmol/L    CO2 17 (L) 20 - 31 mmol/L    Anion Gap 18 (H) 9 - 17 mmol/L    Alkaline Phosphatase 111 (H) 35 - 104 U/L    ALT 17 5 - 33 U/L    AST 18 <32 U/L    Total Bilirubin 0.2 (L) 0.3 - 1.2 mg/dL    Total Protein 8.2 6.4 - 8.3 g/dL    Albumin 4.5 3.5 - 5.2 g/dL    Albumin/Globulin Ratio 1.2 1.0 - 2.5   Lipase    Collection Time: 02/17/23 10:39 AM   Result Value Ref Range    Lipase 59 13 - 60 U/L   Lactic Acid    Collection Time: 02/17/23 10:39 AM   Result Value Ref Range    Lactic Acid, Whole Blood 3.5 (H) 0.7 - 2.1 mmol/L   Protime-INR    Collection Time: 02/17/23 10:39 AM   Result Value Ref Range    Protime 10.3 9.1 - 12.3 sec    INR 1.0    Urinalysis with Reflex to Culture    Collection Time: 02/17/23 12:01 PM    Specimen: Urine   Result Value Ref Range    Color, UA Yellow Yellow    Turbidity UA Clear Clear    Glucose, Ur NEGATIVE NEGATIVE    Bilirubin Urine NEGATIVE NEGATIVE    Ketones, Urine NEGATIVE NEGATIVE    Specific Gravity, UA 1.015 1.005 - 1.030    Urine Hgb NEGATIVE NEGATIVE    pH, UA 5.5 5.0 - 8.0    Protein, UA NEGATIVE NEGATIVE    Urobilinogen, Urine Normal Normal    Nitrite, Urine NEGATIVE NEGATIVE    Leukocyte Esterase, Urine NEGATIVE NEGATIVE    Urinalysis Comments       Microscopic exam not performed based on chemical results unless requested in original order. Lactic Acid    Collection Time: 02/17/23  1:06 PM   Result Value Ref Range    Lactic Acid, Whole Blood 1.3 0.7 - 2.1 mmol/L       Imaging:   CT ABDOMEN PELVIS WO CONTRAST Additional Contrast? None    Result Date: 2/17/2023  1. No evidence of renal stones or obstructive uropathy. 2. Normal appendix. Diverticulosis but no acute diverticulitis. Constipation and stool impaction in the rectum. 3. Mild fatty liver but no focal disease. No evidence of gallbladder or biliary disease. ASSESSMENT & PLAN     ASSESSMENT / PLAN:     IMPRESSION  Principal Problem:    HARVEY (acute kidney injury) ([address]  Resolved Problems:    * No resolved hospital problems. *   1.   Acute urinary retention:  - Likely due to underlying severe constipation  - Bladder scan revealed 870 mL of retained urine in the ED, straight cath was done  - Continue intermittent straight cath  - Patient received soapsuds enema in the ED and had 2 bowel movement  - Currently not complaining of urinary retention like symptoms  - Continue GlycoLax for severe constipation    2. HARVEY on CKD stage IIIb:  - Likely precipitated by underlying acute urinary retention  - Baseline creatinine 1.4  - Current creatinine 2.05  - Avoid all nephrotoxic agent  - Nephrology on board, appreciate recommendation  - HARVEY panel ordered  - Nephro recommendation to be followed    3. Chronic constipation:  - Patient has longstanding history of constipation, TSH ordered  - CT abdomen showed diverticulosis most likely the result of chronic constipation  - Patient received soapsuds enema in the ED, continue GlycoLax  - CT also showed impacted stool in the rectum  - If needed we will do manual disimpaction    4. COPD:  - Not on exacerbation, on room air, does not use any oxygen at home  - Mild wheezing present on auscultation  - Continue RT bronchodilator protocol with DuoNeb every 4 hour    5. Atrial fibrillation:  - [id number] score 5  - Rate controlled  - Continue Eliquis  - Continue Lopressor 12.5 AS rate control medication    6. Type II DM without complication:  -Last ZVI3C 9.7 on 12/14/2022  - POC glucose check  - Hypoglycemia protocol in place  - Takes Trulicity as home med    7. Combined systolic and diastolic heart failure:  - Last echo on 12/14/2022 showed LVEF of more than 55%  - Patient has significant past medical history of coronary artery disease with 2 stent placement  - Last cardiac cath on 10/4/2021 showed 90% stenosis of the mid RCA which was stented, on  Plavix  - Takes Lasix 40 mg orally as home med    8.   Dyslipidemia:  - Triglyceride 152 and last lipid panel  - Continue Lipitor 40 mg oral daily    DVT ppx: Eliquis  GI ppx: Not on any GI prophylaxis    PT/OT/SW: Consulted  Discharge Planning:  consulted    Aliza Guerrero MD  Internal Medicine Resident, PGY-1  [address], [place]  2/17/2023, 6:31 PM       Attending Physician Statement  I have discussed the care of Douglas Wagner and I have examined the patient myselft and taken ros and hpi , including pertinent history and exam findings,  with the resident. I have reviewed the key elements of all parts of the encounter with the resident. I agree with the assessment, plan and orders as documented by the resident.       Electronically signed by Dominique Lawrence MD

## 2023-02-18 ENCOUNTER — APPOINTMENT (OUTPATIENT)
Dept: GENERAL RADIOLOGY | Age: 63
DRG: 683 | End: 2023-02-18
Payer: MEDICARE

## 2023-02-18 LAB
ABSOLUTE EOS #: <0.03 K/UL (ref 0–0.44)
ABSOLUTE IMMATURE GRANULOCYTE: 0.03 K/UL (ref 0–0.3)
ABSOLUTE LYMPH #: 0.83 K/UL (ref 1.1–3.7)
ABSOLUTE MONO #: 0.35 K/UL (ref 0.1–1.2)
ANION GAP SERPL CALCULATED.3IONS-SCNC: 13 MMOL/L (ref 9–17)
BASOPHILS # BLD: 0 % (ref 0–2)
BASOPHILS ABSOLUTE: <0.03 K/UL (ref 0–0.2)
BUN SERPL-MCNC: 35 MG/DL (ref 8–23)
CALCIUM SERPL-MCNC: 8.7 MG/DL (ref 8.6–10.4)
CHLORIDE SERPL-SCNC: 109 MMOL/L (ref 98–107)
CO2 SERPL-SCNC: 18 MMOL/L (ref 20–31)
CREAT SERPL-MCNC: 1.51 MG/DL (ref 0.5–0.9)
CREATININE URINE: 113.1 MG/DL (ref 28–217)
EOSINOPHIL,URINE: NORMAL
EOSINOPHILS RELATIVE PERCENT: 0 % (ref 1–4)
GFR SERPL CREATININE-BSD FRML MDRD: 39 ML/MIN/1.73M2
GLUCOSE BLD-MCNC: 130 MG/DL (ref 65–105)
GLUCOSE BLD-MCNC: 137 MG/DL (ref 65–105)
GLUCOSE BLD-MCNC: 155 MG/DL (ref 65–105)
GLUCOSE SERPL-MCNC: 143 MG/DL (ref 70–99)
HCT VFR BLD AUTO: 32.9 % (ref 36.3–47.1)
HGB BLD-MCNC: 10 G/DL (ref 11.9–15.1)
IMMATURE GRANULOCYTES: 1 %
LACTIC ACID, WHOLE BLOOD: 0.9 MMOL/L (ref 0.7–2.1)
LYMPHOCYTES # BLD: 20 % (ref 24–43)
MCH RBC QN AUTO: 23.6 PG (ref 25.2–33.5)
MCHC RBC AUTO-ENTMCNC: 30.4 G/DL (ref 28.4–34.8)
MCV RBC AUTO: 77.6 FL (ref 82.6–102.9)
MONOCYTES # BLD: 9 % (ref 3–12)
NRBC AUTOMATED: 0 PER 100 WBC
OSMOLALITY URINE: 493 MOSM/KG (ref 80–1300)
PDW BLD-RTO: 17.5 % (ref 11.8–14.4)
PLATELET # BLD AUTO: 195 K/UL (ref 138–453)
PMV BLD AUTO: 10.7 FL (ref 8.1–13.5)
POTASSIUM SERPL-SCNC: 4.4 MMOL/L (ref 3.7–5.3)
POTASSIUM, UR: 21.3 MMOL/L
RBC # BLD: 4.24 M/UL (ref 3.95–5.11)
RBC # BLD: ABNORMAL 10*6/UL
SEG NEUTROPHILS: 70 % (ref 36–65)
SEGMENTED NEUTROPHILS ABSOLUTE COUNT: 2.85 K/UL (ref 1.5–8.1)
SODIUM SERPL-SCNC: 140 MMOL/L (ref 135–144)
SODIUM,UR: 31 MMOL/L
TOTAL PROTEIN, URINE: 10 MG/DL
URINE TOTAL PROTEIN CREATININE RATIO: 0.09 (ref 0–0.2)
WBC # BLD AUTO: 4.1 K/UL (ref 3.5–11.3)

## 2023-02-18 PROCEDURE — 36415 COLL VENOUS BLD VENIPUNCTURE: CPT

## 2023-02-18 PROCEDURE — 99233 SBSQ HOSP IP/OBS HIGH 50: CPT | Performed by: INTERNAL MEDICINE

## 2023-02-18 PROCEDURE — 84166 PROTEIN E-PHORESIS/URINE/CSF: CPT

## 2023-02-18 PROCEDURE — 94761 N-INVAS EAR/PLS OXIMETRY MLT: CPT

## 2023-02-18 PROCEDURE — 6370000000 HC RX 637 (ALT 250 FOR IP): Performed by: STUDENT IN AN ORGANIZED HEALTH CARE EDUCATION/TRAINING PROGRAM

## 2023-02-18 PROCEDURE — 83935 ASSAY OF URINE OSMOLALITY: CPT

## 2023-02-18 PROCEDURE — 83605 ASSAY OF LACTIC ACID: CPT

## 2023-02-18 PROCEDURE — 94760 N-INVAS EAR/PLS OXIMETRY 1: CPT

## 2023-02-18 PROCEDURE — 84156 ASSAY OF PROTEIN URINE: CPT

## 2023-02-18 PROCEDURE — 87040 BLOOD CULTURE FOR BACTERIA: CPT

## 2023-02-18 PROCEDURE — 51798 US URINE CAPACITY MEASURE: CPT

## 2023-02-18 PROCEDURE — 82570 ASSAY OF URINE CREATININE: CPT

## 2023-02-18 PROCEDURE — 84133 ASSAY OF URINE POTASSIUM: CPT

## 2023-02-18 PROCEDURE — 6370000000 HC RX 637 (ALT 250 FOR IP)

## 2023-02-18 PROCEDURE — 1200000000 HC SEMI PRIVATE

## 2023-02-18 PROCEDURE — 85025 COMPLETE CBC W/AUTO DIFF WBC: CPT

## 2023-02-18 PROCEDURE — 87205 SMEAR GRAM STAIN: CPT

## 2023-02-18 PROCEDURE — 94640 AIRWAY INHALATION TREATMENT: CPT

## 2023-02-18 PROCEDURE — 80048 BASIC METABOLIC PNL TOTAL CA: CPT

## 2023-02-18 PROCEDURE — 86335 IMMUNFIX E-PHORSIS/URINE/CSF: CPT

## 2023-02-18 PROCEDURE — 2580000003 HC RX 258

## 2023-02-18 PROCEDURE — 84300 ASSAY OF URINE SODIUM: CPT

## 2023-02-18 PROCEDURE — 74018 RADEX ABDOMEN 1 VIEW: CPT

## 2023-02-18 PROCEDURE — 82947 ASSAY GLUCOSE BLOOD QUANT: CPT

## 2023-02-18 PROCEDURE — 51702 INSERT TEMP BLADDER CATH: CPT

## 2023-02-18 RX ORDER — DOCUSATE SODIUM 100 MG/1
100 CAPSULE, LIQUID FILLED ORAL DAILY
Status: DISCONTINUED | OUTPATIENT
Start: 2023-02-18 | End: 2023-02-20 | Stop reason: HOSPADM

## 2023-02-18 RX ORDER — CALCIUM CARBONATE 200(500)MG
500 TABLET,CHEWABLE ORAL 3 TIMES DAILY PRN
Status: DISCONTINUED | OUTPATIENT
Start: 2023-02-18 | End: 2023-02-20 | Stop reason: HOSPADM

## 2023-02-18 RX ORDER — FENTANYL CITRATE 50 UG/ML
25 INJECTION, SOLUTION INTRAMUSCULAR; INTRAVENOUS ONCE
Status: DISCONTINUED | OUTPATIENT
Start: 2023-02-18 | End: 2023-02-20 | Stop reason: HOSPADM

## 2023-02-18 RX ORDER — MIDODRINE HYDROCHLORIDE 5 MG/1
10 TABLET ORAL ONCE
Status: COMPLETED | OUTPATIENT
Start: 2023-02-18 | End: 2023-02-18

## 2023-02-18 RX ORDER — 0.9 % SODIUM CHLORIDE 0.9 %
500 INTRAVENOUS SOLUTION INTRAVENOUS ONCE
Status: DISCONTINUED | OUTPATIENT
Start: 2023-02-18 | End: 2023-02-20 | Stop reason: HOSPADM

## 2023-02-18 RX ORDER — FAMOTIDINE 20 MG/1
10 TABLET, FILM COATED ORAL 2 TIMES DAILY
Status: DISCONTINUED | OUTPATIENT
Start: 2023-02-18 | End: 2023-02-20 | Stop reason: HOSPADM

## 2023-02-18 RX ORDER — LACTULOSE 10 G/15ML
20 SOLUTION ORAL 3 TIMES DAILY
Status: DISCONTINUED | OUTPATIENT
Start: 2023-02-18 | End: 2023-02-20 | Stop reason: HOSPADM

## 2023-02-18 RX ADMIN — DOCUSATE SODIUM 100 MG: 100 CAPSULE ORAL at 10:00

## 2023-02-18 RX ADMIN — MIDODRINE HYDROCHLORIDE 10 MG: 5 TABLET ORAL at 22:09

## 2023-02-18 RX ADMIN — APIXABAN 5 MG: 5 TABLET, FILM COATED ORAL at 22:09

## 2023-02-18 RX ADMIN — BUSPIRONE HYDROCHLORIDE 30 MG: 15 TABLET ORAL at 22:30

## 2023-02-18 RX ADMIN — LACTULOSE 20 G: 20 SOLUTION ORAL at 10:00

## 2023-02-18 RX ADMIN — IPRATROPIUM BROMIDE AND ALBUTEROL SULFATE 1 AMPULE: 2.5; .5 SOLUTION RESPIRATORY (INHALATION) at 20:50

## 2023-02-18 RX ADMIN — SODIUM CHLORIDE: 9 INJECTION, SOLUTION INTRAVENOUS at 03:39

## 2023-02-18 RX ADMIN — ACETAMINOPHEN 650 MG: 325 TABLET ORAL at 15:42

## 2023-02-18 RX ADMIN — BUDESONIDE AND FORMOTEROL FUMARATE DIHYDRATE 2 PUFF: 80; 4.5 AEROSOL RESPIRATORY (INHALATION) at 09:06

## 2023-02-18 RX ADMIN — BUSPIRONE HYDROCHLORIDE 30 MG: 15 TABLET ORAL at 10:11

## 2023-02-18 RX ADMIN — IPRATROPIUM BROMIDE AND ALBUTEROL SULFATE 1 AMPULE: 2.5; .5 SOLUTION RESPIRATORY (INHALATION) at 12:28

## 2023-02-18 RX ADMIN — IPRATROPIUM BROMIDE AND ALBUTEROL SULFATE 1 AMPULE: 2.5; .5 SOLUTION RESPIRATORY (INHALATION) at 08:57

## 2023-02-18 RX ADMIN — IPRATROPIUM BROMIDE AND ALBUTEROL SULFATE 1 AMPULE: 2.5; .5 SOLUTION RESPIRATORY (INHALATION) at 15:14

## 2023-02-18 RX ADMIN — ANTACID TABLETS 500 MG: 500 TABLET, CHEWABLE ORAL at 20:28

## 2023-02-18 RX ADMIN — POLYETHYLENE GLYCOL 3350 17 G: 17 POWDER, FOR SOLUTION ORAL at 10:11

## 2023-02-18 RX ADMIN — APIXABAN 5 MG: 5 TABLET, FILM COATED ORAL at 10:11

## 2023-02-18 RX ADMIN — BUDESONIDE AND FORMOTEROL FUMARATE DIHYDRATE 2 PUFF: 80; 4.5 AEROSOL RESPIRATORY (INHALATION) at 20:50

## 2023-02-18 RX ADMIN — CLOPIDOGREL 75 MG: 75 TABLET, FILM COATED ORAL at 10:11

## 2023-02-18 RX ADMIN — LACTULOSE 20 G: 20 SOLUTION ORAL at 15:43

## 2023-02-18 RX ADMIN — DESMOPRESSIN ACETATE 40 MG: 0.2 TABLET ORAL at 22:09

## 2023-02-18 RX ADMIN — FAMOTIDINE 10 MG: 20 TABLET, FILM COATED ORAL at 22:09

## 2023-02-18 RX ADMIN — Medication 500 ML: at 21:16

## 2023-02-18 RX ADMIN — SENNOSIDES 8.6 MG: 8.6 TABLET, COATED ORAL at 10:14

## 2023-02-18 ASSESSMENT — PAIN SCALES - GENERAL
PAINLEVEL_OUTOF10: 8
PAINLEVEL_OUTOF10: 8

## 2023-02-18 ASSESSMENT — PAIN DESCRIPTION - LOCATION: LOCATION: OTHER (COMMENT)

## 2023-02-18 NOTE — PROGRESS NOTES
[facility]  Internal Medicine Teaching Residency Program  Inpatient Daily Progress Note  ______________________________________________________________________________    Patient: Marito Silva  YOB: 1960   FBA:[id number]    Acct: [de-identified]     Room: 12/18-65  Admit date: 2/17/2023  Today's date: 02/18/23  Number of days in the hospital: 1    SUBJECTIVE   Admitting Diagnosis: HARVEY (acute kidney injury) ([facility] [address]  CC: Acute urinary retention, abdominal pain, severe constipation  Pt examined at bedside. Chart & results reviewed. -Seen and examined at bedside. Hemodynamically stable. Overnight patient retained urine. Bladder scan revealed 630 mL of urine. Patient had to underwent Shook catheterization. Overnight patient had 2-3 BM. We will add on some more laxative. ROS:  Constitutional:  negative for chills, fevers, sweats  Respiratory:  negative for cough, dyspnea on exertion, hemoptysis, shortness of breath, wheezing  Cardiovascular:  negative for chest pain, chest pressure/discomfort, lower extremity edema, palpitations  Gastrointestinal:  negative for abdominal pain, constipation, diarrhea, nausea, vomiting  Neurological:  negative for dizziness, headache  BRIEF HISTORY   The patient is a pleasant 63 y.o. female presents with past medical history significant for COPD exacerbation, type II DM, CKD, longstanding history of unknown constipation for a very long time presented to the ED with 1 day history of acute urinary retention and inability to pass urine. Patient said she has chronic constipation and takes on and off laxatives. But for the past 7 days the constipation have progressively increased to severe level and from last night the patient has been unable to pass any urine. And was complaining of suprapubic abdominal pain. With the chief complaint patient came into the ED and bladder scan revealed 870 mL of retained urine.   Straight cath was done and significant amount of urine were removed in the ED evaluation, patient was also found to be in HARVEY with significantly elevated creatinine level of 2.05.  UA was unremarkable and lactate was completelY normal.     On my evaluation today, patient was ANO x4. Hemodynamically stable with a BP of 111/79. On room air. OBJECTIVE     Vital Signs:  BP (!) 82/40   Pulse 92   Temp 98.7 °F (37.1 °C) (Oral)   Resp 16   Ht 5' 9\" (1.753 m)   Wt 236 lb 6.4 oz (107.2 kg)   SpO2 96%   BMI 34.91 kg/m²     Temp (24hrs), Av.3 °F (36.8 °C), Min:98 °F (36.7 °C), Max:98.7 °F (37.1 °C)    In: 720   Out: 1930 [Urine:1930]    Physical Exam:  Constitutional: This is a well developed, well nourished, 30-34.9 - Obesity Grade I 63y.o. year old female who is alert, oriented, cooperative and in no apparent distress. Head:normocephalic and atraumatic. EENT:  PERRLA. No conjunctival injections. Septum was midline, mucosa was without erythema, exudates or cobblestoning. No thrush was noted. Neck: Supple without thyromegaly. No elevated JVP. Trachea was midline. Respiratory: Chest was symmetrical without dullness to percussion. Breath sounds bilaterally were clear to auscultation. There were no wheezes, rhonchi or rales. There is no intercostal retraction or use of accessory muscles. No egophony noted. Cardiovascular: Regular without murmur, clicks, gallops or rubs. Abdomen: Slightly rounded and soft without organomegaly. No rebound, rigidity or guarding was appreciated. Lymphatic: No lymphadenopathy. Musculoskeletal: Normal curvature of the spine. No gross muscle weakness. Extremities:  No lower extremity edema, ulcerations, tenderness, varicosities or erythema. Muscle size, tone and strength are normal.  No involuntary movements are noted. Skin:  Warm and dry. Good color, turgor and pigmentation. No lesions or scars.   No cyanosis or clubbing  Neurological/Psychiatric: The patient's general behavior, level of consciousness, thought content and emotional status is normal.        Medications:  Scheduled Medications:    lactulose  20 g Oral TID    docusate sodium  100 mg Oral Daily    apixaban  5 mg Oral BID    atorvastatin  40 mg Oral Nightly    clopidogrel  75 mg Oral Daily    metoprolol tartrate  12.5 mg Oral BID    budesonide-formoterol  2 puff Inhalation BID    sodium chloride flush  5-40 mL IntraVENous 2 times per day    senna  1 tablet Oral Daily    ipratropium-albuterol  1 ampule Inhalation Q4H WA    polyethylene glycol  17 g Oral Daily    insulin glargine  20 Units SubCUTAneous Nightly    insulin lispro  0-8 Units SubCUTAneous TID WC    insulin lispro  0-4 Units SubCUTAneous Nightly     Continuous Infusions:    sodium chloride      dextrose      sodium chloride 75 mL/hr at 02/18/23 0339     PRN MedicationsbusPIRone, 30 mg, TID PRN  sodium chloride flush, 5-40 mL, PRN  sodium chloride, , PRN  ondansetron, 4 mg, Q8H PRN   Or  ondansetron, 4 mg, Q6H PRN  acetaminophen, 650 mg, Q6H PRN   Or  acetaminophen, 650 mg, Q6H PRN  glucose, 4 tablet, PRN  dextrose bolus, 125 mL, PRN   Or  dextrose bolus, 250 mL, PRN  glucagon (rDNA), 1 mg, PRN  dextrose, , Continuous PRN        Diagnostic Labs:  CBC:   Recent Labs     02/17/23  1039 02/18/23  0713   WBC 5.9 4.1   RBC 5.32* 4.24   HGB 12.5 10.0*   HCT 41.2 32.9*   MCV 77.4* 77.6*   RDW 17.1* 17.5*    195     BMP:   Recent Labs     02/17/23  1039 02/18/23  0713   * 140   K 4.9 4.4   CL 99 109*   CO2 17* 18*   BUN 47* 35*   CREATININE 2.05* 1.51*     BNP: No results for input(s): BNP in the last 72 hours. PT/INR:   Recent Labs     02/17/23  1039   PROTIME 10.3   INR 1.0     APTT: No results for input(s): APTT in the last 72 hours. CARDIAC ENZYMES: No results for input(s): CKMB, CKMBINDEX, TROPONINI in the last 72 hours.     Invalid input(s): CKTOTAL;3  FASTING LIPID PANEL:  Lab Results   Component Value Date    CHOL 122 06/16/2020    HDL 35 (L) 06/16/2020    TRIG 152 (H) 06/16/2020     LIVER PROFILE:   Recent Labs     02/17/23  1039   AST 18   ALT 17   BILITOT 0.2*   ALKPHOS 111*      MICROBIOLOGY:   Lab Results   Component Value Date/Time    CULTURE NO GROWTH 5 DAYS 07/20/2022 10:02 PM       Imaging:    CT ABDOMEN PELVIS WO CONTRAST Additional Contrast? None    Result Date: 2/17/2023  1. No evidence of renal stones or obstructive uropathy. 2. Normal appendix. Diverticulosis but no acute diverticulitis. Constipation and stool impaction in the rectum. 3. Mild fatty liver but no focal disease. No evidence of gallbladder or biliary disease. ASSESSMENT & PLAN     ASSESSMENT / PLAN:     Principal Problem:    HARVEY (acute kidney injury) ([facility] [address]  Resolved Problems:    * No resolved hospital problems. *   1. Acute urinary retention:  - Likely due to underlying severe constipation  - Bladder scan revealed 870 mL of retained urine in the ED, straight cath was done  - Continue intermittent straight cath  - Patient received soapsuds enema in the ED and had 2 bowel movement  - Currently not complaining of urinary retention like symptoms  - Continue GlycoLax for severe constipation     2. HARVEY on CKD stage IIIb:  - Likely precipitated by underlying acute urinary retention  - Baseline creatinine 1.4  - Current creatinine 2.05  - Avoid all nephrotoxic agent  - Nephrology on board, appreciate recommendation  - HARVEY panel ordered  - Nephro recommendation to be followed     3. Chronic constipation:  - Patient has longstanding history of constipation, TSH ordered  - CT abdomen showed diverticulosis most likely the result of chronic constipation  - Patient received soapsuds enema in the ED, continue GlycoLax  - CT also showed impacted stool in the rectum  - If needed we will do manual disimpaction     4.   COPD:  - Not on exacerbation, on room air, does not use any oxygen at home  - Mild wheezing present on auscultation  - Continue RT bronchodilator protocol with DuoNeb every 4 hour     5. Atrial fibrillation:  - QNF0OR2-NBUr score 5  - Rate controlled  - Continue Eliquis  - Continue Lopressor 12.5 AS rate control medication     6. Type II DM without complication:  -Last HAC5A 9.7 on 12/14/2022  - POC glucose check  - Hypoglycemia protocol in place  - Takes Trulicity as home med     7. Combined systolic and diastolic heart failure:  - Last echo on 12/14/2022 showed LVEF of more than 55%  - Patient has significant past medical history of coronary artery disease with 2 stent placement  - Last cardiac cath on 10/4/2021 showed 90% stenosis of the mid RCA which was stented, on  Plavix  - Takes Lasix 40 mg orally as home med     8. Dyslipidemia:  - Triglyceride 152 and last lipid panel  - Continue Lipitor 40 mg oral daily      DVT ppx : Eliquis  GI ppx: Not on any GI prophylaxis    PT/OT: Consulted  Discharge Planning / SW:  consulted    Lorie Escobar MD  Internal Medicine Resident, PGY-1  [facility]; [place], [place]  2/18/2023, 10:52 AM    Attending Physician Statement  I have discussed the care of Erick Marcano and I have examined the patient myselft and taken ros and hpi , including pertinent history and exam findings,  with the resident. I have reviewed the key elements of all parts of the encounter with the resident. I agree with the assessment, plan and orders as documented by the resident.     Will repeat KUB  Urology consult for urinary retention  Continue bowel regimen    Electronically signed by Tam Guevara MD

## 2023-02-18 NOTE — PROGRESS NOTES
Physical Therapy        Physical Therapy Cancel Note      DATE: 2023    NAME: Aisha Cline  MRN: [id number]   : 1960      Patient not seen this date for Physical Therapy due to:    Per RN, pt too anxious at this time, will not be able to participate. Will check tomorrow.       Electronically signed by John Aiken PT on 2023 at 10:42 AM

## 2023-02-18 NOTE — FLOWSHEET NOTE
[id number] Dr Jamie Loya contacted for clarification of ordered fluid restriction of 1800 ml, patient taking oral fluids and current IVF order for 50 ml/hr. Received order for IVF rate change to 75 ml/hour.   0402 Patient c/o sensation to void, bladder scan 780 ml, jeronimo inserted per order if required to straight cath a second time. 600 ml urine returned, specimen sent, jeronimo remains in per order to maintain if residual >300 ml. Patient tolerated well. Dr Jamie Loya notified.

## 2023-02-18 NOTE — CONSULTS
[facility]    [facility] Consultation    Patient:  Clem Moran  MRN: [id number]  YOB: 1960  Consult requested from Sadi Storey MD  for purpose of evaluation of urinary retention. CHIEF COMPLAINT: Acute urinary retention    HISTORY OF PRESENT ILLNESS:   The patient is a 63 y.o. female who presents with acute urinary retention yesterday. She came in with progressively worsening constipation for the past 7 days to the ER yesterday and was complaining of urinary retention, was straight cathed for 870 cc in the ER. She also received soapsuds enemas in the ER and is on scheduled bowel regimen now. Since she has been able to void 250 to 350 cc, still retaining about 650 - 750 Cc. Shook Catheter Was Placed for 650 cc today. She was found to be in acute kidney injury with creatinine up to 2.01, currently 1.5 this AM.  She reports having similar issues about 4 years ago when she was constipated. He has not seen a urologist in the past.  No history of gross hematuria, urolithiasis, UTIs and incontinence in the past.        Patient's old records, notes and chart reviewed and summarized above. Past Medical History:    Past Medical History:   Diagnosis Date    Acute on chronic systolic CHF (congestive heart failure) ([address] 12/20/2021    Asthma     Blood circulation, collateral     CAD (coronary artery disease)     COPD (chronic obstructive pulmonary disease) (HCC)     COPD exacerbation ([address] 12/18/2021    Diabetes mellitus ([address]     Hyperlipidemia     Hypertension     Ischemic colitis ([address] 10/28/2021    Movement disorder     B/L torn rotator cuff.     Neuropathy     PAD (peripheral artery disease) ([address]     Tobacco abuse 11/19/2016    Tobacco abuse counseling 7/25/2021       Past Surgical History:    Past Surgical History:   Procedure Laterality Date    CORONARY ANGIOPLASTY WITH STENT PLACEMENT      x2    CORONARY ANGIOPLASTY WITH STENT PLACEMENT  02/11/2019    [facility]    INNER EAR SURGERY Right     TONSILLECTOMY         Medications:    Scheduled Meds:   lactulose  20 g Oral TID    docusate sodium  100 mg Oral Daily    apixaban  5 mg Oral BID    atorvastatin  40 mg Oral Nightly    clopidogrel  75 mg Oral Daily    metoprolol tartrate  12.5 mg Oral BID    budesonide-formoterol  2 puff Inhalation BID    sodium chloride flush  5-40 mL IntraVENous 2 times per day    senna  1 tablet Oral Daily    ipratropium-albuterol  1 ampule Inhalation Q4H WA    polyethylene glycol  17 g Oral Daily    insulin glargine  20 Units SubCUTAneous Nightly    insulin lispro  0-8 Units SubCUTAneous TID WC    insulin lispro  0-4 Units SubCUTAneous Nightly     Continuous Infusions:   sodium chloride      dextrose      sodium chloride 75 mL/hr at 02/18/23 0339     PRN Meds:.busPIRone, sodium chloride flush, sodium chloride, ondansetron **OR** ondansetron, acetaminophen **OR** acetaminophen, glucose, dextrose bolus **OR** dextrose bolus, glucagon (rDNA), dextrose    Allergies:  Codeine and Morphine    Social History:    Social History     Socioeconomic History    Marital status:       Spouse name: Not on file    Number of children: Not on file    Years of education: Not on file    Highest education level: Not on file   Occupational History    Not on file   Tobacco Use    Smoking status: Every Day     Packs/day: 0.50     Years: 45.00     Pack years: 22.50     Types: Cigarettes    Smokeless tobacco: Former   Substance and Sexual Activity    Alcohol use: No    Drug use: Yes     Types: Marijuana [place])    Sexual activity: Never   Other Topics Concern    Not on file   Social History Narrative    Not on file     Social Determinants of Health     Financial Resource Strain: Not on file   Food Insecurity: Not on file   Transportation Needs: Not on file   Physical Activity: Not on file   Stress: Not on file   Social Connections: Not on file   Intimate Partner Violence: Not on file   Housing Stability: Not on file       Family History:    Family History   Problem Relation Age of Onset    Hypertension Mother     Hypertension Father     Diabetes Sister     Diabetes Brother     Stroke Brother        Physical Exam:      This a 63 y.o. female   Patient Vitals for the past 24 hrs:   BP Temp Temp src Pulse Resp SpO2 Height Weight   02/18/23 0857 -- -- -- 92 16 96 % -- --   02/18/23 0820 (!) 82/40 98.7 °F (37.1 °C) Oral 85 18 94 % -- --   02/17/23 2041 101/61 98.1 °F (36.7 °C) Oral 71 22 98 % -- --   02/17/23 2023 (!) 84/44 -- -- -- -- 100 % -- --   02/17/23 1730 111/79 98 °F (36.7 °C) Oral 81 20 98 % 5' 9\" (1.753 m) 236 lb 6.4 oz (107.2 kg)   02/17/23 1654 102/80 -- -- 76 18 99 % -- --     Constitutional: Patient in no acute distress. Neuro: Alert and oriented to person, place and time. Psych: mood and affect normal  Abdomen: Soft, non-tender, non-distended with no CVA, flank pain. : Jeronimo catheter in situ with clear yellow urine    LABS:   Recent Labs     02/17/23  1039 02/18/23  0713   WBC 5.9 4.1   HGB 12.5 10.0*   HCT 41.2 32.9*   MCV 77.4* 77.6*    195     Recent Labs     02/17/23  1039 02/18/23  0713   * 140   K 4.9 4.4   CL 99 109*   CO2 17* 18*   BUN 47* 35*   CREATININE 2.05* 1.51*       Additional Lab/culture results:    Urinalysis:   Recent Labs     02/17/23  1201   COLORU Yellow   PHUR 5.5   SPECGRAV 1.015   LEUKOCYTESUR NEGATIVE   UROBILINOGEN Normal   BILIRUBINUR NEGATIVE        -----------------------------------------------------------------  Imaging Results: 2/17/23 CT abdomen pelvis independently reviewed and radiology report verified demonstrating  No evidence of stone/obstructive uropathy        Assessment and Plan   Impression:    Marito Silva is a 63-year-old female with acute urinary retention  Associated with constipation  Acute kidney injury, resolving    Plan:   Remove jeronimo and intermittent straight catheterization for PVR > 400 cc.    Continue scheduled bowel regimen  Avoid narcotic, anticholinergic medications  Encourage ambulation as able  To be discussed with Dr. Wili Joshi MD  10:20 AM 2/18/2023  I have reviewed the history above and agree. I have repeated the key portions of the physical exam and concur with the resident's findings. I have reviewed all laboratory findings and imaging reports/films. I agree with the plan as noted above.     Electronically signed by Jose Serrato MD on 2/19/2023 at 10:53 AM

## 2023-02-18 NOTE — FLOWSHEET NOTE
Patient voiding, unable to obtain urine sample for current lab orders because stool mixed with urine. Educated patient regarding need for urine specimen and nephrology consult. Verbalized understanding. Clean specimen collection containers placed in bathroom.

## 2023-02-18 NOTE — PROGRESS NOTES
Patient is 63-year-old female with multiple comorbidities including history of hypertension, diabetes, A-fib presented to the ER with unable to urinate and abdominal pain.   Has been constipated from last 1 week  Abdominal CT was done which showed constipation and stool impaction  Patient was retaining urine  No evidence of any obstructive uropathy in the CT  Has HARVEY on CKD stage IIIb creatinine of 2  Given bolus of normal saline in the ER  Gentle hydration  Watch for sign of volume overload  HbA1c was done 2 months ago was 9.7  Resume home dose of Lantus and sliding scale  Give enema for constipation and stool impaction  If no improvement will need manual dense impaction or surgical consult  Did have history of constipation for years ago  Not on any narcotics  States that otherwise she is pretty mobile, independent in ADL  Vitals have been stable  Hold onto lisinopril  Check UA  Check TSH  Monitors I's and O's    Full dictation to follow

## 2023-02-19 LAB
ABSOLUTE EOS #: 0 K/UL (ref 0–0.4)
ABSOLUTE IMMATURE GRANULOCYTE: 0 K/UL (ref 0–0.3)
ABSOLUTE LYMPH #: 0.63 K/UL (ref 1–4.8)
ABSOLUTE MONO #: 0.28 K/UL (ref 0.1–0.8)
ANION GAP SERPL CALCULATED.3IONS-SCNC: 11 MMOL/L (ref 9–17)
BACTERIA: ABNORMAL
BASOPHILS # BLD: 0 % (ref 0–2)
BASOPHILS ABSOLUTE: 0 K/UL (ref 0–0.2)
BILIRUBIN URINE: NEGATIVE
BUN SERPL-MCNC: 21 MG/DL (ref 8–23)
CALCIUM SERPL-MCNC: 8.3 MG/DL (ref 8.6–10.4)
CHLORIDE SERPL-SCNC: 109 MMOL/L (ref 98–107)
CO2 SERPL-SCNC: 18 MMOL/L (ref 20–31)
COLOR: YELLOW
CREAT SERPL-MCNC: 1.16 MG/DL (ref 0.5–0.9)
CRYSTALS, UA: ABNORMAL /HPF
CRYSTALS, UA: ABNORMAL /HPF
EOSINOPHILS RELATIVE PERCENT: 0 % (ref 1–4)
EPITHELIAL CELLS UA: ABNORMAL /HPF (ref 0–5)
GFR SERPL CREATININE-BSD FRML MDRD: 54 ML/MIN/1.73M2
GLUCOSE BLD-MCNC: 114 MG/DL (ref 65–105)
GLUCOSE BLD-MCNC: 123 MG/DL (ref 65–105)
GLUCOSE BLD-MCNC: 160 MG/DL (ref 65–105)
GLUCOSE BLD-MCNC: 164 MG/DL (ref 65–105)
GLUCOSE BLD-MCNC: 173 MG/DL (ref 65–105)
GLUCOSE SERPL-MCNC: 129 MG/DL (ref 70–99)
GLUCOSE UR STRIP.AUTO-MCNC: NEGATIVE MG/DL
HCT VFR BLD AUTO: 32 % (ref 36.3–47.1)
HGB BLD-MCNC: 9.6 G/DL (ref 11.9–15.1)
IMMATURE GRANULOCYTES: 0 %
KETONES UR STRIP.AUTO-MCNC: NEGATIVE MG/DL
LEUKOCYTE ESTERASE UR QL STRIP.AUTO: ABNORMAL
LYMPHOCYTES # BLD: 18 % (ref 24–44)
MCH RBC QN AUTO: 23.5 PG (ref 25.2–33.5)
MCHC RBC AUTO-ENTMCNC: 30 G/DL (ref 28.4–34.8)
MCV RBC AUTO: 78.2 FL (ref 82.6–102.9)
MONOCYTES # BLD: 8 % (ref 1–7)
MORPHOLOGY: ABNORMAL
MORPHOLOGY: ABNORMAL
NITRITE UR QL STRIP.AUTO: NEGATIVE
NRBC AUTOMATED: 0 PER 100 WBC
PDW BLD-RTO: 17.7 % (ref 11.8–14.4)
PLATELET # BLD AUTO: 174 K/UL (ref 138–453)
PMV BLD AUTO: 10.8 FL (ref 8.1–13.5)
POTASSIUM SERPL-SCNC: 4.2 MMOL/L (ref 3.7–5.3)
PROT UR STRIP.AUTO-MCNC: 5.5 MG/DL (ref 5–8)
PROT UR STRIP.AUTO-MCNC: NEGATIVE MG/DL
RBC # BLD: 4.09 M/UL (ref 3.95–5.11)
RBC CLUMPS #/AREA URNS AUTO: ABNORMAL /HPF (ref 0–2)
SEG NEUTROPHILS: 74 % (ref 36–66)
SEGMENTED NEUTROPHILS ABSOLUTE COUNT: 2.59 K/UL (ref 1.8–7.7)
SODIUM SERPL-SCNC: 138 MMOL/L (ref 135–144)
SPECIFIC GRAVITY UA: 1.01 (ref 1–1.03)
TURBIDITY: CLEAR
URINE HGB: ABNORMAL
UROBILINOGEN, URINE: NORMAL
WBC # BLD AUTO: 3.5 K/UL (ref 3.5–11.3)
WBC UA: ABNORMAL /HPF (ref 0–5)

## 2023-02-19 PROCEDURE — 81001 URINALYSIS AUTO W/SCOPE: CPT

## 2023-02-19 PROCEDURE — 85025 COMPLETE CBC W/AUTO DIFF WBC: CPT

## 2023-02-19 PROCEDURE — 6370000000 HC RX 637 (ALT 250 FOR IP)

## 2023-02-19 PROCEDURE — 36415 COLL VENOUS BLD VENIPUNCTURE: CPT

## 2023-02-19 PROCEDURE — 99233 SBSQ HOSP IP/OBS HIGH 50: CPT | Performed by: INTERNAL MEDICINE

## 2023-02-19 PROCEDURE — 94640 AIRWAY INHALATION TREATMENT: CPT

## 2023-02-19 PROCEDURE — 6370000000 HC RX 637 (ALT 250 FOR IP): Performed by: STUDENT IN AN ORGANIZED HEALTH CARE EDUCATION/TRAINING PROGRAM

## 2023-02-19 PROCEDURE — 51798 US URINE CAPACITY MEASURE: CPT

## 2023-02-19 PROCEDURE — 83520 IMMUNOASSAY QUANT NOS NONAB: CPT

## 2023-02-19 PROCEDURE — 84156 ASSAY OF PROTEIN URINE: CPT

## 2023-02-19 PROCEDURE — 80048 BASIC METABOLIC PNL TOTAL CA: CPT

## 2023-02-19 PROCEDURE — 82947 ASSAY GLUCOSE BLOOD QUANT: CPT

## 2023-02-19 PROCEDURE — 2580000003 HC RX 258

## 2023-02-19 PROCEDURE — 1200000000 HC SEMI PRIVATE

## 2023-02-19 PROCEDURE — 97530 THERAPEUTIC ACTIVITIES: CPT

## 2023-02-19 PROCEDURE — 81050 URINALYSIS VOLUME MEASURE: CPT

## 2023-02-19 PROCEDURE — 6360000002 HC RX W HCPCS

## 2023-02-19 PROCEDURE — 97161 PT EVAL LOW COMPLEX 20 MIN: CPT

## 2023-02-19 RX ORDER — CYCLOBENZAPRINE HCL 5 MG
5 TABLET ORAL NIGHTLY
Status: DISCONTINUED | OUTPATIENT
Start: 2023-02-19 | End: 2023-02-20 | Stop reason: HOSPADM

## 2023-02-19 RX ORDER — HYDROCORTISONE 25 MG/G
CREAM TOPICAL 2 TIMES DAILY
Status: DISCONTINUED | OUTPATIENT
Start: 2023-02-19 | End: 2023-02-20 | Stop reason: HOSPADM

## 2023-02-19 RX ORDER — BISACODYL 10 MG
10 SUPPOSITORY, RECTAL RECTAL DAILY PRN
Status: DISCONTINUED | OUTPATIENT
Start: 2023-02-19 | End: 2023-02-20 | Stop reason: HOSPADM

## 2023-02-19 RX ADMIN — BUSPIRONE HYDROCHLORIDE 30 MG: 15 TABLET ORAL at 20:15

## 2023-02-19 RX ADMIN — SODIUM CHLORIDE, PRESERVATIVE FREE 10 ML: 5 INJECTION INTRAVENOUS at 21:25

## 2023-02-19 RX ADMIN — CLOPIDOGREL 75 MG: 75 TABLET, FILM COATED ORAL at 08:27

## 2023-02-19 RX ADMIN — HYDROCORTISONE: 25 CREAM TOPICAL at 16:34

## 2023-02-19 RX ADMIN — APIXABAN 5 MG: 5 TABLET, FILM COATED ORAL at 08:28

## 2023-02-19 RX ADMIN — SENNOSIDES 8.6 MG: 8.6 TABLET, COATED ORAL at 08:27

## 2023-02-19 RX ADMIN — DOCUSATE SODIUM 100 MG: 100 CAPSULE ORAL at 08:28

## 2023-02-19 RX ADMIN — ACETAMINOPHEN 650 MG: 325 TABLET ORAL at 02:58

## 2023-02-19 RX ADMIN — IPRATROPIUM BROMIDE AND ALBUTEROL SULFATE 1 AMPULE: 2.5; .5 SOLUTION RESPIRATORY (INHALATION) at 12:54

## 2023-02-19 RX ADMIN — FAMOTIDINE 10 MG: 20 TABLET, FILM COATED ORAL at 20:19

## 2023-02-19 RX ADMIN — BUDESONIDE AND FORMOTEROL FUMARATE DIHYDRATE 2 PUFF: 80; 4.5 AEROSOL RESPIRATORY (INHALATION) at 07:51

## 2023-02-19 RX ADMIN — METOPROLOL TARTRATE 12.5 MG: 25 TABLET ORAL at 21:25

## 2023-02-19 RX ADMIN — APIXABAN 5 MG: 5 TABLET, FILM COATED ORAL at 21:26

## 2023-02-19 RX ADMIN — IPRATROPIUM BROMIDE AND ALBUTEROL SULFATE 1 AMPULE: 2.5; .5 SOLUTION RESPIRATORY (INHALATION) at 07:50

## 2023-02-19 RX ADMIN — IPRATROPIUM BROMIDE AND ALBUTEROL SULFATE 1 AMPULE: 2.5; .5 SOLUTION RESPIRATORY (INHALATION) at 16:04

## 2023-02-19 RX ADMIN — FAMOTIDINE 10 MG: 20 TABLET, FILM COATED ORAL at 08:28

## 2023-02-19 RX ADMIN — IPRATROPIUM BROMIDE AND ALBUTEROL SULFATE 1 AMPULE: 2.5; .5 SOLUTION RESPIRATORY (INHALATION) at 20:41

## 2023-02-19 RX ADMIN — CYCLOBENZAPRINE HYDROCHLORIDE 5 MG: 5 TABLET, FILM COATED ORAL at 02:58

## 2023-02-19 RX ADMIN — HYOSCYAMINE SULFATE 125 MCG: 0.12 TABLET ORAL at 00:23

## 2023-02-19 RX ADMIN — ONDANSETRON 4 MG: 2 INJECTION INTRAMUSCULAR; INTRAVENOUS at 09:52

## 2023-02-19 RX ADMIN — BUDESONIDE AND FORMOTEROL FUMARATE DIHYDRATE 2 PUFF: 80; 4.5 AEROSOL RESPIRATORY (INHALATION) at 20:50

## 2023-02-19 RX ADMIN — POLYETHYLENE GLYCOL 3350 17 G: 17 POWDER, FOR SOLUTION ORAL at 08:30

## 2023-02-19 RX ADMIN — INSULIN GLARGINE 20 UNITS: 100 INJECTION, SOLUTION SUBCUTANEOUS at 22:09

## 2023-02-19 RX ADMIN — LACTULOSE 20 G: 20 SOLUTION ORAL at 08:30

## 2023-02-19 RX ADMIN — SODIUM CHLORIDE, PRESERVATIVE FREE 10 ML: 5 INJECTION INTRAVENOUS at 08:28

## 2023-02-19 RX ADMIN — BUSPIRONE HYDROCHLORIDE 30 MG: 15 TABLET ORAL at 08:39

## 2023-02-19 RX ADMIN — ANTACID TABLETS 500 MG: 500 TABLET, CHEWABLE ORAL at 11:37

## 2023-02-19 RX ADMIN — DESMOPRESSIN ACETATE 40 MG: 0.2 TABLET ORAL at 21:26

## 2023-02-19 ASSESSMENT — PAIN DESCRIPTION - LOCATION
LOCATION: OTHER (COMMENT)
LOCATION: BACK

## 2023-02-19 ASSESSMENT — PAIN SCALES - GENERAL
PAINLEVEL_OUTOF10: 6
PAINLEVEL_OUTOF10: 6

## 2023-02-19 NOTE — PROGRESS NOTES
Perfect served the Doc of the patients vitals \" BP = 88/57 right now MAP = 67, pulse = 81 \" . Doc said to monitor.

## 2023-02-19 NOTE — PROGRESS NOTES
Perfect served the Doc of patient's pain of 8 at the back.  Fentanyl 25 mcg ordered, but was not given because of allergy to narcotics and low BP

## 2023-02-19 NOTE — PROGRESS NOTES
[facility]  Internal Medicine Teaching Residency Program  Inpatient Daily Progress Note  ______________________________________________________________________________    Patient: Jc Royal  YOB: 1960   QAA:[id number]    Acct: [de-identified]     Room: 12/18-65  Admit date: 2/17/2023  Today's date: 02/19/23  Number of days in the hospital: 2    SUBJECTIVE   Admitting Diagnosis: HARVEY (acute kidney injury) ([facility]  CC: Acute urinary retention, abdominal pain, severe constipation  Pt examined at bedside. Chart & results reviewed. -Seen and examined at bedside. Overnight patient blood pressure was on the softer side. Did receive 1 dose of midodrine. Overnight patient complained of severe pain upon insertion of the Shook catheter. This morning the patient remove the Shook catheter. Order for bladder scan and straight cath and PVR is already in. Patient did receive multiple boluses. We will order soapsuds enema for not being able to have a bowel movement. HARVEY is improving. Creatinine 1. 16. ROS:  Constitutional:  negative for chills, fevers, sweats  Respiratory:  negative for cough, dyspnea on exertion, hemoptysis, shortness of breath, wheezing  Cardiovascular:  negative for chest pain, chest pressure/discomfort, lower extremity edema, palpitations  Gastrointestinal:  negative for abdominal pain, constipation, diarrhea, nausea, vomiting  Neurological:  negative for dizziness, headache  BRIEF HISTORY   The patient is a pleasant 63 y.o. female presents with past medical history significant for COPD exacerbation, type II DM, CKD, longstanding history of unknown constipation for a very long time presented to the ED with 1 day history of acute urinary retention and inability to pass urine. Patient said she has chronic constipation and takes on and off laxatives.   But for the past 7 days the constipation have progressively increased to severe level and from last night the patient has been unable to pass any urine. And was complaining of suprapubic abdominal pain. With the chief complaint patient came into the ED and bladder scan revealed 870 mL of retained urine. Straight cath was done and significant amount of urine were removed in the ED evaluation, patient was also found to be in HARVEY with significantly elevated creatinine level of 2.05.  UA was unremarkable and lactate was completelY normal.     On my evaluation today, patient was ANO x4. Hemodynamically stable with a BP of 111/79. On room air. OBJECTIVE     Vital Signs:  BP (!) 93/59   Pulse 86   Temp 97.3 °F (36.3 °C) (Axillary)   Resp 18   Ht 5' 9\" (1.753 m)   Wt 236 lb 6.4 oz (107.2 kg)   SpO2 98%   BMI 34.91 kg/m²     Temp (24hrs), Av.9 °F (36.6 °C), Min:97.3 °F (36.3 °C), Max:98.5 °F (36.9 °C)    In: 1000   Out: 1750 [Urine:1650]    Physical Exam:  Constitutional: This is a well developed, well nourished, 30-34.9 - Obesity Grade I 63y.o. year old female who is alert, oriented, cooperative and in no apparent distress. Head:normocephalic and atraumatic. EENT:  PERRLA. No conjunctival injections. Septum was midline, mucosa was without erythema, exudates or cobblestoning. No thrush was noted. Neck: Supple without thyromegaly. No elevated JVP. Trachea was midline. Respiratory: Chest was symmetrical without dullness to percussion. Breath sounds bilaterally were clear to auscultation. There were no wheezes, rhonchi or rales. There is no intercostal retraction or use of accessory muscles. No egophony noted. Cardiovascular: Regular without murmur, clicks, gallops or rubs. Abdomen: Slightly rounded and soft without organomegaly. No rebound, rigidity or guarding was appreciated. Lymphatic: No lymphadenopathy. Musculoskeletal: Normal curvature of the spine. No gross muscle weakness. Extremities:  No lower extremity edema, ulcerations, tenderness, varicosities or erythema. Muscle size, tone and strength are normal.  No involuntary movements are noted. Skin:  Warm and dry. Good color, turgor and pigmentation. No lesions or scars.   No cyanosis or clubbing  Neurological/Psychiatric: The patient's general behavior, level of consciousness, thought content and emotional status is normal.        Medications:  Scheduled Medications:    cyclobenzaprine  5 mg Oral Nightly    lactulose  20 g Oral TID    docusate sodium  100 mg Oral Daily    sodium chloride  500 mL IntraVENous Once    famotidine  10 mg Oral BID    fentanNYL  25 mcg IntraVENous Once    apixaban  5 mg Oral BID    atorvastatin  40 mg Oral Nightly    clopidogrel  75 mg Oral Daily    metoprolol tartrate  12.5 mg Oral BID    budesonide-formoterol  2 puff Inhalation BID    sodium chloride flush  5-40 mL IntraVENous 2 times per day    senna  1 tablet Oral Daily    ipratropium-albuterol  1 ampule Inhalation Q4H WA    polyethylene glycol  17 g Oral Daily    insulin glargine  20 Units SubCUTAneous Nightly    insulin lispro  0-8 Units SubCUTAneous TID WC    insulin lispro  0-4 Units SubCUTAneous Nightly     Continuous Infusions:    sodium chloride      dextrose      sodium chloride 100 mL/hr at 02/19/23 0355     PRN Medicationsbisacodyl, 10 mg, Daily PRN  calcium carbonate, 500 mg, TID PRN  hyoscyamine, 125 mcg, Q4H PRN  busPIRone, 30 mg, TID PRN  sodium chloride flush, 5-40 mL, PRN  sodium chloride, , PRN  ondansetron, 4 mg, Q8H PRN   Or  ondansetron, 4 mg, Q6H PRN  acetaminophen, 650 mg, Q6H PRN   Or  acetaminophen, 650 mg, Q6H PRN  glucose, 4 tablet, PRN  dextrose bolus, 125 mL, PRN   Or  dextrose bolus, 250 mL, PRN  glucagon (rDNA), 1 mg, PRN  dextrose, , Continuous PRN      Diagnostic Labs:  CBC:   Recent Labs     02/17/23  1039 02/18/23  0713 02/19/23  0537   WBC 5.9 4.1 3.5   RBC 5.32* 4.24 4.09   HGB 12.5 10.0* 9.6*   HCT 41.2 32.9* 32.0*   MCV 77.4* 77.6* 78.2*   RDW 17.1* 17.5* 17.7*    195 174       BMP:   Recent Labs 02/17/23  1039 02/18/23  0713 02/19/23  0537   * 140 138   K 4.9 4.4 4.2   CL 99 109* 109*   CO2 17* 18* 18*   BUN 47* 35* 21   CREATININE 2.05* 1.51* 1.16*       BNP: No results for input(s): BNP in the last 72 hours. PT/INR:   Recent Labs     02/17/23  1039   PROTIME 10.3   INR 1.0       APTT: No results for input(s): APTT in the last 72 hours. CARDIAC ENZYMES: No results for input(s): CKMB, CKMBINDEX, TROPONINI in the last 72 hours. Invalid input(s): CKTOTAL;3  FASTING LIPID PANEL:  Lab Results   Component Value Date    CHOL 122 06/16/2020    HDL 35 (L) 06/16/2020    TRIG 152 (H) 06/16/2020     LIVER PROFILE:   Recent Labs     02/17/23  1039   AST 18   ALT 17   BILITOT 0.2*   ALKPHOS 111*        MICROBIOLOGY:   Lab Results   Component Value Date/Time    CULTURE NO GROWTH 12 HOURS 02/18/2023 10:15 PM       Imaging:    CT ABDOMEN PELVIS WO CONTRAST Additional Contrast? None    Result Date: 2/17/2023  1. No evidence of renal stones or obstructive uropathy. 2. Normal appendix. Diverticulosis but no acute diverticulitis. Constipation and stool impaction in the rectum. 3. Mild fatty liver but no focal disease. No evidence of gallbladder or biliary disease. ASSESSMENT & PLAN     ASSESSMENT / PLAN:     Principal Problem:    HARVEY (acute kidney injury) ([facility]  Resolved Problems:    * No resolved hospital problems. *   1. Acute urinary retention:  - Likely due to underlying severe constipation  - Bladder scan revealed 870 mL of retained urine in the ED, straight cath was done  - Shook was removed by patient today continue intermittent straight cath  - Patient received soapsuds enema in the ED and had 2 bowel movement  - Currently not complaining of urinary retention like symptoms  - Continue GlycoLax, Senokot, bisacodyl for constipation  - We will order soapsuds enema  - Urology on board, following recs  - Shook's were taken out by patient this a.m.      2.  HARVEY on CKD stage IIIb:  - Likely precipitated by underlying acute urinary retention  - Baseline creatinine 1.4  - Current creatinine 1.16  - Avoid all nephrotoxic agent  - Nephrology on board, appreciate recommendation  - Following recs     3. Chronic constipation:  - Patient has longstanding history of constipation, TSH normal  - CT abdomen showed diverticulosis most likely the result of chronic constipation  - Patient received soapsuds enema in the ED, continue GlycoLax, bisacodyl, Senokot  - CT also showed impacted stool in the rectum  - If needed we will do manual disimpaction  - X-ray KUB unremarkable     4. COPD:  - Not on exacerbation, on room air, does not use any oxygen at home  - Mild wheezing present on auscultation  - Continue RT bronchodilator protocol with DuoNeb every 4 hour     5. Atrial fibrillation:  - [id number] score 5  - Rate controlled  - Continue Eliquis  - Continue Lopressor 12.5 AS rate control medication     6. Type II DM without complication:  -Last CRQ5G 9.7 on 12/14/2022  - POC glucose check  - Hypoglycemia protocol in place  - Takes Trulicity as home med     7. Combined systolic and diastolic heart failure:  - Last echo on 12/14/2022 showed LVEF of more than 55%  - Patient has significant past medical history of coronary artery disease with 2 stent placement  - Last cardiac cath on 10/4/2021 showed 90% stenosis of the mid RCA which was stented, on  Plavix  - Takes Lasix 40 mg orally as home med     8. Dyslipidemia:  - Triglyceride 152 and last lipid panel  - Continue Lipitor 40 mg oral daily      DVT ppx : Eliquis  GI ppx: Not on any GI prophylaxis    PT/OT: Consulted  Discharge Planning / SW:  consulted    Eric Sam MD  Internal Medicine Resident, PGY-1  [address] [place] [address];  [place], [place]  2/19/2023, 11:13 AM    Attending Physician Statement  I have discussed the care of Maria Guadalupe Marc and I have examined the patient myselft and taken ros and hpi , including pertinent history and exam findings,  with the resident. I have reviewed the key elements of all parts of the encounter with the resident. I agree with the assessment, plan and orders as documented by the resident.       Electronically signed by Doe Torres MD

## 2023-02-19 NOTE — PROGRESS NOTES
Physical Therapy  Facility/Department: [address] ORTHO/MED SURG  Physical Therapy Initial Assessment    Name: Erick Marcano  : 1960  MRN: [id number]  Date of Service: 2023  Chief Complaint   Patient presents with    Urinary Frequency    Dysuria       Discharge Recommendations:  Therapy recommended at discharge   PT Equipment Recommendations  Equipment Needed: No      Patient Diagnosis(es): The primary encounter diagnosis was HARVEY (acute kidney injury) ([facility] A diagnosis of Urinary retention was also pertinent to this visit. Past Medical History:  has a past medical history of Acute on chronic systolic CHF (congestive heart failure) ([address] Asthma, Blood circulation, collateral, CAD (coronary artery disease), COPD (chronic obstructive pulmonary disease) ([address] COPD exacerbation ([facility] [address] Diabetes mellitus ([address] Hyperlipidemia, Hypertension, Ischemic colitis ([facility] [address] Movement disorder, Neuropathy, PAD (peripheral artery disease) ([facility] [address] Tobacco abuse, and Tobacco abuse counseling. Past Surgical History:  has a past surgical history that includes Coronary angioplasty with stent; Tonsillectomy; Inner ear surgery (Right); and Coronary angioplasty with stent (2019). Assessment   Body Structures, Functions, Activity Limitations Requiring Skilled Therapeutic Intervention: Decreased endurance;Decreased functional mobility ; Decreased balance  Assessment: pt ambulates 20' to bathroom with no AD and CGA. pt gait appears steady, with no LOB  Therapy Prognosis: Good  Decision Making: Low Complexity  Requires PT Follow-Up: Yes  Activity Tolerance  Activity Tolerance: Patient tolerated evaluation without incident     Plan   Physcial Therapy Plan  General Plan: 3-5 times per week  Current Treatment Recommendations: Strengthening, Balance training, Gait training, Stair training, Neuromuscular re-education, Therapeutic activities  Safety Devices  Type of Devices:  All fall risk precautions in place, Call light within reach, Gait belt, Nurse notified (pt in chair at PT exit with needs in reach and RN aware)     Restrictions  Restrictions/Precautions  Required Braces or Orthoses?: No  Position Activity Restriction  Other position/activity restrictions: none     Subjective   Pain: 0/10 just heart burn  General  Chart Reviewed: Yes  Patient assessed for rehabilitation services?: Yes  Family / Caregiver Present: Yes  Follows Commands: Within Functional Limits  General Comment  Comments: per RN pt okay for PT and pt agreeable for PT  Subjective  Subjective: reports no pain just severe heart burn, RN aware and pt given TUMs         Social/Functional History  Social/Functional History  Lives With: Family (son and 3 cats)  Type of Home: House (duplex son lives upstairs)  Home Layout: Two level (duplex (lives on lower level))  Home Access: Stairs to enter without rails  Entrance Stairs - Number of Steps: 3  Bathroom Shower/Tub: Tub/Shower unit, Shower chair without back  Bathroom Toilet: Standard (with support)  Bathroom Accessibility: Not accessible  Home Equipment:  (no DME prior to hospital admission)  Has the patient had two or more falls in the past year or any fall with injury in the past year?: No  Receives Help From: Family  Homemaking Assistance:  (son does grocery shopping)  Ambulation Assistance: Independent  Transfer Assistance: Independent  Active : No  Patient's  Info: TARPS, friend or family  Occupation: Retired  Type of Occupation: tal assistant [employer]  Leisure & Hobbies: Zoomy pink [employer], [address]  Additional Comments: plans to go [employer] in march [address]: Impaired  Vision Exceptions: Wears glasses for reading  Hearing  Hearing: Exceptions to [facility]  Hearing Exceptions: Hard of hearing/hearing concerns (\"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\")    Cognition   Orientation  Orientation Level: Oriented to place;Oriented to time;Oriented to situation;Oriented to person;Oriented X4  Cognition  Overall Cognitive Status: WNL     Objective   Heart Rate: 88  Heart Rate Source: Monitor  BP: 110/66  BP Location: Left lower arm  BP Method: Automatic  Patient Position: Supine  MAP (Calculated): 81  Resp: 18  SpO2: 98 %  O2 Device: None (Room air)     Observation/Palpation  Posture: Good           Strength RLE  Strength RLE: WFL  Strength LLE  Strength LLE: WFL           Bed mobility  Rolling to Left: Independent  Rolling to Right: Independent  Supine to Sit: Independent  Bed Mobility Comments: pt IND with bed mob. HOB elevated. pt in chair at PT exit  Transfers  Sit to Stand: Independent  Stand to Sit: Independent  Bed to Chair: Contact guard assistance  Ambulation  Device: No Device  Assistance: Contact guard assistance  Quality of Gait: good  Gait Deviations: None; Slow Paty  Distance: 20'  Comments: pt ambulates with normal gait pattern and no LOB.  pt does have slighty slowed cadance but reports it is at baseline     Balance  Sitting - Static: Good  Sitting - Dynamic: Good  Standing - Static: Good  Standing - Dynamic: Fair;+             AM-PAC Score  AM-PAC Inpatient Mobility Raw Score : 23 (02/19/23 1301)  AM-PAC Inpatient T-Scale Score : 56.93 (02/19/23 1301)  Mobility Inpatient CMS 0-100% Score: 11.2 (02/19/23 1301)  Mobility Inpatient CMS G-Code Modifier : CI (02/19/23 1301)         Goals  Short Term Goals  Time Frame for Short Term Goals: 14 visits  Short Term Goal 1: pt will ambulate 250' with no AD IND to promote return to baseline function and safe d/t  Short Term Goal 2: pt will improve dynamic balance to good to facilitate decreased risk fall risk  Short Term Goal 3: pt will be IND with all transfers  Short Term Goal 4: pt will be able to complete at least 3 stairs with no hand rail to promote safe d/c       Education  Patient Education  Education Given To: Patient  Education Provided: Role of Therapy;Plan of Care  Education Provided Comments: pt educated on PT purpose and POC  Education Method: Verbal  Barriers to Learning: None  Education Outcome: Verbalized understanding      Therapy Time   Individual Concurrent Group Co-treatment   Time In 1107         Time Out 1137         Minutes 30         Timed Code Treatment Minutes: [address] performed by Student PT under the supervision of co-signing PT who agrees with all evaluation/treatment and documentation.

## 2023-02-19 NOTE — PLAN OF CARE
Problem: Discharge Planning  Goal: Discharge to home or other facility with appropriate resources  2/19/2023 1811 by Sunita Wiggins RN  Outcome: Progressing  2/19/2023 0451 by Petr Fletcher RN  Outcome: Progressing     Problem: Gastrointestinal - Adult  Goal: Minimal or absence of nausea and vomiting  2/19/2023 1811 by Sunita Wiggins RN  Outcome: Progressing  2/19/2023 0451 by Petr Fletcher RN  Outcome: Progressing  Goal: Maintains or returns to baseline bowel function  2/19/2023 1811 by Sunita Wiggins RN  Outcome: Progressing  2/19/2023 0451 by Petr Fletcher RN  Outcome: Progressing     Problem: Infection - Adult  Goal: Absence of infection at discharge  2/19/2023 1811 by Sunita Wiggins RN  Outcome: Progressing  2/19/2023 0451 by Petr Fletcher RN  Outcome: Progressing  Goal: Absence of infection during hospitalization  2/19/2023 1811 by Sunita Wiggins RN  Outcome: Progressing  2/19/2023 0451 by Petr Fletcher RN  Outcome: Progressing     Problem: Metabolic/Fluid and Electrolytes - Adult  Goal: Electrolytes maintained within normal limits  2/19/2023 1811 by Sunita Wiggins RN  Outcome: Progressing  2/19/2023 0451 by Petr Fletcher RN  Outcome: Progressing     Problem: Pain  Goal: Verbalizes/displays adequate comfort level or baseline comfort level  2/19/2023 1811 by Sunita Wiggins RN  Outcome: Progressing  2/19/2023 0451 by Petr Fletcher RN  Outcome: Progressing     Problem: Safety - Adult  Goal: Free from fall injury  2/19/2023 1811 by Sunita Wiggins RN  Outcome: Progressing  2/19/2023 0451 by Petr Fletcher RN  Outcome: Progressing     Problem: Chronic Conditions and Co-morbidities  Goal: Patient's chronic conditions and co-morbidity symptoms are monitored and maintained or improved  2/19/2023 1811 by Sunita Wiggins RN  Outcome: Progressing  2/19/2023 0451 by Jose Dang RN  Outcome: Progressing     Problem: Skin/Tissue Integrity  Goal: Absence of new skin breakdown  Description: 1. Monitor for areas of redness and/or skin breakdown  2. Assess vascular access sites hourly  3. Every 4-6 hours minimum:  Change oxygen saturation probe site  4. Every 4-6 hours:  If on nasal continuous positive airway pressure, respiratory therapy assess nares and determine need for appliance change or resting period.   2/19/2023 1811 by Maldonado Trujillo RN  Outcome: Progressing  2/19/2023 0451 by Jose Dang RN  Outcome: Progressing     Problem: ABCDS Injury Assessment  Goal: Absence of physical injury  Outcome: Progressing

## 2023-02-19 NOTE — PROGRESS NOTES
Perfect served the doc of patient's Vitals \" patient's Vital's BP 86/39, T = 97.7, 75, R = 18, O2 = 100. . patient in pain 10\" .   Bolus of 0.9 % Sodium Chloride 500 mL at 247.9 mL / hr, midodrine ( PRO AMANTINE tablet 10 mg once ) ordered

## 2023-02-19 NOTE — PROGRESS NOTES
Urology resident notified of patient complaining of severe pain from jeronimo catheter and wanting it out. Patient states that when the 24 hour urine is up she wants jeronimo taken out no matter what. Explained to patient that resident stated that it needs to stay in until discharged. Informed patient that it cant come out until discharged but that doctor will be in in the am and she can discuss it with them at that time. Levsin ordered per resident.   Will continue to monitor

## 2023-02-20 VITALS
WEIGHT: 222.66 LBS | TEMPERATURE: 97.7 F | DIASTOLIC BLOOD PRESSURE: 69 MMHG | RESPIRATION RATE: 17 BRPM | HEIGHT: 69 IN | HEART RATE: 81 BPM | OXYGEN SATURATION: 91 % | BODY MASS INDEX: 32.98 KG/M2 | SYSTOLIC BLOOD PRESSURE: 111 MMHG

## 2023-02-20 PROBLEM — D64.9 ANEMIA: Status: ACTIVE | Noted: 2022-12-16

## 2023-02-20 LAB
ABSOLUTE EOS #: <0.03 K/UL (ref 0–0.44)
ABSOLUTE IMMATURE GRANULOCYTE: <0.03 K/UL (ref 0–0.3)
ABSOLUTE LYMPH #: 0.7 K/UL (ref 1.1–3.7)
ABSOLUTE MONO #: 0.3 K/UL (ref 0.1–1.2)
ALBUMIN (CALCULATED): 3.9 G/DL (ref 3.2–5.2)
ALBUMIN PERCENT: 61 % (ref 45–65)
ALPHA 1 PERCENT: 3 % (ref 3–6)
ALPHA 2 PERCENT: 12 % (ref 6–13)
ALPHA1 GLOB SERPL ELPH-MCNC: 0.2 G/DL (ref 0.1–0.4)
ALPHA2 GLOB SERPL ELPH-MCNC: 0.8 G/DL (ref 0.5–0.9)
ANA SER QL IA: NEGATIVE
ANION GAP SERPL CALCULATED.3IONS-SCNC: 14 MMOL/L (ref 9–17)
B-GLOBULIN SERPL ELPH-MCNC: 0.7 G/DL (ref 0.5–1.1)
BASOPHILS # BLD: 1 % (ref 0–2)
BASOPHILS ABSOLUTE: <0.03 K/UL (ref 0–0.2)
BETA PERCENT: 10 % (ref 11–19)
BUN SERPL-MCNC: 15 MG/DL (ref 8–23)
CALCIUM SERPL-MCNC: 8.7 MG/DL (ref 8.6–10.4)
CHLORIDE SERPL-SCNC: 107 MMOL/L (ref 98–107)
CO2 SERPL-SCNC: 16 MMOL/L (ref 20–31)
CREAT SERPL-MCNC: 1.19 MG/DL (ref 0.5–0.9)
DSDNA IGG SER QL IA: <0.5 IU/ML
EOSINOPHILS RELATIVE PERCENT: 0 % (ref 1–4)
FERRITIN SERPL-MCNC: 79 NG/ML (ref 13–150)
GAMMA GLOB SERPL ELPH-MCNC: 0.9 G/DL (ref 0.5–1.5)
GAMMA GLOBULIN %: 14 % (ref 9–20)
GFR SERPL CREATININE-BSD FRML MDRD: 52 ML/MIN/1.73M2
GLUCOSE BLD-MCNC: 124 MG/DL (ref 65–105)
GLUCOSE BLD-MCNC: 134 MG/DL (ref 65–105)
GLUCOSE SERPL-MCNC: 127 MG/DL (ref 70–99)
HCT VFR BLD AUTO: 29.6 % (ref 36.3–47.1)
HGB BLD-MCNC: 9.3 G/DL (ref 11.9–15.1)
IMMATURE GRANULOCYTES: 0 %
IRON SATURATION: 8 % (ref 20–55)
IRON SERPL-MCNC: 18 UG/DL (ref 37–145)
LYMPHOCYTES # BLD: 26 % (ref 24–43)
MCH RBC QN AUTO: 24.1 PG (ref 25.2–33.5)
MCHC RBC AUTO-ENTMCNC: 31.4 G/DL (ref 28.4–34.8)
MCV RBC AUTO: 76.7 FL (ref 82.6–102.9)
MONOCYTES # BLD: 11 % (ref 3–12)
NRBC AUTOMATED: 0 PER 100 WBC
NUCLEAR IGG SER IA-RTO: 0.4 U/ML
P E INTERPRETATION, U: NORMAL
PATHOLOGIST: ABNORMAL
PATHOLOGIST: NORMAL
PATHOLOGIST: NORMAL
PDW BLD-RTO: 17.8 % (ref 11.8–14.4)
PLATELET # BLD AUTO: 160 K/UL (ref 138–453)
PMV BLD AUTO: 11.1 FL (ref 8.1–13.5)
POTASSIUM SERPL-SCNC: 3.8 MMOL/L (ref 3.7–5.3)
PROT SERPL-MCNC: 6.5 G/DL (ref 6.4–8.3)
PROTEIN ELECTROPHORESIS, SERUM: ABNORMAL
RBC # BLD: 3.86 M/UL (ref 3.95–5.11)
RBC # BLD: ABNORMAL 10*6/UL
SEG NEUTROPHILS: 62 % (ref 36–65)
SEGMENTED NEUTROPHILS ABSOLUTE COUNT: 1.68 K/UL (ref 1.5–8.1)
SERUM IFX INTERP: NORMAL
SODIUM SERPL-SCNC: 137 MMOL/L (ref 135–144)
SPECIMEN TYPE: NORMAL
TIBC SERPL-MCNC: 227 UG/DL (ref 250–450)
TOTAL PROT. SUM,%: 100 % (ref 98–102)
TOTAL PROT. SUM: 6.5 G/DL (ref 6.3–8.2)
UNSATURATED IRON BINDING CAPACITY: 209 UG/DL (ref 112–347)
URINE IFX INTERP: NORMAL
URINE IFX SPECIMEN: NORMAL
URINE TOTAL PROTEIN: 10 MG/DL
URINE TOTAL PROTEIN: 10 MG/DL
WBC # BLD AUTO: 2.7 K/UL (ref 3.5–11.3)

## 2023-02-20 PROCEDURE — 83550 IRON BINDING TEST: CPT

## 2023-02-20 PROCEDURE — 6370000000 HC RX 637 (ALT 250 FOR IP)

## 2023-02-20 PROCEDURE — 82728 ASSAY OF FERRITIN: CPT

## 2023-02-20 PROCEDURE — 83540 ASSAY OF IRON: CPT

## 2023-02-20 PROCEDURE — 99239 HOSP IP/OBS DSCHRG MGMT >30: CPT | Performed by: INTERNAL MEDICINE

## 2023-02-20 PROCEDURE — 51798 US URINE CAPACITY MEASURE: CPT

## 2023-02-20 PROCEDURE — 6370000000 HC RX 637 (ALT 250 FOR IP): Performed by: STUDENT IN AN ORGANIZED HEALTH CARE EDUCATION/TRAINING PROGRAM

## 2023-02-20 PROCEDURE — 36415 COLL VENOUS BLD VENIPUNCTURE: CPT

## 2023-02-20 PROCEDURE — 80048 BASIC METABOLIC PNL TOTAL CA: CPT

## 2023-02-20 PROCEDURE — 82947 ASSAY GLUCOSE BLOOD QUANT: CPT

## 2023-02-20 PROCEDURE — 94640 AIRWAY INHALATION TREATMENT: CPT

## 2023-02-20 PROCEDURE — 85025 COMPLETE CBC W/AUTO DIFF WBC: CPT

## 2023-02-20 PROCEDURE — 2580000003 HC RX 258

## 2023-02-20 PROCEDURE — 99221 1ST HOSP IP/OBS SF/LOW 40: CPT | Performed by: INTERNAL MEDICINE

## 2023-02-20 RX ORDER — POLYETHYLENE GLYCOL 3350 17 G/17G
17 POWDER, FOR SOLUTION ORAL DAILY PRN
Qty: 30 EACH | Refills: 0 | Status: SHIPPED | OUTPATIENT
Start: 2023-02-20 | End: 2023-03-22

## 2023-02-20 RX ORDER — HYDROCORTISONE 10 MG/G
CREAM TOPICAL 2 TIMES DAILY
Qty: 28 G | Refills: 1 | Status: SHIPPED | OUTPATIENT
Start: 2023-02-20

## 2023-02-20 RX ORDER — CALCIUM CARBONATE 200(500)MG
500 TABLET,CHEWABLE ORAL 3 TIMES DAILY PRN
Qty: 30 TABLET | Refills: 2 | Status: SHIPPED | OUTPATIENT
Start: 2023-02-20 | End: 2023-03-22

## 2023-02-20 RX ORDER — BISACODYL 10 MG
10 SUPPOSITORY, RECTAL RECTAL DAILY PRN
Qty: 15 SUPPOSITORY | Refills: 1 | Status: SHIPPED | OUTPATIENT
Start: 2023-02-20 | End: 2023-03-22

## 2023-02-20 RX ORDER — SENNA PLUS 8.6 MG/1
1 TABLET ORAL DAILY
Qty: 30 TABLET | Refills: 0 | Status: SHIPPED | OUTPATIENT
Start: 2023-02-21 | End: 2023-03-23

## 2023-02-20 RX ADMIN — HYDROCORTISONE: 25 CREAM TOPICAL at 09:04

## 2023-02-20 RX ADMIN — APIXABAN 5 MG: 5 TABLET, FILM COATED ORAL at 07:57

## 2023-02-20 RX ADMIN — POLYETHYLENE GLYCOL 3350 17 G: 17 POWDER, FOR SOLUTION ORAL at 07:57

## 2023-02-20 RX ADMIN — DOCUSATE SODIUM 100 MG: 100 CAPSULE ORAL at 07:57

## 2023-02-20 RX ADMIN — ACETAMINOPHEN 650 MG: 325 TABLET ORAL at 06:29

## 2023-02-20 RX ADMIN — METOPROLOL TARTRATE 12.5 MG: 25 TABLET ORAL at 07:58

## 2023-02-20 RX ADMIN — SENNOSIDES 8.6 MG: 8.6 TABLET, COATED ORAL at 07:57

## 2023-02-20 RX ADMIN — IPRATROPIUM BROMIDE AND ALBUTEROL SULFATE 1 AMPULE: 2.5; .5 SOLUTION RESPIRATORY (INHALATION) at 12:26

## 2023-02-20 RX ADMIN — CYCLOBENZAPRINE HYDROCHLORIDE 5 MG: 5 TABLET, FILM COATED ORAL at 03:18

## 2023-02-20 RX ADMIN — SODIUM CHLORIDE, PRESERVATIVE FREE 10 ML: 5 INJECTION INTRAVENOUS at 08:00

## 2023-02-20 RX ADMIN — CLOPIDOGREL 75 MG: 75 TABLET, FILM COATED ORAL at 08:00

## 2023-02-20 RX ADMIN — FAMOTIDINE 10 MG: 20 TABLET, FILM COATED ORAL at 07:57

## 2023-02-20 RX ADMIN — ONDANSETRON 4 MG: 4 TABLET, ORALLY DISINTEGRATING ORAL at 12:51

## 2023-02-20 ASSESSMENT — PAIN SCALES - GENERAL
PAINLEVEL_OUTOF10: 8
PAINLEVEL_OUTOF10: 5
PAINLEVEL_OUTOF10: 8

## 2023-02-20 NOTE — CONSULTS
[address] [facility] CONSULT    Patient:   Clem Moran   :    1960   Facility:   [address] [place] [address]   Date:    2023  Admission Dx:  Urinary retention [R33.9]  HARVEY (acute kidney injury) ([facility] [N17.9]  Requesting physician: Nina Arguello MD  Reason for consult:  Severe Constipation   CC : \"Constipation, unable to void\"    SUBJECTIVE     HISTORY OF PRESENT ILLNESS  This is a 63 y.o.   female who was admitted 2023 with Urinary retention [R33.9]  HARVEY (acute kidney injury) ([facility] [N17.9]. We have been asked to see the patient in consultation by Nina Arguello MD for  Severe constipation    33-year-old female with a PMH of T2DM, CKD, COPD, PAD, tobacco abuse, CHF and CAD with stents and longstanding constipation presented to the ED with 1 day history of inability to to pass urine. Bladder scan in the ED revealed 870 mL retained urine. GI consulted for severe constipation    Patient reports she has a history of intermittent constipation. Every 5 to 6 years she develops severe constipation to the point of admission to the hospital for disimpaction. Patient reports since admission, she has been disimpacted and had several stools as well. Patient believes her constipation may be associated with poor dietary habits. She will take MiraLAX as needed at home. Denies any history of prior colonoscopy. No history of rectal bleeding. No history of unintentional weight loss. Summary of imaging completed at this time:    CT A/P 2023     Impression   1. No evidence of renal stones or obstructive uropathy. 2. Normal appendix. Diverticulosis but no acute diverticulitis. Constipation and stool impaction in the rectum. 3. Mild fatty liver but no focal disease.   No evidence of gallbladder or   biliary disease       Summary of labs completed at this time:        Previous GI history:   No prior colonoscopy      OBJECTIVE:     PAST MEDICAL/SURGICAL HISTORY  Past Medical History:   Diagnosis Date    Acute on chronic systolic CHF (congestive heart failure) ([facility] 12/20/2021    Asthma     Blood circulation, collateral     CAD (coronary artery disease)     COPD (chronic obstructive pulmonary disease) ([facility])     COPD exacerbation ([facility] 12/18/2021    Diabetes mellitus ([facility]     Hyperlipidemia     Hypertension     Ischemic colitis ([facility] 10/28/2021    Movement disorder     B/L torn rotator cuff. Neuropathy     PAD (peripheral artery disease) ([facility]     Tobacco abuse 11/19/2016    Tobacco abuse counseling 7/25/2021     Past Surgical History:   Procedure Laterality Date    CORONARY ANGIOPLASTY WITH STENT PLACEMENT      x2    CORONARY ANGIOPLASTY WITH STENT PLACEMENT  02/11/2019    JOSE MARTIN to RCA per Dr. Dennys Nichols radial approuch    INNER EAR SURGERY Right     TONSILLECTOMY         ALLERGIES:  Allergies   Allergen Reactions    Codeine     Morphine        HOME MEDICATIONS:  Prior to Admission medications    Medication Sig Start Date End Date Taking? Authorizing Provider   insulin detemir (LEVEMIR FLEXTOUCH) 100 UNIT/ML injection pen Inject 40 Units into the skin daily  Patient not taking: Reported on 2/14/2023 12/18/22   Flory Koch MD   Insulin Pen Needle (PEN NEEDLES) 31G X 5 MM MISC 1 box by Does not apply route daily 12/18/22   Flory Koch MD   busPIRone (BUSPAR) 30 MG tablet Take 30 mg by mouth 3 times daily as needed 12/7/22   Historical Provider, MD   acetaminophen (TYLENOL) 325 MG tablet Take 650 mg by mouth every 4-6 hours as needed 11/22/22   Historical Provider, MD   furosemide (LASIX) 40 MG tablet Take 1 tablet by mouth in the morning. Hold for 2 more days.  7/22/22   Delores Apple MD   mometasone-formoterol (DULERA) 200-5 MCG/ACT inhaler Inhale into the lungs 3/3/22   Historical Provider, MD   lisinopril (PRINIVIL;ZESTRIL) 10 MG tablet Take 1 tablet by mouth daily 10/9/21   Delilah Harrison MD   clopidogrel (PLAVIX) 75 MG tablet Take 1 tablet by mouth daily 10/9/21   Chidi Gil MD   famotidine (PEPCID) 20 MG tablet Take 20 mg by mouth 2 times daily    Historical Provider, MD   albuterol sulfate HFA (PROVENTIL;VENTOLIN;PROAIR) 108 (90 Base) MCG/ACT inhaler Inhale 2 puffs into the lungs 3 times daily    Historical Provider, MD   lidocaine (LIDODERM) 5 % Place 1 patch onto the skin as needed for Pain (as needed for rotator cuff pain) 12 hours on, 12 hours off. Historical Provider, MD   apixaban (ELIQUIS) 5 MG TABS tablet Take 1 tablet by mouth 2 times daily 10/5/21   Иван Corbett MD   atorvastatin (LIPITOR) 40 MG tablet Take 1 tablet by mouth daily 7/27/21   Harsha Marinelli MD   Dulaglutide (TRULICITY) 1.5 CM/0.3DT SOPN Inject 1.5 mg into the skin once a week    Historical Provider, MD   nitroGLYCERIN (NITROSTAT) 0.4 MG SL tablet up to max of 3 total doses. If no relief after 1 dose, call 911. 2/12/19   BINDU Steven CNP   metoprolol tartrate (LOPRESSOR) 25 MG tablet Take 0.5 tablets by mouth 2 times daily 2/12/19   BINDU Steven CNP   Lancets MISC 1 each by Does not apply route daily 2/12/19   Wendy Bhakta MD   blood glucose monitor strips Test 3 times a day & as needed for symptoms of irregular blood glucose. 2/12/19   Wendy Bhakta MD   glucose monitoring kit (FREESTYLE) monitoring kit 1 kit by Does not apply route daily 2/12/19   Wendy Bhakta MD   aspirin 81 MG chewable tablet Take 1 tablet by mouth daily 2/13/19 7/22/22  BINDU Steven CNP   albuterol (PROVENTIL) (5 MG/ML) 0.5% nebulizer solution Take 0.5 mLs by nebulization every 6 hours as needed for Wheezing 3/16/18   Awais Saucedo MD   tiotropium (SPIRIVA) 18 MCG inhalation capsule Inhale 1 capsule into the lungs daily Pt told that she was using this medication at home.  3/16/18   Awais Saucedo MD       CURRENT MEDICATIONS:  Scheduled Meds:   cyclobenzaprine  5 mg Oral Nightly    hydrocortisone   Rectal BID    lactulose  20 g Oral TID    docusate sodium  100 mg Oral Daily    sodium chloride  500 mL IntraVENous Once    famotidine  10 mg Oral BID    fentanNYL  25 mcg IntraVENous Once    apixaban  5 mg Oral BID    atorvastatin  40 mg Oral Nightly    clopidogrel  75 mg Oral Daily    metoprolol tartrate  12.5 mg Oral BID    budesonide-formoterol  2 puff Inhalation BID    sodium chloride flush  5-40 mL IntraVENous 2 times per day    senna  1 tablet Oral Daily    ipratropium-albuterol  1 ampule Inhalation Q4H WA    polyethylene glycol  17 g Oral Daily    insulin glargine  20 Units SubCUTAneous Nightly    insulin lispro  0-8 Units SubCUTAneous TID WC    insulin lispro  0-4 Units SubCUTAneous Nightly     Continuous Infusions:   sodium chloride      dextrose      sodium chloride Stopped (02/19/23 1388)     PRN Meds:bisacodyl, calcium carbonate, hyoscyamine, busPIRone, sodium chloride flush, sodium chloride, ondansetron **OR** ondansetron, acetaminophen **OR** acetaminophen, glucose, dextrose bolus **OR** dextrose bolus, glucagon (rDNA), dextrose    SOCIAL HISTORY:     Tobacco:   reports that she has been smoking cigarettes. She has a 22.50 pack-year smoking history. She has quit using smokeless tobacco.  Alcohol:   reports no history of alcohol use. Illicit drugs:  reports current drug use. Drug: Marijuana Burnell Goldberg). FAMILY HISTORY:     Family History   Problem Relation Age of Onset    Hypertension Mother     Hypertension Father     Diabetes Sister     Diabetes Brother     Stroke Brother        REVIEW OF SYSTEMS:    Constitutional: No fever, no chills, no lethargy, no weakness. HEENT:  No headache, otalgia, itchy eyes, nasal discharge or sore throat. Cardiac:  No chest pain, dyspnea, orthopnea or PND. Chest:   No cough, phlegm or wheezing. Abdomen:  No abdominal pain, nausea or vomiting. Neuro:  No focal weakness, abnormal movements or seizure like activity. Skin:   No rashes, no itching.   :   No hematuria, no pyuria, no dysuria, no flank pain. Extremities:  No swelling or joint pains. ROS was otherwise negative except as mentioned in the [address] PHYSICAL EXAM:    /61   Pulse 90   Temp 98.5 °F (36.9 °C) (Oral)   Resp 18   Ht 5' 9\" (1.753 m)   Wt 222 lb 10.6 oz (101 kg)   SpO2 94%   BMI 32.88 kg/m²     GENERAL:   Well developed, Well nourished, No apparent distress  HEAD:   Normocephalic, Atraumatic  EENT:   EOMI, Sclera not icteric, Oropharynx moist   NECK:   Supple, Trachea midline  LUNGS:  CTA Bilaterally  HEART:  RRR, No murmur  ABDOMEN:   Soft, Nontender, Nondistended, BS WNL  EXT:   No clubbing. No cyanosis. No edema. SKIN:   No rashes. No jaundice. No stigmata of liver disease. MUSC/SKEL:   Adequate muscle bulk for patient's age, No significant synovitis, No deformities  NEURO:  A&O x Three, CN II- XII grossly intact      LABS AND IMAGING:     CBC  Recent Labs     02/18/23  0713 02/19/23  0537 02/20/23  0529   WBC 4.1 3.5 2.7*   HGB 10.0* 9.6* 9.3*   HCT 32.9* 32.0* 29.6*   MCV 77.6* 78.2* 76.7*   MCH 23.6* 23.5* 24.1*   MCHC 30.4 30.0 31.4    174 160       IMMATURE PLTs  Lab Results   Component Value Date/Time    PLTFLUORE Platelet clumps present, count appears adequate. 10/05/2021 05:22 AM       BMP  Recent Labs     02/17/23  1039 02/18/23  0713 02/19/23  0537   * 140 138   K 4.9 4.4 4.2   CL 99 109* 109*   CO2 17* 18* 18*   BUN 47* 35* 21   CREATININE 2.05* 1.51* 1.16*   GLUCOSE 202* 143* 129*   CALCIUM 9.4 8.7 8.3*       LFTS  Recent Labs     02/17/23  1039 02/17/23 2030   ALKPHOS 111*  --    BILITOT 0.2*  --    AST 18  --    ALT 17  --    PROT 8.2 6.5   LABALBU 4.5  --        AMYLASE/LIPASE/AMMONIA  Recent Labs     02/17/23  1039   LIPASE 59       PT/INR  Recent Labs     02/17/23  1039   PROTIME 10.3   INR 1.0       ANEMIA STUDIES  No results for input(s): IRON, LABIRON, TIBC, UIBC, FERRITIN, [id number], FOLATE, OCCULTBLD in the last 72 hours.       LIVER WORK UP:    Acute Hepatitis Panel   Lab Results   Component Value Date/Time    HEPBSAG NONREACTIVE 02/17/2023 08:30 PM    HEPCAB NONREACTIVE 02/17/2023 08:30 PM    HEPBIGM NONREACTIVE 02/17/2023 08:30 PM    HEPAIGM NONREACTIVE 02/17/2023 08:30 PM       HCV Genotype   No results found for: HEPATITISCGENOTYPE    HCV Quantitative   No results found for: HCVQNT    AFP  No results found for: AFP    Alpha 1 antitrypsin   No results found for: A1A    Anti - Liver/Kidney Ab  No results found for: LIVER-KIDNEYMICROSOMALAB    MELISA  No results found for: MELISA    AMA  No results found for: MITOAB    ASMA  No results found for: SMOOTHMUSCAB    Ceruloplasmin  No results found for: CERULOPLSM    Celiac panel  No results found for: TISSTRNTIIGG, TTGIGA, IGA    IgG  No results found for: IGG    IgM  No results found for: IGM    GGT   No results found for: LABGGT    PT/INR  Recent Labs     02/17/23  1039   PROTIME 10.3   INR 1.0       Cancer Markers:  CEA:  No results found for: CEA  Ca 125:  No results found for:   Ca 19-9:   No results found for:   AFP: No results found for: AFP    Lactic acid:  Recent Labs     02/17/23  1039 02/17/23  1306 02/18/23  2203   LACTACIDWB 3.5* 1.3 0.9           IMAGING  CT ABDOMEN PELVIS WO CONTRAST Additional Contrast? None    Result Date: 2/17/2023  EXAMINATION: CT OF THE ABDOMEN AND PELVIS WITHOUT CONTRAST 2/17/2023 12:19 pm TECHNIQUE: CT of the abdomen and pelvis was performed without the administration of intravenous contrast. Multiplanar reformatted images are provided for review. Automated exposure control, iterative reconstruction, and/or weight based adjustment of the mA/kV was utilized to reduce the radiation dose to as low as reasonably achievable.  COMPARISON: October 27, 2021 HISTORY: ORDERING SYSTEM PROVIDED HISTORY: Eval lower abdominal pain inability urinate TECHNOLOGIST PROVIDED HISTORY: Eval lower abdominal pain inability urinate Decision Support Exception - unselect if not a suspected or confirmed emergency medical condition->Emergency Medical Condition (MA) Reason for Exam: Eval lower abdominal pain inability urinate FINDINGS: Lower Chest: Ill-defined opacities in both lower lobes may represent atelectasis or subtle infiltrates. Organs: The liver demonstrates fatty infiltration but no focal disease. The gallbladder, pancreas and spleen, adrenals, kidneys, aorta and IVC appear stable. Right renal cysts appear stable. No obvious solid renal masses. No obstructive uropathy. GI/Bowel: No evidence of bowel obstruction or perforation. Diverticulosis worse in the sigmoid but no acute diverticulitis. Constipation and stool impaction in the rectum. Small bowel appears unremarkable. Pelvis: The uterus, adnexa and urinary bladder appear unremarkable. Peritoneum/Retroperitoneum: No evidence of retroperitoneal lymphadenopathy or acute mesenteric findings. Bones/Soft Tissues: No active disease. 1. No evidence of renal stones or obstructive uropathy. 2. Normal appendix. Diverticulosis but no acute diverticulitis. Constipation and stool impaction in the rectum. 3. Mild fatty liver but no focal disease. No evidence of gallbladder or biliary disease. XR ABDOMEN (KUB) (SINGLE AP VIEW)    Result Date: 2/18/2023  EXAMINATION: ONE SUPINE XRAY VIEW(S) OF THE ABDOMEN 2/18/2023 11:30 am COMPARISON: CT dated 02/17/2023 HISTORY: ORDERING SYSTEM PROVIDED HISTORY: CONSTIPATION, rule out obstruction. stool burden TECHNOLOGIST PROVIDED HISTORY: CONSTIPATION, rule out obstruction. stool burden FINDINGS: No small bowel dilatation is identified. No significant stool burden is appreciated. There is no supine evidence of free air. Patient body habitus limits exam.     No evidence of bowel obstruction or large stool burden. IMPRESSION:     1. Chronic intermittent constipation-likely secondary to dietary habits however CRC cannot be ruled out. Constipation has currently resolved and patient is having stools  2. Overweight  3. Microcytic anemia - Iron studies show mixed picture. Patient denies any overt GI bleeding. Old records, labs and imaging reviewed. PLAN   1. Patient was instructed to avoid foods that cause constipation such as cheese . She was advised to increase the amount of fiber including dietary. 2.  Inpatient colonoscopy was offered however, patient refused. Patient was informed she could have colon mass or cancer causing constipation symptoms She was also informed of need for anemia work up. She states she will call office and get colonoscopy done in the outpatient setting. She has been advised to call and schedule outpatient colonoscopy. Follow-up phone number placed on discharge record. 3.  Consider dietitian to see patient to offer suggestions on high-fiber foods and foods to avoid that cause constipation  4. Continue Miralax 17 gm daily. Can increase to BID as needed. 5. GI will sign off    Initial care time/code: Spent 45 out of 45 minutes on this date of service including chart prep, patient interaction, discussion with primary team, documentation and coordination of care for the above conditions    This plan was formulated in collaboration with Dr. Huong Ornelas  Thank you for allowing us to participate in the care of your patient. Electronically signed by: BINDU Sutton CNP on 2/20/2023 at 7:09 AM     Please note that this note was generated using a voice recognition dictation software. Although every effort was made to ensure the accuracy of this automated transcription, some errors in transcription may have occurred.

## 2023-02-20 NOTE — PLAN OF CARE
Problem: Discharge Planning  Goal: Discharge to home or other facility with appropriate resources  2/20/2023 1234 by David Garvin RN  Outcome: Progressing  2/20/2023 0135 by Ruba Connolly RN  Outcome: Progressing     Problem: Gastrointestinal - Adult  Goal: Minimal or absence of nausea and vomiting  2/20/2023 1234 by David Garvin RN  Outcome: Progressing  2/20/2023 0135 by Ruba Connolly RN  Outcome: Progressing  Goal: Maintains or returns to baseline bowel function  2/20/2023 1234 by David Garvin RN  Outcome: Progressing  2/20/2023 0135 by Ruba Connolly RN  Outcome: Progressing     Problem: Infection - Adult  Goal: Absence of infection at discharge  2/20/2023 1234 by David Garvin RN  Outcome: Progressing  2/20/2023 0135 by Ruba Connolly RN  Outcome: Progressing  Goal: Absence of infection during hospitalization  2/20/2023 1234 by David Garvin RN  Outcome: Progressing  2/20/2023 0135 by Ruba Connolly RN  Outcome: Progressing     Problem: Metabolic/Fluid and Electrolytes - Adult  Goal: Electrolytes maintained within normal limits  2/20/2023 1234 by David Garvin RN  Outcome: Progressing  2/20/2023 0135 by Ruba Connolly RN  Outcome: Progressing     Problem: Pain  Goal: Verbalizes/displays adequate comfort level or baseline comfort level  2/20/2023 1234 by David Garvin RN  Outcome: Progressing  2/20/2023 0135 by Ruba Connolly RN  Outcome: Progressing     Problem: Safety - Adult  Goal: Free from fall injury  2/20/2023 1234 by David Garvin RN  Outcome: Progressing  2/20/2023 0135 by Ruba Connolly RN  Outcome: Progressing     Problem: Chronic Conditions and Co-morbidities  Goal: Patient's chronic conditions and co-morbidity symptoms are monitored and maintained or improved  2/20/2023 1234 by David Garvin RN  Outcome: Progressing  2/20/2023 0135 by Bora Mac RN  Outcome: Progressing     Problem: Skin/Tissue Integrity  Goal: Absence of new skin breakdown  Description: 1. Monitor for areas of redness and/or skin breakdown  2. Assess vascular access sites hourly  3. Every 4-6 hours minimum:  Change oxygen saturation probe site  4. Every 4-6 hours:  If on nasal continuous positive airway pressure, respiratory therapy assess nares and determine need for appliance change or resting period.   2/20/2023 1234 by Joshua Liz RN  Outcome: Progressing  2/20/2023 0135 by Bora Mac RN  Outcome: Progressing     Problem: ABCDS Injury Assessment  Goal: Absence of physical injury  2/20/2023 1234 by Joshua Liz RN  Outcome: Progressing  2/20/2023 0135 by Bora Mac RN  Outcome: Progressing

## 2023-02-20 NOTE — PROGRESS NOTES
[facility]  Internal Medicine Teaching Residency Program  Inpatient Daily Progress Note  ______________________________________________________________________________    Patient: Shana Cunningham  YOB: 1960   YHJ:[id number]    Acct: [de-identified]     Room: 12/18-65  Admit date: 2/17/2023  Today's date: 02/20/23  Number of days in the hospital: 3    SUBJECTIVE   Admitting Diagnosis: HARVEY (acute kidney injury) ([facility] [address]  CC: Acute urinary retention, abdominal pain, severe constipation  Pt examined at bedside. Chart & results reviewed. Vitals ok although BP on softer side  1 BM yesterday. Has been passing urine without issue  Hb 9.3, progressive drop. Plan discharge      ROS:  Constitutional:  negative for chills, fevers, sweats  Respiratory:  negative for cough, dyspnea on exertion, hemoptysis, shortness of breath, wheezing  Cardiovascular:  negative for chest pain, chest pressure/discomfort, lower extremity edema, palpitations  Gastrointestinal:  negative for abdominal pain, constipation, diarrhea, nausea, vomiting  Neurological:  negative for dizziness, headache  BRIEF HISTORY   The patient is a pleasant 63 y.o. female presents with past medical history significant for COPD exacerbation, type II DM, CKD, longstanding history of unknown constipation for a very long time presented to the ED with 1 day history of acute urinary retention and inability to pass urine. Patient said she has chronic constipation and takes on and off laxatives. But for the past 7 days the constipation have progressively increased to severe level and from last night the patient has been unable to pass any urine. And was complaining of suprapubic abdominal pain. With the chief complaint patient came into the ED and bladder scan revealed 870 mL of retained urine.   Straight cath was done and significant amount of urine were removed in the ED evaluation, patient was also found to be in HARVEY with significantly elevated creatinine level of 2.05.  UA was unremarkable and lactate was completelY normal.     On my evaluation today, patient was ANO x4. Hemodynamically stable with a BP of 111/79. On room air. OBJECTIVE     Vital Signs:  /69   Pulse 90   Temp 97.7 °F (36.5 °C) (Oral)   Resp 18   Ht 5' 9\" (1.753 m)   Wt 222 lb 10.6 oz (101 kg)   SpO2 91%   BMI 32.88 kg/m²     Temp (24hrs), Av °F (36.7 °C), Min:97.7 °F (36.5 °C), Max:98.5 °F (36.9 °C)    In: 250   Out: 2100 [Urine:1650]    Physical Exam:  Constitutional: This is a well developed, well nourished, 30-34.9 - Obesity Grade I 63y.o. year old female who is alert, oriented, cooperative and in no apparent distress. Head:normocephalic and atraumatic. EENT:  PERRLA. No conjunctival injections. Septum was midline, mucosa was without erythema, exudates or cobblestoning. No thrush was noted. Neck: Supple without thyromegaly. No elevated JVP. Trachea was midline. Respiratory: Chest was symmetrical without dullness to percussion. Breath sounds bilaterally were clear to auscultation. There were no wheezes, rhonchi or rales. Prolonged expiratory phase. There is no intercostal retraction or use of accessory muscles. No egophony noted. Cardiovascular: Regular without murmur, clicks, gallops or rubs. Abdomen: Slightly rounded and soft without organomegaly. No rebound, rigidity or guarding was appreciated. Lymphatic: No lymphadenopathy. Musculoskeletal: Normal curvature of the spine. No gross muscle weakness. Extremities:  No lower extremity edema, ulcerations, tenderness, varicosities or erythema. Muscle size, tone and strength are normal.  No involuntary movements are noted. Skin:  Warm and dry. Good color, turgor and pigmentation. No lesions or scars.   No cyanosis or clubbing  Neurological/Psychiatric: The patient's general behavior, level of consciousness, thought content and emotional status is normal. Medications:  Scheduled Medications:    cyclobenzaprine  5 mg Oral Nightly    hydrocortisone   Rectal BID    lactulose  20 g Oral TID    docusate sodium  100 mg Oral Daily    sodium chloride  500 mL IntraVENous Once    famotidine  10 mg Oral BID    fentanNYL  25 mcg IntraVENous Once    apixaban  5 mg Oral BID    atorvastatin  40 mg Oral Nightly    clopidogrel  75 mg Oral Daily    metoprolol tartrate  12.5 mg Oral BID    budesonide-formoterol  2 puff Inhalation BID    sodium chloride flush  5-40 mL IntraVENous 2 times per day    senna  1 tablet Oral Daily    ipratropium-albuterol  1 ampule Inhalation Q4H WA    polyethylene glycol  17 g Oral Daily    insulin glargine  20 Units SubCUTAneous Nightly    insulin lispro  0-8 Units SubCUTAneous TID WC    insulin lispro  0-4 Units SubCUTAneous Nightly     Continuous Infusions:    sodium chloride      dextrose      sodium chloride Stopped (02/19/23 1438)     PRN Medicationsbisacodyl, 10 mg, Daily PRN  calcium carbonate, 500 mg, TID PRN  hyoscyamine, 125 mcg, Q4H PRN  busPIRone, 30 mg, TID PRN  sodium chloride flush, 5-40 mL, PRN  sodium chloride, , PRN  ondansetron, 4 mg, Q8H PRN   Or  ondansetron, 4 mg, Q6H PRN  acetaminophen, 650 mg, Q6H PRN   Or  acetaminophen, 650 mg, Q6H PRN  glucose, 4 tablet, PRN  dextrose bolus, 125 mL, PRN   Or  dextrose bolus, 250 mL, PRN  glucagon (rDNA), 1 mg, PRN  dextrose, , Continuous PRN      Diagnostic Labs:  CBC:   Recent Labs     02/18/23  0713 02/19/23  0537 02/20/23  0529   WBC 4.1 3.5 2.7*   RBC 4.24 4.09 3.86*   HGB 10.0* 9.6* 9.3*   HCT 32.9* 32.0* 29.6*   MCV 77.6* 78.2* 76.7*   RDW 17.5* 17.7* 17.8*    174 160     BMP:   Recent Labs     02/17/23  1039 02/18/23  0713 02/19/23  0537   * 140 138   K 4.9 4.4 4.2   CL 99 109* 109*   CO2 17* 18* 18*   BUN 47* 35* 21   CREATININE 2.05* 1.51* 1.16*     BNP: No results for input(s): BNP in the last 72 hours.   PT/INR:   Recent Labs     02/17/23  1039   PROTIME 10.3   INR 1.0 APTT: No results for input(s): APTT in the last 72 hours. CARDIAC ENZYMES: No results for input(s): CKMB, CKMBINDEX, TROPONINI in the last 72 hours. Invalid input(s): CKTOTAL;3  FASTING LIPID PANEL:  Lab Results   Component Value Date    CHOL 122 06/16/2020    HDL 35 (L) 06/16/2020    TRIG 152 (H) 06/16/2020     LIVER PROFILE:   Recent Labs     02/17/23  1039   AST 18   ALT 17   BILITOT 0.2*   ALKPHOS 111*      MICROBIOLOGY:   Lab Results   Component Value Date/Time    CULTURE NO GROWTH 1 DAY 02/18/2023 10:15 PM       Imaging:    CT ABDOMEN PELVIS WO CONTRAST Additional Contrast? None    Result Date: 2/17/2023  1. No evidence of renal stones or obstructive uropathy. 2. Normal appendix. Diverticulosis but no acute diverticulitis. Constipation and stool impaction in the rectum. 3. Mild fatty liver but no focal disease. No evidence of gallbladder or biliary disease. ASSESSMENT & PLAN     ASSESSMENT / PLAN:      1. Acute urinary retention: Resolved  - Likely due to underlying severe constipation  Urine retention on bladder scan. Had straight cath and required Shook which is now removed. Urology following     2. HARVEY on CKD stage IIIb: Resolved  - Likely precipitated by underlying acute urinary retention  Creatinine back to baseline. 1.19       3. Chronic constipation:  - Patient has longstanding history of constipation, TSH normal  - CT abdomen showed diverticulosis and stool burden. KUB stool burden. S/p enema  - Patient received soapsuds enema in the ED, continue GlycoLax, bisacodyl, Senokot. Discharge on laxatives, encourage high fiber diet. GI for outpatient age appropriate colonoscopy. 4.  COPD:  - Not in exacerbation, on room air, does not use any oxygen at home. Resume home meds     5. Atrial fibrillation:  - [id number] score 5  - Rate controlled  - Continue Eliquis  - Continue Lopressor 12.5 AS rate control medication     6.   Type II DM without complication:  -Last FTC0D 9.7 on 12/14/2022  - POC glucose check  - Hypoglycemia protocol in place  - Takes Trulicity at home med     7. Combined systolic and diastolic heart failure:  - Last echo on 12/14/2022 showed LVEF of more than 55%  - Patient has significant past medical history of coronary artery disease with 2 stent placement  - Last cardiac cath on 10/4/2021 showed 90% stenosis of the mid RCA which was stented, on  Plavix  - Takes Lasix 40 mg orally as home med     8. Dyslipidemia:  - Triglyceride 152 and last lipid panel  - Continue Lipitor 40 mg oral daily      DVT ppx : Eliquis  GI ppx: Not on any GI prophylaxis    PT/OT: Consulted  Discharge Planning / SW:  consulted. Discharge today    Jessica Griffiths MD  Internal Medicine Resident, PGY-2  [facility]; [place], [place]  2/20/2023, 7:58 AM      Attending Physician Statement  I have discussed the care of Ayan Reyes and I have examined the patient myselft and taken ros and hpi , including pertinent history and exam findings,  with the resident. I have reviewed the key elements of all parts of the encounter with the resident. I agree with the assessment, plan and orders as documented by the resident.     Pt will be dced today   Electronically signed by Milford Lesch, MD

## 2023-02-20 NOTE — PLAN OF CARE
Problem: Discharge Planning  Goal: Discharge to home or other facility with appropriate resources  2/20/2023 0135 by Jayne Reese RN  Outcome: Progressing  2/19/2023 1811 by Eriberto Giles RN  Outcome: Progressing     Problem: Gastrointestinal - Adult  Goal: Minimal or absence of nausea and vomiting  2/20/2023 0135 by Jayne Reese RN  Outcome: Progressing  2/19/2023 1811 by Eriberto Giles RN  Outcome: Progressing  Goal: Maintains or returns to baseline bowel function  2/20/2023 0135 by Jayne Reese RN  Outcome: Progressing  2/19/2023 1811 by Eriberto Giles RN  Outcome: Progressing     Problem: Infection - Adult  Goal: Absence of infection at discharge  2/20/2023 0135 by Jayne Reese RN  Outcome: Progressing  2/19/2023 1811 by Eriberto Giles RN  Outcome: Progressing  Goal: Absence of infection during hospitalization  2/20/2023 0135 by Jayne Reese RN  Outcome: Progressing  2/19/2023 1811 by Eriberto Giles RN  Outcome: Progressing     Problem: Metabolic/Fluid and Electrolytes - Adult  Goal: Electrolytes maintained within normal limits  2/20/2023 0135 by Jayne Reese RN  Outcome: Progressing  2/19/2023 1811 by Eriberto Giles RN  Outcome: Progressing     Problem: Pain  Goal: Verbalizes/displays adequate comfort level or baseline comfort level  2/20/2023 0135 by Jayne Reese RN  Outcome: Progressing  2/19/2023 1811 by Eriberto Giles RN  Outcome: Progressing     Problem: Safety - Adult  Goal: Free from fall injury  2/20/2023 0135 by Jayne Reese RN  Outcome: Progressing  2/19/2023 1811 by Eriberto Giles RN  Outcome: Progressing     Problem: Chronic Conditions and Co-morbidities  Goal: Patient's chronic conditions and co-morbidity symptoms are monitored and maintained or improved  2/20/2023 0135 by Jayne Reese RN  Outcome: Progressing  2/19/2023 1811 by Jamel Maza RN  Outcome: Progressing     Problem: Skin/Tissue Integrity  Goal: Absence of new skin breakdown  Description: 1. Monitor for areas of redness and/or skin breakdown  2. Assess vascular access sites hourly  3. Every 4-6 hours minimum:  Change oxygen saturation probe site  4. Every 4-6 hours:  If on nasal continuous positive airway pressure, respiratory therapy assess nares and determine need for appliance change or resting period.   2/20/2023 0135 by Arron Sagastume RN  Outcome: Progressing  2/19/2023 1811 by Jamel Maza RN  Outcome: Progressing     Problem: ABCDS Injury Assessment  Goal: Absence of physical injury  2/20/2023 0135 by Arron Sagastume RN  Outcome: Progressing  2/19/2023 1811 by Jamel Maza RN  Outcome: Progressing

## 2023-02-20 NOTE — PROGRESS NOTES
CLINICAL PHARMACY NOTE: MEDS TO BEDS    Total # of Prescriptions Filled: 0   The following medications were delivered to the patient:      Additional Documentation:     DID NOT HAVE THE MONEY FOR HER MEDS SO WE WILL BE TRANSFERRING TO HER PHARMACY

## 2023-02-20 NOTE — PROGRESS NOTES
Congestive Heart Failure Education completed and charted. CHF booklet given. Patient was receptive to education. Discussed the  importance of medication compliance. Discussed the importance of a heart healthy diet. Discussed 2000 mg sodium-restricted daily diet. Patient instructed to limit fluid intake to  32 to 48 oz  per day; her cardiologist-recommended amount of fluids. Patient instructed to weigh self at the same time of each day each morning, reinforced teaching to monitor for 3-5 lb weight increase over 1-2 days notify physician if change noted. Signs and symptoms of CHF discussed with patient, such as feeling more tired than normal, feeling short of breath, coughing that increases when lying down, sudden weight gain, swelling of the feet, legs or belly. Patient verbalized understanding to notify physician office if these symptoms occur. EF 55%   Pt is established with  CHF Clinic  Appt will be made for pt following this admission.    Next appt 3/16/23

## 2023-02-20 NOTE — DISCHARGE INSTRUCTIONS
You were seen and admitted with acute kidney injury from urine retention caused by constipation. Please avoid foods that cause constipation. Increase your dietary fiber. Follow-up with the stomach doctors outpatient for age-appropriate screening colonoscopy  Follow-up with urologist in 2 weeks  Follow-up with your PCP  Check your blood levels in 1 week  Watch out for symptoms of urine retention and return if you have any worsening.

## 2023-02-20 NOTE — PROGRESS NOTES
Pt. Given discharge instructions. No questions/concerns. Taken down to main lobby by wheelchair; waiting for her ride home.

## 2023-02-21 ENCOUNTER — TELEPHONE (OUTPATIENT)
Dept: OTHER | Age: 63
End: 2023-02-21

## 2023-02-21 LAB
FREE KAPPA LT CHAINS,UR: 299.03 MG/L (ref 0–32.9)
FREE LAMBDA LT CHAINS,UR: 24.49 MG/L (ref 0–3.79)
FREE UR LAMBDA EXCRETION/DAY: 38.74 MG/D
HOURS COLLECTED: ABNORMAL
PROTEIN, TOTAL URINE: 599 MG/D
URINE FREE KAPPA EXCRETION/DAY: 473.07 MG/D
URINE VOLUME: 1582

## 2023-02-21 NOTE — TELEPHONE ENCOUNTER
Contacted patient at home to see how she is doing since discharge from hospital. She says she was hospitalized for severe constipation, no chf. Says she is feeling good, denies any complaints. Reinforced importance of daily weight, following a low sodium diet and fluid restrictions. Reminded patient of next chf appt. On [email or web address] pm. Encouraged patient to call clinic if any signs of fluid overload develop and she verbalized understanding.

## 2023-02-23 LAB
MICROORGANISM SPEC CULT: NORMAL
MICROORGANISM SPEC CULT: NORMAL
SERVICE CMNT-IMP: NORMAL
SERVICE CMNT-IMP: NORMAL
SPECIMEN DESCRIPTION: NORMAL
SPECIMEN DESCRIPTION: NORMAL

## 2023-02-24 ENCOUNTER — APPOINTMENT (OUTPATIENT)
Dept: GENERAL RADIOLOGY | Age: 63
DRG: 683 | End: 2023-02-24
Payer: MEDICARE

## 2023-02-24 ENCOUNTER — HOSPITAL ENCOUNTER (INPATIENT)
Age: 63
LOS: 3 days | Discharge: HOME | DRG: 683 | End: 2023-02-27
Attending: EMERGENCY MEDICINE | Admitting: INTERNAL MEDICINE
Payer: MEDICARE

## 2023-02-24 DIAGNOSIS — N17.9 AKI (ACUTE KIDNEY INJURY) (HCC): Primary | ICD-10-CM

## 2023-02-24 DIAGNOSIS — R10.12 ABDOMINAL CRAMPING IN LEFT UPPER QUADRANT: ICD-10-CM

## 2023-02-24 LAB
ABSOLUTE EOS #: <0.03 K/UL (ref 0–0.44)
ABSOLUTE IMMATURE GRANULOCYTE: 0.06 K/UL (ref 0–0.3)
ABSOLUTE LYMPH #: 0.94 K/UL (ref 1.1–3.7)
ABSOLUTE MONO #: 0.42 K/UL (ref 0.1–1.2)
ALBUMIN SERPL-MCNC: 4.2 G/DL (ref 3.5–5.2)
ALBUMIN/GLOBULIN RATIO: 1.2 (ref 1–2.5)
ALP SERPL-CCNC: 92 U/L (ref 35–104)
ALT SERPL-CCNC: 17 U/L (ref 5–33)
ANION GAP SERPL CALCULATED.3IONS-SCNC: 14 MMOL/L (ref 9–17)
AST SERPL-CCNC: 24 U/L
BACTERIA: ABNORMAL
BASOPHILS # BLD: 0 % (ref 0–2)
BASOPHILS ABSOLUTE: <0.03 K/UL (ref 0–0.2)
BILIRUB SERPL-MCNC: 0.6 MG/DL (ref 0.3–1.2)
BILIRUBIN URINE: NEGATIVE
BUN SERPL-MCNC: 26 MG/DL (ref 8–23)
CALCIUM SERPL-MCNC: 9.1 MG/DL (ref 8.6–10.4)
CASTS UA: ABNORMAL /LPF (ref 0–2)
CASTS UA: ABNORMAL /LPF (ref 0–2)
CHLORIDE SERPL-SCNC: 99 MMOL/L (ref 98–107)
CO2 SERPL-SCNC: 22 MMOL/L (ref 20–31)
COLOR: YELLOW
COMMENT UA: ABNORMAL
CREAT SERPL-MCNC: 1.95 MG/DL (ref 0.5–0.9)
CREATININE URINE: 138.1 MG/DL (ref 28–217)
EOSINOPHIL,URINE: NORMAL
EOSINOPHILS RELATIVE PERCENT: 0 % (ref 1–4)
EPITHELIAL CELLS UA: ABNORMAL /HPF (ref 0–5)
GFR SERPL CREATININE-BSD FRML MDRD: 29 ML/MIN/1.73M2
GLUCOSE SERPL-MCNC: 131 MG/DL (ref 70–99)
GLUCOSE UR STRIP.AUTO-MCNC: NEGATIVE MG/DL
HCT VFR BLD AUTO: 37.8 % (ref 36.3–47.1)
HGB BLD-MCNC: 11.5 G/DL (ref 11.9–15.1)
IMMATURE GRANULOCYTES: 1 %
KETONES UR STRIP.AUTO-MCNC: NEGATIVE MG/DL
LEUKOCYTE ESTERASE UR QL STRIP.AUTO: ABNORMAL
LYMPHOCYTES # BLD: 20 % (ref 24–43)
MAGNESIUM SERPL-MCNC: 2 MG/DL (ref 1.6–2.6)
MCH RBC QN AUTO: 24.1 PG (ref 25.2–33.5)
MCHC RBC AUTO-ENTMCNC: 30.4 G/DL (ref 28.4–34.8)
MCV RBC AUTO: 79.2 FL (ref 82.6–102.9)
MONOCYTES # BLD: 9 % (ref 3–12)
NITRITE UR QL STRIP.AUTO: NEGATIVE
NRBC AUTOMATED: 0 PER 100 WBC
PDW BLD-RTO: 18.1 % (ref 11.8–14.4)
PLATELET # BLD AUTO: 291 K/UL (ref 138–453)
PMV BLD AUTO: 11.1 FL (ref 8.1–13.5)
POTASSIUM SERPL-SCNC: 4.1 MMOL/L (ref 3.7–5.3)
PROT SERPL-MCNC: 7.8 G/DL (ref 6.4–8.3)
PROT UR STRIP.AUTO-MCNC: 6 MG/DL (ref 5–8)
PROT UR STRIP.AUTO-MCNC: NEGATIVE MG/DL
RBC # BLD: 4.77 M/UL (ref 3.95–5.11)
RBC # BLD: ABNORMAL 10*6/UL
RBC CLUMPS #/AREA URNS AUTO: ABNORMAL /HPF (ref 0–2)
SEG NEUTROPHILS: 69 % (ref 36–65)
SEGMENTED NEUTROPHILS ABSOLUTE COUNT: 3.23 K/UL (ref 1.5–8.1)
SODIUM SERPL-SCNC: 135 MMOL/L (ref 135–144)
SODIUM,UR: 64 MMOL/L
SPECIFIC GRAVITY UA: 1.02 (ref 1–1.03)
TURBIDITY: ABNORMAL
URINE HGB: ABNORMAL
UROBILINOGEN, URINE: NORMAL
WBC # BLD AUTO: 4.7 K/UL (ref 3.5–11.3)
WBC UA: ABNORMAL /HPF (ref 0–5)

## 2023-02-24 PROCEDURE — 87086 URINE CULTURE/COLONY COUNT: CPT

## 2023-02-24 PROCEDURE — 87077 CULTURE AEROBIC IDENTIFY: CPT

## 2023-02-24 PROCEDURE — 99285 EMERGENCY DEPT VISIT HI MDM: CPT

## 2023-02-24 PROCEDURE — 82570 ASSAY OF URINE CREATININE: CPT

## 2023-02-24 PROCEDURE — 6360000002 HC RX W HCPCS: Performed by: STUDENT IN AN ORGANIZED HEALTH CARE EDUCATION/TRAINING PROGRAM

## 2023-02-24 PROCEDURE — 96374 THER/PROPH/DIAG INJ IV PUSH: CPT

## 2023-02-24 PROCEDURE — 87186 SC STD MICRODIL/AGAR DIL: CPT

## 2023-02-24 PROCEDURE — 1200000000 HC SEMI PRIVATE

## 2023-02-24 PROCEDURE — 99223 1ST HOSP IP/OBS HIGH 75: CPT | Performed by: INTERNAL MEDICINE

## 2023-02-24 PROCEDURE — 96372 THER/PROPH/DIAG INJ SC/IM: CPT

## 2023-02-24 PROCEDURE — 2580000003 HC RX 258: Performed by: STUDENT IN AN ORGANIZED HEALTH CARE EDUCATION/TRAINING PROGRAM

## 2023-02-24 PROCEDURE — 85025 COMPLETE CBC W/AUTO DIFF WBC: CPT

## 2023-02-24 PROCEDURE — 81001 URINALYSIS AUTO W/SCOPE: CPT

## 2023-02-24 PROCEDURE — 51798 US URINE CAPACITY MEASURE: CPT

## 2023-02-24 PROCEDURE — 87205 SMEAR GRAM STAIN: CPT

## 2023-02-24 PROCEDURE — 84300 ASSAY OF URINE SODIUM: CPT

## 2023-02-24 PROCEDURE — 74018 RADEX ABDOMEN 1 VIEW: CPT

## 2023-02-24 PROCEDURE — 80053 COMPREHEN METABOLIC PANEL: CPT

## 2023-02-24 PROCEDURE — 83735 ASSAY OF MAGNESIUM: CPT

## 2023-02-24 RX ORDER — SODIUM CHLORIDE 9 MG/ML
INJECTION, SOLUTION INTRAVENOUS CONTINUOUS
Status: DISCONTINUED | OUTPATIENT
Start: 2023-02-24 | End: 2023-02-27 | Stop reason: HOSPADM

## 2023-02-24 RX ORDER — ONDANSETRON 2 MG/ML
4 INJECTION INTRAMUSCULAR; INTRAVENOUS EVERY 6 HOURS PRN
Status: DISCONTINUED | OUTPATIENT
Start: 2023-02-24 | End: 2023-02-27 | Stop reason: HOSPADM

## 2023-02-24 RX ORDER — DEXTROSE MONOHYDRATE 100 MG/ML
INJECTION, SOLUTION INTRAVENOUS CONTINUOUS PRN
Status: DISCONTINUED | OUTPATIENT
Start: 2023-02-24 | End: 2023-02-27 | Stop reason: HOSPADM

## 2023-02-24 RX ORDER — ACETAMINOPHEN 650 MG/1
650 SUPPOSITORY RECTAL EVERY 6 HOURS PRN
Status: DISCONTINUED | OUTPATIENT
Start: 2023-02-24 | End: 2023-02-27 | Stop reason: HOSPADM

## 2023-02-24 RX ORDER — SENNA AND DOCUSATE SODIUM 50; 8.6 MG/1; MG/1
2 TABLET, FILM COATED ORAL DAILY PRN
Status: DISCONTINUED | OUTPATIENT
Start: 2023-02-24 | End: 2023-02-27 | Stop reason: HOSPADM

## 2023-02-24 RX ORDER — HEPARIN SODIUM 5000 [USP'U]/ML
5000 INJECTION, SOLUTION INTRAVENOUS; SUBCUTANEOUS EVERY 8 HOURS SCHEDULED
Status: DISCONTINUED | OUTPATIENT
Start: 2023-02-24 | End: 2023-02-24

## 2023-02-24 RX ORDER — POLYETHYLENE GLYCOL 3350 17 G/17G
17 POWDER, FOR SOLUTION ORAL DAILY PRN
Status: DISCONTINUED | OUTPATIENT
Start: 2023-02-24 | End: 2023-02-27 | Stop reason: HOSPADM

## 2023-02-24 RX ORDER — ONDANSETRON 4 MG/1
4 TABLET, ORALLY DISINTEGRATING ORAL EVERY 8 HOURS PRN
Status: DISCONTINUED | OUTPATIENT
Start: 2023-02-24 | End: 2023-02-27 | Stop reason: HOSPADM

## 2023-02-24 RX ORDER — FAMOTIDINE 20 MG/1
20 TABLET, FILM COATED ORAL DAILY
Status: DISCONTINUED | OUTPATIENT
Start: 2023-02-25 | End: 2023-02-26

## 2023-02-24 RX ORDER — SODIUM CHLORIDE 0.9 % (FLUSH) 0.9 %
5-40 SYRINGE (ML) INJECTION EVERY 12 HOURS SCHEDULED
Status: DISCONTINUED | OUTPATIENT
Start: 2023-02-24 | End: 2023-02-27 | Stop reason: HOSPADM

## 2023-02-24 RX ORDER — ONDANSETRON 2 MG/ML
4 INJECTION INTRAMUSCULAR; INTRAVENOUS ONCE
Status: COMPLETED | OUTPATIENT
Start: 2023-02-24 | End: 2023-02-24

## 2023-02-24 RX ORDER — POLYETHYLENE GLYCOL 3350 17 G/17G
17 POWDER, FOR SOLUTION ORAL DAILY
Status: DISCONTINUED | OUTPATIENT
Start: 2023-02-25 | End: 2023-02-24 | Stop reason: SDUPTHER

## 2023-02-24 RX ORDER — BISACODYL 10 MG
10 SUPPOSITORY, RECTAL RECTAL DAILY PRN
Status: DISCONTINUED | OUTPATIENT
Start: 2023-02-24 | End: 2023-02-27 | Stop reason: HOSPADM

## 2023-02-24 RX ORDER — CLOPIDOGREL BISULFATE 75 MG/1
75 TABLET ORAL DAILY
Status: DISCONTINUED | OUTPATIENT
Start: 2023-02-25 | End: 2023-02-27 | Stop reason: HOSPADM

## 2023-02-24 RX ORDER — 0.9 % SODIUM CHLORIDE 0.9 %
500 INTRAVENOUS SOLUTION INTRAVENOUS ONCE
Status: DISCONTINUED | OUTPATIENT
Start: 2023-02-24 | End: 2023-02-24

## 2023-02-24 RX ORDER — 0.9 % SODIUM CHLORIDE 0.9 %
500 INTRAVENOUS SOLUTION INTRAVENOUS ONCE
Status: COMPLETED | OUTPATIENT
Start: 2023-02-24 | End: 2023-02-24

## 2023-02-24 RX ORDER — SODIUM CHLORIDE 9 MG/ML
INJECTION, SOLUTION INTRAVENOUS PRN
Status: DISCONTINUED | OUTPATIENT
Start: 2023-02-24 | End: 2023-02-27 | Stop reason: HOSPADM

## 2023-02-24 RX ORDER — ORPHENADRINE CITRATE 30 MG/ML
60 INJECTION INTRAMUSCULAR; INTRAVENOUS ONCE
Status: COMPLETED | OUTPATIENT
Start: 2023-02-24 | End: 2023-02-24

## 2023-02-24 RX ORDER — INSULIN GLARGINE 100 [IU]/ML
10 INJECTION, SOLUTION SUBCUTANEOUS NIGHTLY
Status: DISCONTINUED | OUTPATIENT
Start: 2023-02-24 | End: 2023-02-27 | Stop reason: HOSPADM

## 2023-02-24 RX ORDER — SODIUM CHLORIDE 0.9 % (FLUSH) 0.9 %
5-40 SYRINGE (ML) INJECTION PRN
Status: DISCONTINUED | OUTPATIENT
Start: 2023-02-24 | End: 2023-02-27 | Stop reason: HOSPADM

## 2023-02-24 RX ORDER — ACETAMINOPHEN 325 MG/1
650 TABLET ORAL EVERY 6 HOURS PRN
Status: DISCONTINUED | OUTPATIENT
Start: 2023-02-24 | End: 2023-02-27 | Stop reason: HOSPADM

## 2023-02-24 RX ORDER — ATORVASTATIN CALCIUM 40 MG/1
40 TABLET, FILM COATED ORAL DAILY
Status: DISCONTINUED | OUTPATIENT
Start: 2023-02-24 | End: 2023-02-27 | Stop reason: HOSPADM

## 2023-02-24 RX ADMIN — SODIUM CHLORIDE 500 ML: 0.9 INJECTION, SOLUTION INTRAVENOUS at 22:54

## 2023-02-24 RX ADMIN — ONDANSETRON 4 MG: 2 INJECTION INTRAMUSCULAR; INTRAVENOUS at 20:58

## 2023-02-24 RX ADMIN — SODIUM CHLORIDE 500 ML: 9 INJECTION, SOLUTION INTRAVENOUS at 20:58

## 2023-02-24 RX ADMIN — ORPHENADRINE CITRATE 60 MG: 30 INJECTION INTRAMUSCULAR; INTRAVENOUS at 20:59

## 2023-02-24 ASSESSMENT — ENCOUNTER SYMPTOMS
DIARRHEA: 0
SHORTNESS OF BREATH: 0
EYE REDNESS: 0
RHINORRHEA: 0
ABDOMINAL PAIN: 1
NAUSEA: 0
VOMITING: 0

## 2023-02-24 ASSESSMENT — PAIN DESCRIPTION - LOCATION: LOCATION: ABDOMEN

## 2023-02-24 ASSESSMENT — PAIN SCALES - GENERAL: PAINLEVEL_OUTOF10: 8

## 2023-02-24 ASSESSMENT — PAIN DESCRIPTION - DESCRIPTORS: DESCRIPTORS: ACHING

## 2023-02-25 PROBLEM — N39.0 UTI (URINARY TRACT INFECTION): Status: ACTIVE | Noted: 2023-02-25

## 2023-02-25 PROBLEM — N18.31 STAGE 3A CHRONIC KIDNEY DISEASE (HCC): Status: ACTIVE | Noted: 2023-02-25

## 2023-02-25 LAB
ABSOLUTE EOS #: <0.03 K/UL (ref 0–0.44)
ABSOLUTE IMMATURE GRANULOCYTE: <0.03 K/UL (ref 0–0.3)
ABSOLUTE LYMPH #: 0.7 K/UL (ref 1.1–3.7)
ABSOLUTE MONO #: 0.36 K/UL (ref 0.1–1.2)
ANION GAP SERPL CALCULATED.3IONS-SCNC: 12 MMOL/L (ref 9–17)
BASOPHILS # BLD: 0 % (ref 0–2)
BASOPHILS ABSOLUTE: <0.03 K/UL (ref 0–0.2)
BUN SERPL-MCNC: 24 MG/DL (ref 8–23)
CALCIUM SERPL-MCNC: 8.4 MG/DL (ref 8.6–10.4)
CHLORIDE SERPL-SCNC: 106 MMOL/L (ref 98–107)
CO2 SERPL-SCNC: 21 MMOL/L (ref 20–31)
CREAT SERPL-MCNC: 1.57 MG/DL (ref 0.5–0.9)
EKG ATRIAL RATE: 80 BPM
EKG P AXIS: 18 DEGREES
EKG P-R INTERVAL: 176 MS
EKG Q-T INTERVAL: 408 MS
EKG QRS DURATION: 72 MS
EKG QTC CALCULATION (BAZETT): 470 MS
EKG R AXIS: 60 DEGREES
EKG T AXIS: 20 DEGREES
EKG VENTRICULAR RATE: 80 BPM
EOSINOPHILS RELATIVE PERCENT: 0 % (ref 1–4)
GFR SERPL CREATININE-BSD FRML MDRD: 38 ML/MIN/1.73M2
GLUCOSE BLD-MCNC: 114 MG/DL (ref 65–105)
GLUCOSE BLD-MCNC: 118 MG/DL (ref 65–105)
GLUCOSE BLD-MCNC: 127 MG/DL (ref 65–105)
GLUCOSE BLD-MCNC: 137 MG/DL (ref 65–105)
GLUCOSE SERPL-MCNC: 123 MG/DL (ref 70–99)
HCT VFR BLD AUTO: 29.9 % (ref 36.3–47.1)
HGB BLD-MCNC: 9.1 G/DL (ref 11.9–15.1)
IMMATURE GRANULOCYTES: 1 %
LYMPHOCYTES # BLD: 18 % (ref 24–43)
MCH RBC QN AUTO: 23.9 PG (ref 25.2–33.5)
MCHC RBC AUTO-ENTMCNC: 30.4 G/DL (ref 28.4–34.8)
MCV RBC AUTO: 78.7 FL (ref 82.6–102.9)
MONOCYTES # BLD: 9 % (ref 3–12)
NRBC AUTOMATED: 0 PER 100 WBC
PDW BLD-RTO: 17.7 % (ref 11.8–14.4)
PLATELET # BLD AUTO: 196 K/UL (ref 138–453)
PMV BLD AUTO: 10.9 FL (ref 8.1–13.5)
POTASSIUM SERPL-SCNC: 4 MMOL/L (ref 3.7–5.3)
RBC # BLD: 3.8 M/UL (ref 3.95–5.11)
RBC # BLD: ABNORMAL 10*6/UL
SEG NEUTROPHILS: 72 % (ref 36–65)
SEGMENTED NEUTROPHILS ABSOLUTE COUNT: 2.82 K/UL (ref 1.5–8.1)
SODIUM SERPL-SCNC: 139 MMOL/L (ref 135–144)
UREA NITROGEN, UR: 646 MG/DL
WBC # BLD AUTO: 3.9 K/UL (ref 3.5–11.3)

## 2023-02-25 PROCEDURE — 6370000000 HC RX 637 (ALT 250 FOR IP)

## 2023-02-25 PROCEDURE — 82947 ASSAY GLUCOSE BLOOD QUANT: CPT

## 2023-02-25 PROCEDURE — 6360000002 HC RX W HCPCS: Performed by: STUDENT IN AN ORGANIZED HEALTH CARE EDUCATION/TRAINING PROGRAM

## 2023-02-25 PROCEDURE — 99221 1ST HOSP IP/OBS SF/LOW 40: CPT | Performed by: INTERNAL MEDICINE

## 2023-02-25 PROCEDURE — 85025 COMPLETE CBC W/AUTO DIFF WBC: CPT

## 2023-02-25 PROCEDURE — 94640 AIRWAY INHALATION TREATMENT: CPT

## 2023-02-25 PROCEDURE — 6370000000 HC RX 637 (ALT 250 FOR IP): Performed by: STUDENT IN AN ORGANIZED HEALTH CARE EDUCATION/TRAINING PROGRAM

## 2023-02-25 PROCEDURE — 2580000003 HC RX 258: Performed by: STUDENT IN AN ORGANIZED HEALTH CARE EDUCATION/TRAINING PROGRAM

## 2023-02-25 PROCEDURE — 51702 INSERT TEMP BLADDER CATH: CPT

## 2023-02-25 PROCEDURE — 80048 BASIC METABOLIC PNL TOTAL CA: CPT

## 2023-02-25 PROCEDURE — 94761 N-INVAS EAR/PLS OXIMETRY MLT: CPT

## 2023-02-25 PROCEDURE — 84540 ASSAY OF URINE/UREA-N: CPT

## 2023-02-25 PROCEDURE — 36415 COLL VENOUS BLD VENIPUNCTURE: CPT

## 2023-02-25 PROCEDURE — 93010 ELECTROCARDIOGRAM REPORT: CPT | Performed by: INTERNAL MEDICINE

## 2023-02-25 PROCEDURE — 87086 URINE CULTURE/COLONY COUNT: CPT

## 2023-02-25 PROCEDURE — 51798 US URINE CAPACITY MEASURE: CPT

## 2023-02-25 PROCEDURE — 6360000002 HC RX W HCPCS

## 2023-02-25 PROCEDURE — 1200000000 HC SEMI PRIVATE

## 2023-02-25 PROCEDURE — 93005 ELECTROCARDIOGRAM TRACING: CPT

## 2023-02-25 RX ORDER — BUSPIRONE HYDROCHLORIDE 15 MG/1
30 TABLET ORAL 3 TIMES DAILY PRN
Status: DISCONTINUED | OUTPATIENT
Start: 2023-02-25 | End: 2023-02-27 | Stop reason: HOSPADM

## 2023-02-25 RX ORDER — ALBUTEROL SULFATE 90 UG/1
2 AEROSOL, METERED RESPIRATORY (INHALATION) 3 TIMES DAILY
Status: DISCONTINUED | OUTPATIENT
Start: 2023-02-25 | End: 2023-02-26

## 2023-02-25 RX ORDER — ALBUTEROL SULFATE 90 UG/1
2 AEROSOL, METERED RESPIRATORY (INHALATION) EVERY 8 HOURS
Status: DISCONTINUED | OUTPATIENT
Start: 2023-02-25 | End: 2023-02-27 | Stop reason: HOSPADM

## 2023-02-25 RX ORDER — ALBUTEROL SULFATE 2.5 MG/3ML
2.5 SOLUTION RESPIRATORY (INHALATION) EVERY 6 HOURS PRN
Status: DISCONTINUED | OUTPATIENT
Start: 2023-02-25 | End: 2023-02-27 | Stop reason: HOSPADM

## 2023-02-25 RX ORDER — ALBUTEROL SULFATE 90 UG/1
2 AEROSOL, METERED RESPIRATORY (INHALATION) EVERY 6 HOURS PRN
Status: DISCONTINUED | OUTPATIENT
Start: 2023-02-25 | End: 2023-02-26

## 2023-02-25 RX ORDER — CYCLOBENZAPRINE HCL 10 MG
10 TABLET ORAL NIGHTLY PRN
Status: DISCONTINUED | OUTPATIENT
Start: 2023-02-26 | End: 2023-02-25

## 2023-02-25 RX ORDER — ALBUTEROL SULFATE 2.5 MG/3ML
2.5 SOLUTION RESPIRATORY (INHALATION) EVERY 6 HOURS PRN
Status: DISCONTINUED | OUTPATIENT
Start: 2023-02-25 | End: 2023-02-25

## 2023-02-25 RX ORDER — CYCLOBENZAPRINE HCL 10 MG
10 TABLET ORAL NIGHTLY PRN
Status: DISCONTINUED | OUTPATIENT
Start: 2023-02-25 | End: 2023-02-27 | Stop reason: HOSPADM

## 2023-02-25 RX ORDER — BUDESONIDE AND FORMOTEROL FUMARATE DIHYDRATE 80; 4.5 UG/1; UG/1
2 AEROSOL RESPIRATORY (INHALATION) 2 TIMES DAILY
Status: DISCONTINUED | OUTPATIENT
Start: 2023-02-25 | End: 2023-02-27 | Stop reason: HOSPADM

## 2023-02-25 RX ADMIN — BUSPIRONE HYDROCHLORIDE 30 MG: 15 TABLET ORAL at 21:12

## 2023-02-25 RX ADMIN — BUDESONIDE AND FORMOTEROL FUMARATE DIHYDRATE 2 PUFF: 80; 4.5 AEROSOL RESPIRATORY (INHALATION) at 22:03

## 2023-02-25 RX ADMIN — TIOTROPIUM BROMIDE INHALATION SPRAY 2 PUFF: 3.12 SPRAY, METERED RESPIRATORY (INHALATION) at 08:58

## 2023-02-25 RX ADMIN — BUSPIRONE HYDROCHLORIDE 30 MG: 15 TABLET ORAL at 11:08

## 2023-02-25 RX ADMIN — ONDANSETRON 4 MG: 2 INJECTION INTRAMUSCULAR; INTRAVENOUS at 17:31

## 2023-02-25 RX ADMIN — DESMOPRESSIN ACETATE 40 MG: 0.2 TABLET ORAL at 00:48

## 2023-02-25 RX ADMIN — ALBUTEROL SULFATE 2.5 MG: 2.5 SOLUTION RESPIRATORY (INHALATION) at 09:07

## 2023-02-25 RX ADMIN — FAMOTIDINE 20 MG: 20 TABLET, FILM COATED ORAL at 08:36

## 2023-02-25 RX ADMIN — DOCUSATE SODIUM 50 MG AND SENNOSIDES 8.6 MG 2 TABLET: 8.6; 5 TABLET, FILM COATED ORAL at 00:48

## 2023-02-25 RX ADMIN — ALBUTEROL SULFATE 2 PUFF: 90 AEROSOL, METERED RESPIRATORY (INHALATION) at 19:24

## 2023-02-25 RX ADMIN — APIXABAN 5 MG: 5 TABLET, FILM COATED ORAL at 08:36

## 2023-02-25 RX ADMIN — CLOPIDOGREL 75 MG: 75 TABLET, FILM COATED ORAL at 08:36

## 2023-02-25 RX ADMIN — SODIUM CHLORIDE: 9 INJECTION, SOLUTION INTRAVENOUS at 00:44

## 2023-02-25 RX ADMIN — CYCLOBENZAPRINE 10 MG: 10 TABLET, FILM COATED ORAL at 22:19

## 2023-02-25 RX ADMIN — ONDANSETRON 4 MG: 2 INJECTION INTRAMUSCULAR; INTRAVENOUS at 08:37

## 2023-02-25 RX ADMIN — CEFTRIAXONE SODIUM 1000 MG: 10 INJECTION, POWDER, FOR SOLUTION INTRAVENOUS at 00:49

## 2023-02-25 RX ADMIN — APIXABAN 5 MG: 5 TABLET, FILM COATED ORAL at 21:12

## 2023-02-25 RX ADMIN — CEFTRIAXONE SODIUM 1000 MG: 10 INJECTION, POWDER, FOR SOLUTION INTRAVENOUS at 23:30

## 2023-02-25 RX ADMIN — DESMOPRESSIN ACETATE 40 MG: 0.2 TABLET ORAL at 21:12

## 2023-02-25 ASSESSMENT — ENCOUNTER SYMPTOMS
SHORTNESS OF BREATH: 0
VOMITING: 0
ABDOMINAL PAIN: 1
BLOOD IN STOOL: 0
ABDOMINAL DISTENTION: 1
CONSTIPATION: 1
NAUSEA: 1

## 2023-02-25 ASSESSMENT — PAIN SCALES - GENERAL
PAINLEVEL_OUTOF10: 3
PAINLEVEL_OUTOF10: 7

## 2023-02-25 NOTE — H&P
[address]     Department of Internal Medicine - Staff Internal Medicine Teaching Service          ADMISSION NOTE/HISTORY AND PHYSICAL EXAMINATION   Date: 2/25/2023  Patient Name: Alo Klein  Date of admission: 2/24/2023  8:00 PM  YOB: 1960  PCP: Silvia Koch MD  History Obtained From:  patient, electronic medical record    CHIEF COMPLAINT     Abdominal cramping    HISTORY OF PRESENTING ILLNESS     The patient is a 63 y.o. female with PMHx of COPD, Afib(on eliquis),LOCO, T2DM, CKD, CHF, chronic constipation, and recent admission for urinary retention 2/2 constipation and HARVEY. They present to the ED with same chief complaint of abdominal cramping. The patient stated that she has been having sharp pain in her LUQ and also some cramps in her LLQ. Mixed reports of no bowel movements since she was discharged, reported as she had not had a bowel movement but patient stated that when she used her suppository she was able to have some stool output. Otherwise the patient reports chills and nausea. She also reports burning with and increased frequency of urination since removal of her jeronimo catheter last admission. Denies fevers, SOB, cough, or chest pain. Review of Systems   Constitutional:  Positive for chills. Negative for fever and unexpected weight change. Respiratory:  Negative for shortness of breath. Cardiovascular:  Negative for chest pain and leg swelling. Gastrointestinal:  Positive for abdominal distention, abdominal pain, constipation and nausea. Negative for blood in stool and vomiting. Genitourinary:  Positive for difficulty urinating, dysuria and frequency. Musculoskeletal:  Positive for myalgias (body aches). Initial Vitals on Presentation:  Temp: 98.2, RR: 20, HR 93, /63, SPO2 96% on RA.     Initial Course in the ED:   - Pt presented with above complaint, concern for continued urinary retention associated with her chronic constipation.  - Lab work showing HARVEY(BUN 26, Cr 1.95), UA showed many bacteria and 10-20 WBC. - No imaging in ED. - They were given fluid boluses, lower volume d/t history of CHF, zofran for nausea and norflex for her abdominal cramps. PAST MEDICAL HISTORY     Past Medical History:   Diagnosis Date    Acute on chronic systolic CHF (congestive heart failure) ([facility] 12/20/2021    Asthma     Blood circulation, collateral     CAD (coronary artery disease)     COPD (chronic obstructive pulmonary disease) (HCC)     COPD exacerbation ([facility] [address] 12/18/2021    Diabetes mellitus ([facility] [address]     Hyperlipidemia     Hypertension     [facility]schem[facility] 10/28/2021    Movement disorder     B/L torn rotator cuff. Neuropathy     PAD (peripheral artery disease) ([facility] [address]     Tobacco abuse 11/19/2016    Tobacco abuse counseling 7/25/2021       PAST SURGICAL HISTORY     Past Surgical History:   Procedure Laterality Date    CORONARY ANGIOPLASTY WITH STENT PLACEMENT      x2    CORONARY ANGIOPLASTY WITH STENT PLACEMENT  02/11/2019    JOSE MARTIN to RCA per Dr. Ofelia Barr radial approuch    INNER EAR SURGERY Right     TONSILLECTOMY         ALLERGIES     Codeine, Lac bovis, and Morphine    MEDICATIONS PRIOR TO ADMISSION     Prior to Admission medications    Medication Sig Start Date End Date Taking? Authorizing Provider   bisacodyl (DULCOLAX) 10 MG suppository Place 1 suppository rectally daily as needed for Constipation 2/20/23 3/22/23  Jossy Madera MD   calcium carbonate (TUMS) 500 MG chewable tablet Take 1 tablet by mouth 3 times daily as needed for Heartburn 2/20/23 3/22/23  Jossy Madera MD   Hydrocortisone, Perianal, (PROCTO-BASHIR) 1 % cream 2 times daily Apply topically 2 times daily.  2/20/23   Jossy Madera MD   polyethylene glycol [facility]) 17 g packet Take 17 g by mouth daily as needed for Constipation 2/20/23 3/22/23  Jossy Madera MD   senna (SENOKOT) 8.6 MG tablet Take 1 tablet by mouth daily 2/21/23 3/23/23 Ashley Vasques MD   insulin detemir (LEVEMIR FLEXTOUCH) 100 UNIT/ML injection pen Inject 40 Units into the skin daily  Patient not taking: Reported on 2/14/2023 12/18/22   Polly Lomeli MD   Insulin Pen Needle (PEN NEEDLES) 31G X 5 MM MISC 1 box by Does not apply route daily 12/18/22   Polly Lomeli MD   busPIRone (BUSPAR) 30 MG tablet Take 30 mg by mouth 3 times daily as needed 12/7/22   Historical Provider, MD   acetaminophen (TYLENOL) 325 MG tablet Take 650 mg by mouth every 4-6 hours as needed 11/22/22   Historical Provider, MD   furosemide (LASIX) 40 MG tablet Take 1 tablet by mouth in the morning. Hold for 2 more days. 7/22/22   Anna Vasquez MD   mometasone-formoterol (DULERA) 200-5 MCG/ACT inhaler Inhale into the lungs 3/3/22   Historical Provider, MD   lisinopril (PRINIVIL;ZESTRIL) 10 MG tablet Take 1 tablet by mouth daily 10/9/21   Lisa Alaniz MD   clopidogrel (PLAVIX) 75 MG tablet Take 1 tablet by mouth daily 10/9/21   Chidi Magana MD   famotidine (PEPCID) 20 MG tablet Take 20 mg by mouth 2 times daily    Historical Provider, MD   albuterol sulfate HFA (PROVENTIL;VENTOLIN;PROAIR) 108 (90 Base) MCG/ACT inhaler Inhale 2 puffs into the lungs 3 times daily    Historical Provider, MD   lidocaine (LIDODERM) 5 % Place 1 patch onto the skin as needed for Pain (as needed for rotator cuff pain) 12 hours on, 12 hours off. Historical Provider, MD   apixaban (ELIQUIS) 5 MG TABS tablet Take 1 tablet by mouth 2 times daily 10/5/21   Jean Lopez MD   atorvastatin (LIPITOR) 40 MG tablet Take 1 tablet by mouth daily 7/27/21   Falguni Adkins MD   Dulaglutide (TRULICITY) 1.5 PJ/6.7UB SOPN Inject 1.5 mg into the skin once a week    Historical Provider, MD   nitroGLYCERIN (NITROSTAT) 0.4 MG SL tablet up to max of 3 total doses.  If no relief after 1 dose, call 911. 2/12/19   Andres Green APRN - CNP   metoprolol tartrate (LOPRESSOR) 25 MG tablet Take 0.5 tablets by mouth 2 times daily 2/12/19 Danya Duverney, APRN - CNP   Lancets MISC 1 each by Does not apply route daily 19   Kary Helm MD   blood glucose monitor strips Test 3 times a day & as needed for symptoms of irregular blood glucose. 19   Kary Helm MD   glucose monitoring kit (FREESTYLE) monitoring kit 1 kit by Does not apply route daily 19   Kary Helm MD   aspirin 81 MG chewable tablet Take 1 tablet by mouth daily 19  Danya Duverney, APRN - CNP   albuterol (PROVENTIL) (5 MG/ML) 0.5% nebulizer solution Take 0.5 mLs by nebulization every 6 hours as needed for Wheezing 3/16/18   Marisol Yung MD   tiotropium (SPIRIVA) 18 MCG inhalation capsule Inhale 1 capsule into the lungs daily Pt told that she was using this medication at home. 3/16/18   Marisol Yung MD       SOCIAL HISTORY     Tobacco: current smoker  Social History     Tobacco Use   Smoking Status Every Day    Packs/day: 0.50    Years: 45.00    Pack years: 22.50    Types: Cigarettes   Smokeless Tobacco Former      Alcohol: denied  Illicits: denied    FAMILY HISTORY     Family History   Problem Relation Age of Onset    Hypertension Mother     Hypertension Father     Diabetes Sister     Diabetes Brother     Stroke Brother        PHYSICAL EXAM     Vitals: BP 92/60   Pulse 78   Temp 97.5 °F (36.4 °C) (Oral)   Resp 15   SpO2 95%   Tmax: Temp (24hrs), Av.9 °F (36.6 °C), Min:97.5 °F (36.4 °C), Max:98.2 °F (36.8 °C)    Last Body weight:   Wt Readings from Last 3 Encounters:   23 222 lb 10.6 oz (101 kg)   23 239 lb 9.6 oz (108.7 kg)   22 253 lb 8.5 oz (115 kg)     BMI: 30-34.9 - Obesity Grade I    PHYSICAL EXAMINATION:  Constitutional: This is a well developed, Obese, 63 y.o. female who is A&Ox3, cooperative and in no apparent distress. Head: Atraumatic and Normocephalic   EENT:  PERRLA. No conjunctival injections. Septum was midline, mucosa was without erythema, exudates or cobblestoning.   No thrush was noted. Neck: Supple without thyromegaly. No elevated JVP. Trachea was midline. Respiratory: Chest was symmetrical without dullness to percussion. Breath sounds bilaterally were clear to auscultation. There were no wheezes, rhonchi or rales. There is no intercostal retraction or use of accessory muscles. Cardiovascular: Regular without murmur, clicks, gallops or rubs. Abdomen: distended, soft, bowel sounds present, tender to palpation diffusely but especially in LUQ. Musculoskeletal: Normal curvature of the spine. No gross muscle weakness. Extremities:  No lower extremity edema, ulcerations, tenderness, varicosities or erythema. Muscle size, tone and strength are normal.  No involuntary movements are noted. Skin:  Warm and dry. Good color, turgor and pigmentation. No lesions or scars.   No cyanosis or clubbing  Neurological/Psychiatric: The patient's general behavior, level of consciousness, thought content and emotional status is normal.          INVESTIGATIONS     Laboratory Testing:     Recent Results (from the past 24 hour(s))   CBC with Auto Differential    Collection Time: 02/24/23  8:40 PM   Result Value Ref Range    WBC 4.7 3.5 - 11.3 k/uL    RBC 4.77 3.95 - 5.11 m/uL    Hemoglobin 11.5 (L) 11.9 - 15.1 g/dL    Hematocrit 37.8 36.3 - 47.1 %    MCV 79.2 (L) 82.6 - 102.9 fL    MCH 24.1 (L) 25.2 - 33.5 pg    MCHC 30.4 28.4 - 34.8 g/dL    RDW 18.1 (H) 11.8 - 14.4 %    Platelets 391 649 - 196 k/uL    MPV 11.1 8.1 - 13.5 fL    NRBC Automated 0.0 0.0 per 100 WBC    RBC Morphology ANISOCYTOSIS PRESENT     Seg Neutrophils 69 (H) 36 - 65 %    Lymphocytes 20 (L) 24 - 43 %    Monocytes 9 3 - 12 %    Eosinophils % 0 (L) 1 - 4 %    Basophils 0 0 - 2 %    Immature Granulocytes 1 (H) 0 %    Segs Absolute 3.23 1.50 - 8.10 k/uL    Absolute Lymph # 0.94 (L) 1.10 - 3.70 k/uL    Absolute Mono # 0.42 0.10 - 1.20 k/uL    Absolute Eos # <0.03 0.00 - 0.44 k/uL    Basophils Absolute <0.03 0.00 - 0.20 k/uL Absolute Immature Granulocyte 0.06 0.00 - 0.30 k/uL   CMP    Collection Time: 02/24/23  8:40 PM   Result Value Ref Range    Glucose 131 (H) 70 - 99 mg/dL    BUN 26 (H) 8 - 23 mg/dL    Creatinine 1.95 (H) 0.50 - 0.90 mg/dL    Est, Glom Filt Rate 29 (L) >60 mL/min/1.73m2    Calcium 9.1 8.6 - 10.4 mg/dL    Sodium 135 135 - 144 mmol/L    Potassium 4.1 3.7 - 5.3 mmol/L    Chloride 99 98 - 107 mmol/L    CO2 22 20 - 31 mmol/L    Anion Gap 14 9 - 17 mmol/L    Alkaline Phosphatase 92 35 - 104 U/L    ALT 17 5 - 33 U/L    AST 24 <32 U/L    Total Bilirubin 0.6 0.3 - 1.2 mg/dL    Total Protein 7.8 6.4 - 8.3 g/dL    Albumin 4.2 3.5 - 5.2 g/dL    Albumin/Globulin Ratio 1.2 1.0 - 2.5   Magnesium    Collection Time: 02/24/23  8:40 PM   Result Value Ref Range    Magnesium 2.0 1.6 - 2.6 mg/dL   Urinalysis with Reflex to Culture    Collection Time: 02/24/23  9:44 PM    Specimen: Urine   Result Value Ref Range    Color, UA Yellow Yellow    Turbidity UA Cloudy (A) Clear    Glucose, Ur NEGATIVE NEGATIVE    Bilirubin Urine NEGATIVE NEGATIVE    Ketones, Urine NEGATIVE NEGATIVE    Specific Gravity, UA 1.020 1.005 - 1.030    Urine Hgb TRACE (A) NEGATIVE    pH, UA 6.0 5.0 - 8.0    Protein, UA NEGATIVE NEGATIVE    Urobilinogen, Urine Normal Normal    Nitrite, Urine NEGATIVE NEGATIVE    Leukocyte Esterase, Urine MODERATE (A) NEGATIVE    Urinalysis Comments Culture ordered based on defined criteria.     SODIUM, URINE, RANDOM    Collection Time: 02/24/23  9:44 PM   Result Value Ref Range    Sodium,Ur 64 mmol/L   CREATININE, RANDOM URINE    Collection Time: 02/24/23  9:44 PM   Result Value Ref Range    Creatinine, Ur 138.1 28.0 - 217.0 mg/dL   EOSINOPHILS, URINE    Collection Time: 02/24/23  9:44 PM   Result Value Ref Range    Eosinophil, Ur NONE SEEN NONE SEEN   Microscopic Urinalysis    Collection Time: 02/24/23  9:44 PM   Result Value Ref Range    WBC, UA 20 TO 50 0 - 5 /HPF    RBC, UA 0 TO 2 0 - 2 /HPF    Casts UA 5 TO 10 0 - 2 /LPF    Casts UA HYALINE 0 - 2 /LPF    Epithelial Cells UA 10 TO 20 0 - 5 /HPF    Bacteria, UA MANY (A) None       Imaging:   XR ABDOMEN (KUB) (SINGLE AP VIEW)    Result Date: 2/20/2023  No evidence of bowel obstruction or large stool burden. ASSESSMENT & PLAN     ASSESSMENT / PLAN:     IMPRESSION    This is a 63 y.o. female who presented with abdominal cramping. Found to have HARVEY, and UA suggestive of UTI. Admitted for management of HARVEY on CKD and IV abx. Principal Problem:    HARVEY (acute kidney injury) ([address]  Resolved Problems:    * No resolved hospital problems. *     #HARVEY on CKD complicated by UTI  - FeNa 0.7, likely pre-renal  - On 0.9NS @75cc/h  - Continue Rocephin for UTI  - q6h bladder scans, straight cath if needed, may need jeronimo, will consider based on degree of retention    #chronic constipation  - will restart patient on bowel regimen  - kub ordered    #Chronic Comorbid Conditions  #HTN  - Patient had lower BPs hold antihypertensives  - Restart as tolerated by BP  #T2DM  - started on 10u lantus tonight d/t reported poor oral intake  - start sliding scale  - monitor glucose, adjust as needed  - poct glucose checks, hypoglycemic protocol    Diet: Regular, low sodium  DVT ppx: Eliquis  GI ppx: Not indicated  PT/OT: Consulted  Discharge Planning/SW:  consult for assistance with discharge Nini Neil MD  Internal Medicine Resident, PGY-1  [facility]; [place], [place]  2/25/2023, 12:37 AM   Attending Physician Statement  I have discussed the care of Claudia Nesbitt and I have examined the patient myselft and taken ros and hpi , including pertinent history and exam findings,  with the resident. I have reviewed the key elements of all parts of the encounter with the resident. I agree with the assessment, plan and orders as documented by the resident.       Electronically signed by Lissette Tillman MD

## 2023-02-25 NOTE — PROGRESS NOTES
Pharmacy Note     Renal Dose Adjustment    Norma Young is a 63 y.o. female. Pharmacist assessment of renally cleared medications. Recent Labs     02/24/23 2040   BUN 26*       Recent Labs     02/24/23 2040   CREATININE 1.95*       Estimated Creatinine Clearance: 39 mL/min (A) (based on SCr of 1.95 mg/dL (H)). Estimated CrCl using Ideal Body Weight: 32 mL/min (based on IBW 66 kg)    Height:   Ht Readings from Last 1 Encounters:   02/17/23 5' 9\" (1.753 m)     Weight:  Wt Readings from Last 1 Encounters:   02/20/23 222 lb 10.6 oz (101 kg)       The following medication dose has been adjusted based upon renal function per P&T Guidelines:             Famotidine 20 mg oral twice daily changed to famotidine 20 mg oral once daily.

## 2023-02-25 NOTE — CONSULTS
Renal Consult Note    Patient :  Chace Amaya; 63 y.o. MRN# [id number]  Location:  7912/9279-57  Attending:  Bob Paredes MD  Admit Date:  2/24/2023   Hospital Day: 1    Reason for Consult:     Asked by Dr Bob Paredes MD to see for HARVEY/Elevated Creatinine. History Obtained From:     patient, electronic medical record    History of Present Illness:     Chace Amaya; 63 y.o. female with past medical history as mentioned below. Patient has medical history of COPD, atrial fibrillation on Eliquis,-diabetes mellitus, CHFpEF, chronic constipation, iron deficiency anemia  Home medications include Dulcolax, calcium carbonate, insulin, BuSpar, Lasix 40 mg daily, lisinopril, Plavix, Pepcid, Eliquis, Lipitor, Trulicity, Lopressor, Spiriva, Dulera    Patient came in with complaints of left upper and lower quadrant abdominal pain, started suddenly, sharp in nature. Patient was recently admitted to the hospital with acute urinary retention which was likely secondary to constipation, was found to have HARVEY, had Shook catheter placed during that admission which was reported removed prior to discharge. Patient reports that she was peeing okay post discharge but developed nausea and vomiting eventually and could not keep any food down, denies any diarrhea reports that she had used suppositories for constipation. Reports burning urination, denies any urinary frequency or decreased urinary volume or frequency or lower lower abdominal pain or feeling like incomplete bladder emptying.     On arrival to the ED patient was afebrile, heart rate in the 90s, blood pressure 127/63, but after that patient's blood pressure has been on the softer side, saturating well on room air  Initial lab work in the ED showed sodium 135, potassium 4.1, chloride 99, bicarb 22, BUN 26, creatinine 1.95, WBC 4.7, hemoglobin 11.5, platelets 929  UA showed moderate leukocyte esterase, WBCs,  Urine sodium 64, urine creatinine 138  X-ray abdomen showed moderate gas pattern in the large bowel but no significant stool burden  In the ED patient received 1 L fluid bolus  Patient admitted to the floor for HARVEY, started on maintenance fluids saline 70 cc/h  Labs this morning show sodium 139, potassium 4, chloride 106, bicarb 21, BUN 24, creatinine 1.57    As per chart review, prior renal functions, cannot determine baseline as although renal function labs are from admissions, no labs in between the admissions    No history of recent contrast exposure, No h/o prolonged NSAIDs use in the past, No h/o nephrolithiasis, No recent skin rashes or arthralgias, No hematuria or pyuria noticed in the recent past. Doesn't report any reduction in the urine output recently. Non report of any obstructive urinary symptoms (urgency, frequency, weak stream, straining while urination). No h/o recurrent UTIs in the past.    Past History/Allergies? Social History:     Past Medical History:   Diagnosis Date    Acute on chronic systolic CHF (congestive heart failure) ([facility] 12/20/2021    Asthma     Blood circulation, collateral     CAD (coronary artery disease)     COPD (chronic obstructive pulmonary disease) ([facility])     COPD exacerbation ([facility] 12/18/2021    Diabetes mellitus ([facility]     Hyperlipidemia     Hypertension     Ischemic colitis ([facility] 10/28/2021    Movement disorder     B/L torn rotator cuff. Neuropathy     PAD (peripheral artery disease) ([facility])     Tobacco abuse 11/19/2016    Tobacco abuse counseling 7/25/2021       Allergies   Allergen Reactions    Codeine     Lac Bovis     Morphine        Social History     Socioeconomic History    Marital status:       Spouse name: Not on file    Number of children: Not on file    Years of education: Not on file    Highest education level: Not on file   Occupational History    Not on file   Tobacco Use    Smoking status: Every Day     Packs/day: 0.50     Years: 45.00     Pack years: 22.50     Types: Cigarettes    Smokeless tobacco: Former   Substance and Sexual Activity    Alcohol use: No    Drug use: Yes     Types: Marijuana Scott Miser)    Sexual activity: Never   Other Topics Concern    Not on file   Social History Narrative    Not on file     Social Determinants of Health     Financial Resource Strain: Not on file   Food Insecurity: Not on file   Transportation Needs: Not on file   Physical Activity: Not on file   Stress: Not on file   Social Connections: Not on file   Intimate Partner Violence: Not on file   Housing Stability: Not on file       Family History:        Family History   Problem Relation Age of Onset    Hypertension Mother     Hypertension Father     Diabetes Sister     Diabetes Brother     Stroke Brother        Outpatient Medications:     Medications Prior to Admission: bisacodyl (DULCOLAX) 10 MG suppository, Place 1 suppository rectally daily as needed for Constipation  calcium carbonate (TUMS) 500 MG chewable tablet, Take 1 tablet by mouth 3 times daily as needed for Heartburn  Hydrocortisone, Perianal, (PROCTO-BASHIR) 1 % cream, 2 times daily Apply topically 2 times daily. polyethylene glycol (GLYCOLAX) 17 g packet, Take 17 g by mouth daily as needed for Constipation  senna (SENOKOT) 8.6 MG tablet, Take 1 tablet by mouth daily  insulin detemir (LEVEMIR FLEXTOUCH) 100 UNIT/ML injection pen, Inject 40 Units into the skin daily (Patient not taking: No sig reported)  Insulin Pen Needle (PEN NEEDLES) 31G X 5 MM MISC, 1 box by Does not apply route daily  busPIRone (BUSPAR) 30 MG tablet, Take 30 mg by mouth 3 times daily as needed  acetaminophen (TYLENOL) 325 MG tablet, Take 650 mg by mouth every 4-6 hours as needed  furosemide (LASIX) 40 MG tablet, Take 1 tablet by mouth in the morning. Hold for 2 more days.   mometasone-formoterol (DULERA) 200-5 MCG/ACT inhaler, Inhale into the lungs  lisinopril (PRINIVIL;ZESTRIL) 10 MG tablet, Take 1 tablet by mouth daily  clopidogrel (PLAVIX) 75 MG tablet, Take 1 tablet by mouth daily  famotidine (PEPCID) 20 MG tablet, Take 20 mg by mouth 2 times daily  albuterol sulfate HFA (PROVENTIL;VENTOLIN;PROAIR) 108 (90 Base) MCG/ACT inhaler, Inhale 2 puffs into the lungs 3 times daily  lidocaine (LIDODERM) 5 %, Place 1 patch onto the skin as needed for Pain (as needed for rotator cuff pain) 12 hours on, 12 hours off.  apixaban (ELIQUIS) 5 MG TABS tablet, Take 1 tablet by mouth 2 times daily  atorvastatin (LIPITOR) 40 MG tablet, Take 1 tablet by mouth daily  Dulaglutide (TRULICITY) 1.5 JG/0.6SR SOPN, Inject 1.5 mg into the skin once a week  nitroGLYCERIN (NITROSTAT) 0.4 MG SL tablet, up to max of 3 total doses. If no relief after 1 dose, call 911. metoprolol tartrate (LOPRESSOR) 25 MG tablet, Take 0.5 tablets by mouth 2 times daily  Lancets MISC, 1 each by Does not apply route daily  blood glucose monitor strips, Test 3 times a day & as needed for symptoms of irregular blood glucose. glucose monitoring kit (FREESTYLE) monitoring kit, 1 kit by Does not apply route daily  albuterol (PROVENTIL) (5 MG/ML) 0.5% nebulizer solution, Take 0.5 mLs by nebulization every 6 hours as needed for Wheezing  tiotropium (SPIRIVA) 18 MCG inhalation capsule, Inhale 1 capsule into the lungs daily Pt told that she was using this medication at home. Current Medications:     Scheduled Meds:    sodium chloride flush  5-40 mL IntraVENous 2 times per day    cefTRIAXone (ROCEPHIN) IV  1,000 mg IntraVENous Q24H    apixaban  5 mg Oral BID    atorvastatin  40 mg Oral Daily    clopidogrel  75 mg Oral Daily    famotidine  20 mg Oral Daily    insulin glargine  10 Units SubCUTAneous Nightly    tiotropium  2 puff Inhalation Daily     Continuous Infusions:    sodium chloride      sodium chloride 75 mL/hr at 02/25/23 0044    dextrose           Review of Systems:     Constitutional: No fever, no chills, reports some generalized weakness  HEENT:  No headache  Cardiac:  No chest pain, dyspnea, orthopnea or PND.   Chest:   No cough, phlegm or wheezing. Abdomen:  Reports some nausea and vomiting  Neuro:             No focal weakness, abnormal movements or seizure like activity. Skin:   No rashes, no itching. :   Reports some urinary frequency and burning urination   extremities:  No swelling or joint pains. ROS was otherwise negative except as mentioned in the [address] Input/Output:       I/O last 3 completed shifts: In: 1000 [I.V.:1000]  Out: 10 [Urine:10]    Vital Signs:   Temperature:  Temp: 98 °F (36.7 °C)  TMax:   Temp (24hrs), Av.9 °F (36.6 °C), Min:97.5 °F (36.4 °C), Max:98.2 °F (36.8 °C)    Respirations:  Resp: 14  Pulse:   Heart Rate: 80  BP:    BP: 99/63  BP Range: Systolic (40YVC), ACO:34 , Min:72 , WWT:718       Diastolic (60STR), QCJ:81, Min:41, Max:79      Physical Examination:     General:  AAO x 3, NAD  HEENT: Atraumatic, normocephalic. Eyes:   Pupils equal, round and reactive to light, EOMI. Neck:   No JVD  Chest:   Clear breath sounds on auscultation bilaterally  Cardiac:  S1 S2 audible. No S3. Abdomen: Soft, non-tender, nondistended  Neuro:   AAO x 3, No FND. SKIN:  No rashes  Extremities:  No edema.     Labs:       Recent Labs     23  0907   WBC 4.7 3.9   RBC 4.77 3.80*   HGB 11.5* 9.1*   HCT 37.8 29.9*   MCV 79.2* 78.7*   MCH 24.1* 23.9*   MCHC 30.4 30.4   RDW 18.1* 17.7*    196   MPV 11.1 10.9      BMP:   Recent Labs     23  0907    139   K 4.1 4.0   CL 99 106   CO2 22 21   BUN 26* 24*   CREATININE 1.95* 1.57*   GLUCOSE 131* 123*   CALCIUM 9.1 8.4*        Magnesium:    Recent Labs     23   MG 2.0     Albumin:    Recent Labs     23   LABALBU 4.2     BNP:      Lab Results   Component Value Date/Time    BNP 68 2012 08:50 AM     MELISA:      Lab Results   Component Value Date/Time    MELISA NEGATIVE 2023 08:30 PM     SPEP:  Lab Results   Component Value Date/Time    PROT 7.8 2023 08:40 PM    ALBCAL 3.9 2023 08:30 PM    ALBPCT 61 02/17/2023 08:30 PM    LABALPH 0.2 02/17/2023 08:30 PM    LABALPH 0.8 02/17/2023 08:30 PM    A1PCT 3 02/17/2023 08:30 PM    A2PCT 12 02/17/2023 08:30 PM    LABBETA 0.7 02/17/2023 08:30 PM    BETAPCT 10 02/17/2023 08:30 PM    GAMGLOB 0.9 02/17/2023 08:30 PM    GGPCT 14 02/17/2023 08:30 PM    PATH ELECTRONICALLY SIGNED. Basim Allen M.D. 02/18/2023 03:57 AM     UPEP:     Lab Results   Component Value Date/Time    LABPE NORMAL ELECTROPHORETIC PATTERN 02/18/2023 03:57 AM     Hep BsAg:         Lab Results   Component Value Date/Time    HEPBSAG NONREACTIVE 02/17/2023 08:30 PM     Hep C AB:          Lab Results   Component Value Date/Time    HEPCAB NONREACTIVE 02/17/2023 08:30 PM       Urinalysis/Chemistries:      Lab Results   Component Value Date/Time    NITRU NEGATIVE 02/24/2023 09:44 PM    COLORU Yellow 02/24/2023 09:44 PM    PHUR 6.0 02/24/2023 09:44 PM    WBCUA 20 TO 50 02/24/2023 09:44 PM    RBCUA 0 TO 2 02/24/2023 09:44 PM    MUCUS NOT REPORTED 10/27/2021 06:56 PM    TRICHOMONAS NOT REPORTED 10/27/2021 06:56 PM    YEAST NOT REPORTED 10/27/2021 06:56 PM    BACTERIA MANY 02/24/2023 09:44 PM    SPECGRAV 1.020 02/24/2023 09:44 PM    LEUKOCYTESUR MODERATE 02/24/2023 09:44 PM    UROBILINOGEN Normal 02/24/2023 09:44 PM    BILIRUBINUR NEGATIVE 02/24/2023 09:44 PM    BILIRUBINUR NEGATIVE 06/01/2012 03:25 AM    GLUCOSEU NEGATIVE 02/24/2023 09:44 PM    GLUCOSEU NEGATIVE 06/01/2012 03:25 AM    KETUA NEGATIVE 02/24/2023 09:44 PM    AMORPHOUS NOT REPORTED 10/27/2021 06:56 PM     Urine Sodium:     Lab Results   Component Value Date/Time    ALEX 64 02/24/2023 09:44 PM     Urine Protein:     Lab Results   Component Value Date/Time     02/19/2023 03:55 AM     Urine Creatinine:     Lab Results   Component Value Date/Time    LABCREA 138.1 02/24/2023 09:44 PM           Assessment:     1.  Acute Kidney Injury, likely secondary to urinary retention, complicated by poor oral intake and infection - renal function seems to be improving  2. UTI  3. Diabetes mellitus  4. Chronic constipation  5. Atrial fibrillation on Eliquis  6. COPD  7. CHFpEF  8. Iron deficiency anemia    Plan:     1. Continue maintenance fluids saline at 75 cc/h. Urine studies reviewed. 2.  Strict intake and output monitoring  3. Monitor creatinine functions closely, BMP in the a.m.  4.  Bowel regimen for constipation  5. Hold home medication ACE inhibitor, diuretic for now  6. Recommend urology eval for recurrent urinary retention  7. We will follow    Nutrition   Please ensure that patient is on a renal diet/TF. Avoid nephrotoxic drugs/contrast exposure. Thank you for the consultation. Please do not hesitate to contact us for any further questions/concerns. We will continue to follow along with you. Yung Membreno  PGY-2  Internal Medicine  [address]    10:29 AM 2/25/2023   Attending Physician Statement  I have discussed the care of Tasha Callahan, including pertinent history and exam findings with the resident/fellow. I have reviewed the key elements of all parts of the encounter with the resident/fellow. I have seen and examined the patient with the resident/fellow. I agree with the assessment and plan and status of the problem list as documented. Patient tells me that following recent discharge from the hospital about a week or so ago she started to have nausea and very poor oral intake at home. She thought she was given an antibiotic and was just having persistent nausea for that. She was also concerned about the possibility of having another urinary tract infection. More recently when she was admitted to the hospital about a week or so ago there was an issue with severe constipation and urinary retention as well.     On the floor after she had voided, Shook was inserted because her bladder scan showed about 350 mL of urine which is about what was obtained after Shook insertion. Appropriately so she has been started on IV fluids and her serum creatinine which had gone up to 1.9 is down to 1.57 with hydration and holding her home diuretics. She tells me she takes Lasix at home because she would develop intermittent lower extremity edema. Her cardiac echo actually shows pretty well preserved LV function with no significant rise in her pulmonary pressures. Abdominal x-ray this time around shows no significant fecal loading. Will leave her on IV fluids for now, check labs tomorrow. It is quite likely that she may not need daily Lasix dose at home and potentially could be switched over to 20 mg of Lasix 3 days a week. We will make additional recommendations.     Didier Melgar MD , MD

## 2023-02-25 NOTE — ED PROVIDER NOTES
[facility] ED  Emergency Department Encounter  Emergency Medicine Resident     Pt Name:Pina May  MRN: [id number]  [place] 1960  Date of evaluation: 2/24/23  PCP:  Hieu Trotter MD  Note Started: 8:23 PM EST    CHIEF COMPLAINT       Chief Complaint   Patient presents with    Abdominal Pain     Since Sunday when patient was discharged from hospital      HISTORY OF PRESENT ILLNESS  (Location/Symptom, Timing/Onset, Context/Setting, Quality, Duration, Modifying Factors, Severity.)      Merlinda Nelson is a 63 y.o. female with history of COPD, HF, type 2 diabetes, CKD (baseline Cr 1.4), chronic constipation who presents with sharp left upper quadrant greater than left lower quadrant abdominal cramps as well as with cramps in her feet/toes. Patient states that these pains are sharp in nature. Patient states that she has had these cramps before and typically they improve after couple of seconds but these have been lasting longer. She did try taking Flexeril at 5 PM tonight without significant improvement of her symptoms. Also having some nausea and chills but denies any fevers. Patient was recently admitted 2/17 through 2/20/2023 for HARVEY, acute urinary retention thought to be secondary to severe constipation as urinary symptoms improved after receiving soapsuds enema and having 2 bowel movements. PAST MEDICAL / SURGICAL / SOCIAL / FAMILY HISTORY      has a past medical history of Acute on chronic systolic CHF (congestive heart failure) ([address] Asthma, Blood circulation, collateral, CAD (coronary artery disease), COPD (chronic obstructive pulmonary disease) ([address] COPD exacerbation ([address] Diabetes mellitus ([address] Hyperlipidemia, Hypertension, Ischemic colitis ([address] Movement disorder, Neuropathy, PAD (peripheral artery disease) ([address] Tobacco abuse, and Tobacco abuse counseling.     has a past surgical history that includes Coronary angioplasty with stent; Tonsillectomy;  Inner ear surgery (Right); and Coronary angioplasty with stent (02/11/2019). Social History     Socioeconomic History    Marital status:      Spouse name: Not on file    Number of children: Not on file    Years of education: Not on file    Highest education level: Not on file   Occupational History    Not on file   Tobacco Use    Smoking status: Every Day     Packs/day: 0.50     Years: 45.00     Pack years: 22.50     Types: Cigarettes    Smokeless tobacco: Former   Substance and Sexual Activity    Alcohol use: No    Drug use: Yes     Types: Marijuana Delcie Benjamin)    Sexual activity: Never   Other Topics Concern    Not on file   Social History Narrative    Not on file     Social Determinants of Health     Financial Resource Strain: Not on file   Food Insecurity: Not on file   Transportation Needs: Not on file   Physical Activity: Not on file   Stress: Not on file   Social Connections: Not on file   Intimate Partner Violence: Not on file   Housing Stability: Not on file       Family History   Problem Relation Age of Onset    Hypertension Mother     Hypertension Father     Diabetes Sister     Diabetes Brother     Stroke Brother        Allergies:  Codeine, Lac bovis, and Morphine    Home Medications:  Prior to Admission medications    Medication Sig Start Date End Date Taking? Authorizing Provider   bisacodyl (DULCOLAX) 10 MG suppository Place 1 suppository rectally daily as needed for Constipation 2/20/23 3/22/23  Joel Beckham MD   calcium carbonate (TUMS) 500 MG chewable tablet Take 1 tablet by mouth 3 times daily as needed for Heartburn 2/20/23 3/22/23  Joel Beckham MD   Hydrocortisone, Perianal, (PROCTO-BASHIR) 1 % cream 2 times daily Apply topically 2 times daily.  2/20/23   Joel Beckham MD   polyethylene glycol [facility]) 17 g packet Take 17 g by mouth daily as needed for Constipation 2/20/23 3/22/23  Joel Beckham MD   senna (SENOKOT) 8.6 MG tablet Take 1 tablet by mouth daily 2/21/23 3/23/23 Jany Almanza MD   insulin detemir (LEVEMIR FLEXTOUCH) 100 UNIT/ML injection pen Inject 40 Units into the skin daily  Patient not taking: Reported on 2/14/2023 12/18/22   Hipolito Steel MD   Insulin Pen Needle (PEN NEEDLES) 31G X 5 MM MISC 1 box by Does not apply route daily 12/18/22   Hipolito Steel MD   busPIRone (BUSPAR) 30 MG tablet Take 30 mg by mouth 3 times daily as needed 12/7/22   Historical Provider, MD   acetaminophen (TYLENOL) 325 MG tablet Take 650 mg by mouth every 4-6 hours as needed 11/22/22   Historical Provider, MD   furosemide (LASIX) 40 MG tablet Take 1 tablet by mouth in the morning. Hold for 2 more days. 7/22/22   Olesya Yates MD   mometasone-formoterol (DULERA) 200-5 MCG/ACT inhaler Inhale into the lungs 3/3/22   Historical Provider, MD   lisinopril (PRINIVIL;ZESTRIL) 10 MG tablet Take 1 tablet by mouth daily 10/9/21   Ermias Magana MD   clopidogrel (PLAVIX) 75 MG tablet Take 1 tablet by mouth daily 10/9/21   Chidi Pal MD   famotidine (PEPCID) 20 MG tablet Take 20 mg by mouth 2 times daily    Historical Provider, MD   albuterol sulfate HFA (PROVENTIL;VENTOLIN;PROAIR) 108 (90 Base) MCG/ACT inhaler Inhale 2 puffs into the lungs 3 times daily    Historical Provider, MD   lidocaine (LIDODERM) 5 % Place 1 patch onto the skin as needed for Pain (as needed for rotator cuff pain) 12 hours on, 12 hours off. Historical Provider, MD   apixaban (ELIQUIS) 5 MG TABS tablet Take 1 tablet by mouth 2 times daily 10/5/21   John Balalrd MD   atorvastatin (LIPITOR) 40 MG tablet Take 1 tablet by mouth daily 7/27/21   Dipti Cantu MD   Dulaglutide (TRULICITY) 1.5 CG/8.0ZK SOPN Inject 1.5 mg into the skin once a week    Historical Provider, MD   nitroGLYCERIN (NITROSTAT) 0.4 MG SL tablet up to max of 3 total doses.  If no relief after 1 dose, call 911. 2/12/19   Kely Saravia, APRN - CNP   metoprolol tartrate (LOPRESSOR) 25 MG tablet Take 0.5 tablets by mouth 2 times daily 2/12/19 BINDU Waggoner CNP   Lancets MISC 1 each by Does not apply route daily 2/12/19   Darren Mcnair MD   blood glucose monitor strips Test 3 times a day & as needed for symptoms of irregular blood glucose. 2/12/19   Darren Mcnair MD   glucose monitoring kit (FREESTYLE) monitoring kit 1 kit by Does not apply route daily 2/12/19   Darren Mncair MD   aspirin 81 MG chewable tablet Take 1 tablet by mouth daily 2/13/19 7/22/22  BINDU Waggoner CNP   albuterol (PROVENTIL) (5 MG/ML) 0.5% nebulizer solution Take 0.5 mLs by nebulization every 6 hours as needed for Wheezing 3/16/18   Joel Tolliver MD   tiotropium (SPIRIVA) 18 MCG inhalation capsule Inhale 1 capsule into the lungs daily Pt told that she was using this medication at home. 3/16/18   Joel Tolliver MD     REVIEW OF SYSTEMS       Review of Systems   Constitutional:  Negative for fever. HENT:  Negative for congestion and rhinorrhea. Eyes:  Negative for redness. Respiratory:  Negative for shortness of breath. Cardiovascular:  Negative for chest pain. Gastrointestinal:  Positive for abdominal pain (cramps). Negative for diarrhea, nausea and vomiting. Genitourinary:  Negative for dysuria. Musculoskeletal:  Positive for myalgias (feet). Negative for joint swelling. Skin:  Negative for wound. Neurological:  Negative for headaches. PHYSICAL EXAM      INITIAL VITALS:   BP 83/75   Pulse 84   Temp 98.2 °F (36.8 °C) (Oral)   Resp 18   SpO2 95%     Physical Exam  Constitutional:       General: She is not in acute distress. Appearance: Normal appearance. HENT:      Head: Normocephalic and atraumatic. Nose: Nose normal.      Mouth/Throat:      Mouth: Mucous membranes are moist.   Eyes:      Extraocular Movements: Extraocular movements intact. Pupils: Pupils are equal, round, and reactive to light. Cardiovascular:      Rate and Rhythm: Normal rate and regular rhythm.       Pulses: Normal pulses. Heart sounds: Normal heart sounds. No murmur heard. Pulmonary:      Effort: Pulmonary effort is normal. No respiratory distress. Breath sounds: Normal breath sounds. No wheezing. Abdominal:      General: There is no distension. Palpations: Abdomen is soft. Tenderness: There is abdominal tenderness in the left upper quadrant. There is no guarding or rebound. Musculoskeletal:         General: Normal range of motion. Cervical back: Normal range of motion. Right lower leg: No edema. Left lower leg: No edema. Comments: No tenderness with palpation of bilateral lower extremities. Neurological:      General: No focal deficit present. Mental Status: She is alert and oriented to person, place, and time. DDX/DIAGNOSTIC RESULTS / EMERGENCY DEPARTMENT COURSE / MDM     Medical Decision Making  61-year-old female presenting for sharp abdominal cramps and feet cramps which started today as well as nausea. .  No improvement with Flexeril at home. Patient does have history of hypokalemia but does not take any supplements for this. Patient does have tenderness over the left upper quadrant of her abdomen but otherwise abdomen is benign soft and non-tender. Will obtain lab work to assess electrolytes and kidney function as patient had recent admission with HARVEY. We will also give IV fluids as patient appears somewhat dry and has not tolerated much oral intake today. We will give Zofran and Norflex for symptom control and reassess. Amount and/or Complexity of Data Reviewed  External Data Reviewed: notes. Labs: ordered. Decision-making details documented in ED Course. Risk  Prescription drug management. Decision regarding hospitalization. EKG  Not indicated.      All EKG's are interpreted by the Emergency Department Physician who either signs or Co-signs this chart in the absence of a cardiologist.    EMERGENCY DEPARTMENT COURSE:    ED Course as of 02/24/23 9338   Fri Feb 24, 2023 2118 Creatinine(!): 1.95  Previously at 1.19 on discharge. Already receiving 500cc bolus [CP]   2118 Potassium: 4.1  Potassium normal.  [CP]   2119 Hemoglobin Quant(!): 11.5  Improved from 9.3 at time of discharge. [CP]   2125 Patient updated on HARVEY. Provided warm blanket. [CP]   2145 Spoke with Internal Medicine team who will admit the patient [CP]      ED Course User Index  [CP] Rabia Almonte MD     PROCEDURES:  Not indicated. CONSULTS:  IP CONSULT TO INTERNAL MEDICINE    CRITICAL CARE:  There was significant risk of life threatening deterioration of patient's condition requiring my direct management. Critical care time 0 minutes, excluding any documented procedures. FINAL IMPRESSION      1. HARVEY (acute kidney injury) ([facility]    2. Abdominal cramping in left upper quadrant        DISPOSITION / PLAN     DISPOSITION Admitted 02/24/2023 10:32:01 PM      PATIENT REFERRED TO:  No follow-up provider specified.     DISCHARGE MEDICATIONS:  New Prescriptions    No medications on file       Minerva Murdock MD  Emergency Medicine Resident    (Please note that portions of thisnote were completed with a voice recognition program.  Efforts were made to edit the dictations but occasionally words are mis-transcribed.)        Rabia Almonte MD  Resident  02/24/23 7989

## 2023-02-25 NOTE — ED NOTES
TRANSFER - OUT REPORT:    Verbal report given to Xin Purdy RN on Traci Madera  being transferred to [facility] for routine progression of patient care       Report consisted of patient's Situation, Background, Assessment and   Recommendations(SBAR). -Pt asymptotically hypotensive, 1L NS fluid bolus given   -Pt still having diarrhea, retaining urine, bladder scan after next trip to bathroom after starting IV infusion   -[place] IRA   -AxO x4          Information from the following report(s) Nurse Handoff Report was reviewed with the receiving nurse. Gordon Assessment: Presents to emergency department  because of falls (Syncope, seizure, or loss of consciousness): No, Age > 79: No, Altered Mental Status, Intoxication with alcohol or substance confusion (Disorientation, impaired judgment, poor safety awaremess, or inability to follow instructions): No, Impaired Mobility: Ambulates or transfers with assistive devices or assistance; Unable to ambulate or transer.: Yes, Nursing Judgement: Yes  Lines:   Peripheral IV 02/24/23 Right; Lower Forearm (Active)   Site Assessment Clean, dry & intact 02/24/23 2042   Line Status Blood return noted;Normal saline locked 02/24/23 2042   Phlebitis Assessment No symptoms 02/24/23 2042   Infiltration Assessment 0 02/24/23 2042   Dressing Status New dressing applied 02/24/23 2042   Dressing Type Transparent 02/24/23 2042   Dressing Intervention New 02/24/23 2042        Opportunity for questions and clarification was provided.       Patient transported with:  [facility] via [facility] [address] on RA with medications from [facility], [address] [facility]  02/24/23 2609 Eddy South RN  02/24/23 1524

## 2023-02-25 NOTE — ED NOTES
Pt presents to the ED with c/o of abdominal pain and generalized body aches. Pt states she has had abdominal pain since she was discharged from the hospital on Sunday. Pt states that she lives with her son and sleep on the couch. It is painful for patient to sit in the stretcher. Pt states she took at muscle relaxer at 1700 PTA with minimal relief. Pt states she was admitted for constipation and urinary retention and states problems with this has resolved. Pt is uncomfortable, states pain is 8/10. Pt place on full cardiac monitor,  Call light in reach, white board updated.        aRmon Ness RN  02/24/23 8745

## 2023-02-25 NOTE — CARE COORDINATION
Case Management Assessment  Initial Evaluation    Date/Time of Evaluation: 2/25/2023 9:23 AM  Assessment Completed by: Roz Castro RN    If patient is discharged prior to next notation, then this note serves as note for discharge by case management. Patient Name: Xi Otto                   YOB: 1960  Diagnosis: HARVEY (acute kidney injury) ([facility] [N17.9]  Abdominal cramping in left upper quadrant [R10.12]                   Date / Time: 2/24/2023  8:00 PM    Patient Admission Status: Inpatient   Readmission Risk (Low < 19, Mod (19-27), High > 27): Readmission Risk Score: 22.4    Current PCP: Kaylyn Jean MD  PCP verified by CM? Yes    Chart Reviewed: Yes      History Provided by: Patient  Patient Orientation: Alert and Oriented    Patient Cognition: Alert    Hospitalization in the last 30 days (Readmission):  Yes    If yes, Readmission Assessment in CM Navigator will be completed. Advance Directives:      Code Status: Full Code   Patient's Primary Decision Maker is:        Discharge Planning:    Patient lives with: Children (son sometimes stays with her) Type of Home: House  Primary Care Giver: Self  Patient Support Systems include: Family Members   Current Financial resources: Medicare, Medicaid  Current community resources:    Current services prior to admission: Durable Medical Equipment            Current DME: Other (Comment) (nebulizer)            Type of Home Care services:  None    ADLS  Prior functional level: Independent in ADLs/IADLs  Current functional level: Independent in ADLs/IADLs    PT AM-PAC:   /24  OT AM-PAC:   /24    Family can provide assistance at DC: Yes  Would you like Case Management to discuss the discharge plan with any other family members/significant others, and if so, who?     Plans to Return to Present Housing: Yes  Other Identified Issues/Barriers to RETURNING to current housing: none  Potential Assistance needed at discharge:              Potential DME: Patient expects to discharge to: House  Plan for transportation at discharge: Family    Financial    Payor: Brie Trevizo / Plan: [place] HMO / Product Type: *No Product type* /     Does insurance require precert for SNF: Yes    Potential assistance Purchasing Medications: No  Meds-to-Beds request:        [address] [place] [address]  [address] [address]  Phone: [phone] Fax: [phone]    Notes:    Factors facilitating achievement of predicted outcomes: Family support, Motivated, Cooperative, and Pleasant    Barriers to discharge: Pain    Additional Case Management Notes: home, lives alone - has help from family. Has ride home. Follow for home care/DME needs    The Plan for Transition of Care is related to the following treatment goals of HARVEY (acute kidney injury) ([facility] [N17.9]  Abdominal cramping in left upper quadrant [Z53.23]    IF APPLICABLE: The Patient and/or patient representative Katie Muñoz and her family were provided with a choice of provider and agrees with the discharge plan. Freedom of choice list with basic dialogue that supports the patient's individualized plan of care/goals and shares the quality data associated with the providers was provided to: Patient   Patient Representative Name:       The Patient and/or Patient Representative Agree with the Discharge Plan?  Yes    Temo Shaw RN  Case Management Department  Ph: [phone]      Readmission Assessment  Number of Days since last admission?: 8-30 days  Previous Disposition: Home with Family  Who is being Interviewed: Patient  What was the patient's/caregiver's perception as to why they think they needed to return back to the hospital?: Other (Comment) (increased nausea)  Did you visit your Primary Care Physician after you left the hospital, before you returned this time?: No  Why weren't you able to visit your PCP?: Did not have an appointment  Did you see a specialist, such as Cardiac, Pulmonary, Orthopedic Physician, etc. after you left the hospital?: No  Who advised the patient to return to the hospital?: Self-referral  Does the patient report anything that got in the way of taking their medications?: No  In our efforts to provide the best possible care to you and others like you, can you think of anything that we could have done to help you after you left the hospital the first time, so that you might not have needed to return so soon?: Other (Comment) (no)

## 2023-02-25 NOTE — PROGRESS NOTES
I signed up for this patient in error. I did not contribute to the patient's care today.     Kervin Hermosillo MD  Emergency Medicine PGY-3

## 2023-02-25 NOTE — PLAN OF CARE
Problem: Discharge Planning  Goal: Discharge to home or other facility with appropriate resources  2/25/2023 1619 by Sergio Naidu RN  Outcome: Progressing  2/25/2023 0658 by Enrico Alarcon RN  Outcome: Progressing     Problem: Pain  Goal: Verbalizes/displays adequate comfort level or baseline comfort level  2/25/2023 1619 by Sergio Naidu RN  Outcome: Progressing  2/25/2023 0658 by Enrico Alarcon RN  Outcome: Progressing     Problem: Chronic Conditions and Co-morbidities  Goal: Patient's chronic conditions and co-morbidity symptoms are monitored and maintained or improved  Outcome: Progressing     Problem: Respiratory - Adult  Goal: Achieves optimal ventilation and oxygenation  2/25/2023 1619 by Sergio Naidu RN  Outcome: Progressing  2/25/2023 0908 by Jon Young RCP  Outcome: Progressing

## 2023-02-25 NOTE — ED PROVIDER NOTES
[facility] ED     Emergency Department     Faculty Attestation        I performed a history and physical examination of the patient and discussed management with the resident. I reviewed the residents note and agree with the documented findings and plan of care. Any areas of disagreement are noted on the chart. I was personally present for the key portions of any procedures. I have documented in the chart those procedures where I was not present during the key portions. I have reviewed the emergency nurses triage note. I agree with the chief complaint, past medical history, past surgical history, allergies, medications, social and family history as documented unless otherwise noted below. For Physician Assistant/ Nurse Practitioner cases/documentation I have personally evaluated this patient and have completed at least one if not all key elements of the E/M (history, physical exam, and MDM). Additional findings are as noted. Vital Signs: BP: 127/63  Heart Rate: 92  Resp: 14  Temp: 98.2 °F (36.8 °C) SpO2: 96 %  PCP:  Jose Silvestre MD    Pertinent Comments:         Critical Care  None      (Please note that portions of this note were completed with a voice recognition program. Efforts were made to edit the dictations but occasionally words are mis-transcribed.  Whenever words are used in this note in any gender, they shall be construed as though they were used in the gender appropriate to the circumstances; and whenever words are used in this note in the singular or plural form, they shall be construed as though they were used in the form appropriate to the circumstances.)    MD Za Jose  Attending Emergency Medicine Physician            Sam Moreno MD  02/24/23 2051

## 2023-02-25 NOTE — ED NOTES
The following labs labeled with pt sticker and tubed to lab:     [] Blue     [] Lavender   [] on ice  [] Green/yellow  [] Green/black [] on ice  [] Yellow  [] Red  [] Pink      [] COVID-19 swab    [] Rapid  [] PCR  [] Flu Swab  [] Strep Swab  [] Peds Viral Panel     [x] Urine Sample  [] Pelvic Cultures  [] Blood Cultures   [] Wound Cultures          Shelley Staley RN  02/24/23 4211

## 2023-02-25 NOTE — PROGRESS NOTES
[facility]  Internal Medicine Teaching Residency Program  Inpatient Daily Progress Note  ______________________________________________________________________________    Patient: Nik Phipps  YOB: 1960   XTK:[id number]    Acct: [de-identified]     Room: [facility]  Admit date: 2/24/2023  Today's date: 02/25/23  Number of days in the hospital: 1    SUBJECTIVE   Admitting Diagnosis: HARVEY (acute kidney injury) ([facility] [address]  CC: Abdominal cramping, constipation, burning sensation in urine  Pt examined at bedside. Chart & results reviewed. -Seen and examined at bedside. Blood pressure was on the lower side of 85/45. EKG was ordered stat. Was unremarkable. Undergoing fluid resuscitation as per HARVEY protocol. Did receive 500 of bolus yesterday overnight. Continuing on normal saline at 75 mill per hour. Abnormal lab evaluation yesterday showed evidence of UTI. CMP revealed creatinine 1.95, BUN 26, EGFR 29. Patient had a BM yesterday after using suppository. Urine output has been fine but complains of burning. Review of Systems   Constitutional:  Positive for chills. Negative for fever and unexpected weight change. Respiratory:  Negative for shortness of breath. Cardiovascular:  Negative for chest pain and leg swelling. Gastrointestinal:  Positive for abdominal distention, abdominal pain, constipation and nausea. Negative for blood in stool and vomiting. Genitourinary:  Positive for difficulty urinating, dysuria and frequency. Musculoskeletal:  Positive for myalgias (body aches). Review of Systems       BRIEF HISTORY     The patient is a 63 y.o. female with PMHx of COPD, Afib(on eliquis),LOCO, T2DM, CKD, CHF, chronic constipation, and recent admission for urinary retention 2/2 constipation and HARVEY. They present to the ED with same chief complaint of abdominal cramping.      The patient stated that she has been having sharp pain in her LUQ and also some cramps in her LLQ. Mixed reports of no bowel movements since she was discharged, reported as she had not had a bowel movement but patient stated that when she used her suppository she was able to have some stool output. Otherwise the patient reports chills and nausea. She also reports burning with and increased frequency of urination since removal of her jeronimo catheter last admission. Denies fevers, SOB, cough, or chest pain. Patient was admitted to last week for acute urinary retention mostly precipitated by chronic constipation. Was on her Jeronimo's catheter. Was discharged on multiple stool softeners. GI was consulted for constipation and recommended inpatient colonoscopy. Patient stated she would follow-up outpatient GI for colonoscopy. But had to be readmitted for burning sensation in the urine. Initial Vitals on Presentation:  Temp: 98.2, RR: 20, HR 93, /63, SPO2 96% on RA. Initial Course in the ED:   - Pt presented with above complaint, concern for continued urinary retention associated with her chronic constipation.  - Lab work showing HARVEY(BUN 26, Cr 1.95), UA showed many bacteria and 10-20 WBC. - No imaging in ED. - They were given fluid boluses, lower volume d/t history of CHF, zofran for nausea and norflex for her abdominal cramps. OBJECTIVE     Vital Signs:  BP 99/63   Pulse 92   Temp 98 °F (36.7 °C) (Oral)   Resp 14   SpO2 95%     Temp (24hrs), Av.9 °F (36.6 °C), Min:97.5 °F (36.4 °C), Max:98.2 °F (36.8 °C)    In: 1200   Out: 210 [Urine:210]    Physical Exam:  Constitutional: This is a well developed, Obese, 63 y.o. female who is A&Ox3, cooperative and in no apparent distress. Head: Atraumatic and Normocephalic   EENT:  PERRLA. No conjunctival injections. Septum was midline, mucosa was without erythema, exudates or cobblestoning. No thrush was noted. Neck: Supple without thyromegaly. No elevated JVP. Trachea was midline.   Respiratory: Chest was symmetrical without dullness to percussion. Breath sounds bilaterally were clear to auscultation. There were no wheezes, rhonchi or rales. There is no intercostal retraction or use of accessory muscles. Cardiovascular: Regular without murmur, clicks, gallops or rubs. Abdomen: distended, soft, bowel sounds present, tender to palpation diffusely but especially in LUQ. Musculoskeletal: Normal curvature of the spine. No gross muscle weakness. Extremities:  No lower extremity edema, ulcerations, tenderness, varicosities or erythema. Muscle size, tone and strength are normal.  No involuntary movements are noted. Skin:  Warm and dry. Good color, turgor and pigmentation. No lesions or scars.   No cyanosis or clubbing  Neurological/Psychiatric: The patient's general behavior, level of consciousness, thought content and emotional status is normal.  Medications:  Scheduled Medications:    sodium chloride flush  5-40 mL IntraVENous 2 times per day    cefTRIAXone (ROCEPHIN) IV  1,000 mg IntraVENous Q24H    apixaban  5 mg Oral BID    atorvastatin  40 mg Oral Daily    clopidogrel  75 mg Oral Daily    famotidine  20 mg Oral Daily    insulin glargine  10 Units SubCUTAneous Nightly    tiotropium  2 puff Inhalation Daily     Continuous Infusions:    sodium chloride      sodium chloride 75 mL/hr at 02/25/23 0044    dextrose       PRN MedicationsbusPIRone, 30 mg, TID PRN  albuterol, 2.5 mg, Q6H PRN  sodium chloride flush, 5-40 mL, PRN  sodium chloride, , PRN  ondansetron, 4 mg, Q8H PRN   Or  ondansetron, 4 mg, Q6H PRN  polyethylene glycol, 17 g, Daily PRN  acetaminophen, 650 mg, Q6H PRN   Or  acetaminophen, 650 mg, Q6H PRN  sennosides-docusate sodium, 2 tablet, Daily PRN  bisacodyl, 10 mg, Daily PRN  glucose, 4 tablet, PRN  dextrose bolus, 125 mL, PRN   Or  dextrose bolus, 250 mL, PRN  glucagon (rDNA), 1 mg, PRN  dextrose, , Continuous PRN      Diagnostic Labs:  CBC:   Recent Labs     02/24/23  2040   WBC 4.7   RBC 4.77 HGB 11.5*   HCT 37.8   MCV 79.2*   RDW 18.1*        BMP:   Recent Labs     02/24/23 2040      K 4.1   CL 99   CO2 22   BUN 26*   CREATININE 1.95*     BNP: No results for input(s): BNP in the last 72 hours. PT/INR: No results for input(s): PROTIME, INR in the last 72 hours. APTT: No results for input(s): APTT in the last 72 hours. CARDIAC ENZYMES: No results for input(s): CKMB, CKMBINDEX, TROPONINI in the last 72 hours. Invalid input(s): CKTOTAL;3  FASTING LIPID PANEL:  Lab Results   Component Value Date    CHOL 122 06/16/2020    HDL 35 (L) 06/16/2020    TRIG 152 (H) 06/16/2020     LIVER PROFILE:   Recent Labs     02/24/23 2040   AST 24   ALT 17   BILITOT 0.6   ALKPHOS 92      MICROBIOLOGY:   Lab Results   Component Value Date/Time    CULTURE NO GROWTH 5 DAYS 02/18/2023 10:15 PM       Imaging:    XR ABDOMEN (KUB) (SINGLE AP VIEW)    Result Date: 2/25/2023  Small to moderate amount of gas scattered in large bowel without any significant retained stool. The finding is nonspecific. There is no abnormal gas in small bowel or in the stomach. XR ABDOMEN (KUB) (SINGLE AP VIEW)    Result Date: 2/20/2023  No evidence of bowel obstruction or large stool burden. ASSESSMENT & PLAN     ASSESSMENT / PLAN:   Principal Problem:    HARVEY (acute kidney injury) ([facility] [address]  Active Problems:    UTI (urinary tract infection)  Resolved Problems:    * No resolved hospital problems. *     1. HARVEY on CKD stage IV:  - Urinary sodium 64, urinary creatinine 138,FeNA 0.7 indicating prerenal etiology of the HARVEY  - Underlying EGFR 29, creatinine on admission 1.95, elevated from baseline  - Nephrology on board, appreciating recommendations  - Gentle hydration with 0.9% sodium chloride at 75 mill per hour  - HARVEY complicated by UTI     2.   Urinary tract infection:  - UA showed moderate leukocyte esterase and cloudy urine  - Urine culture pending  - WBC count WNL  - Continue Rocephin, awaiting culture and sensitivity to adjust antibiotic    3.  Chronic constipation:  - We will restart full on bowel regimen  - Continue GlycoLax, bisacodyl  - GI was consulted during last admission.  They recommended colonoscopy and patient was supposed to get it outpatient.  However patient had to be readmitted.  - Patient had a bowel movemenT in the ED after receiving suppository  - KUB unremarkable    4.  Atrial fibrillation:  - [id number] score 5  - Rate controlled  - Continue Eliquis      5.  Type II DM without complication:  -Last HbA1c 9.7 on 12/14/2022  - POC glucose check  - Hypoglycemia protocol in place  - Takes Trulicity at home med    6.  Combined systolic and diastolic heart failure:  - Last echo on 12/14/2022 showed LVEF of more than 55%  - Patient has significant past medical history of coronary artery disease with 2 stent placement  - Last cardiac cath on 10/4/2021 showed 90% stenosis of the mid RCA which was stented, on  Plavix  - Takes Lasix 40 mg orally as home med    7.COPD:  - Not on any exacerbation, on room air  - Albuterol as needed            DVT ppx : Eliquis  GI ppx: Not on any GI prophylaxis    PT/OT: Consulted  Discharge Planning / SW:  consulted    Antonio Avina MD  Internal Medicine Resident, PGY-1  [facility]; [place], [place]  2/25/2023, 9:02 AM   Attending Physician Statement  I have discussed the care of Pina CHOLO Haase and I have examined the patient myselft and taken ros and hpi , including pertinent history and exam findings,  with the resident. I have reviewed the key elements of all parts of the encounter with the resident.  I agree with the assessment, plan and orders as documented by the resident.      Electronically signed by Juliano Swain MD

## 2023-02-25 NOTE — PROGRESS NOTES
Senior note    28-year-old female past medical history COPD, A-fib on Eliquis, iron deficiency anemia, type 2 diabetes, CKD, chronic constipation, recent admission for constipation causing acute urinary retention and HARVEY who presents again with chief complaint of abdominal cramping and is found to have HARVEY. She is also complaining of urinary frequency and burning. Vital signs normal on arrival.  Work-up showing HARVEY with creatinine 1.95 baseline 1.0-1.5. BMP otherwise unremarkable, hepatic function unremarkable, hemoglobin showing improvement in her chronic microcytic anemia, UA positive for UTI    #HARVEY on CKD, prerenal  #UTI  -Lyla 0.7. Patient stating she has been having some nausea and abdominal cramping which is caused her to eat and drink less over the last 2 days and she continues to take her Lasix as prescribed  -500 bolus given in the ED. Will give another 500 followed by 75/h as patient has developed some hypotension  -Prior cultures reviewed. Rocephin x5 days  -Bladder scan showing postvoid residual of 300. Continue to monitor with bladder scans every 6    #Chronic constipation  -Bowel regimen  -KUB    #Chronic conditions  -Continue medications as appropriate. Patient has hypotensive and with HARVEY. Will hold blood pressure medications  -Takes 40 Lantus at home. Was getting 20 during last hospital stay. Has been having poor oral intake.   Give 10 tonight and sliding scale    DVT prophylaxis: Wayne Stewart, DO  Internal Medicine, PGY2  Please Perfect Serve with any questions or concerns

## 2023-02-25 NOTE — PROGRESS NOTES
Patient voided 250. PVR on bladder scan was 303ml. Shook ordered and placed.  Will continue to monitor

## 2023-02-26 LAB
ABSOLUTE EOS #: <0.03 K/UL (ref 0–0.44)
ABSOLUTE IMMATURE GRANULOCYTE: 0.05 K/UL (ref 0–0.3)
ABSOLUTE LYMPH #: 0.71 K/UL (ref 1.1–3.7)
ABSOLUTE MONO #: 0.37 K/UL (ref 0.1–1.2)
ANION GAP SERPL CALCULATED.3IONS-SCNC: 10 MMOL/L (ref 9–17)
BASOPHILS # BLD: 1 % (ref 0–2)
BASOPHILS ABSOLUTE: <0.03 K/UL (ref 0–0.2)
BUN SERPL-MCNC: 16 MG/DL (ref 8–23)
CALCIUM SERPL-MCNC: 8.2 MG/DL (ref 8.6–10.4)
CHLORIDE SERPL-SCNC: 110 MMOL/L (ref 98–107)
CO2 SERPL-SCNC: 17 MMOL/L (ref 20–31)
CREAT SERPL-MCNC: 1.35 MG/DL (ref 0.5–0.9)
EOSINOPHILS RELATIVE PERCENT: 1 % (ref 1–4)
GFR SERPL CREATININE-BSD FRML MDRD: 45 ML/MIN/1.73M2
GLUCOSE BLD-MCNC: 101 MG/DL (ref 65–105)
GLUCOSE BLD-MCNC: 106 MG/DL (ref 65–105)
GLUCOSE BLD-MCNC: 112 MG/DL (ref 65–105)
GLUCOSE BLD-MCNC: 121 MG/DL (ref 65–105)
GLUCOSE SERPL-MCNC: 102 MG/DL (ref 70–99)
HCT VFR BLD AUTO: 30 % (ref 36.3–47.1)
HGB BLD-MCNC: 9 G/DL (ref 11.9–15.1)
IMMATURE GRANULOCYTES: 1 %
LYMPHOCYTES # BLD: 20 % (ref 24–43)
MCH RBC QN AUTO: 24.7 PG (ref 25.2–33.5)
MCHC RBC AUTO-ENTMCNC: 30 G/DL (ref 28.4–34.8)
MCV RBC AUTO: 82.4 FL (ref 82.6–102.9)
MICROORGANISM SPEC CULT: ABNORMAL
MICROORGANISM SPEC CULT: NO GROWTH
MONOCYTES # BLD: 11 % (ref 3–12)
NRBC AUTOMATED: 0 PER 100 WBC
PDW BLD-RTO: 18.1 % (ref 11.8–14.4)
PLATELET # BLD AUTO: 207 K/UL (ref 138–453)
PMV BLD AUTO: 11.3 FL (ref 8.1–13.5)
POTASSIUM SERPL-SCNC: 3.8 MMOL/L (ref 3.7–5.3)
RBC # BLD: 3.64 M/UL (ref 3.95–5.11)
RBC # BLD: ABNORMAL 10*6/UL
SEG NEUTROPHILS: 66 % (ref 36–65)
SEGMENTED NEUTROPHILS ABSOLUTE COUNT: 2.31 K/UL (ref 1.5–8.1)
SODIUM SERPL-SCNC: 137 MMOL/L (ref 135–144)
SPECIMEN DESCRIPTION: ABNORMAL
SPECIMEN DESCRIPTION: NORMAL
WBC # BLD AUTO: 3.5 K/UL (ref 3.5–11.3)

## 2023-02-26 PROCEDURE — 6360000002 HC RX W HCPCS: Performed by: STUDENT IN AN ORGANIZED HEALTH CARE EDUCATION/TRAINING PROGRAM

## 2023-02-26 PROCEDURE — 51702 INSERT TEMP BLADDER CATH: CPT

## 2023-02-26 PROCEDURE — 36415 COLL VENOUS BLD VENIPUNCTURE: CPT

## 2023-02-26 PROCEDURE — 6370000000 HC RX 637 (ALT 250 FOR IP): Performed by: STUDENT IN AN ORGANIZED HEALTH CARE EDUCATION/TRAINING PROGRAM

## 2023-02-26 PROCEDURE — 99231 SBSQ HOSP IP/OBS SF/LOW 25: CPT | Performed by: INTERNAL MEDICINE

## 2023-02-26 PROCEDURE — 6360000002 HC RX W HCPCS

## 2023-02-26 PROCEDURE — 1200000000 HC SEMI PRIVATE

## 2023-02-26 PROCEDURE — 6370000000 HC RX 637 (ALT 250 FOR IP)

## 2023-02-26 PROCEDURE — 94640 AIRWAY INHALATION TREATMENT: CPT

## 2023-02-26 PROCEDURE — 80048 BASIC METABOLIC PNL TOTAL CA: CPT

## 2023-02-26 PROCEDURE — 85025 COMPLETE CBC W/AUTO DIFF WBC: CPT

## 2023-02-26 PROCEDURE — 2580000003 HC RX 258: Performed by: STUDENT IN AN ORGANIZED HEALTH CARE EDUCATION/TRAINING PROGRAM

## 2023-02-26 PROCEDURE — 99232 SBSQ HOSP IP/OBS MODERATE 35: CPT | Performed by: INTERNAL MEDICINE

## 2023-02-26 PROCEDURE — 82947 ASSAY GLUCOSE BLOOD QUANT: CPT

## 2023-02-26 PROCEDURE — 94761 N-INVAS EAR/PLS OXIMETRY MLT: CPT

## 2023-02-26 RX ORDER — FAMOTIDINE 20 MG/1
20 TABLET, FILM COATED ORAL 2 TIMES DAILY
Status: DISCONTINUED | OUTPATIENT
Start: 2023-02-26 | End: 2023-02-27 | Stop reason: HOSPADM

## 2023-02-26 RX ORDER — DICYCLOMINE HYDROCHLORIDE 10 MG/ML
20 INJECTION INTRAMUSCULAR 4 TIMES DAILY
Status: DISCONTINUED | OUTPATIENT
Start: 2023-02-26 | End: 2023-02-26

## 2023-02-26 RX ORDER — LIDOCAINE 4 G/G
1 PATCH TOPICAL DAILY
Status: DISCONTINUED | OUTPATIENT
Start: 2023-02-27 | End: 2023-02-27 | Stop reason: HOSPADM

## 2023-02-26 RX ORDER — PHENAZOPYRIDINE HYDROCHLORIDE 100 MG/1
200 TABLET, FILM COATED ORAL 3 TIMES DAILY PRN
Status: DISCONTINUED | OUTPATIENT
Start: 2023-02-26 | End: 2023-02-27 | Stop reason: HOSPADM

## 2023-02-26 RX ORDER — DICYCLOMINE HYDROCHLORIDE 10 MG/1
20 CAPSULE ORAL
Status: DISCONTINUED | OUTPATIENT
Start: 2023-02-26 | End: 2023-02-27 | Stop reason: HOSPADM

## 2023-02-26 RX ORDER — DICYCLOMINE HYDROCHLORIDE 10 MG/ML
20 INJECTION INTRAMUSCULAR 4 TIMES DAILY PRN
Status: DISCONTINUED | OUTPATIENT
Start: 2023-02-26 | End: 2023-02-26

## 2023-02-26 RX ORDER — METOPROLOL TARTRATE 5 MG/5ML
2.5 INJECTION INTRAVENOUS DAILY PRN
Status: DISCONTINUED | OUTPATIENT
Start: 2023-02-26 | End: 2023-02-27 | Stop reason: HOSPADM

## 2023-02-26 RX ADMIN — ACETAMINOPHEN 650 MG: 325 TABLET ORAL at 04:56

## 2023-02-26 RX ADMIN — DESMOPRESSIN ACETATE 40 MG: 0.2 TABLET ORAL at 20:46

## 2023-02-26 RX ADMIN — APIXABAN 5 MG: 5 TABLET, FILM COATED ORAL at 20:46

## 2023-02-26 RX ADMIN — CYCLOBENZAPRINE 10 MG: 10 TABLET, FILM COATED ORAL at 18:18

## 2023-02-26 RX ADMIN — FAMOTIDINE 20 MG: 20 TABLET, FILM COATED ORAL at 20:46

## 2023-02-26 RX ADMIN — BUSPIRONE HYDROCHLORIDE 30 MG: 15 TABLET ORAL at 20:47

## 2023-02-26 RX ADMIN — TIOTROPIUM BROMIDE INHALATION SPRAY 2 PUFF: 3.12 SPRAY, METERED RESPIRATORY (INHALATION) at 08:44

## 2023-02-26 RX ADMIN — CLOPIDOGREL 75 MG: 75 TABLET, FILM COATED ORAL at 08:36

## 2023-02-26 RX ADMIN — BUDESONIDE AND FORMOTEROL FUMARATE DIHYDRATE 2 PUFF: 80; 4.5 AEROSOL RESPIRATORY (INHALATION) at 08:43

## 2023-02-26 RX ADMIN — ALBUTEROL SULFATE 2 PUFF: 90 AEROSOL, METERED RESPIRATORY (INHALATION) at 08:44

## 2023-02-26 RX ADMIN — ALBUTEROL SULFATE 2.5 MG: 2.5 SOLUTION RESPIRATORY (INHALATION) at 13:44

## 2023-02-26 RX ADMIN — FAMOTIDINE 20 MG: 20 TABLET, FILM COATED ORAL at 08:36

## 2023-02-26 RX ADMIN — CEFTRIAXONE SODIUM 1000 MG: 10 INJECTION, POWDER, FOR SOLUTION INTRAVENOUS at 23:30

## 2023-02-26 RX ADMIN — APIXABAN 5 MG: 5 TABLET, FILM COATED ORAL at 08:36

## 2023-02-26 ASSESSMENT — PAIN DESCRIPTION - LOCATION: LOCATION: BACK

## 2023-02-26 ASSESSMENT — PAIN SCALES - GENERAL
PAINLEVEL_OUTOF10: 10
PAINLEVEL_OUTOF10: 9

## 2023-02-26 NOTE — PROGRESS NOTES
Pharmacy Note     Renal Dose Adjustment    Marlon Christopher is a 63 y.o. female. Pharmacist assessment of renally cleared medications. Recent Labs     02/25/23  0907 02/26/23  0641   BUN 24* 16       Recent Labs     02/25/23  0907 02/26/23  0641   CREATININE 1.57* 1.35*       Estimated Creatinine Clearance: 56 mL/min (A) (based on SCr of 1.35 mg/dL (H)).     Height:   Ht Readings from Last 1 Encounters:   02/25/23 5' 9\" (1.753 m)     Weight:  Wt Readings from Last 1 Encounters:   02/20/23 222 lb 10.6 oz (101 kg)       The following medication dose has been adjusted based upon renal function per P&T Guidelines:             Famotidine 20 mg oral once daily changed to famotidine 20 mg oral twice daily due to CrCl > 50 mL/min    Avi Bernal, PharmD, [facility]  2/26/2023 1:30 PM

## 2023-02-26 NOTE — PROGRESS NOTES
Nephrology Progress Note      SUBJECTIVE       Pt was seen and examined. No acute issues overnite. Stable hemodynamics . Urine culture is growing Klebsiella. She did actually have excellent urine output of almost 2.8 L last 24 hours. Her serum creatinine has come down to 1.3. She wants her Shook out again. OBJECTIVE      CURRENT TEMPERATURE:  Temp: 97.7 °F (36.5 °C)  MAXIMUM TEMPERATURE OVER 24HRS:  Temp (24hrs), Av.5 °F (36.9 °C), Min:97.7 °F (36.5 °C), Max:99.2 °F (37.3 °C)    CURRENT RESPIRATORY RATE:  Resp: 16  CURRENT PULSE:  Heart Rate: 69  CURRENT BLOOD PRESSURE:  BP: 115/60  24HR BLOOD PRESSURE RANGE:  Systolic (26FIQ), YAE:742 , Min:101 , FBQ:629   ; Diastolic (67VHR), SWF:12, Min:56, Max:60    24HR INTAKE/OUTPUT:    Intake/Output Summary (Last 24 hours) at 2023 1325  Last data filed at 2023 0700  Gross per 24 hour   Intake --   Output 2800 ml   Net -2800 ml     WEIGHT :Patient Vitals for the past 96 hrs (Last 3 readings):   Weight   23 0000 220 lb 8 oz (100 kg)     PHYSICAL EXAM      GENERAL APPEARANCE:Awake, alert, in no acute distress  SKIN: warm and dry, no rash or erythema  EYES: conjunctivae normal and sclera anicteric  ENT: no thrush no pharyngeal congestion   NECK:   No JVD. No carotid bruits and no carotid lymphadenopathy . PULMONARY: lungs are clear to auscultation. No Wheezing, no ronchi . CADRDIOVASCULAR: S1 and S2 irregular. No rubs , no murmur. ABDOMEN: soft nontender, bowel sounds present, no organomegaly, no ascites.      EXTREMITIES: no cyanosis, clubbing or edema     CURRENT MEDICATIONS      metoprolol (LOPRESSOR) injection 2.5 mg, Daily PRN  dicyclomine (BENTYL) injection 20 mg, 4x Daily  busPIRone (BUSPAR) tablet 30 mg, TID PRN  albuterol sulfate HFA (PROVENTIL;VENTOLIN;PROAIR) 108 (90 Base) MCG/ACT inhaler 2 puff, Q6H PRN  albuterol sulfate HFA (PROVENTIL;VENTOLIN;PROAIR) 108 (90 Base) MCG/ACT inhaler 2 puff, TID  albuterol (PROVENTIL) nebulizer solution 2.5 mg, Q6H PRN  albuterol sulfate HFA (PROVENTIL;VENTOLIN;PROAIR) 108 (90 Base) MCG/ACT inhaler 2 puff, q8h  budesonide-formoterol (SYMBICORT) 80-4.5 MCG/ACT inhaler 2 puff, BID  cyclobenzaprine (FLEXERIL) tablet 10 mg, Nightly PRN  sodium chloride flush 0.9 % injection 5-40 mL, 2 times per day  sodium chloride flush 0.9 % injection 5-40 mL, PRN  0.9 % sodium chloride infusion, PRN  ondansetron (ZOFRAN-ODT) disintegrating tablet 4 mg, Q8H PRN   Or  ondansetron (ZOFRAN) injection 4 mg, Q6H PRN  polyethylene glycol (GLYCOLAX) packet 17 g, Daily PRN  acetaminophen (TYLENOL) tablet 650 mg, Q6H PRN   Or  acetaminophen (TYLENOL) suppository 650 mg, Q6H PRN  0.9 % sodium chloride infusion, Continuous  cefTRIAXone (ROCEPHIN) 1,000 mg in sterile water 10 mL IV syringe, Q24H  sennosides-docusate sodium (SENOKOT-S) 8.6-50 MG tablet 2 tablet, Daily PRN  bisacodyl (DULCOLAX) suppository 10 mg, Daily PRN  glucose chewable tablet 16 g, PRN  dextrose bolus 10% 125 mL, PRN   Or  dextrose bolus 10% 250 mL, PRN  glucagon (rDNA) injection 1 mg, PRN  dextrose 10 % infusion, Continuous PRN  apixaban (ELIQUIS) tablet 5 mg, BID  atorvastatin (LIPITOR) tablet 40 mg, Daily  clopidogrel (PLAVIX) tablet 75 mg, Daily  famotidine (PEPCID) tablet 20 mg, Daily  insulin glargine (LANTUS) injection vial 10 Units, Nightly  tiotropium (SPIRIVA RESPIMAT) 2.5 MCG/ACT inhaler 2 puff, Daily          LABS      CBC:   Recent Labs     02/24/23  2040 02/25/23  0907 02/26/23  0641   WBC 4.7 3.9 3.5   RBC 4.77 3.80* 3.64*   HGB 11.5* 9.1* 9.0*   HCT 37.8 29.9* 30.0*   MCV 79.2* 78.7* 82.4*   MCH 24.1* 23.9* 24.7*   MCHC 30.4 30.4 30.0   RDW 18.1* 17.7* 18.1*    196 207   MPV 11.1 10.9 11.3      BMP:   Recent Labs     02/24/23  2040 02/25/23  0907 02/26/23  0641    139 137   K 4.1 4.0 3.8   CL 99 106 110*   CO2 22 21 17*   BUN 26* 24* 16   CREATININE 1.95* 1.57* 1.35*   GLUCOSE 131* 123* 102*   CALCIUM 9.1 8.4* 8.2*        MAGNESIUM:   Recent Labs 02/24/23  2040   MG 2.0     ALBUMIN:   Recent Labs     02/24/23 2040   LABALBU 4.2     IRON:    Lab Results   Component Value Date/Time    IRON 18 02/20/2023 05:29 AM     IRON SATURATION:    Lab Results   Component Value Date/Time    LABIRON 8 02/20/2023 05:29 AM     TIBC:    Lab Results   Component Value Date/Time    TIBC 227 02/20/2023 05:29 AM     FERRITIN:    Lab Results   Component Value Date/Time    FERRITIN 79 02/20/2023 05:29 AM     MELISA:   Lab Results   Component Value Date    MELISA NEGATIVE 02/17/2023       SPEP:   Lab Results   Component Value Date/Time    PROT 7.8 02/24/2023 08:40 PM    ALBCAL 3.9 02/17/2023 08:30 PM    ALBPCT 61 02/17/2023 08:30 PM    LABALPH 0.2 02/17/2023 08:30 PM    LABALPH 0.8 02/17/2023 08:30 PM    A1PCT 3 02/17/2023 08:30 PM    A2PCT 12 02/17/2023 08:30 PM    LABBETA 0.7 02/17/2023 08:30 PM    BETAPCT 10 02/17/2023 08:30 PM    GAMGLOB 0.9 02/17/2023 08:30 PM    GGPCT 14 02/17/2023 08:30 PM    PATH ELECTRONICALLY SIGNED.  Trevin Perales M.D. 02/18/2023 03:57 AM     UPEP:   Lab Results   Component Value Date/Time     02/19/2023 03:55 AM      HEPBSAG:  Lab Results   Component Value Date/Time    HEPBSAG NONREACTIVE 02/17/2023 08:30 PM     HEPCAB:  Lab Results   Component Value Date/Time    HEPCAB NONREACTIVE 02/17/2023 08:30 PM       URINE SODIUM:    Lab Results   Component Value Date/Time    ALEX 64 02/24/2023 09:44 PM      URINE CREATININE:    Lab Results   Component Value Date/Time    LABCREA 138.1 02/24/2023 09:44 PM     URINE EOSINOPHILS:   Lab Results   Component Value Date/Time    UREO NONE SEEN 02/24/2023 09:44 PM     URINE PROTEIN:    Lab Results   Component Value Date/Time     02/19/2023 03:55 AM     URINALYSIS:  U/A:   Lab Results   Component Value Date/Time    NITRU NEGATIVE 02/24/2023 09:44 PM    COLORU Yellow 02/24/2023 09:44 PM    PHUR 6.0 02/24/2023 09:44 PM    WBCUA 20 TO 50 02/24/2023 09:44 PM    RBCUA 0 TO 2 02/24/2023 09:44 PM    MUCUS NOT REPORTED 10/27/2021 06:56 PM    TRICHOMONAS NOT REPORTED 10/27/2021 06:56 PM    YEAST NOT REPORTED 10/27/2021 06:56 PM    BACTERIA MANY 02/24/2023 09:44 PM    SPECGRAV 1.020 02/24/2023 09:44 PM    LEUKOCYTESUR MODERATE 02/24/2023 09:44 PM    UROBILINOGEN Normal 02/24/2023 09:44 PM    BILIRUBINUR NEGATIVE 02/24/2023 09:44 PM    BILIRUBINUR NEGATIVE 06/01/2012 03:25 AM    GLUCOSEU NEGATIVE 02/24/2023 09:44 PM    GLUCOSEU NEGATIVE 06/01/2012 03:25 AM    KETUA NEGATIVE 02/24/2023 09:44 PM    AMORPHOUS NOT REPORTED 10/27/2021 06:56 PM       ASSESSMENT      1. Acute Kidney Injury, likely secondary to urinary retention, complicated by poor oral intake and infection - renal function  improving  2. UTI: Klebsiella pneumonia UTI  3. Diabetes mellitus  4. Chronic constipation  5. Atrial fibrillation on Eliquis  6. COPD  7. CHFpEF  8. Iron deficiency anemia  9. Has intermittent urinary retention    PLAN      1. Encourage oral intake, fluids can be dropped down to [facility]. 2.  From my side Shook can come out as long as urology can follow the patient and do voiding trials. 3.  I would favor that she be on no more than 20 mg of Lasix every other day upon discharge. .   4.  Her creatinine is now at baseline, I will sign off. Please call back if any questions arise      Please do not hesitate to call with questions.     This note is created with the assistance of a speech-recognition program. While intending to generate a document that actually reflects the content of the visit, no guarantees can be provided that every mistake has been identified and corrected by editing    Electronically signed by Kristine Pearl MD on 2/26/2023 at 1:25 PM

## 2023-02-26 NOTE — PROGRESS NOTES
[facility]  Internal Medicine Teaching Residency Program  Inpatient Daily Progress Note  ______________________________________________________________________________    Patient: Allyssa Long  YOB: 1960   OMU:[id number]    Acct: [de-identified]     Room: [facility]-  Admit date: 2/24/2023  Today's date: 02/26/23  Number of days in the hospital: 2    SUBJECTIVE   Admitting Diagnosis: HARVEY (acute kidney injury) ([facility] [address]  CC: Abdominal cramping, constipation, burning sensation in urine  Pt examined at bedside. Chart & results reviewed. -Seen and examined at bedside. Hemodynamically stable. Overnight paged by the RN to evaluate the patient at bedside for elevated heart rate. Patient's BP was on the softer side. Added Lopressor with some holding parameters to give. BP stable at 106/57 with an HR of 69. Review of Systems   Constitutional:  Positive for chills. Negative for fever and unexpected weight change. Respiratory:  Negative for shortness of breath. Cardiovascular:  Negative for chest pain and leg swelling. Gastrointestinal:  Positive for abdominal distention, abdominal pain, constipation and nausea. Negative for blood in stool and vomiting. Genitourinary:  Positive for difficulty urinating, dysuria and frequency. Musculoskeletal:  Positive for myalgias (body aches). Review of Systems       BRIEF HISTORY     The patient is a 63 y.o. female with PMHx of COPD, Afib(on eliquis),LOCO, T2DM, CKD, CHF, chronic constipation, and recent admission for urinary retention 2/2 constipation and HARVEY. They present to the ED with same chief complaint of abdominal cramping. The patient stated that she has been having sharp pain in her LUQ and also some cramps in her LLQ.  Mixed reports of no bowel movements since she was discharged, reported as she had not had a bowel movement but patient stated that when she used her suppository she was able to have some stool output. Otherwise the patient reports chills and nausea. She also reports burning with and increased frequency of urination since removal of her jeronimo catheter last admission. Denies fevers, SOB, cough, or chest pain. Patient was admitted to last week for acute urinary retention mostly precipitated by chronic constipation. Was on her Jeronimo's catheter. Was discharged on multiple stool softeners. GI was consulted for constipation and recommended inpatient colonoscopy. Patient stated she would follow-up outpatient GI for colonoscopy. But had to be readmitted for burning sensation in the urine. Initial Vitals on Presentation:  Temp: 98.2, RR: 20, HR 93, /63, SPO2 96% on RA. Initial Course in the ED:   - Pt presented with above complaint, concern for continued urinary retention associated with her chronic constipation.  - Lab work showing HARVEY(BUN 26, Cr 1.95), UA showed many bacteria and 10-20 WBC. - No imaging in ED. - They were given fluid boluses, lower volume d/t history of CHF, zofran for nausea and norflex for her abdominal cramps. OBJECTIVE     Vital Signs:  BP (!) 106/57   Pulse 69   Temp 97.7 °F (36.5 °C) (Oral)   Resp 16   Ht 5' 9\" (1.753 m)   Wt 220 lb 8 oz (100 kg)   SpO2 94%   BMI 32.56 kg/m²     Temp (24hrs), Av.5 °F (36.9 °C), Min:97.7 °F (36.5 °C), Max:99.2 °F (37.3 °C)    In: 200   Out: 3200 [Urine:3200]    Physical Exam:  Constitutional: This is a well developed, Obese, 63 y.o. female who is A&Ox3, cooperative and in no apparent distress. Head: Atraumatic and Normocephalic   EENT:  PERRLA. No conjunctival injections. Septum was midline, mucosa was without erythema, exudates or cobblestoning. No thrush was noted. Neck: Supple without thyromegaly. No elevated JVP. Trachea was midline. Respiratory: Chest was symmetrical without dullness to percussion. Breath sounds bilaterally were clear to auscultation. There were no wheezes, rhonchi or rales. There is no intercostal retraction or use of accessory muscles. Cardiovascular: Regular without murmur, clicks, gallops or rubs. Abdomen: distended, soft, bowel sounds present, tender to palpation diffusely but especially in LUQ. Musculoskeletal: Normal curvature of the spine. No gross muscle weakness. Extremities:  No lower extremity edema, ulcerations, tenderness, varicosities or erythema. Muscle size, tone and strength are normal.  No involuntary movements are noted. Skin:  Warm and dry. Good color, turgor and pigmentation. No lesions or scars.   No cyanosis or clubbing  Neurological/Psychiatric: The patient's general behavior, level of consciousness, thought content and emotional status is normal.  Medications:  Scheduled Medications:    dicyclomine  20 mg IntraMUSCular 4x Daily    albuterol sulfate HFA  2 puff Inhalation TID    albuterol sulfate HFA  2 puff Inhalation q8h    budesonide-formoterol  2 puff Inhalation BID    sodium chloride flush  5-40 mL IntraVENous 2 times per day    cefTRIAXone (ROCEPHIN) IV  1,000 mg IntraVENous Q24H    apixaban  5 mg Oral BID    atorvastatin  40 mg Oral Daily    clopidogrel  75 mg Oral Daily    famotidine  20 mg Oral Daily    insulin glargine  10 Units SubCUTAneous Nightly    tiotropium  2 puff Inhalation Daily     Continuous Infusions:    sodium chloride      sodium chloride 75 mL/hr at 02/25/23 0044    dextrose       PRN Medicationsmetoprolol, 2.5 mg, Daily PRN  busPIRone, 30 mg, TID PRN  albuterol sulfate HFA, 2 puff, Q6H PRN  albuterol, 2.5 mg, Q6H PRN  cyclobenzaprine, 10 mg, Nightly PRN  sodium chloride flush, 5-40 mL, PRN  sodium chloride, , PRN  ondansetron, 4 mg, Q8H PRN   Or  ondansetron, 4 mg, Q6H PRN  polyethylene glycol, 17 g, Daily PRN  acetaminophen, 650 mg, Q6H PRN   Or  acetaminophen, 650 mg, Q6H PRN  sennosides-docusate sodium, 2 tablet, Daily PRN  bisacodyl, 10 mg, Daily PRN  glucose, 4 tablet, PRN  dextrose bolus, 125 mL, PRN   Or  dextrose bolus, 250 mL, PRN  glucagon (rDNA), 1 mg, PRN  dextrose, , Continuous PRN      Diagnostic Labs:  CBC:   Recent Labs     02/24/23 2040 02/25/23  0907 02/26/23  0641   WBC 4.7 3.9 3.5   RBC 4.77 3.80* 3.64*   HGB 11.5* 9.1* 9.0*   HCT 37.8 29.9* 30.0*   MCV 79.2* 78.7* 82.4*   RDW 18.1* 17.7* 18.1*    196 207       BMP:   Recent Labs     02/24/23 2040 02/25/23  0907 02/26/23  0641    139 137   K 4.1 4.0 3.8   CL 99 106 110*   CO2 22 21 17*   BUN 26* 24* 16   CREATININE 1.95* 1.57* 1.35*       BNP: No results for input(s): BNP in the last 72 hours. PT/INR: No results for input(s): PROTIME, INR in the last 72 hours. APTT: No results for input(s): APTT in the last 72 hours. CARDIAC ENZYMES: No results for input(s): CKMB, CKMBINDEX, TROPONINI in the last 72 hours. Invalid input(s): CKTOTAL;3  FASTING LIPID PANEL:  Lab Results   Component Value Date    CHOL 122 06/16/2020    HDL 35 (L) 06/16/2020    TRIG 152 (H) 06/16/2020     LIVER PROFILE:   Recent Labs     02/24/23 2040   AST 24   ALT 17   BILITOT 0.6   ALKPHOS 92        MICROBIOLOGY:   Lab Results   Component Value Date/Time    CULTURE GRAM NEGATIVE RODS >889301 CFU/ML (A) 02/24/2023 09:44 PM       Imaging:    XR ABDOMEN (KUB) (SINGLE AP VIEW)    Result Date: 2/25/2023  Small to moderate amount of gas scattered in large bowel without any significant retained stool. The finding is nonspecific. There is no abnormal gas in small bowel or in the stomach. XR ABDOMEN (KUB) (SINGLE AP VIEW)    Result Date: 2/20/2023  No evidence of bowel obstruction or large stool burden. ASSESSMENT & PLAN     ASSESSMENT / PLAN:   Principal Problem:    HARVEY (acute kidney injury) ([facility] [address]  Active Problems:    UTI (urinary tract infection)    Stage 3a chronic kidney disease ([facility] [address]    Urinary retention  Resolved Problems:    * No resolved hospital problems. *     1.   HARVEY on CKD stage IV:  - Urinary sodium 64, urinary creatinine 138,FeNA 0.7 indicating prerenal etiology of the HAVREY  - Underlying EGFR 29, creatinine on admission 1.95, elevated from baseline  - Nephrology on board, appreciating recommendations  - Gentle hydration with 0.9% sodium chloride at 75 mill per hour  - HARVEY complicated by UTI     2. Urinary tract infection:  - UA showed moderate leukocyte esterase and cloudy urine  - Urine culture pending  - WBC count WNL  - Continue Rocephin, awaiting culture and sensitivity to adjust antibiotic    3. Chronic constipation:  - We will restart full on bowel regimen  - Continue GlycoLax, bisacodyl  - GI was consulted during last admission. They recommended colonoscopy and patient was supposed to get it outpatient. However patient had to be readmitted. - Patient had a bowel movemenT in the ED after receiving suppository  - KUB unremarkable    4. Atrial fibrillation:  - [id number] score 5  - Rate controlled  - Continue Eliquis      5. Type II DM without complication:  -Last ECO6K 9.7 on 12/14/2022  - POC glucose check  - Hypoglycemia protocol in place  - Takes Trulicity at home med    6. Combined systolic and diastolic heart failure:  - Last echo on 12/14/2022 showed LVEF of more than 55%  - Patient has significant past medical history of coronary artery disease with 2 stent placement  - Last cardiac cath on 10/4/2021 showed 90% stenosis of the mid RCA which was stented, on  Plavix  - Takes Lasix 40 mg orally as home med    7. COPD:  - Not on any exacerbation, on room air  - Albuterol as needed            DVT ppx : Eliquis  GI ppx: Not on any GI prophylaxis    PT/OT: Consulted  Discharge Planning / SW:  consulted    Fabiana Chang MD  Internal Medicine Resident, PGY-1  [facility]; [place], [place]  2/26/2023, 9:43 AM   Attending Physician Statement  I have discussed the care of Lia Tang and I have examined the patient myselft and taken ros and hpi , including pertinent history and exam findings,  with the resident.  I have reviewed the key elements of all parts of the encounter with the resident. I agree with the assessment, plan and orders as documented by the resident.       Electronically signed by Alexandria Landon MD

## 2023-02-26 NOTE — PLAN OF CARE
Problem: Discharge Planning  Goal: Discharge to home or other facility with appropriate resources  Outcome: Progressing     Problem: Pain  Goal: Verbalizes/displays adequate comfort level or baseline comfort level  Outcome: Progressing     Problem: Chronic Conditions and Co-morbidities  Goal: Patient's chronic conditions and co-morbidity symptoms are monitored and maintained or improved  Outcome: Progressing     Problem: Respiratory - Adult  Goal: Achieves optimal ventilation and oxygenation  Outcome: Progressing

## 2023-02-26 NOTE — PROGRESS NOTES
Pt's HR ran up to 151-160 at times, internal med was notified and resident came to the floor. Resident set parameters of sbp<90 or HR<130 for 2.5mg of lopressor. BP running low 83/46.     0131- Reviewing pt's home meds further, and talking with pt she takes 25mg of metoprolol 0.5 tablet bid. Pt took metoprolol on 2/24 before admission to ED. Request made for metoprolol to be added back into her home meds. 0422BP- pressure rechecked at next time of elevated HR and BP was 120/67.

## 2023-02-27 VITALS
BODY MASS INDEX: 32.66 KG/M2 | HEART RATE: 138 BPM | WEIGHT: 220.5 LBS | TEMPERATURE: 97.8 F | HEIGHT: 69 IN | OXYGEN SATURATION: 96 % | SYSTOLIC BLOOD PRESSURE: 122 MMHG | DIASTOLIC BLOOD PRESSURE: 79 MMHG | RESPIRATION RATE: 16 BRPM

## 2023-02-27 LAB
EKG ATRIAL RATE: 88 BPM
EKG P AXIS: 58 DEGREES
EKG P-R INTERVAL: 176 MS
EKG Q-T INTERVAL: 420 MS
EKG QRS DURATION: 74 MS
EKG QTC CALCULATION (BAZETT): 508 MS
EKG R AXIS: 43 DEGREES
EKG T AXIS: 20 DEGREES
EKG VENTRICULAR RATE: 88 BPM
GLUCOSE BLD-MCNC: 112 MG/DL (ref 65–105)
GLUCOSE BLD-MCNC: 129 MG/DL (ref 65–105)

## 2023-02-27 PROCEDURE — 6370000000 HC RX 637 (ALT 250 FOR IP)

## 2023-02-27 PROCEDURE — 93005 ELECTROCARDIOGRAM TRACING: CPT

## 2023-02-27 PROCEDURE — 6370000000 HC RX 637 (ALT 250 FOR IP): Performed by: INTERNAL MEDICINE

## 2023-02-27 PROCEDURE — 6370000000 HC RX 637 (ALT 250 FOR IP): Performed by: STUDENT IN AN ORGANIZED HEALTH CARE EDUCATION/TRAINING PROGRAM

## 2023-02-27 PROCEDURE — 93010 ELECTROCARDIOGRAM REPORT: CPT | Performed by: INTERNAL MEDICINE

## 2023-02-27 PROCEDURE — 51798 US URINE CAPACITY MEASURE: CPT

## 2023-02-27 PROCEDURE — 2580000003 HC RX 258: Performed by: STUDENT IN AN ORGANIZED HEALTH CARE EDUCATION/TRAINING PROGRAM

## 2023-02-27 PROCEDURE — 94640 AIRWAY INHALATION TREATMENT: CPT

## 2023-02-27 PROCEDURE — 82947 ASSAY GLUCOSE BLOOD QUANT: CPT

## 2023-02-27 PROCEDURE — 2500000003 HC RX 250 WO HCPCS

## 2023-02-27 PROCEDURE — 99239 HOSP IP/OBS DSCHRG MGMT >30: CPT | Performed by: INTERNAL MEDICINE

## 2023-02-27 RX ORDER — CIPROFLOXACIN 500 MG/1
500 TABLET, FILM COATED ORAL EVERY 12 HOURS SCHEDULED
Status: DISCONTINUED | OUTPATIENT
Start: 2023-02-27 | End: 2023-02-27 | Stop reason: HOSPADM

## 2023-02-27 RX ORDER — CIPROFLOXACIN 500 MG/1
500 TABLET, FILM COATED ORAL EVERY 12 HOURS SCHEDULED
Qty: 6 TABLET | Refills: 0 | Status: SHIPPED | OUTPATIENT
Start: 2023-02-27 | End: 2023-03-02

## 2023-02-27 RX ORDER — CYCLOBENZAPRINE HCL 10 MG
10 TABLET ORAL NIGHTLY PRN
Qty: 10 TABLET | Refills: 0 | Status: CANCELLED | OUTPATIENT
Start: 2023-02-27 | End: 2023-03-09

## 2023-02-27 RX ADMIN — BUDESONIDE AND FORMOTEROL FUMARATE DIHYDRATE 2 PUFF: 80; 4.5 AEROSOL RESPIRATORY (INHALATION) at 09:41

## 2023-02-27 RX ADMIN — FAMOTIDINE 20 MG: 20 TABLET, FILM COATED ORAL at 09:00

## 2023-02-27 RX ADMIN — METOPROLOL TARTRATE 2.5 MG: 5 INJECTION, SOLUTION INTRAVENOUS at 09:41

## 2023-02-27 RX ADMIN — ACETAMINOPHEN 650 MG: 325 TABLET ORAL at 00:12

## 2023-02-27 RX ADMIN — SODIUM CHLORIDE: 9 INJECTION, SOLUTION INTRAVENOUS at 02:01

## 2023-02-27 RX ADMIN — CLOPIDOGREL 75 MG: 75 TABLET, FILM COATED ORAL at 09:00

## 2023-02-27 RX ADMIN — CIPROFLOXACIN 500 MG: 500 TABLET, FILM COATED ORAL at 11:06

## 2023-02-27 RX ADMIN — PHENAZOPYRIDINE HYDROCHLORIDE 200 MG: 100 TABLET ORAL at 05:38

## 2023-02-27 RX ADMIN — APIXABAN 5 MG: 5 TABLET, FILM COATED ORAL at 09:00

## 2023-02-27 RX ADMIN — DICYCLOMINE HYDROCHLORIDE 20 MG: 10 CAPSULE ORAL at 00:13

## 2023-02-27 RX ADMIN — DICYCLOMINE HYDROCHLORIDE 20 MG: 10 CAPSULE ORAL at 05:38

## 2023-02-27 RX ADMIN — BUSPIRONE HYDROCHLORIDE 30 MG: 15 TABLET ORAL at 09:02

## 2023-02-27 RX ADMIN — ALBUTEROL SULFATE 2 PUFF: 90 AEROSOL, METERED RESPIRATORY (INHALATION) at 09:41

## 2023-02-27 RX ADMIN — DICYCLOMINE HYDROCHLORIDE 20 MG: 10 CAPSULE ORAL at 11:07

## 2023-02-27 ASSESSMENT — PAIN SCALES - GENERAL
PAINLEVEL_OUTOF10: 5
PAINLEVEL_OUTOF10: 8

## 2023-02-27 NOTE — CONSULTS
Noelle Davison, Abelino Gaming, [facility]   Urology Consultation      Patient:  Rodger Frazier  MRN: [id number]  YOB: 1960    CHIEF COMPLAINT:  Urinary retention    HISTORY OF PRESENT ILLNESS:   The patient is a 63 y.o. female who was admitted for acute on chronic renal insufficiency. She was recently admitted to the hospital for urinary retention secondary to constipation. She was retaining up to 750 ml during that admission and this was managed with intermittent straight catheterization after urology was consulted. She returned to the hospital for lower abdominal pain, constipation and dysuria/frequency. Cr 1.35 from 1.57, WBC 3.5. Urine culture growing Klebsiella pneumoniae. Patient was urinating on her own. She had a bladder scan of 303 ml and had Shook catheter placed on 2/25/23 for ins/outs. CT AP showed no hydronephrosis, normal bladder. She is being treated with Ceftriaxone. Patient's old records, notes and chart reviewed and summarized above. Past Medical History:    Past Medical History:   Diagnosis Date    Acute on chronic systolic CHF (congestive heart failure) ([address] 12/20/2021    Asthma     Blood circulation, collateral     CAD (coronary artery disease)     COPD (chronic obstructive pulmonary disease) (HCC)     COPD exacerbation ([address] 12/18/2021    Diabetes mellitus ([address]     Hyperlipidemia     Hypertension     Ischemic colitis ([facility] [address] 10/28/2021    Movement disorder     B/L torn rotator cuff.     Neuropathy     PAD (peripheral artery disease) ([facility] [address]     Tobacco abuse 11/19/2016    Tobacco abuse counseling 7/25/2021       Past Surgical History:    Past Surgical History:   Procedure Laterality Date    CORONARY ANGIOPLASTY WITH STENT PLACEMENT      x2    CORONARY ANGIOPLASTY WITH STENT PLACEMENT  02/11/2019    JOSE MARTIN to RCA per Dr. Johanna Solorio radial approuch    INNER EAR SURGERY Right     TONSILLECTOMY         Medications:      Current Facility-Administered Medications:     metoprolol (LOPRESSOR) injection 2.5 mg, 2.5 mg, IntraVENous, Daily PRN, Ari Garcia MD    dicyclomine (BENTYL) injection 20 mg, 20 mg, IntraMUSCular, 4x Daily, Angelina Moreno MD    famotidine (PEPCID) tablet 20 mg, 20 mg, Oral, BID, Sandip Dali, DO, 20 mg at 02/26/23 2046    busPIRone (BUSPAR) tablet 30 mg, 30 mg, Oral, TID PRN, Angelina Moreno MD, 30 mg at 02/26/23 2047    albuterol (PROVENTIL) nebulizer solution 2.5 mg, 2.5 mg, Nebulization, Q6H PRN, Angelina Moreno MD, 2.5 mg at 02/26/23 1344    albuterol sulfate HFA (PROVENTIL;VENTOLIN;PROAIR) 108 (90 Base) MCG/ACT inhaler 2 puff, 2 puff, Inhalation, q8h, Ari Garcia MD    budesonide-formoterol (SYMBICORT) 80-4.5 MCG/ACT inhaler 2 puff, 2 puff, Inhalation, BID, Ari Garcia MD, 2 puff at 02/26/23 0843    cyclobenzaprine (FLEXERIL) tablet 10 mg, 10 mg, Oral, Nightly PRN, Norman Last MD, 10 mg at 02/26/23 1818    sodium chloride flush 0.9 % injection 5-40 mL, 5-40 mL, IntraVENous, 2 times per day, Sandip Dali, DO    sodium chloride flush 0.9 % injection 5-40 mL, 5-40 mL, IntraVENous, PRN, Sandip Dali, DO    0.9 % sodium chloride infusion, , IntraVENous, PRN, Sandip Dali, DO    ondansetron (ZOFRAN-ODT) disintegrating tablet 4 mg, 4 mg, Oral, Q8H PRN **OR** ondansetron (ZOFRAN) injection 4 mg, 4 mg, IntraVENous, Q6H PRN, Sandip Dali, DO, 4 mg at 02/25/23 1731    polyethylene glycol (GLYCOLAX) packet 17 g, 17 g, Oral, Daily PRN, Sandip Dali, DO    acetaminophen (TYLENOL) tablet 650 mg, 650 mg, Oral, Q6H PRN, 650 mg at 02/26/23 0456 **OR** acetaminophen (TYLENOL) suppository 650 mg, 650 mg, Rectal, Q6H PRN, Sandip Dali, DO    0.9 % sodium chloride infusion, , IntraVENous, Continuous, Sandip Dali, DO, Last Rate: 75 mL/hr at 02/25/23 0044, New Bag at 02/25/23 0044    cefTRIAXone (ROCEPHIN) 1,000 mg in sterile water 10 mL IV syringe, 1,000 mg, IntraVENous, Q24H, Sandip Dali, DO, 1,000 mg at 02/25/23 2330    sennosides-docusate sodium (SENOKOT-S) 8.6-50 MG tablet 2 tablet, 2 tablet, Oral, Daily PRN, Kathey Rupa, DO, 2 tablet at 02/25/23 0048    bisacodyl (DULCOLAX) suppository 10 mg, 10 mg, Rectal, Daily PRN, Kathey Rupa, DO    glucose chewable tablet 16 g, 4 tablet, Oral, PRN, Kathey Rupa, DO    dextrose bolus 10% 125 mL, 125 mL, IntraVENous, PRN **OR** dextrose bolus 10% 250 mL, 250 mL, IntraVENous, PRN, Kathey Rupa, DO    glucagon (rDNA) injection 1 mg, 1 mg, SubCUTAneous, PRN, Johey Rupa, DO    dextrose 10 % infusion, , IntraVENous, Continuous PRN, Johey Rupa, DO    apixaban Wynetta Albin) tablet 5 mg, 5 mg, Oral, BID, Kathey Rupa, DO, 5 mg at 02/26/23 2046    atorvastatin (LIPITOR) tablet 40 mg, 40 mg, Oral, Daily, Kathey Rupa, DO, 40 mg at 02/26/23 2046    clopidogrel (PLAVIX) tablet 75 mg, 75 mg, Oral, Daily, Kathey Rupa, DO, 75 mg at 02/26/23 6414    insulin glargine (LANTUS) injection vial 10 Units, 10 Units, SubCUTAneous, Nightly, Kathey Rupa, DO    tiotropium (SPIRIVA RESPIMAT) 2.5 MCG/ACT inhaler 2 puff, 2 puff, Inhalation, Daily, Kathey Rupa, DO, 2 puff at 02/26/23 0844    Allergies: Allergies   Allergen Reactions    Codeine     Lac Bovis     Morphine        Social History:   Social History     Socioeconomic History    Marital status:       Spouse name: Not on file    Number of children: Not on file    Years of education: Not on file    Highest education level: Not on file   Occupational History    Not on file   Tobacco Use    Smoking status: Every Day     Packs/day: 0.50     Years: 45.00     Pack years: 22.50     Types: Cigarettes    Smokeless tobacco: Former   Substance and Sexual Activity    Alcohol use: No    Drug use: Yes     Types: Marijuana Clarence Forward)    Sexual activity: Never   Other Topics Concern    Not on file   Social History Narrative    Not on file     Social Determinants of Health     Financial Resource Strain: Not on file   Food Insecurity: Not on file   Transportation Needs: Not on file   Physical Activity: Not on file   Stress: Not on file   Social Connections: Not on file   Intimate Partner Violence: Not on file   Housing Stability: Not on file       Family History:    Family History   Problem Relation Age of Onset    Hypertension Mother     Hypertension Father     Diabetes Sister     Diabetes Brother     Stroke Brother        REVIEW OF SYSTEMS:  A comprehensive 14 point review of systems was obtained. Constitutional: No fatigue  Eyes: No blurry vision  Ears, nose, mouth, throat, face: No ringing in the ears; no facial droop. Respiratory: No cough or cold. Cardiovascular: No palpitations  Gastrointestinal: No diarrhea or constipation. Genitourinary: No burning with urination  Integument/Skin: No rashes  Hematologic/Lymphatic: No easy bruising  Musculoskeletal: No muscle pains  Neurologic: No weakness in the extremities. Psychiatric: No depression or suicidal thoughts. Endocrine: No heat or cold intolerances. Allergic/Immunologic: No current seasonal allergies; no skin hives. Physical Exam:      This a 63 y.o. female   Vitals:    02/26/23 1937   BP: (!) 146/83   Pulse: 91   Resp: 14   Temp: 98 °F (36.7 °C)   SpO2: 98%     Constitutional: Patient in no acute distress. Neuro: alert and oriented to person place and time. Head: Atraumatic and normocephalic. Neck: Trachea midline. Ext: 2+ radial pulses bilaterally. Psych: Mood and affect normal.  Skin: No rashes or bruising present. Lungs: Respiratory effort normal.  Cardiovascular:  Regular rhythm. Abdomen: Soft, non-tender, non-distended. Bladder non-tender and not distended.   Lymphatics: no palpable lymphadenopathy      Labs:  Recent Labs     02/24/23 2040 02/25/23  0907 02/26/23  0641   WBC 4.7 3.9 3.5   HGB 11.5* 9.1* 9.0*   HCT 37.8 29.9* 30.0*   MCV 79.2* 78.7* 82.4*    196 207     Recent Labs     02/24/23 2040 02/25/23  0907 02/26/23  0641    139 137   K 4.1 4.0 3.8 CL 99 106 110*   CO2 22 21 17*   BUN 26* 24* 16   CREATININE 1.95* 1.57* 1.35*       Recent Labs     02/24/23  2144   COLORU Yellow   PHUR 6.0   WBCUA 20 TO 50   RBCUA 0 TO 2   BACTERIA MANY*   SPECGRAV 1.020   LEUKOCYTESUR MODERATE*   UROBILINOGEN Normal   BILIRUBINUR NEGATIVE           -----------------------------------------------------------------  Imaging Results:  XR ABDOMEN (KUB) (SINGLE AP VIEW)    Result Date: 2/25/2023  EXAMINATION: ONE SUPINE XRAY VIEW(S) OF THE ABDOMEN 2/24/2023 10:12 pm COMPARISON: Supine AP view of the abdomen including pelvis on 02/18/2023. Supine and upright AP views of abdomen on 07/25/2021. HISTORY: ORDERING SYSTEM PROVIDED HISTORY: abd bloating and pain. chronic constipation TECHNOLOGIST PROVIDED HISTORY: abd bloating and pain. chronic constipation Reason for Exam: supine,abd pain,constipation,harvey FINDINGS: There is no obvious abnormal gas in small bowel. Small amount of gas in the right side of colon present. Small to moderate amount of gas scattered in transverse colon. In the descending colon and rectosigmoid colon small-to-moderate amount of gas scattered. There is no obvious or significant retained stool in colon. Evidence of moderate to marked multilevel degenerative disc disease in the lumbar spine, which demonstrates mild levoscoliosis. Small to moderate amount of gas scattered in large bowel without any significant retained stool. The finding is nonspecific. There is no abnormal gas in small bowel or in the stomach.        Assessment and Plan   Impression:    82-year-old female  Urinary retention  HARVEY, improving  Klebsiella UTI      Plan:   OK to remove Shook catheter  Intermittent straight catheterization for PVR >400 ml  Bowel regimen  Avoid narcotic, anticholinergic medications  Encourage ambulation as able  Continue IV Ceftriaxone, transition to PO antibiotic when appropriate per primary      Herlinda Landry MD

## 2023-02-27 NOTE — PLAN OF CARE
Problem: Discharge Planning  Goal: Discharge to home or other facility with appropriate resources  Outcome: Completed     Problem: Pain  Goal: Verbalizes/displays adequate comfort level or baseline comfort level  Outcome: Completed     Problem: Chronic Conditions and Co-morbidities  Goal: Patient's chronic conditions and co-morbidity symptoms are monitored and maintained or improved  Outcome: Completed     Problem: Respiratory - Adult  Goal: Achieves optimal ventilation and oxygenation  Outcome: Completed

## 2023-02-27 NOTE — DISCHARGE INSTRUCTIONS
You were seen and admitted with acute urinary retention and urinary tract infection.     Please take Cipro for 5 days  Follow-up with urologist as prescribed  Follow-up with your PCP  Maintain a high-fiber diet and use laxatives as needed to avoid constipation  Return if you have any worsening of your symptoms

## 2023-02-27 NOTE — PROGRESS NOTES
[facility]  Internal Medicine Teaching Residency Program  Inpatient Daily Progress Note  ______________________________________________________________________________    Patient: Jaylene Dunne  YOB: 1960   YQM:[id number]    Acct: [de-identified]     Room: [facility]  Admit date: 2/24/2023  Today's date: 02/27/23  Number of days in the hospital: 3    SUBJECTIVE   Admitting Diagnosis: HARVEY (acute kidney injury) ([facility] [address]  CC: Abdominal cramping, constipation, burning sensation in urine  Pt examined at bedside. Chart & results reviewed. -Seen and examined at bedside. Hemodynamically stable. Shook was removed earlier in the morning. Patient doing fine today. Denies complaint. Denies dysuria, urine retention, urinary fullness or burning. Denies fever or chills. Patient had bowel movement yesterday. Urinary cultures growing Klebsiella pneumoniae    Okay for discharge today    Review of Systems   Constitutional:  . Negative for fever and unexpected weight change. Respiratory:  Negative for shortness of breath. Cardiovascular:  Negative for chest pain and leg swelling. Gastrointestinal:  Negative for blood in stool and vomiting. Genitourinary:  Musculoskeletal:        BRIEF HISTORY     The patient is a 63 y.o. female with PMHx of COPD, Afib(on eliquis),LOCO, T2DM, CKD, CHF, chronic constipation, and recent admission for urinary retention 2/2 constipation and HARVEY. They present to the ED with same chief complaint of abdominal cramping. The patient stated that she has been having sharp pain in her LUQ and also some cramps in her LLQ. Mixed reports of no bowel movements since she was discharged, reported as she had not had a bowel movement but patient stated that when she used her suppository she was able to have some stool output. Otherwise the patient reports chills and nausea.  She also reports burning with and increased frequency of urination since removal of her jeronimo catheter last admission. Denies fevers, SOB, cough, or chest pain. Patient was admitted to last week for acute urinary retention mostly precipitated by chronic constipation. Was on her Jeronimo's catheter. Was discharged on multiple stool softeners. GI was consulted for constipation and recommended inpatient colonoscopy. Patient stated she would follow-up outpatient GI for colonoscopy. But had to be readmitted for burning sensation in the urine. Initial Vitals on Presentation:  Temp: 98.2, RR: 20, HR 93, /63, SPO2 96% on RA. Initial Course in the ED:   - Pt presented with above complaint, concern for continued urinary retention associated with her chronic constipation.  - Lab work showing HARVEY(BUN 26, Cr 1.95), UA showed many bacteria and 10-20 WBC. - No imaging in ED. - They were given fluid boluses, lower volume d/t history of CHF, zofran for nausea and norflex for her abdominal cramps. OBJECTIVE     Vital Signs:  /79   Pulse (!) 138   Temp 97.8 °F (36.6 °C) (Oral)   Resp 16   Ht 5' 9\" (1.753 m)   Wt 220 lb 8 oz (100 kg)   SpO2 96%   BMI 32.56 kg/m²     Temp (24hrs), Av.9 °F (36.6 °C), Min:97.8 °F (36.6 °C), Max:98 °F (36.7 °C)    In: -   Out: 2190 [Urine:2190]    Physical Exam:  Constitutional: This is a well developed, Obese, 63 y.o. female who is A&Ox3, cooperative and in no apparent distress. Head: Atraumatic and Normocephalic   EENT:  PERRLA. No conjunctival injections. Septum was midline, mucosa was without erythema, exudates or cobblestoning. No thrush was noted. Neck: Supple without thyromegaly. No elevated JVP. Trachea was midline. Respiratory: Chest was symmetrical without dullness to percussion. Breath sounds bilaterally were clear to auscultation. There were no wheezes, rhonchi or rales. There is no intercostal retraction or use of accessory muscles. Cardiovascular: Regular without murmur, clicks, gallops or rubs.    Abdomen: distended, soft, bowel sounds present,     Musculoskeletal: Normal curvature of the spine. No gross muscle weakness. Extremities:  No lower extremity edema, ulcerations, tenderness, varicosities or erythema. Muscle size, tone and strength are normal.  No involuntary movements are noted. Skin:  Warm and dry. Good color, turgor and pigmentation. No lesions or scars.   No cyanosis or clubbing  Neurological/Psychiatric: The patient's general behavior, level of consciousness, thought content and emotional status is normal.  Medications:  Scheduled Medications:    ciprofloxacin  500 mg Oral 2 times per day    famotidine  20 mg Oral BID    lidocaine  1 patch TransDERmal Daily    dicyclomine  20 mg Oral 4x Daily AC & HS    albuterol sulfate HFA  2 puff Inhalation q8h    budesonide-formoterol  2 puff Inhalation BID    sodium chloride flush  5-40 mL IntraVENous 2 times per day    apixaban  5 mg Oral BID    atorvastatin  40 mg Oral Daily    clopidogrel  75 mg Oral Daily    insulin glargine  10 Units SubCUTAneous Nightly    tiotropium  2 puff Inhalation Daily     Continuous Infusions:    sodium chloride      sodium chloride 75 mL/hr at 02/27/23 0201    dextrose       PRN Medicationsmetoprolol, 2.5 mg, Daily PRN  phenazopyridine, 200 mg, TID PRN  busPIRone, 30 mg, TID PRN  albuterol, 2.5 mg, Q6H PRN  cyclobenzaprine, 10 mg, Nightly PRN  sodium chloride flush, 5-40 mL, PRN  sodium chloride, , PRN  ondansetron, 4 mg, Q8H PRN   Or  ondansetron, 4 mg, Q6H PRN  polyethylene glycol, 17 g, Daily PRN  acetaminophen, 650 mg, Q6H PRN   Or  acetaminophen, 650 mg, Q6H PRN  sennosides-docusate sodium, 2 tablet, Daily PRN  bisacodyl, 10 mg, Daily PRN  glucose, 4 tablet, PRN  dextrose bolus, 125 mL, PRN   Or  dextrose bolus, 250 mL, PRN  glucagon (rDNA), 1 mg, PRN  dextrose, , Continuous PRN      Diagnostic Labs:  CBC:   Recent Labs     02/24/23  2040 02/25/23  0907 02/26/23  0641   WBC 4.7 3.9 3.5   RBC 4.77 3.80* 3.64*   HGB 11.5* 9. 1* 9.0*   HCT 37.8 29.9* 30.0*   MCV 79.2* 78.7* 82.4*   RDW 18.1* 17.7* 18.1*    196 207     BMP:   Recent Labs     02/24/23 2040 02/25/23  0907 02/26/23  0641    139 137   K 4.1 4.0 3.8   CL 99 106 110*   CO2 22 21 17*   BUN 26* 24* 16   CREATININE 1.95* 1.57* 1.35*     BNP: No results for input(s): BNP in the last 72 hours. PT/INR: No results for input(s): PROTIME, INR in the last 72 hours. APTT: No results for input(s): APTT in the last 72 hours. CARDIAC ENZYMES: No results for input(s): CKMB, CKMBINDEX, TROPONINI in the last 72 hours. Invalid input(s): CKTOTAL;3  FASTING LIPID PANEL:  Lab Results   Component Value Date    CHOL 122 06/16/2020    HDL 35 (L) 06/16/2020    TRIG 152 (H) 06/16/2020     LIVER PROFILE:   Recent Labs     02/24/23 2040   AST 24   ALT 17   BILITOT 0.6   ALKPHOS 92      MICROBIOLOGY:   Lab Results   Component Value Date/Time    CULTURE NO GROWTH 02/25/2023 12:26 PM       Imaging:    XR ABDOMEN (KUB) (SINGLE AP VIEW)    Result Date: 2/25/2023  Small to moderate amount of gas scattered in large bowel without any significant retained stool. The finding is nonspecific. There is no abnormal gas in small bowel or in the stomach. XR ABDOMEN (KUB) (SINGLE AP VIEW)    Result Date: 2/20/2023  No evidence of bowel obstruction or large stool burden. ASSESSMENT & PLAN     ASSESSMENT / PLAN:   Principal Problem:    HARVEY (acute kidney injury) ([facility] [address]  Active Problems:    UTI (urinary tract infection)    Stage 3a chronic kidney disease ([facility] [address]    Urinary retention  Resolved Problems:    * No resolved hospital problems. *     1. HARVEY on CKD stage IV:  - Urinary sodium 64, urinary creatinine 138,FeNA 0.7 indicating prerenal etiology of the HARVEY  - Underlying EGFR 29, creatinine on admission 1.95, elevated from baseline. Creatinine down to 1.35. Nephrology signed off      2. Urinary tract infection:  - Cultures growing Klebsiella pneumonia, sensitive to Rocephin and Cipro. Will discharge on Cipro for 5 days    3. Chronic constipation:  - We will restart full on bowel regimen  - Continue GlycoLax, bisacodyl      4. Atrial fibrillation:  - [id number] score 5  - Rate controlled  - Continue Eliquis      5. Type II DM without complication:  -Last XEF6A 9.7 on 12/14/2022  - POC glucose check  - Hypoglycemia protocol in place  - Takes Trulicity at home med    6. Combined systolic and diastolic heart failure:  - Last echo on 12/14/2022 showed LVEF of more than 55%  - Patient has significant past medical history of coronary artery disease with 2 stent placement  - Last cardiac cath on 10/4/2021 showed 90% stenosis of the mid RCA which was stented, on  Plavix  - Takes Lasix 40 mg orally as home med    7. COPD:  - Not on any exacerbation, on room air  - Albuterol as needed      DVT ppx : Eliquis  GI ppx: Not on any GI prophylaxis    PT/OT: Consulted  Discharge Planning / Sandip Quiles: Plan discharge today    Zulma Hill MD  Internal Medicine Resident, PGY-2  [facility]; [place], [place]  2/27/2023, 11:03 AM   Attending Physician Statement  I have discussed the care of Lia Tang and I have examined the patient myselft and taken ros and hpi , including pertinent history and exam findings,  with the resident. I have reviewed the key elements of all parts of the encounter with the resident. I agree with the assessment, plan and orders as documented by the resident.   Ok to Jonh Matos signed by Wilfrido Lozoya MD

## 2023-03-04 NOTE — DISCHARGE SUMMARY
[address]     Department of Internal Medicine - Staff Internal Medicine Teaching Service    INPATIENT DISCHARGE SUMMARY      Patient Identification:  Danilo Kwon is a 63 y.o. female. :  1960  MRN: [id number]     Acct: [de-identified]   PCP: Steven Marques MD  Admit Date:  2023  Discharge date and time: 2023  2:43 PM   Attending Provider: No att. providers found                                     [address] Problem Lists:  Principal Problem:    HARVEY (acute kidney injury) ([facility] [address]  Active Problems:    Anemia  Resolved Problems:    * No resolved hospital problems. *      HOSPITAL STAY     Brief Inpatient course: The patient is a 63 y.o. female with PMHx of COPD, Afib(on eliquis),LOCO, T2DM, CKD, CHF, chronic constipation, and recent admission for urinary retention 2/2 constipation and HARVEY. They present to the ED with same chief complaint of abdominal cramping. The patient stated that she has been having sharp pain in her LUQ and also some cramps in her LLQ. Mixed reports of no bowel movements since she was discharged, reported as she had not had a bowel movement but patient stated that when she used her suppository she was able to have some stool output. Otherwise the patient reports chills and nausea. She also reports burning with and increased frequency of urination since removal of her jeronimo catheter last admission. Denies fevers, SOB, cough, or chest pain. Patient was admitted to last week for acute urinary retention mostly precipitated by chronic constipation. Was on her Jeronimo's catheter. Was discharged on multiple stool softeners. GI was consulted for constipation and recommended inpatient colonoscopy. Patient stated she would follow-up outpatient GI for colonoscopy. But had to be readmitted for burning sensation in the urine. Initial Vitals on Presentation:  Temp: 98.2, RR: 20, HR 93, /63, SPO2 96% on RA. Initial Course in the ED:   - Pt presented with above complaint, concern for continued urinary retention associated with her chronic constipation.  - Lab work showing HARVEY(BUN 26, Cr 1.95), UA showed many bacteria and 10-20 WBC. - No imaging in ED. - They were given fluid boluses, lower volume d/t history of CHF, zofran for nausea and norflex for her abdominal cramps. Consults:     Consults:     Final Specialist Recommendations/Findings:   IP CONSULT TO INTERNAL MEDICINE  IP CONSULT TO NEPHROLOGY  IP CONSULT TO CASE MANAGEMENT  IP CONSULT TO UROLOGY  IP CONSULT TO GI      Any Hospital Acquired Infections: none    Discharge Functional Status:  stable    DISCHARGE PLAN     Disposition: home    Patient Instructions:   Discharge Medication List as of 2/20/2023  2:16 PM        START taking these medications    Details   bisacodyl (DULCOLAX) 10 MG suppository Place 1 suppository rectally daily as needed for Constipation, Disp-15 suppository, R-1Normal      calcium carbonate (TUMS) 500 MG chewable tablet Take 1 tablet by mouth 3 times daily as needed for Heartburn, Disp-30 tablet, R-2Normal      Hydrocortisone, Perianal, (PROCTO-BASHIR) 1 % cream 2 times daily Apply topically 2 times daily. , Rectal, Disp-28 g, R-1, Normal      polyethylene glycol (GLYCOLAX) 17 g packet Take 17 g by mouth daily as needed for Constipation, Disp-30 each, R-0Normal      senna (SENOKOT) 8.6 MG tablet Take 1 tablet by mouth daily, Disp-30 tablet, R-0Normal           CONTINUE these medications which have NOT CHANGED    Details   insulin detemir (LEVEMIR FLEXTOUCH) 100 UNIT/ML injection pen Inject 40 Units into the skin daily, Disp-5 Adjustable Dose Pre-filled Pen Syringe, R-3Normal      Insulin Pen Needle (PEN NEEDLES) 31G X 5 MM MISC 1 box by Does not apply route daily, Disp-1 each, R-0Normal      busPIRone (BUSPAR) 30 MG tablet Take 30 mg by mouth 3 times daily as neededHistorical Med      acetaminophen (TYLENOL) 325 MG tablet Take 650 mg by mouth every 4-6 hours as neededHistorical Med      furosemide (LASIX) 40 MG tablet Take 1 tablet by mouth in the morning. Hold for 2 more days. , Disp-60 tablet, R-3NO PRINT      mometasone-formoterol (DULERA) 200-5 MCG/ACT inhaler Inhale into the lungsHistorical Med      lisinopril (PRINIVIL;ZESTRIL) 10 MG tablet Take 1 tablet by mouth daily, Disp-30 tablet, R-3Normal      clopidogrel (PLAVIX) 75 MG tablet Take 1 tablet by mouth daily, Disp-30 tablet, R-3Normal      famotidine (PEPCID) 20 MG tablet Take 20 mg by mouth 2 times dailyHistorical Med      albuterol sulfate HFA (PROVENTIL;VENTOLIN;PROAIR) 108 (90 Base) MCG/ACT inhaler Inhale 2 puffs into the lungs 3 times dailyHistorical Med      lidocaine (LIDODERM) 5 % Place 1 patch onto the skin as needed for Pain (as needed for rotator cuff pain) 12 hours on, 12 hours off. Historical Med      apixaban (ELIQUIS) 5 MG TABS tablet Take 1 tablet by mouth 2 times daily, Disp-180 tablet, R-1Print      atorvastatin (LIPITOR) 40 MG tablet Take 1 tablet by mouth daily, Disp-30 tablet, R-3Normal      Dulaglutide (TRULICITY) 1.5 MK/7.7II SOPN Inject 1.5 mg into the skin once a weekHistorical Med      nitroGLYCERIN (NITROSTAT) 0.4 MG SL tablet up to max of 3 total doses.  If no relief after 1 dose, call 911., Disp-25 tablet, R-3Normal      metoprolol tartrate (LOPRESSOR) 25 MG tablet Take 0.5 tablets by mouth 2 times daily, Disp-60 tablet, R-3Normal      Lancets MISC DAILY Starting Tue 2/12/2019, Disp-100 each, R-3, Print      blood glucose monitor strips Test 3 times a day & as needed for symptoms of irregular blood glucose., Disp-100 strip, R-3, Normal      glucose monitoring kit (FREESTYLE) monitoring kit DAILY Starting Tue 2/12/2019, Disp-1 kit, R-0, Normal      albuterol (PROVENTIL) (5 MG/ML) 0.5% nebulizer solution Take 0.5 mLs by nebulization every 6 hours as needed for Wheezing, Disp-120 each, R-0Print      tiotropium (SPIRIVA) 18 MCG inhalation capsule Inhale 1 capsule into the lungs daily Pt told that she was using this medication at home., Disp-1 capsule, R-0Print           STOP taking these medications       aspirin 81 MG chewable tablet Comments:   Reason for Stopping:               Activity: activity as tolerated    Diet: regular diet    Follow-up:    Kylah Healy MD  [facility], [address] [facility]  [phone] [phone]    Follow up in 2 week(s)  or urinary retention    Keny Lane MD  [facility]  [phone]    Call  Call office and make appointment for cancer screening colonoscopy and anemia work up. Sd Matthew MD  [facility] [address] [phone]    Schedule an appointment as soon as possible for a visit in 2 day(s)  Post hospital follow-up      Patient Instructions: You were seen and admitted with acute kidney injury from urine retention caused by constipation. Please avoid foods that cause constipation. Increase your dietary fiber. Follow-up with the stomach doctors outpatient for age-appropriate screening colonoscopy  Follow-up with urologist in 2 weeks  Follow-up with your PCP  Check your blood levels in 1 week  Watch out for symptoms of urine retention and return if you have any worsening. Carlos Pitts MD,  Internal Medicine Resident, PGY-1  [facility];  [place], [place]  3/4/2023, 8:44 AM

## 2023-03-16 ENCOUNTER — TELEPHONE (OUTPATIENT)
Dept: OTHER | Age: 63
End: 2023-03-16

## 2023-03-27 PROBLEM — N39.0 UTI (URINARY TRACT INFECTION): Status: RESOLVED | Noted: 2023-02-25 | Resolved: 2023-03-27

## 2023-07-11 ENCOUNTER — HOSPITAL ENCOUNTER (EMERGENCY)
Age: 63
Discharge: HOME | End: 2023-07-11
Attending: EMERGENCY MEDICINE
Payer: MEDICARE

## 2023-07-11 ENCOUNTER — APPOINTMENT (OUTPATIENT)
Dept: GENERAL RADIOLOGY | Age: 63
End: 2023-07-11
Payer: MEDICARE

## 2023-07-11 VITALS
WEIGHT: 226.19 LBS | OXYGEN SATURATION: 95 % | TEMPERATURE: 98.3 F | RESPIRATION RATE: 18 BRPM | SYSTOLIC BLOOD PRESSURE: 133 MMHG | BODY MASS INDEX: 33.4 KG/M2 | DIASTOLIC BLOOD PRESSURE: 77 MMHG | HEART RATE: 93 BPM

## 2023-07-11 DIAGNOSIS — M25.562 CHRONIC PAIN OF LEFT KNEE: Primary | ICD-10-CM

## 2023-07-11 DIAGNOSIS — G89.29 CHRONIC PAIN OF LEFT KNEE: Primary | ICD-10-CM

## 2023-07-11 DIAGNOSIS — M25.512 ACUTE PAIN OF LEFT SHOULDER: ICD-10-CM

## 2023-07-11 PROCEDURE — 73030 X-RAY EXAM OF SHOULDER: CPT

## 2023-07-11 PROCEDURE — 73562 X-RAY EXAM OF KNEE 3: CPT

## 2023-07-11 PROCEDURE — 99283 EMERGENCY DEPT VISIT LOW MDM: CPT

## 2023-07-11 PROCEDURE — 6370000000 HC RX 637 (ALT 250 FOR IP)

## 2023-07-11 RX ORDER — METHOCARBAMOL 500 MG/1
500 TABLET, FILM COATED ORAL ONCE
Status: COMPLETED | OUTPATIENT
Start: 2023-07-11 | End: 2023-07-11

## 2023-07-11 RX ORDER — LIDOCAINE 50 MG/G
1 PATCH TOPICAL DAILY
Qty: 10 PATCH | Refills: 0 | Status: SHIPPED | OUTPATIENT
Start: 2023-07-11 | End: 2023-07-21

## 2023-07-11 RX ORDER — LIDOCAINE 4 G/G
1 PATCH TOPICAL DAILY
Status: DISCONTINUED | OUTPATIENT
Start: 2023-07-11 | End: 2023-07-11 | Stop reason: HOSPADM

## 2023-07-11 RX ORDER — ACETAMINOPHEN 500 MG
1000 TABLET ORAL ONCE
Status: COMPLETED | OUTPATIENT
Start: 2023-07-11 | End: 2023-07-11

## 2023-07-11 RX ADMIN — ACETAMINOPHEN 1000 MG: 500 TABLET ORAL at 10:56

## 2023-07-11 RX ADMIN — METHOCARBAMOL 500 MG: 500 TABLET ORAL at 10:57

## 2023-07-11 ASSESSMENT — PAIN DESCRIPTION - DESCRIPTORS: DESCRIPTORS: ACHING;DISCOMFORT

## 2023-07-11 ASSESSMENT — ENCOUNTER SYMPTOMS
RHINORRHEA: 0
COUGH: 0
WHEEZING: 0
SHORTNESS OF BREATH: 0
PHOTOPHOBIA: 0
EYE REDNESS: 0
EYE PAIN: 0
BACK PAIN: 0
COLOR CHANGE: 0
VOMITING: 0
SORE THROAT: 0
ABDOMINAL PAIN: 0
NAUSEA: 0

## 2023-07-11 ASSESSMENT — PAIN DESCRIPTION - LOCATION: LOCATION: KNEE;SHOULDER

## 2023-07-11 ASSESSMENT — PAIN SCALES - GENERAL: PAINLEVEL_OUTOF10: 7

## 2023-07-11 NOTE — DISCHARGE INSTRUCTIONS
Your evaluated for your left shoulder pain and left knee pain. Your diagnosed with osteoarthritis of your left shoulder and your left knee. You are given the number for an orthopedic surgery clinic please follow-up with them for any further imaging that they may want to do and plan. Take your medication as indicated and prescribed. For pain use acetaminophen (Tylenol), unless prescribed medications that have acetaminophen in it. You can take over the counter acetaminophen tablets (1 - 2 tablets of the 500-mg strength every 6 hours). PLEASE RETURN TO THE EMERGENCY DEPARTMENT IMMEDIATELY for worsening symptoms, pain not controlled with the prescribed / over the counter pain medication, numbness or tingling in your feet or toes, increased swelling to your toes, or if you develop any concerning symptoms such as: high fever not relieved by acetaminophen (Tylenol) and/or ibuprofen (Motrin / Advil), chills, shortness of breath, chest pain, feeling of your heart fluttering or racing, persistent nausea and/or vomiting, vomiting up blood, blood in your stool, numbness, loss of consciousness, weakness or tingling in the arms or legs or change in color of the extremities, changes in mental status, persistent headache, blurry vision, loss of bladder / bowel control, unable to follow up with your physician, or other any other care or concern.

## 2023-07-11 NOTE — ED PROVIDER NOTES
[facility] ED  Emergency Department Encounter  Emergency Medicine Resident     Pt Name:Nancy Darcella Opitz  MRN: [id number]  [address] [facility] [place] 1960  Date of evaluation: 7/11/23  PCP:  Wilber Sherwood MD  Note Started: 10:19 AM EDT      CHIEF COMPLAINT       Chief Complaint   Patient presents with    Shoulder Pain     left    Knee Pain     left       HISTORY OF PRESENT ILLNESS  (Location/Symptom, Timing/Onset, Context/Setting, Quality, Duration, Modifying Factors, Severity.)      Kosta Hernandez is a 63 y.o. female with a history of rotator cuff tear who presents with left shoulder pain, knee pain. Patient states last night after dinner she started having left shoulder pain. She denies any trauma to her left shoulder or left knee. She states that when her shoulder is still she has no pain but when she moves her left shoulder pain greatly increases. She denies any chest pain shortness of breath vomiting diaphoresis. Patient states she also started having left knee pain. She reports she was previously in the emergency department diagnosed with rotator cuff tears bilaterally. Denies seeing an orthopedic surgeon previously. States she is and lidocaine patch which gave her a few hours of sleep. States that she also took Tylenol at 3 AM and a CBD gummy at 6 AM with minimal relief of symptoms. Patient states that she took a muscle relaxer but does not know what the name of it is. Patient rates her pain 7 out of 10.     PAST MEDICAL / SURGICAL / SOCIAL / FAMILY HISTORY      has a past medical history of Acute on chronic systolic CHF (congestive heart failure) ([address]), Asthma, Blood circulation, collateral, CAD (coronary artery disease), COPD (chronic obstructive pulmonary disease) ([address]), COPD exacerbation ([address]), Diabetes mellitus ([address]), Hyperlipidemia, Hypertension, Ischemic colitis ([address]), Movement disorder, Neuropathy, PAD (peripheral artery disease) ([address]), Tobacco abuse, and Tobacco abuse

## 2023-07-15 ENCOUNTER — APPOINTMENT (OUTPATIENT)
Dept: GENERAL RADIOLOGY | Age: 63
End: 2023-07-15
Payer: MEDICARE

## 2023-07-15 ENCOUNTER — HOSPITAL ENCOUNTER (EMERGENCY)
Age: 63
Discharge: HOME | End: 2023-07-16
Attending: EMERGENCY MEDICINE
Payer: MEDICARE

## 2023-07-15 VITALS
SYSTOLIC BLOOD PRESSURE: 120 MMHG | TEMPERATURE: 96.8 F | DIASTOLIC BLOOD PRESSURE: 77 MMHG | OXYGEN SATURATION: 95 % | RESPIRATION RATE: 17 BRPM | HEART RATE: 86 BPM

## 2023-07-15 DIAGNOSIS — R07.89 CHEST DISCOMFORT: ICD-10-CM

## 2023-07-15 DIAGNOSIS — R30.0 DYSURIA: Primary | ICD-10-CM

## 2023-07-15 LAB
ANION GAP SERPL CALCULATED.3IONS-SCNC: 15 MMOL/L (ref 9–17)
BASOPHILS # BLD: 0.06 K/UL (ref 0–0.2)
BASOPHILS NFR BLD: 1 % (ref 0–2)
BILIRUB UR QL STRIP: NEGATIVE
BUN SERPL-MCNC: 30 MG/DL (ref 8–23)
CALCIUM SERPL-MCNC: 9.9 MG/DL (ref 8.6–10.4)
CASTS #/AREA URNS LPF: ABNORMAL /LPF (ref 0–2)
CASTS #/AREA URNS LPF: ABNORMAL /LPF (ref 0–2)
CHLORIDE SERPL-SCNC: 102 MMOL/L (ref 98–107)
CLARITY UR: CLEAR
CO2 SERPL-SCNC: 21 MMOL/L (ref 20–31)
COLOR UR: YELLOW
CREAT SERPL-MCNC: 2 MG/DL (ref 0.5–0.9)
EOSINOPHIL # BLD: 0.12 K/UL (ref 0–0.44)
EOSINOPHILS RELATIVE PERCENT: 1 % (ref 1–4)
EPI CELLS #/AREA URNS HPF: ABNORMAL /HPF (ref 0–5)
ERYTHROCYTE [DISTWIDTH] IN BLOOD BY AUTOMATED COUNT: 15.7 % (ref 11.8–14.4)
GFR SERPL CREATININE-BSD FRML MDRD: 28 ML/MIN/1.73M2
GLUCOSE SERPL-MCNC: 221 MG/DL (ref 70–99)
GLUCOSE UR STRIP-MCNC: NEGATIVE MG/DL
HCT VFR BLD AUTO: 36 % (ref 36.3–47.1)
HGB BLD-MCNC: 11.1 G/DL (ref 11.9–15.1)
HGB UR QL STRIP.AUTO: NEGATIVE
IMM GRANULOCYTES # BLD AUTO: 0.05 K/UL (ref 0–0.3)
IMM GRANULOCYTES NFR BLD: 1 %
KETONES UR STRIP-MCNC: NEGATIVE MG/DL
LEUKOCYTE ESTERASE UR QL STRIP: ABNORMAL
LYMPHOCYTES # BLD: 15 % (ref 24–43)
LYMPHOCYTES NFR BLD: 1.34 K/UL (ref 1.1–3.7)
MCH RBC QN AUTO: 24.8 PG (ref 25.2–33.5)
MCHC RBC AUTO-ENTMCNC: 30.8 G/DL (ref 28.4–34.8)
MCV RBC AUTO: 80.5 FL (ref 82.6–102.9)
MONOCYTES NFR BLD: 0.57 K/UL (ref 0.1–1.2)
MONOCYTES NFR BLD: 7 % (ref 3–12)
MUCOUS THREADS URNS QL MICRO: ABNORMAL
NEUTROPHILS NFR BLD: 75 % (ref 36–65)
NEUTS SEG NFR BLD: 6.68 K/UL (ref 1.5–8.1)
NITRITE UR QL STRIP: NEGATIVE
NRBC BLD-RTO: 0 PER 100 WBC
PH UR STRIP: 5.5 [PH] (ref 5–8)
PLATELET # BLD AUTO: 272 K/UL (ref 138–453)
PMV BLD AUTO: 10.6 FL (ref 8.1–13.5)
POTASSIUM SERPL-SCNC: 4.1 MMOL/L (ref 3.7–5.3)
PROT UR STRIP-MCNC: NEGATIVE MG/DL
RBC # BLD AUTO: 4.47 M/UL (ref 3.95–5.11)
RBC # BLD: ABNORMAL 10*6/UL
RBC #/AREA URNS HPF: ABNORMAL /HPF (ref 0–2)
SODIUM SERPL-SCNC: 138 MMOL/L (ref 135–144)
SP GR UR STRIP: 1.02 (ref 1–1.03)
TROPONIN I SERPL HS-MCNC: 12 NG/L (ref 0–14)
UROBILINOGEN UR STRIP-ACNC: NORMAL EU/DL (ref 0–1)
WBC #/AREA URNS HPF: ABNORMAL /HPF (ref 0–5)
WBC OTHER # BLD: 8.8 K/UL (ref 3.5–11.3)

## 2023-07-15 PROCEDURE — 93005 ELECTROCARDIOGRAM TRACING: CPT

## 2023-07-15 PROCEDURE — 85027 COMPLETE CBC AUTOMATED: CPT

## 2023-07-15 PROCEDURE — 71045 X-RAY EXAM CHEST 1 VIEW: CPT

## 2023-07-15 PROCEDURE — 80048 BASIC METABOLIC PNL TOTAL CA: CPT

## 2023-07-15 PROCEDURE — 84484 ASSAY OF TROPONIN QUANT: CPT

## 2023-07-15 PROCEDURE — 81001 URINALYSIS AUTO W/SCOPE: CPT

## 2023-07-15 PROCEDURE — 99285 EMERGENCY DEPT VISIT HI MDM: CPT

## 2023-07-15 ASSESSMENT — PAIN SCALES - GENERAL: PAINLEVEL_OUTOF10: 7

## 2023-07-15 ASSESSMENT — PAIN - FUNCTIONAL ASSESSMENT: PAIN_FUNCTIONAL_ASSESSMENT: 0-10

## 2023-07-15 ASSESSMENT — PAIN DESCRIPTION - LOCATION: LOCATION: BACK

## 2023-07-16 LAB — TROPONIN I SERPL HS-MCNC: 11 NG/L (ref 0–14)

## 2023-07-16 RX ORDER — CEPHALEXIN 500 MG
500 CAPSULE ORAL 4 TIMES DAILY
Qty: 28 CAPSULE | Refills: 0 | Status: SHIPPED | OUTPATIENT
Start: 2023-07-16 | End: 2023-07-23

## 2023-07-16 ASSESSMENT — ENCOUNTER SYMPTOMS
NAUSEA: 0
CONSTIPATION: 0
VOMITING: 0
COLOR CHANGE: 0
DIARRHEA: 0
CHEST TIGHTNESS: 1
ABDOMINAL PAIN: 0
SHORTNESS OF BREATH: 0

## 2023-07-17 LAB
EKG ATRIAL RATE: 76 BPM
EKG P AXIS: 24 DEGREES
EKG P-R INTERVAL: 174 MS
EKG Q-T INTERVAL: 420 MS
EKG QRS DURATION: 80 MS
EKG QTC CALCULATION (BAZETT): 472 MS
EKG R AXIS: 35 DEGREES
EKG T AXIS: 2 DEGREES
EKG VENTRICULAR RATE: 76 BPM

## 2023-09-18 ENCOUNTER — HOSPITAL ENCOUNTER (EMERGENCY)
Age: 63
Discharge: HOME | End: 2023-09-19
Attending: EMERGENCY MEDICINE
Payer: MEDICARE

## 2023-09-18 DIAGNOSIS — R11.0 NAUSEA: Primary | ICD-10-CM

## 2023-09-18 PROCEDURE — 99285 EMERGENCY DEPT VISIT HI MDM: CPT

## 2023-09-18 PROCEDURE — 93005 ELECTROCARDIOGRAM TRACING: CPT

## 2023-09-19 ENCOUNTER — APPOINTMENT (OUTPATIENT)
Dept: GENERAL RADIOLOGY | Age: 63
End: 2023-09-19
Payer: MEDICARE

## 2023-09-19 VITALS
BODY MASS INDEX: 33.21 KG/M2 | TEMPERATURE: 97.1 F | HEART RATE: 96 BPM | SYSTOLIC BLOOD PRESSURE: 123 MMHG | RESPIRATION RATE: 22 BRPM | DIASTOLIC BLOOD PRESSURE: 97 MMHG | OXYGEN SATURATION: 96 % | WEIGHT: 224.87 LBS

## 2023-09-19 LAB
ALBUMIN SERPL-MCNC: 4.4 G/DL (ref 3.5–5.2)
ALBUMIN/GLOB SERPL: 1.3 {RATIO} (ref 1–2.5)
ALP SERPL-CCNC: 118 U/L (ref 35–104)
ALT SERPL-CCNC: 11 U/L (ref 5–33)
ANION GAP SERPL CALCULATED.3IONS-SCNC: 18 MMOL/L (ref 9–17)
AST SERPL-CCNC: 13 U/L
BASOPHILS # BLD: 0.08 K/UL (ref 0–0.2)
BASOPHILS NFR BLD: 1 % (ref 0–2)
BILIRUB SERPL-MCNC: <0.1 MG/DL (ref 0.3–1.2)
BUN SERPL-MCNC: 43 MG/DL (ref 8–23)
CALCIUM SERPL-MCNC: 9.6 MG/DL (ref 8.6–10.4)
CHLORIDE SERPL-SCNC: 102 MMOL/L (ref 98–107)
CO2 SERPL-SCNC: 18 MMOL/L (ref 20–31)
CREAT SERPL-MCNC: 2.3 MG/DL (ref 0.5–0.9)
EOSINOPHIL # BLD: 0.08 K/UL (ref 0–0.44)
EOSINOPHILS RELATIVE PERCENT: 1 % (ref 1–4)
ERYTHROCYTE [DISTWIDTH] IN BLOOD BY AUTOMATED COUNT: 16.2 % (ref 11.8–14.4)
GFR SERPL CREATININE-BSD FRML MDRD: 24 ML/MIN/1.73M2
GLUCOSE SERPL-MCNC: 173 MG/DL (ref 70–99)
HCT VFR BLD AUTO: 36.7 % (ref 36.3–47.1)
HGB BLD-MCNC: 11.6 G/DL (ref 11.9–15.1)
IMM GRANULOCYTES # BLD AUTO: 0.04 K/UL (ref 0–0.3)
IMM GRANULOCYTES NFR BLD: 0 %
LIPASE SERPL-CCNC: 48 U/L (ref 13–60)
LYMPHOCYTES NFR BLD: 1.39 K/UL (ref 1.1–3.7)
LYMPHOCYTES RELATIVE PERCENT: 12 % (ref 24–43)
MCH RBC QN AUTO: 26.2 PG (ref 25.2–33.5)
MCHC RBC AUTO-ENTMCNC: 31.6 G/DL (ref 28.4–34.8)
MCV RBC AUTO: 82.8 FL (ref 82.6–102.9)
MONOCYTES NFR BLD: 0.68 K/UL (ref 0.1–1.2)
MONOCYTES NFR BLD: 6 % (ref 3–12)
NEUTROPHILS NFR BLD: 80 % (ref 36–65)
NEUTS SEG NFR BLD: 9.21 K/UL (ref 1.5–8.1)
NRBC BLD-RTO: 0 PER 100 WBC
PLATELET # BLD AUTO: 287 K/UL (ref 138–453)
PMV BLD AUTO: 10.2 FL (ref 8.1–13.5)
POTASSIUM SERPL-SCNC: 4.2 MMOL/L (ref 3.7–5.3)
PROT SERPL-MCNC: 7.7 G/DL (ref 6.4–8.3)
RBC # BLD AUTO: 4.43 M/UL (ref 3.95–5.11)
RBC # BLD: ABNORMAL 10*6/UL
SARS-COV-2 RDRP RESP QL NAA+PROBE: NOT DETECTED
SODIUM SERPL-SCNC: 138 MMOL/L (ref 135–144)
SPECIMEN DESCRIPTION: NORMAL
TROPONIN I SERPL HS-MCNC: 14 NG/L (ref 0–14)
TROPONIN I SERPL HS-MCNC: 14 NG/L (ref 0–14)
WBC OTHER # BLD: 11.5 K/UL (ref 3.5–11.3)

## 2023-09-19 PROCEDURE — 87635 SARS-COV-2 COVID-19 AMP PRB: CPT

## 2023-09-19 PROCEDURE — 6360000002 HC RX W HCPCS

## 2023-09-19 PROCEDURE — 84484 ASSAY OF TROPONIN QUANT: CPT

## 2023-09-19 PROCEDURE — 85025 COMPLETE CBC W/AUTO DIFF WBC: CPT

## 2023-09-19 PROCEDURE — 96376 TX/PRO/DX INJ SAME DRUG ADON: CPT

## 2023-09-19 PROCEDURE — 83690 ASSAY OF LIPASE: CPT

## 2023-09-19 PROCEDURE — 96374 THER/PROPH/DIAG INJ IV PUSH: CPT

## 2023-09-19 PROCEDURE — 73630 X-RAY EXAM OF FOOT: CPT

## 2023-09-19 PROCEDURE — 93005 ELECTROCARDIOGRAM TRACING: CPT

## 2023-09-19 PROCEDURE — 71045 X-RAY EXAM CHEST 1 VIEW: CPT

## 2023-09-19 PROCEDURE — 80053 COMPREHEN METABOLIC PANEL: CPT

## 2023-09-19 RX ORDER — ONDANSETRON 2 MG/ML
4 INJECTION INTRAMUSCULAR; INTRAVENOUS ONCE
Status: COMPLETED | OUTPATIENT
Start: 2023-09-19 | End: 2023-09-19

## 2023-09-19 RX ORDER — ONDANSETRON 4 MG/1
4 TABLET, FILM COATED ORAL EVERY 8 HOURS PRN
Qty: 20 TABLET | Refills: 0 | Status: SHIPPED | OUTPATIENT
Start: 2023-09-19

## 2023-09-19 RX ADMIN — ONDANSETRON 4 MG: 2 INJECTION INTRAMUSCULAR; INTRAVENOUS at 02:44

## 2023-09-19 RX ADMIN — ONDANSETRON 4 MG: 2 INJECTION INTRAMUSCULAR; INTRAVENOUS at 00:17

## 2023-09-19 ASSESSMENT — ENCOUNTER SYMPTOMS
NAUSEA: 1
DIARRHEA: 0
VOMITING: 0
BLOOD IN STOOL: 0
ABDOMINAL PAIN: 0
CONSTIPATION: 1
SHORTNESS OF BREATH: 0

## 2023-09-19 NOTE — ED NOTES
The following labs were labeled with appropriate pt sticker and tubed to lab:     [] Blue     [x] Lavender   [] on ice  [x] Green/yellow  [] Green/black [] on ice  [] [place]  [] on ice  [] Yellow  [] Red  [] Pink  [] Type/ Screen  [] ABG  [] VBG    [x] COVID-19 swab    [x] Rapid  [] PCR  [] Flu swab  [] Peds Viral Panel     [] Urine Sample  [] Fecal Sample  [] Pelvic Cultures  [] Blood Cultures  [] X 2  [] STREP Cultures      Rajeev Bills RN  09/19/23 0021

## 2023-09-19 NOTE — ED NOTES
Patient arrives to ED room 24 via triage with complaints of chills, nausea, and toe pain.  Patient states she hit her big toe on her      Delfina ALICJA Kevin  09/19/23 0022 details… normal/normal affect/alert and oriented x3/normal behavior

## 2023-09-19 NOTE — DISCHARGE INSTRUCTIONS
You were seen today in the emergency department for your nausea. We have evaluated you and determined that you likely viral GI illness. We now feel you are safe for discharge home. Please take 1 tablet of Zofran every 8 hours as needed for nausea. Please return to the emergency department immediately if develop any new or worsening concerns including chest pain, shortness of breath, abdominal pain, nausea, vomiting, diarrhea, weakness, loss consciousness, fever, chills, or any other concerns. Please call your PCP and schedule appointment within the next 24 to 48 hours for follow-up.

## 2023-09-20 LAB
EKG ATRIAL RATE: 87 BPM
EKG ATRIAL RATE: 95 BPM
EKG P AXIS: 47 DEGREES
EKG P AXIS: 61 DEGREES
EKG P-R INTERVAL: 140 MS
EKG P-R INTERVAL: 170 MS
EKG Q-T INTERVAL: 356 MS
EKG Q-T INTERVAL: 368 MS
EKG QRS DURATION: 78 MS
EKG QRS DURATION: 80 MS
EKG QTC CALCULATION (BAZETT): 428 MS
EKG QTC CALCULATION (BAZETT): 462 MS
EKG R AXIS: 31 DEGREES
EKG R AXIS: 51 DEGREES
EKG T AXIS: 28 DEGREES
EKG T AXIS: 52 DEGREES
EKG VENTRICULAR RATE: 87 BPM
EKG VENTRICULAR RATE: 95 BPM

## 2023-09-20 PROCEDURE — 93010 ELECTROCARDIOGRAM REPORT: CPT | Performed by: INTERNAL MEDICINE

## 2023-10-27 ENCOUNTER — HOSPITAL ENCOUNTER (EMERGENCY)
Age: 63
Discharge: HOME | End: 2023-10-28
Attending: EMERGENCY MEDICINE
Payer: MEDICARE

## 2023-10-27 ENCOUNTER — APPOINTMENT (OUTPATIENT)
Dept: GENERAL RADIOLOGY | Age: 63
End: 2023-10-27
Payer: MEDICARE

## 2023-10-27 DIAGNOSIS — K21.9 MILD ACID REFLUX: ICD-10-CM

## 2023-10-27 DIAGNOSIS — J45.901 EXACERBATION OF ASTHMA, UNSPECIFIED ASTHMA SEVERITY, UNSPECIFIED WHETHER PERSISTENT: Primary | ICD-10-CM

## 2023-10-27 DIAGNOSIS — N30.90 CYSTITIS WITHOUT HEMATURIA: ICD-10-CM

## 2023-10-27 LAB
ALBUMIN SERPL-MCNC: 4.3 G/DL (ref 3.5–5.2)
ALBUMIN/GLOB SERPL: 1.4 {RATIO} (ref 1–2.5)
ALP SERPL-CCNC: 96 U/L (ref 35–104)
ALT SERPL-CCNC: 11 U/L (ref 5–33)
ANION GAP SERPL CALCULATED.3IONS-SCNC: 19 MMOL/L (ref 9–17)
AST SERPL-CCNC: 13 U/L
BASOPHILS # BLD: 0.06 K/UL (ref 0–0.2)
BASOPHILS NFR BLD: 1 % (ref 0–2)
BILIRUB DIRECT SERPL-MCNC: <0.1 MG/DL
BILIRUB INDIRECT SERPL-MCNC: ABNORMAL MG/DL (ref 0–1)
BILIRUB SERPL-MCNC: 0.2 MG/DL (ref 0.3–1.2)
BILIRUB UR QL STRIP: NEGATIVE
BUN SERPL-MCNC: 30 MG/DL (ref 8–23)
CALCIUM SERPL-MCNC: 9.9 MG/DL (ref 8.6–10.4)
CHLORIDE SERPL-SCNC: 103 MMOL/L (ref 98–107)
CLARITY UR: CLEAR
CO2 SERPL-SCNC: 19 MMOL/L (ref 20–31)
COLOR UR: YELLOW
CREAT SERPL-MCNC: 2 MG/DL (ref 0.5–0.9)
EOSINOPHIL # BLD: 0.03 K/UL (ref 0–0.44)
EOSINOPHILS RELATIVE PERCENT: 0 % (ref 1–4)
ERYTHROCYTE [DISTWIDTH] IN BLOOD BY AUTOMATED COUNT: 14.9 % (ref 11.8–14.4)
GFR SERPL CREATININE-BSD FRML MDRD: 28 ML/MIN/1.73M2
GLUCOSE SERPL-MCNC: 201 MG/DL (ref 70–99)
GLUCOSE UR STRIP-MCNC: NEGATIVE MG/DL
HCT VFR BLD AUTO: 36.6 % (ref 36.3–47.1)
HGB BLD-MCNC: 11.7 G/DL (ref 11.9–15.1)
HGB UR QL STRIP.AUTO: NEGATIVE
IMM GRANULOCYTES # BLD AUTO: 0.04 K/UL (ref 0–0.3)
IMM GRANULOCYTES NFR BLD: 1 %
KETONES UR STRIP-MCNC: NEGATIVE MG/DL
LEUKOCYTE ESTERASE UR QL STRIP: ABNORMAL
LIPASE SERPL-CCNC: 50 U/L (ref 13–60)
LYMPHOCYTES NFR BLD: 1.64 K/UL (ref 1.1–3.7)
LYMPHOCYTES RELATIVE PERCENT: 19 % (ref 24–43)
MCH RBC QN AUTO: 26 PG (ref 25.2–33.5)
MCHC RBC AUTO-ENTMCNC: 32 G/DL (ref 28.4–34.8)
MCV RBC AUTO: 81.3 FL (ref 82.6–102.9)
MONOCYTES NFR BLD: 0.59 K/UL (ref 0.1–1.2)
MONOCYTES NFR BLD: 7 % (ref 3–12)
NEUTROPHILS NFR BLD: 72 % (ref 36–65)
NEUTS SEG NFR BLD: 6.1 K/UL (ref 1.5–8.1)
NITRITE UR QL STRIP: NEGATIVE
NRBC BLD-RTO: 0 PER 100 WBC
PH UR STRIP: 5 [PH] (ref 5–8)
PLATELET # BLD AUTO: 271 K/UL (ref 138–453)
PMV BLD AUTO: 10.3 FL (ref 8.1–13.5)
POTASSIUM SERPL-SCNC: 3.5 MMOL/L (ref 3.7–5.3)
PROT SERPL-MCNC: 7.3 G/DL (ref 6.4–8.3)
PROT UR STRIP-MCNC: NEGATIVE MG/DL
RBC # BLD AUTO: 4.5 M/UL (ref 3.95–5.11)
RBC # BLD: ABNORMAL 10*6/UL
SODIUM SERPL-SCNC: 141 MMOL/L (ref 135–144)
SP GR UR STRIP: 1.02 (ref 1–1.03)
TROPONIN I SERPL HS-MCNC: 13 NG/L (ref 0–14)
TROPONIN I SERPL HS-MCNC: 7 NG/L (ref 0–14)
UROBILINOGEN UR STRIP-ACNC: NORMAL EU/DL (ref 0–1)
WBC OTHER # BLD: 8.5 K/UL (ref 3.5–11.3)

## 2023-10-27 PROCEDURE — 99285 EMERGENCY DEPT VISIT HI MDM: CPT

## 2023-10-27 PROCEDURE — 81001 URINALYSIS AUTO W/SCOPE: CPT

## 2023-10-27 PROCEDURE — 6370000000 HC RX 637 (ALT 250 FOR IP): Performed by: STUDENT IN AN ORGANIZED HEALTH CARE EDUCATION/TRAINING PROGRAM

## 2023-10-27 PROCEDURE — 83690 ASSAY OF LIPASE: CPT

## 2023-10-27 PROCEDURE — 93005 ELECTROCARDIOGRAM TRACING: CPT | Performed by: STUDENT IN AN ORGANIZED HEALTH CARE EDUCATION/TRAINING PROGRAM

## 2023-10-27 PROCEDURE — 85025 COMPLETE CBC W/AUTO DIFF WBC: CPT

## 2023-10-27 PROCEDURE — 80076 HEPATIC FUNCTION PANEL: CPT

## 2023-10-27 PROCEDURE — 84484 ASSAY OF TROPONIN QUANT: CPT

## 2023-10-27 PROCEDURE — 71045 X-RAY EXAM CHEST 1 VIEW: CPT

## 2023-10-27 PROCEDURE — 87086 URINE CULTURE/COLONY COUNT: CPT

## 2023-10-27 PROCEDURE — 80048 BASIC METABOLIC PNL TOTAL CA: CPT

## 2023-10-27 RX ORDER — CALCIUM CARBONATE 500 MG/1
1 TABLET, CHEWABLE ORAL DAILY
Qty: 30 TABLET | Refills: 0 | Status: SHIPPED | OUTPATIENT
Start: 2023-10-27 | End: 2023-11-26

## 2023-10-27 RX ORDER — DICYCLOMINE HYDROCHLORIDE 10 MG/1
10 CAPSULE ORAL ONCE
Status: COMPLETED | OUTPATIENT
Start: 2023-10-27 | End: 2023-10-27

## 2023-10-27 RX ORDER — PREDNISONE 20 MG/1
20 TABLET ORAL 2 TIMES DAILY
Qty: 10 TABLET | Refills: 0 | Status: SHIPPED | OUTPATIENT
Start: 2023-10-27 | End: 2023-11-06

## 2023-10-27 RX ORDER — DICYCLOMINE HYDROCHLORIDE 10 MG/1
10 CAPSULE ORAL 4 TIMES DAILY
Qty: 56 CAPSULE | Refills: 0 | Status: SHIPPED | OUTPATIENT
Start: 2023-10-27 | End: 2023-11-10

## 2023-10-27 RX ORDER — PREDNISONE 20 MG/1
60 TABLET ORAL ONCE
Status: COMPLETED | OUTPATIENT
Start: 2023-10-27 | End: 2023-10-27

## 2023-10-27 RX ORDER — MAGNESIUM HYDROXIDE/ALUMINUM HYDROXICE/SIMETHICONE 120; 1200; 1200 MG/30ML; MG/30ML; MG/30ML
30 SUSPENSION ORAL ONCE
Status: COMPLETED | OUTPATIENT
Start: 2023-10-27 | End: 2023-10-27

## 2023-10-27 RX ORDER — IPRATROPIUM BROMIDE AND ALBUTEROL SULFATE 2.5; .5 MG/3ML; MG/3ML
1 SOLUTION RESPIRATORY (INHALATION) ONCE
Status: COMPLETED | OUTPATIENT
Start: 2023-10-27 | End: 2023-10-27

## 2023-10-27 RX ADMIN — DICYCLOMINE HYDROCHLORIDE 10 MG: 10 CAPSULE ORAL at 19:29

## 2023-10-27 RX ADMIN — POTASSIUM BICARBONATE 40 MEQ: 782 TABLET, EFFERVESCENT ORAL at 21:34

## 2023-10-27 RX ADMIN — ALUMINUM HYDROXIDE, MAGNESIUM HYDROXIDE, AND SIMETHICONE 30 ML: 200; 200; 20 SUSPENSION ORAL at 19:29

## 2023-10-27 RX ADMIN — PREDNISONE 60 MG: 20 TABLET ORAL at 19:29

## 2023-10-27 RX ADMIN — IPRATROPIUM BROMIDE AND ALBUTEROL SULFATE 1 DOSE: 2.5; .5 SOLUTION RESPIRATORY (INHALATION) at 19:45

## 2023-10-27 ASSESSMENT — PAIN DESCRIPTION - LOCATION: LOCATION: OTHER (COMMENT)

## 2023-10-27 ASSESSMENT — PAIN SCALES - GENERAL
PAINLEVEL_OUTOF10: 5
PAINLEVEL_OUTOF10: 10

## 2023-10-27 ASSESSMENT — PAIN - FUNCTIONAL ASSESSMENT: PAIN_FUNCTIONAL_ASSESSMENT: 0-10

## 2023-10-27 NOTE — ED NOTES
Patient placed in room 10. States she has been feeling \"fluttering\" in her chest.   Pt reports recent excessive burping and abdominal cramping. Vitals assessed. Care ongoing.       Vicenta Deng  10/27/23 1945

## 2023-10-28 VITALS
TEMPERATURE: 97.9 F | DIASTOLIC BLOOD PRESSURE: 71 MMHG | SYSTOLIC BLOOD PRESSURE: 106 MMHG | HEART RATE: 91 BPM | BODY MASS INDEX: 33.21 KG/M2 | RESPIRATION RATE: 18 BRPM | WEIGHT: 224.87 LBS | OXYGEN SATURATION: 95 %

## 2023-10-28 LAB
CASTS #/AREA URNS LPF: NORMAL /LPF (ref 0–2)
CASTS #/AREA URNS LPF: NORMAL /LPF (ref 0–2)
EPI CELLS #/AREA URNS HPF: NORMAL /HPF (ref 0–5)
MICROORGANISM SPEC CULT: NORMAL
RBC #/AREA URNS HPF: NORMAL /HPF (ref 0–2)
SPECIMEN DESCRIPTION: NORMAL
WBC #/AREA URNS HPF: NORMAL /HPF (ref 0–5)

## 2023-10-28 PROCEDURE — 6370000000 HC RX 637 (ALT 250 FOR IP): Performed by: STUDENT IN AN ORGANIZED HEALTH CARE EDUCATION/TRAINING PROGRAM

## 2023-10-28 RX ORDER — CEPHALEXIN 250 MG/1
500 CAPSULE ORAL ONCE
Status: COMPLETED | OUTPATIENT
Start: 2023-10-28 | End: 2023-10-28

## 2023-10-28 RX ORDER — CEPHALEXIN 500 MG/1
500 CAPSULE ORAL 2 TIMES DAILY
Qty: 14 CAPSULE | Refills: 0 | Status: SHIPPED | OUTPATIENT
Start: 2023-10-28 | End: 2023-11-04

## 2023-10-28 RX ADMIN — CEPHALEXIN 500 MG: 250 CAPSULE ORAL at 00:28

## 2023-10-28 ASSESSMENT — ENCOUNTER SYMPTOMS
RHINORRHEA: 0
SHORTNESS OF BREATH: 1
VOMITING: 0
DIARRHEA: 0
ABDOMINAL PAIN: 1
NAUSEA: 0
BLOOD IN STOOL: 0
COUGH: 0

## 2023-10-28 NOTE — DISCHARGE INSTRUCTIONS
Please call your primary care provider for follow up in the next 1-2 days. Take your prednisone as prescribed. If you are a diabetic, you should check your blood sugar more frequently while taking prednisone. Use your inhaler or nebulizer as prescribed, or at minimum every 4 hours while you are having an asthma attack. Being around people that smoke, vinicio houses, weather change can cause an asthma exacerbation. PLEASE RETURN TO THE EMERGENCY DEPARTMENT IMMEDIATELY for worsening symptoms of shortness of breath, wheezing, change in the amount of sputum that you cough up or a change in the color of your sputum, using your inhaler more frequently or if your inhaler only lasts up to 2 hours, or if you develop any concerning symptoms such as: high fever not relieved by acetaminophen (Tylenol) and/or ibuprofen (Motrin / Advil), chills, shortness of breath, chest pain, feeling of your heart fluttering or racing, persistent nausea and/or vomiting, numbness, weakness or tingling in the arms or legs or change in color of the extremities, changes in mental status, persistent headache, blurry vision, unable to follow up with your physician, or other any other care or concern.

## 2023-10-30 LAB
EKG ATRIAL RATE: 102 BPM
EKG P AXIS: 49 DEGREES
EKG P-R INTERVAL: 188 MS
EKG Q-T INTERVAL: 350 MS
EKG QRS DURATION: 76 MS
EKG QTC CALCULATION (BAZETT): 456 MS
EKG R AXIS: 54 DEGREES
EKG T AXIS: 18 DEGREES
EKG VENTRICULAR RATE: 102 BPM

## 2023-10-30 PROCEDURE — 93010 ELECTROCARDIOGRAM REPORT: CPT | Performed by: INTERNAL MEDICINE

## 2024-02-07 RX ORDER — ONDANSETRON 4 MG/1
4 TABLET, FILM COATED ORAL EVERY 8 HOURS PRN
Qty: 20 TABLET | Refills: 0 | OUTPATIENT
Start: 2024-02-07

## 2024-11-18 ENCOUNTER — HOSPITAL ENCOUNTER (EMERGENCY)
Age: 64
Discharge: HOME | End: 2024-11-18
Attending: EMERGENCY MEDICINE
Payer: MEDICARE

## 2024-11-18 ENCOUNTER — APPOINTMENT (OUTPATIENT)
Dept: ULTRASOUND IMAGING | Age: 64
End: 2024-11-18
Payer: MEDICARE

## 2024-11-18 VITALS
WEIGHT: 221 LBS | RESPIRATION RATE: 13 BRPM | TEMPERATURE: 97.7 F | HEART RATE: 85 BPM | OXYGEN SATURATION: 97 % | BODY MASS INDEX: 32.64 KG/M2 | SYSTOLIC BLOOD PRESSURE: 115 MMHG | DIASTOLIC BLOOD PRESSURE: 87 MMHG

## 2024-11-18 DIAGNOSIS — N93.9 VAGINAL BLEEDING: Primary | ICD-10-CM

## 2024-11-18 LAB
ANION GAP SERPL CALCULATED.3IONS-SCNC: 16 MMOL/L (ref 9–16)
BACTERIA URNS QL MICRO: ABNORMAL
BASOPHILS # BLD: 0.08 K/UL (ref 0–0.2)
BASOPHILS NFR BLD: 1 % (ref 0–2)
BILIRUB UR QL STRIP: NEGATIVE
BUN SERPL-MCNC: 30 MG/DL (ref 8–23)
CALCIUM SERPL-MCNC: 9.8 MG/DL (ref 8.6–10.4)
CANDIDA SPECIES: NEGATIVE
CASTS #/AREA URNS LPF: ABNORMAL /LPF (ref 0–8)
CHLORIDE SERPL-SCNC: 102 MMOL/L (ref 98–107)
CLARITY UR: ABNORMAL
CO2 SERPL-SCNC: 20 MMOL/L (ref 20–31)
COLOR UR: ABNORMAL
CREAT SERPL-MCNC: 2 MG/DL (ref 0.6–0.9)
EOSINOPHIL # BLD: 0.15 K/UL (ref 0–0.44)
EOSINOPHILS RELATIVE PERCENT: 1 % (ref 1–4)
EPI CELLS #/AREA URNS HPF: ABNORMAL /HPF (ref 0–5)
ERYTHROCYTE [DISTWIDTH] IN BLOOD BY AUTOMATED COUNT: 14.3 % (ref 11.8–14.4)
GARDNERELLA VAGINALIS: NEGATIVE
GFR, ESTIMATED: 28 ML/MIN/1.73M2
GLUCOSE SERPL-MCNC: 173 MG/DL (ref 74–99)
GLUCOSE UR STRIP-MCNC: NEGATIVE MG/DL
HCT VFR BLD AUTO: 37.9 % (ref 36.3–47.1)
HCT VFR BLD AUTO: 38.6 % (ref 36.3–47.1)
HGB BLD-MCNC: 11.3 G/DL (ref 11.9–15.1)
HGB BLD-MCNC: 12 G/DL (ref 11.9–15.1)
HGB UR QL STRIP.AUTO: ABNORMAL
IMM GRANULOCYTES # BLD AUTO: 0.06 K/UL (ref 0–0.3)
IMM GRANULOCYTES NFR BLD: 1 %
INR PPP: 1.2
KETONES UR STRIP-MCNC: NEGATIVE MG/DL
LEUKOCYTE ESTERASE UR QL STRIP: ABNORMAL
LYMPHOCYTES NFR BLD: 2.57 K/UL (ref 1.1–3.7)
LYMPHOCYTES RELATIVE PERCENT: 24 % (ref 24–43)
MCH RBC QN AUTO: 25.9 PG (ref 25.2–33.5)
MCHC RBC AUTO-ENTMCNC: 31.1 G/DL (ref 28.4–34.8)
MCV RBC AUTO: 83.4 FL (ref 82.6–102.9)
MONOCYTES NFR BLD: 0.89 K/UL (ref 0.1–1.2)
MONOCYTES NFR BLD: 8 % (ref 3–12)
NEUTROPHILS NFR BLD: 65 % (ref 36–65)
NEUTS SEG NFR BLD: 7.18 K/UL (ref 1.5–8.1)
NITRITE UR QL STRIP: NEGATIVE
NRBC BLD-RTO: 0 PER 100 WBC
PH UR STRIP: 5.5 [PH] (ref 5–8)
PLATELET # BLD AUTO: 301 K/UL (ref 138–453)
PMV BLD AUTO: 11 FL (ref 8.1–13.5)
POTASSIUM SERPL-SCNC: 4.3 MMOL/L (ref 3.7–5.3)
PROT UR STRIP-MCNC: ABNORMAL MG/DL
PROTHROMBIN TIME: 15.3 SEC (ref 11.7–14.9)
RBC # BLD AUTO: 4.63 M/UL (ref 3.95–5.11)
RBC #/AREA URNS HPF: ABNORMAL /HPF (ref 0–4)
SODIUM SERPL-SCNC: 138 MMOL/L (ref 136–145)
SOURCE: NORMAL
SP GR UR STRIP: 1.02 (ref 1–1.03)
TRICHOMONAS: NEGATIVE
UROBILINOGEN UR STRIP-ACNC: NORMAL EU/DL (ref 0–1)
WBC #/AREA URNS HPF: ABNORMAL /HPF (ref 0–5)
WBC OTHER # BLD: 10.9 K/UL (ref 3.5–11.3)

## 2024-11-18 PROCEDURE — 87591 N.GONORRHOEAE DNA AMP PROB: CPT

## 2024-11-18 PROCEDURE — 87660 TRICHOMONAS VAGIN DIR PROBE: CPT

## 2024-11-18 PROCEDURE — 76830 TRANSVAGINAL US NON-OB: CPT

## 2024-11-18 PROCEDURE — 81001 URINALYSIS AUTO W/SCOPE: CPT

## 2024-11-18 PROCEDURE — 85014 HEMATOCRIT: CPT

## 2024-11-18 PROCEDURE — 85018 HEMOGLOBIN: CPT

## 2024-11-18 PROCEDURE — 80048 BASIC METABOLIC PNL TOTAL CA: CPT

## 2024-11-18 PROCEDURE — 87510 GARDNER VAG DNA DIR PROBE: CPT

## 2024-11-18 PROCEDURE — 87480 CANDIDA DNA DIR PROBE: CPT

## 2024-11-18 PROCEDURE — 99284 EMERGENCY DEPT VISIT MOD MDM: CPT

## 2024-11-18 PROCEDURE — 87491 CHLMYD TRACH DNA AMP PROBE: CPT

## 2024-11-18 PROCEDURE — 85025 COMPLETE CBC W/AUTO DIFF WBC: CPT

## 2024-11-18 PROCEDURE — 6360000002 HC RX W HCPCS

## 2024-11-18 PROCEDURE — 85610 PROTHROMBIN TIME: CPT

## 2024-11-18 PROCEDURE — 96374 THER/PROPH/DIAG INJ IV PUSH: CPT

## 2024-11-18 RX ORDER — ONDANSETRON 2 MG/ML
4 INJECTION INTRAMUSCULAR; INTRAVENOUS ONCE
Status: DISCONTINUED | OUTPATIENT
Start: 2024-11-18 | End: 2024-11-18 | Stop reason: HOSPADM

## 2024-11-18 RX ORDER — ONDANSETRON 2 MG/ML
4 INJECTION INTRAMUSCULAR; INTRAVENOUS ONCE
Status: COMPLETED | OUTPATIENT
Start: 2024-11-18 | End: 2024-11-18

## 2024-11-18 RX ADMIN — ONDANSETRON 4 MG: 2 INJECTION INTRAMUSCULAR; INTRAVENOUS at 16:06

## 2024-11-18 ASSESSMENT — PAIN - FUNCTIONAL ASSESSMENT: PAIN_FUNCTIONAL_ASSESSMENT: NONE - DENIES PAIN

## 2024-11-18 ASSESSMENT — ENCOUNTER SYMPTOMS: NAUSEA: 1

## 2024-11-18 NOTE — ED NOTES
Pt assisted back to bed call light is in reach. NAD noted at this time. Pt denies any further needs

## 2024-11-18 NOTE — ED NOTES
Pt to ED c/o vaginal bleeding x1 day. Pt states that she takes eliquis for afib and has not missed any doses. Pt states that she noticed bright red blood with clots today, pt denies previous hx hysterectomy or bladder surgery. Pt denies rectal bleeding or abdominal pain or cramping.

## 2024-11-19 LAB
C TRACH DNA SPEC QL PROBE+SIG AMP: NEGATIVE
N GONORRHOEA DNA SPEC QL PROBE+SIG AMP: NEGATIVE
SPECIMEN DESCRIPTION: NORMAL

## 2024-11-19 NOTE — ED NOTES
Report taken from Zoe RN  Pt is A&Ox4, even unlabored RR NAD  Pt resting comfortably on cot with call light within reach

## 2024-11-19 NOTE — DISCHARGE INSTRUCTIONS
You were seen in the ED for postmenopausal bleeding.    Please follow-up with gynecology in the outpatient setting within the next 3 to 5 days.  Postmenopausal bleeding is a concern for cancer of the endometrium.  You require further evaluation and imaging by the gynecology physicians.  Information for scheduling outpatient appointment has been provided.    RETURN  Please return to the ED if you experience any dizziness, chest pain or worsening of bleeding.

## 2024-11-19 NOTE — ED NOTES
Pt provided boxed lunch  Pt ambulatory to bedside commode independently   Pt is A&Ox4, even unlabored RR NAD  Pt connected to full cardiac monitor

## 2024-11-19 NOTE — ED PROVIDER NOTES
[facility]     Emergency Department     Faculty Attestation  4:33 PM EST      I performed a history and physical examination of the patient and discussed management with the resident. I have reviewed and agree with the resident’s findings including all diagnostic interpretations, and treatment plans as written. Any areas of disagreement are noted on the chart. I was personally present for the key portions of any procedures. I have documented in the chart those procedures where I was not present during the key portions. I have reviewed the emergency nurses triage note. I agree with the chief complaint, past medical history, past surgical history, allergies, medications, social and family history as documented unless otherwise noted below. Documentation of the HPI, Physical Exam and Medical Decision Making performed by danae is based on my personal performance of the HPI, PE and MDM. For Physician Assistant/ Nurse Practitioner cases/documentation I have personally evaluated this patient and have completed at least one if not all key elements of the E/M (history, physical exam, and MDM). Additional findings are as noted.    Patient with painless vaginal bleeding, felt light headed this morning, and went to use bathroom and felt gush of blood that filled the toiler, and then a second episode where there was a small blood clot, she reports intermittent spotting, but non for the past year  No abdominal pain, she is on eliquis for  h/o afib    Abdomen soft on exam  Pelvic exam her resident  Year since patient has obgyn eval  Will check labs  Swabs  US  Possible obgyn consultation    Stacie Peters D.O, M.P.H  Attending Emergency Medicine Physician         Stacie Peters, DO  11/18/24 3099    
     [facility] ED  Emergency Department  Faculty Sign-Out Addendum     Care of Nancy L Haase was assumed from previous attending and is being seen for Vaginal Bleeding  .  The patient's initial evaluation and plan have been discussed with the prior provider who initially evaluated the patient.      I reviewed the resident’s note and agree with the documented findings and plan of care. Any areas of disagreement are noted on the chart. I was personally present for the key portions of any procedures. I have documented in the chart those procedures where I was not present during the key portions. I have reviewed the emergency nurses triage note. I agree with the chief complaint, past medical history, past surgical history, allergies, medications, social and family history as documented unless otherwise noted below.      EMERGENCY DEPARTMENT COURSE / MEDICAL DECISION MAKING:       MEDICATIONS GIVEN:  Orders Placed This Encounter   Medications    ondansetron (ZOFRAN) injection 4 mg    ondansetron (ZOFRAN) injection 4 mg       LABS / RADIOLOGY:     Labs Reviewed   BASIC METABOLIC PANEL - Abnormal; Notable for the following components:       Result Value    Glucose 173 (*)     BUN 30 (*)     Creatinine 2.0 (*)     Est, Glom Filt Rate 28 (*)     All other components within normal limits   CBC WITH AUTO DIFFERENTIAL - Abnormal; Notable for the following components:    Immature Granulocytes % 1 (*)     All other components within normal limits   URINALYSIS WITH MICROSCOPIC - Abnormal; Notable for the following components:    Color, UA Orange (*)     Turbidity UA Cloudy (*)     Urine Hgb LARGE (*)     Protein, UA TRACE (*)     Leukocyte Esterase, Urine MODERATE (*)     All other components within normal limits   PROTIME-INR - Abnormal; Notable for the following components:    Protime 15.3 (*)     All other components within normal limits   HEMOGLOBIN AND HEMATOCRIT - Abnormal; Notable for the following 
     [facility] ED  Emergency Department  Emergency Medicine Resident Turn-Over     Note Started: 6:43 PM EST    Care of Nancy L Haase was assumed from Dr. Young and is being seen for Vaginal Bleeding  .  The patient's initial evaluation and plan have been discussed with the prior provider who initially evaluated the patient.     EMERGENCY DEPARTMENT COURSE / MEDICAL DECISION MAKING:       MEDICATIONS GIVEN:  Orders Placed This Encounter   Medications    ondansetron (ZOFRAN) injection 4 mg    DISCONTD: ondansetron (ZOFRAN) injection 4 mg       LABS / RADIOLOGY:     Labs Reviewed   BASIC METABOLIC PANEL - Abnormal; Notable for the following components:       Result Value    Glucose 173 (*)     BUN 30 (*)     Creatinine 2.0 (*)     Est, Glom Filt Rate 28 (*)     All other components within normal limits   CBC WITH AUTO DIFFERENTIAL - Abnormal; Notable for the following components:    Immature Granulocytes % 1 (*)     All other components within normal limits   URINALYSIS WITH MICROSCOPIC - Abnormal; Notable for the following components:    Color, UA Orange (*)     Turbidity UA Cloudy (*)     Urine Hgb LARGE (*)     Protein, UA TRACE (*)     Leukocyte Esterase, Urine MODERATE (*)     All other components within normal limits   PROTIME-INR - Abnormal; Notable for the following components:    Protime 15.3 (*)     All other components within normal limits   HEMOGLOBIN AND HEMATOCRIT - Abnormal; Notable for the following components:    Hemoglobin 11.3 (*)     All other components within normal limits   C.TRACHOMATIS N.GONORRHOEAE DNA   VAGINITIS DNA PROBE       No results found.    RECENT VITALS:     Temp: 97.7 °F (36.5 °C),  Pulse: 85, Respirations: 13, BP: 115/87, SpO2: 97 %    This patient is a 64 y.o. Female with vaginal bleeding.  Pending ultrasound results.    ED Course as of 11/19/24 1058   Mon Nov 18, 2024   1610 Pelvic exam done with RN as chaperone at bedside.  Vulva noted to be within normal 
obtained.  Patient had subtle tenderness to palpation on left adnexal area. [SP]   1657 Patient currently on bedpan. [SP]   1840 Signout given to Dr. Rodriguez [SP]      ED Course User Index  [SP] Ty Young MD       PROCEDURES:    CONSULTS:  None    CRITICAL CARE:  There was significant risk of life threatening deterioration of patient's condition requiring my direct management. Critical care time  minutes, excluding any documented procedures.    FINAL IMPRESSION      1. Vaginal bleeding          DISPOSITION / PLAN     DISPOSITION             PATIENT REFERRED TO:  No follow-up provider specified.    DISCHARGE MEDICATIONS:  New Prescriptions    No medications on file       Ty Young MD  Emergency Medicine Resident    (Please note that portions of thisnote were completed with a voice recognition program.  Efforts were made to edit the dictations but occasionally words are mis-transcribed.)

## 2025-03-02 ENCOUNTER — APPOINTMENT (OUTPATIENT)
Dept: ULTRASOUND IMAGING | Age: 65
DRG: 744 | End: 2025-03-02
Payer: MEDICARE

## 2025-03-02 ENCOUNTER — HOSPITAL ENCOUNTER (INPATIENT)
Age: 65
LOS: 6 days | Discharge: HOME | DRG: 744 | End: 2025-03-08
Attending: EMERGENCY MEDICINE | Admitting: INTERNAL MEDICINE
Payer: MEDICARE

## 2025-03-02 DIAGNOSIS — N93.9 ABNORMAL VAGINAL BLEEDING: Primary | ICD-10-CM

## 2025-03-02 DIAGNOSIS — N95.0 POST-MENOPAUSAL BLEEDING: ICD-10-CM

## 2025-03-02 DIAGNOSIS — I48.11 LONGSTANDING PERSISTENT ATRIAL FIBRILLATION (HCC): ICD-10-CM

## 2025-03-02 DIAGNOSIS — I50.9 CHRONIC CONGESTIVE HEART FAILURE, UNSPECIFIED HEART FAILURE TYPE (HCC): ICD-10-CM

## 2025-03-02 PROBLEM — Z78.9 ACTIVE BLEEDING: Status: ACTIVE | Noted: 2025-03-02

## 2025-03-02 PROBLEM — D62 ANEMIA DUE TO BLOOD LOSS, ACUTE: Status: ACTIVE | Noted: 2025-03-02

## 2025-03-02 LAB
ANION GAP SERPL CALCULATED.3IONS-SCNC: 15 MMOL/L (ref 9–16)
BASOPHILS # BLD: 0.08 K/UL (ref 0–0.2)
BASOPHILS NFR BLD: 1 % (ref 0–2)
BUN BLD-MCNC: 39 MG/DL (ref 8–26)
BUN SERPL-MCNC: 40 MG/DL (ref 8–23)
CA-I BLD-SCNC: 1.12 MMOL/L (ref 1.15–1.33)
CALCIUM SERPL-MCNC: 9.4 MG/DL (ref 8.6–10.4)
CHLORIDE BLD-SCNC: 106 MMOL/L (ref 98–107)
CHLORIDE SERPL-SCNC: 104 MMOL/L (ref 98–107)
CO2 BLD CALC-SCNC: 19 MMOL/L (ref 22–30)
CO2 SERPL-SCNC: 18 MMOL/L (ref 20–31)
CREAT SERPL-MCNC: 1.9 MG/DL (ref 0.6–0.9)
EGFR, POC: 28 ML/MIN/1.73M2
EOSINOPHIL # BLD: 0.18 K/UL (ref 0–0.44)
EOSINOPHILS RELATIVE PERCENT: 2 % (ref 1–4)
ERYTHROCYTE [DISTWIDTH] IN BLOOD BY AUTOMATED COUNT: 16.4 % (ref 11.8–14.4)
ERYTHROCYTE [DISTWIDTH] IN BLOOD BY AUTOMATED COUNT: 16.5 % (ref 11.8–14.4)
FERRITIN SERPL-MCNC: 33 NG/ML
GFR, ESTIMATED: 29 ML/MIN/1.73M2
GLUCOSE BLD-MCNC: 161 MG/DL (ref 65–105)
GLUCOSE BLD-MCNC: 166 MG/DL (ref 74–100)
GLUCOSE SERPL-MCNC: 164 MG/DL (ref 74–99)
HCO3 VENOUS: 19.5 MMOL/L (ref 22–29)
HCT VFR BLD AUTO: 25.4 % (ref 36.3–47.1)
HCT VFR BLD AUTO: 30.1 % (ref 36.3–47.1)
HCT VFR BLD AUTO: 32.7 % (ref 36.3–47.1)
HCT VFR BLD AUTO: 36 % (ref 36–46)
HGB BLD-MCNC: 10.4 G/DL (ref 11.9–15.1)
HGB BLD-MCNC: 7.8 G/DL (ref 11.9–15.1)
HGB BLD-MCNC: 9.5 G/DL (ref 11.9–15.1)
IMM GRANULOCYTES # BLD AUTO: 0.05 K/UL (ref 0–0.3)
IMM GRANULOCYTES NFR BLD: 1 %
INR PPP: 1.4
IRON SATN MFR SERPL: 12 % (ref 20–55)
IRON SERPL-MCNC: 41 UG/DL (ref 37–145)
LYMPHOCYTES NFR BLD: 1.68 K/UL (ref 1.1–3.7)
LYMPHOCYTES RELATIVE PERCENT: 17 % (ref 24–43)
MCH RBC QN AUTO: 24.9 PG (ref 25.2–33.5)
MCH RBC QN AUTO: 25.2 PG (ref 25.2–33.5)
MCHC RBC AUTO-ENTMCNC: 31.6 G/DL (ref 28.4–34.8)
MCHC RBC AUTO-ENTMCNC: 31.8 G/DL (ref 28.4–34.8)
MCV RBC AUTO: 78.8 FL (ref 82.6–102.9)
MCV RBC AUTO: 79.2 FL (ref 82.6–102.9)
MONOCYTES NFR BLD: 0.69 K/UL (ref 0.1–1.2)
MONOCYTES NFR BLD: 7 % (ref 3–12)
NEGATIVE BASE EXCESS, VEN: 4.5 MMOL/L (ref 0–2)
NEUTROPHILS NFR BLD: 72 % (ref 36–65)
NEUTS SEG NFR BLD: 6.95 K/UL (ref 1.5–8.1)
NRBC BLD-RTO: 0 PER 100 WBC
NRBC BLD-RTO: 0 PER 100 WBC
O2 SAT, VEN: 67 % (ref 60–85)
PCO2 VENOUS: 31.9 MM HG (ref 41–51)
PH VENOUS: 7.39 (ref 7.32–7.43)
PLATELET # BLD AUTO: 300 K/UL (ref 138–453)
PLATELET # BLD AUTO: 304 K/UL (ref 138–453)
PMV BLD AUTO: 10.6 FL (ref 8.1–13.5)
PMV BLD AUTO: 10.6 FL (ref 8.1–13.5)
PO2 VENOUS: 34.4 MM HG (ref 30–50)
POC ANION GAP: 14 MMOL/L (ref 7–16)
POC CREATININE: 2 MG/DL (ref 0.51–1.19)
POC HEMOGLOBIN (CALC): 12.2 G/DL (ref 12–16)
POC LACTIC ACID: 3.3 MMOL/L (ref 0.56–1.39)
POTASSIUM BLD-SCNC: 4.2 MMOL/L (ref 3.5–4.5)
POTASSIUM SERPL-SCNC: 4.3 MMOL/L (ref 3.7–5.3)
PROTHROMBIN TIME: 17.7 SEC (ref 11.7–14.9)
RBC # BLD AUTO: 3.82 M/UL (ref 3.95–5.11)
RBC # BLD AUTO: 4.13 M/UL (ref 3.95–5.11)
RBC # BLD: ABNORMAL 10*6/UL
SODIUM BLD-SCNC: 138 MMOL/L (ref 138–146)
SODIUM SERPL-SCNC: 137 MMOL/L (ref 136–145)
TIBC SERPL-MCNC: 348 UG/DL (ref 250–450)
UNSATURATED IRON BINDING CAPACITY: 307 UG/DL (ref 112–347)
WBC OTHER # BLD: 10.6 K/UL (ref 3.5–11.3)
WBC OTHER # BLD: 9.6 K/UL (ref 3.5–11.3)

## 2025-03-02 PROCEDURE — 76830 TRANSVAGINAL US NON-OB: CPT

## 2025-03-02 PROCEDURE — 83550 IRON BINDING TEST: CPT

## 2025-03-02 PROCEDURE — 83540 ASSAY OF IRON: CPT

## 2025-03-02 PROCEDURE — 83605 ASSAY OF LACTIC ACID: CPT

## 2025-03-02 PROCEDURE — 85014 HEMATOCRIT: CPT

## 2025-03-02 PROCEDURE — 85018 HEMOGLOBIN: CPT

## 2025-03-02 PROCEDURE — 86923 COMPATIBILITY TEST ELECTRIC: CPT

## 2025-03-02 PROCEDURE — 80051 ELECTROLYTE PANEL: CPT

## 2025-03-02 PROCEDURE — 94640 AIRWAY INHALATION TREATMENT: CPT

## 2025-03-02 PROCEDURE — 84520 ASSAY OF UREA NITROGEN: CPT

## 2025-03-02 PROCEDURE — 85025 COMPLETE CBC W/AUTO DIFF WBC: CPT

## 2025-03-02 PROCEDURE — 99285 EMERGENCY DEPT VISIT HI MDM: CPT

## 2025-03-02 PROCEDURE — 82565 ASSAY OF CREATININE: CPT

## 2025-03-02 PROCEDURE — 82330 ASSAY OF CALCIUM: CPT

## 2025-03-02 PROCEDURE — 86900 BLOOD TYPING SEROLOGIC ABO: CPT

## 2025-03-02 PROCEDURE — 2580000003 HC RX 258

## 2025-03-02 PROCEDURE — 82947 ASSAY GLUCOSE BLOOD QUANT: CPT

## 2025-03-02 PROCEDURE — 2500000003 HC RX 250 WO HCPCS

## 2025-03-02 PROCEDURE — 2060000000 HC ICU INTERMEDIATE R&B

## 2025-03-02 PROCEDURE — 36415 COLL VENOUS BLD VENIPUNCTURE: CPT

## 2025-03-02 PROCEDURE — 93005 ELECTROCARDIOGRAM TRACING: CPT

## 2025-03-02 PROCEDURE — 85610 PROTHROMBIN TIME: CPT

## 2025-03-02 PROCEDURE — 6370000000 HC RX 637 (ALT 250 FOR IP)

## 2025-03-02 PROCEDURE — 82728 ASSAY OF FERRITIN: CPT

## 2025-03-02 PROCEDURE — 86850 RBC ANTIBODY SCREEN: CPT

## 2025-03-02 PROCEDURE — 82803 BLOOD GASES ANY COMBINATION: CPT

## 2025-03-02 PROCEDURE — 85027 COMPLETE CBC AUTOMATED: CPT

## 2025-03-02 PROCEDURE — 80048 BASIC METABOLIC PNL TOTAL CA: CPT

## 2025-03-02 PROCEDURE — 86901 BLOOD TYPING SEROLOGIC RH(D): CPT

## 2025-03-02 PROCEDURE — 6370000000 HC RX 637 (ALT 250 FOR IP): Performed by: STUDENT IN AN ORGANIZED HEALTH CARE EDUCATION/TRAINING PROGRAM

## 2025-03-02 RX ORDER — SODIUM CHLORIDE 0.9 % (FLUSH) 0.9 %
5-40 SYRINGE (ML) INJECTION EVERY 12 HOURS SCHEDULED
Status: DISCONTINUED | OUTPATIENT
Start: 2025-03-02 | End: 2025-03-08 | Stop reason: HOSPADM

## 2025-03-02 RX ORDER — ALBUTEROL SULFATE 5 MG/ML
2.5 SOLUTION RESPIRATORY (INHALATION) EVERY 6 HOURS PRN
Status: DISCONTINUED | OUTPATIENT
Start: 2025-03-02 | End: 2025-03-02

## 2025-03-02 RX ORDER — ONDANSETRON 4 MG/1
4 TABLET, ORALLY DISINTEGRATING ORAL EVERY 8 HOURS PRN
Status: DISCONTINUED | OUTPATIENT
Start: 2025-03-02 | End: 2025-03-08 | Stop reason: HOSPADM

## 2025-03-02 RX ORDER — SODIUM CHLORIDE 0.9 % (FLUSH) 0.9 %
5-40 SYRINGE (ML) INJECTION PRN
Status: DISCONTINUED | OUTPATIENT
Start: 2025-03-02 | End: 2025-03-08 | Stop reason: HOSPADM

## 2025-03-02 RX ORDER — OXYCODONE HYDROCHLORIDE 5 MG/1
5 TABLET ORAL EVERY 6 HOURS PRN
Status: COMPLETED | OUTPATIENT
Start: 2025-03-02 | End: 2025-03-03

## 2025-03-02 RX ORDER — ACETAMINOPHEN 650 MG/1
650 SUPPOSITORY RECTAL EVERY 6 HOURS PRN
Status: DISCONTINUED | OUTPATIENT
Start: 2025-03-02 | End: 2025-03-08 | Stop reason: HOSPADM

## 2025-03-02 RX ORDER — SODIUM CHLORIDE 9 MG/ML
INJECTION, SOLUTION INTRAVENOUS PRN
Status: DISCONTINUED | OUTPATIENT
Start: 2025-03-02 | End: 2025-03-08 | Stop reason: HOSPADM

## 2025-03-02 RX ORDER — 0.9 % SODIUM CHLORIDE 0.9 %
500 INTRAVENOUS SOLUTION INTRAVENOUS ONCE
Status: COMPLETED | OUTPATIENT
Start: 2025-03-03 | End: 2025-03-03

## 2025-03-02 RX ORDER — NORETHINDRONE 5 MG/1
5 TABLET ORAL 3 TIMES DAILY
Status: DISCONTINUED | OUTPATIENT
Start: 2025-03-06 | End: 2025-03-04

## 2025-03-02 RX ORDER — GLUCAGON 1 MG/ML
1 KIT INJECTION PRN
Status: DISCONTINUED | OUTPATIENT
Start: 2025-03-02 | End: 2025-03-08 | Stop reason: HOSPADM

## 2025-03-02 RX ORDER — BUSPIRONE HYDROCHLORIDE 10 MG/1
30 TABLET ORAL 3 TIMES DAILY
Status: DISCONTINUED | OUTPATIENT
Start: 2025-03-02 | End: 2025-03-08 | Stop reason: HOSPADM

## 2025-03-02 RX ORDER — NORETHINDRONE 5 MG/1
20 TABLET ORAL DAILY
Qty: 120 TABLET | Refills: 1 | Status: SHIPPED | OUTPATIENT
Start: 2025-03-02 | End: 2025-03-06 | Stop reason: HOSPADM

## 2025-03-02 RX ORDER — POTASSIUM CHLORIDE 1500 MG/1
40 TABLET, EXTENDED RELEASE ORAL PRN
Status: DISCONTINUED | OUTPATIENT
Start: 2025-03-02 | End: 2025-03-08 | Stop reason: HOSPADM

## 2025-03-02 RX ORDER — DEXTROSE MONOHYDRATE 100 MG/ML
INJECTION, SOLUTION INTRAVENOUS CONTINUOUS PRN
Status: DISCONTINUED | OUTPATIENT
Start: 2025-03-02 | End: 2025-03-08 | Stop reason: HOSPADM

## 2025-03-02 RX ORDER — NORETHINDRONE 5 MG/1
5 TABLET ORAL 4 TIMES DAILY
Status: DISCONTINUED | OUTPATIENT
Start: 2025-03-03 | End: 2025-03-04

## 2025-03-02 RX ORDER — CLOPIDOGREL BISULFATE 75 MG/1
75 TABLET ORAL DAILY
Status: DISCONTINUED | OUTPATIENT
Start: 2025-03-02 | End: 2025-03-08 | Stop reason: HOSPADM

## 2025-03-02 RX ORDER — ONDANSETRON 2 MG/ML
4 INJECTION INTRAMUSCULAR; INTRAVENOUS EVERY 6 HOURS PRN
Status: DISCONTINUED | OUTPATIENT
Start: 2025-03-02 | End: 2025-03-08 | Stop reason: HOSPADM

## 2025-03-02 RX ORDER — MAGNESIUM SULFATE IN WATER 40 MG/ML
2000 INJECTION, SOLUTION INTRAVENOUS PRN
Status: DISCONTINUED | OUTPATIENT
Start: 2025-03-02 | End: 2025-03-08 | Stop reason: HOSPADM

## 2025-03-02 RX ORDER — ATORVASTATIN CALCIUM 40 MG/1
40 TABLET, FILM COATED ORAL DAILY
Status: DISCONTINUED | OUTPATIENT
Start: 2025-03-02 | End: 2025-03-08 | Stop reason: HOSPADM

## 2025-03-02 RX ORDER — POTASSIUM CHLORIDE 7.45 MG/ML
10 INJECTION INTRAVENOUS PRN
Status: DISCONTINUED | OUTPATIENT
Start: 2025-03-02 | End: 2025-03-08 | Stop reason: HOSPADM

## 2025-03-02 RX ORDER — ACETAMINOPHEN 325 MG/1
650 TABLET ORAL EVERY 6 HOURS PRN
Status: DISCONTINUED | OUTPATIENT
Start: 2025-03-02 | End: 2025-03-08 | Stop reason: HOSPADM

## 2025-03-02 RX ORDER — 0.9 % SODIUM CHLORIDE 0.9 %
1000 INTRAVENOUS SOLUTION INTRAVENOUS ONCE
Status: COMPLETED | OUTPATIENT
Start: 2025-03-02 | End: 2025-03-02

## 2025-03-02 RX ORDER — POLYETHYLENE GLYCOL 3350 17 G/17G
17 POWDER, FOR SOLUTION ORAL DAILY PRN
Status: DISCONTINUED | OUTPATIENT
Start: 2025-03-02 | End: 2025-03-08 | Stop reason: HOSPADM

## 2025-03-02 RX ORDER — ALBUTEROL SULFATE 90 UG/1
2 INHALANT RESPIRATORY (INHALATION) EVERY 6 HOURS PRN
Status: DISCONTINUED | OUTPATIENT
Start: 2025-03-02 | End: 2025-03-08 | Stop reason: HOSPADM

## 2025-03-02 RX ORDER — NORETHINDRONE 5 MG/1
20 TABLET ORAL ONCE
Status: COMPLETED | OUTPATIENT
Start: 2025-03-02 | End: 2025-03-02

## 2025-03-02 RX ORDER — BUDESONIDE AND FORMOTEROL FUMARATE DIHYDRATE 160; 4.5 UG/1; UG/1
2 AEROSOL RESPIRATORY (INHALATION)
Status: DISCONTINUED | OUTPATIENT
Start: 2025-03-02 | End: 2025-03-08 | Stop reason: HOSPADM

## 2025-03-02 RX ORDER — METOPROLOL TARTRATE 25 MG/1
12.5 TABLET, FILM COATED ORAL 2 TIMES DAILY
Status: DISCONTINUED | OUTPATIENT
Start: 2025-03-02 | End: 2025-03-06

## 2025-03-02 RX ORDER — INSULIN LISPRO 100 [IU]/ML
0-8 INJECTION, SOLUTION INTRAVENOUS; SUBCUTANEOUS
Status: DISCONTINUED | OUTPATIENT
Start: 2025-03-02 | End: 2025-03-08 | Stop reason: HOSPADM

## 2025-03-02 RX ORDER — LISINOPRIL 10 MG/1
10 TABLET ORAL DAILY
Status: DISCONTINUED | OUTPATIENT
Start: 2025-03-02 | End: 2025-03-08 | Stop reason: HOSPADM

## 2025-03-02 RX ADMIN — ATORVASTATIN CALCIUM 40 MG: 40 TABLET, FILM COATED ORAL at 19:52

## 2025-03-02 RX ADMIN — BUDESONIDE AND FORMOTEROL FUMARATE DIHYDRATE 2 PUFF: 160; 4.5 AEROSOL RESPIRATORY (INHALATION) at 20:09

## 2025-03-02 RX ADMIN — SODIUM CHLORIDE, PRESERVATIVE FREE 10 ML: 5 INJECTION INTRAVENOUS at 19:52

## 2025-03-02 RX ADMIN — BUSPIRONE HYDROCHLORIDE 30 MG: 10 TABLET ORAL at 19:52

## 2025-03-02 RX ADMIN — NORETHINDRONE ACETATE 20 MG: 5 TABLET ORAL at 16:04

## 2025-03-02 RX ADMIN — TIOTROPIUM BROMIDE INHALATION SPRAY 2 PUFF: 3.12 SPRAY, METERED RESPIRATORY (INHALATION) at 20:08

## 2025-03-02 RX ADMIN — SODIUM CHLORIDE 1000 ML: 9 INJECTION, SOLUTION INTRAVENOUS at 16:18

## 2025-03-02 RX ADMIN — OXYCODONE 5 MG: 5 TABLET ORAL at 18:38

## 2025-03-02 ASSESSMENT — PAIN DESCRIPTION - LOCATION: LOCATION: ABDOMEN

## 2025-03-02 ASSESSMENT — PAIN DESCRIPTION - PAIN TYPE: TYPE: ACUTE PAIN

## 2025-03-02 ASSESSMENT — PAIN SCALES - GENERAL
PAINLEVEL_OUTOF10: 6
PAINLEVEL_OUTOF10: 8
PAINLEVEL_OUTOF10: 2

## 2025-03-02 ASSESSMENT — PAIN DESCRIPTION - DESCRIPTORS: DESCRIPTORS: PRESSURE

## 2025-03-02 ASSESSMENT — PAIN - FUNCTIONAL ASSESSMENT: PAIN_FUNCTIONAL_ASSESSMENT: 0-10

## 2025-03-02 NOTE — ED PROVIDER NOTES
[facility] EMERGENCY DEPARTMENT  eMERGENCY dEPARTMENT eNCOUnter   Attending Attestation     Pt Name: Nancy L Haase  MRN: [id number]  Birthdate 1960  Date of evaluation: 3/2/25       Nancy L Haase is a 64 y.o. female who presents with Vaginal Bleeding      1:58 PM EST      History: Patient presents with vaginal bleeding.  Patient having some mild vaginal bleeding for the last several months however it became much worse today.  Patient says that she is having considerable clots.  Patient says they are very large and every time she moves sneezes coughs a large amount of bleeding occurs.    Exam: Heart rate and rhythm are regular.  Lungs are clear to auscultation bilaterally.  Abdomen is soft, and is noted in the suprapubic area.  Patient is awake and alert.  Patient is well-appearing.    Plan for labs, will discuss with OB, will perform pelvic versus will do in conjunction with OB.    I performed a history and physical examination of the patient and discussed management with the resident. I reviewed the resident’s note and agree with the documented findings and plan of care. Any areas of disagreement are noted on the chart. I was personally present for the key portions of any procedures. I have documented in the chart those procedures where I was not present during the key portions. I have personally reviewed all images and agree with the resident's interpretation. I have reviewed the emergency nurses triage note. I agree with the chief complaint, past medical history, past surgical history, allergies, medications, social and family history as documented unless otherwise noted below. Documentation of the HPI, Physical Exam and Medical Decision Making performed by medical students or scribes is based on my personal performance of the HPI, PE and MDM. For Phys Assistant/ Nurse Practitioner cases/documentation I have had a face to face evaluation of this patient and have completed at least one if not all key  elements of the E/M (history, physical exam, and MDM). Additional findings are as noted.    For APC cases I have personally evaluated and examined the patient in conjunction with the APC and agree with the treatment plan and disposition of the patient as recorded by the APC.    Haim Doyle MD  Attending Emergency  Physician       Haim Doyle MD  03/02/25 1400

## 2025-03-02 NOTE — ED NOTES
Patient presents to ED for evaluation of vaginal bleeding. Patient reports she has had consistent vaginal bleeding since November 2024. Patient reports the bleeding significantly worsening yesterday with large clots and pelvic pain. Patient now having shortness of breath and  dizziness. Patient has appointment with OBGYN on Wednesday.  Patient is alert and oriented x4, answering questions appropriately. Respirations even and unlabored. Patient changed into gown, placed on full cardiac monitor, BP cuff, and pulse ox. EKG completed. IV established. Call light within reach. Will continue to monitor.

## 2025-03-02 NOTE — CONSULTS
with body mass index (BMI) of 36.0 to 36.9 in adult 07/21/2022     Priority: Medium    Active bleeding 03/02/2025    Acute on chronic combined systolic and diastolic CHF (congestive heart failure) ([facility]) 02/14/2022    Former smoker 02/14/2022    Anxiety 02/14/2022    COPD exacerbation ([facility]) 12/18/2021    Lobar pneumonia 12/18/2021    Other constipation 10/28/2021    Urinary retention 10/28/2021    Gastroenteritis 07/25/2021    Coronary artery disease involving native coronary artery of native heart without angina pectoris 07/25/2021     s/p PCI with JOSE MARTIN to a  of RCA in Feb 2019:       Vitamin D deficiency 07/25/2021    Marijuana abuse 07/25/2021    Type 2 diabetes mellitus with hyperglycemia, without long-term current use of insulin ([facility]) 07/25/2021    ACS (acute coronary syndrome) ([facility]) 02/10/2019    Essential hypertension 02/10/2019    Chronic bronchitis ([facility]) 11/19/2016    Lung nodule 11/19/2016       Plan discussed with Dr. Ruiz, who is agreeable.       Shana Guerrero DO  Ob/Gyn Resident PGY4  [facility]  3/2/2025, 7:23 PM          Attending Physician Statement  I have discussed the care of Pina Mcleanase, including pertinent history and exam findings,  with the resident. I have reviewed the key elements of all parts of the encounter with the resident.  I agree with the assessment, plan and orders as documented by the resident.  (GE Modifier)    Shanelle Ruiz DO

## 2025-03-02 NOTE — ED PROVIDER NOTES
[facility]   Emergency Department  Emergency Medicine Attending Sign-out   Note started: 3:08 PM EST    Care of Nancy L Haase was assumed from previous attending Dr. Doyle at 3 PM and is being seen for Vaginal Bleeding  .  The patient's initial evaluation and plan have been discussed with the prior provider who initially evaluated the patient.     Attestation  I was available and discussed any additional care issues that arose and coordinated the management plans with the resident(s) caring for the patient during my duty period. Any areas of disagreement with resident's documentation of care or procedures are noted on the chart. I was personally present for the key portions of any/all procedures, during my duty period. I have documented in the chart those procedures where I was not present during the key portions.     BRIEF PATIENT SUMMARY/MDM COURSE PER INITIAL PROVIDER:   RECENT VITALS:     Temp: 97.7 °F (36.5 °C),  Pulse: 72, Respirations: 15, BP: (!) 97/56, SpO2: 99 %    This patient is a 64 y.o. Female with vaginal bleeding that been scant for last 8 months but today started having large baseball clots.  OB consult here on their exam blood was pooling in the speculum.    DIAGNOSTICS/MEDICATIONS:     MEDICATIONS GIVEN:  ED Medication Orders (From admission, onward)      Start Ordered     Status Ordering Provider    03/02/25 1430 03/02/25 1415  norethindrone (AYGESTIN) tablet 20 mg  ONCE         Acknowledged BETHANY CORREA            LABS    Labs Reviewed   CBC WITH AUTO DIFFERENTIAL - Abnormal; Notable for the following components:       Result Value    Hemoglobin 10.4 (*)     Hematocrit 32.7 (*)     MCV 79.2 (*)     RDW 16.4 (*)     Neutrophils % 72 (*)     Lymphocytes % 17 (*)     Immature Granulocytes % 1 (*)     All other components within normal limits   BASIC METABOLIC PANEL - Abnormal; Notable for the following components:    CO2 18 (*)     Glucose 164 (*)     BUN 40 (*)

## 2025-03-02 NOTE — H&P
[facility]     Department of Internal Medicine - Staff Internal Medicine Teaching Service          ADMISSION NOTE/HISTORY AND PHYSICAL EXAMINATION   Date: 3/2/2025  Patient Name: Nancy L Haase  Date of admission: 3/2/2025 10:31 AM  YOB: 1960  PCP: Scar Reyes MD  History Obtained From:  patient    CHIEF COMPLAINT     Chief complaint: Active bleeding and passing blood clots    HISTORY OF PRESENTING ILLNESS     The patient is a pleasant 64 y.o. female with a PMHx significant for     A-fib on Eliquis  COPD  Iron deficiency anemia type 2 diabetes mellitus  CKD  Congestive heart failure    presents with a chief complaint of abnormal vaginal bleeding and passing blood clots from the vagina.  Patient reported that she started passing blood clots from November, and subsequently she stopped passing blood clots but was having bleeding from the vagina.  She mentioned that the bleeding has not stopped since then and she stopped passing blood clots until yesterday.  She reported that she again passed blood clot yesterday and since then she has not stopped passing blood clots since then.  She reported nothing improves or worsens the bleeding.    In the emergency department her initial blood pressure was 1 lower side with 83/52 mmHg and MAP of 55 mmHg.  Initial labs revealed worsening of creatinine of 1.9, glucose 166.  CBC revealed WBC 9.6, hemoglobin 10.4.  Obstetrics/gynecology was consulted from emergency department and patient underwent transvaginal sonography which revealed unremarkable sonographic appearance of the uterus.  She was started on norethindrone as per obstetrics/gynecology recommendations.  Subsequently internal medicine was consulted for further evaluation and management of active bleeding.    Patient denies chest pain, shortness of breath, abdominal pain, nausea, vomiting, constipation, diarrhea, burning urination.    Review of Systems:  Review of Systems

## 2025-03-02 NOTE — ED NOTES
Pt returned from US. Pt requesting something to eat. Writer informed pt that she is unable to eat until US is resulted.

## 2025-03-02 NOTE — CONSENT
Informed Consent for Blood Component Transfusion Note    I have discussed with the patient the rationale for blood component transfusion; its benefits in treating or preventing fatigue, organ damage, or death; and its risk which includes mild transfusion reactions, rare risk of blood borne infection, or more serious but rare reactions. I have discussed the alternatives to transfusion, including the risk and consequences of not receiving transfusion. The patient had an opportunity to ask questions and had agreed to proceed with transfusion of blood components.    Electronically signed by Chris Strauss MD on 3/2/25 at 5:30 PM EST

## 2025-03-02 NOTE — ED PROVIDER NOTES
that includes Coronary angioplasty with stent; Tonsillectomy; Inner ear surgery (Right); and Coronary angioplasty with stent (02/11/2019).    Social History     Socioeconomic History    Marital status:      Spouse name: Not on file    Number of children: Not on file    Years of education: Not on file    Highest education level: Not on file   Occupational History    Not on file   Tobacco Use    Smoking status: Former     Current packs/day: 0.50     Average packs/day: 0.5 packs/day for 45.0 years (22.5 ttl pk-yrs)     Types: Cigarettes    Smokeless tobacco: Former   Substance and Sexual Activity    Alcohol use: No    Drug use: Yes     Types: Marijuana (Weed)    Sexual activity: Never   Other Topics Concern    Not on file   Social History Narrative    Not on file     Social Determinants of Health     Financial Resource Strain: Not on file   Food Insecurity: Not on file   Transportation Needs: Not on file   Physical Activity: Not on file   Stress: Not on file   Social Connections: Not on file   Intimate Partner Violence: Not on file   Housing Stability: Not on file       Family History   Problem Relation Age of Onset    Hypertension Mother     Hypertension Father     Diabetes Sister     Diabetes Brother     Stroke Brother        Allergies:  Codeine, Milk (cow), and Morphine    Home Medications:  Prior to Admission medications    Medication Sig Start Date End Date Taking? Authorizing Provider   norethindrone (AYGESTIN) 5 MG tablet Take 4 tablets by mouth daily Take 4 tablets daily for 2 days, followed by 3 tablets daily for 2 days, followed by 2 tablets for 2 days, then one tablet daily. If bleeding returns or increases, may increase dose back up to 4 tablets per day 3/2/25  Yes Shana Guerrero DO   busPIRone (BUSPAR) 30 MG tablet Take 30 mg by mouth 3 times daily as needed 12/7/22  Yes Provider, MD Dia   nitroGLYCERIN (NITROSTAT) 0.4 MG SL tablet up to max of 3 total doses. If no relief after 1 dose,  (TRULICITY) 1.5 MG/0.5ML SOPN Inject 0.5 mLs into the skin once a week Every tuesday    Provider, MD Dia   metoprolol tartrate (LOPRESSOR) 25 MG tablet Take 0.5 tablets by mouth 2 times daily 2/12/19   Brooklynn Tran APRN - CNP   Lancets MISC 1 each by Does not apply route daily 2/12/19   Kathryn Kwon MD   blood glucose monitor strips Test 3 times a day & as needed for symptoms of irregular blood glucose. 2/12/19   Kathryn Kwon MD   glucose monitoring kit (FREESTYLE) monitoring kit 1 kit by Does not apply route daily 2/12/19   Kathryn Kwon MD   aspirin 81 MG chewable tablet Take 1 tablet by mouth daily 2/13/19 7/22/22  Brooklynn Tran APRN - CNP   albuterol (PROVENTIL) (5 MG/ML) 0.5% nebulizer solution Take 0.5 mLs by nebulization every 6 hours as needed for Wheezing  Patient taking differently: Take 0.5 mLs by nebulization every 4 hours as needed for Wheezing 3/16/18   Etta Menon MD   tiotropium (SPIRIVA) 18 MCG inhalation capsule Inhale 1 capsule into the lungs daily Pt told that she was using this medication at home.  Patient taking differently: Inhale 1 capsule into the lungs daily 2 puffs in a.m. daily 3/16/18   Etta Menon MD       REVIEW OF SYSTEMS       Review of Systems   Constitutional:  Negative for fever.   HENT:  Negative for congestion.    Respiratory:  Negative for shortness of breath.    Cardiovascular:  Negative for chest pain.   Gastrointestinal:  Positive for abdominal pain. Negative for diarrhea and vomiting.   Genitourinary:  Positive for vaginal bleeding and vaginal pain. Negative for dysuria and hematuria.   Neurological:  Negative for headaches.       PHYSICAL EXAM      INITIAL VITALS:   BP (!) 139/51   Pulse 85   Temp 97.7 °F (36.5 °C) (Oral)   Resp 19   Ht 1.753 m (5' 9\")   Wt 108.9 kg (240 lb)   SpO2 100%   BMI 35.44 kg/m²     Physical Exam  Constitutional:       General: She is not in acute distress.     Appearance: Normal

## 2025-03-02 NOTE — ED NOTES
911.  Qty: 25 tablet, Refills: 3      baclofen (LIORESAL) 10 MG tablet Take by mouth      potassium chloride (KLOR-CON) 10 MEQ extended release tablet Take 1 tablet by mouth daily      dicyclomine (BENTYL) 10 MG capsule Take 1 capsule by mouth 4 times daily for 14 days  Qty: 56 capsule, Refills: 0      ondansetron (ZOFRAN) 4 MG tablet Take 1 tablet by mouth every 8 hours as needed for Nausea  Qty: 20 tablet, Refills: 0      diclofenac sodium (VOLTAREN) 1 % GEL Apply 2 g topically 4 times daily for 5 days  Qty: 40 g, Refills: 0      Hydrocortisone, Perianal, (PROCTO-BASHIR) 1 % cream 2 times daily Apply topically 2 times daily.  Qty: 28 g, Refills: 1      Insulin Pen Needle (PEN NEEDLES) 31G X 5 MM MISC 1 box by Does not apply route daily  Qty: 1 each, Refills: 0      acetaminophen (TYLENOL) 325 MG tablet Take 2 tablets by mouth every 4-6 hours as needed      furosemide (LASIX) 40 MG tablet Take 1 tablet by mouth in the morning. Hold for 2 more days.  Qty: 60 tablet, Refills: 3      mometasone-formoterol (DULERA) 200-5 MCG/ACT inhaler Inhale 2 puffs into the lungs in the morning and 2 puffs in the evening.      lisinopril (PRINIVIL;ZESTRIL) 10 MG tablet Take 1 tablet by mouth daily  Qty: 30 tablet, Refills: 3      clopidogrel (PLAVIX) 75 MG tablet Take 1 tablet by mouth daily  Qty: 30 tablet, Refills: 3      famotidine (PEPCID) 20 MG tablet Take 1 tablet by mouth 2 times daily      albuterol sulfate HFA (PROVENTIL;VENTOLIN;PROAIR) 108 (90 Base) MCG/ACT inhaler Inhale 2 puffs into the lungs 3 times daily      apixaban (ELIQUIS) 5 MG TABS tablet Take 1 tablet by mouth 2 times daily  Qty: 180 tablet, Refills: 1      atorvastatin (LIPITOR) 40 MG tablet Take 1 tablet by mouth daily  Qty: 30 tablet, Refills: 3      Dulaglutide (TRULICITY) 1.5 MG/0.5ML SOPN Inject 0.5 mLs into the skin once a week Every tuesday      metoprolol tartrate (LOPRESSOR) 25 MG tablet Take 0.5 tablets by mouth 2 times daily  Qty: 60 tablet, Refills: 3       Lancets MISC 1 each by Does not apply route daily  Qty: 100 each, Refills: 3      blood glucose monitor strips Test 3 times a day & as needed for symptoms of irregular blood glucose.  Qty: 100 strip, Refills: 3    Comments: Brand per patient preference. May round up to next available package size.      glucose monitoring kit (FREESTYLE) monitoring kit 1 kit by Does not apply route daily  Qty: 1 kit, Refills: 0      albuterol (PROVENTIL) (5 MG/ML) 0.5% nebulizer solution Take 0.5 mLs by nebulization every 6 hours as needed for Wheezing  Qty: 120 each, Refills: 0      tiotropium (SPIRIVA) 18 MCG inhalation capsule Inhale 1 capsule into the lungs daily Pt told that she was using this medication at home.  Qty: 1 capsule, Refills: 0           Orders Placed This Encounter   Medications    norethindrone (AYGESTIN) tablet 20 mg    norethindrone (AYGESTIN) 5 MG tablet     Sig: Take 4 tablets by mouth daily Take 4 tablets daily for 2 days, followed by 3 tablets daily for 2 days, followed by 2 tablets for 2 days, then one tablet daily. If bleeding returns or increases, may increase dose back up to 4 tablets per day     Dispense:  120 tablet     Refill:  1    sodium chloride 0.9 % bolus 1,000 mL       SURGICAL HISTORY       Past Surgical History:   Procedure Laterality Date    CORONARY ANGIOPLASTY WITH STENT PLACEMENT      x2    CORONARY ANGIOPLASTY WITH STENT PLACEMENT  02/11/2019    JOSE MARTIN to RCA per Dr. Calix radial approuch    INNER EAR SURGERY Right     TONSILLECTOMY         PAST MEDICAL HISTORY       Past Medical History:   Diagnosis Date    Acute on chronic systolic CHF (congestive heart failure) ([facility]) 12/20/2021    Asthma     Blood circulation, collateral     CAD (coronary artery disease)     COPD (chronic obstructive pulmonary disease) ([facility])     COPD exacerbation ([facility]) 12/18/2021    Diabetes mellitus ([facility])     Hyperlipidemia     Hypertension     Ischemic colitis 10/28/2021    Movement disorder     B/L torn rotator

## 2025-03-03 LAB
ANION GAP SERPL CALCULATED.3IONS-SCNC: 11 MMOL/L (ref 9–16)
BASOPHILS # BLD: 0.06 K/UL (ref 0–0.2)
BASOPHILS NFR BLD: 1 % (ref 0–2)
BUN SERPL-MCNC: 31 MG/DL (ref 8–23)
CALCIUM SERPL-MCNC: 8 MG/DL (ref 8.6–10.4)
CHLORIDE SERPL-SCNC: 109 MMOL/L (ref 98–107)
CO2 SERPL-SCNC: 18 MMOL/L (ref 20–31)
CREAT SERPL-MCNC: 1.5 MG/DL (ref 0.6–0.9)
EKG ATRIAL RATE: 92 BPM
EKG P AXIS: 65 DEGREES
EKG P-R INTERVAL: 160 MS
EKG Q-T INTERVAL: 368 MS
EKG QRS DURATION: 74 MS
EKG QTC CALCULATION (BAZETT): 455 MS
EKG R AXIS: 61 DEGREES
EKG T AXIS: 40 DEGREES
EKG VENTRICULAR RATE: 92 BPM
EOSINOPHIL # BLD: 0.09 K/UL (ref 0–0.44)
EOSINOPHILS RELATIVE PERCENT: 1 % (ref 1–4)
ERYTHROCYTE [DISTWIDTH] IN BLOOD BY AUTOMATED COUNT: 16.8 % (ref 11.8–14.4)
GFR, ESTIMATED: 39 ML/MIN/1.73M2
GLUCOSE BLD-MCNC: 155 MG/DL (ref 65–105)
GLUCOSE BLD-MCNC: 160 MG/DL (ref 65–105)
GLUCOSE BLD-MCNC: 174 MG/DL (ref 65–105)
GLUCOSE BLD-MCNC: 177 MG/DL (ref 65–105)
GLUCOSE SERPL-MCNC: 141 MG/DL (ref 74–99)
HCT VFR BLD AUTO: 23.2 % (ref 36.3–47.1)
HCT VFR BLD AUTO: 23.3 % (ref 36.3–47.1)
HCT VFR BLD AUTO: 24.2 % (ref 36.3–47.1)
HCT VFR BLD AUTO: 25.1 % (ref 36.3–47.1)
HCT VFR BLD AUTO: 27 % (ref 36.3–47.1)
HGB BLD-MCNC: 6.8 G/DL (ref 11.9–15.1)
HGB BLD-MCNC: 7.2 G/DL (ref 11.9–15.1)
HGB BLD-MCNC: 7.8 G/DL (ref 11.9–15.1)
HGB BLD-MCNC: 8 G/DL (ref 11.9–15.1)
HGB BLD-MCNC: 8.2 G/DL (ref 11.9–15.1)
IMM GRANULOCYTES # BLD AUTO: 0.03 K/UL (ref 0–0.3)
IMM GRANULOCYTES NFR BLD: 0 %
LYMPHOCYTES NFR BLD: 1.38 K/UL (ref 1.1–3.7)
LYMPHOCYTES RELATIVE PERCENT: 18 % (ref 24–43)
MCH RBC QN AUTO: 24.8 PG (ref 25.2–33.5)
MCHC RBC AUTO-ENTMCNC: 30.9 G/DL (ref 28.4–34.8)
MCV RBC AUTO: 80.3 FL (ref 82.6–102.9)
MONOCYTES NFR BLD: 0.55 K/UL (ref 0.1–1.2)
MONOCYTES NFR BLD: 7 % (ref 3–12)
NEUTROPHILS NFR BLD: 72 % (ref 36–65)
NEUTS SEG NFR BLD: 5.53 K/UL (ref 1.5–8.1)
NRBC BLD-RTO: 0 PER 100 WBC
PLATELET # BLD AUTO: 221 K/UL (ref 138–453)
PMV BLD AUTO: 10.4 FL (ref 8.1–13.5)
POTASSIUM SERPL-SCNC: 3.9 MMOL/L (ref 3.7–5.3)
RBC # BLD AUTO: 2.9 M/UL (ref 3.95–5.11)
RBC # BLD: ABNORMAL 10*6/UL
SODIUM SERPL-SCNC: 138 MMOL/L (ref 136–145)
WBC OTHER # BLD: 7.6 K/UL (ref 3.5–11.3)

## 2025-03-03 PROCEDURE — 85018 HEMOGLOBIN: CPT

## 2025-03-03 PROCEDURE — 85025 COMPLETE CBC W/AUTO DIFF WBC: CPT

## 2025-03-03 PROCEDURE — 36415 COLL VENOUS BLD VENIPUNCTURE: CPT

## 2025-03-03 PROCEDURE — 93010 ELECTROCARDIOGRAM REPORT: CPT | Performed by: INTERNAL MEDICINE

## 2025-03-03 PROCEDURE — 80048 BASIC METABOLIC PNL TOTAL CA: CPT

## 2025-03-03 PROCEDURE — 2060000000 HC ICU INTERMEDIATE R&B

## 2025-03-03 PROCEDURE — 93005 ELECTROCARDIOGRAM TRACING: CPT | Performed by: INTERNAL MEDICINE

## 2025-03-03 PROCEDURE — 6370000000 HC RX 637 (ALT 250 FOR IP)

## 2025-03-03 PROCEDURE — 94640 AIRWAY INHALATION TREATMENT: CPT

## 2025-03-03 PROCEDURE — 85014 HEMATOCRIT: CPT

## 2025-03-03 PROCEDURE — 99223 1ST HOSP IP/OBS HIGH 75: CPT | Performed by: STUDENT IN AN ORGANIZED HEALTH CARE EDUCATION/TRAINING PROGRAM

## 2025-03-03 PROCEDURE — P9016 RBC LEUKOCYTES REDUCED: HCPCS

## 2025-03-03 PROCEDURE — 2580000003 HC RX 258

## 2025-03-03 PROCEDURE — 36430 TRANSFUSION BLD/BLD COMPNT: CPT

## 2025-03-03 PROCEDURE — 2500000003 HC RX 250 WO HCPCS

## 2025-03-03 PROCEDURE — 82947 ASSAY GLUCOSE BLOOD QUANT: CPT

## 2025-03-03 PROCEDURE — 94761 N-INVAS EAR/PLS OXIMETRY MLT: CPT

## 2025-03-03 RX ORDER — DICYCLOMINE HYDROCHLORIDE 10 MG/ML
20 INJECTION INTRAMUSCULAR 4 TIMES DAILY PRN
Status: DISCONTINUED | OUTPATIENT
Start: 2025-03-03 | End: 2025-03-08 | Stop reason: HOSPADM

## 2025-03-03 RX ORDER — SODIUM CHLORIDE, SODIUM LACTATE, POTASSIUM CHLORIDE, CALCIUM CHLORIDE 600; 310; 30; 20 MG/100ML; MG/100ML; MG/100ML; MG/100ML
INJECTION, SOLUTION INTRAVENOUS CONTINUOUS
Status: DISCONTINUED | OUTPATIENT
Start: 2025-03-03 | End: 2025-03-03

## 2025-03-03 RX ORDER — CALCIUM CARBONATE 500 MG/1
500 TABLET, CHEWABLE ORAL 3 TIMES DAILY PRN
Status: DISCONTINUED | OUTPATIENT
Start: 2025-03-03 | End: 2025-03-08 | Stop reason: HOSPADM

## 2025-03-03 RX ORDER — LIDOCAINE 4 G/G
1 PATCH TOPICAL DAILY
Status: DISCONTINUED | OUTPATIENT
Start: 2025-03-03 | End: 2025-03-08 | Stop reason: HOSPADM

## 2025-03-03 RX ORDER — SODIUM CHLORIDE, SODIUM LACTATE, POTASSIUM CHLORIDE, CALCIUM CHLORIDE 600; 310; 30; 20 MG/100ML; MG/100ML; MG/100ML; MG/100ML
INJECTION, SOLUTION INTRAVENOUS CONTINUOUS
Status: DISCONTINUED | OUTPATIENT
Start: 2025-03-03 | End: 2025-03-07

## 2025-03-03 RX ORDER — OXYCODONE HYDROCHLORIDE 5 MG/1
5 TABLET ORAL EVERY 6 HOURS PRN
Status: DISCONTINUED | OUTPATIENT
Start: 2025-03-03 | End: 2025-03-08 | Stop reason: HOSPADM

## 2025-03-03 RX ORDER — FAMOTIDINE 20 MG/1
20 TABLET, FILM COATED ORAL 2 TIMES DAILY
Status: DISCONTINUED | OUTPATIENT
Start: 2025-03-03 | End: 2025-03-08 | Stop reason: HOSPADM

## 2025-03-03 RX ORDER — ALPRAZOLAM 0.25 MG
0.25 TABLET ORAL 4 TIMES DAILY PRN
Status: DISCONTINUED | OUTPATIENT
Start: 2025-03-03 | End: 2025-03-08 | Stop reason: HOSPADM

## 2025-03-03 RX ORDER — 0.9 % SODIUM CHLORIDE 0.9 %
500 INTRAVENOUS SOLUTION INTRAVENOUS ONCE
Status: DISCONTINUED | OUTPATIENT
Start: 2025-03-03 | End: 2025-03-03

## 2025-03-03 RX ORDER — HYDROXYZINE HYDROCHLORIDE 10 MG/1
10 TABLET, FILM COATED ORAL 3 TIMES DAILY PRN
Status: DISCONTINUED | OUTPATIENT
Start: 2025-03-03 | End: 2025-03-08 | Stop reason: HOSPADM

## 2025-03-03 RX ORDER — OXYCODONE HYDROCHLORIDE 5 MG/1
5 TABLET ORAL ONCE
Status: COMPLETED | OUTPATIENT
Start: 2025-03-03 | End: 2025-03-03

## 2025-03-03 RX ORDER — SODIUM CHLORIDE 9 MG/ML
INJECTION, SOLUTION INTRAVENOUS PRN
Status: DISCONTINUED | OUTPATIENT
Start: 2025-03-03 | End: 2025-03-08 | Stop reason: HOSPADM

## 2025-03-03 RX ORDER — SODIUM CHLORIDE, SODIUM LACTATE, POTASSIUM CHLORIDE, AND CALCIUM CHLORIDE .6; .31; .03; .02 G/100ML; G/100ML; G/100ML; G/100ML
500 INJECTION, SOLUTION INTRAVENOUS ONCE
Status: COMPLETED | OUTPATIENT
Start: 2025-03-03 | End: 2025-03-03

## 2025-03-03 RX ORDER — SODIUM CHLORIDE 9 MG/ML
INJECTION, SOLUTION INTRAVENOUS PRN
Status: DISCONTINUED | OUTPATIENT
Start: 2025-03-03 | End: 2025-03-05

## 2025-03-03 RX ADMIN — ATORVASTATIN CALCIUM 40 MG: 40 TABLET, FILM COATED ORAL at 08:46

## 2025-03-03 RX ADMIN — OXYCODONE 5 MG: 5 TABLET ORAL at 09:39

## 2025-03-03 RX ADMIN — SODIUM CHLORIDE, SODIUM LACTATE, POTASSIUM CHLORIDE, AND CALCIUM CHLORIDE: .6; .31; .03; .02 INJECTION, SOLUTION INTRAVENOUS at 09:01

## 2025-03-03 RX ADMIN — OXYCODONE 5 MG: 5 TABLET ORAL at 00:46

## 2025-03-03 RX ADMIN — SODIUM CHLORIDE, PRESERVATIVE FREE 10 ML: 5 INJECTION INTRAVENOUS at 08:47

## 2025-03-03 RX ADMIN — SODIUM CHLORIDE, SODIUM LACTATE, POTASSIUM CHLORIDE, AND CALCIUM CHLORIDE: .6; .31; .03; .02 INJECTION, SOLUTION INTRAVENOUS at 22:08

## 2025-03-03 RX ADMIN — SODIUM CHLORIDE, SODIUM LACTATE, POTASSIUM CHLORIDE, AND CALCIUM CHLORIDE 500 ML: .6; .31; .03; .02 INJECTION, SOLUTION INTRAVENOUS at 19:57

## 2025-03-03 RX ADMIN — BUDESONIDE AND FORMOTEROL FUMARATE DIHYDRATE 2 PUFF: 160; 4.5 AEROSOL RESPIRATORY (INHALATION) at 20:53

## 2025-03-03 RX ADMIN — BUSPIRONE HYDROCHLORIDE 30 MG: 10 TABLET ORAL at 08:46

## 2025-03-03 RX ADMIN — ACETAMINOPHEN 650 MG: 325 TABLET ORAL at 22:05

## 2025-03-03 RX ADMIN — NORETHINDRONE ACETATE 5 MG: 5 TABLET ORAL at 21:06

## 2025-03-03 RX ADMIN — OXYCODONE 5 MG: 5 TABLET ORAL at 19:33

## 2025-03-03 RX ADMIN — Medication 2 PUFF: at 17:00

## 2025-03-03 RX ADMIN — BUSPIRONE HYDROCHLORIDE 30 MG: 10 TABLET ORAL at 21:06

## 2025-03-03 RX ADMIN — OXYCODONE 5 MG: 5 TABLET ORAL at 22:46

## 2025-03-03 RX ADMIN — BUSPIRONE HYDROCHLORIDE 30 MG: 10 TABLET ORAL at 13:16

## 2025-03-03 RX ADMIN — BUDESONIDE AND FORMOTEROL FUMARATE DIHYDRATE 2 PUFF: 160; 4.5 AEROSOL RESPIRATORY (INHALATION) at 09:22

## 2025-03-03 RX ADMIN — NORETHINDRONE ACETATE 5 MG: 5 TABLET ORAL at 17:12

## 2025-03-03 RX ADMIN — SODIUM CHLORIDE 500 ML: 0.9 INJECTION, SOLUTION INTRAVENOUS at 00:04

## 2025-03-03 RX ADMIN — NORETHINDRONE ACETATE 5 MG: 5 TABLET ORAL at 08:48

## 2025-03-03 RX ADMIN — OXYCODONE 5 MG: 5 TABLET ORAL at 13:13

## 2025-03-03 RX ADMIN — FAMOTIDINE 20 MG: 20 TABLET, FILM COATED ORAL at 21:06

## 2025-03-03 RX ADMIN — NORETHINDRONE ACETATE 5 MG: 5 TABLET ORAL at 13:14

## 2025-03-03 RX ADMIN — TIOTROPIUM BROMIDE INHALATION SPRAY 2 PUFF: 3.12 SPRAY, METERED RESPIRATORY (INHALATION) at 09:22

## 2025-03-03 ASSESSMENT — PAIN SCALES - GENERAL
PAINLEVEL_OUTOF10: 0
PAINLEVEL_OUTOF10: 5
PAINLEVEL_OUTOF10: 8
PAINLEVEL_OUTOF10: 0
PAINLEVEL_OUTOF10: 9
PAINLEVEL_OUTOF10: 3
PAINLEVEL_OUTOF10: 5
PAINLEVEL_OUTOF10: 0
PAINLEVEL_OUTOF10: 4
PAINLEVEL_OUTOF10: 7
PAINLEVEL_OUTOF10: 2
PAINLEVEL_OUTOF10: 7
PAINLEVEL_OUTOF10: 8
PAINLEVEL_OUTOF10: 5
PAINLEVEL_OUTOF10: 8

## 2025-03-03 ASSESSMENT — PAIN DESCRIPTION - ORIENTATION
ORIENTATION: RIGHT;LEFT
ORIENTATION: LOWER
ORIENTATION: LOWER

## 2025-03-03 ASSESSMENT — PAIN DESCRIPTION - LOCATION
LOCATION: ABDOMEN;BACK
LOCATION: ABDOMEN;BACK
LOCATION: BACK;ABDOMEN
LOCATION: HIP
LOCATION: ABDOMEN;BACK

## 2025-03-03 ASSESSMENT — PAIN DESCRIPTION - DESCRIPTORS
DESCRIPTORS: ACHING;DISCOMFORT;SORE
DESCRIPTORS: SHARP

## 2025-03-03 ASSESSMENT — PAIN - FUNCTIONAL ASSESSMENT: PAIN_FUNCTIONAL_ASSESSMENT: ACTIVITIES ARE NOT PREVENTED

## 2025-03-03 NOTE — PLAN OF CARE
Problem: Chronic Conditions and Co-morbidities  Goal: Patient's chronic conditions and co-morbidity symptoms are monitored and maintained or improved  Outcome: Progressing  Flowsheets (Taken 3/2/2025 1945)  Care Plan - Patient's Chronic Conditions and Co-Morbidity Symptoms are Monitored and Maintained or Improved: Monitor and assess patient's chronic conditions and comorbid symptoms for stability, deterioration, or improvement     Problem: Discharge Planning  Goal: Discharge to home or other facility with appropriate resources  Outcome: Progressing  Flowsheets (Taken 3/2/2025 1945)  Discharge to home or other facility with appropriate resources: Identify barriers to discharge with patient and caregiver     Problem: Pain  Goal: Verbalizes/displays adequate comfort level or baseline comfort level  Outcome: Progressing     Problem: Skin/Tissue Integrity  Goal: Skin integrity remains intact  Description: 1.  Monitor for areas of redness and/or skin breakdown  2.  Assess vascular access sites hourly  3.  Every 4-6 hours minimum:  Change oxygen saturation probe site  4.  Every 4-6 hours:  If on nasal continuous positive airway pressure, respiratory therapy assess nares and determine need for appliance change or resting period  Outcome: Progressing  Flowsheets  Taken 3/2/2025 2156  Skin Integrity Remains Intact: Monitor for areas of redness and/or skin breakdown  Taken 3/2/2025 1945  Skin Integrity Remains Intact: Monitor for areas of redness and/or skin breakdown     Problem: Safety - Adult  Goal: Free from fall injury  Outcome: Progressing     Problem: ABCDS Injury Assessment  Goal: Absence of physical injury  Outcome: Progressing  Flowsheets (Taken 3/2/2025 2156)  Absence of Physical Injury: Implement safety measures based on patient assessment

## 2025-03-03 NOTE — PROGRESS NOTES
\"CKTOTAL;3\"  FASTING LIPID PANEL:  Lab Results   Component Value Date    CHOL 122 06/16/2020    HDL 35 (L) 06/16/2020    TRIG 152 (H) 06/16/2020     LIVER PROFILE: No results for input(s): \"AST\", \"ALT\", \"BILIDIR\", \"BILITOT\", \"ALKPHOS\" in the last 72 hours.    Invalid input(s): \"ALB\"   MICROBIOLOGY:   Lab Results   Component Value Date/Time    CULTURE NO SIGNIFICANT GROWTH 10/27/2023 07:30 PM       Imaging:    US NON OB TRANSVAGINAL    Result Date: 3/2/2025  1. Unremarkable sonographic appearance of the uterus. 2. Unable visualize the right and left ovaries sonographically.       ASSESSMENT & PLAN     Assessment and Plan:    IMPRESSION  This is a 64 y.o. female with a PMHx significant for atrial fibrillation on Eliquis, COPD, type 2 diabetes mellitus, CKD, congestive heart failure who presented with active bleeding.    Principal Problem:    Postmenopausal bleeding  Active Problems:    Paroxysmal A-fib (HCC)    Type 2 diabetes mellitus with hyperglycemia, without long-term current use of insulin (HCC)    Anemia due to blood loss, acute  Resolved Problems:    Abnormal uterine bleeding (AUB)    Active bleeding     Patient has active bleeding from vagina since arrival.  Obstetrics and gynecology was consulted, underwent transvaginal sonogram which revealed unremarkable appearance of the uterus.  Obstetrics/gynecology started patient on Aygestin.  Obstetrics/gynecology signed off..  Iron panel revealed iron 41, TIBC 348, ferritin 33.     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  Will hold off on Eliquis currently due to active bleeding.  Echo from 12/22 revealed EF of 50%  Takes Lasix 40 mg daily, lisinopril 10 mg daily, Plavix 75 mg daily, Eliquis 5 mg twice daily, Lipitor 40 mg daily, Lopressor 25 mg half tablets twice daily and aspirin at home.  Will hold lisinopril and metoprolol for now.  Will continue  to monitor.  Will consider obtaining echo     COPD : - Will consider starting patient on breathing treatment as required.     T2DM :-     Takes Trulicity at home  Glucose on admission 164  Medium dose sliding scale.        CKD :-      Baseline creatinine around 2.0  Creatinine on admission 1.9  Continue to monitor.  Avoid nephrotoxic drugs.    Diet: ADULT DIET; Regular; 4 carb choices (60 gm/meal)   DVT ppx : Will hold off on anticoagulants   GI ppx: Not required for now     PT/OT: On board  Discharge Planning / SW: Case management is consulted for assistance with discharge planning       Chris Strauss MD   Internal Medicine Resident, PGY-1  [place], [place]  3/3/2025,5:55 AM

## 2025-03-03 NOTE — PROGRESS NOTES
Occupational Therapy    [facility]  Occupational Therapy Not Seen Note    DATE: 3/3/2025    NAME: Nancy L Haase  MRN: [id number]   : 1960      Patient not seen this date for Occupational Therapy due to:    Per Pt she has been up in room without RN present and has not noticed a difference in balance or strength. Patient at baseline functional level with no acute OT needs. Will defer OT evaluation at this time. Please reorder OT if future needs arise.     Electronically signed by FEDERICO HEMPHILL on 3/3/2025 at 11:01 AM

## 2025-03-03 NOTE — PROGRESS NOTES
BRONCHOSPASM/BRONCHOCONSTRICTION   [x]  IMPROVE AERATION/BREATH SOUNDS  [x]   ADMINISTER BRONCHODILATOR THERAPY AS APPROPRIATE  [x]   ASSESS BREATH SOUNDS  [x]   IMPLEMENT AEROSOL/MDI PROTOCOL  []   PATIENT EDUCATION AS NEEDED

## 2025-03-03 NOTE — PROGRESS NOTES
Primary notified patients bp 88/41. HR 89. Asymptomatic. Awaiting response.     500ml fluid bolus ordered and initiated by writer

## 2025-03-03 NOTE — CARE COORDINATION
Case Management Assessment  Initial Evaluation    Date/Time of Evaluation: 3/3/2025 11:19 AM  Assessment Completed by: Ivone Parnell RN    If patient is discharged prior to next notation, then this note serves as note for discharge by case management.    Patient Name: Nancy L Haase                   YOB: 1960  Diagnosis: Abnormal vaginal bleeding [N93.9]  Active bleeding [Z78.9]                   Date / Time: 3/2/2025 10:31 AM    Patient Admission Status: Inpatient   Readmission Risk (Low < 19, Mod (19-27), High > 27): Readmission Risk Score: 18.9    Current PCP: Scar Reyes MD  PCP verified by CM? (P) Yes (Scar Reyes MD)    Chart Reviewed: Yes      History Provided by: (P) Patient  Patient Orientation: (P) Alert and Oriented, Person, Place, Situation, Self    Patient Cognition: (P) Alert    Hospitalization in the last 30 days (Readmission):  No    If yes, Readmission Assessment in  Navigator will be completed.    Advance Directives:      Code Status: Full Code   Patient's Primary Decision Maker is: (P) Legal Next of Kin (, 4 children)      Discharge Planning:    Patient lives with: (P) Children Type of Home: (P) House  Primary Care Giver: (P) Self  Patient Support Systems include: (P) Family Members, Children   Current Financial resources: (P) Medicare  Current community resources: (P) None  Current services prior to admission: (P) Transportation            Current DME:              Type of Home Care services:  None    ADLS  Prior functional level: (P) Independent in ADLs/IADLs  Current functional level: (P) Independent in ADLs/IADLs    PT AM-PAC:   /24  OT AM-PAC:   /24    Family can provide assistance at DC: (P) Yes  Would you like Case Management to discuss the discharge plan with any other family members/significant others, and if so, who? (P) No  Plans to Return to Present Housing: (P) Yes  Other Identified Issues/Barriers to RETURNING to current housing:    Potential  Assistance needed at discharge: (P) N/A            Potential DME:    Patient expects to discharge to: House  Plan for transportation at discharge: (P) Family    Financial    Payor: [employer] MEDICARE / Plan: [employer] DUAL COMPLETE / Product Type: *No Product type* /     Does insurance require precert for SNF: Yes    Potential assistance Purchasing Medications:    Meds-to-Beds request: Yes      [facility] - [place], [place] - [address] [place] [address] - P [phone] - F [phone]  [address] [place] [id number]  Phone: [phone] Fax: [phone]      Notes:    Factors facilitating achievement of predicted outcomes: Family support, Motivated, Cooperative, and Pleasant    Barriers to discharge: Medical complications    Additional Case Management Notes: spoke to patient and plan is home. She lives with her son. Has family support.     The Plan for Transition of Care is related to the following treatment goals of Abnormal vaginal bleeding [N93.9]  Active bleeding [Z78.9]    IF APPLICABLE: The Patient and/or patient representative Pina and her family were provided with a choice of provider and agrees with the discharge plan. Freedom of choice list with basic dialogue that supports the patient's individualized plan of care/goals and shares the quality data associated with the providers was provided to: (P) Patient   Patient Representative Name:       The Patient and/or Patient Representative Agree with the Discharge Plan? (P) Yes    Ivone Parnell RN  Case Management Department  Ph: [phone] Fax:

## 2025-03-04 ENCOUNTER — ANESTHESIA EVENT (OUTPATIENT)
Dept: OPERATING ROOM | Age: 65
End: 2025-03-04
Payer: MEDICARE

## 2025-03-04 ENCOUNTER — APPOINTMENT (OUTPATIENT)
Age: 65
DRG: 744 | End: 2025-03-04
Payer: MEDICARE

## 2025-03-04 PROBLEM — R10.2 PELVIC PAIN: Status: ACTIVE | Noted: 2025-03-04

## 2025-03-04 PROBLEM — N93.9 ABNORMAL VAGINAL BLEEDING: Status: ACTIVE | Noted: 2025-03-04

## 2025-03-04 LAB
ANION GAP SERPL CALCULATED.3IONS-SCNC: 9 MMOL/L (ref 9–16)
BACTERIA URNS QL MICRO: ABNORMAL
BASOPHILS # BLD: 0.04 K/UL (ref 0–0.2)
BASOPHILS NFR BLD: 1 % (ref 0–2)
BILIRUB UR QL STRIP: NEGATIVE
BUN SERPL-MCNC: 18 MG/DL (ref 8–23)
CALCIUM SERPL-MCNC: 7.8 MG/DL (ref 8.6–10.4)
CASTS #/AREA URNS LPF: ABNORMAL /LPF (ref 0–8)
CHLORIDE SERPL-SCNC: 110 MMOL/L (ref 98–107)
CLARITY UR: CLEAR
CO2 SERPL-SCNC: 18 MMOL/L (ref 20–31)
COLOR UR: YELLOW
CREAT SERPL-MCNC: 1.3 MG/DL (ref 0.6–0.9)
EOSINOPHIL # BLD: 0.08 K/UL (ref 0–0.44)
EOSINOPHILS RELATIVE PERCENT: 1 % (ref 1–4)
EPI CELLS #/AREA URNS HPF: ABNORMAL /HPF (ref 0–5)
ERYTHROCYTE [DISTWIDTH] IN BLOOD BY AUTOMATED COUNT: 16.5 % (ref 11.8–14.4)
GFR, ESTIMATED: 46 ML/MIN/1.73M2
GLUCOSE BLD-MCNC: 149 MG/DL (ref 65–105)
GLUCOSE BLD-MCNC: 165 MG/DL (ref 65–105)
GLUCOSE BLD-MCNC: 183 MG/DL (ref 65–105)
GLUCOSE BLD-MCNC: 209 MG/DL (ref 65–105)
GLUCOSE SERPL-MCNC: 138 MG/DL (ref 74–99)
GLUCOSE UR STRIP-MCNC: ABNORMAL MG/DL
HCT VFR BLD AUTO: 20.8 % (ref 36.3–47.1)
HCT VFR BLD AUTO: 22.7 % (ref 36.3–47.1)
HCT VFR BLD AUTO: 23.9 % (ref 36.3–47.1)
HGB BLD-MCNC: 6.5 G/DL (ref 11.9–15.1)
HGB BLD-MCNC: 7.1 G/DL (ref 11.9–15.1)
HGB BLD-MCNC: 7.4 G/DL (ref 11.9–15.1)
HGB UR QL STRIP.AUTO: ABNORMAL
IMM GRANULOCYTES # BLD AUTO: 0.04 K/UL (ref 0–0.3)
IMM GRANULOCYTES NFR BLD: 1 %
KETONES UR STRIP-MCNC: ABNORMAL MG/DL
LEUKOCYTE ESTERASE UR QL STRIP: NEGATIVE
LYMPHOCYTES NFR BLD: 1.5 K/UL (ref 1.1–3.7)
LYMPHOCYTES RELATIVE PERCENT: 21 % (ref 24–43)
MAGNESIUM SERPL-MCNC: 2.2 MG/DL (ref 1.6–2.4)
MCH RBC QN AUTO: 25.8 PG (ref 25.2–33.5)
MCHC RBC AUTO-ENTMCNC: 31.3 G/DL (ref 28.4–34.8)
MCV RBC AUTO: 82.5 FL (ref 82.6–102.9)
MONOCYTES NFR BLD: 0.51 K/UL (ref 0.1–1.2)
MONOCYTES NFR BLD: 7 % (ref 3–12)
NEUTROPHILS NFR BLD: 70 % (ref 36–65)
NEUTS SEG NFR BLD: 5.05 K/UL (ref 1.5–8.1)
NITRITE UR QL STRIP: NEGATIVE
NRBC BLD-RTO: 0 PER 100 WBC
PH UR STRIP: 6 [PH] (ref 5–8)
PLATELET # BLD AUTO: 207 K/UL (ref 138–453)
PMV BLD AUTO: 10.6 FL (ref 8.1–13.5)
POTASSIUM SERPL-SCNC: 3.6 MMOL/L (ref 3.7–5.3)
PROT UR STRIP-MCNC: NEGATIVE MG/DL
RBC # BLD AUTO: 2.75 M/UL (ref 3.95–5.11)
RBC # BLD: ABNORMAL 10*6/UL
RBC #/AREA URNS HPF: ABNORMAL /HPF (ref 0–4)
SODIUM SERPL-SCNC: 137 MMOL/L (ref 136–145)
SP GR UR STRIP: 1.02 (ref 1–1.03)
UROBILINOGEN UR STRIP-ACNC: NORMAL EU/DL (ref 0–1)
WBC #/AREA URNS HPF: ABNORMAL /HPF (ref 0–5)
WBC OTHER # BLD: 7.2 K/UL (ref 3.5–11.3)

## 2025-03-04 PROCEDURE — 94640 AIRWAY INHALATION TREATMENT: CPT

## 2025-03-04 PROCEDURE — 6370000000 HC RX 637 (ALT 250 FOR IP)

## 2025-03-04 PROCEDURE — P9016 RBC LEUKOCYTES REDUCED: HCPCS

## 2025-03-04 PROCEDURE — 6360000002 HC RX W HCPCS

## 2025-03-04 PROCEDURE — 99232 SBSQ HOSP IP/OBS MODERATE 35: CPT | Performed by: STUDENT IN AN ORGANIZED HEALTH CARE EDUCATION/TRAINING PROGRAM

## 2025-03-04 PROCEDURE — 85014 HEMATOCRIT: CPT

## 2025-03-04 PROCEDURE — 2580000003 HC RX 258

## 2025-03-04 PROCEDURE — 85018 HEMOGLOBIN: CPT

## 2025-03-04 PROCEDURE — 85025 COMPLETE CBC W/AUTO DIFF WBC: CPT

## 2025-03-04 PROCEDURE — 74177 CT ABD & PELVIS W/CONTRAST: CPT

## 2025-03-04 PROCEDURE — 2500000003 HC RX 250 WO HCPCS

## 2025-03-04 PROCEDURE — 36430 TRANSFUSION BLD/BLD COMPNT: CPT

## 2025-03-04 PROCEDURE — 82947 ASSAY GLUCOSE BLOOD QUANT: CPT

## 2025-03-04 PROCEDURE — 83735 ASSAY OF MAGNESIUM: CPT

## 2025-03-04 PROCEDURE — 2060000000 HC ICU INTERMEDIATE R&B

## 2025-03-04 PROCEDURE — 81001 URINALYSIS AUTO W/SCOPE: CPT

## 2025-03-04 PROCEDURE — 36415 COLL VENOUS BLD VENIPUNCTURE: CPT

## 2025-03-04 PROCEDURE — 99233 SBSQ HOSP IP/OBS HIGH 50: CPT | Performed by: INTERNAL MEDICINE

## 2025-03-04 PROCEDURE — 80048 BASIC METABOLIC PNL TOTAL CA: CPT

## 2025-03-04 PROCEDURE — 6360000004 HC RX CONTRAST MEDICATION

## 2025-03-04 RX ORDER — SODIUM CHLORIDE 9 MG/ML
INJECTION, SOLUTION INTRAVENOUS PRN
Status: DISCONTINUED | OUTPATIENT
Start: 2025-03-04 | End: 2025-03-08 | Stop reason: HOSPADM

## 2025-03-04 RX ORDER — FENTANYL CITRATE 50 UG/ML
25 INJECTION, SOLUTION INTRAMUSCULAR; INTRAVENOUS EVERY 4 HOURS PRN
Status: DISCONTINUED | OUTPATIENT
Start: 2025-03-04 | End: 2025-03-08 | Stop reason: HOSPADM

## 2025-03-04 RX ORDER — MEGESTROL ACETATE 20 MG/1
20 TABLET ORAL DAILY
Status: DISCONTINUED | OUTPATIENT
Start: 2025-03-04 | End: 2025-03-08 | Stop reason: HOSPADM

## 2025-03-04 RX ORDER — FERROUS SULFATE 325(65) MG
325 TABLET, DELAYED RELEASE (ENTERIC COATED) ORAL
Status: DISCONTINUED | OUTPATIENT
Start: 2025-03-04 | End: 2025-03-08 | Stop reason: HOSPADM

## 2025-03-04 RX ORDER — IOPAMIDOL 755 MG/ML
75 INJECTION, SOLUTION INTRAVASCULAR
Status: COMPLETED | OUTPATIENT
Start: 2025-03-04 | End: 2025-03-04

## 2025-03-04 RX ORDER — FENTANYL CITRATE 50 UG/ML
25 INJECTION, SOLUTION INTRAMUSCULAR; INTRAVENOUS ONCE
Status: COMPLETED | OUTPATIENT
Start: 2025-03-04 | End: 2025-03-04

## 2025-03-04 RX ORDER — POTASSIUM CHLORIDE 1500 MG/1
40 TABLET, EXTENDED RELEASE ORAL ONCE
Status: COMPLETED | OUTPATIENT
Start: 2025-03-04 | End: 2025-03-04

## 2025-03-04 RX ADMIN — FENTANYL CITRATE 25 MCG: 50 INJECTION, SOLUTION INTRAMUSCULAR; INTRAVENOUS at 15:26

## 2025-03-04 RX ADMIN — BUDESONIDE AND FORMOTEROL FUMARATE DIHYDRATE 2 PUFF: 160; 4.5 AEROSOL RESPIRATORY (INHALATION) at 21:19

## 2025-03-04 RX ADMIN — IOPAMIDOL 75 ML: 755 INJECTION, SOLUTION INTRAVENOUS at 21:00

## 2025-03-04 RX ADMIN — TIOTROPIUM BROMIDE INHALATION SPRAY 2 PUFF: 3.12 SPRAY, METERED RESPIRATORY (INHALATION) at 08:03

## 2025-03-04 RX ADMIN — Medication 2 PUFF: at 06:52

## 2025-03-04 RX ADMIN — OXYCODONE 5 MG: 5 TABLET ORAL at 16:36

## 2025-03-04 RX ADMIN — BUSPIRONE HYDROCHLORIDE 30 MG: 10 TABLET ORAL at 21:34

## 2025-03-04 RX ADMIN — FENTANYL CITRATE 25 MCG: 50 INJECTION, SOLUTION INTRAMUSCULAR; INTRAVENOUS at 19:25

## 2025-03-04 RX ADMIN — HYDROXYZINE HYDROCHLORIDE 10 MG: 10 TABLET ORAL at 23:22

## 2025-03-04 RX ADMIN — ANTACID TABLETS 500 MG: 500 TABLET, CHEWABLE ORAL at 16:36

## 2025-03-04 RX ADMIN — FAMOTIDINE 20 MG: 20 TABLET, FILM COATED ORAL at 21:34

## 2025-03-04 RX ADMIN — INSULIN LISPRO 2 UNITS: 100 INJECTION, SOLUTION INTRAVENOUS; SUBCUTANEOUS at 21:34

## 2025-03-04 RX ADMIN — Medication 2 PUFF: at 03:56

## 2025-03-04 RX ADMIN — FAMOTIDINE 20 MG: 20 TABLET, FILM COATED ORAL at 08:18

## 2025-03-04 RX ADMIN — SODIUM CHLORIDE, SODIUM LACTATE, POTASSIUM CHLORIDE, AND CALCIUM CHLORIDE: .6; .31; .03; .02 INJECTION, SOLUTION INTRAVENOUS at 19:29

## 2025-03-04 RX ADMIN — DICYCLOMINE HYDROCHLORIDE 20 MG: 10 INJECTION, SOLUTION INTRAMUSCULAR at 14:51

## 2025-03-04 RX ADMIN — FERROUS SULFATE TAB EC 325 MG (65 MG FE EQUIVALENT) 325 MG: 325 (65 FE) TABLET DELAYED RESPONSE at 08:20

## 2025-03-04 RX ADMIN — SODIUM CHLORIDE, PRESERVATIVE FREE 10 ML: 5 INJECTION INTRAVENOUS at 21:35

## 2025-03-04 RX ADMIN — BUDESONIDE AND FORMOTEROL FUMARATE DIHYDRATE 2 PUFF: 160; 4.5 AEROSOL RESPIRATORY (INHALATION) at 08:03

## 2025-03-04 RX ADMIN — ANTACID TABLETS 500 MG: 500 TABLET, CHEWABLE ORAL at 21:41

## 2025-03-04 RX ADMIN — HYDROXYZINE HYDROCHLORIDE 10 MG: 10 TABLET ORAL at 03:40

## 2025-03-04 RX ADMIN — MEGESTROL ACETATE 20 MG: 20 TABLET ORAL at 12:30

## 2025-03-04 RX ADMIN — OXYCODONE 5 MG: 5 TABLET ORAL at 09:56

## 2025-03-04 RX ADMIN — ATORVASTATIN CALCIUM 40 MG: 40 TABLET, FILM COATED ORAL at 08:18

## 2025-03-04 RX ADMIN — OXYCODONE 5 MG: 5 TABLET ORAL at 23:22

## 2025-03-04 RX ADMIN — BUSPIRONE HYDROCHLORIDE 30 MG: 10 TABLET ORAL at 08:18

## 2025-03-04 RX ADMIN — ACETAMINOPHEN 650 MG: 325 TABLET ORAL at 12:36

## 2025-03-04 RX ADMIN — POTASSIUM CHLORIDE 40 MEQ: 1500 TABLET, EXTENDED RELEASE ORAL at 08:18

## 2025-03-04 RX ADMIN — NORETHINDRONE ACETATE 5 MG: 5 TABLET ORAL at 08:18

## 2025-03-04 RX ADMIN — BUSPIRONE HYDROCHLORIDE 30 MG: 10 TABLET ORAL at 14:23

## 2025-03-04 ASSESSMENT — ENCOUNTER SYMPTOMS
SHORTNESS OF BREATH: 1
RESPIRATORY NEGATIVE: 1

## 2025-03-04 ASSESSMENT — PAIN DESCRIPTION - ORIENTATION
ORIENTATION: LOWER;LEFT;RIGHT
ORIENTATION: RIGHT;LEFT;POSTERIOR
ORIENTATION: LEFT
ORIENTATION: RIGHT;LEFT;LOWER
ORIENTATION: MID
ORIENTATION: RIGHT;LEFT;LOWER

## 2025-03-04 ASSESSMENT — PAIN DESCRIPTION - LOCATION
LOCATION: ABDOMEN;BACK;HIP
LOCATION: BACK;ABDOMEN;PELVIS
LOCATION: ABDOMEN
LOCATION: ABDOMEN;BACK;HIP
LOCATION: ABDOMEN;HIP;BACK
LOCATION: ABDOMEN;BACK;HIP
LOCATION: ABDOMEN
LOCATION: ABDOMEN

## 2025-03-04 ASSESSMENT — PAIN SCALES - GENERAL
PAINLEVEL_OUTOF10: 6
PAINLEVEL_OUTOF10: 7
PAINLEVEL_OUTOF10: 9
PAINLEVEL_OUTOF10: 8
PAINLEVEL_OUTOF10: 2
PAINLEVEL_OUTOF10: 5
PAINLEVEL_OUTOF10: 5
PAINLEVEL_OUTOF10: 7
PAINLEVEL_OUTOF10: 3
PAINLEVEL_OUTOF10: 7
PAINLEVEL_OUTOF10: 9
PAINLEVEL_OUTOF10: 7

## 2025-03-04 ASSESSMENT — PAIN DESCRIPTION - DESCRIPTORS
DESCRIPTORS: ACHING
DESCRIPTORS: ACHING;THROBBING
DESCRIPTORS: SHARP;CRAMPING
DESCRIPTORS: ACHING
DESCRIPTORS: CRAMPING;SHARP
DESCRIPTORS: ACHING
DESCRIPTORS: STABBING;SHARP
DESCRIPTORS: CRAMPING;SHARP

## 2025-03-04 ASSESSMENT — PAIN DESCRIPTION - PAIN TYPE: TYPE: ACUTE PAIN

## 2025-03-04 ASSESSMENT — PAIN - FUNCTIONAL ASSESSMENT: PAIN_FUNCTIONAL_ASSESSMENT: PREVENTS OR INTERFERES SOME ACTIVE ACTIVITIES AND ADLS

## 2025-03-04 NOTE — PROGRESS NOTES
Obstetric/Gynecology Resident Interval Note    OBGYN team was previously consulted for postmenopausal bleeding and was started on aygestin 20mg BID with taper. TVUS (3/2) showed unremarkable sonographic appearance of the uterus. Unable visualize the right and left ovaries sonographically. Endometrial stripe was 4 mm. Patient had elected for outpatient Gyn follow up to schedule endometrial sampling via hysteroscopy, dilation and curettage at that appointment.    OBGYN team was re-consult this AM with primary team reporting that her vaginal bleeding has persisted and she has required 1U PRBCs since admission.    At this time, aygestin is switched to Megace 20mg BID.     Patient is seen at bedside with OBGYN attending Dr. Cardoza. Patient reporting that her vaginal bleeding is improved since admission but is still passing clots. She has been having this postmenopausal bleeding since November but has not had evaluation of her bleeding. Discussed the need for endometrial sampling to rule out malignancy in addition to Pap smear as she does not remember the last time she had one. Also discussed placement of IUD for bleeding. Patient desires to proceed with inpatient sampling. R/B are discussed and patient is consented for exam under anesthesia, hysteroscopy dilation and curettage, pap smear collection, IUD insertion and is scheduled for tomorrow 3/5/25 at 1330. She is reminded to remain NPO after midnight. Coags ordered in the AM. Consent scanned into Media tab in EPIC and physical copy placed in patient chart. Patient was able to have all questions and concerns addressed.    Laura Love MD  OB/GYN Resident, PGY2  [place], [place]  3/4/2025, 9:39 AM          Attending Physician Statement      I have personally seen, evaluated and discussed the care of Nancy L Haase, including pertinent history and exam findings with the resident. I have reviewed and edited their note in the electronic medical  record. The key elements of all parts of the encounter have been performed/reviewed by me.  I agree with the assessment, plan and orders as documented by the resident. (GC Modifier)    Vitals:    03/04/25 1518 03/04/25 1556 03/04/25 1706 03/04/25 1925   BP: 111/66      Pulse:       Resp: 19 18 17 16   Temp: 98.2 °F (36.8 °C)      TempSrc: Oral      SpO2: 100%      Weight:       Height:            Patient evaluated at bedside. She reports vaginal bleeding since November. She reports the past few days this has been much heavier with passing of clots. She is admitted for her symptoms. She has a significant cardiac history and eliquis and plavix are currently held due to the bleeding. She has persisted to have bleeding despite Aygestin however it has improved somewhat. At this time recommend surgical evaluation. She denies history of abnormal pap but hasn't had one in >10 years. She did have a D&C in her 20s. She hasn't seen a gynecologist recently but has an appointment scheduled tomorrow. Will plan for EUA, pap smear, hysteroscopy, D&C, Mirena IUD placement tomorrow. Recommend NPO at midnight. Will continue to trend H&H and obtain coags prior to surgery.    Sam Cardoza,      Purse String (Simple) Text: Given the location of the defect and the characteristics of the surrounding skin a purse string simple closure was deemed most appropriate.  Undermining was performed circumferentially around the surgical defect.  A purse string suture was then placed and tightened.

## 2025-03-04 NOTE — PLAN OF CARE
Problem: Chronic Conditions and Co-morbidities  Goal: Patient's chronic conditions and co-morbidity symptoms are monitored and maintained or improved  Outcome: Progressing  Flowsheets (Taken 3/3/2025 2000)  Care Plan - Patient's Chronic Conditions and Co-Morbidity Symptoms are Monitored and Maintained or Improved:   Monitor and assess patient's chronic conditions and comorbid symptoms for stability, deterioration, or improvement   Collaborate with multidisciplinary team to address chronic and comorbid conditions and prevent exacerbation or deterioration   Update acute care plan with appropriate goals if chronic or comorbid symptoms are exacerbated and prevent overall improvement and discharge     Problem: Discharge Planning  Goal: Discharge to home or other facility with appropriate resources  Outcome: Progressing  Flowsheets (Taken 3/3/2025 2000)  Discharge to home or other facility with appropriate resources:   Identify barriers to discharge with patient and caregiver   Arrange for needed discharge resources and transportation as appropriate   Identify discharge learning needs (meds, wound care, etc)     Problem: Pain  Goal: Verbalizes/displays adequate comfort level or baseline comfort level  Outcome: Progressing     Problem: Skin/Tissue Integrity  Goal: Skin integrity remains intact  Description: 1.  Monitor for areas of redness and/or skin breakdown  2.  Assess vascular access sites hourly  3.  Every 4-6 hours minimum:  Change oxygen saturation probe site  4.  Every 4-6 hours:  If on nasal continuous positive airway pressure, respiratory therapy assess nares and determine need for appliance change or resting period  Outcome: Progressing  Flowsheets (Taken 3/3/2025 2000)  Skin Integrity Remains Intact: Monitor for areas of redness and/or skin breakdown     Problem: Safety - Adult  Goal: Free from fall injury  Outcome: Progressing     Problem: ABCDS Injury Assessment  Goal: Absence of physical injury  Outcome:  Progressing  Flowsheets (Taken 3/3/2025 2000)  Absence of Physical Injury: Implement safety measures based on patient assessment

## 2025-03-04 NOTE — PROGRESS NOTES
[facility]  Internal Medicine Teaching Residency Program  Inpatient Daily Progress Note  ______________________________________________________________________________    Patient: Nancy L Haase  YOB: 1960   MRN:[id number]    Acct: [id number]     Room: 2014/2014-01  Admit date: 3/2/2025  Today's date: 03/04/25  Number of days in the hospital: 2    SUBJECTIVE   Admitting Diagnosis: Postmenopausal bleeding    CC: Active bleeding and passing blood clots     Pt examined at bedside. Chart & results reviewed.   No acute events overnight.  AOx4.  Hemodynamically stable with blood pressure 124/69 mmHg maintaining saturation at around 99%  Patient on IV Ringer's lactate at 75 mL/h    Morning labs reviewed, BMP  reviewed K 3.6, creatinine 1.3, glucose 138, CBC revealed WBC 7.2, hemoglobin 7.1.      ROS:  Review of Systems   Constitutional: Negative.    Respiratory: Negative.     Genitourinary:  Positive for pelvic pain, vaginal bleeding and vaginal pain.   Neurological: Negative.         Plan :-    Will continue to monitor.  Will check H&H and transfuse as clinically indicated.  Will consider abdominal imaging.   Will reconsult gynecology and follow the recommendations.    BRIEF HISTORY     The patient is a pleasant 64 y.o. female with a PMHx significant for      A-fib on Eliquis  COPD  Iron deficiency anemia type 2 diabetes mellitus  CKD  Congestive heart failure     presents with a chief complaint of abnormal vaginal bleeding and passing blood clots from the vagina.  Patient reported that she started passing blood clots from November, and subsequently she stopped passing blood clots but was having bleeding from the vagina.  She mentioned that the bleeding has not stopped since then and she stopped passing blood clots until yesterday.  She reported that she again passed blood clot yesterday and since then she has not stopped passing blood clots since then.  She

## 2025-03-04 NOTE — ANESTHESIA PRE-OP
ECHO from 2022 showed moderate to Severe Mitral stenosis.    Needs new Transthoracic ECHO to assess progression     Needs  to have Cardiology clearance and involvement given history of CHF    Hemoglobin 7.4    Need to transfuse one unit preop and have another one available

## 2025-03-04 NOTE — PLAN OF CARE
Problem: Chronic Conditions and Co-morbidities  Goal: Patient's chronic conditions and co-morbidity symptoms are monitored and maintained or improved  Recent Flowsheet Documentation  Taken 3/4/2025 0829 by Dede Isaac RN  Care Plan - Patient's Chronic Conditions and Co-Morbidity Symptoms are Monitored and Maintained or Improved:   Monitor and assess patient's chronic conditions and comorbid symptoms for stability, deterioration, or improvement   Collaborate with multidisciplinary team to address chronic and comorbid conditions and prevent exacerbation or deterioration   Update acute care plan with appropriate goals if chronic or comorbid symptoms are exacerbated and prevent overall improvement and discharge     Problem: Discharge Planning  Goal: Discharge to home or other facility with appropriate resources  Recent Flowsheet Documentation  Taken 3/4/2025 0829 by Dede Isaac RN  Discharge to home or other facility with appropriate resources:   Identify barriers to discharge with patient and caregiver   Arrange for needed discharge resources and transportation as appropriate   Identify discharge learning needs (meds, wound care, etc)     Problem: Pain  Goal: Verbalizes/displays adequate comfort level or baseline comfort level  Recent Flowsheet Documentation  Taken 3/4/2025 0820 by Dede Isaac RN  Verbalizes/displays adequate comfort level or baseline comfort level:   Encourage patient to monitor pain and request assistance   Assess pain using appropriate pain scale   Administer analgesics based on type and severity of pain and evaluate response   Implement non-pharmacological measures as appropriate and evaluate response     Problem: Skin/Tissue Integrity  Goal: Skin integrity remains intact  Description: 1.  Monitor for areas of redness and/or skin breakdown  2.  Assess vascular access sites hourly  3.  Every 4-6 hours minimum:  Change oxygen saturation probe site  4.  Every 4-6 hours:  If on

## 2025-03-04 NOTE — PROGRESS NOTES
2000  During shift report, assessed patient's heart rate at 120 and manual blood pressure of 88/62.  Patient reported feeling lightheaded and passing egg sized clots from the vagina.  On-call internal medicine resident was notified and came to bedside.  Patient blood pressure reassessed and taken manually once more and found to be 94/60.  Dr. Monroe came to bedside and ordered a 500ml bolus of lactated ringers over two hours to be followed with continuous lactated ringers at 75ml/hr.  H & H ordered, nursing instructed to message with results. Care ongoing.    2052  Messaged Dr. Monroe via [email or web address] Hgb 7.8 and Hct 24.2. Dr. Monroe states no need to transfuse and repeat H&H with morning labs. Care ongoing.    0650  Messaged on-call internal medicine resident morning Hgb draw resulted at 7.1. Resident also informed patient still passing egg sized blood clots throughout the night. Care ongoing.

## 2025-03-04 NOTE — ANESTHESIA PRE PROCEDURE
Findings:             Anesthesia Plan      general     ASA 3       Induction: intravenous.    MIPS: Postoperative opioids intended and Prophylactic antiemetics administered.  Anesthetic plan and risks discussed with patient.    Use of blood products discussed with patient whom consented to blood products.    Plan discussed with CRNA.

## 2025-03-05 ENCOUNTER — APPOINTMENT (OUTPATIENT)
Age: 65
DRG: 744 | End: 2025-03-05
Payer: MEDICARE

## 2025-03-05 ENCOUNTER — ANESTHESIA (OUTPATIENT)
Dept: OPERATING ROOM | Age: 65
End: 2025-03-05
Payer: MEDICARE

## 2025-03-05 ENCOUNTER — APPOINTMENT (OUTPATIENT)
Dept: GENERAL RADIOLOGY | Age: 65
DRG: 744 | End: 2025-03-05
Payer: MEDICARE

## 2025-03-05 LAB
ANION GAP SERPL CALCULATED.3IONS-SCNC: 8 MMOL/L (ref 9–16)
BASOPHILS # BLD: 0.08 K/UL (ref 0–0.2)
BASOPHILS NFR BLD: 1 % (ref 0–2)
BUN SERPL-MCNC: 12 MG/DL (ref 8–23)
CALCIUM SERPL-MCNC: 8.1 MG/DL (ref 8.6–10.4)
CHLORIDE SERPL-SCNC: 109 MMOL/L (ref 98–107)
CO2 SERPL-SCNC: 20 MMOL/L (ref 20–31)
CREAT SERPL-MCNC: 1.2 MG/DL (ref 0.6–0.9)
ECHO AO ROOT DIAM: 2.7 CM
ECHO AO ROOT INDEX: 1.21 CM/M2
ECHO AV AREA PEAK VELOCITY: 1.8 CM2
ECHO AV AREA VTI: 2 CM2
ECHO AV AREA/BSA PEAK VELOCITY: 0.8 CM2/M2
ECHO AV AREA/BSA VTI: 0.9 CM2/M2
ECHO AV MEAN GRADIENT: 6 MMHG
ECHO AV MEAN VELOCITY: 1.1 M/S
ECHO AV PEAK GRADIENT: 14 MMHG
ECHO AV PEAK VELOCITY: 1.9 M/S
ECHO AV VELOCITY RATIO: 0.63
ECHO AV VTI: 32.7 CM
ECHO BSA: 2.3 M2
ECHO EST RA PRESSURE: 3 MMHG
ECHO LA AREA 2C: 20.3 CM2
ECHO LA AREA 4C: 19.6 CM2
ECHO LA DIAMETER INDEX: 2.02 CM/M2
ECHO LA DIAMETER: 4.5 CM
ECHO LA MAJOR AXIS: 5.6 CM
ECHO LA MINOR AXIS: 5.6 CM
ECHO LA TO AORTIC ROOT RATIO: 1.67
ECHO LA VOL BP: 57 ML (ref 22–52)
ECHO LA VOL MOD A2C: 59 ML (ref 22–52)
ECHO LA VOL MOD A4C: 56 ML (ref 22–52)
ECHO LA VOL/BSA BIPLANE: 26 ML/M2 (ref 16–34)
ECHO LA VOLUME INDEX MOD A2C: 26 ML/M2 (ref 16–34)
ECHO LA VOLUME INDEX MOD A4C: 25 ML/M2 (ref 16–34)
ECHO LV E' LATERAL VELOCITY: 6.74 CM/S
ECHO LV E' SEPTAL VELOCITY: 5.77 CM/S
ECHO LV EDV A2C: 53 ML
ECHO LV EDV A4C: 64 ML
ECHO LV EDV INDEX A4C: 29 ML/M2
ECHO LV EDV NDEX A2C: 24 ML/M2
ECHO LV EF PHYSICIAN: 55 %
ECHO LV EJECTION FRACTION A2C: 55 %
ECHO LV EJECTION FRACTION A4C: 59 %
ECHO LV EJECTION FRACTION BIPLANE: 57 % (ref 55–100)
ECHO LV ESV A2C: 24 ML
ECHO LV ESV A4C: 26 ML
ECHO LV ESV INDEX A2C: 11 ML/M2
ECHO LV ESV INDEX A4C: 12 ML/M2
ECHO LV FRACTIONAL SHORTENING: 22 % (ref 28–44)
ECHO LV INTERNAL DIMENSION DIASTOLE INDEX: 2.06 CM/M2
ECHO LV INTERNAL DIMENSION DIASTOLIC: 4.6 CM (ref 3.9–5.3)
ECHO LV INTERNAL DIMENSION SYSTOLIC INDEX: 1.61 CM/M2
ECHO LV INTERNAL DIMENSION SYSTOLIC: 3.6 CM
ECHO LV IVSD: 1 CM (ref 0.6–0.9)
ECHO LV MASS 2D: 158.8 G (ref 67–162)
ECHO LV MASS INDEX 2D: 71.2 G/M2 (ref 43–95)
ECHO LV POSTERIOR WALL DIASTOLIC: 1 CM (ref 0.6–0.9)
ECHO LV RELATIVE WALL THICKNESS RATIO: 0.43
ECHO LVOT AREA: 2.8 CM2
ECHO LVOT AV VTI INDEX: 0.7
ECHO LVOT DIAM: 1.9 CM
ECHO LVOT MEAN GRADIENT: 2 MMHG
ECHO LVOT PEAK GRADIENT: 5 MMHG
ECHO LVOT PEAK VELOCITY: 1.2 M/S
ECHO LVOT STROKE VOLUME INDEX: 29 ML/M2
ECHO LVOT SV: 64.6 ML
ECHO LVOT VTI: 22.8 CM
ECHO MV A VELOCITY: 1.6 M/S
ECHO MV AREA VTI: 1.1 CM2
ECHO MV E DECELERATION TIME (DT): 207 MS
ECHO MV E VELOCITY: 1.75 M/S
ECHO MV E/A RATIO: 1.09
ECHO MV E/E' LATERAL: 25.96
ECHO MV E/E' RATIO (AVERAGED): 28.15
ECHO MV E/E' SEPTAL: 30.33
ECHO MV LVOT VTI INDEX: 2.48
ECHO MV MAX VELOCITY: 2 M/S
ECHO MV MEAN GRADIENT: 8 MMHG
ECHO MV MEAN VELOCITY: 1.3 M/S
ECHO MV PEAK GRADIENT: 16 MMHG
ECHO MV VTI: 56.5 CM
ECHO PV MAX VELOCITY: 1 M/S
ECHO PV PEAK GRADIENT: 4 MMHG
ECHO RIGHT VENTRICULAR SYSTOLIC PRESSURE (RVSP): 35 MMHG
ECHO RV BASAL DIMENSION: 3.2 CM
ECHO RV FREE WALL PEAK S': 17.6 CM/S
ECHO RV TAPSE: 2.2 CM (ref 1.7–?)
ECHO TV REGURGITANT MAX VELOCITY: 2.81 M/S
ECHO TV REGURGITANT PEAK GRADIENT: 32 MMHG
EKG ATRIAL RATE: 103 BPM
EKG P AXIS: 55 DEGREES
EKG P-R INTERVAL: 156 MS
EKG Q-T INTERVAL: 384 MS
EKG QRS DURATION: 74 MS
EKG QTC CALCULATION (BAZETT): 503 MS
EKG R AXIS: 63 DEGREES
EKG T AXIS: -12 DEGREES
EKG VENTRICULAR RATE: 103 BPM
EOSINOPHIL # BLD: 0.15 K/UL (ref 0–0.44)
EOSINOPHILS RELATIVE PERCENT: 2 % (ref 1–4)
ERYTHROCYTE [DISTWIDTH] IN BLOOD BY AUTOMATED COUNT: 16.3 % (ref 11.8–14.4)
FIBRINOGEN PPP-MCNC: 318 MG/DL (ref 203–521)
GFR, ESTIMATED: 51 ML/MIN/1.73M2
GLUCOSE BLD-MCNC: 128 MG/DL (ref 65–105)
GLUCOSE BLD-MCNC: 133 MG/DL (ref 65–105)
GLUCOSE BLD-MCNC: 207 MG/DL (ref 65–105)
GLUCOSE BLD-MCNC: 242 MG/DL (ref 65–105)
GLUCOSE SERPL-MCNC: 161 MG/DL (ref 74–99)
HCT VFR BLD AUTO: 23.2 % (ref 36.3–47.1)
HCT VFR BLD AUTO: 23.5 % (ref 36.3–47.1)
HCT VFR BLD AUTO: 24 % (ref 36.3–47.1)
HCT VFR BLD AUTO: 25.9 % (ref 36.3–47.1)
HGB BLD-MCNC: 7.3 G/DL (ref 11.9–15.1)
HGB BLD-MCNC: 7.3 G/DL (ref 11.9–15.1)
HGB BLD-MCNC: 7.5 G/DL (ref 11.9–15.1)
HGB BLD-MCNC: 8.1 G/DL (ref 11.9–15.1)
IMM GRANULOCYTES # BLD AUTO: 0.08 K/UL (ref 0–0.3)
IMM GRANULOCYTES NFR BLD: 1 %
INR PPP: 1
LYMPHOCYTES NFR BLD: 1.43 K/UL (ref 1.1–3.7)
LYMPHOCYTES RELATIVE PERCENT: 19 % (ref 24–43)
MCH RBC QN AUTO: 26.3 PG (ref 25.2–33.5)
MCHC RBC AUTO-ENTMCNC: 31.5 G/DL (ref 28.4–34.8)
MCV RBC AUTO: 83.5 FL (ref 82.6–102.9)
MONOCYTES NFR BLD: 0.6 K/UL (ref 0.1–1.2)
MONOCYTES NFR BLD: 8 % (ref 3–12)
MORPHOLOGY: ABNORMAL
NEUTROPHILS NFR BLD: 69 % (ref 36–65)
NEUTS SEG NFR BLD: 5.16 K/UL (ref 1.5–8.1)
NRBC BLD-RTO: 0 PER 100 WBC
PARTIAL THROMBOPLASTIN TIME: 22.2 SEC (ref 23–36.5)
PLATELET # BLD AUTO: 169 K/UL (ref 138–453)
PMV BLD AUTO: 10.5 FL (ref 8.1–13.5)
POTASSIUM SERPL-SCNC: 4 MMOL/L (ref 3.7–5.3)
PROTHROMBIN TIME: 13.9 SEC (ref 11.7–14.9)
RBC # BLD AUTO: 2.78 M/UL (ref 3.95–5.11)
RBC # BLD: ABNORMAL 10*6/UL
SODIUM SERPL-SCNC: 137 MMOL/L (ref 136–145)
WBC OTHER # BLD: 7.5 K/UL (ref 3.5–11.3)

## 2025-03-05 PROCEDURE — 85384 FIBRINOGEN ACTIVITY: CPT

## 2025-03-05 PROCEDURE — 82947 ASSAY GLUCOSE BLOOD QUANT: CPT

## 2025-03-05 PROCEDURE — 85025 COMPLETE CBC W/AUTO DIFF WBC: CPT

## 2025-03-05 PROCEDURE — 2060000000 HC ICU INTERMEDIATE R&B

## 2025-03-05 PROCEDURE — 6370000000 HC RX 637 (ALT 250 FOR IP)

## 2025-03-05 PROCEDURE — 99232 SBSQ HOSP IP/OBS MODERATE 35: CPT | Performed by: STUDENT IN AN ORGANIZED HEALTH CARE EDUCATION/TRAINING PROGRAM

## 2025-03-05 PROCEDURE — 94761 N-INVAS EAR/PLS OXIMETRY MLT: CPT

## 2025-03-05 PROCEDURE — 6360000002 HC RX W HCPCS

## 2025-03-05 PROCEDURE — 99223 1ST HOSP IP/OBS HIGH 75: CPT | Performed by: INTERNAL MEDICINE

## 2025-03-05 PROCEDURE — 85730 THROMBOPLASTIN TIME PARTIAL: CPT

## 2025-03-05 PROCEDURE — 94640 AIRWAY INHALATION TREATMENT: CPT

## 2025-03-05 PROCEDURE — 93005 ELECTROCARDIOGRAM TRACING: CPT

## 2025-03-05 PROCEDURE — 2500000003 HC RX 250 WO HCPCS

## 2025-03-05 PROCEDURE — 85610 PROTHROMBIN TIME: CPT

## 2025-03-05 PROCEDURE — 71045 X-RAY EXAM CHEST 1 VIEW: CPT

## 2025-03-05 PROCEDURE — 85018 HEMOGLOBIN: CPT

## 2025-03-05 PROCEDURE — 93010 ELECTROCARDIOGRAM REPORT: CPT | Performed by: INTERNAL MEDICINE

## 2025-03-05 PROCEDURE — 80048 BASIC METABOLIC PNL TOTAL CA: CPT

## 2025-03-05 PROCEDURE — 2580000003 HC RX 258

## 2025-03-05 PROCEDURE — 93306 TTE W/DOPPLER COMPLETE: CPT

## 2025-03-05 PROCEDURE — 93306 TTE W/DOPPLER COMPLETE: CPT | Performed by: INTERNAL MEDICINE

## 2025-03-05 PROCEDURE — 85014 HEMATOCRIT: CPT

## 2025-03-05 PROCEDURE — 36415 COLL VENOUS BLD VENIPUNCTURE: CPT

## 2025-03-05 RX ORDER — IPRATROPIUM BROMIDE AND ALBUTEROL SULFATE 2.5; .5 MG/3ML; MG/3ML
1 SOLUTION RESPIRATORY (INHALATION) 4 TIMES DAILY PRN
Status: DISCONTINUED | OUTPATIENT
Start: 2025-03-05 | End: 2025-03-08 | Stop reason: HOSPADM

## 2025-03-05 RX ORDER — IPRATROPIUM BROMIDE AND ALBUTEROL SULFATE 2.5; .5 MG/3ML; MG/3ML
1 SOLUTION RESPIRATORY (INHALATION) EVERY 4 HOURS PRN
Status: DISCONTINUED | OUTPATIENT
Start: 2025-03-05 | End: 2025-03-05

## 2025-03-05 RX ORDER — PREDNISONE 20 MG/1
40 TABLET ORAL DAILY
Status: DISCONTINUED | OUTPATIENT
Start: 2025-03-05 | End: 2025-03-08 | Stop reason: HOSPADM

## 2025-03-05 RX ORDER — IPRATROPIUM BROMIDE AND ALBUTEROL SULFATE 2.5; .5 MG/3ML; MG/3ML
1 SOLUTION RESPIRATORY (INHALATION)
Status: DISCONTINUED | OUTPATIENT
Start: 2025-03-05 | End: 2025-03-05

## 2025-03-05 RX ORDER — SODIUM CHLORIDE 9 MG/ML
INJECTION, SOLUTION INTRAVENOUS PRN
Status: DISCONTINUED | OUTPATIENT
Start: 2025-03-05 | End: 2025-03-08 | Stop reason: HOSPADM

## 2025-03-05 RX ADMIN — FERROUS SULFATE TAB EC 325 MG (65 MG FE EQUIVALENT) 325 MG: 325 (65 FE) TABLET DELAYED RESPONSE at 09:39

## 2025-03-05 RX ADMIN — PREDNISONE 40 MG: 20 TABLET ORAL at 14:29

## 2025-03-05 RX ADMIN — OXYCODONE 5 MG: 5 TABLET ORAL at 09:46

## 2025-03-05 RX ADMIN — FAMOTIDINE 20 MG: 20 TABLET, FILM COATED ORAL at 20:12

## 2025-03-05 RX ADMIN — ALPRAZOLAM 0.25 MG: 0.25 TABLET ORAL at 07:17

## 2025-03-05 RX ADMIN — FAMOTIDINE 20 MG: 20 TABLET, FILM COATED ORAL at 09:39

## 2025-03-05 RX ADMIN — MEGESTROL ACETATE 20 MG: 20 TABLET ORAL at 09:41

## 2025-03-05 RX ADMIN — INSULIN LISPRO 2 UNITS: 100 INJECTION, SOLUTION INTRAVENOUS; SUBCUTANEOUS at 20:12

## 2025-03-05 RX ADMIN — ACETAMINOPHEN 650 MG: 325 TABLET ORAL at 18:08

## 2025-03-05 RX ADMIN — BUSPIRONE HYDROCHLORIDE 30 MG: 10 TABLET ORAL at 20:12

## 2025-03-05 RX ADMIN — SODIUM CHLORIDE, PRESERVATIVE FREE 10 ML: 5 INJECTION INTRAVENOUS at 09:40

## 2025-03-05 RX ADMIN — BUDESONIDE AND FORMOTEROL FUMARATE DIHYDRATE 2 PUFF: 160; 4.5 AEROSOL RESPIRATORY (INHALATION) at 20:22

## 2025-03-05 RX ADMIN — BUSPIRONE HYDROCHLORIDE 30 MG: 10 TABLET ORAL at 14:29

## 2025-03-05 RX ADMIN — ATORVASTATIN CALCIUM 40 MG: 40 TABLET, FILM COATED ORAL at 09:39

## 2025-03-05 RX ADMIN — SODIUM CHLORIDE, SODIUM LACTATE, POTASSIUM CHLORIDE, AND CALCIUM CHLORIDE: .6; .31; .03; .02 INJECTION, SOLUTION INTRAVENOUS at 11:03

## 2025-03-05 RX ADMIN — OXYCODONE 5 MG: 5 TABLET ORAL at 16:44

## 2025-03-05 RX ADMIN — BUSPIRONE HYDROCHLORIDE 30 MG: 10 TABLET ORAL at 09:39

## 2025-03-05 RX ADMIN — INSULIN LISPRO 2 UNITS: 100 INJECTION, SOLUTION INTRAVENOUS; SUBCUTANEOUS at 16:50

## 2025-03-05 RX ADMIN — BUDESONIDE AND FORMOTEROL FUMARATE DIHYDRATE 2 PUFF: 160; 4.5 AEROSOL RESPIRATORY (INHALATION) at 09:28

## 2025-03-05 RX ADMIN — SODIUM CHLORIDE, PRESERVATIVE FREE 10 ML: 5 INJECTION INTRAVENOUS at 20:11

## 2025-03-05 RX ADMIN — WATER 1000 MG: 1 INJECTION INTRAMUSCULAR; INTRAVENOUS; SUBCUTANEOUS at 14:29

## 2025-03-05 RX ADMIN — Medication 2 PUFF: at 05:33

## 2025-03-05 RX ADMIN — OXYCODONE 5 MG: 5 TABLET ORAL at 23:14

## 2025-03-05 ASSESSMENT — PAIN SCALES - GENERAL
PAINLEVEL_OUTOF10: 5
PAINLEVEL_OUTOF10: 0
PAINLEVEL_OUTOF10: 7
PAINLEVEL_OUTOF10: 6
PAINLEVEL_OUTOF10: 7
PAINLEVEL_OUTOF10: 4
PAINLEVEL_OUTOF10: 7
PAINLEVEL_OUTOF10: 7

## 2025-03-05 ASSESSMENT — ENCOUNTER SYMPTOMS: RESPIRATORY NEGATIVE: 1

## 2025-03-05 ASSESSMENT — PAIN DESCRIPTION - DESCRIPTORS
DESCRIPTORS: ACHING
DESCRIPTORS: ACHING

## 2025-03-05 ASSESSMENT — PAIN DESCRIPTION - LOCATION
LOCATION: BACK
LOCATION: ABDOMEN;BACK;HIP
LOCATION: ABDOMEN

## 2025-03-05 NOTE — PROGRESS NOTES
Gynecology Progress Note    Date: 3/5/2025  Time: 7:01 AM    Nancy L Haase is a 64 y.o. female  HD# 4    Patient was seen and examined.  Patient asleep on entry to room, awoke from rest.  Patient has no complaints this morning reports vaginal bleeding is about the same.  She has been n.p.o. since midnight for her procedure today.      Gynecology service reconsulted yesterday for increased vaginal bleeding due to ultrasound and no Pap results everything was discussed with patient yesterday and will proceed with surgery today for exam under anesthesia hysteroscopy dilation and curettage and IUD placement.      Pain is  controlled.  Patient is  tolerating oral intake. She is urinating well .  She is  ambulating without difficulty.      Vitals:  Vitals:    25 2352 25 0142 25 0325 25 0534   BP:  (!) 134/56 (!) 137/58    Pulse:  99 98 95   Resp: 16 16 16 16   Temp:  97.8 °F (36.6 °C) 97.7 °F (36.5 °C)    TempSrc:  Oral Oral    SpO2:  98% 99% 96%   Weight:       Height:             Intake/Output:   Last Shift: I/O last 3 completed shifts:  In: 1263 [P.O.:960; Blood:303]  Out: 1300 [Urine:1300]  Current Shift: No intake/output data recorded.    Physical Exam:  General:  no apparent distress, alert, and cooperative  Neurologic:  alert, oriented, normal speech, no focal findings or movement disorder noted  Lungs:  No increased work of breathing, good air exchange, clear to auscultation bilaterally, no crackles or wheezing  Heart:  regular rate and rhythm and no murmur    Abdomen: Abdomen soft, non-tender. BS normal. No masses,  No organomegaly  Extremities:  no calf tenderness, non edematous    Lab:  Complete Blood Count:   Recent Labs     25  0444 25  1024 25  0446 25  1205 25  1935 25  0221   WBC 7.6  --  7.2  --   --  7.5   HGB 7.2*   < > 7.1* 7.4* 6.5* 7.3*   HCT 23.3*   < > 22.7* 23.9* 20.8* 23.2*     --  207  --   --  169    < > = values in this

## 2025-03-05 NOTE — PLAN OF CARE
Problem: Respiratory - Adult  Goal: Achieves optimal ventilation and oxygenation  Outcome: Progressing  Flowsheets (Taken 3/4/2025 0829 by Dede Isaac RN)  Achieves optimal ventilation and oxygenation:   Assess for changes in respiratory status   Assess for changes in mentation and behavior   Position to facilitate oxygenation and minimize respiratory effort   Oxygen supplementation based on oxygen saturation or arterial blood gases      Patient is not pregnant (male or female)

## 2025-03-05 NOTE — PROGRESS NOTES
Physical Therapy        Physical Therapy Cancel Note      DATE: 3/5/2025    NAME: Nancy L Haase  MRN: [id number]   : 1960      Patient not seen this date for Physical Therapy due to:    Patient independent with functional mobility. Pt educated on purpose of PT eval, POC, and importance of continued mobility during admission, pt verbalizing no concerns in regards to functional mobility upon discharge. Will defer PT evaluation at this time. Please reorder PT if future needs arise.       Electronically signed by Sonia Gar PT on 3/5/2025 at 9:19 AM

## 2025-03-05 NOTE — PROGRESS NOTES
2130  Notified on-call internal medicine resident via [facility] patient evening Hgb resulted 6.5.  Received order for blood transfusion.  Care ongoing.    8091  Contacted on-call internal medicine resident regarding patient's multiple H&H order sets.  On-call resident stated H&H should be rechecked after blood transfusion and Q4 hours following.  Care ongoing.

## 2025-03-05 NOTE — PROGRESS NOTES
[facility]  Internal Medicine Teaching Residency Program  Inpatient Daily Progress Note  ______________________________________________________________________________    Patient: Nancy L Haase  YOB: 1960   MRN:[id number]    Acct: [id number]     Room: 2014/2014-01  Admit date: 3/2/2025  Today's date: 03/05/25  Number of days in the hospital: 3    SUBJECTIVE   Admitting Diagnosis: Postmenopausal bleeding    CC: Active bleeding and passing blood clots     Pt examined at bedside. Chart & results reviewed.   Patient received 1 unit of PRBC overnight  AOx4.  Hemodynamically stable with blood pressure 124/69 mmHg maintaining saturation at around 99%  Patient on IV Ringer's lactate at 75 mL/h    Morning labs reviewed, BMP  reviewed K 4.0, creatinine 1.2, glucose 161, CBC revealed WBC 7.5, hemoglobin 8.1.    Cardiology has been consulted for presurgical risk stratification.    ROS:  Review of Systems   Constitutional: Negative.    Respiratory: Negative.     Genitourinary:  Positive for pelvic pain, vaginal bleeding and vaginal pain.   Neurological: Negative.         Plan :-    Will continue to monitor.  Will check H&H and transfuse as clinically indicated.  Will obtain echocardiogram as per cardiology recommendation.  Will follow-up on gynecology recommendations regarding further plan for D&E    BRIEF HISTORY     The patient is a pleasant 64 y.o. female with a PMHx significant for      A-fib on Eliquis  COPD  Iron deficiency anemia type 2 diabetes mellitus  CKD  Congestive heart failure     presents with a chief complaint of abnormal vaginal bleeding and passing blood clots from the vagina.  Patient reported that she started passing blood clots from November, and subsequently she stopped passing blood clots but was having bleeding from the vagina.  She mentioned that the bleeding has not stopped since then and she stopped passing blood clots until yesterday.  She

## 2025-03-05 NOTE — PROGRESS NOTES
Obstetric/Gynecology Interval Note    Communicated with cardiology fellow through [facility] this AM. He did see the patient and agrees that she needs an Echo before surgery and recommends delaying the procedure until this can be achieved. Patient is to continue with megace and pad counts. Transfuse to remain >8 or >9 pending cardiology recommendations. Vaginal bleeding has remained stable. Not excessive at this time or overnight.     Surgical case is unlikely to be ready at 1:30pm given need for echo and clearance. Will push case back to 3pm. Spoke with surgery scheduling desk in regards to this. Cardiology has placed echo order. I did request that it be placed as stat so she is not further delayed. Medicine team is primary and managing other co morbidities. Patient to remain NPO with glucose checks and IV fluids in anticipation of surgery this afternoon.       DO Nneka Hernandez OBG[facility]  [address]    [id number]  [facility], [place], [address]   3/5/2025, 12:23 PM

## 2025-03-05 NOTE — CONSULTS
History: family history includes Diabetes in her brother and sister; Hypertension in her father and mother; Stroke in her brother.     REVIEW OF SYSTEMS:    Constitutional: Negative for fatigue, weight loss, loss of appetite   Cardiovascular: as per HPI  Respiratory: as per HPI  Gastrointestinal: Negative for abdominal pain, N/V  Genitourinary: No dysuria, trouble voiding, or hematuria.  Vaginal bleeding.  Musculoskeletal:  No gait disturbance, No weakness or joint complaints.  Neurological: No headache, diplopia, change in muscle strength, numbness or tingling. No change in gate.   Endocrine: No temperature intolerance. No excessive thirst, fluid intake, or urination. No tremor.  Hematologic/Lymphatic: No abnormal bruising or bleeding    PHYSICAL EXAM:      BP (!) 137/58   Pulse 92   Temp 97.7 °F (36.5 °C) (Oral)   Resp 16   Ht 1.753 m (5' 9\")   Wt 108.9 kg (240 lb)   SpO2 100%   BMI 35.44 kg/m²    Constitutional and General Appearance: alert, cooperative, in no distress   HEENT: atraumatic, normocephalic.   Respiratory:  Clear to auscultation bilaterally  Cardiovascular:  Regular S1 and S2.  No JVD  Peripheral pulses are symmetrical and full   Abdomen:   Soft, non tender   Bowel sounds present  Extremities:  No Le edema or cyanosis   Neurological:  Deferred     DATA:    EKG:   Results for orders placed or performed during the hospital encounter of 03/02/25   EKG 12 Lead   Result Value Ref Range    Ventricular Rate 103 BPM    Atrial Rate 103 BPM    P-R Interval 156 ms    QRS Duration 74 ms    Q-T Interval 384 ms    QTc Calculation (Bazett) 503 ms    P Axis 55 degrees    R Axis 63 degrees    T Axis -12 degrees    Narrative    Sinus tachycardia with Premature atrial complexes  Cannot rule out Inferior infarct , age undetermined  Abnormal ECG  When compared with ECG of 02-MAR-2025 10:46,  Premature atrial complexes are now Present  Inverted T waves have replaced nonspecific T wave abnormality in Inferior leads  4 was used for Right coronary angiography.    Contrast Material:    - Isovue 745663 ml    Fluoroscopy Time: Diagnostic: 7:07 minutes. Total: 7:07 minutes.     Medical History     Allergies    - *Unlisted:(codeine/morphine).     Risk Factors      The patient risk factors include:obesity, physical activity, treated   hypercholesterolemia, treated and uncontrolled hypertension, family   history of premature CAD, insulin-treated diabetes mellitus, chronic lung   disease, last creatinine: 1 mg/dl, creatinine clearance: 100.12 ml/min,   dyslipidemia and Current - Every day tobacco use.     Admission Data  Admission Date: 09/29/2021    Admission Status: Inpatient        -The patient's anginal syndrome was assessed as CCS III according to the      Gates clinical classification.     Hemodynamics      Condition: Baseline Room Air      Estimated: 218.92Heart Rate: 83 bpm     Pressure  +-----+--------------------------------------------------------------------+  !Site !Pressure                                                            !  +-----+--------------------------------------------------------------------+  !AO   !102/55 (78)                                                         !  +-----+--------------------------------------------------------------------+  !LV   !105/8 ,22                                                           !  +-----+--------------------------------------------------------------------+  !LV   !103/5 ,16                                                           !  +-----+--------------------------------------------------------------------+    Valve Gradients and Areas  +-----------+---------+---------+---------+----------+---------+-----------+  !Valve      !Peak     !Mean     !Area     !Index     !Flow     !Source     !  +-----------+---------+---------+---------+----------+---------+-----------+  !Aortic     !0        !0        !         !          !         !

## 2025-03-05 NOTE — PLAN OF CARE
Problem: Chronic Conditions and Co-morbidities  Goal: Patient's chronic conditions and co-morbidity symptoms are monitored and maintained or improved  3/5/2025 1704 by Barbara Hudson, RN  Outcome: Progressing  Flowsheets (Taken 3/5/2025 0830)  Care Plan - Patient's Chronic Conditions and Co-Morbidity Symptoms are Monitored and Maintained or Improved:   Monitor and assess patient's chronic conditions and comorbid symptoms for stability, deterioration, or improvement   Collaborate with multidisciplinary team to address chronic and comorbid conditions and prevent exacerbation or deterioration   Update acute care plan with appropriate goals if chronic or comorbid symptoms are exacerbated and prevent overall improvement and discharge  3/5/2025 0449 by Ramsey Marquez RN  Outcome: Progressing  Flowsheets (Taken 3/4/2025 2000)  Care Plan - Patient's Chronic Conditions and Co-Morbidity Symptoms are Monitored and Maintained or Improved:   Monitor and assess patient's chronic conditions and comorbid symptoms for stability, deterioration, or improvement   Collaborate with multidisciplinary team to address chronic and comorbid conditions and prevent exacerbation or deterioration   Update acute care plan with appropriate goals if chronic or comorbid symptoms are exacerbated and prevent overall improvement and discharge     Problem: Discharge Planning  Goal: Discharge to home or other facility with appropriate resources  3/5/2025 1704 by Barbara Hudson, RN  Outcome: Progressing  Flowsheets (Taken 3/5/2025 0830)  Discharge to home or other facility with appropriate resources:   Identify barriers to discharge with patient and caregiver   Arrange for needed discharge resources and transportation as appropriate   Identify discharge learning needs (meds, wound care, etc)   Refer to discharge planning if patient needs post-hospital services based on physician order or complex needs related to functional status,  cognitive ability or social support system  3/5/2025 0449 by Ramsey Marquez RN  Outcome: Progressing  Flowsheets (Taken 3/4/2025 2000)  Discharge to home or other facility with appropriate resources:   Identify barriers to discharge with patient and caregiver   Arrange for needed discharge resources and transportation as appropriate   Identify discharge learning needs (meds, wound care, etc)     Problem: Pain  Goal: Verbalizes/displays adequate comfort level or baseline comfort level  3/5/2025 1704 by Barbara Hudson RN  Outcome: Progressing  Flowsheets (Taken 3/5/2025 0725 by Gloria Barnhart, RN)  Verbalizes/displays adequate comfort level or baseline comfort level: Encourage patient to monitor pain and request assistance  3/5/2025 0449 by Ramsey Marquez RN  Outcome: Progressing     Problem: Skin/Tissue Integrity  Goal: Skin integrity remains intact  Description: 1.  Monitor for areas of redness and/or skin breakdown  2.  Assess vascular access sites hourly  3.  Every 4-6 hours minimum:  Change oxygen saturation probe site  4.  Every 4-6 hours:  If on nasal continuous positive airway pressure, respiratory therapy assess nares and determine need for appliance change or resting period  3/5/2025 1704 by Barbara Hudson RN  Outcome: Progressing  Flowsheets (Taken 3/5/2025 0830)  Skin Integrity Remains Intact: Monitor for areas of redness and/or skin breakdown  3/5/2025 0449 by Ramsey Marquez RN  Outcome: Progressing  Flowsheets (Taken 3/4/2025 2000)  Skin Integrity Remains Intact: Monitor for areas of redness and/or skin breakdown     Problem: Safety - Adult  Goal: Free from fall injury  3/5/2025 1704 by Barbara Hudson RN  Outcome: Progressing  3/5/2025 0449 by Ramsey Marquez RN  Outcome: Progressing     Problem: ABCDS Injury Assessment  Goal: Absence of physical injury  3/5/2025 1704 by Barbara Hudson RN  Outcome: Progressing  3/5/2025 0449 by Ramsey Marquez RN  Outcome:

## 2025-03-05 NOTE — PROGRESS NOTES
RN seen hgb resulted at 8.1. RN received call from OB resident stating that they are not planning on doing the procedure today and that pt can eat. They stated that they are wanting cardiology to see pt and to have echo done before they do procedure. RN updated pt on above, she verbalized understanding and requested a water and popsicle at the moment.    1108- RN reviewed new orders, pt is still listed as npo. Sent PS to Dr Moncada with obgyn regarding diet order.

## 2025-03-05 NOTE — PLAN OF CARE
Problem: Chronic Conditions and Co-morbidities  Goal: Patient's chronic conditions and co-morbidity symptoms are monitored and maintained or improved  Outcome: Progressing  Flowsheets (Taken 3/4/2025 2000)  Care Plan - Patient's Chronic Conditions and Co-Morbidity Symptoms are Monitored and Maintained or Improved:   Monitor and assess patient's chronic conditions and comorbid symptoms for stability, deterioration, or improvement   Collaborate with multidisciplinary team to address chronic and comorbid conditions and prevent exacerbation or deterioration   Update acute care plan with appropriate goals if chronic or comorbid symptoms are exacerbated and prevent overall improvement and discharge     Problem: Discharge Planning  Goal: Discharge to home or other facility with appropriate resources  Outcome: Progressing  Flowsheets (Taken 3/4/2025 2000)  Discharge to home or other facility with appropriate resources:   Identify barriers to discharge with patient and caregiver   Arrange for needed discharge resources and transportation as appropriate   Identify discharge learning needs (meds, wound care, etc)     Problem: Pain  Goal: Verbalizes/displays adequate comfort level or baseline comfort level  Outcome: Progressing     Problem: Skin/Tissue Integrity  Goal: Skin integrity remains intact  Description: 1.  Monitor for areas of redness and/or skin breakdown  2.  Assess vascular access sites hourly  3.  Every 4-6 hours minimum:  Change oxygen saturation probe site  4.  Every 4-6 hours:  If on nasal continuous positive airway pressure, respiratory therapy assess nares and determine need for appliance change or resting period  Outcome: Progressing  Flowsheets (Taken 3/4/2025 2000)  Skin Integrity Remains Intact: Monitor for areas of redness and/or skin breakdown     Problem: Safety - Adult  Goal: Free from fall injury  Outcome: Progressing     Problem: ABCDS Injury Assessment  Goal: Absence of physical injury  Outcome:

## 2025-03-06 PROBLEM — Z98.890 POST-OPERATIVE STATE: Status: ACTIVE | Noted: 2025-03-06

## 2025-03-06 PROBLEM — I50.9 CHRONIC CONGESTIVE HEART FAILURE (HCC): Status: ACTIVE | Noted: 2025-03-06

## 2025-03-06 PROBLEM — I48.11 LONGSTANDING PERSISTENT ATRIAL FIBRILLATION (HCC): Status: ACTIVE | Noted: 2025-03-06

## 2025-03-06 PROBLEM — J18.9 RIGHT LOWER LOBE PNEUMONIA: Status: ACTIVE | Noted: 2025-03-06

## 2025-03-06 LAB
ABO/RH: NORMAL
ANION GAP SERPL CALCULATED.3IONS-SCNC: 13 MMOL/L (ref 9–16)
ANTIBODY SCREEN: NEGATIVE
BASOPHILS # BLD: <0.03 K/UL (ref 0–0.2)
BASOPHILS NFR BLD: 0 % (ref 0–2)
BLOOD BANK BLOOD PRODUCT EXPIRATION DATE: NORMAL
BLOOD BANK BLOOD PRODUCT EXPIRATION DATE: NORMAL
BLOOD BANK DISPENSE STATUS: NORMAL
BLOOD BANK ISBT PRODUCT BLOOD TYPE: 5100
BLOOD BANK ISBT PRODUCT BLOOD TYPE: 5100
BLOOD BANK PRODUCT CODE: NORMAL
BLOOD BANK PRODUCT CODE: NORMAL
BLOOD BANK SAMPLE EXPIRATION: NORMAL
BLOOD BANK UNIT TYPE AND RH: NORMAL
BLOOD BANK UNIT TYPE AND RH: NORMAL
BPU ID: NORMAL
BUN SERPL-MCNC: 14 MG/DL (ref 8–23)
CALCIUM SERPL-MCNC: 8.5 MG/DL (ref 8.6–10.4)
CHLORIDE SERPL-SCNC: 108 MMOL/L (ref 98–107)
CO2 SERPL-SCNC: 17 MMOL/L (ref 20–31)
COMPONENT: NORMAL
CREAT SERPL-MCNC: 1.3 MG/DL (ref 0.6–0.9)
CROSSMATCH RESULT: NORMAL
EKG ATRIAL RATE: 84 BPM
EKG P AXIS: 85 DEGREES
EKG P-R INTERVAL: 170 MS
EKG Q-T INTERVAL: 394 MS
EKG QRS DURATION: 84 MS
EKG QTC CALCULATION (BAZETT): 465 MS
EKG R AXIS: 48 DEGREES
EKG T AXIS: 2 DEGREES
EKG VENTRICULAR RATE: 84 BPM
EOSINOPHIL # BLD: <0.03 K/UL (ref 0–0.44)
EOSINOPHILS RELATIVE PERCENT: 0 % (ref 1–4)
ERYTHROCYTE [DISTWIDTH] IN BLOOD BY AUTOMATED COUNT: 17.3 % (ref 11.8–14.4)
GFR, ESTIMATED: 46 ML/MIN/1.73M2
GLUCOSE BLD-MCNC: 132 MG/DL (ref 65–105)
GLUCOSE BLD-MCNC: 263 MG/DL (ref 65–105)
GLUCOSE SERPL-MCNC: 213 MG/DL (ref 74–99)
HCT VFR BLD AUTO: 23 % (ref 36.3–47.1)
HCT VFR BLD AUTO: 23.7 % (ref 36.3–47.1)
HCT VFR BLD AUTO: 24.5 % (ref 36.3–47.1)
HCT VFR BLD AUTO: 24.7 % (ref 36.3–47.1)
HCT VFR BLD AUTO: 25 % (ref 36.3–47.1)
HCT VFR BLD AUTO: 25.4 % (ref 36.3–47.1)
HGB BLD-MCNC: 6.9 G/DL (ref 11.9–15.1)
HGB BLD-MCNC: 7.3 G/DL (ref 11.9–15.1)
HGB BLD-MCNC: 7.5 G/DL (ref 11.9–15.1)
HGB BLD-MCNC: 7.6 G/DL (ref 11.9–15.1)
HGB BLD-MCNC: 7.7 G/DL (ref 11.9–15.1)
HGB BLD-MCNC: 7.8 G/DL (ref 11.9–15.1)
IMM GRANULOCYTES # BLD AUTO: 0.08 K/UL (ref 0–0.3)
IMM GRANULOCYTES NFR BLD: 1 %
LYMPHOCYTES NFR BLD: 1 K/UL (ref 1.1–3.7)
LYMPHOCYTES RELATIVE PERCENT: 11 % (ref 24–43)
MCH RBC QN AUTO: 26.3 PG (ref 25.2–33.5)
MCHC RBC AUTO-ENTMCNC: 31.2 G/DL (ref 28.4–34.8)
MCV RBC AUTO: 84.2 FL (ref 82.6–102.9)
MONOCYTES NFR BLD: 0.41 K/UL (ref 0.1–1.2)
MONOCYTES NFR BLD: 4 % (ref 3–12)
NEUTROPHILS NFR BLD: 84 % (ref 36–65)
NEUTS SEG NFR BLD: 8.05 K/UL (ref 1.5–8.1)
NRBC BLD-RTO: 0 PER 100 WBC
PLATELET # BLD AUTO: 203 K/UL (ref 138–453)
PMV BLD AUTO: 11 FL (ref 8.1–13.5)
POTASSIUM SERPL-SCNC: 4.5 MMOL/L (ref 3.7–5.3)
RBC # BLD AUTO: 2.97 M/UL (ref 3.95–5.11)
RBC # BLD: ABNORMAL 10*6/UL
SODIUM SERPL-SCNC: 138 MMOL/L (ref 136–145)
TRANSFUSION STATUS: NORMAL
UNIT DIVISION: 0
UNIT ISSUE DATE/TIME: NORMAL
UNIT ISSUE DATE/TIME: NORMAL
WBC OTHER # BLD: 9.6 K/UL (ref 3.5–11.3)

## 2025-03-06 PROCEDURE — 6370000000 HC RX 637 (ALT 250 FOR IP): Performed by: NURSE ANESTHETIST, CERTIFIED REGISTERED

## 2025-03-06 PROCEDURE — 88342 IMHCHEM/IMCYTCHM 1ST ANTB: CPT

## 2025-03-06 PROCEDURE — 88175 CYTOPATH C/V AUTO FLUID REDO: CPT

## 2025-03-06 PROCEDURE — 36415 COLL VENOUS BLD VENIPUNCTURE: CPT

## 2025-03-06 PROCEDURE — 2500000003 HC RX 250 WO HCPCS: Performed by: STUDENT IN AN ORGANIZED HEALTH CARE EDUCATION/TRAINING PROGRAM

## 2025-03-06 PROCEDURE — P9016 RBC LEUKOCYTES REDUCED: HCPCS

## 2025-03-06 PROCEDURE — 99232 SBSQ HOSP IP/OBS MODERATE 35: CPT | Performed by: STUDENT IN AN ORGANIZED HEALTH CARE EDUCATION/TRAINING PROGRAM

## 2025-03-06 PROCEDURE — 93010 ELECTROCARDIOGRAM REPORT: CPT | Performed by: INTERNAL MEDICINE

## 2025-03-06 PROCEDURE — 6370000000 HC RX 637 (ALT 250 FOR IP)

## 2025-03-06 PROCEDURE — 6370000000 HC RX 637 (ALT 250 FOR IP): Performed by: STUDENT IN AN ORGANIZED HEALTH CARE EDUCATION/TRAINING PROGRAM

## 2025-03-06 PROCEDURE — 3600000014 HC SURGERY LEVEL 4 ADDTL 15MIN: Performed by: STUDENT IN AN ORGANIZED HEALTH CARE EDUCATION/TRAINING PROGRAM

## 2025-03-06 PROCEDURE — 80048 BASIC METABOLIC PNL TOTAL CA: CPT

## 2025-03-06 PROCEDURE — 86900 BLOOD TYPING SEROLOGIC ABO: CPT

## 2025-03-06 PROCEDURE — 6360000002 HC RX W HCPCS: Performed by: NURSE ANESTHETIST, CERTIFIED REGISTERED

## 2025-03-06 PROCEDURE — 85014 HEMATOCRIT: CPT

## 2025-03-06 PROCEDURE — 88341 IMHCHEM/IMCYTCHM EA ADD ANTB: CPT

## 2025-03-06 PROCEDURE — 86923 COMPATIBILITY TEST ELECTRIC: CPT

## 2025-03-06 PROCEDURE — 3600000004 HC SURGERY LEVEL 4 BASE: Performed by: STUDENT IN AN ORGANIZED HEALTH CARE EDUCATION/TRAINING PROGRAM

## 2025-03-06 PROCEDURE — 0UH97HZ INSERTION OF CONTRACEPTIVE DEVICE INTO UTERUS, VIA NATURAL OR ARTIFICIAL OPENING: ICD-10-PCS | Performed by: STUDENT IN AN ORGANIZED HEALTH CARE EDUCATION/TRAINING PROGRAM

## 2025-03-06 PROCEDURE — 2720000010 HC SURG SUPPLY STERILE: Performed by: STUDENT IN AN ORGANIZED HEALTH CARE EDUCATION/TRAINING PROGRAM

## 2025-03-06 PROCEDURE — 88305 TISSUE EXAM BY PATHOLOGIST: CPT

## 2025-03-06 PROCEDURE — 1200000000 HC SEMI PRIVATE

## 2025-03-06 PROCEDURE — 6360000002 HC RX W HCPCS: Performed by: STUDENT IN AN ORGANIZED HEALTH CARE EDUCATION/TRAINING PROGRAM

## 2025-03-06 PROCEDURE — 3700000000 HC ANESTHESIA ATTENDED CARE: Performed by: STUDENT IN AN ORGANIZED HEALTH CARE EDUCATION/TRAINING PROGRAM

## 2025-03-06 PROCEDURE — 2709999900 HC NON-CHARGEABLE SUPPLY: Performed by: STUDENT IN AN ORGANIZED HEALTH CARE EDUCATION/TRAINING PROGRAM

## 2025-03-06 PROCEDURE — 85025 COMPLETE CBC W/AUTO DIFF WBC: CPT

## 2025-03-06 PROCEDURE — 2580000003 HC RX 258: Performed by: NURSE ANESTHETIST, CERTIFIED REGISTERED

## 2025-03-06 PROCEDURE — 94640 AIRWAY INHALATION TREATMENT: CPT

## 2025-03-06 PROCEDURE — 58558 HYSTEROSCOPY BIOPSY: CPT | Performed by: STUDENT IN AN ORGANIZED HEALTH CARE EDUCATION/TRAINING PROGRAM

## 2025-03-06 PROCEDURE — 36430 TRANSFUSION BLD/BLD COMPNT: CPT

## 2025-03-06 PROCEDURE — 7100000000 HC PACU RECOVERY - FIRST 15 MIN: Performed by: STUDENT IN AN ORGANIZED HEALTH CARE EDUCATION/TRAINING PROGRAM

## 2025-03-06 PROCEDURE — 7100000001 HC PACU RECOVERY - ADDTL 15 MIN: Performed by: STUDENT IN AN ORGANIZED HEALTH CARE EDUCATION/TRAINING PROGRAM

## 2025-03-06 PROCEDURE — 58300 INSERT INTRAUTERINE DEVICE: CPT | Performed by: STUDENT IN AN ORGANIZED HEALTH CARE EDUCATION/TRAINING PROGRAM

## 2025-03-06 PROCEDURE — 82947 ASSAY GLUCOSE BLOOD QUANT: CPT

## 2025-03-06 PROCEDURE — 30233N1 TRANSFUSION OF NONAUTOLOGOUS RED BLOOD CELLS INTO PERIPHERAL VEIN, PERCUTANEOUS APPROACH: ICD-10-PCS | Performed by: STUDENT IN AN ORGANIZED HEALTH CARE EDUCATION/TRAINING PROGRAM

## 2025-03-06 PROCEDURE — 0UDB8ZZ EXTRACTION OF ENDOMETRIUM, VIA NATURAL OR ARTIFICIAL OPENING ENDOSCOPIC: ICD-10-PCS | Performed by: STUDENT IN AN ORGANIZED HEALTH CARE EDUCATION/TRAINING PROGRAM

## 2025-03-06 PROCEDURE — 6360000002 HC RX W HCPCS: Performed by: ANESTHESIOLOGY

## 2025-03-06 PROCEDURE — 3700000001 HC ADD 15 MINUTES (ANESTHESIA): Performed by: STUDENT IN AN ORGANIZED HEALTH CARE EDUCATION/TRAINING PROGRAM

## 2025-03-06 PROCEDURE — 86850 RBC ANTIBODY SCREEN: CPT

## 2025-03-06 PROCEDURE — 86901 BLOOD TYPING SEROLOGIC RH(D): CPT

## 2025-03-06 PROCEDURE — 85018 HEMOGLOBIN: CPT

## 2025-03-06 RX ORDER — MIDAZOLAM HYDROCHLORIDE 2 MG/2ML
2 INJECTION, SOLUTION INTRAMUSCULAR; INTRAVENOUS
Status: DISCONTINUED | OUTPATIENT
Start: 2025-03-06 | End: 2025-03-06 | Stop reason: HOSPADM

## 2025-03-06 RX ORDER — MAGNESIUM HYDROXIDE 1200 MG/15ML
LIQUID ORAL CONTINUOUS PRN
Status: DISCONTINUED | OUTPATIENT
Start: 2025-03-06 | End: 2025-03-06 | Stop reason: HOSPADM

## 2025-03-06 RX ORDER — PHENYLEPHRINE HCL IN 0.9% NACL 1 MG/10 ML
SYRINGE (ML) INTRAVENOUS
Status: DISCONTINUED | OUTPATIENT
Start: 2025-03-06 | End: 2025-03-06 | Stop reason: SDUPTHER

## 2025-03-06 RX ORDER — ONDANSETRON 2 MG/ML
INJECTION INTRAMUSCULAR; INTRAVENOUS
Status: DISCONTINUED | OUTPATIENT
Start: 2025-03-06 | End: 2025-03-06 | Stop reason: SDUPTHER

## 2025-03-06 RX ORDER — MIDAZOLAM HYDROCHLORIDE 1 MG/ML
INJECTION, SOLUTION INTRAMUSCULAR; INTRAVENOUS
Status: DISCONTINUED | OUTPATIENT
Start: 2025-03-06 | End: 2025-03-06 | Stop reason: SDUPTHER

## 2025-03-06 RX ORDER — METOCLOPRAMIDE HYDROCHLORIDE 5 MG/ML
10 INJECTION INTRAMUSCULAR; INTRAVENOUS
Status: DISCONTINUED | OUTPATIENT
Start: 2025-03-06 | End: 2025-03-06 | Stop reason: HOSPADM

## 2025-03-06 RX ORDER — GUAIFENESIN/DEXTROMETHORPHAN 100-10MG/5
5 SYRUP ORAL EVERY 4 HOURS PRN
Status: DISCONTINUED | OUTPATIENT
Start: 2025-03-06 | End: 2025-03-08 | Stop reason: HOSPADM

## 2025-03-06 RX ORDER — AZITHROMYCIN 250 MG/1
250 TABLET, FILM COATED ORAL DAILY
Status: DISCONTINUED | OUTPATIENT
Start: 2025-03-07 | End: 2025-03-08 | Stop reason: HOSPADM

## 2025-03-06 RX ORDER — AZITHROMYCIN 250 MG/1
500 TABLET, FILM COATED ORAL DAILY
Status: COMPLETED | OUTPATIENT
Start: 2025-03-06 | End: 2025-03-06

## 2025-03-06 RX ORDER — SODIUM CHLORIDE 0.9 % (FLUSH) 0.9 %
5-40 SYRINGE (ML) INJECTION PRN
Status: DISCONTINUED | OUTPATIENT
Start: 2025-03-06 | End: 2025-03-06 | Stop reason: HOSPADM

## 2025-03-06 RX ORDER — SODIUM CHLORIDE 9 MG/ML
INJECTION, SOLUTION INTRAVENOUS PRN
Status: DISCONTINUED | OUTPATIENT
Start: 2025-03-06 | End: 2025-03-08 | Stop reason: HOSPADM

## 2025-03-06 RX ORDER — SODIUM CHLORIDE 0.9 % (FLUSH) 0.9 %
5-40 SYRINGE (ML) INJECTION EVERY 12 HOURS SCHEDULED
Status: DISCONTINUED | OUTPATIENT
Start: 2025-03-06 | End: 2025-03-06 | Stop reason: HOSPADM

## 2025-03-06 RX ORDER — MEGESTROL ACETATE 20 MG/1
20 TABLET ORAL 2 TIMES DAILY
Qty: 60 TABLET | Refills: 3 | Status: SHIPPED | OUTPATIENT
Start: 2025-03-06 | End: 2025-07-04

## 2025-03-06 RX ORDER — METOPROLOL TARTRATE 25 MG/1
12.5 TABLET, FILM COATED ORAL 2 TIMES DAILY
Status: DISCONTINUED | OUTPATIENT
Start: 2025-03-06 | End: 2025-03-08 | Stop reason: HOSPADM

## 2025-03-06 RX ORDER — SODIUM CHLORIDE 9 MG/ML
INJECTION, SOLUTION INTRAVENOUS PRN
Status: DISCONTINUED | OUTPATIENT
Start: 2025-03-06 | End: 2025-03-06 | Stop reason: HOSPADM

## 2025-03-06 RX ORDER — LIDOCAINE HYDROCHLORIDE 10 MG/ML
INJECTION, SOLUTION EPIDURAL; INFILTRATION; INTRACAUDAL; PERINEURAL
Status: DISCONTINUED | OUTPATIENT
Start: 2025-03-06 | End: 2025-03-06 | Stop reason: SDUPTHER

## 2025-03-06 RX ORDER — SODIUM CHLORIDE, SODIUM LACTATE, POTASSIUM CHLORIDE, CALCIUM CHLORIDE 600; 310; 30; 20 MG/100ML; MG/100ML; MG/100ML; MG/100ML
INJECTION, SOLUTION INTRAVENOUS
Status: DISCONTINUED | OUTPATIENT
Start: 2025-03-06 | End: 2025-03-06 | Stop reason: SDUPTHER

## 2025-03-06 RX ORDER — FENTANYL CITRATE 50 UG/ML
INJECTION, SOLUTION INTRAMUSCULAR; INTRAVENOUS
Status: DISCONTINUED | OUTPATIENT
Start: 2025-03-06 | End: 2025-03-06 | Stop reason: SDUPTHER

## 2025-03-06 RX ORDER — ALBUTEROL SULFATE 90 UG/1
INHALANT RESPIRATORY (INHALATION)
Status: DISCONTINUED | OUTPATIENT
Start: 2025-03-06 | End: 2025-03-06 | Stop reason: SDUPTHER

## 2025-03-06 RX ORDER — LABETALOL HYDROCHLORIDE 5 MG/ML
10 INJECTION, SOLUTION INTRAVENOUS
Status: DISCONTINUED | OUTPATIENT
Start: 2025-03-06 | End: 2025-03-06 | Stop reason: HOSPADM

## 2025-03-06 RX ORDER — GLYCOPYRROLATE 0.2 MG/ML
INJECTION INTRAMUSCULAR; INTRAVENOUS
Status: DISCONTINUED | OUTPATIENT
Start: 2025-03-06 | End: 2025-03-06 | Stop reason: SDUPTHER

## 2025-03-06 RX ORDER — PROPOFOL 10 MG/ML
INJECTION, EMULSION INTRAVENOUS
Status: DISCONTINUED | OUTPATIENT
Start: 2025-03-06 | End: 2025-03-06 | Stop reason: SDUPTHER

## 2025-03-06 RX ORDER — NALOXONE HYDROCHLORIDE 0.4 MG/ML
INJECTION, SOLUTION INTRAMUSCULAR; INTRAVENOUS; SUBCUTANEOUS PRN
Status: DISCONTINUED | OUTPATIENT
Start: 2025-03-06 | End: 2025-03-06 | Stop reason: HOSPADM

## 2025-03-06 RX ADMIN — SODIUM CHLORIDE, POTASSIUM CHLORIDE, SODIUM LACTATE AND CALCIUM CHLORIDE: 600; 310; 30; 20 INJECTION, SOLUTION INTRAVENOUS at 07:23

## 2025-03-06 RX ADMIN — FAMOTIDINE 20 MG: 20 TABLET, FILM COATED ORAL at 20:24

## 2025-03-06 RX ADMIN — AZITHROMYCIN DIHYDRATE 500 MG: 250 TABLET ORAL at 10:42

## 2025-03-06 RX ADMIN — MIDAZOLAM 2 MG: 1 INJECTION INTRAMUSCULAR; INTRAVENOUS at 07:23

## 2025-03-06 RX ADMIN — Medication 50 MCG: at 07:32

## 2025-03-06 RX ADMIN — BUSPIRONE HYDROCHLORIDE 30 MG: 10 TABLET ORAL at 15:47

## 2025-03-06 RX ADMIN — Medication 50 MCG: at 07:41

## 2025-03-06 RX ADMIN — GLYCOPYRROLATE 0.2 MG: 0.2 INJECTION INTRAMUSCULAR; INTRAVENOUS at 07:34

## 2025-03-06 RX ADMIN — ONDANSETRON 4 MG: 2 INJECTION INTRAMUSCULAR; INTRAVENOUS at 07:51

## 2025-03-06 RX ADMIN — METOPROLOL TARTRATE 12.5 MG: 25 TABLET, FILM COATED ORAL at 09:42

## 2025-03-06 RX ADMIN — FENTANYL CITRATE 50 MCG: 50 INJECTION, SOLUTION INTRAMUSCULAR; INTRAVENOUS at 07:27

## 2025-03-06 RX ADMIN — INSULIN LISPRO 4 UNITS: 100 INJECTION, SOLUTION INTRAVENOUS; SUBCUTANEOUS at 17:55

## 2025-03-06 RX ADMIN — BUDESONIDE AND FORMOTEROL FUMARATE DIHYDRATE 2 PUFF: 160; 4.5 AEROSOL RESPIRATORY (INHALATION) at 19:46

## 2025-03-06 RX ADMIN — OXYCODONE 5 MG: 5 TABLET ORAL at 17:58

## 2025-03-06 RX ADMIN — OXYCODONE 5 MG: 5 TABLET ORAL at 10:40

## 2025-03-06 RX ADMIN — BUDESONIDE AND FORMOTEROL FUMARATE DIHYDRATE 2 PUFF: 160; 4.5 AEROSOL RESPIRATORY (INHALATION) at 09:20

## 2025-03-06 RX ADMIN — METOPROLOL TARTRATE 12.5 MG: 25 TABLET, FILM COATED ORAL at 20:24

## 2025-03-06 RX ADMIN — ALBUTEROL SULFATE 10 PUFF: 90 AEROSOL, METERED RESPIRATORY (INHALATION) at 07:51

## 2025-03-06 RX ADMIN — GUAIFENESIN AND DEXTROMETHORPHAN 5 ML: 100; 10 SYRUP ORAL at 23:28

## 2025-03-06 RX ADMIN — PROPOFOL 100 MG: 10 INJECTION, EMULSION INTRAVENOUS at 07:27

## 2025-03-06 RX ADMIN — FENTANYL CITRATE 25 MCG: 50 INJECTION, SOLUTION INTRAMUSCULAR; INTRAVENOUS at 07:37

## 2025-03-06 RX ADMIN — FENTANYL CITRATE 25 MCG: 50 INJECTION, SOLUTION INTRAMUSCULAR; INTRAVENOUS at 07:50

## 2025-03-06 RX ADMIN — SODIUM CHLORIDE, PRESERVATIVE FREE 10 ML: 5 INJECTION INTRAVENOUS at 20:24

## 2025-03-06 RX ADMIN — LIDOCAINE HYDROCHLORIDE 50 MG: 10 INJECTION, SOLUTION EPIDURAL; INFILTRATION; INTRACAUDAL; PERINEURAL at 07:27

## 2025-03-06 RX ADMIN — BUSPIRONE HYDROCHLORIDE 30 MG: 10 TABLET ORAL at 20:24

## 2025-03-06 RX ADMIN — HYDROMORPHONE HYDROCHLORIDE 0.5 MG: 1 INJECTION, SOLUTION INTRAMUSCULAR; INTRAVENOUS; SUBCUTANEOUS at 08:27

## 2025-03-06 RX ADMIN — WATER 1000 MG: 1 INJECTION INTRAMUSCULAR; INTRAVENOUS; SUBCUTANEOUS at 15:49

## 2025-03-06 RX ADMIN — Medication 50 MCG: at 07:27

## 2025-03-06 ASSESSMENT — PAIN SCALES - GENERAL
PAINLEVEL_OUTOF10: 9
PAINLEVEL_OUTOF10: 2
PAINLEVEL_OUTOF10: 2
PAINLEVEL_OUTOF10: 8
PAINLEVEL_OUTOF10: 8
PAINLEVEL_OUTOF10: 3
PAINLEVEL_OUTOF10: 2

## 2025-03-06 ASSESSMENT — PAIN DESCRIPTION - LOCATION
LOCATION: LEG;HIP
LOCATION: ABDOMEN
LOCATION: BACK;HIP

## 2025-03-06 ASSESSMENT — ENCOUNTER SYMPTOMS: RESPIRATORY NEGATIVE: 1

## 2025-03-06 NOTE — OP NOTE
Operative Note  Department of Obstetrics and Gynecology  [facility]       Patient: Nancy L Haase   : 1960  MRN: [id number]       Acct: [id number]   PCP: Scar Reyes MD  Date of Procedure: 3/6/25    Pre-operative Diagnosis: 64 y.o. female     Postmenopausal bleeding  Coronary artery disease with stents on chronic anticoagulation  Paroxysmal atrial fibrillation  Chronic obstructive pulmonary disease  Chronic kidney disease  Hypertension  Type 2 diabetes mellitus  Hyperlipidemia   BMI 35     Post-operative Diagnosis:   Thickened endometrium   Postmenopausal bleeding  Coronary artery disease with stents on chronic anticoagulation  Paroxysmal atrial fibrillation  Chronic obstructive pulmonary disease  Chronic kidney disease  Hypertension  Type 2 diabetes mellitus  Hyperlipidemia   BMI 35     Procedure: pap smear, hysteroscopy dilation and curettage, Liletta IUD insertion (Lot #: [id number], Exp: 2029)     Surgeon: Dr. Ruiz     Assistant(s): Nita Paige DO, PGY4     Anesthesia: general      Indications: Nancy Haase is a 64 year old female  who presented to the ER complaining of vaginal bleeding since November. During admission, US revealed unremarkable appearance of uterus, endometrial stripe 4 mm, inability to visualize bilateral ovaries. She was initiated on Aygestin therapy, however Hgb continued to down trend from 10.4 to 6.8 and she has required 2U pRBC during admission. Bleeding stopped once switching progesterone therapy to Megace. After obtaining risk stratification for surgery given significant medical co-morbidities, plan was for inpatient evaluation of postmenopausal bleeding.     Procedure Details:   The patient was seen in the pre-op room. The risks, benefits, complications, treatment options, and expected outcomes were discussed with the patient. The patient concurred with the proposed plan, giving informed consent. The patient was taken to the  entire operation.    Findings:  normal appearing external genitalia and vaginal mucosa, rectocele present, normal appearing ectocervix visibly dilated approximately 1 cm, normal appearing endocervical canal, significantly proliferative endometrial cavity obscuring bilateral tubal ostia, no obvious uterine fibroid or polyp  Total IV fluids/Blood products:  500 ml crystalloid  Urine Output:  N/A  Estimated blood loss:  less than 5 mL  Drains:  none  Specimens:  pap smear, sharp endometrial curettings  Instrument and Sponge Count: Correct  Complications:  none  Condition:  stable, transferred to post anesthesia recovery    Nita Paige DO  Ob/Gyn Resident  3/6/2025, 8:24 AM           Attending Physician Statement  I have discussed the care of Nancy L Haase, including pertinent history and exam findings,  with the resident. I have seen and examined the patient and the key elements of all parts of the encounter have been performed by me.  I agree with the assessment, plan and orders as documented by the resident.  (GC Modifier)    Shanelle Ruiz DO

## 2025-03-06 NOTE — PROGRESS NOTES
[facility]  Internal Medicine Teaching Residency Program  Inpatient Daily Progress Note  ______________________________________________________________________________    Patient: Nancy L Haase  YOB: 1960   MRN:[id number]    Acct: [id number]     Room: [facility] [place] [facility]/NONE  Admit date: 3/2/2025  Today's date: 03/06/25  Number of days in the hospital: 4    SUBJECTIVE   Admitting Diagnosis: Postmenopausal bleeding    CC: Active bleeding and passing blood clots     Pt examined at bedside. Chart & results reviewed.   No acute events overnight.  AOx4.  Hemodynamically stable and maintaining saturation at around 99%  Patient on IV Ringer's lactate at 75 mL/h  Patient reported that she had had stopped passing clots.  Morning labs reviewed, BMP revealed reviewed K 4.5, creatinine 1 point, glucose 213, CBC revealed WBC 9.6, hemoglobin 7.8.    Cardiology has been consulted for presurgical risk stratification.  Most recent echo revealed EF 57%    Chest x-ray from 3/5 revealed trace right pleural effusion and hazy right lower lobe airspace disease.    Patient underwent dilatation curettage hysteroscopy, Pap smear and IUD insertion by gynecology today.    ROS:  Review of Systems   Constitutional: Negative.    Respiratory: Negative.     Genitourinary:  Positive for pelvic pain, vaginal bleeding and vaginal pain.   Neurological: Negative.         Plan :-    Will continue to monitor.  Will check H&H and transfuse as clinically indicated.  Will follow-up on gynecology recommendations regarding further plan for D&E  Will follow-up on gynecology recommendations.    BRIEF HISTORY     The patient is a pleasant 64 y.o. female with a PMHx significant for      A-fib on Eliquis  COPD  Iron deficiency anemia type 2 diabetes mellitus  CKD  Congestive heart failure     presents with a chief complaint of abnormal vaginal bleeding and passing blood clots from the vagina.  Patient reported  \"ALT\", \"BILIDIR\", \"BILITOT\", \"ALKPHOS\" in the last 72 hours.    Invalid input(s): \"ALB\"   MICROBIOLOGY:   Lab Results   Component Value Date/Time    CULTURE NO SIGNIFICANT GROWTH 10/27/2023 07:30 PM       Imaging:    US NON OB TRANSVAGINAL    Result Date: 3/2/2025  1. Unremarkable sonographic appearance of the uterus. 2. Unable visualize the right and left ovaries sonographically.       ASSESSMENT & PLAN     Assessment and Plan:    IMPRESSION  This is a 64 y.o. female with a PMHx significant for atrial fibrillation on Eliquis, COPD, type 2 diabetes mellitus, CKD, congestive heart failure who presented with active bleeding.    Principal Problem:    Postmenopausal bleeding  Active Problems:    Paroxysmal A-fib (HCC)    Type 2 diabetes mellitus with hyperglycemia, without long-term current use of insulin (HCC)    Abnormal uterine bleeding (AUB)    Anemia due to blood loss, acute    Pelvic pain    Abnormal vaginal bleeding  Resolved Problems:    * No resolved hospital problems. *    Postmenopausal bleeding  Acute blood loss anemia     Obstetrics and gynecology was consulted, underwent transvaginal sonogram which revealed unremarkable appearance of the uterus.  Obstetrics/gynecology started patient on Aygestin, switched to megestrol  Most recent Hb 7.8, received 2 unit of PRBC so far.    Iron panel revealed iron 41, TIBC 348, ferritin 33.  Patient underwent dilatation curettage hysteroscopy, Pap smear and IUD insertion by gynecology today.     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  Will hold off on Eliquis currently due to active bleeding.  Echo from 12/22 revealed EF of 50%  Takes Lasix 40 mg daily, lisinopril 10 mg daily, Plavix 75 mg daily, Eliquis 5 mg twice daily, Lipitor 40 mg daily, Lopressor 25 mg half tablets twice daily and aspirin at home.  Will hold lisinopril and metoprolol for

## 2025-03-06 NOTE — ANESTHESIA POSTPROCEDURE EVALUATION
Department of Anesthesiology  Postprocedure Note    Patient: Nancy L Haase  MRN: [id number]  YOB: 1960  Date of evaluation: 3/6/2025    Procedure Summary       Date: 03/06/25 Room / Location: [address]    Anesthesia Start: 0723 Anesthesia Stop: 0804    Procedure: EUA, DILATATION AND CURETTAGE HYSTEROSCOPY,  PAP SMEAR, IUD INSERTION (LILETTA, PAP KIT) Diagnosis:       Post-menopausal bleeding      (Post-menopausal bleeding [N95.0])    Surgeons: Shanelle Ruiz DO Responsible Provider: Lisa Campbell MD    Anesthesia Type: general ASA Status: 3            Anesthesia Type: No value filed.    Katherin Phase I: Katherin Score: 10    Katherin Phase II:      Anesthesia Post Evaluation    Patient location during evaluation: PACU  Patient participation: complete - patient participated  Level of consciousness: awake  Airway patency: patent  Nausea & Vomiting: no nausea and no vomiting  Cardiovascular status: blood pressure returned to baseline  Respiratory status: acceptable  Hydration status: euvolemic  Pain management: adequate    No notable events documented.

## 2025-03-06 NOTE — PROGRESS NOTES
Obstetric/Gynecology Resident Interval Note    Patient is s/p pap smear, hysteroscopy dilation and curettage with Liletta IUD insertion on 3/6/25. Procedure uncomplicated. Will follow pathology results. Plan to continue patient on Megace 20 mg BID. OB/GYN team will sign off at this time. Please reach out with any further questions or concerns.     Dr. Ruiz in agreement with plan.     Nita Paige DO  OB/GYN Resident, PGY4  [place], [place]  3/6/2025, 8:13 AM

## 2025-03-06 NOTE — PLAN OF CARE
Problem: Chronic Conditions and Co-morbidities  Goal: Patient's chronic conditions and co-morbidity symptoms are monitored and maintained or improved  3/6/2025 0351 by Susi Thomas RN  Outcome: Progressing  3/5/2025 1704 by Barbara Hudson RN  Outcome: Progressing  Flowsheets (Taken 3/5/2025 0830)  Care Plan - Patient's Chronic Conditions and Co-Morbidity Symptoms are Monitored and Maintained or Improved:   Monitor and assess patient's chronic conditions and comorbid symptoms for stability, deterioration, or improvement   Collaborate with multidisciplinary team to address chronic and comorbid conditions and prevent exacerbation or deterioration   Update acute care plan with appropriate goals if chronic or comorbid symptoms are exacerbated and prevent overall improvement and discharge     Problem: Discharge Planning  Goal: Discharge to home or other facility with appropriate resources  3/6/2025 0351 by Susi Thomas RN  Outcome: Progressing  3/5/2025 1704 by Barbara Hudson RN  Outcome: Progressing  Flowsheets (Taken 3/5/2025 0830)  Discharge to home or other facility with appropriate resources:   Identify barriers to discharge with patient and caregiver   Arrange for needed discharge resources and transportation as appropriate   Identify discharge learning needs (meds, wound care, etc)   Refer to discharge planning if patient needs post-hospital services based on physician order or complex needs related to functional status, cognitive ability or social support system     Problem: Pain  Goal: Verbalizes/displays adequate comfort level or baseline comfort level  3/6/2025 0351 by Susi Thomas RN  Outcome: Progressing  3/5/2025 1704 by Barbara Husdon RN  Outcome: Progressing  Flowsheets (Taken 3/5/2025 0725 by Gloria Barnhart RN)  Verbalizes/displays adequate comfort level or baseline comfort level: Encourage patient to monitor pain and request assistance     Problem: Skin/Tissue  Integrity  Goal: Skin integrity remains intact  Description: 1.  Monitor for areas of redness and/or skin breakdown  2.  Assess vascular access sites hourly  3.  Every 4-6 hours minimum:  Change oxygen saturation probe site  4.  Every 4-6 hours:  If on nasal continuous positive airway pressure, respiratory therapy assess nares and determine need for appliance change or resting period  3/6/2025 0351 by Susi Thomas RN  Outcome: Progressing  3/5/2025 1704 by Barbara Hudson RN  Outcome: Progressing  Flowsheets (Taken 3/5/2025 0830)  Skin Integrity Remains Intact: Monitor for areas of redness and/or skin breakdown     Problem: Safety - Adult  Goal: Free from fall injury  3/6/2025 0351 by Susi Thomas RN  Outcome: Progressing  3/5/2025 1704 by Barbara Hudson RN  Outcome: Progressing     Problem: ABCDS Injury Assessment  Goal: Absence of physical injury  3/6/2025 0351 by Susi Thomas RN  Outcome: Progressing  3/5/2025 1704 by Barbara Hudson RN  Outcome: Progressing     Problem: Respiratory - Adult  Goal: Achieves optimal ventilation and oxygenation  3/6/2025 0351 by Susi Thomas RN  Outcome: Progressing  3/5/2025 1704 by Barbara Hudson RN  Outcome: Progressing  Flowsheets (Taken 3/5/2025 0830 by Gloria Barnhart RN)  Achieves optimal ventilation and oxygenation: Assess for changes in respiratory status

## 2025-03-06 NOTE — PLAN OF CARE
Problem: Respiratory - Adult  Goal: Achieves optimal ventilation and oxygenation  3/6/2025 0923 by Gita Akins RCP  Outcome: Progressing  3/6/2025 0351 by Susi Thomas RN  Outcome: Progressing

## 2025-03-06 NOTE — PROGRESS NOTES
Gynecology Progress Note    Date: 3/6/2025  Time: 6:48 AM    Nancy L Haase is a 64 y.o. female  HD# 5    Patient was seen and examined.  Upon entering the room, patient was very anxious with RN at bedside. She had requested medication to calm her down though was suggested not to have anything prior to being transported down to preop. Patient was anxious about the procedure this AM though said she is ready for it. Reassurance given.  She has been n.p.o. since midnight for her procedure today.  She says the vaginal bleeding has completely stopped and she has not had any vaginal bleeding for 2 days.    Her procedure was delayed yesterday due to need for clearance and will proceed this morning. She is looking forward to having this procedure.    Pain is controlled. Patient is tolerating oral intake. She is urinating well. She is ambulating without difficulty.      Vitals:  Vitals:    25 2337 25 2344 25 0000 25 0345   BP:   120/67 (!) 128/49   Pulse: 77  80 90   Resp:  16 16 16   Temp:   98.2 °F (36.8 °C) 98.5 °F (36.9 °C)   TempSrc:   Axillary Oral   SpO2:   97% 96%   Weight:       Height:         Intake/Output:   Last Shift: I/O last 3 completed shifts:  In: 1153 [P.O.:850; Blood:303]  Out: 1350 [Urine:1350]  Current Shift: I/O this shift:  In: -   Out: 850 [Urine:850]    Physical Exam:  General:  no apparent distress, alert, and cooperative  Neurologic:  alert, oriented, normal speech, no focal findings or movement disorder noted  Lungs:  No increased work of breathing, good air exchange, clear to auscultation bilaterally, no crackles or wheezing  Heart:  regular rate and rhythm and no murmur    Abdomen: Abdomen soft, non-tender. BS normal. No masses,  No organomegaly  Extremities:  no calf tenderness, non edematous    Lab:  Complete Blood Count:   Recent Labs     25  0446 25  1205 25  0221 25  0856 25  2138 25  0302 25  0542   WBC 7.2  --  7.5   history and exam findings,  with the resident. I have seen and examined the patient and the key elements of all parts of the encounter have been performed by me.  I agree with the assessment, plan and orders as documented by the resident.  (GC Modifier)    Shanelle Ruiz, DO     The patient is ready to transport to the OR. Plan for hscope, D&C, pap, IUD placement. Echo was completed and MV stenosis moderate. Per cardiology documentation if moderate or less can proceed with surgery with moderate risk. The patient is requesting to proceed with the surgery.

## 2025-03-06 NOTE — BRIEF OP NOTE
Brief Operative Note  Department of Obstetrics and Gynecology  [facility]     Patient: Nancy L Haase   : 1960  MRN: [id number]       Acct: [id number]   Date of Procedure: 3/6/25     Pre-operative Diagnosis: 64 y.o. female    Postmenopausal bleeding  Coronary artery disease with stents on chronic anticoagulation  Paroxysmal atrial fibrillation  Chronic obstructive pulmonary disease  Chronic kidney disease  Hypertension  Type 2 diabetes mellitus  Hyperlipidemia   BMI 35     Post-operative Diagnosis:   Thickened endometrium   Postmenopausal bleeding  Coronary artery disease with stents on chronic anticoagulation  Paroxysmal atrial fibrillation  Chronic obstructive pulmonary disease  Chronic kidney disease  Hypertension  Type 2 diabetes mellitus  Hyperlipidemia   BMI 35     Procedure: pap smear, hysteroscopy dilation and curettage, Liletta IUD insertion (Lot #: [id number], Exp: 2029)     Surgeon: Dr. Ruiz     Assistant(s): Nita Paige DO, PGY4     Anesthesia: general      Findings:  normal appearing external genitalia and vaginal mucosa, rectocele present, normal appearing ectocervix visibly dilated approximately 1 cm, normal appearing endocervical canal, significantly proliferative endometrial cavity obscuring bilateral tubal ostia  Total IV fluids/Blood products:  500 ml crystalloid  Urine Output:  N/A  Estimated blood loss:  less than 5 mL  Drains:  none  Specimens:  pap smear, sharp endometrial curettings  Instrument and Sponge Count: Correct  Complications:  none  Condition:  stable, transferred to post anesthesia recovery    See full operative report for further details.    Nita Paige DO  Ob/Gyn Resident  3/6/2025, 8:05 AM          Attending Physician Statement  I have discussed the care of Nancy L Haase, including pertinent history and exam findings,  with the resident. I have seen and examined the patient and the key elements of all parts of the encounter have been  performed by me.  I agree with the assessment, plan and orders as documented by the resident.  (GC Modifier)    Shanelle Ruiz, DO

## 2025-03-06 NOTE — CARE COORDINATION
Case Management   Daily Progress Note       Patient Name: Nancy L Haase                   YOB: 1960  Diagnosis: Abnormal vaginal bleeding [N93.9]  Active bleeding [Z78.9]                       GMLOS: 2.8 days  Length of Stay: 4  days    Anticipated Discharge Date: One day until discharge    Readmission Risk (Low < 19, Mod (19-27), High > 27): Readmission Risk Score: 17.8      Patient Transitional Goal: Home    Current Transitional Plan    [x] Home Independently    [] Home with HC    [] Skilled Nursing Facility    [] Acute Rehabilitation    [] Long Term Acute Care (LTAC)    [] Other:     Plan for the Stay (Medical Management): monitoring hemoglobin after surgery today      Workflow Continuation (Additional Notes): patient denies needs for home        Silke Ahuja RN  March 6, 2025

## 2025-03-07 LAB
ABO/RH: NORMAL
ANION GAP SERPL CALCULATED.3IONS-SCNC: 9 MMOL/L (ref 9–16)
ANTI-XA UNFRAC HEPARIN: 0.12 IU/L
ANTI-XA UNFRAC HEPARIN: 0.17 IU/L
ANTIBODY SCREEN: NEGATIVE
BASOPHILS # BLD: 0.06 K/UL (ref 0–0.2)
BASOPHILS NFR BLD: 1 % (ref 0–2)
BLOOD BANK BLOOD PRODUCT EXPIRATION DATE: NORMAL
BLOOD BANK DISPENSE STATUS: NORMAL
BLOOD BANK ISBT PRODUCT BLOOD TYPE: 5100
BLOOD BANK PRODUCT CODE: NORMAL
BLOOD BANK SAMPLE EXPIRATION: NORMAL
BLOOD BANK UNIT TYPE AND RH: NORMAL
BPU ID: NORMAL
BUN SERPL-MCNC: 14 MG/DL (ref 8–23)
CALCIUM SERPL-MCNC: 8.4 MG/DL (ref 8.6–10.4)
CHLORIDE SERPL-SCNC: 106 MMOL/L (ref 98–107)
CO2 SERPL-SCNC: 23 MMOL/L (ref 20–31)
COMPONENT: NORMAL
CREAT SERPL-MCNC: 1.3 MG/DL (ref 0.6–0.9)
CROSSMATCH RESULT: NORMAL
CYTOLOGY REPORT: NORMAL
EOSINOPHIL # BLD: 0.1 K/UL (ref 0–0.44)
EOSINOPHILS RELATIVE PERCENT: 1 % (ref 1–4)
ERYTHROCYTE [DISTWIDTH] IN BLOOD BY AUTOMATED COUNT: 17.9 % (ref 11.8–14.4)
GFR, ESTIMATED: 46 ML/MIN/1.73M2
GLUCOSE BLD-MCNC: 126 MG/DL (ref 65–105)
GLUCOSE BLD-MCNC: 136 MG/DL (ref 65–105)
GLUCOSE BLD-MCNC: 272 MG/DL (ref 65–105)
GLUCOSE BLD-MCNC: 273 MG/DL (ref 65–105)
GLUCOSE BLD-MCNC: 321 MG/DL (ref 65–105)
GLUCOSE BLD-MCNC: 327 MG/DL (ref 65–105)
GLUCOSE SERPL-MCNC: 126 MG/DL (ref 74–99)
HCT VFR BLD AUTO: 25.1 % (ref 36.3–47.1)
HCT VFR BLD AUTO: 26.4 % (ref 36.3–47.1)
HCT VFR BLD AUTO: 27 % (ref 36.3–47.1)
HCT VFR BLD AUTO: 27.2 % (ref 36.3–47.1)
HGB BLD-MCNC: 7.8 G/DL (ref 11.9–15.1)
HGB BLD-MCNC: 8 G/DL (ref 11.9–15.1)
HGB BLD-MCNC: 8.3 G/DL (ref 11.9–15.1)
HGB BLD-MCNC: 8.4 G/DL (ref 11.9–15.1)
IMM GRANULOCYTES # BLD AUTO: 0.06 K/UL (ref 0–0.3)
IMM GRANULOCYTES NFR BLD: 1 %
INR PPP: 1
LYMPHOCYTES NFR BLD: 1.43 K/UL (ref 1.1–3.7)
LYMPHOCYTES RELATIVE PERCENT: 14 % (ref 24–43)
MCH RBC QN AUTO: 26.6 PG (ref 25.2–33.5)
MCHC RBC AUTO-ENTMCNC: 30.5 G/DL (ref 28.4–34.8)
MCV RBC AUTO: 87.2 FL (ref 82.6–102.9)
MONOCYTES NFR BLD: 0.78 K/UL (ref 0.1–1.2)
MONOCYTES NFR BLD: 7 % (ref 3–12)
NEUTROPHILS NFR BLD: 76 % (ref 36–65)
NEUTS SEG NFR BLD: 8.09 K/UL (ref 1.5–8.1)
NRBC BLD-RTO: 0 PER 100 WBC
PARTIAL THROMBOPLASTIN TIME: 21.4 SEC (ref 23–36.5)
PLATELET # BLD AUTO: 186 K/UL (ref 138–453)
PMV BLD AUTO: 10.8 FL (ref 8.1–13.5)
POTASSIUM SERPL-SCNC: 4.2 MMOL/L (ref 3.7–5.3)
PROTHROMBIN TIME: 13.5 SEC (ref 11.7–14.9)
RBC # BLD AUTO: 3.12 M/UL (ref 3.95–5.11)
RBC # BLD: ABNORMAL 10*6/UL
SODIUM SERPL-SCNC: 138 MMOL/L (ref 136–145)
TRANSFUSION STATUS: NORMAL
UNIT DIVISION: 0
UNIT ISSUE DATE/TIME: NORMAL
WBC OTHER # BLD: 10.5 K/UL (ref 3.5–11.3)

## 2025-03-07 PROCEDURE — 6370000000 HC RX 637 (ALT 250 FOR IP): Performed by: STUDENT IN AN ORGANIZED HEALTH CARE EDUCATION/TRAINING PROGRAM

## 2025-03-07 PROCEDURE — 82947 ASSAY GLUCOSE BLOOD QUANT: CPT

## 2025-03-07 PROCEDURE — 1200000000 HC SEMI PRIVATE

## 2025-03-07 PROCEDURE — 85520 HEPARIN ASSAY: CPT

## 2025-03-07 PROCEDURE — 2500000003 HC RX 250 WO HCPCS: Performed by: STUDENT IN AN ORGANIZED HEALTH CARE EDUCATION/TRAINING PROGRAM

## 2025-03-07 PROCEDURE — 6370000000 HC RX 637 (ALT 250 FOR IP)

## 2025-03-07 PROCEDURE — 6360000002 HC RX W HCPCS: Performed by: STUDENT IN AN ORGANIZED HEALTH CARE EDUCATION/TRAINING PROGRAM

## 2025-03-07 PROCEDURE — 85018 HEMOGLOBIN: CPT

## 2025-03-07 PROCEDURE — 6360000002 HC RX W HCPCS

## 2025-03-07 PROCEDURE — 85025 COMPLETE CBC W/AUTO DIFF WBC: CPT

## 2025-03-07 PROCEDURE — 80048 BASIC METABOLIC PNL TOTAL CA: CPT

## 2025-03-07 PROCEDURE — 36415 COLL VENOUS BLD VENIPUNCTURE: CPT

## 2025-03-07 PROCEDURE — 85014 HEMATOCRIT: CPT

## 2025-03-07 PROCEDURE — 94640 AIRWAY INHALATION TREATMENT: CPT

## 2025-03-07 PROCEDURE — 99232 SBSQ HOSP IP/OBS MODERATE 35: CPT | Performed by: STUDENT IN AN ORGANIZED HEALTH CARE EDUCATION/TRAINING PROGRAM

## 2025-03-07 PROCEDURE — 85610 PROTHROMBIN TIME: CPT

## 2025-03-07 PROCEDURE — 85730 THROMBOPLASTIN TIME PARTIAL: CPT

## 2025-03-07 RX ORDER — HEPARIN SODIUM 10000 [USP'U]/100ML
5-30 INJECTION, SOLUTION INTRAVENOUS CONTINUOUS
Status: DISCONTINUED | OUTPATIENT
Start: 2025-03-07 | End: 2025-03-08 | Stop reason: HOSPADM

## 2025-03-07 RX ORDER — HEPARIN SODIUM 1000 [USP'U]/ML
4000 INJECTION, SOLUTION INTRAVENOUS; SUBCUTANEOUS PRN
Status: DISCONTINUED | OUTPATIENT
Start: 2025-03-07 | End: 2025-03-07

## 2025-03-07 RX ORDER — LIDOCAINE 4 G/G
1 PATCH TOPICAL DAILY
Status: DISCONTINUED | OUTPATIENT
Start: 2025-03-07 | End: 2025-03-08 | Stop reason: HOSPADM

## 2025-03-07 RX ORDER — HEPARIN SODIUM 1000 [USP'U]/ML
2000 INJECTION, SOLUTION INTRAVENOUS; SUBCUTANEOUS PRN
Status: DISCONTINUED | OUTPATIENT
Start: 2025-03-07 | End: 2025-03-08 | Stop reason: HOSPADM

## 2025-03-07 RX ORDER — IPRATROPIUM BROMIDE AND ALBUTEROL SULFATE 2.5; .5 MG/3ML; MG/3ML
1 SOLUTION RESPIRATORY (INHALATION)
Status: DISCONTINUED | OUTPATIENT
Start: 2025-03-07 | End: 2025-03-08 | Stop reason: HOSPADM

## 2025-03-07 RX ORDER — HEPARIN SODIUM 1000 [USP'U]/ML
4000 INJECTION, SOLUTION INTRAVENOUS; SUBCUTANEOUS PRN
Status: DISCONTINUED | OUTPATIENT
Start: 2025-03-07 | End: 2025-03-08 | Stop reason: HOSPADM

## 2025-03-07 RX ADMIN — IPRATROPIUM BROMIDE AND ALBUTEROL SULFATE 1 DOSE: .5; 2.5 SOLUTION RESPIRATORY (INHALATION) at 16:14

## 2025-03-07 RX ADMIN — MEGESTROL ACETATE 20 MG: 20 TABLET ORAL at 08:31

## 2025-03-07 RX ADMIN — SODIUM CHLORIDE, PRESERVATIVE FREE 10 ML: 5 INJECTION INTRAVENOUS at 21:22

## 2025-03-07 RX ADMIN — METOPROLOL TARTRATE 12.5 MG: 25 TABLET, FILM COATED ORAL at 21:21

## 2025-03-07 RX ADMIN — FAMOTIDINE 20 MG: 20 TABLET, FILM COATED ORAL at 21:22

## 2025-03-07 RX ADMIN — AZITHROMYCIN DIHYDRATE 250 MG: 250 TABLET ORAL at 08:31

## 2025-03-07 RX ADMIN — INSULIN LISPRO 4 UNITS: 100 INJECTION, SOLUTION INTRAVENOUS; SUBCUTANEOUS at 11:37

## 2025-03-07 RX ADMIN — OXYCODONE 5 MG: 5 TABLET ORAL at 10:45

## 2025-03-07 RX ADMIN — BUDESONIDE AND FORMOTEROL FUMARATE DIHYDRATE 2 PUFF: 160; 4.5 AEROSOL RESPIRATORY (INHALATION) at 20:41

## 2025-03-07 RX ADMIN — PREDNISONE 40 MG: 20 TABLET ORAL at 08:32

## 2025-03-07 RX ADMIN — HEPARIN SODIUM 2000 UNITS: 1000 INJECTION INTRAVENOUS; SUBCUTANEOUS at 21:17

## 2025-03-07 RX ADMIN — ACETAMINOPHEN 650 MG: 325 TABLET ORAL at 05:25

## 2025-03-07 RX ADMIN — OXYCODONE 5 MG: 5 TABLET ORAL at 00:14

## 2025-03-07 RX ADMIN — INSULIN LISPRO 6 UNITS: 100 INJECTION, SOLUTION INTRAVENOUS; SUBCUTANEOUS at 16:31

## 2025-03-07 RX ADMIN — HEPARIN SODIUM 9 UNITS/KG/HR: 10000 INJECTION, SOLUTION INTRAVENOUS at 11:49

## 2025-03-07 RX ADMIN — ATORVASTATIN CALCIUM 40 MG: 40 TABLET, FILM COATED ORAL at 08:32

## 2025-03-07 RX ADMIN — IPRATROPIUM BROMIDE AND ALBUTEROL SULFATE 1 DOSE: .5; 2.5 SOLUTION RESPIRATORY (INHALATION) at 20:41

## 2025-03-07 RX ADMIN — SODIUM CHLORIDE, PRESERVATIVE FREE 10 ML: 5 INJECTION INTRAVENOUS at 08:37

## 2025-03-07 RX ADMIN — WATER 1000 MG: 1 INJECTION INTRAMUSCULAR; INTRAVENOUS; SUBCUTANEOUS at 11:37

## 2025-03-07 RX ADMIN — FERROUS SULFATE TAB EC 325 MG (65 MG FE EQUIVALENT) 325 MG: 325 (65 FE) TABLET DELAYED RESPONSE at 08:32

## 2025-03-07 RX ADMIN — BUSPIRONE HYDROCHLORIDE 30 MG: 10 TABLET ORAL at 08:32

## 2025-03-07 RX ADMIN — BUSPIRONE HYDROCHLORIDE 30 MG: 10 TABLET ORAL at 13:38

## 2025-03-07 RX ADMIN — IPRATROPIUM BROMIDE AND ALBUTEROL SULFATE 1 DOSE: .5; 2.5 SOLUTION RESPIRATORY (INHALATION) at 11:28

## 2025-03-07 RX ADMIN — OXYCODONE 5 MG: 5 TABLET ORAL at 21:25

## 2025-03-07 RX ADMIN — BUSPIRONE HYDROCHLORIDE 30 MG: 10 TABLET ORAL at 21:21

## 2025-03-07 RX ADMIN — METOPROLOL TARTRATE 12.5 MG: 25 TABLET, FILM COATED ORAL at 08:31

## 2025-03-07 RX ADMIN — BUDESONIDE AND FORMOTEROL FUMARATE DIHYDRATE 2 PUFF: 160; 4.5 AEROSOL RESPIRATORY (INHALATION) at 07:50

## 2025-03-07 RX ADMIN — INSULIN LISPRO 4 UNITS: 100 INJECTION, SOLUTION INTRAVENOUS; SUBCUTANEOUS at 21:22

## 2025-03-07 RX ADMIN — FAMOTIDINE 20 MG: 20 TABLET, FILM COATED ORAL at 08:32

## 2025-03-07 ASSESSMENT — PAIN DESCRIPTION - LOCATION
LOCATION: ABDOMEN
LOCATION: ABDOMEN
LOCATION: BACK;HIP

## 2025-03-07 ASSESSMENT — PAIN SCALES - GENERAL
PAINLEVEL_OUTOF10: 9
PAINLEVEL_OUTOF10: 9
PAINLEVEL_OUTOF10: 0
PAINLEVEL_OUTOF10: 5
PAINLEVEL_OUTOF10: 9
PAINLEVEL_OUTOF10: 3

## 2025-03-07 ASSESSMENT — ENCOUNTER SYMPTOMS: RESPIRATORY NEGATIVE: 1

## 2025-03-07 ASSESSMENT — PAIN DESCRIPTION - ORIENTATION
ORIENTATION: RIGHT
ORIENTATION: LEFT

## 2025-03-07 ASSESSMENT — PAIN DESCRIPTION - DESCRIPTORS
DESCRIPTORS: DISCOMFORT
DESCRIPTORS: ACHING;DISCOMFORT

## 2025-03-07 NOTE — PLAN OF CARE
Problem: Chronic Conditions and Co-morbidities  Goal: Patient's chronic conditions and co-morbidity symptoms are monitored and maintained or improved  Outcome: Progressing     Problem: Discharge Planning  Goal: Discharge to home or other facility with appropriate resources  Outcome: Progressing     Problem: Pain  Goal: Verbalizes/displays adequate comfort level or baseline comfort level  Outcome: Progressing     Problem: Skin/Tissue Integrity  Goal: Skin integrity remains intact  Description: 1.  Monitor for areas of redness and/or skin breakdown  2.  Assess vascular access sites hourly  3.  Every 4-6 hours minimum:  Change oxygen saturation probe site  4.  Every 4-6 hours:  If on nasal continuous positive airway pressure, respiratory therapy assess nares and determine need for appliance change or resting period  Outcome: Progressing     Problem: Safety - Adult  Goal: Free from fall injury  Outcome: Progressing     Problem: ABCDS Injury Assessment  Goal: Absence of physical injury  Outcome: Progressing     Problem: Respiratory - Adult  Goal: Achieves optimal ventilation and oxygenation  3/7/2025 1033 by Sruthi Rivera RN  Outcome: Progressing  3/7/2025 0752 by Zoe Harrison, RCP  Outcome: Progressing

## 2025-03-07 NOTE — PLAN OF CARE
Problem: Respiratory - Adult  Goal: Achieves optimal ventilation and oxygenation  3/7/2025 0752 by Zoe Harrison, RCMARLEE  Outcome: Progressing

## 2025-03-07 NOTE — PLAN OF CARE
Problem: Respiratory - Adult  Goal: Achieves optimal ventilation and oxygenation  3/6/2025 2026 by Demetria Rubio RCP  Outcome: Progressing

## 2025-03-07 NOTE — CARE COORDINATION
Case Management   Daily Progress Note       Patient Name: Nancy L Haase                   YOB: 1960  Diagnosis: Abnormal vaginal bleeding [N93.9]  Active bleeding [Z78.9]                       GMLOS: 4.5 days  Length of Stay: 5  days    Anticipated Discharge Date: Two or more days before discharge    Readmission Risk (Low < 19, Mod (19-27), High > 27): Readmission Risk Score: 16.8      Patient Transitional Goal: Home    Current Transitional Plan    [x] Home Independently    [] Home with HC    [] Skilled Nursing Facility    [] Acute Rehabilitation    [] Long Term Acute Care (LTAC)    [] Other:     Plan for the Stay (Medical Management) : monitor H&H, check labs          Workflow Continuation (Additional Notes) : Plan is to return home independent, denies any needs at home, will need cab home        Lydia Carter RN  March 7, 2025

## 2025-03-07 NOTE — PROGRESS NOTES
[facility]  Internal Medicine Teaching Residency Program  Inpatient Daily Progress Note  ______________________________________________________________________________    Patient: Nancy L Haase  YOB: 1960   MRN:[id number]    Acct: [id number]     Room: 2014/2014-01  Admit date: 3/2/2025  Today's date: 03/07/25  Number of days in the hospital: 5    SUBJECTIVE   Admitting Diagnosis: Postmenopausal bleeding    CC: Active bleeding and passing blood clots     Pt examined at bedside. Chart & results reviewed.   Patient's hemoglobin dropped overnight and received 1 unit of PRBC  AOx4.  Hemodynamically stable and maintaining saturation at around 99%  Patient reported that she had had stopped passing clots.  Morning labs reviewed, BMP revealed reviewed K 4.5, creatinine 1 point, glucose 213, CBC revealed WBC 9.6, hemoglobin 8.3.    Cardiology has been consulted for presurgical risk stratification.  Most recent echo revealed EF 57%    Chest x-ray from 3/5 revealed trace right pleural effusion and hazy right lower lobe airspace disease.    Patient underwent dilatation curettage hysteroscopy, Pap smear and IUD insertion by gynecology today.    ROS:  Review of Systems   Constitutional: Negative.    Respiratory: Negative.     Genitourinary:  Positive for pelvic pain, vaginal bleeding and vaginal pain.   Neurological: Negative.         Plan :-    Will continue to monitor.  Will check H&H and transfuse as clinically indicated.  Will follow-up on gynecology recommendations regarding further plan for D&E  Will follow-up on gynecology recommendations.    BRIEF HISTORY     The patient is a pleasant 64 y.o. female with a PMHx significant for      A-fib on Eliquis  COPD  Iron deficiency anemia type 2 diabetes mellitus  CKD  Congestive heart failure     presents with a chief complaint of abnormal vaginal bleeding and passing blood clots from the vagina.  Patient reported that she  ADULT DIET; Regular; 4 carb choices (60 gm/meal); Low Fat/Low Chol/High Fiber/ARIEL   DVT ppx : Will hold off on anticoagulants   GI ppx: Not required for now     PT/OT: On board  Discharge Planning / SW: Case management is consulted for assistance with discharge planning       Chris Strauss MD   Internal Medicine Resident, PGY-1  [place], [place]  3/7/2025,12:10 AM

## 2025-03-08 VITALS
OXYGEN SATURATION: 97 % | HEIGHT: 69 IN | WEIGHT: 240 LBS | SYSTOLIC BLOOD PRESSURE: 130 MMHG | DIASTOLIC BLOOD PRESSURE: 73 MMHG | HEART RATE: 98 BPM | TEMPERATURE: 97.5 F | RESPIRATION RATE: 20 BRPM | BODY MASS INDEX: 35.55 KG/M2

## 2025-03-08 LAB
ANION GAP SERPL CALCULATED.3IONS-SCNC: 9 MMOL/L (ref 9–16)
ANTI-XA UNFRAC HEPARIN: 0.26 IU/L
ANTI-XA UNFRAC HEPARIN: 0.37 IU/L
BASOPHILS # BLD: 0.03 K/UL (ref 0–0.2)
BASOPHILS NFR BLD: 0 % (ref 0–2)
BUN SERPL-MCNC: 18 MG/DL (ref 8–23)
CALCIUM SERPL-MCNC: 8.6 MG/DL (ref 8.6–10.4)
CHLORIDE SERPL-SCNC: 109 MMOL/L (ref 98–107)
CO2 SERPL-SCNC: 20 MMOL/L (ref 20–31)
CREAT SERPL-MCNC: 1.3 MG/DL (ref 0.6–0.9)
EOSINOPHIL # BLD: <0.03 K/UL (ref 0–0.44)
EOSINOPHILS RELATIVE PERCENT: 0 % (ref 1–4)
ERYTHROCYTE [DISTWIDTH] IN BLOOD BY AUTOMATED COUNT: 18.2 % (ref 11.8–14.4)
GFR, ESTIMATED: 46 ML/MIN/1.73M2
GLUCOSE BLD-MCNC: 154 MG/DL (ref 65–105)
GLUCOSE BLD-MCNC: 165 MG/DL (ref 65–105)
GLUCOSE BLD-MCNC: 265 MG/DL (ref 65–105)
GLUCOSE SERPL-MCNC: 183 MG/DL (ref 74–99)
HCT VFR BLD AUTO: 24.9 % (ref 36.3–47.1)
HCT VFR BLD AUTO: 27.6 % (ref 36.3–47.1)
HGB BLD-MCNC: 7.5 G/DL (ref 11.9–15.1)
HGB BLD-MCNC: 8.4 G/DL (ref 11.9–15.1)
IMM GRANULOCYTES # BLD AUTO: 0.05 K/UL (ref 0–0.3)
IMM GRANULOCYTES NFR BLD: 1 %
LYMPHOCYTES NFR BLD: 0.91 K/UL (ref 1.1–3.7)
LYMPHOCYTES RELATIVE PERCENT: 10 % (ref 24–43)
MCH RBC QN AUTO: 26.7 PG (ref 25.2–33.5)
MCHC RBC AUTO-ENTMCNC: 30.1 G/DL (ref 28.4–34.8)
MCV RBC AUTO: 88.6 FL (ref 82.6–102.9)
MONOCYTES NFR BLD: 0.67 K/UL (ref 0.1–1.2)
MONOCYTES NFR BLD: 7 % (ref 3–12)
NEUTROPHILS NFR BLD: 82 % (ref 36–65)
NEUTS SEG NFR BLD: 7.66 K/UL (ref 1.5–8.1)
NRBC BLD-RTO: 0.2 PER 100 WBC
PLATELET # BLD AUTO: 181 K/UL (ref 138–453)
PMV BLD AUTO: 11.3 FL (ref 8.1–13.5)
POTASSIUM SERPL-SCNC: 4.1 MMOL/L (ref 3.7–5.3)
RBC # BLD AUTO: 2.81 M/UL (ref 3.95–5.11)
RBC # BLD: ABNORMAL 10*6/UL
SODIUM SERPL-SCNC: 138 MMOL/L (ref 136–145)
WBC OTHER # BLD: 9.3 K/UL (ref 3.5–11.3)

## 2025-03-08 PROCEDURE — 6370000000 HC RX 637 (ALT 250 FOR IP)

## 2025-03-08 PROCEDURE — 6370000000 HC RX 637 (ALT 250 FOR IP): Performed by: STUDENT IN AN ORGANIZED HEALTH CARE EDUCATION/TRAINING PROGRAM

## 2025-03-08 PROCEDURE — 85520 HEPARIN ASSAY: CPT

## 2025-03-08 PROCEDURE — 6360000002 HC RX W HCPCS

## 2025-03-08 PROCEDURE — 85025 COMPLETE CBC W/AUTO DIFF WBC: CPT

## 2025-03-08 PROCEDURE — 85014 HEMATOCRIT: CPT

## 2025-03-08 PROCEDURE — 94761 N-INVAS EAR/PLS OXIMETRY MLT: CPT

## 2025-03-08 PROCEDURE — 99232 SBSQ HOSP IP/OBS MODERATE 35: CPT | Performed by: STUDENT IN AN ORGANIZED HEALTH CARE EDUCATION/TRAINING PROGRAM

## 2025-03-08 PROCEDURE — 85018 HEMOGLOBIN: CPT

## 2025-03-08 PROCEDURE — 82947 ASSAY GLUCOSE BLOOD QUANT: CPT

## 2025-03-08 PROCEDURE — 36415 COLL VENOUS BLD VENIPUNCTURE: CPT

## 2025-03-08 PROCEDURE — 80048 BASIC METABOLIC PNL TOTAL CA: CPT

## 2025-03-08 PROCEDURE — 94640 AIRWAY INHALATION TREATMENT: CPT

## 2025-03-08 PROCEDURE — 2500000003 HC RX 250 WO HCPCS: Performed by: STUDENT IN AN ORGANIZED HEALTH CARE EDUCATION/TRAINING PROGRAM

## 2025-03-08 RX ORDER — METOPROLOL TARTRATE 1 MG/ML
5 INJECTION, SOLUTION INTRAVENOUS EVERY 6 HOURS PRN
Status: DISCONTINUED | OUTPATIENT
Start: 2025-03-08 | End: 2025-03-08 | Stop reason: HOSPADM

## 2025-03-08 RX ORDER — PREDNISONE 20 MG/1
40 TABLET ORAL DAILY
Qty: 4 TABLET | Refills: 0 | Status: ON HOLD | OUTPATIENT
Start: 2025-03-09 | End: 2025-03-12 | Stop reason: HOSPADM

## 2025-03-08 RX ORDER — FERROUS SULFATE 325(65) MG
325 TABLET, DELAYED RELEASE (ENTERIC COATED) ORAL
Qty: 90 TABLET | Refills: 3 | Status: SHIPPED | OUTPATIENT
Start: 2025-03-09

## 2025-03-08 RX ORDER — AZITHROMYCIN 250 MG/1
250 TABLET, FILM COATED ORAL DAILY
Qty: 2 TABLET | Refills: 0 | Status: ON HOLD | OUTPATIENT
Start: 2025-03-09 | End: 2025-03-12 | Stop reason: HOSPADM

## 2025-03-08 RX ADMIN — AZITHROMYCIN DIHYDRATE 250 MG: 250 TABLET ORAL at 08:34

## 2025-03-08 RX ADMIN — BUDESONIDE AND FORMOTEROL FUMARATE DIHYDRATE 2 PUFF: 160; 4.5 AEROSOL RESPIRATORY (INHALATION) at 08:17

## 2025-03-08 RX ADMIN — BUSPIRONE HYDROCHLORIDE 30 MG: 10 TABLET ORAL at 14:28

## 2025-03-08 RX ADMIN — FAMOTIDINE 20 MG: 20 TABLET, FILM COATED ORAL at 08:33

## 2025-03-08 RX ADMIN — IPRATROPIUM BROMIDE AND ALBUTEROL SULFATE 1 DOSE: .5; 2.5 SOLUTION RESPIRATORY (INHALATION) at 12:37

## 2025-03-08 RX ADMIN — METOPROLOL TARTRATE 12.5 MG: 25 TABLET, FILM COATED ORAL at 08:32

## 2025-03-08 RX ADMIN — POLYETHYLENE GLYCOL 3350 17 G: 17 POWDER, FOR SOLUTION ORAL at 08:30

## 2025-03-08 RX ADMIN — OXYCODONE 5 MG: 5 TABLET ORAL at 03:49

## 2025-03-08 RX ADMIN — ACETAMINOPHEN 650 MG: 325 TABLET ORAL at 04:56

## 2025-03-08 RX ADMIN — BUSPIRONE HYDROCHLORIDE 30 MG: 10 TABLET ORAL at 08:31

## 2025-03-08 RX ADMIN — FERROUS SULFATE TAB EC 325 MG (65 MG FE EQUIVALENT) 325 MG: 325 (65 FE) TABLET DELAYED RESPONSE at 08:32

## 2025-03-08 RX ADMIN — INSULIN LISPRO 4 UNITS: 100 INJECTION, SOLUTION INTRAVENOUS; SUBCUTANEOUS at 11:59

## 2025-03-08 RX ADMIN — PREDNISONE 40 MG: 20 TABLET ORAL at 08:31

## 2025-03-08 RX ADMIN — HEPARIN SODIUM 2000 UNITS: 1000 INJECTION INTRAVENOUS; SUBCUTANEOUS at 05:52

## 2025-03-08 RX ADMIN — IPRATROPIUM BROMIDE AND ALBUTEROL SULFATE 1 DOSE: .5; 2.5 SOLUTION RESPIRATORY (INHALATION) at 08:17

## 2025-03-08 RX ADMIN — MEGESTROL ACETATE 20 MG: 20 TABLET ORAL at 09:17

## 2025-03-08 RX ADMIN — SODIUM CHLORIDE, PRESERVATIVE FREE 10 ML: 5 INJECTION INTRAVENOUS at 08:36

## 2025-03-08 RX ADMIN — HEPARIN SODIUM 13 UNITS/KG/HR: 10000 INJECTION, SOLUTION INTRAVENOUS at 08:52

## 2025-03-08 ASSESSMENT — PAIN SCALES - GENERAL
PAINLEVEL_OUTOF10: 0
PAINLEVEL_OUTOF10: 9
PAINLEVEL_OUTOF10: 1
PAINLEVEL_OUTOF10: 3

## 2025-03-08 ASSESSMENT — PAIN DESCRIPTION - LOCATION
LOCATION: HIP
LOCATION: PELVIS;HIP;BACK

## 2025-03-08 ASSESSMENT — PAIN DESCRIPTION - DESCRIPTORS
DESCRIPTORS: ACHING;DISCOMFORT
DESCRIPTORS: ACHING;DISCOMFORT

## 2025-03-08 NOTE — PROGRESS NOTES
CLINICAL PHARMACY NOTE: MEDS TO BEDS    Total # of Prescriptions Filled: 4   The following medications were delivered to the patient:  Ferosul 325mg tabs  Megestrol acetate 20mg tabs  Prednisone 20mg tabs  Azithromycin 250mg tabs    Additional Documentation:  Delivered to pt + 1 in room 2014 on 3/8 by KAREN at 2:03P. Copay was $2.42 paid with cash

## 2025-03-08 NOTE — PLAN OF CARE
Problem: Discharge Planning  Goal: Discharge to home or other facility with appropriate resources  3/8/2025 1422 by Scarlett Stokes, RN  Flowsheets  Taken 3/8/2025 1422  Discharge to home or other facility with appropriate resources: Identify barriers to discharge with patient and caregiver  Taken 3/8/2025 0800  Discharge to home or other facility with appropriate resources: Identify barriers to discharge with patient and caregiver  3/8/2025 0054 by Lori Karimi, RN  Outcome: Progressing

## 2025-03-08 NOTE — PROGRESS NOTES
Pt was discharged home with all personal belongings, all discharge instructions gone over/all questions answered.

## 2025-03-08 NOTE — CARE COORDINATION
Transitional planning    Attempted to speak to patient. She is sleeping soundly and did not awaken to several verbal stimulations.     1450 Spoke to patient about plan for discharge Plan is home. She needs a cab ride. Going to [address]  in [place].Her phone # is [phone] [address] [employer] booked for 164. CM gave confirmation slip to patient RN. Confirmation # [id number]

## 2025-03-08 NOTE — PROGRESS NOTES
[facility]  Internal Medicine Teaching Residency Program  Inpatient Daily Progress Note  ______________________________________________________________________________    Patient: Nancy L Haase  YOB: 1960   MRN:[id number]    Acct: [id number]     Room: 2014/2014-01  Admit date: 3/2/2025  Today's date: 03/08/25  Number of days in the hospital: 6    SUBJECTIVE   Admitting Diagnosis: Postmenopausal bleeding    CC: Postmenopausal bleeding    Patient seen and examined bedside.  No overnight events  This morning patient was anxious, tachycardic with heart rate 110s  No further episodes of vaginal bleeding  Currently on heparin infusion  Hemoglobin 8.4-8- 7.5    Will recheck hemoglobin if stable plan is to discharge today on Eliquis    BRIEF HISTORY     The patient is a pleasant 64 y.o. female with a PMHx significant for      A-fib on Eliquis  COPD  Iron deficiency anemia type 2 diabetes mellitus  CKD  Congestive heart failure     presents with a chief complaint of abnormal vaginal bleeding and passing blood clots from the vagina.  Patient reported that she started passing blood clots from November, and subsequently she stopped passing blood clots but was having bleeding from the vagina.  She mentioned that the bleeding has not stopped since then and she stopped passing blood clots until yesterday.  She reported that she again passed blood clot yesterday and since then she has not stopped passing blood clots since then.  She reported nothing improves or worsens the bleeding.     In the emergency department her initial blood pressure was 1 lower side with 83/52 mmHg and MAP of 55 mmHg.  Initial labs revealed worsening of creatinine of 1.9, glucose 166.  CBC revealed WBC 9.6, hemoglobin 10.4.  Obstetrics/gynecology was consulted from emergency department and patient underwent transvaginal sonography which revealed unremarkable sonographic appearance of the uterus.   Continue Lopressor 12.5 Mg 2 times a day.  Currently on heparin infusion.  Will repeat H&H and if stable switch to Eliquis on discharge     COPD : - Will consider starting patient on breathing treatment as required.     T2DM :-     Takes Trulicity at home  Medium dose sliding scale.     CKD :-      Baseline creatinine around 2.0  Continue to monitor.  Avoid nephrotoxic drugs.    Diet: ADULT DIET; Regular; okay for milk and milk products   DVT ppx : Will hold off on anticoagulants   GI ppx: Not required for now     PT/OT: On board  Discharge Planning / SW: Possible discharge today      Jeremy Michelle MD  Internal Medicine Resident, PGY-2  [place], [place]  3/8/2025,11:13 AM

## 2025-03-08 NOTE — DISCHARGE INSTRUCTIONS
You were admitted to hospital with postmenopausal bleeding and low hemoglobin  You were treated with blood transfusion, underwent hysteroscopic procedure  Start taking Megace 20 Mg 2 times a day  Starting prednisone 40 Mg for 2 more days  Complete the course of azithromycin.  250 Mg daily for 2 days  Continue taking-year-old medications as prescribed  Follow-up with PCP in 1 week  Follow-up with OB/GYN in 2 weeks  In case of worsening symptoms or new symptoms arise, please visit ER

## 2025-03-08 NOTE — PLAN OF CARE
Problem: Chronic Conditions and Co-morbidities  Goal: Patient's chronic conditions and co-morbidity symptoms are monitored and maintained or improved  Outcome: Progressing     Problem: Discharge Planning  Goal: Discharge to home or other facility with appropriate resources  Outcome: Progressing     Problem: Pain  Goal: Verbalizes/displays adequate comfort level or baseline comfort level  Outcome: Progressing     Problem: Skin/Tissue Integrity  Goal: Skin integrity remains intact  Description: 1.  Monitor for areas of redness and/or skin breakdown  2.  Assess vascular access sites hourly  3.  Every 4-6 hours minimum:  Change oxygen saturation probe site  4.  Every 4-6 hours:  If on nasal continuous positive airway pressure, respiratory therapy assess nares and determine need for appliance change or resting period  Outcome: Progressing     Problem: Safety - Adult  Goal: Free from fall injury  Outcome: Progressing     Problem: ABCDS Injury Assessment  Goal: Absence of physical injury  Outcome: Progressing     Problem: Respiratory - Adult  Goal: Achieves optimal ventilation and oxygenation  3/8/2025 0054 by Lori Karimi RN  Outcome: Progressing  3/7/2025 2049 by Kathi Chaudhry RCP  Outcome: Progressing

## 2025-03-08 NOTE — PLAN OF CARE
Problem: Respiratory - Adult  Goal: Achieves optimal ventilation and oxygenation  3/7/2025 2049 by Kathi Chaudhry, CECY  Outcome: Progressing   BRONCHOSPASM/BRONCHOCONSTRICTION     [x]         IMPROVE AERATION/BREATH SOUNDS  [x]   ADMINISTER BRONCHODILATOR THERAPY AS APPROPRIATE  [x]   ASSESS BREATH SOUNDS  []   IMPLEMENT AEROSOL/MDI PROTOCOL  [x]   PATIENT EDUCATION AS NEEDED     Anesthesia Pre Eval Note    Anesthesia ROS/Med Hx        Anesthetic Complication History:  Patient does not have a history of anesthetic complications      Pulmonary Review:  Patient does not have a pulmonary history      Neuro/Psych Review:  Patient does not have a neuro/psych history       Cardiovascular Review:  Patient does not have a cardiovascular history       GI/HEPATIC/RENAL Review:  Patient does not have a GI/hepatic/renalhistory       End/Other Review:  Patient does not have an endo/other history    Additional Results:     ALLERGIES:  No Known Allergies       Lab Results       Component                Value               Date                       WBC                      7.3                 02/07/2018                 RBC                      5.27 (H)            02/07/2018                 HGB                      13.8                02/07/2018                 MCHC                     32.7                02/07/2018                 SODIUM                   141                 03/06/2018                 POTASSIUM                4.2                 03/06/2018                 CHLORIDE                 104                 03/06/2018                 CO2                      29                  03/06/2018                 GLUCOSE                  91                  03/06/2018                 BUN                      16                  03/06/2018                 CREATININE               0.89                03/06/2018                 GFRA                     >90                 03/06/2018                 GFRNA                    83                  03/06/2018                 CALCIUM                  9.6                 03/06/2018                 PLT                      266                 02/07/2018                 PTT                      27                  07/11/2017                 PTT                      NOT APPLICABLE      07/11/2017                 INR                      1.1                 07/11/2017              History reviewed. No pertinent past medical history.    Past Surgical History:  No date: Ovarian cyst removal  No date: Sinus surgery       Prior to Admission medications :  Medication hydroxychloroquine (PLAQUENIL) 200 MG tablet, Sig TAKE 1 TABLET BY MOUTH TWICE DAILY WITH FOOD, Start Date 3/8/19, End Date , Taking? , Authorizing Provider Kacie White MD            Relevant Problems   No relevant active problems       Physical Exam     Airway   Mallampati: II  TM Distance: >3 FB  Neck ROM: Full  Neck: Non-tender and Able to place in sniff position  TMJ Mobility: Good    Cardiovascular  Cardiovascular exam normal  Cardio Rhythm: Regular  Cardio Rate: Normal    Head Assessment  Head assessment: Normocephalic and Atraumatic    General Assessment  General Assessment: Alert and oriented and No acute distress    Dental Exam  Dental exam normal    Pulmonary Exam  Pulmonary exam normal  Breath sounds clear to auscultation:  Yes    Abdominal Exam  Abdominal exam normal      Anesthesia Plan    ASA Status: 2    Anesthesia Type: General    Induction: Intravenous  Maintenance: TIVA      Risks Discussed: Intra-operative Awareness    Post-op Pain Management: Per Surgeon      Checklist  Reviewed: NPO Status, Allergies, Medications, Problem list, Past Med History and Patient Summary    Informed Consent  The proposed anesthetic plan, including its risks and benefits, have been discussed with the Patient  - along with the risks and benefits of alternatives.  Questions were encouraged and answered and the patient and/or representative understands and agrees to proceed.     Blood Products: Not Anticipated

## 2025-03-08 NOTE — PLAN OF CARE
Problem: Respiratory - Adult  Goal: Achieves optimal ventilation and oxygenation  3/8/2025 1239 by Jules Bowles RCP  Outcome: Progressing  Flowsheets  Taken 3/8/2025 1239 by Jules Bowles RCP  Achieves optimal ventilation and oxygenation:   Respiratory therapy support as indicated   Assess the need for suctioning and aspirate as needed   Position to facilitate oxygenation and minimize respiratory effort   Assess for changes in respiratory status   Assess for changes in mentation and behavior   Oxygen supplementation based on oxygen saturation or arterial blood gases   Encourage broncho-pulmonary hygiene including cough, deep breathe, incentive spirometry   Assess and instruct to report shortness of breath or any respiratory difficulty

## 2025-03-09 ENCOUNTER — APPOINTMENT (OUTPATIENT)
Dept: CT IMAGING | Age: 65
End: 2025-03-09
Payer: MEDICARE

## 2025-03-09 ENCOUNTER — APPOINTMENT (OUTPATIENT)
Dept: ULTRASOUND IMAGING | Age: 65
End: 2025-03-09
Payer: MEDICARE

## 2025-03-09 ENCOUNTER — APPOINTMENT (OUTPATIENT)
Dept: GENERAL RADIOLOGY | Age: 65
End: 2025-03-09
Payer: MEDICARE

## 2025-03-09 ENCOUNTER — HOSPITAL ENCOUNTER (INPATIENT)
Age: 65
LOS: 6 days | Discharge: HOME | End: 2025-03-15
Attending: EMERGENCY MEDICINE | Admitting: INTERNAL MEDICINE
Payer: MEDICARE

## 2025-03-09 DIAGNOSIS — G47.30 SLEEP APNEA, UNSPECIFIED TYPE: ICD-10-CM

## 2025-03-09 DIAGNOSIS — J44.1 COPD WITH ACUTE EXACERBATION (HCC): ICD-10-CM

## 2025-03-09 DIAGNOSIS — I50.23 ACUTE ON CHRONIC SYSTOLIC CONGESTIVE HEART FAILURE (HCC): Primary | ICD-10-CM

## 2025-03-09 PROBLEM — J96.21 ACUTE AND CHRONIC RESPIRATORY FAILURE WITH HYPOXIA (HCC): Status: ACTIVE | Noted: 2025-03-09

## 2025-03-09 LAB
ALBUMIN SERPL-MCNC: 3.3 G/DL (ref 3.5–5.2)
ALBUMIN/GLOB SERPL: 1.3 {RATIO} (ref 1–2.5)
ALP SERPL-CCNC: 67 U/L (ref 35–104)
ALT SERPL-CCNC: 52 U/L (ref 10–35)
ANION GAP SERPL CALCULATED.3IONS-SCNC: 10 MMOL/L (ref 9–16)
AST SERPL-CCNC: 30 U/L (ref 10–35)
B PARAP IS1001 DNA NPH QL NAA+NON-PROBE: NOT DETECTED
B PERT DNA SPEC QL NAA+PROBE: NOT DETECTED
BASOPHILS # BLD: 0.05 K/UL (ref 0–0.2)
BASOPHILS NFR BLD: 1 % (ref 0–2)
BILIRUB DIRECT SERPL-MCNC: 0.1 MG/DL (ref 0–0.2)
BILIRUB INDIRECT SERPL-MCNC: 0.1 MG/DL (ref 0–1)
BILIRUB SERPL-MCNC: 0.2 MG/DL (ref 0–1.2)
BNP SERPL-MCNC: 6781 PG/ML (ref 0–125)
BUN BLD-MCNC: 18 MG/DL (ref 8–26)
BUN BLD-MCNC: 19 MG/DL (ref 8–26)
BUN SERPL-MCNC: 18 MG/DL (ref 8–23)
C PNEUM DNA NPH QL NAA+NON-PROBE: NOT DETECTED
CA-I BLD-SCNC: 1.22 MMOL/L (ref 1.15–1.33)
CA-I BLD-SCNC: 1.22 MMOL/L (ref 1.15–1.33)
CALCIUM SERPL-MCNC: 8.5 MG/DL (ref 8.6–10.4)
CHLORIDE BLD-SCNC: 110 MMOL/L (ref 98–107)
CHLORIDE BLD-SCNC: 111 MMOL/L (ref 98–107)
CHLORIDE SERPL-SCNC: 113 MMOL/L (ref 98–107)
CO2 BLD CALC-SCNC: 22 MMOL/L (ref 22–30)
CO2 BLD CALC-SCNC: 22 MMOL/L (ref 22–30)
CO2 SERPL-SCNC: 19 MMOL/L (ref 20–31)
CREAT SERPL-MCNC: 1.3 MG/DL (ref 0.6–0.9)
EGFR, POC: 46 ML/MIN/1.73M2
EGFR, POC: 46 ML/MIN/1.73M2
EOSINOPHIL # BLD: <0.03 K/UL (ref 0–0.44)
EOSINOPHILS RELATIVE PERCENT: 0 % (ref 1–4)
ERYTHROCYTE [DISTWIDTH] IN BLOOD BY AUTOMATED COUNT: 18.2 % (ref 11.8–14.4)
FLUAV RNA NPH QL NAA+NON-PROBE: NOT DETECTED
FLUBV RNA NPH QL NAA+NON-PROBE: NOT DETECTED
GFR, ESTIMATED: 46 ML/MIN/1.73M2
GLOBULIN SER CALC-MCNC: 2.6 G/DL
GLUCOSE BLD-MCNC: 191 MG/DL (ref 74–100)
GLUCOSE BLD-MCNC: 274 MG/DL (ref 74–100)
GLUCOSE BLD-MCNC: 336 MG/DL (ref 65–105)
GLUCOSE BLD-MCNC: 353 MG/DL (ref 65–105)
GLUCOSE SERPL-MCNC: 189 MG/DL (ref 74–99)
HADV DNA NPH QL NAA+NON-PROBE: NOT DETECTED
HCO3 VENOUS: 22.2 MMOL/L (ref 22–29)
HCO3 VENOUS: 22.3 MMOL/L (ref 22–29)
HCOV 229E RNA NPH QL NAA+NON-PROBE: NOT DETECTED
HCOV HKU1 RNA NPH QL NAA+NON-PROBE: NOT DETECTED
HCOV NL63 RNA NPH QL NAA+NON-PROBE: NOT DETECTED
HCOV OC43 RNA NPH QL NAA+NON-PROBE: NOT DETECTED
HCT VFR BLD AUTO: 23 % (ref 36–46)
HCT VFR BLD AUTO: 24.8 % (ref 36.3–47.1)
HCT VFR BLD AUTO: 32 % (ref 36–46)
HGB BLD-MCNC: 7.8 G/DL (ref 11.9–15.1)
HMPV RNA NPH QL NAA+NON-PROBE: NOT DETECTED
HPIV1 RNA NPH QL NAA+NON-PROBE: NOT DETECTED
HPIV2 RNA NPH QL NAA+NON-PROBE: NOT DETECTED
HPIV3 RNA NPH QL NAA+NON-PROBE: NOT DETECTED
HPIV4 RNA NPH QL NAA+NON-PROBE: NOT DETECTED
IMM GRANULOCYTES # BLD AUTO: 0.07 K/UL (ref 0–0.3)
IMM GRANULOCYTES NFR BLD: 1 %
INR PPP: 1.2
L PNEUMO1 AG UR QL IA.RAPID: NEGATIVE
LACTIC ACID, WHOLE BLOOD: 4.1 MMOL/L (ref 0.7–2.1)
LACTIC ACID, WHOLE BLOOD: 4.2 MMOL/L (ref 0.7–2.1)
LYMPHOCYTES NFR BLD: 1.56 K/UL (ref 1.1–3.7)
LYMPHOCYTES RELATIVE PERCENT: 14 % (ref 24–43)
M PNEUMO DNA NPH QL NAA+NON-PROBE: NOT DETECTED
MCH RBC QN AUTO: 26.9 PG (ref 25.2–33.5)
MCHC RBC AUTO-ENTMCNC: 31.5 G/DL (ref 28.4–34.8)
MCV RBC AUTO: 85.5 FL (ref 82.6–102.9)
MONOCYTES NFR BLD: 0.82 K/UL (ref 0.1–1.2)
MONOCYTES NFR BLD: 8 % (ref 3–12)
NEGATIVE BASE EXCESS, VEN: 2.7 MMOL/L (ref 0–2)
NEGATIVE BASE EXCESS, VEN: 4.9 MMOL/L (ref 0–2)
NEUTROPHILS NFR BLD: 77 % (ref 36–65)
NEUTS SEG NFR BLD: 8.3 K/UL (ref 1.5–8.1)
NRBC BLD-RTO: 0 PER 100 WBC
O2 SAT, VEN: 60 % (ref 60–85)
O2 SAT, VEN: 94.6 % (ref 60–85)
PCO2 VENOUS: 38.5 MM HG (ref 41–51)
PCO2 VENOUS: 48.7 MM HG (ref 41–51)
PH VENOUS: 7.27 (ref 7.32–7.43)
PH VENOUS: 7.37 (ref 7.32–7.43)
PLATELET # BLD AUTO: 218 K/UL (ref 138–453)
PMV BLD AUTO: 10.8 FL (ref 8.1–13.5)
PO2 VENOUS: 31.9 MM HG (ref 30–50)
PO2 VENOUS: 84.4 MM HG (ref 30–50)
POC ANION GAP: 10 MMOL/L (ref 7–16)
POC ANION GAP: 13 MMOL/L (ref 7–16)
POC CREATININE: 1.3 MG/DL (ref 0.51–1.19)
POC CREATININE: 1.3 MG/DL (ref 0.51–1.19)
POC HEMOGLOBIN (CALC): 10.9 G/DL (ref 12–16)
POC HEMOGLOBIN (CALC): 7.9 G/DL (ref 12–16)
POC LACTIC ACID: 1.8 MMOL/L (ref 0.56–1.39)
POC LACTIC ACID: 2.4 MMOL/L (ref 0.56–1.39)
POTASSIUM BLD-SCNC: 3.7 MMOL/L (ref 3.5–4.5)
POTASSIUM BLD-SCNC: 4.5 MMOL/L (ref 3.5–4.5)
POTASSIUM SERPL-SCNC: 3.8 MMOL/L (ref 3.7–5.3)
PROT SERPL-MCNC: 5.9 G/DL (ref 6.6–8.7)
PROTHROMBIN TIME: 15.5 SEC (ref 11.7–14.9)
RBC # BLD AUTO: 2.9 M/UL (ref 3.95–5.11)
RBC # BLD: ABNORMAL 10*6/UL
RSV RNA NPH QL NAA+NON-PROBE: NOT DETECTED
RV+EV RNA NPH QL NAA+NON-PROBE: NOT DETECTED
S PNEUM AG SPEC QL: NEGATIVE
SARS-COV-2 RNA NPH QL NAA+NON-PROBE: NOT DETECTED
SODIUM BLD-SCNC: 142 MMOL/L (ref 138–146)
SODIUM BLD-SCNC: 144 MMOL/L (ref 138–146)
SODIUM SERPL-SCNC: 142 MMOL/L (ref 136–145)
SPECIMEN DESCRIPTION: NORMAL
SPECIMEN SOURCE: NORMAL
TROPONIN I SERPL HS-MCNC: 18 NG/L (ref 0–14)
TROPONIN I SERPL HS-MCNC: 22 NG/L (ref 0–14)
TROPONIN I SERPL HS-MCNC: 31 NG/L (ref 0–14)
TROPONIN I SERPL HS-MCNC: 34 NG/L (ref 0–14)
WBC OTHER # BLD: 10.8 K/UL (ref 3.5–11.3)

## 2025-03-09 PROCEDURE — 85025 COMPLETE CBC W/AUTO DIFF WBC: CPT

## 2025-03-09 PROCEDURE — 2500000003 HC RX 250 WO HCPCS

## 2025-03-09 PROCEDURE — 80076 HEPATIC FUNCTION PANEL: CPT

## 2025-03-09 PROCEDURE — 2060000000 HC ICU INTERMEDIATE R&B

## 2025-03-09 PROCEDURE — 96375 TX/PRO/DX INJ NEW DRUG ADDON: CPT

## 2025-03-09 PROCEDURE — 5A09557 ASSISTANCE WITH RESPIRATORY VENTILATION, GREATER THAN 96 CONSECUTIVE HOURS, CONTINUOUS POSITIVE AIRWAY PRESSURE: ICD-10-PCS

## 2025-03-09 PROCEDURE — 6360000002 HC RX W HCPCS

## 2025-03-09 PROCEDURE — 87040 BLOOD CULTURE FOR BACTERIA: CPT

## 2025-03-09 PROCEDURE — 6370000000 HC RX 637 (ALT 250 FOR IP)

## 2025-03-09 PROCEDURE — 83605 ASSAY OF LACTIC ACID: CPT

## 2025-03-09 PROCEDURE — 82330 ASSAY OF CALCIUM: CPT

## 2025-03-09 PROCEDURE — 99285 EMERGENCY DEPT VISIT HI MDM: CPT

## 2025-03-09 PROCEDURE — 87449 NOS EACH ORGANISM AG IA: CPT

## 2025-03-09 PROCEDURE — 85610 PROTHROMBIN TIME: CPT

## 2025-03-09 PROCEDURE — 94761 N-INVAS EAR/PLS OXIMETRY MLT: CPT

## 2025-03-09 PROCEDURE — 99222 1ST HOSP IP/OBS MODERATE 55: CPT | Performed by: SURGERY

## 2025-03-09 PROCEDURE — 87899 AGENT NOS ASSAY W/OPTIC: CPT

## 2025-03-09 PROCEDURE — 6360000004 HC RX CONTRAST MEDICATION

## 2025-03-09 PROCEDURE — 2580000003 HC RX 258

## 2025-03-09 PROCEDURE — 0202U NFCT DS 22 TRGT SARS-COV-2: CPT

## 2025-03-09 PROCEDURE — 85014 HEMATOCRIT: CPT

## 2025-03-09 PROCEDURE — 71046 X-RAY EXAM CHEST 2 VIEWS: CPT

## 2025-03-09 PROCEDURE — 71260 CT THORAX DX C+: CPT

## 2025-03-09 PROCEDURE — 94640 AIRWAY INHALATION TREATMENT: CPT

## 2025-03-09 PROCEDURE — 94660 CPAP INITIATION&MGMT: CPT

## 2025-03-09 PROCEDURE — 93005 ELECTROCARDIOGRAM TRACING: CPT | Performed by: INTERNAL MEDICINE

## 2025-03-09 PROCEDURE — 82565 ASSAY OF CREATININE: CPT

## 2025-03-09 PROCEDURE — 82803 BLOOD GASES ANY COMBINATION: CPT

## 2025-03-09 PROCEDURE — 82947 ASSAY GLUCOSE BLOOD QUANT: CPT

## 2025-03-09 PROCEDURE — 76705 ECHO EXAM OF ABDOMEN: CPT

## 2025-03-09 PROCEDURE — 84484 ASSAY OF TROPONIN QUANT: CPT

## 2025-03-09 PROCEDURE — 80048 BASIC METABOLIC PNL TOTAL CA: CPT

## 2025-03-09 PROCEDURE — 2700000000 HC OXYGEN THERAPY PER DAY

## 2025-03-09 PROCEDURE — 36415 COLL VENOUS BLD VENIPUNCTURE: CPT

## 2025-03-09 PROCEDURE — 96374 THER/PROPH/DIAG INJ IV PUSH: CPT

## 2025-03-09 PROCEDURE — 84520 ASSAY OF UREA NITROGEN: CPT

## 2025-03-09 PROCEDURE — 83880 ASSAY OF NATRIURETIC PEPTIDE: CPT

## 2025-03-09 PROCEDURE — 80051 ELECTROLYTE PANEL: CPT

## 2025-03-09 RX ORDER — INSULIN LISPRO 100 [IU]/ML
0-8 INJECTION, SOLUTION INTRAVENOUS; SUBCUTANEOUS
Status: DISCONTINUED | OUTPATIENT
Start: 2025-03-09 | End: 2025-03-15 | Stop reason: HOSPADM

## 2025-03-09 RX ORDER — IPRATROPIUM BROMIDE AND ALBUTEROL SULFATE 2.5; .5 MG/3ML; MG/3ML
1 SOLUTION RESPIRATORY (INHALATION)
Status: DISCONTINUED | OUTPATIENT
Start: 2025-03-09 | End: 2025-03-10

## 2025-03-09 RX ORDER — BUDESONIDE AND FORMOTEROL FUMARATE DIHYDRATE 160; 4.5 UG/1; UG/1
2 AEROSOL RESPIRATORY (INHALATION)
Status: DISCONTINUED | OUTPATIENT
Start: 2025-03-09 | End: 2025-03-15 | Stop reason: HOSPADM

## 2025-03-09 RX ORDER — ASPIRIN 81 MG/1
81 TABLET, CHEWABLE ORAL DAILY
Status: DISCONTINUED | OUTPATIENT
Start: 2025-03-09 | End: 2025-03-10

## 2025-03-09 RX ORDER — LORAZEPAM 2 MG/ML
0.5 INJECTION INTRAMUSCULAR ONCE
Status: COMPLETED | OUTPATIENT
Start: 2025-03-09 | End: 2025-03-09

## 2025-03-09 RX ORDER — SODIUM CHLORIDE 0.9 % (FLUSH) 0.9 %
5-40 SYRINGE (ML) INJECTION EVERY 12 HOURS SCHEDULED
Status: DISCONTINUED | OUTPATIENT
Start: 2025-03-09 | End: 2025-03-15 | Stop reason: HOSPADM

## 2025-03-09 RX ORDER — IOPAMIDOL 755 MG/ML
75 INJECTION, SOLUTION INTRAVASCULAR
Status: COMPLETED | OUTPATIENT
Start: 2025-03-09 | End: 2025-03-09

## 2025-03-09 RX ORDER — FAMOTIDINE 20 MG/1
20 TABLET, FILM COATED ORAL 2 TIMES DAILY
Status: DISCONTINUED | OUTPATIENT
Start: 2025-03-09 | End: 2025-03-11

## 2025-03-09 RX ORDER — BUMETANIDE 0.25 MG/ML
1 INJECTION, SOLUTION INTRAMUSCULAR; INTRAVENOUS 2 TIMES DAILY
Status: DISCONTINUED | OUTPATIENT
Start: 2025-03-09 | End: 2025-03-10

## 2025-03-09 RX ORDER — HEPARIN SODIUM 5000 [USP'U]/ML
5000 INJECTION, SOLUTION INTRAVENOUS; SUBCUTANEOUS EVERY 8 HOURS SCHEDULED
Status: DISCONTINUED | OUTPATIENT
Start: 2025-03-09 | End: 2025-03-10

## 2025-03-09 RX ORDER — FUROSEMIDE 10 MG/ML
40 INJECTION INTRAMUSCULAR; INTRAVENOUS ONCE
Status: COMPLETED | OUTPATIENT
Start: 2025-03-09 | End: 2025-03-09

## 2025-03-09 RX ORDER — LISINOPRIL 20 MG/1
10 TABLET ORAL DAILY
Status: DISCONTINUED | OUTPATIENT
Start: 2025-03-09 | End: 2025-03-15 | Stop reason: HOSPADM

## 2025-03-09 RX ORDER — POLYETHYLENE GLYCOL 3350 17 G/17G
17 POWDER, FOR SOLUTION ORAL DAILY PRN
Status: DISCONTINUED | OUTPATIENT
Start: 2025-03-09 | End: 2025-03-12

## 2025-03-09 RX ORDER — POTASSIUM CHLORIDE 1500 MG/1
40 TABLET, EXTENDED RELEASE ORAL PRN
Status: DISCONTINUED | OUTPATIENT
Start: 2025-03-09 | End: 2025-03-15 | Stop reason: HOSPADM

## 2025-03-09 RX ORDER — FERROUS SULFATE 325(65) MG
325 TABLET, DELAYED RELEASE (ENTERIC COATED) ORAL
Status: DISCONTINUED | OUTPATIENT
Start: 2025-03-10 | End: 2025-03-15 | Stop reason: HOSPADM

## 2025-03-09 RX ORDER — SODIUM CHLORIDE 0.9 % (FLUSH) 0.9 %
5-40 SYRINGE (ML) INJECTION PRN
Status: DISCONTINUED | OUTPATIENT
Start: 2025-03-09 | End: 2025-03-15 | Stop reason: HOSPADM

## 2025-03-09 RX ORDER — IPRATROPIUM BROMIDE AND ALBUTEROL SULFATE 2.5; .5 MG/3ML; MG/3ML
1 SOLUTION RESPIRATORY (INHALATION) EVERY 4 HOURS PRN
Status: DISCONTINUED | OUTPATIENT
Start: 2025-03-09 | End: 2025-03-15 | Stop reason: HOSPADM

## 2025-03-09 RX ORDER — MEGESTROL ACETATE 20 MG/1
20 TABLET ORAL 2 TIMES DAILY
Status: DISCONTINUED | OUTPATIENT
Start: 2025-03-09 | End: 2025-03-15 | Stop reason: HOSPADM

## 2025-03-09 RX ORDER — DEXTROSE MONOHYDRATE 100 MG/ML
INJECTION, SOLUTION INTRAVENOUS CONTINUOUS PRN
Status: DISCONTINUED | OUTPATIENT
Start: 2025-03-09 | End: 2025-03-15 | Stop reason: HOSPADM

## 2025-03-09 RX ORDER — ALBUTEROL SULFATE 0.83 MG/ML
2.5 SOLUTION RESPIRATORY (INHALATION)
Status: DISCONTINUED | OUTPATIENT
Start: 2025-03-09 | End: 2025-03-09

## 2025-03-09 RX ORDER — MAGNESIUM SULFATE IN WATER 40 MG/ML
2000 INJECTION, SOLUTION INTRAVENOUS PRN
Status: DISCONTINUED | OUTPATIENT
Start: 2025-03-09 | End: 2025-03-15 | Stop reason: HOSPADM

## 2025-03-09 RX ORDER — BUSPIRONE HYDROCHLORIDE 10 MG/1
30 TABLET ORAL 3 TIMES DAILY PRN
Status: DISCONTINUED | OUTPATIENT
Start: 2025-03-09 | End: 2025-03-11

## 2025-03-09 RX ORDER — ONDANSETRON 2 MG/ML
4 INJECTION INTRAMUSCULAR; INTRAVENOUS EVERY 6 HOURS PRN
Status: DISCONTINUED | OUTPATIENT
Start: 2025-03-09 | End: 2025-03-15 | Stop reason: HOSPADM

## 2025-03-09 RX ORDER — DICYCLOMINE HYDROCHLORIDE 10 MG/1
10 CAPSULE ORAL 4 TIMES DAILY
Status: DISCONTINUED | OUTPATIENT
Start: 2025-03-09 | End: 2025-03-12

## 2025-03-09 RX ORDER — NITROGLYCERIN 0.4 MG/1
0.4 TABLET SUBLINGUAL EVERY 5 MIN PRN
Status: DISCONTINUED | OUTPATIENT
Start: 2025-03-09 | End: 2025-03-15 | Stop reason: HOSPADM

## 2025-03-09 RX ORDER — ACETAMINOPHEN 325 MG/1
650 TABLET ORAL EVERY 6 HOURS PRN
Status: DISCONTINUED | OUTPATIENT
Start: 2025-03-09 | End: 2025-03-15 | Stop reason: HOSPADM

## 2025-03-09 RX ORDER — NITROGLYCERIN 20 MG/100ML
5-200 INJECTION INTRAVENOUS CONTINUOUS
Status: DISCONTINUED | OUTPATIENT
Start: 2025-03-09 | End: 2025-03-09

## 2025-03-09 RX ORDER — ATORVASTATIN CALCIUM 40 MG/1
40 TABLET, FILM COATED ORAL DAILY
Status: DISCONTINUED | OUTPATIENT
Start: 2025-03-09 | End: 2025-03-15 | Stop reason: HOSPADM

## 2025-03-09 RX ORDER — GLUCAGON 1 MG/ML
1 KIT INJECTION PRN
Status: DISCONTINUED | OUTPATIENT
Start: 2025-03-09 | End: 2025-03-15 | Stop reason: HOSPADM

## 2025-03-09 RX ORDER — NITROGLYCERIN 20 MG/100ML
5-200 INJECTION INTRAVENOUS CONTINUOUS
Status: DISCONTINUED | OUTPATIENT
Start: 2025-03-09 | End: 2025-03-10

## 2025-03-09 RX ORDER — ACETAMINOPHEN 650 MG/1
650 SUPPOSITORY RECTAL EVERY 6 HOURS PRN
Status: DISCONTINUED | OUTPATIENT
Start: 2025-03-09 | End: 2025-03-15 | Stop reason: HOSPADM

## 2025-03-09 RX ORDER — SODIUM CHLORIDE 9 MG/ML
INJECTION, SOLUTION INTRAVENOUS PRN
Status: DISCONTINUED | OUTPATIENT
Start: 2025-03-09 | End: 2025-03-15 | Stop reason: HOSPADM

## 2025-03-09 RX ORDER — POTASSIUM CHLORIDE 7.45 MG/ML
10 INJECTION INTRAVENOUS PRN
Status: DISCONTINUED | OUTPATIENT
Start: 2025-03-09 | End: 2025-03-15 | Stop reason: HOSPADM

## 2025-03-09 RX ORDER — ONDANSETRON 4 MG/1
4 TABLET, ORALLY DISINTEGRATING ORAL EVERY 8 HOURS PRN
Status: DISCONTINUED | OUTPATIENT
Start: 2025-03-09 | End: 2025-03-15 | Stop reason: HOSPADM

## 2025-03-09 RX ORDER — BENZONATATE 100 MG/1
100 CAPSULE ORAL ONCE
Status: COMPLETED | OUTPATIENT
Start: 2025-03-09 | End: 2025-03-09

## 2025-03-09 RX ORDER — METOPROLOL TARTRATE 25 MG/1
12.5 TABLET, FILM COATED ORAL 2 TIMES DAILY
Status: DISCONTINUED | OUTPATIENT
Start: 2025-03-09 | End: 2025-03-15 | Stop reason: HOSPADM

## 2025-03-09 RX ADMIN — NITROGLYCERIN 50 MCG/MIN: 20 INJECTION INTRAVENOUS at 08:19

## 2025-03-09 RX ADMIN — HEPARIN SODIUM 5000 UNITS: 5000 INJECTION INTRAVENOUS; SUBCUTANEOUS at 20:22

## 2025-03-09 RX ADMIN — DICYCLOMINE HYDROCHLORIDE 10 MG: 10 CAPSULE ORAL at 17:39

## 2025-03-09 RX ADMIN — LISINOPRIL 10 MG: 20 TABLET ORAL at 14:37

## 2025-03-09 RX ADMIN — NITROGLYCERIN 5 MCG/MIN: 20 INJECTION INTRAVENOUS at 08:12

## 2025-03-09 RX ADMIN — CEFTRIAXONE SODIUM 1000 MG: 1 INJECTION, POWDER, FOR SOLUTION INTRAMUSCULAR; INTRAVENOUS at 10:32

## 2025-03-09 RX ADMIN — AZITHROMYCIN MONOHYDRATE 500 MG: 500 INJECTION, POWDER, LYOPHILIZED, FOR SOLUTION INTRAVENOUS at 13:58

## 2025-03-09 RX ADMIN — ALBUTEROL SULFATE 15 MG: 2.5 SOLUTION RESPIRATORY (INHALATION) at 08:38

## 2025-03-09 RX ADMIN — LORAZEPAM 0.5 MG: 2 INJECTION INTRAMUSCULAR; INTRAVENOUS at 07:58

## 2025-03-09 RX ADMIN — FAMOTIDINE 20 MG: 20 TABLET, FILM COATED ORAL at 14:37

## 2025-03-09 RX ADMIN — IPRATROPIUM BROMIDE AND ALBUTEROL SULFATE 1 DOSE: .5; 3 SOLUTION RESPIRATORY (INHALATION) at 06:06

## 2025-03-09 RX ADMIN — ALBUTEROL SULFATE 10 MG: 2.5 SOLUTION RESPIRATORY (INHALATION) at 10:51

## 2025-03-09 RX ADMIN — MEGESTROL ACETATE 20 MG: 20 TABLET ORAL at 14:37

## 2025-03-09 RX ADMIN — IPRATROPIUM BROMIDE AND ALBUTEROL SULFATE 1 DOSE: .5; 3 SOLUTION RESPIRATORY (INHALATION) at 08:17

## 2025-03-09 RX ADMIN — BUDESONIDE AND FORMOTEROL FUMARATE DIHYDRATE 2 PUFF: 160; 4.5 AEROSOL RESPIRATORY (INHALATION) at 20:51

## 2025-03-09 RX ADMIN — ATORVASTATIN CALCIUM 40 MG: 40 TABLET, FILM COATED ORAL at 14:38

## 2025-03-09 RX ADMIN — BUSPIRONE HYDROCHLORIDE 30 MG: 10 TABLET ORAL at 14:39

## 2025-03-09 RX ADMIN — DICYCLOMINE HYDROCHLORIDE 10 MG: 10 CAPSULE ORAL at 14:37

## 2025-03-09 RX ADMIN — FAMOTIDINE 20 MG: 20 TABLET, FILM COATED ORAL at 20:21

## 2025-03-09 RX ADMIN — HEPARIN SODIUM 5000 UNITS: 5000 INJECTION INTRAVENOUS; SUBCUTANEOUS at 13:49

## 2025-03-09 RX ADMIN — BUMETANIDE 1 MG: 0.25 INJECTION INTRAMUSCULAR; INTRAVENOUS at 22:57

## 2025-03-09 RX ADMIN — BUMETANIDE 1 MG: 0.25 INJECTION INTRAMUSCULAR; INTRAVENOUS at 14:38

## 2025-03-09 RX ADMIN — FUROSEMIDE 40 MG: 10 INJECTION, SOLUTION INTRAMUSCULAR; INTRAVENOUS at 08:12

## 2025-03-09 RX ADMIN — INSULIN LISPRO 6 UNITS: 100 INJECTION, SOLUTION INTRAVENOUS; SUBCUTANEOUS at 20:28

## 2025-03-09 RX ADMIN — IPRATROPIUM BROMIDE AND ALBUTEROL SULFATE 1 DOSE: .5; 2.5 SOLUTION RESPIRATORY (INHALATION) at 20:51

## 2025-03-09 RX ADMIN — DICYCLOMINE HYDROCHLORIDE 10 MG: 10 CAPSULE ORAL at 20:21

## 2025-03-09 RX ADMIN — WATER 40 MG: 1 INJECTION INTRAMUSCULAR; INTRAVENOUS; SUBCUTANEOUS at 13:48

## 2025-03-09 RX ADMIN — INSULIN LISPRO 8 UNITS: 100 INJECTION, SOLUTION INTRAVENOUS; SUBCUTANEOUS at 17:40

## 2025-03-09 RX ADMIN — IOPAMIDOL 75 ML: 755 INJECTION, SOLUTION INTRAVENOUS at 07:04

## 2025-03-09 RX ADMIN — SODIUM CHLORIDE, PRESERVATIVE FREE 10 ML: 5 INJECTION INTRAVENOUS at 20:26

## 2025-03-09 RX ADMIN — IPRATROPIUM BROMIDE AND ALBUTEROL SULFATE 1 DOSE: .5; 2.5 SOLUTION RESPIRATORY (INHALATION) at 15:45

## 2025-03-09 RX ADMIN — METOPROLOL TARTRATE 12.5 MG: 25 TABLET, FILM COATED ORAL at 14:38

## 2025-03-09 RX ADMIN — MEGESTROL ACETATE 20 MG: 20 TABLET ORAL at 20:22

## 2025-03-09 RX ADMIN — BENZONATATE 100 MG: 100 CAPSULE ORAL at 06:35

## 2025-03-09 RX ADMIN — ALBUTEROL SULFATE 15 MG: 2.5 SOLUTION RESPIRATORY (INHALATION) at 08:16

## 2025-03-09 ASSESSMENT — PAIN - FUNCTIONAL ASSESSMENT: PAIN_FUNCTIONAL_ASSESSMENT: 0-10

## 2025-03-09 ASSESSMENT — PAIN SCALES - GENERAL: PAINLEVEL_OUTOF10: 0

## 2025-03-09 NOTE — ED PROVIDER NOTES
[facility]     Emergency Department     Faculty Attestation    I performed a history and physical examination of the patient and discussed management with the resident. I reviewed the resident’s note and agree with the documented findings and plan of care. Any areas of disagreement are noted on the chart. I was personally present for the key portions of any procedures. I have documented in the chart those procedures where I was not present during the key portions. I have reviewed the emergency nurses triage note. I agree with the chief complaint, past medical history, past surgical history, allergies, medications, social and family history as documented unless otherwise noted below. Documentation of the HPI, Physical Exam and Medical Decision Making performed by medical students or scribes is based on my personal performance of the HPI, PE and MDM. For Physician Assistant/ Nurse Practitioner cases/documentation I have personally evaluated this patient and have completed at least one if not all key elements of the E/M (history, physical exam, and MDM). Additional findings are as noted.    Vital signs:   Vitals:    03/09/25 0601   Pulse: 96   Resp: 18   Temp: 98 °F (36.7 °C)      64-year-old female presents complaining of shortness of breath.  Recent admission for vaginal bleeding.  Patient underwent a D&C and IUD placement on Wednesday of last week.  She states she continues to have bleeding.  Patient also has a history of COPD, and tried her medications for both COPD and anxiety at home without relief. She is also eliquis and plavix. On exam, she is alert and afebrile.  Breath sounds diminished bilaterally.  Patient is tachypneic, and appears uncomfortable.  Cardiac exam with a tachycardic rate, regular rhythm.  Abdomen is soft and nontender.  Extremities with right leg edema.    EKG Interpretation    Interpreted by emergency department physician    Rhythm: normal sinus   Rate:  normal  Axis: normal  Ectopy: none  Conduction: normal  ST Segments: nonspecific changes  T Waves: non specific changes  Q Waves: none    Clinical Impression: non-specific EKG    Olivia Huddleston MD    Plan for CBC, BMP, EKG, chest x-ray, troponin, CT angiogram to rule out a pulmonary embolism          Olivia Huddleston M.D,  Attending Emergency  Physician           Olivia Huddleston MD  03/09/25 0606

## 2025-03-09 NOTE — ED PROVIDER NOTES
[facility] EMERGENCY DEPARTMENT  Emergency Department  Emergency Medicine Resident Turn-Over     Note Started: 9:39 AM EDT    Care of Nancy L Haase was assumed from Dr. Bradford and is being seen for SOB  .  The patient's initial evaluation and plan have been discussed with the prior provider who initially evaluated the patient.     EMERGENCY DEPARTMENT COURSE / MEDICAL DECISION MAKING:       MEDICATIONS GIVEN:  Orders Placed This Encounter   Medications    ipratropium 0.5 mg-albuterol 2.5 mg (DUONEB) nebulizer solution 1 Dose     Initiate RT Bronchodilator Protocol:   Yes - Inpatient Protocol    benzonatate (TESSALON) capsule 100 mg    iopamidol (ISOVUE-370) 76 % injection 75 mL    LORazepam (ATIVAN) injection 0.5 mg    DISCONTD: nitroGLYCERIN 200 mcg/mL in dextrose 5%     Titrate Infusion?:   Yes     Initial Infusion Dose::   5 mcg/min     Goal of Therapy is::   SBP less than 160 mmHg     Contact Provider if::   SBP less than 90 mmHg    furosemide (LASIX) injection 40 mg    albuterol (PROVENTIL) (2.5 MG/3ML) 0.083% nebulizer solution 2.5 mg     Initiate RT Bronchodilator Protocol:   Yes - Inpatient Protocol    nitroGLYCERIN 200 mcg/mL in dextrose 5%     Titrate Infusion?:   Yes     Initial Infusion Dose::   Other     Other (mcg/min)::   50 mcg/min     Goal of Therapy is::   SBP less than 160 mmHg     Contact Provider if::   SBP less than 90 mmHg    cefTRIAXone (ROCEPHIN) 1,000 mg in sterile water 10 mL IV syringe     Antimicrobial Indications:   COPD Exacerbation    azithromycin (ZITHROMAX) 500 mg in sodium chloride 0.9 % 250 mL IVPB ([id number])     Antimicrobial Indications:   COPD Exacerbation       LABS / RADIOLOGY:     Labs Reviewed   ELECTROLYTES PLUS - Abnormal; Notable for the following components:       Result Value    POC Chloride 110 (*)     All other components within normal limits   HGB/HCT - Abnormal; Notable for the following components:    POC Hemoglobin (calc) 7.9 (*)     POC Hematocrit   Tricuspid Valve: Mild regurgitation. Mildly elevated RVSP, consistent with mild pulmonary hypertension. The estimated RVSP is 35 mmHg.   Left Atrium: Left atrium is moderately dilated. Left atrial volume index is normal (16-34 mL/m2) mL/m2. LA Vol Index is  26 ml/m2 mL/m2.   Image quality is good.     XR CHEST PORTABLE  Result Date: 3/5/2025  EXAMINATION: ONE XRAY VIEW OF THE CHEST 3/5/2025 9:19 am COMPARISON: None. HISTORY: ORDERING SYSTEM PROVIDED HISTORY: SOB, r/o pneumonia TECHNOLOGIST PROVIDED HISTORY: SOB, r/o pneumonia Reason for Exam: Upright port, SOB, pna? FINDINGS: Trace right pleural effusion with hazy right lower lobe airspace disease.  No pneumothorax.  Normal cardiomediastinal silhouette     Trace right pleural effusion with hazy right lower lobe airspace disease. This could represent pneumonia in the appropriate clinical setting     CT ABDOMEN PELVIS W IV CONTRAST Additional Contrast? None  Result Date: 3/4/2025  EXAMINATION: CT OF THE ABDOMEN AND PELVIS WITH CONTRAST 3/4/2025 7:41 pm TECHNIQUE: CT of the abdomen and pelvis was performed with the administration of intravenous contrast. Multiplanar reformatted images are provided for review. Automated exposure control, iterative reconstruction, and/or weight based adjustment of the mA/kV was utilized to reduce the radiation dose to as low as reasonably achievable. COMPARISON: None. HISTORY: ORDERING SYSTEM PROVIDED HISTORY: postmenopausal bleeding with pelvic pain TECHNOLOGIST PROVIDED HISTORY: postmenopausal bleeding with pelvic pain Reason for Exam: postmenopausal bleeding with pelvic pain FINDINGS: Lower Chest: No significant abnormalities. Organs: Liver: Liver is normal. No focal lesions are seen. Spleen: Normal. Pancreas: Normal Gallbladder and bile ducts: No acute abnormality is seen. . Adrenal glands: Normal. No focal lesions are seen. Kidneys and ureters: Renal cortical simple cysts are seen. There is no hydronephrosis or calculi. Ureters are

## 2025-03-09 NOTE — PLAN OF CARE
Problem: Pain  Goal: Verbalizes/displays adequate comfort level or baseline comfort level  Outcome: Progressing-Patient assessed for pain level this shift via 0-10 scale, reassessed appropriately post-interventions.     Problem: Skin/Tissue Integrity  Goal: Skin integrity remains intact  Description: 1.  Monitor for areas of redness and/or skin breakdown  2.  Assess vascular access sites hourly  3.  Every 4-6 hours minimum:  Change oxygen saturation probe site  4.  Every 4-6 hours:  If on nasal continuous positive airway pressure, respiratory therapy assess nares and determine need for appliance change or resting period  Outcome: Progressing-No new skin abnormalities noted during this shift.      Problem: Safety - Adult  Goal: Free from fall injury  Outcome: Progressing-no new injuries during this shift.    Problem: ABCDS Injury Assessment  Goal: Absence of physical injury  Outcome: Progressing-no new injuries during this shift.

## 2025-03-09 NOTE — ED PROVIDER NOTES
[facility] EMERGENCY DEPARTMENT  Emergency Department Encounter  Emergency Medicine Resident     Pt Name:Nancy L Haase  MRN: [id number]  Birthdate 1960  Date of evaluation: 3/9/25  PCP:  Scar Reyes MD  Note Started: 6:25 AM EDT      CHIEF COMPLAINT       Chief Complaint   Patient presents with    SOB       HISTORY OF PRESENT ILLNESS  (Location/Symptom, Timing/Onset, Context/Setting, Quality, Duration, Modifying Factors, Severity.)      Nancy L Haase is a 64 y.o. female who presents to the ED with c/o shortness of breath which began last night.      Of note, patient was admitted from 3/2 - 3/8 for postmenopausal vaginal bleeding while on Eliquis and Plavix.  During that admission, did require 2 units PRBCs for hemoglobin less than 7.  She underwent D&C with Liletta IUD insertion on 3/6.  Her vaginal bleeding slowed and eventually stopped 3/7.  Patient was discharged yesterday at 5 PM.      Since then, she has noted increasing shortness of breath worse with exertion.  Patient states she is no longer able to stand up and walk to the bathroom due to shortness of breath.  She tried nebulizer and inhaler treatments without significant improvement, so she called 911.  EMS gave Combivent treatment, 125 mg Solu-Medrol, and 2 g magnesium en route.  Upon arrival to the ED, patient was still reporting significant shortness of breath and cough.  She denies any fever, chest pain, abdominal pain, nausea/vomiting.    PAST MEDICAL / SURGICAL / SOCIAL / FAMILY HISTORY      has a past medical history of Acute on chronic systolic CHF (congestive heart failure) (HCC), Asthma, Blood circulation, collateral, CAD (coronary artery disease), COPD (chronic obstructive pulmonary disease) ([facility]), COPD exacerbation ([facility]), Diabetes mellitus ([facility]), Hyperlipidemia, Hypertension, Ischemic colitis, Movement disorder, Neuropathy, PAD (peripheral artery disease), Tobacco abuse, and Tobacco abuse counseling.     has a past surgical

## 2025-03-09 NOTE — FLOWSHEET NOTE
03/09/25 1715   Treatment Team Notification   Reason for Communication Critical results   Type of Critical Result POC test   Critical Lab Information 353   Critical POC Result Type Glucose   Name of Team Member Notified The On-Call Internal Medicine Intern Resident

## 2025-03-09 NOTE — ED PROVIDER NOTES
[facility]  FACULTY HANDOFF       Handoff taken on the following patient from prior Attending Physician:  Pt Name: Pina EMMANUEL Haase  PCP:  Scar Reyes MD    Attestation  I was available and discussed any additional care issues that arose and coordinated the management plans with the resident(s) caring for the patient during my duty period. Any areas of disagreement with resident's documentation of care or procedures are noted on the chart. I was personally present for the key portions of any/all procedures during my duty period. I have documented in the chart those procedures where I was not present during the key portions.         CHIEF COMPLAINT       Chief Complaint   Patient presents with    SOB         CURRENT MEDICATIONS     Previous Medications  Previous Medications    ACETAMINOPHEN (TYLENOL) 325 MG TABLET    Take 2 tablets by mouth every 4-6 hours as needed    ALBUTEROL (PROVENTIL) (5 MG/ML) 0.5% NEBULIZER SOLUTION    Take 0.5 mLs by nebulization every 6 hours as needed for Wheezing    ALBUTEROL SULFATE HFA (PROVENTIL;VENTOLIN;PROAIR) 108 (90 BASE) MCG/ACT INHALER    Inhale 2 puffs into the lungs 3 times daily    APIXABAN (ELIQUIS) 5 MG TABS TABLET    Take 1 tablet by mouth 2 times daily    ATORVASTATIN (LIPITOR) 40 MG TABLET    Take 1 tablet by mouth daily    AZITHROMYCIN (ZITHROMAX) 250 MG TABLET    Take 1 tablet by mouth daily for 2 doses    BACLOFEN (LIORESAL) 10 MG TABLET    Take by mouth    BLOOD GLUCOSE MONITOR STRIPS    Test 3 times a day & as needed for symptoms of irregular blood glucose.    BUSPIRONE (BUSPAR) 30 MG TABLET    Take 30 mg by mouth 3 times daily as needed    CLOPIDOGREL (PLAVIX) 75 MG TABLET    Take 1 tablet by mouth daily    DICLOFENAC SODIUM (VOLTAREN) 1 % GEL    Apply 2 g topically 4 times daily for 5 days    DICYCLOMINE (BENTYL) 10 MG CAPSULE    Take 1 capsule by mouth 4 times daily for 14 days    DULAGLUTIDE (TRULICITY) 1.5 MG/0.5ML SOPN    Inject 0.5

## 2025-03-09 NOTE — CONSULTS
General Surgery  Consult    PATIENT NAME: Nancy L Haase  AGE: 64 y.o.  MEDICAL RECORD NO. [id number]  DATE: 3/9/2025  SURGEON: Dr. Spencer  PRIMARY CARE PHYSICIAN: Scar Reyes MD    Patient evaluated at the request of  Dr. Luo  Reason for evaluation: \"acute shira on CT\"    Patient information was obtained from patient and past medical records.  History/Exam limitations: none.    IMPRESSION:     Patient Active Problem List   Diagnosis    Chronic bronchitis (HCC)    Lung nodule    ACS (acute coronary syndrome) ([facility])    Essential hypertension    S/P drug eluting coronary stent placement - Mid RCA () 2/11/19 (Dr. cee)    Gastroenteritis    Coronary artery disease involving native coronary artery of native heart without angina pectoris    Vitamin D deficiency    Marijuana abuse    Type 2 diabetes mellitus with hyperglycemia, without long-term current use of insulin (HCC)    Other constipation    Urinary retention    COPD exacerbation ([facility])    Lobar pneumonia    Acute on chronic combined systolic and diastolic CHF (congestive heart failure) ([facility])    Former smoker    Anxiety    Class 2 obesity with body mass index (BMI) of 36.0 to 36.9 in adult    Pyuria    Dyspnea    COPD with exacerbation ([facility])    Paroxysmal A-fib ([facility])    CKD stage 3 due to type 2 diabetes mellitus ([facility])    Anemia    HARVEY (acute kidney injury)    Stage 3a chronic kidney disease (HCC)    Postmenopausal bleeding    Abnormal uterine bleeding (AUB)    Anemia due to blood loss, acute    Pelvic pain    Abnormal vaginal bleeding    Right lower lobe pneumonia    S/p Pap smear, Hscope D&C, Liletta IUD insertion 3/6/25    Chronic congestive heart failure (HCC)    Longstanding persistent atrial fibrillation (HCC)    Acute and chronic respiratory failure with hypoxia ([facility])     64-year-old female, with history of COPD, CHF, diabetes, presents with acute onset shortness of breath.  CT PE results state cholelithiasis in the neck of the gallbladder with  --   --  19*  --    BUN 14  --   --  18  --   --  18  --    CREATININE 1.3*  --   --  1.3*  --  1.3* 1.3* 1.3*   CALCIUM 8.4*  --   --  8.6  --   --  8.5*  --    INR  --  1.0  --   --   --   --  1.2  --    AST  --   --   --   --   --   --  30  --    ALT  --   --   --   --   --   --  52*  --    BILITOT  --   --   --   --   --   --  0.2  --    BILIDIR  --   --   --   --   --   --  0.1  --     < > = values in this interval not displayed.     Recent Labs     03/09/25  0614   ALKPHOS 67   ALT 52*   AST 30   BILITOT 0.2   BILIDIR 0.1         RADIOLOGY:     US GALLBLADDER RUQ  Result Date: 3/9/2025  1.  Markedly limited study due to patient's body habitus and overlying bowel gas.  Previously described cholelithiasis is not appreciated by ultrasound. Borderline gallbladder wall thickening.  Technologist reports a negative sonographic Roe sign.  Follow-up HIDA scan may be useful as clinically warranted. 2.  Diffuse hepatic steatosis. 3.  Suboptimally visualized pancreas.     CT CHEST PULMONARY EMBOLISM W CONTRAST  Result Date: 3/9/2025  1. Congestive heart failure. 2. Mediastinal and bilateral hilar adenopathy, likely reactive. 3. Acute cholecystitis.     XR CHEST (2 VW)  Result Date: 3/9/2025  Mild pulmonary edema with now small right pleural effusion and a right basilar airspace opacity, which may be secondary to atelectasis or infection.         Thank you for the interesting evaluation. Further recommendations to follow.    Jennifer Hazel,   3/9/2025, 3:24 PM

## 2025-03-09 NOTE — H&P
swelling and sneezing.    Respiratory:  Positive for cough and shortness of breath.    Gastrointestinal:  Negative for abdominal distention and anal bleeding.   Genitourinary:  Negative for difficulty urinating and dysuria.          PAST MEDICAL HISTORY     Past Medical History:   Diagnosis Date    Acute on chronic systolic CHF (congestive heart failure) ([facility]) 12/20/2021    Asthma     Blood circulation, collateral     CAD (coronary artery disease)     COPD (chronic obstructive pulmonary disease) ([facility])     COPD exacerbation ([facility]) 12/18/2021    Diabetes mellitus ([facility])     Hyperlipidemia     Hypertension     Ischemic colitis 10/28/2021    Movement disorder     B/L torn rotator cuff.    Neuropathy     PAD (peripheral artery disease)     Tobacco abuse 11/19/2016    Tobacco abuse counseling 7/25/2021       PAST SURGICAL HISTORY     Past Surgical History:   Procedure Laterality Date    CORONARY ANGIOPLASTY WITH STENT PLACEMENT      x2    CORONARY ANGIOPLASTY WITH STENT PLACEMENT  02/11/2019    JOSE MARTIN to RCA per Dr. Calix radial approuch    DILATION AND CURETTAGE OF UTERUS N/A 3/6/2025    EUA, DILATATION AND CURETTAGE HYSTEROSCOPY,  PAP SMEAR, IUD INSERTION (LILETTA, PAP KIT) performed by Shanelle Ruiz DO at [facility] OR    INNER EAR SURGERY Right     TONSILLECTOMY         ALLERGIES     Codeine and Morphine    MEDICATIONS PRIOR TO ADMISSION     Prior to Admission medications    Medication Sig Start Date End Date Taking? Authorizing Provider   predniSONE (DELTASONE) 20 MG tablet Take 2 tablets by mouth daily for 2 doses 3/9/25 3/11/25 Yes Jeremy Michelle MD   ferrous sulfate (FE TABS 325) 325 (65 Fe) MG EC tablet Take 1 tablet by mouth daily (with breakfast) 3/9/25  Yes Jeremy Michelle MD   azithromycin (ZITHROMAX) 250 MG tablet Take 1 tablet by mouth daily for 2 doses 3/9/25 3/11/25 Yes Jeremy Michelle MD   megestrol (MEGACE) 20 MG tablet Take 1 tablet by mouth 2 times daily 3/6/25 7/4/25 Yes Nita Paige DO  Basophils % 1 0 - 2 %    Immature Granulocytes % 1 (H) 0 %    Neutrophils Absolute 8.30 (H) 1.50 - 8.10 k/uL    Lymphocytes Absolute 1.56 1.10 - 3.70 k/uL    Monocytes Absolute 0.82 0.10 - 1.20 k/uL    Eosinophils Absolute <0.03 0.00 - 0.44 k/uL    Basophils Absolute 0.05 0.00 - 0.20 k/uL    Immature Granulocytes Absolute 0.07 0.00 - 0.30 k/uL   BMP    Collection Time: 03/09/25  6:14 AM   Result Value Ref Range    Sodium 142 136 - 145 mmol/L    Potassium 3.8 3.7 - 5.3 mmol/L    Chloride 113 (H) 98 - 107 mmol/L    CO2 19 (L) 20 - 31 mmol/L    Anion Gap 10 9 - 16 mmol/L    Glucose 189 (H) 74 - 99 mg/dL    BUN 18 8 - 23 mg/dL    Creatinine 1.3 (H) 0.6 - 0.9 mg/dL    Est, Glom Filt Rate 46 (L) >60 mL/min/1.73m2    Calcium 8.5 (L) 8.6 - 10.4 mg/dL   Protime-INR    Collection Time: 03/09/25  6:14 AM   Result Value Ref Range    Protime 15.5 (H) 11.7 - 14.9 sec    INR 1.2    Troponin    Collection Time: 03/09/25  6:14 AM   Result Value Ref Range    Troponin, High Sensitivity 18 (H) 0 - 14 ng/L   Brain Natriuretic Peptide    Collection Time: 03/09/25  6:14 AM   Result Value Ref Range    NT Pro-BNP 6,781 (H) 0 - 125 pg/mL   Hepatic Function Panel    Collection Time: 03/09/25  6:14 AM   Result Value Ref Range    Albumin 3.3 (L) 3.5 - 5.2 g/dL    Alkaline Phosphatase 67 35 - 104 U/L    ALT 52 (H) 10 - 35 U/L    AST 30 10 - 35 U/L    Total Bilirubin 0.2 0.0 - 1.2 mg/dL    Bilirubin, Direct 0.1 0.0 - 0.2 mg/dL    Bilirubin, Indirect 0.1 0.0 - 1.0 mg/dL    Total Protein 5.9 (L) 6.6 - 8.7 g/dL    Globulin 2.6 g/dL    Albumin/Globulin Ratio 1.3 1.0 - 2.5   Venous Blood Gas, POC    Collection Time: 03/09/25  8:10 AM   Result Value Ref Range    pH, Adolph 7.266 (L) 7.320 - 7.430    pCO2, Adolph 48.7 41.0 - 51.0 mm Hg    PO2, Adolph 84.4 (H) 30.0 - 50.0 mm Hg    HCO3, Venous 22.2 22.0 - 29.0 mmol/L    Negative Base Excess, Adolph 4.9 (H) 0.0 - 2.0 mmol/L    O2 Sat, Adolph 94.6 (H) 60.0 - 85.0 %   ELECTROLYTES PLUS    Collection Time: 03/09/25  8:10

## 2025-03-09 NOTE — ED NOTES
Admitting team at bedside to evaluate the patient  
Nitro gtt increased from 5 mcg/min to 50 mcg/min per verbal order from Dr. Dickens  
Patient called writer into room due to difficulty breathing. HR 150s and RR 45. Patient using accessory muscles and in moderate distress. Patient wheezing bilaterally. Patient changed from NC to 15L NRB due to work of breathing. Ativan given. EKG completed. Dr. Dickens at bedside. RT called for request of BIPAP and duoneb treatments. Bedside EPOC completed. New second IV placed. Lasix given and nitro gtt started.  
Patient placed on BIPAP with aerosol treatments through the line.  
Patient transported from  to Replaced by [facility] via stretcher on nitro gtt, full monitor, and BIPAP by Eneida HELM and RT. MOHAN.  
Pt placed back on 2L NC for comfort and tachypnea   
Pt placed on bipap.   
Pt placed on purewick   
Pt to CT  
Pt to Ed via EMS for SOB  Pt given 125 solumedrol, 2g mag an 1 Combivent tx PTA   Pt states SOB started in the evening after she got home  Pt states she went to the restroom and became very short of breath, states she used her nebulizer and inhalers with slight relief   Pt states she then woke up and had SOB again so called EMS  Pt states she was dc'ed from hospital yesterday   Pt states she was seen for vaginal bleeding, had D&C completed and IUD placed   Pt states she is not having any vaginal bleeding at this time       
Report given to Sweta HELM   All questions answered     
The following labs were labeled with appropriate pt sticker and tubed to lab:     [] Blue     [] Lavender   [] on ice  [x] Green/yellow  [] Green/black [] on ice  [] Grey  [] on ice  [] Yellow  [] Red  [] Pink  [] Type/ Screen  [] ABG  [] VBG    [] COVID-19 swab    [] Rapid  [] PCR  [] Flu swab  [] Peds Viral Panel     [] Urine Sample  [] Fecal Sample  [] Pelvic Cultures  [] Blood Cultures  [] X 2  [] STREP Cultures  [] Wound Cultures  
Ultrasound tech at bedside to complete RUQ abdominal ultrasound  
   POC Creatinine 1.3 (*)     eGFR, POC 46 (*)     All other components within normal limits   LACTIC ACID,POINT OF CARE - Abnormal; Notable for the following components:    POC Lactic Acid 1.8 (*)     All other components within normal limits   POCT GLUCOSE - Abnormal; Notable for the following components:    POC Glucose 191 (*)     All other components within normal limits   VENOUS BLOOD GAS, POINT OF CARE - Abnormal; Notable for the following components:    pH, Adolph 7.266 (*)     PO2, Adolph 84.4 (*)     Negative Base Excess, Adolph 4.9 (*)     O2 Sat, Adolph 94.6 (*)     All other components within normal limits   CREATININE W/GFR POINT OF CARE - Abnormal; Notable for the following components:    POC Creatinine 1.3 (*)     eGFR, POC 46 (*)     All other components within normal limits   LACTIC ACID,POINT OF CARE - Abnormal; Notable for the following components:    POC Lactic Acid 2.4 (*)     All other components within normal limits   POCT GLUCOSE - Abnormal; Notable for the following components:    POC Glucose 274 (*)     All other components within normal limits   CALCIUM, IONIC (POC)   CALCIUM, IONIC (POC)   TROPONIN   HEPATIC FUNCTION PANEL   UREA N (POC)   UREA N (POC)   POC BLOOD GAS       Electronically signed by Sweta Patrick RN on 3/9/2025 at 9:07 AM

## 2025-03-10 LAB
ALBUMIN SERPL-MCNC: 3.4 G/DL (ref 3.5–5.2)
ALBUMIN/GLOB SERPL: 1.3 {RATIO} (ref 1–2.5)
ALP SERPL-CCNC: 66 U/L (ref 35–104)
ALT SERPL-CCNC: 57 U/L (ref 10–35)
ANION GAP SERPL CALCULATED.3IONS-SCNC: 13 MMOL/L (ref 9–16)
AST SERPL-CCNC: 23 U/L (ref 10–35)
BASOPHILS # BLD: 0 K/UL (ref 0–0.2)
BASOPHILS NFR BLD: 0 % (ref 0–2)
BILIRUB SERPL-MCNC: 0.3 MG/DL (ref 0–1.2)
BUN SERPL-MCNC: 24 MG/DL (ref 8–23)
CALCIUM SERPL-MCNC: 8.2 MG/DL (ref 8.6–10.4)
CHLORIDE SERPL-SCNC: 104 MMOL/L (ref 98–107)
CO2 SERPL-SCNC: 22 MMOL/L (ref 20–31)
CREAT SERPL-MCNC: 1.4 MG/DL (ref 0.6–0.9)
EOSINOPHIL # BLD: 0 K/UL (ref 0–0.4)
EOSINOPHILS RELATIVE PERCENT: 0 % (ref 1–4)
ERYTHROCYTE [DISTWIDTH] IN BLOOD BY AUTOMATED COUNT: 18.1 % (ref 11.8–14.4)
GFR, ESTIMATED: 43 ML/MIN/1.73M2
GLUCOSE BLD-MCNC: 267 MG/DL (ref 65–105)
GLUCOSE BLD-MCNC: 281 MG/DL (ref 65–105)
GLUCOSE BLD-MCNC: 297 MG/DL (ref 65–105)
GLUCOSE SERPL-MCNC: 311 MG/DL (ref 74–99)
HCT VFR BLD AUTO: 26.3 % (ref 36.3–47.1)
HGB BLD-MCNC: 8.1 G/DL (ref 11.9–15.1)
IMM GRANULOCYTES # BLD AUTO: 0 K/UL (ref 0–0.3)
IMM GRANULOCYTES NFR BLD: 0 %
LACTIC ACID, WHOLE BLOOD: 1.5 MMOL/L (ref 0.7–2.1)
LYMPHOCYTES NFR BLD: 0.23 K/UL (ref 1–4.8)
LYMPHOCYTES RELATIVE PERCENT: 2 % (ref 24–44)
MCH RBC QN AUTO: 26.3 PG (ref 25.2–33.5)
MCHC RBC AUTO-ENTMCNC: 30.8 G/DL (ref 28.4–34.8)
MCV RBC AUTO: 85.4 FL (ref 82.6–102.9)
MONOCYTES NFR BLD: 0.35 K/UL (ref 0.1–0.8)
MONOCYTES NFR BLD: 3 % (ref 1–7)
MORPHOLOGY: ABNORMAL
NEUTROPHILS NFR BLD: 95 % (ref 36–66)
NEUTS SEG NFR BLD: 11.12 K/UL (ref 1.8–7.7)
NRBC BLD-RTO: 0.2 PER 100 WBC
PLATELET # BLD AUTO: 229 K/UL (ref 138–453)
PMV BLD AUTO: 10.5 FL (ref 8.1–13.5)
POTASSIUM SERPL-SCNC: 4.3 MMOL/L (ref 3.7–5.3)
PROT SERPL-MCNC: 6.1 G/DL (ref 6.6–8.7)
RBC # BLD AUTO: 3.08 M/UL (ref 3.95–5.11)
SODIUM SERPL-SCNC: 139 MMOL/L (ref 136–145)
WBC OTHER # BLD: 11.7 K/UL (ref 3.5–11.3)

## 2025-03-10 PROCEDURE — 83605 ASSAY OF LACTIC ACID: CPT

## 2025-03-10 PROCEDURE — 2060000000 HC ICU INTERMEDIATE R&B

## 2025-03-10 PROCEDURE — 6360000002 HC RX W HCPCS

## 2025-03-10 PROCEDURE — 97530 THERAPEUTIC ACTIVITIES: CPT

## 2025-03-10 PROCEDURE — 82947 ASSAY GLUCOSE BLOOD QUANT: CPT

## 2025-03-10 PROCEDURE — 6370000000 HC RX 637 (ALT 250 FOR IP)

## 2025-03-10 PROCEDURE — 97535 SELF CARE MNGMENT TRAINING: CPT

## 2025-03-10 PROCEDURE — 80053 COMPREHEN METABOLIC PANEL: CPT

## 2025-03-10 PROCEDURE — 85025 COMPLETE CBC W/AUTO DIFF WBC: CPT

## 2025-03-10 PROCEDURE — 94761 N-INVAS EAR/PLS OXIMETRY MLT: CPT

## 2025-03-10 PROCEDURE — 2580000003 HC RX 258

## 2025-03-10 PROCEDURE — 97162 PT EVAL MOD COMPLEX 30 MIN: CPT

## 2025-03-10 PROCEDURE — 97166 OT EVAL MOD COMPLEX 45 MIN: CPT

## 2025-03-10 PROCEDURE — 2700000000 HC OXYGEN THERAPY PER DAY

## 2025-03-10 PROCEDURE — 2500000003 HC RX 250 WO HCPCS

## 2025-03-10 PROCEDURE — 36415 COLL VENOUS BLD VENIPUNCTURE: CPT

## 2025-03-10 PROCEDURE — 94640 AIRWAY INHALATION TREATMENT: CPT

## 2025-03-10 PROCEDURE — 99232 SBSQ HOSP IP/OBS MODERATE 35: CPT | Performed by: INTERNAL MEDICINE

## 2025-03-10 RX ORDER — INSULIN GLARGINE 100 [IU]/ML
INJECTION, SOLUTION SUBCUTANEOUS
Status: DISPENSED
Start: 2025-03-10 | End: 2025-03-10

## 2025-03-10 RX ORDER — FUROSEMIDE 10 MG/ML
40 INJECTION INTRAMUSCULAR; INTRAVENOUS 2 TIMES DAILY
Status: DISCONTINUED | OUTPATIENT
Start: 2025-03-10 | End: 2025-03-11

## 2025-03-10 RX ORDER — ASPIRIN 81 MG/1
TABLET, CHEWABLE ORAL
Status: DISPENSED
Start: 2025-03-10 | End: 2025-03-10

## 2025-03-10 RX ORDER — INSULIN GLARGINE 100 [IU]/ML
10 INJECTION, SOLUTION SUBCUTANEOUS DAILY
Status: DISCONTINUED | OUTPATIENT
Start: 2025-03-10 | End: 2025-03-11

## 2025-03-10 RX ORDER — CLOPIDOGREL BISULFATE 75 MG/1
75 TABLET ORAL DAILY
Status: DISCONTINUED | OUTPATIENT
Start: 2025-03-10 | End: 2025-03-15 | Stop reason: HOSPADM

## 2025-03-10 RX ORDER — IPRATROPIUM BROMIDE AND ALBUTEROL SULFATE 2.5; .5 MG/3ML; MG/3ML
1 SOLUTION RESPIRATORY (INHALATION)
Status: DISCONTINUED | OUTPATIENT
Start: 2025-03-10 | End: 2025-03-15 | Stop reason: HOSPADM

## 2025-03-10 RX ORDER — CLOPIDOGREL BISULFATE 75 MG/1
TABLET ORAL
Status: DISPENSED
Start: 2025-03-10 | End: 2025-03-10

## 2025-03-10 RX ADMIN — FUROSEMIDE 40 MG: 10 INJECTION, SOLUTION INTRAMUSCULAR; INTRAVENOUS at 18:36

## 2025-03-10 RX ADMIN — BUSPIRONE HYDROCHLORIDE 30 MG: 10 TABLET ORAL at 08:49

## 2025-03-10 RX ADMIN — DICYCLOMINE HYDROCHLORIDE 10 MG: 10 CAPSULE ORAL at 21:24

## 2025-03-10 RX ADMIN — FAMOTIDINE 20 MG: 20 TABLET, FILM COATED ORAL at 08:50

## 2025-03-10 RX ADMIN — WATER 40 MG: 1 INJECTION INTRAMUSCULAR; INTRAVENOUS; SUBCUTANEOUS at 23:35

## 2025-03-10 RX ADMIN — IPRATROPIUM BROMIDE AND ALBUTEROL SULFATE 1 DOSE: .5; 2.5 SOLUTION RESPIRATORY (INHALATION) at 20:40

## 2025-03-10 RX ADMIN — ATORVASTATIN CALCIUM 40 MG: 40 TABLET, FILM COATED ORAL at 08:50

## 2025-03-10 RX ADMIN — SODIUM CHLORIDE, PRESERVATIVE FREE 10 ML: 5 INJECTION INTRAVENOUS at 21:24

## 2025-03-10 RX ADMIN — FAMOTIDINE 20 MG: 20 TABLET, FILM COATED ORAL at 21:24

## 2025-03-10 RX ADMIN — FERROUS SULFATE TAB EC 325 MG (65 MG FE EQUIVALENT) 325 MG: 325 (65 FE) TABLET DELAYED RESPONSE at 08:49

## 2025-03-10 RX ADMIN — INSULIN LISPRO 6 UNITS: 100 INJECTION, SOLUTION INTRAVENOUS; SUBCUTANEOUS at 05:41

## 2025-03-10 RX ADMIN — CLOPIDOGREL BISULFATE 75 MG: 75 TABLET, FILM COATED ORAL at 08:48

## 2025-03-10 RX ADMIN — BUSPIRONE HYDROCHLORIDE 30 MG: 10 TABLET ORAL at 01:00

## 2025-03-10 RX ADMIN — MEGESTROL ACETATE 20 MG: 20 TABLET ORAL at 21:24

## 2025-03-10 RX ADMIN — WATER 40 MG: 1 INJECTION INTRAMUSCULAR; INTRAVENOUS; SUBCUTANEOUS at 13:17

## 2025-03-10 RX ADMIN — INSULIN LISPRO 4 UNITS: 100 INJECTION, SOLUTION INTRAVENOUS; SUBCUTANEOUS at 18:37

## 2025-03-10 RX ADMIN — MEGESTROL ACETATE 20 MG: 20 TABLET ORAL at 08:49

## 2025-03-10 RX ADMIN — BUDESONIDE AND FORMOTEROL FUMARATE DIHYDRATE 2 PUFF: 160; 4.5 AEROSOL RESPIRATORY (INHALATION) at 20:40

## 2025-03-10 RX ADMIN — APIXABAN 5 MG: 5 TABLET, FILM COATED ORAL at 08:49

## 2025-03-10 RX ADMIN — INSULIN GLARGINE 10 UNITS: 100 INJECTION, SOLUTION SUBCUTANEOUS at 08:51

## 2025-03-10 RX ADMIN — BUSPIRONE HYDROCHLORIDE 30 MG: 10 TABLET ORAL at 18:35

## 2025-03-10 RX ADMIN — IPRATROPIUM BROMIDE AND ALBUTEROL SULFATE 1 DOSE: .5; 2.5 SOLUTION RESPIRATORY (INHALATION) at 09:35

## 2025-03-10 RX ADMIN — DICYCLOMINE HYDROCHLORIDE 10 MG: 10 CAPSULE ORAL at 08:49

## 2025-03-10 RX ADMIN — INSULIN LISPRO 4 UNITS: 100 INJECTION, SOLUTION INTRAVENOUS; SUBCUTANEOUS at 13:04

## 2025-03-10 RX ADMIN — IPRATROPIUM BROMIDE AND ALBUTEROL SULFATE 1 DOSE: .5; 2.5 SOLUTION RESPIRATORY (INHALATION) at 15:50

## 2025-03-10 RX ADMIN — METOPROLOL TARTRATE 12.5 MG: 25 TABLET, FILM COATED ORAL at 08:48

## 2025-03-10 RX ADMIN — DICYCLOMINE HYDROCHLORIDE 10 MG: 10 CAPSULE ORAL at 13:15

## 2025-03-10 RX ADMIN — APIXABAN 5 MG: 5 TABLET, FILM COATED ORAL at 21:24

## 2025-03-10 RX ADMIN — CEFTRIAXONE SODIUM 1000 MG: 1 INJECTION, POWDER, FOR SOLUTION INTRAMUSCULAR; INTRAVENOUS at 13:15

## 2025-03-10 RX ADMIN — HEPARIN SODIUM 5000 UNITS: 5000 INJECTION INTRAVENOUS; SUBCUTANEOUS at 05:41

## 2025-03-10 RX ADMIN — ACETAMINOPHEN 650 MG: 325 TABLET ORAL at 18:35

## 2025-03-10 RX ADMIN — FUROSEMIDE 40 MG: 10 INJECTION, SOLUTION INTRAMUSCULAR; INTRAVENOUS at 10:02

## 2025-03-10 RX ADMIN — SODIUM CHLORIDE, PRESERVATIVE FREE 10 ML: 5 INJECTION INTRAVENOUS at 08:51

## 2025-03-10 RX ADMIN — BUDESONIDE AND FORMOTEROL FUMARATE DIHYDRATE 2 PUFF: 160; 4.5 AEROSOL RESPIRATORY (INHALATION) at 09:36

## 2025-03-10 RX ADMIN — AZITHROMYCIN MONOHYDRATE 500 MG: 500 INJECTION, POWDER, LYOPHILIZED, FOR SOLUTION INTRAVENOUS at 13:40

## 2025-03-10 RX ADMIN — WATER 40 MG: 1 INJECTION INTRAMUSCULAR; INTRAVENOUS; SUBCUTANEOUS at 00:58

## 2025-03-10 RX ADMIN — METOPROLOL TARTRATE 12.5 MG: 25 TABLET, FILM COATED ORAL at 21:24

## 2025-03-10 ASSESSMENT — PAIN DESCRIPTION - LOCATION: LOCATION: HEAD

## 2025-03-10 ASSESSMENT — ENCOUNTER SYMPTOMS: SHORTNESS OF BREATH: 1

## 2025-03-10 ASSESSMENT — PAIN SCALES - GENERAL: PAINLEVEL_OUTOF10: 6

## 2025-03-10 NOTE — PROGRESS NOTES
[facility]  Internal Medicine Teaching Residency Program  Inpatient Daily Progress Note  ______________________________________________________________________________    Patient: Nancy L Haase  YOB: 1960   MRN:[id number]    Acct: [id number]     Room: 0449/0449-01  Admit date: 3/9/2025  Today's date: 03/10/25  Number of days in the hospital: 1    SUBJECTIVE   Admitting Diagnosis: Acute and chronic respiratory failure with hypoxia (HCC)  CC: Shortness of breath    Pt examined at bedside. Chart & results reviewed.   No acute events overnight.  AOx4.  Reported no active complaints and bleeding has been controlled.  Hemodynamically stable with blood pressure of 126/78 mm and maintaining saturation at around 99% on 2 L of oxygen via nasal cannula  IV nitroglycerin has been weaned off.    Monitor labs reviewed, BMP unremarkable, glucose 297.  CBC revealed WBC 11.7, hemoglobin 8.1.    Urine output about 4.1 L since admission  Currently on Zithromax, Rocephin, Lasix 40 IV twice daily.      ROS:  Review of Systems   Constitutional: Negative.    HENT: Negative.     Respiratory:  Positive for shortness of breath (Improving).    Cardiovascular:  Negative for chest pain and palpitations.   Genitourinary: Negative.         Plan :-    Will continue patient on diet  Will change diuretics to Lasix.  Will start patient on 10 units of Lantus    BRIEF HISTORY     The patient is a 64 y.o. female with PMHx of atrial fibrillation Eliquis, COPD, iron deficiency anemia, type 2 diabetes mellitus, CHF and CKD.     They present to the ED with a chief complaint of shortness of breath and cough since 1 day, sudden onset.  Patient stated the night before when she was going to bathroom she experienced shortness of breath and felt difficulty in walking.  Patient felt nebulizer and inhaler treatments but did not.  Patient received Combivent, Solu-Medrol and magnesium by EMS en route.   right basilar airspace opacity, which may be secondary to atelectasis or infection.     XR CHEST PORTABLE  Result Date: 3/5/2025  Trace right pleural effusion with hazy right lower lobe airspace disease. This could represent pneumonia in the appropriate clinical setting     CT ABDOMEN PELVIS W IV CONTRAST Additional Contrast? None  Result Date: 3/4/2025  1. No acute intra-abdominal or pelvic abnormality. 2. Scattered left-sided colonic diverticulosis. 3. Renal cortical simple cysts. 4. Degenerative changes in the lumbar spine.       ASSESSMENT & PLAN     Assessment and Plan:    Principal Problem:    Acute and chronic respiratory failure with hypoxia (HCC)  Active Problems:    Acute on chronic systolic congestive heart failure (HCC)  Resolved Problems:    * No resolved hospital problems. *    Acute hypoxic respiratory failure possibly secondary to pulmonary edema secondary to congestive heart failure : Improving  Atrial fibrillation   CAD  Hypertension    Chest x-ray on admission revealed mild pulmonary edema with small right pleural effusion  CT scan of chest revealed signs concerning for congestive heart failure  proBNP at admission 6781  Patient was started on nitroglycerin infusion and 40 mg IV Lasix twice daily  Continue IV Lasix 40 mg twice daily  Continue Lipitor 40 mg, aspirin 81 mg, Plavix, Lopressor 12.5 mg twice daily, Eliquis 5 mg twice daily  Will hold lisinopril for now, will consider restarting if blood pressure tolerates.    Concerning for acute cholecystitis    CT scan of chest on admission revealed mild pericholecystic inflammation plan concern for cholecystitis  Right upper quadrant sonogram revealed negative sonographic Roe sign and previously described cholelithiasis not appreciated  Diet as tolerated    History of COPD    Does not use oxygen at home  Zithromax and Rocephin  Bronchodilators as needed  Continue to monitor    Postmenopausal bleeding    Patient had postmenopausal bleeding on

## 2025-03-10 NOTE — CARE COORDINATION
03/10/25 1354   Readmission Assessment   Number of Days since last admission? 1-7 days   Previous Disposition Home with Family   Who is being Interviewed Patient   What was the patient's/caregiver's perception as to why they think they needed to return back to the hospital? Did not realize care needs would be so extensive;Not enough help at home;Could not/did not make appointment with PCP   Did you visit your Primary Care Physician after you left the hospital, before you returned this time? No   Why weren't you able to visit your PCP? Did not have an appointment   Did you see a specialist, such as Cardiac, Pulmonary, Orthopedic Physician, etc. after you left the hospital? No   Who advised the patient to return to the hospital? Self-referral   Does the patient report anything that got in the way of taking their medications? No   In our efforts to provide the best possible care to you and others like you, can you think of anything that we could have done to help you after you left the hospital the first time, so that you might not have needed to return so soon? Arrange for more help when leaving the hospital;Identify patient's health literacy needs;Improved written discharge instructions;Teaching during hospitalization regarding your illness;Education on how to continue taking medications upon discharge;Additional Community resources available for illness support

## 2025-03-10 NOTE — CARE COORDINATION
Patient has a qualifying diagnosis for pulmonary rehabilitation.     Education provided to patient regarding the health benefits of participating in a cardiac rehabilitation program. Handout provided with an overview of cardiac rehabilitation from the [employer].     Patient agreeable: No, refusing [facility] as well.

## 2025-03-10 NOTE — PROGRESS NOTES
Physical Therapy  Facility/Department: [address] ONC/MED SURG   Physical Therapy Initial Evaluation    Patient Name: Nancy L Haase        MRN: [id number]    : 1960    Date of Service: 3/10/2025    Chief Complaint   Patient presents with    SOB     Past Medical History:  has a past medical history of Acute on chronic systolic CHF (congestive heart failure) (HCC), Asthma, Blood circulation, collateral, CAD (coronary artery disease), COPD (chronic obstructive pulmonary disease) (HCC), COPD exacerbation (HCC), Diabetes mellitus (HCC), Hyperlipidemia, Hypertension, Ischemic colitis, Movement disorder, Neuropathy, PAD (peripheral artery disease), Tobacco abuse, and Tobacco abuse counseling.  Past Surgical History:  has a past surgical history that includes Coronary angioplasty with stent; Tonsillectomy; Inner ear surgery (Right); Coronary angioplasty with stent (2019); and Dilation and curettage of uterus (N/A, 3/6/2025).    Discharge Recommendations  Discharge Recommendations: Patient would benefit from continued therapy after discharge  PT Equipment Recommendations  Equipment Needed: No    Assessment  Body Structures, Functions, Activity Limitations Requiring Skilled Therapeutic Intervention: Decreased functional mobility , Decreased endurance, Decreased strength  Assessment: The pt ambulated 35 ft without a device x CGA. She reported some dizziness with mild fatigue with mobilization. She could benefit from a continuation of PT for gait , strengthening and functional mobility prior to her DC  Therapy Prognosis: Good  Decision Making: Medium Complexity  Requires PT Follow-Up: Yes  Activity Tolerance  Activity Tolerance: Patient limited by fatigue, Patient limited by endurance  Safety Devices  Type of Devices: Nurse notified, Left in bed, Gait belt, Call light within reach  Restraints  Restraints Initially in Place: No    AM-PAC  AM-PAC Basic Mobility - Inpatient   How much help is needed turning from your back

## 2025-03-10 NOTE — PROGRESS NOTES
Occupational Therapy  Occupational Therapy Initial Evaluation  Facility/Department: [address] ONC/MED SURG   Patient Name: Nancy L Haase        MRN: [id number]    : 1960    Date of Service: 3/10/2025    Chief Complaint   Patient presents with    SOB   Copied from Emergency Medicine:  Nancy L Haase is a 64 y.o. female who presents to the ED with c/o shortness of breath which began last night.       Of note, patient was admitted from 3/2 - 3/8 for postmenopausal vaginal bleeding while on Eliquis and Plavix.  During that admission, did require 2 units PRBCs for hemoglobin less than 7.  She underwent D&C with Liletta IUD insertion on 3/6.  Her vaginal bleeding slowed and eventually stopped 3/7.  Patient was discharged yesterday at 5 PM.       Since then, she has noted increasing shortness of breath worse with exertion.  Patient states she is no longer able to stand up and walk to the bathroom due to shortness of breath.  She tried nebulizer and inhaler treatments without significant improvement, so she called 911.  EMS gave Combivent treatment, 125 mg Solu-Medrol, and 2 g magnesium en route.  Upon arrival to the ED, patient was still reporting significant shortness of breath and cough.  She denies any fever, chest pain, abdominal pain, nausea/vomiting.  Past Medical History:  has a past medical history of Acute on chronic systolic CHF (congestive heart failure) (HCC), Asthma, Blood circulation, collateral, CAD (coronary artery disease), COPD (chronic obstructive pulmonary disease) ([facility]), COPD exacerbation ([facility]), Diabetes mellitus ([facility]), Hyperlipidemia, Hypertension, Ischemic colitis, Movement disorder, Neuropathy, PAD (peripheral artery disease), Tobacco abuse, and Tobacco abuse counseling.  Past Surgical History:  has a past surgical history that includes Coronary angioplasty with stent; Tonsillectomy; Inner ear surgery (Right); Coronary angioplasty with stent (2019); and Dilation and curettage of uterus (N/A,  Mobility Skilled Clinical Factors: no device utilized              Patient Education  Patient Education  Education Given To: Patient  Education Provided: Role of Therapy;Plan of Care  Education Provided Comments: Pt ed on OT POC, safety awareness tech, proper hand placement for transfers, and energy conservation tech with proper breathing tech with good return  Education Method: Demonstration;Verbal  Barriers to Learning: None  Education Outcome: Verbalized understanding;Continued education needed    Goals  Short Term Goals  Time Frame for Short Term Goals: Pt will, by discharge:  Short Term Goal 1: Dem Mod I with adl performance utilizing energy conservation tech and AE as needed  Short Term Goal 2: Dem I with functional transfers for daily occupations  Short Term Goal 3: Dem I with dynamic mobility utilizing pursed lip breathing tech for household distance  Short Term Goal 4: Dem good safety awareness tech for all transfers/mobility without verbal cuing  Short Term Goal 5: Dem a 15 min dynamic standing task Mirta to increase activity tolerance for ADL's/IADLS with one rest break utilizing energy conservation tech    Plan  Occupational Therapy Plan  Times Per Week: 3-4 x week  Current Treatment Recommendations: Strengthening, Functional mobility training, Endurance training, Equipment evaluation, education, & procurement, Patient/Caregiver education & training, Safety education & training, Self-Care / ADL, Home management training    Minutes  OT Individual Minutes  Time In: 1045  Time Out: 1133  Minutes: 48  Time Code Minutes   Timed Code Treatment Minutes: 38 Minutes    Partial Co Eval with PT    Electronically signed by FEDERICO ROBLEDO on 3/10/25 at 11:57 AM EDT

## 2025-03-10 NOTE — PROGRESS NOTES
Physician Progress Note      PATIENT:               HAASE, NANCY  CSN #:                  [id number]  :                       1960  ADMIT DATE:       3/9/2025 6:00 AM  DISCH DATE:  RESPONDING  PROVIDER #:        COLLIN CHACON          QUERY TEXT:    Patient admitted with CHF exacerbation, noted to have Persistent atrial   fibrillation and is maintained on Eliquis. If possible, please document in   progress notes and discharge summary if you are evaluating and/or treating any   of the following:?  ?  The medical record reflects the following:  Risk Factors: HTN, CAD,COPD  Clinical Indicators: 65 yo M to ED with SOB.Found to be in CHF exacerbation   with acute respiratory failure. Know Hx of Persistent Afib and takes Eliquis   5mg BID.  Treatment: Eliquis continued on admission  Options provided:  -- Secondary hypercoagulable state in a patient with atrial fibrillation  -- Other - I will add my own diagnosis  -- Disagree - Not applicable / Not valid  -- Disagree - Clinically unable to determine / Unknown  -- Refer to Clinical Documentation Reviewer    PROVIDER RESPONSE TEXT:    This patient has secondary hypercoagulable state in a patient with atrial   fibrillation.    Query created by: Ann Gibbons on 3/10/2025 2:19 PM      Electronically signed by:  COLLIN CHACON 3/10/2025 4:50 PM

## 2025-03-10 NOTE — PLAN OF CARE
Problem: Pain  Goal: Verbalizes/displays adequate comfort level or baseline comfort level  3/10/2025 0110 by Kirstin Arana RN  Outcome: Progressing     Problem: Skin/Tissue Integrity  Goal: Skin integrity remains intact  3/10/2025 0110 by Kirstin Arana RN  Outcome: Progressing     Problem: Safety - Adult  Goal: Free from fall injury  3/10/2025 0110 by Kirstin Arana RN  Outcome: Progressing     Problem: ABCDS Injury Assessment  Goal: Absence of physical injury  3/10/2025 0110 by Kirstin Arana RN  Outcome: Progressing

## 2025-03-10 NOTE — CARE COORDINATION
.Case Management Assessment  Initial Evaluation    Date/Time of Evaluation: 3/10/2025 1:57 PM  Assessment Completed by: Brooke Black RN    If patient is discharged prior to next notation, then this note serves as note for discharge by case management.    Patient Name: Nancy L Haase                   YOB: 1960  Diagnosis: Acute on chronic systolic congestive heart failure (HCC) [I50.23]  COPD with acute exacerbation (HCC) [J44.1]  Acute and chronic respiratory failure with hypoxia (HCC) [J96.21]                   Date / Time: 3/9/2025  6:00 AM    Patient Admission Status: Inpatient   Readmission Risk (Low < 19, Mod (19-27), High > 27): Readmission Risk Score: 25.4    Current PCP: Scar Reyes MD  PCP verified by CM? (P) Yes    Chart Reviewed: Yes      History Provided by: (P) Patient  Patient Orientation: (P) Alert and Oriented    Patient Cognition: (P) Alert    Hospitalization in the last 30 days (Readmission):  Yes    If yes, Readmission Assessment in  Navigator will be completed.    Advance Directives:      Code Status: Full Code   Patient's Primary Decision Maker is: (P) Legal Next of Kin    Primary Decision Maker: Haase,Tony  Enedelia - [phone]    Discharge Planning:    Patient lives with: (P) Children Type of Home: (P) House  Primary Care Giver: (P) Self  Patient Support Systems include: (P) Family Members   Current Financial resources: (P) Medicare  Current community resources: (P) None  Current services prior to admission: (P) None            Current DME:              Type of Home Care services:  (P) None    ADLS  Prior functional level: (P) Independent in ADLs/IADLs  Current functional level: (P) Independent in ADLs/IADLs    PT AM-PAC: 21 /24  OT AM-PAC: 22 /24    Family can provide assistance at DC: (P) Yes  Would you like Case Management to discuss the discharge plan with any other family members/significant others, and if so, who? (P) No  Plans to Return to Present Housing: (P)

## 2025-03-10 NOTE — PLAN OF CARE
Problem: Chronic Conditions and Co-morbidities  Goal: Patient's chronic conditions and co-morbidity symptoms are monitored and maintained or improved  Outcome: Progressing     Problem: Pain  Goal: Verbalizes/displays adequate comfort level or baseline comfort level  Outcome: Progressing     Problem: Skin/Tissue Integrity  Goal: Skin integrity remains intact  Description: 1.  Monitor for areas of redness and/or skin breakdown  2.  Assess vascular access sites hourly  3.  Every 4-6 hours minimum:  Change oxygen saturation probe site  4.  Every 4-6 hours:  If on nasal continuous positive airway pressure, respiratory therapy assess nares and determine need for appliance change or resting period  Outcome: Progressing     Problem: Safety - Adult  Goal: Free from fall injury  Outcome: Progressing     Problem: ABCDS Injury Assessment  Goal: Absence of physical injury  Outcome: Progressing     Problem: Pain  Goal: Verbalizes/displays adequate comfort level or baseline comfort level  Outcome: Progressing     Problem: Skin/Tissue Integrity  Goal: Skin integrity remains intact  Description: 1.  Monitor for areas of redness and/or skin breakdown  2.  Assess vascular access sites hourly  3.  Every 4-6 hours minimum:  Change oxygen saturation probe site  4.  Every 4-6 hours:  If on nasal continuous positive airway pressure, respiratory therapy assess nares and determine need for appliance change or resting period  Outcome: Progressing     Problem: Safety - Adult  Goal: Free from fall injury  Outcome: Progressing     Problem: ABCDS Injury Assessment  Goal: Absence of physical injury  Outcome: Progressing

## 2025-03-11 LAB
ALBUMIN SERPL-MCNC: 3.7 G/DL (ref 3.5–5.2)
ALBUMIN/GLOB SERPL: 1.4 {RATIO} (ref 1–2.5)
ALP SERPL-CCNC: 67 U/L (ref 35–104)
ALT SERPL-CCNC: 57 U/L (ref 10–35)
ANION GAP SERPL CALCULATED.3IONS-SCNC: 13 MMOL/L (ref 9–16)
AST SERPL-CCNC: 19 U/L (ref 10–35)
BASOPHILS # BLD: 0 K/UL (ref 0–0.2)
BASOPHILS NFR BLD: 0 % (ref 0–2)
BILIRUB SERPL-MCNC: 0.3 MG/DL (ref 0–1.2)
BUN SERPL-MCNC: 33 MG/DL (ref 8–23)
CALCIUM SERPL-MCNC: 8.6 MG/DL (ref 8.6–10.4)
CHLORIDE SERPL-SCNC: 102 MMOL/L (ref 98–107)
CO2 SERPL-SCNC: 22 MMOL/L (ref 20–31)
CREAT SERPL-MCNC: 1.6 MG/DL (ref 0.6–0.9)
EKG ATRIAL RATE: 100 BPM
EKG P AXIS: 32 DEGREES
EKG P-R INTERVAL: 164 MS
EKG Q-T INTERVAL: 348 MS
EKG QRS DURATION: 78 MS
EKG QTC CALCULATION (BAZETT): 448 MS
EKG R AXIS: 40 DEGREES
EKG T AXIS: -25 DEGREES
EKG VENTRICULAR RATE: 100 BPM
EOSINOPHIL # BLD: 0 K/UL (ref 0–0.4)
EOSINOPHILS RELATIVE PERCENT: 0 % (ref 1–4)
ERYTHROCYTE [DISTWIDTH] IN BLOOD BY AUTOMATED COUNT: 17.6 % (ref 11.8–14.4)
GFR, ESTIMATED: 36 ML/MIN/1.73M2
GLUCOSE BLD-MCNC: 188 MG/DL (ref 65–105)
GLUCOSE BLD-MCNC: 247 MG/DL (ref 65–105)
GLUCOSE BLD-MCNC: 254 MG/DL (ref 65–105)
GLUCOSE BLD-MCNC: 260 MG/DL (ref 65–105)
GLUCOSE BLD-MCNC: 278 MG/DL (ref 65–105)
GLUCOSE BLD-MCNC: 333 MG/DL (ref 65–105)
GLUCOSE BLD-MCNC: 368 MG/DL (ref 65–105)
GLUCOSE SERPL-MCNC: 257 MG/DL (ref 74–99)
HCT VFR BLD AUTO: 29.8 % (ref 36.3–47.1)
HGB BLD-MCNC: 9.2 G/DL (ref 11.9–15.1)
IMM GRANULOCYTES # BLD AUTO: 0 K/UL (ref 0–0.3)
IMM GRANULOCYTES NFR BLD: 0 %
LYMPHOCYTES NFR BLD: 0.38 K/UL (ref 1–4.8)
LYMPHOCYTES RELATIVE PERCENT: 3 % (ref 24–44)
MCH RBC QN AUTO: 26 PG (ref 25.2–33.5)
MCHC RBC AUTO-ENTMCNC: 30.9 G/DL (ref 28.4–34.8)
MCV RBC AUTO: 84.2 FL (ref 82.6–102.9)
MONOCYTES NFR BLD: 0.75 K/UL (ref 0.1–0.8)
MONOCYTES NFR BLD: 6 % (ref 1–7)
MORPHOLOGY: ABNORMAL
NEUTROPHILS NFR BLD: 91 % (ref 36–66)
NEUTS SEG NFR BLD: 11.37 K/UL (ref 1.8–7.7)
NRBC BLD-RTO: 0 PER 100 WBC
PLATELET # BLD AUTO: 323 K/UL (ref 138–453)
PMV BLD AUTO: 10.7 FL (ref 8.1–13.5)
POTASSIUM SERPL-SCNC: 4.2 MMOL/L (ref 3.7–5.3)
PROT SERPL-MCNC: 6.4 G/DL (ref 6.6–8.7)
RBC # BLD AUTO: 3.54 M/UL (ref 3.95–5.11)
SODIUM SERPL-SCNC: 137 MMOL/L (ref 136–145)
WBC OTHER # BLD: 12.5 K/UL (ref 3.5–11.3)

## 2025-03-11 PROCEDURE — 6370000000 HC RX 637 (ALT 250 FOR IP)

## 2025-03-11 PROCEDURE — 2500000003 HC RX 250 WO HCPCS: Performed by: INTERNAL MEDICINE

## 2025-03-11 PROCEDURE — 2500000003 HC RX 250 WO HCPCS

## 2025-03-11 PROCEDURE — 6360000002 HC RX W HCPCS

## 2025-03-11 PROCEDURE — 99232 SBSQ HOSP IP/OBS MODERATE 35: CPT | Performed by: INTERNAL MEDICINE

## 2025-03-11 PROCEDURE — 2060000000 HC ICU INTERMEDIATE R&B

## 2025-03-11 PROCEDURE — 2700000000 HC OXYGEN THERAPY PER DAY

## 2025-03-11 PROCEDURE — 85025 COMPLETE CBC W/AUTO DIFF WBC: CPT

## 2025-03-11 PROCEDURE — 36415 COLL VENOUS BLD VENIPUNCTURE: CPT

## 2025-03-11 PROCEDURE — 6360000002 HC RX W HCPCS: Performed by: INTERNAL MEDICINE

## 2025-03-11 PROCEDURE — 93010 ELECTROCARDIOGRAM REPORT: CPT | Performed by: INTERNAL MEDICINE

## 2025-03-11 PROCEDURE — 94640 AIRWAY INHALATION TREATMENT: CPT

## 2025-03-11 PROCEDURE — 94660 CPAP INITIATION&MGMT: CPT

## 2025-03-11 PROCEDURE — 80053 COMPREHEN METABOLIC PANEL: CPT

## 2025-03-11 RX ORDER — PREDNISONE 20 MG/1
40 TABLET ORAL DAILY
Status: COMPLETED | OUTPATIENT
Start: 2025-03-11 | End: 2025-03-15

## 2025-03-11 RX ORDER — GUAIFENESIN 600 MG/1
600 TABLET, EXTENDED RELEASE ORAL 2 TIMES DAILY
Status: DISCONTINUED | OUTPATIENT
Start: 2025-03-11 | End: 2025-03-15 | Stop reason: HOSPADM

## 2025-03-11 RX ORDER — FAMOTIDINE 20 MG/1
20 TABLET, FILM COATED ORAL 2 TIMES DAILY
Status: DISCONTINUED | OUTPATIENT
Start: 2025-03-11 | End: 2025-03-15 | Stop reason: HOSPADM

## 2025-03-11 RX ORDER — FAMOTIDINE 20 MG/1
20 TABLET, FILM COATED ORAL DAILY
Status: DISCONTINUED | OUTPATIENT
Start: 2025-03-12 | End: 2025-03-11

## 2025-03-11 RX ORDER — FUROSEMIDE 40 MG/1
40 TABLET ORAL DAILY
Status: DISCONTINUED | OUTPATIENT
Start: 2025-03-12 | End: 2025-03-15 | Stop reason: HOSPADM

## 2025-03-11 RX ORDER — INSULIN GLARGINE 100 [IU]/ML
15 INJECTION, SOLUTION SUBCUTANEOUS DAILY
Status: DISCONTINUED | OUTPATIENT
Start: 2025-03-11 | End: 2025-03-15

## 2025-03-11 RX ORDER — AZITHROMYCIN 250 MG/1
500 TABLET, FILM COATED ORAL DAILY
Status: COMPLETED | OUTPATIENT
Start: 2025-03-11 | End: 2025-03-11

## 2025-03-11 RX ORDER — BUSPIRONE HYDROCHLORIDE 10 MG/1
30 TABLET ORAL 3 TIMES DAILY
Status: DISCONTINUED | OUTPATIENT
Start: 2025-03-11 | End: 2025-03-15 | Stop reason: HOSPADM

## 2025-03-11 RX ADMIN — CEFTRIAXONE SODIUM 1000 MG: 1 INJECTION, POWDER, FOR SOLUTION INTRAMUSCULAR; INTRAVENOUS at 13:30

## 2025-03-11 RX ADMIN — APIXABAN 5 MG: 5 TABLET, FILM COATED ORAL at 19:59

## 2025-03-11 RX ADMIN — INSULIN LISPRO 8 UNITS: 100 INJECTION, SOLUTION INTRAVENOUS; SUBCUTANEOUS at 17:56

## 2025-03-11 RX ADMIN — BUDESONIDE AND FORMOTEROL FUMARATE DIHYDRATE 2 PUFF: 160; 4.5 AEROSOL RESPIRATORY (INHALATION) at 09:57

## 2025-03-11 RX ADMIN — INSULIN GLARGINE 15 UNITS: 100 INJECTION, SOLUTION SUBCUTANEOUS at 08:28

## 2025-03-11 RX ADMIN — FERROUS SULFATE TAB EC 325 MG (65 MG FE EQUIVALENT) 325 MG: 325 (65 FE) TABLET DELAYED RESPONSE at 08:22

## 2025-03-11 RX ADMIN — FUROSEMIDE 40 MG: 10 INJECTION, SOLUTION INTRAMUSCULAR; INTRAVENOUS at 08:18

## 2025-03-11 RX ADMIN — MEGESTROL ACETATE 20 MG: 20 TABLET ORAL at 20:00

## 2025-03-11 RX ADMIN — BUSPIRONE HYDROCHLORIDE 30 MG: 10 TABLET ORAL at 11:49

## 2025-03-11 RX ADMIN — BUSPIRONE HYDROCHLORIDE 30 MG: 10 TABLET ORAL at 17:57

## 2025-03-11 RX ADMIN — INSULIN LISPRO 4 UNITS: 100 INJECTION, SOLUTION INTRAVENOUS; SUBCUTANEOUS at 22:04

## 2025-03-11 RX ADMIN — CLOPIDOGREL BISULFATE 75 MG: 75 TABLET, FILM COATED ORAL at 08:24

## 2025-03-11 RX ADMIN — INSULIN LISPRO 4 UNITS: 100 INJECTION, SOLUTION INTRAVENOUS; SUBCUTANEOUS at 06:16

## 2025-03-11 RX ADMIN — ATORVASTATIN CALCIUM 40 MG: 40 TABLET, FILM COATED ORAL at 08:22

## 2025-03-11 RX ADMIN — IPRATROPIUM BROMIDE AND ALBUTEROL SULFATE 1 DOSE: .5; 2.5 SOLUTION RESPIRATORY (INHALATION) at 09:57

## 2025-03-11 RX ADMIN — AZITHROMYCIN DIHYDRATE 500 MG: 250 TABLET ORAL at 08:22

## 2025-03-11 RX ADMIN — PREDNISONE 40 MG: 20 TABLET ORAL at 08:21

## 2025-03-11 RX ADMIN — INSULIN LISPRO 6 UNITS: 100 INJECTION, SOLUTION INTRAVENOUS; SUBCUTANEOUS at 12:49

## 2025-03-11 RX ADMIN — METOPROLOL TARTRATE 12.5 MG: 25 TABLET, FILM COATED ORAL at 19:59

## 2025-03-11 RX ADMIN — GUAIFENESIN 600 MG: 600 TABLET, EXTENDED RELEASE ORAL at 19:59

## 2025-03-11 RX ADMIN — FAMOTIDINE 20 MG: 20 TABLET, FILM COATED ORAL at 08:21

## 2025-03-11 RX ADMIN — POLYETHYLENE GLYCOL 3350 17 G: 17 POWDER, FOR SOLUTION ORAL at 18:01

## 2025-03-11 RX ADMIN — DICYCLOMINE HYDROCHLORIDE 10 MG: 10 CAPSULE ORAL at 17:58

## 2025-03-11 RX ADMIN — GUAIFENESIN 600 MG: 600 TABLET, EXTENDED RELEASE ORAL at 08:22

## 2025-03-11 RX ADMIN — BUSPIRONE HYDROCHLORIDE 30 MG: 10 TABLET ORAL at 08:21

## 2025-03-11 RX ADMIN — BUDESONIDE AND FORMOTEROL FUMARATE DIHYDRATE 2 PUFF: 160; 4.5 AEROSOL RESPIRATORY (INHALATION) at 20:05

## 2025-03-11 RX ADMIN — SODIUM CHLORIDE, PRESERVATIVE FREE 10 ML: 5 INJECTION INTRAVENOUS at 08:24

## 2025-03-11 RX ADMIN — MEGESTROL ACETATE 20 MG: 20 TABLET ORAL at 09:44

## 2025-03-11 RX ADMIN — DICYCLOMINE HYDROCHLORIDE 10 MG: 10 CAPSULE ORAL at 20:00

## 2025-03-11 RX ADMIN — APIXABAN 5 MG: 5 TABLET, FILM COATED ORAL at 08:25

## 2025-03-11 RX ADMIN — IPRATROPIUM BROMIDE AND ALBUTEROL SULFATE 1 DOSE: .5; 2.5 SOLUTION RESPIRATORY (INHALATION) at 15:33

## 2025-03-11 RX ADMIN — IPRATROPIUM BROMIDE AND ALBUTEROL SULFATE 1 DOSE: .5; 2.5 SOLUTION RESPIRATORY (INHALATION) at 20:05

## 2025-03-11 RX ADMIN — DICYCLOMINE HYDROCHLORIDE 10 MG: 10 CAPSULE ORAL at 11:48

## 2025-03-11 RX ADMIN — FAMOTIDINE 20 MG: 20 TABLET, FILM COATED ORAL at 22:04

## 2025-03-11 RX ADMIN — METOPROLOL TARTRATE 12.5 MG: 25 TABLET, FILM COATED ORAL at 08:21

## 2025-03-11 RX ADMIN — ACETAMINOPHEN 650 MG: 325 TABLET ORAL at 10:31

## 2025-03-11 RX ADMIN — DICYCLOMINE HYDROCHLORIDE 10 MG: 10 CAPSULE ORAL at 08:22

## 2025-03-11 RX ADMIN — SODIUM CHLORIDE, PRESERVATIVE FREE 10 ML: 5 INJECTION INTRAVENOUS at 19:38

## 2025-03-11 ASSESSMENT — PAIN SCALES - GENERAL
PAINLEVEL_OUTOF10: 0
PAINLEVEL_OUTOF10: 0
PAINLEVEL_OUTOF10: 7

## 2025-03-11 ASSESSMENT — ENCOUNTER SYMPTOMS: SHORTNESS OF BREATH: 1

## 2025-03-11 ASSESSMENT — PAIN DESCRIPTION - LOCATION: LOCATION: HEAD

## 2025-03-11 NOTE — PROGRESS NOTES
[facility]  Internal Medicine Teaching Residency Program  Inpatient Daily Progress Note  ______________________________________________________________________________    Patient: Nancy L Haase  YOB: 1960   MRN:[id number]    Acct: [id number]     Room: 0449/0449-01  Admit date: 3/9/2025  Today's date: 03/11/25  Number of days in the hospital: 2    SUBJECTIVE   Admitting Diagnosis: Acute and chronic respiratory failure with hypoxia (HCC)  CC: Shortness of breath    Pt examined at bedside. Chart & results reviewed.   No acute events overnight.  AOx4.  Reported no active complaints and bleeding has been controlled.  Hemodynamically stable with blood pressure of 115/65 mm and maintaining saturation at around 99% on 2 L of oxygen via nasal cannula    Monitor labs reviewed, BMP unremarkable, glucose 257.  CBC revealed WBC 12.5, hemoglobin 9.2.    Urine output about 1.6 L since admission  Currently on , Rocephin, Lasix 40 IV twice daily.  Zithromax has been completed.      ROS:  Review of Systems   Constitutional: Negative.    HENT: Negative.     Respiratory:  Positive for shortness of breath (Improving).    Cardiovascular:  Negative for chest pain and palpitations.   Genitourinary: Negative.         Plan :-    Will continue patient on diet  Will continue diuretics to Lasix.  Insulin glargine 15 units    BRIEF HISTORY     The patient is a 64 y.o. female with PMHx of atrial fibrillation Eliquis, COPD, iron deficiency anemia, type 2 diabetes mellitus, CHF and CKD.     They present to the ED with a chief complaint of shortness of breath and cough since 1 day, sudden onset.  Patient stated the night before when she was going to bathroom she experienced shortness of breath and felt difficulty in walking.  Patient felt nebulizer and inhaler treatments but did not.  Patient received Combivent, Solu-Medrol and magnesium by EMS en route.  Patient denied fever, chest pain,            Medications:  Scheduled Medications:    cefTRIAXone (ROCEPHIN) IV  1,000 mg IntraVENous Q24H    furosemide  40 mg IntraVENous BID    insulin glargine  10 Units SubCUTAneous Daily    apixaban  5 mg Oral BID    clopidogrel  75 mg Oral Daily    ipratropium 0.5 mg-albuterol 2.5 mg  1 Dose Inhalation TID RT    dicyclomine  10 mg Oral 4x Daily    atorvastatin  40 mg Oral Daily    famotidine  20 mg Oral BID    ferrous sulfate  325 mg Oral Daily with breakfast    [Held by provider] lisinopril  10 mg Oral Daily    megestrol  20 mg Oral BID    metoprolol tartrate  12.5 mg Oral BID    azithromycin  500 mg IntraVENous Q24H    methylPREDNISolone  40 mg IntraVENous Q12H    budesonide-formoterol  2 puff Inhalation BID RT    sodium chloride flush  5-40 mL IntraVENous 2 times per day    insulin lispro  0-8 Units SubCUTAneous 4x Daily AC & HS     Continuous Infusions:    sodium chloride      dextrose       PRN Medicationsipratropium 0.5 mg-albuterol 2.5 mg, 1 Dose, Q4H PRN  busPIRone, 30 mg, TID PRN  nitroGLYCERIN, 0.4 mg, Q5 Min PRN  sodium chloride flush, 5-40 mL, PRN  sodium chloride, , PRN  potassium chloride, 40 mEq, PRN   Or  potassium alternative oral replacement, 40 mEq, PRN   Or  potassium chloride, 10 mEq, PRN  magnesium sulfate, 2,000 mg, PRN  ondansetron, 4 mg, Q8H PRN   Or  ondansetron, 4 mg, Q6H PRN  polyethylene glycol, 17 g, Daily PRN  acetaminophen, 650 mg, Q6H PRN   Or  acetaminophen, 650 mg, Q6H PRN  glucose, 4 tablet, PRN  dextrose bolus, 125 mL, PRN   Or  dextrose bolus, 250 mL, PRN  glucagon (rDNA), 1 mg, PRN  dextrose, , Continuous PRN        Diagnostic Labs:  CBC:   Recent Labs     03/09/25  0614 03/10/25  0343 03/11/25  0341   WBC 10.8 11.7* 12.5*   RBC 2.90* 3.08* 3.54*   HGB 7.8* 8.1* 9.2*   HCT 24.8* 26.3* 29.8*   MCV 85.5 85.4 84.2   RDW 18.2* 18.1* 17.6*    229 323     BMP:   Recent Labs     03/09/25  0614 03/09/25  0810 03/10/25  0343 03/11/25  0341     --  139 137   K 3.8  --

## 2025-03-11 NOTE — PLAN OF CARE
Problem: Pain  Goal: Verbalizes/displays adequate comfort level or baseline comfort level  3/10/2025 2013 by ALICJA Johnson  Outcome: Progressing-Patient assessed for pain level this shift via 0-10 scale, reassessed appropriately post-interventions.    Problem: Skin/Tissue Integrity  Goal: Skin integrity remains intact  Description: 1.  Monitor for areas of redness and/or skin breakdown  2.  Assess vascular access sites hourly  3.  Every 4-6 hours minimum:  Change oxygen saturation probe site  4.  Every 4-6 hours:  If on nasal continuous positive airway pressure, respiratory therapy assess nares and determine need for appliance change or resting period  3/10/2025 2013 by ALICJA Johnson  Outcome: Progressing-No new skin abnormalities noted during this shift.      Problem: Safety - Adult  Goal: Free from fall injury  3/10/2025 2013 by ALICJA Johnson  Outcome: Progressing-Patient free from falls during this shift.     Problem: ABCDS Injury Assessment  Goal: Absence of physical injury  3/10/2025 2013 by ALICJA Johnson  Outcome: Progressing-no new injuries during this shift.

## 2025-03-11 NOTE — PLAN OF CARE
Problem: Chronic Conditions and Co-morbidities  Goal: Patient's chronic conditions and co-morbidity symptoms are monitored and maintained or improved  3/11/2025 0314 by Malinda Smith RN  Outcome: Progressing  3/10/2025 2013 by Elizabeth Black RN  Outcome: Progressing  Flowsheets (Taken 3/10/2025 1945)  Care Plan - Patient's Chronic Conditions and Co-Morbidity Symptoms are Monitored and Maintained or Improved: Monitor and assess patient's chronic conditions and comorbid symptoms for stability, deterioration, or improvement  3/10/2025 1640 by Arely Palomares RN  Outcome: Progressing     Problem: Pain  Goal: Verbalizes/displays adequate comfort level or baseline comfort level  3/11/2025 0314 by Malinda Smith RN  Outcome: Progressing  3/10/2025 2013 by Elizabeth Black RN  Outcome: Progressing  3/10/2025 1640 by Arely Palomares RN  Outcome: Progressing     Problem: Skin/Tissue Integrity  Goal: Skin integrity remains intact  Description: 1.  Monitor for areas of redness and/or skin breakdown  2.  Assess vascular access sites hourly  3.  Every 4-6 hours minimum:  Change oxygen saturation probe site  4.  Every 4-6 hours:  If on nasal continuous positive airway pressure, respiratory therapy assess nares and determine need for appliance change or resting period  3/11/2025 0314 by Malinda Smith RN  Outcome: Progressing  3/10/2025 2013 by Elizabeth Black RN  Outcome: Progressing  Flowsheets (Taken 3/10/2025 1945)  Skin Integrity Remains Intact: Monitor for areas of redness and/or skin breakdown  3/10/2025 1640 by Arely Palomares RN  Outcome: Progressing     Problem: Safety - Adult  Goal: Free from fall injury  3/11/2025 0314 by Malinda Smith RN  Outcome: Progressing  3/10/2025 2013 by Elizabeth Black RN  Outcome: Progressing  3/10/2025 1640 by Arely Palomares RN  Outcome: Progressing     Problem: ABCDS Injury Assessment  Goal: Absence of physical injury  3/11/2025 0314 by Malinda Smith  RN  Outcome: Progressing  3/10/2025 2013 by Elizabeth Black RN  Outcome: Progressing  3/10/2025 1640 by Arely Palomares RN  Outcome: Progressing     Problem: Respiratory - Adult  Goal: Achieves optimal ventilation and oxygenation  Outcome: Progressing  Flowsheets (Taken 3/10/2025 2041 by Helder Lees, CECY)  Achieves optimal ventilation and oxygenation:   Respiratory therapy support as indicated   Assess and instruct to report shortness of breath or any respiratory difficulty   Assess the need for suctioning and aspirate as needed   Encourage broncho-pulmonary hygiene including cough, deep breathe, incentive spirometry   Initiate smoking cessation protocol as indicated   Oxygen supplementation based on oxygen saturation or arterial blood gases   Position to facilitate oxygenation and minimize respiratory effort   Assess for changes in mentation and behavior   Assess for changes in respiratory status     Problem: Cardiovascular - Adult  Goal: Maintains optimal cardiac output and hemodynamic stability  Outcome: Progressing  Goal: Absence of cardiac dysrhythmias or at baseline  Outcome: Progressing     Problem: Hematologic - Adult  Goal: Maintains hematologic stability  Outcome: Progressing

## 2025-03-11 NOTE — PROGRESS NOTES
03/10/25 2041   Care Plan - Respiratory Goals   Achieves optimal ventilation and oxygenation Respiratory therapy support as indicated;Assess and instruct to report shortness of breath or any respiratory difficulty;Assess the need for suctioning and aspirate as needed;Encourage broncho-pulmonary hygiene including cough, deep breathe, incentive spirometry;Initiate smoking cessation protocol as indicated;Oxygen supplementation based on oxygen saturation or arterial blood gases;Position to facilitate oxygenation and minimize respiratory effort;Assess for changes in mentation and behavior;Assess for changes in respiratory status

## 2025-03-12 ENCOUNTER — RESULTS FOLLOW-UP (OUTPATIENT)
Dept: INPATIENT UNIT | Age: 65
End: 2025-03-12

## 2025-03-12 ENCOUNTER — APPOINTMENT (OUTPATIENT)
Dept: GENERAL RADIOLOGY | Age: 65
End: 2025-03-12
Payer: MEDICARE

## 2025-03-12 LAB
ALBUMIN SERPL-MCNC: 3.6 G/DL (ref 3.5–5.2)
ALBUMIN/GLOB SERPL: 1.4 {RATIO} (ref 1–2.5)
ALP SERPL-CCNC: 63 U/L (ref 35–104)
ALT SERPL-CCNC: 46 U/L (ref 10–35)
ANION GAP SERPL CALCULATED.3IONS-SCNC: 11 MMOL/L (ref 9–16)
AST SERPL-CCNC: 16 U/L (ref 10–35)
BASOPHILS # BLD: <0.03 K/UL (ref 0–0.2)
BASOPHILS NFR BLD: 0 % (ref 0–2)
BILIRUB SERPL-MCNC: 0.3 MG/DL (ref 0–1.2)
BUN SERPL-MCNC: 36 MG/DL (ref 8–23)
CALCIUM SERPL-MCNC: 8.8 MG/DL (ref 8.6–10.4)
CHLORIDE SERPL-SCNC: 103 MMOL/L (ref 98–107)
CO2 SERPL-SCNC: 24 MMOL/L (ref 20–31)
CREAT SERPL-MCNC: 1.5 MG/DL (ref 0.6–0.9)
EOSINOPHIL # BLD: <0.03 K/UL (ref 0–0.44)
EOSINOPHILS RELATIVE PERCENT: 0 % (ref 1–4)
ERYTHROCYTE [DISTWIDTH] IN BLOOD BY AUTOMATED COUNT: 17.3 % (ref 11.8–14.4)
GFR, ESTIMATED: 39 ML/MIN/1.73M2
GLUCOSE BLD-MCNC: 142 MG/DL (ref 65–105)
GLUCOSE BLD-MCNC: 224 MG/DL (ref 65–105)
GLUCOSE BLD-MCNC: 251 MG/DL (ref 65–105)
GLUCOSE BLD-MCNC: 337 MG/DL (ref 65–105)
GLUCOSE SERPL-MCNC: 155 MG/DL (ref 74–99)
HCT VFR BLD AUTO: 30.6 % (ref 36.3–47.1)
HGB BLD-MCNC: 9.6 G/DL (ref 11.9–15.1)
IMM GRANULOCYTES # BLD AUTO: 0.1 K/UL (ref 0–0.3)
IMM GRANULOCYTES NFR BLD: 1 %
LYMPHOCYTES NFR BLD: 1.62 K/UL (ref 1.1–3.7)
LYMPHOCYTES RELATIVE PERCENT: 14 % (ref 24–43)
MCH RBC QN AUTO: 26.1 PG (ref 25.2–33.5)
MCHC RBC AUTO-ENTMCNC: 31.4 G/DL (ref 28.4–34.8)
MCV RBC AUTO: 83.2 FL (ref 82.6–102.9)
MONOCYTES NFR BLD: 1 K/UL (ref 0.1–1.2)
MONOCYTES NFR BLD: 9 % (ref 3–12)
NEUTROPHILS NFR BLD: 76 % (ref 36–65)
NEUTS SEG NFR BLD: 8.86 K/UL (ref 1.5–8.1)
NRBC BLD-RTO: 0 PER 100 WBC
PLATELET # BLD AUTO: 326 K/UL (ref 138–453)
PMV BLD AUTO: 10.1 FL (ref 8.1–13.5)
POTASSIUM SERPL-SCNC: 3.9 MMOL/L (ref 3.7–5.3)
PROT SERPL-MCNC: 6.1 G/DL (ref 6.6–8.7)
RBC # BLD AUTO: 3.68 M/UL (ref 3.95–5.11)
RBC # BLD: ABNORMAL 10*6/UL
SODIUM SERPL-SCNC: 138 MMOL/L (ref 136–145)
SURGICAL PATHOLOGY REPORT: NORMAL
WBC OTHER # BLD: 11.6 K/UL (ref 3.5–11.3)

## 2025-03-12 PROCEDURE — 6360000002 HC RX W HCPCS: Performed by: INTERNAL MEDICINE

## 2025-03-12 PROCEDURE — 6370000000 HC RX 637 (ALT 250 FOR IP)

## 2025-03-12 PROCEDURE — 51798 US URINE CAPACITY MEASURE: CPT

## 2025-03-12 PROCEDURE — 94761 N-INVAS EAR/PLS OXIMETRY MLT: CPT

## 2025-03-12 PROCEDURE — 82947 ASSAY GLUCOSE BLOOD QUANT: CPT

## 2025-03-12 PROCEDURE — 85025 COMPLETE CBC W/AUTO DIFF WBC: CPT

## 2025-03-12 PROCEDURE — 80053 COMPREHEN METABOLIC PANEL: CPT

## 2025-03-12 PROCEDURE — 2500000003 HC RX 250 WO HCPCS: Performed by: INTERNAL MEDICINE

## 2025-03-12 PROCEDURE — 36415 COLL VENOUS BLD VENIPUNCTURE: CPT

## 2025-03-12 PROCEDURE — 99233 SBSQ HOSP IP/OBS HIGH 50: CPT | Performed by: INTERNAL MEDICINE

## 2025-03-12 PROCEDURE — 51701 INSERT BLADDER CATHETER: CPT

## 2025-03-12 PROCEDURE — 2060000000 HC ICU INTERMEDIATE R&B

## 2025-03-12 PROCEDURE — 74018 RADEX ABDOMEN 1 VIEW: CPT

## 2025-03-12 PROCEDURE — 94640 AIRWAY INHALATION TREATMENT: CPT

## 2025-03-12 PROCEDURE — 2500000003 HC RX 250 WO HCPCS

## 2025-03-12 RX ORDER — SENNA AND DOCUSATE SODIUM 50; 8.6 MG/1; MG/1
2 TABLET, FILM COATED ORAL DAILY
Status: DISCONTINUED | OUTPATIENT
Start: 2025-03-12 | End: 2025-03-13

## 2025-03-12 RX ORDER — POLYETHYLENE GLYCOL 3350 17 G/17G
17 POWDER, FOR SOLUTION ORAL 2 TIMES DAILY
Status: DISCONTINUED | OUTPATIENT
Start: 2025-03-12 | End: 2025-03-13

## 2025-03-12 RX ORDER — CEPHALEXIN 500 MG/1
500 CAPSULE ORAL 2 TIMES DAILY
Qty: 8 CAPSULE | Refills: 0 | Status: CANCELLED | OUTPATIENT
Start: 2025-03-12 | End: 2025-03-16

## 2025-03-12 RX ORDER — PREDNISONE 20 MG/1
40 TABLET ORAL DAILY
Qty: 8 TABLET | Refills: 0 | Status: SHIPPED | OUTPATIENT
Start: 2025-03-12 | End: 2025-03-16

## 2025-03-12 RX ORDER — LEVOFLOXACIN 250 MG/1
250 TABLET, FILM COATED ORAL DAILY
Qty: 4 TABLET | Refills: 0 | Status: SHIPPED | OUTPATIENT
Start: 2025-03-12 | End: 2025-03-14

## 2025-03-12 RX ORDER — LIDOCAINE HYDROCHLORIDE 20 MG/ML
JELLY TOPICAL PRN
Status: DISCONTINUED | OUTPATIENT
Start: 2025-03-12 | End: 2025-03-12 | Stop reason: CLARIF

## 2025-03-12 RX ORDER — LIDOCAINE HYDROCHLORIDE 20 MG/ML
JELLY TOPICAL PRN
Status: DISCONTINUED | OUTPATIENT
Start: 2025-03-12 | End: 2025-03-14 | Stop reason: SDUPTHER

## 2025-03-12 RX ORDER — HYDROCORTISONE ACETATE 25 MG/1
25 SUPPOSITORY RECTAL ONCE
Status: DISCONTINUED | OUTPATIENT
Start: 2025-03-12 | End: 2025-03-15 | Stop reason: HOSPADM

## 2025-03-12 RX ORDER — BISACODYL 10 MG
10 SUPPOSITORY, RECTAL RECTAL DAILY PRN
Status: DISCONTINUED | OUTPATIENT
Start: 2025-03-12 | End: 2025-03-15 | Stop reason: HOSPADM

## 2025-03-12 RX ADMIN — APIXABAN 5 MG: 5 TABLET, FILM COATED ORAL at 20:01

## 2025-03-12 RX ADMIN — IPRATROPIUM BROMIDE AND ALBUTEROL SULFATE 1 DOSE: .5; 2.5 SOLUTION RESPIRATORY (INHALATION) at 12:55

## 2025-03-12 RX ADMIN — MEGESTROL ACETATE 20 MG: 20 TABLET ORAL at 20:01

## 2025-03-12 RX ADMIN — ATORVASTATIN CALCIUM 40 MG: 40 TABLET, FILM COATED ORAL at 09:31

## 2025-03-12 RX ADMIN — FERROUS SULFATE TAB EC 325 MG (65 MG FE EQUIVALENT) 325 MG: 325 (65 FE) TABLET DELAYED RESPONSE at 09:30

## 2025-03-12 RX ADMIN — SODIUM CHLORIDE, PRESERVATIVE FREE 10 ML: 5 INJECTION INTRAVENOUS at 20:04

## 2025-03-12 RX ADMIN — IPRATROPIUM BROMIDE AND ALBUTEROL SULFATE 1 DOSE: .5; 2.5 SOLUTION RESPIRATORY (INHALATION) at 09:07

## 2025-03-12 RX ADMIN — MEGESTROL ACETATE 20 MG: 20 TABLET ORAL at 09:29

## 2025-03-12 RX ADMIN — BUDESONIDE AND FORMOTEROL FUMARATE DIHYDRATE 2 PUFF: 160; 4.5 AEROSOL RESPIRATORY (INHALATION) at 21:01

## 2025-03-12 RX ADMIN — ACETAMINOPHEN 650 MG: 325 TABLET ORAL at 02:52

## 2025-03-12 RX ADMIN — POLYETHYLENE GLYCOL 3350 17 G: 17 POWDER, FOR SOLUTION ORAL at 19:56

## 2025-03-12 RX ADMIN — DICYCLOMINE HYDROCHLORIDE 10 MG: 10 CAPSULE ORAL at 09:31

## 2025-03-12 RX ADMIN — APIXABAN 5 MG: 5 TABLET, FILM COATED ORAL at 09:31

## 2025-03-12 RX ADMIN — CLOPIDOGREL BISULFATE 75 MG: 75 TABLET, FILM COATED ORAL at 09:31

## 2025-03-12 RX ADMIN — CEFTRIAXONE SODIUM 2000 MG: 2 INJECTION, POWDER, FOR SOLUTION INTRAMUSCULAR; INTRAVENOUS at 13:56

## 2025-03-12 RX ADMIN — BISACODYL 10 MG: 10 SUPPOSITORY RECTAL at 13:58

## 2025-03-12 RX ADMIN — SODIUM CHLORIDE, PRESERVATIVE FREE 10 ML: 5 INJECTION INTRAVENOUS at 09:44

## 2025-03-12 RX ADMIN — ACETAMINOPHEN 650 MG: 325 TABLET ORAL at 20:02

## 2025-03-12 RX ADMIN — GUAIFENESIN 600 MG: 600 TABLET, EXTENDED RELEASE ORAL at 20:02

## 2025-03-12 RX ADMIN — INSULIN LISPRO 2 UNITS: 100 INJECTION, SOLUTION INTRAVENOUS; SUBCUTANEOUS at 11:47

## 2025-03-12 RX ADMIN — BUSPIRONE HYDROCHLORIDE 30 MG: 10 TABLET ORAL at 09:32

## 2025-03-12 RX ADMIN — BUSPIRONE HYDROCHLORIDE 30 MG: 10 TABLET ORAL at 20:01

## 2025-03-12 RX ADMIN — FAMOTIDINE 20 MG: 20 TABLET, FILM COATED ORAL at 20:02

## 2025-03-12 RX ADMIN — INSULIN LISPRO 6 UNITS: 100 INJECTION, SOLUTION INTRAVENOUS; SUBCUTANEOUS at 18:08

## 2025-03-12 RX ADMIN — METOPROLOL TARTRATE 12.5 MG: 25 TABLET, FILM COATED ORAL at 09:29

## 2025-03-12 RX ADMIN — LISINOPRIL 10 MG: 20 TABLET ORAL at 09:30

## 2025-03-12 RX ADMIN — LIDOCAINE HYDROCHLORIDE: 20 JELLY TOPICAL at 17:47

## 2025-03-12 RX ADMIN — POLYETHYLENE GLYCOL 3350 17 G: 17 POWDER, FOR SOLUTION ORAL at 09:32

## 2025-03-12 RX ADMIN — GUAIFENESIN 600 MG: 600 TABLET, EXTENDED RELEASE ORAL at 09:30

## 2025-03-12 RX ADMIN — INSULIN LISPRO 4 UNITS: 100 INJECTION, SOLUTION INTRAVENOUS; SUBCUTANEOUS at 21:35

## 2025-03-12 RX ADMIN — INSULIN GLARGINE 15 UNITS: 100 INJECTION, SOLUTION SUBCUTANEOUS at 09:32

## 2025-03-12 RX ADMIN — FAMOTIDINE 20 MG: 20 TABLET, FILM COATED ORAL at 09:31

## 2025-03-12 RX ADMIN — SENNOSIDES AND DOCUSATE SODIUM 2 TABLET: 50; 8.6 TABLET ORAL at 09:29

## 2025-03-12 RX ADMIN — IPRATROPIUM BROMIDE AND ALBUTEROL SULFATE 1 DOSE: .5; 2.5 SOLUTION RESPIRATORY (INHALATION) at 21:01

## 2025-03-12 RX ADMIN — PREDNISONE 40 MG: 20 TABLET ORAL at 09:29

## 2025-03-12 RX ADMIN — METOPROLOL TARTRATE 12.5 MG: 25 TABLET, FILM COATED ORAL at 20:02

## 2025-03-12 RX ADMIN — FUROSEMIDE 40 MG: 40 TABLET ORAL at 09:30

## 2025-03-12 RX ADMIN — BUDESONIDE AND FORMOTEROL FUMARATE DIHYDRATE 2 PUFF: 160; 4.5 AEROSOL RESPIRATORY (INHALATION) at 09:08

## 2025-03-12 RX ADMIN — BUSPIRONE HYDROCHLORIDE 30 MG: 10 TABLET ORAL at 14:01

## 2025-03-12 ASSESSMENT — PAIN SCALES - GENERAL
PAINLEVEL_OUTOF10: 5
PAINLEVEL_OUTOF10: 9
PAINLEVEL_OUTOF10: 7
PAINLEVEL_OUTOF10: 10
PAINLEVEL_OUTOF10: 5
PAINLEVEL_OUTOF10: 7

## 2025-03-12 ASSESSMENT — ENCOUNTER SYMPTOMS: SHORTNESS OF BREATH: 1

## 2025-03-12 ASSESSMENT — PAIN DESCRIPTION - DESCRIPTORS
DESCRIPTORS: DISCOMFORT
DESCRIPTORS: DISCOMFORT

## 2025-03-12 ASSESSMENT — PAIN DESCRIPTION - LOCATION
LOCATION: BACK
LOCATION: ABDOMEN;BACK

## 2025-03-12 NOTE — PROGRESS NOTES
CLINICAL PHARMACY NOTE: MEDS TO BEDS    Total # of Prescriptions Filled: 2   The following medications were delivered to the patient:  PREDNISONE 20MG   LEVAQUIN 250MG     Additional Documentation:

## 2025-03-12 NOTE — PROGRESS NOTES
1322: Writer reached out Chris Brambila MD \"Hey patient is complaining of 10/10 sharp pain in her rectum. States that it hurts when she passess gas and just laying there. She is requesting something else to help her have a bowel movement, she knows she would feel better if she could just have a BM\" New suppository orders noted.     1613: \"Hey, patient stated that when she is impacted she retains urine. She did pee earlier today but I bladder scanned her and she has 999+ in her bladder. She did do the enema, suppository and everything else ordered but she has no relief. Please advise\" Per Dr \"We will discontinue the discharge order for now, will get KUB, straight cath her and I have put in the order for straight cath at 10:30 PM.\"     Writer reached Intern on call. Dr Strauss switch providers. \"Hello, just wanted to reach out. Patient had received glycolax, senokot, and suppository for constipation. It didnt work. I got an order for a soaps and suds enema. When the enema was inserted the cap was never removed despite the cap still being on the fluid was still able to leak out. Patient is still leaking stool, are you able to order a numbing gel so we can try to retrieve the cap?\"     New orders noted. Patient made aware of the situation. KUB obtained.

## 2025-03-12 NOTE — PLAN OF CARE
Problem: Chronic Conditions and Co-morbidities  Goal: Patient's chronic conditions and co-morbidity symptoms are monitored and maintained or improved  Outcome: Progressing     Problem: Pain  Goal: Verbalizes/displays adequate comfort level or baseline comfort level  Outcome: Progressing     Problem: Skin/Tissue Integrity  Goal: Skin integrity remains intact  Description: 1.  Monitor for areas of redness and/or skin breakdown  2.  Assess vascular access sites hourly  3.  Every 4-6 hours minimum:  Change oxygen saturation probe site  4.  Every 4-6 hours:  If on nasal continuous positive airway pressure, respiratory therapy assess nares and determine need for appliance change or resting period  Outcome: Progressing     Problem: Safety - Adult  Goal: Free from fall injury  Outcome: Progressing     Problem: ABCDS Injury Assessment  Goal: Absence of physical injury  Outcome: Progressing     Problem: Respiratory - Adult  Goal: Achieves optimal ventilation and oxygenation  3/12/2025 1315 by Shu Cortez RN  Outcome: Progressing  3/12/2025 0910 by Jaimee Canela RCP  Outcome: Progressing     Problem: Cardiovascular - Adult  Goal: Maintains optimal cardiac output and hemodynamic stability  Outcome: Progressing  Goal: Absence of cardiac dysrhythmias or at baseline  Outcome: Progressing     Problem: Hematologic - Adult  Goal: Maintains hematologic stability  Outcome: Progressing

## 2025-03-12 NOTE — PROGRESS NOTES
[facility]  Occupational Therapy Not Seen Note    DATE: 3/12/2025    NAME: Nancy L Haase  MRN: [id number]   : 1960      Patient not seen this date for Occupational Therapy due to:    Other: Pt declined, pt on toilet attempting to have BM for extended period of time. Pt expressing increased frustration not being able to have BM with RN notified and aware.    Next Scheduled Treatment: 3/13/25    Electronically signed by Savannah Irene OT on 3/12/2025 at 3:48 PM

## 2025-03-12 NOTE — DISCHARGE INSTRUCTIONS
You were seen here for heart failure exacerbation.  You are also found to have pneumonia.  -Start Levaquin 1 tablet daily for 2 days.  -Start prednisone 40 mg 1 tablet for 2 days.  -Continue to take Lasix 40 mg once a day.  If you begin to feel short of breath or noticed to have increased swelling in your legs, you can take Lasix for twice a day.  - please follow up with gynaec oncology for the new diagnosis of uterine cancer  -Please follow-up with your cardiology for further evaluation of mitral stenosis.  -Continue to take rest of the medications as previously prescribed.  -Continue to use your inhalers as previously prescribed.  -Follow-up with your PCP/cardiology.  Return to ER/call 911 if you begin with worsening chest pain, cough, shortness of breath, nausea vomiting or diarrhea

## 2025-03-12 NOTE — DISCHARGE SUMMARY
Until Sun 7/16/2023, For 5 days, Disp-40 g, R-0, Normal      Hydrocortisone, Perianal, (PROCTO-BASHIR) 1 % cream 2 times daily Apply topically 2 times daily., Rectal, Disp-28 g, R-1, Normal      Insulin Pen Needle (PEN NEEDLES) 31G X 5 MM MISC 1 box by Does not apply route daily, Disp-1 each, R-0Normal      busPIRone (BUSPAR) 30 MG tablet Take 30 mg by mouth 3 times daily as neededHistorical Med      acetaminophen (TYLENOL) 325 MG tablet Take 2 tablets by mouth every 4-6 hours as neededHistorical Med      furosemide (LASIX) 40 MG tablet Take 1 tablet by mouth in the morning. Hold for 2 more days., Disp-60 tablet, R-3NO PRINT      mometasone-formoterol (DULERA) 200-5 MCG/ACT inhaler Inhale 2 puffs into the lungs in the morning and 2 puffs in the evening.Historical Med      lisinopril (PRINIVIL;ZESTRIL) 10 MG tablet Take 1 tablet by mouth daily, Disp-30 tablet, R-3Normal      clopidogrel (PLAVIX) 75 MG tablet Take 1 tablet by mouth daily, Disp-30 tablet, R-3Normal      famotidine (PEPCID) 20 MG tablet Take 1 tablet by mouth 2 times dailyHistorical Med      albuterol sulfate HFA (PROVENTIL;VENTOLIN;PROAIR) 108 (90 Base) MCG/ACT inhaler Inhale 2 puffs into the lungs 3 times dailyHistorical Med      apixaban (ELIQUIS) 5 MG TABS tablet Take 1 tablet by mouth 2 times daily, Disp-180 tablet, R-1Print      atorvastatin (LIPITOR) 40 MG tablet Take 1 tablet by mouth daily, Disp-30 tablet, R-3Normal      Dulaglutide (TRULICITY) 1.5 MG/0.5ML SOPN Inject 0.5 mLs into the skin once a week Every tuesdayHistorical Med      metoprolol tartrate (LOPRESSOR) 25 MG tablet Take 0.5 tablets by mouth 2 times daily, Disp-60 tablet, R-3Normal      Lancets MISC DAILY Starting Tue 2/12/2019, Disp-100 each, R-3, Print      blood glucose monitor strips Test 3 times a day & as needed for symptoms of irregular blood glucose., Disp-100 strip, R-3, Normal      glucose monitoring kit (FREESTYLE) monitoring kit DAILY Starting Tue 2/12/2019, Disp-1 kit,

## 2025-03-12 NOTE — PLAN OF CARE
Problem: Chronic Conditions and Co-morbidities  Goal: Patient's chronic conditions and co-morbidity symptoms are monitored and maintained or improved  Outcome: Progressing  Flowsheets (Taken 3/11/2025 0425 by Malinda Smith, RN)  Care Plan - Patient's Chronic Conditions and Co-Morbidity Symptoms are Monitored and Maintained or Improved:   Monitor and assess patient's chronic conditions and comorbid symptoms for stability, deterioration, or improvement   Collaborate with multidisciplinary team to address chronic and comorbid conditions and prevent exacerbation or deterioration   Update acute care plan with appropriate goals if chronic or comorbid symptoms are exacerbated and prevent overall improvement and discharge     Problem: Pain  Goal: Verbalizes/displays adequate comfort level or baseline comfort level  Outcome: Progressing  Flowsheets (Taken 3/11/2025 0425 by Malinda Smith RN)  Verbalizes/displays adequate comfort level or baseline comfort level:   Encourage patient to monitor pain and request assistance   Assess pain using appropriate pain scale   Administer analgesics based on type and severity of pain and evaluate response   Implement non-pharmacological measures as appropriate and evaluate response   Consider cultural and social influences on pain and pain management   Notify Licensed Independent Practitioner if interventions unsuccessful or patient reports new pain     Problem: Skin/Tissue Integrity  Goal: Skin integrity remains intact  Description: 1.  Monitor for areas of redness and/or skin breakdown  2.  Assess vascular access sites hourly  3.  Every 4-6 hours minimum:  Change oxygen saturation probe site  4.  Every 4-6 hours:  If on nasal continuous positive airway pressure, respiratory therapy assess nares and determine need for appliance change or resting period  Outcome: Progressing  Flowsheets (Taken 3/11/2025 0425 by Malinda Smith, RN)  Skin Integrity Remains Intact:   Monitor for  areas of redness and/or skin breakdown   Assess vascular access sites hourly   Every 4-6 hours minimum: Change oxygen saturation probe site     Problem: Safety - Adult  Goal: Free from fall injury  Outcome: Progressing  Flowsheets (Taken 3/11/2025 2136)  Free From Fall Injury: Instruct family/caregiver on patient safety

## 2025-03-12 NOTE — CARE COORDINATION
Discharge Report    [facility]  Clinical Case Management Department  Written by: RONY VALENZUELA    Patient Name: Nancy L Haase  Attending Provider: Lenin Acevedo MD  Admit Date: 3/9/2025  6:00 AM  MRN: [id number]  Account: [id number]                     : 1960  Discharge Date: 3/12/25    After pt has BM.    Disposition: home    RONY VALENZUELA

## 2025-03-12 NOTE — PROGRESS NOTES
[facility]  Internal Medicine Teaching Residency Program  Inpatient Daily Progress Note  ______________________________________________________________________________    Patient: Nancy L Haase  YOB: 1960   MRN:[id number]    Acct: [id number]     Room: 0449/0449-01  Admit date: 3/9/2025  Today's date: 03/12/25  Number of days in the hospital: 3    SUBJECTIVE   Admitting Diagnosis: Acute and chronic respiratory failure with hypoxia (HCC)  CC: Shortness of breath    Pt examined at bedside. Chart & results reviewed.   No acute events overnight.  AOx4.  Reported no active complaints and bleeding has been controlled.  Hemodynamically stable with blood pressure of 115/65 mm and maintaining saturation at around 99% on 2 L of oxygen via nasal cannula    Monitor labs reviewed, BMP unremarkable, glucose 155.  CBC revealed WBC 11.6, hemoglobin 9.6.    Urine output about 1.9 L since admission  Currently on IV Rocephin 2 g every 24 hours.      ROS:  Review of Systems   Constitutional: Negative.    HENT: Negative.     Respiratory:  Positive for shortness of breath (Improving).    Cardiovascular:  Negative for chest pain and palpitations.   Genitourinary: Negative.         Plan :-    Will continue patient on diet  Insulin glargine 15 units    BRIEF HISTORY     The patient is a 64 y.o. female with PMHx of atrial fibrillation Eliquis, COPD, iron deficiency anemia, type 2 diabetes mellitus, CHF and CKD.     They present to the ED with a chief complaint of shortness of breath and cough since 1 day, sudden onset.  Patient stated the night before when she was going to bathroom she experienced shortness of breath and felt difficulty in walking.  Patient felt nebulizer and inhaler treatments but did not.  Patient received Combivent, Solu-Medrol and magnesium by EMS en route.  Patient denied fever, chest pain, abdominal pain, nausea/vomiting, syncope, dizziness and weakness/burning  monitor    Diet:ADULT DIET; Regular; Low Sodium (2 gm); 1500 ml   DVT ppx : Eliquis  GI ppx: Famotidine    PT/OT: On board  Discharge Planning / SW: Case management assistance for discharge planning.      Chris Strauss MD   Internal Medicine Resident, PGY-1  [place], [place]  3/12/2025,7:22 AM

## 2025-03-13 ENCOUNTER — APPOINTMENT (OUTPATIENT)
Dept: GENERAL RADIOLOGY | Age: 65
End: 2025-03-13
Payer: MEDICARE

## 2025-03-13 ENCOUNTER — TELEPHONE (OUTPATIENT)
Dept: OBGYN CLINIC | Age: 65
End: 2025-03-13

## 2025-03-13 DIAGNOSIS — C54.1 ENDOMETRIAL CANCER, GRADE I (HCC): Primary | ICD-10-CM

## 2025-03-13 LAB
ALBUMIN SERPL-MCNC: 3.5 G/DL (ref 3.5–5.2)
ALBUMIN/GLOB SERPL: 1.2 {RATIO} (ref 1–2.5)
ALP SERPL-CCNC: 65 U/L (ref 35–104)
ALT SERPL-CCNC: 43 U/L (ref 10–35)
ANION GAP SERPL CALCULATED.3IONS-SCNC: 12 MMOL/L (ref 9–16)
AST SERPL-CCNC: 14 U/L (ref 10–35)
BASOPHILS # BLD: 0.03 K/UL (ref 0–0.2)
BASOPHILS NFR BLD: 0 % (ref 0–2)
BILIRUB SERPL-MCNC: 0.3 MG/DL (ref 0–1.2)
BUN SERPL-MCNC: 36 MG/DL (ref 8–23)
CALCIUM SERPL-MCNC: 9 MG/DL (ref 8.6–10.4)
CHLORIDE SERPL-SCNC: 105 MMOL/L (ref 98–107)
CO2 SERPL-SCNC: 20 MMOL/L (ref 20–31)
CREAT SERPL-MCNC: 1.6 MG/DL (ref 0.6–0.9)
EOSINOPHIL # BLD: <0.03 K/UL (ref 0–0.44)
EOSINOPHILS RELATIVE PERCENT: 0 % (ref 1–4)
ERYTHROCYTE [DISTWIDTH] IN BLOOD BY AUTOMATED COUNT: 17.3 % (ref 11.8–14.4)
GFR, ESTIMATED: 36 ML/MIN/1.73M2
GLUCOSE BLD-MCNC: 145 MG/DL (ref 65–105)
GLUCOSE BLD-MCNC: 200 MG/DL (ref 65–105)
GLUCOSE BLD-MCNC: 204 MG/DL (ref 65–105)
GLUCOSE BLD-MCNC: 307 MG/DL (ref 65–105)
GLUCOSE SERPL-MCNC: 130 MG/DL (ref 74–99)
HCT VFR BLD AUTO: 36.7 % (ref 36.3–47.1)
HGB BLD-MCNC: 11.1 G/DL (ref 11.9–15.1)
IMM GRANULOCYTES # BLD AUTO: 0.11 K/UL (ref 0–0.3)
IMM GRANULOCYTES NFR BLD: 1 %
LYMPHOCYTES NFR BLD: 1.77 K/UL (ref 1.1–3.7)
LYMPHOCYTES RELATIVE PERCENT: 11 % (ref 24–43)
MCH RBC QN AUTO: 25.9 PG (ref 25.2–33.5)
MCHC RBC AUTO-ENTMCNC: 30.2 G/DL (ref 28.4–34.8)
MCV RBC AUTO: 85.5 FL (ref 82.6–102.9)
MONOCYTES NFR BLD: 1.37 K/UL (ref 0.1–1.2)
MONOCYTES NFR BLD: 9 % (ref 3–12)
NEUTROPHILS NFR BLD: 79 % (ref 36–65)
NEUTS SEG NFR BLD: 12.39 K/UL (ref 1.5–8.1)
NRBC BLD-RTO: 0 PER 100 WBC
PLATELET # BLD AUTO: 375 K/UL (ref 138–453)
PMV BLD AUTO: 10.3 FL (ref 8.1–13.5)
POTASSIUM SERPL-SCNC: 3.8 MMOL/L (ref 3.7–5.3)
PROT SERPL-MCNC: 6.5 G/DL (ref 6.6–8.7)
RBC # BLD AUTO: 4.29 M/UL (ref 3.95–5.11)
RBC # BLD: ABNORMAL 10*6/UL
SODIUM SERPL-SCNC: 137 MMOL/L (ref 136–145)
WBC OTHER # BLD: 15.7 K/UL (ref 3.5–11.3)

## 2025-03-13 PROCEDURE — 74019 RADEX ABDOMEN 2 VIEWS: CPT

## 2025-03-13 PROCEDURE — 94761 N-INVAS EAR/PLS OXIMETRY MLT: CPT

## 2025-03-13 PROCEDURE — 80053 COMPREHEN METABOLIC PANEL: CPT

## 2025-03-13 PROCEDURE — 82947 ASSAY GLUCOSE BLOOD QUANT: CPT

## 2025-03-13 PROCEDURE — 6370000000 HC RX 637 (ALT 250 FOR IP)

## 2025-03-13 PROCEDURE — 85025 COMPLETE CBC W/AUTO DIFF WBC: CPT

## 2025-03-13 PROCEDURE — 51798 US URINE CAPACITY MEASURE: CPT

## 2025-03-13 PROCEDURE — 2500000003 HC RX 250 WO HCPCS

## 2025-03-13 PROCEDURE — 99232 SBSQ HOSP IP/OBS MODERATE 35: CPT | Performed by: SURGERY

## 2025-03-13 PROCEDURE — 2060000000 HC ICU INTERMEDIATE R&B

## 2025-03-13 PROCEDURE — 2500000003 HC RX 250 WO HCPCS: Performed by: INTERNAL MEDICINE

## 2025-03-13 PROCEDURE — 94640 AIRWAY INHALATION TREATMENT: CPT

## 2025-03-13 PROCEDURE — 2580000003 HC RX 258

## 2025-03-13 PROCEDURE — 6370000000 HC RX 637 (ALT 250 FOR IP): Performed by: PHYSICIAN ASSISTANT

## 2025-03-13 PROCEDURE — 6360000002 HC RX W HCPCS: Performed by: INTERNAL MEDICINE

## 2025-03-13 PROCEDURE — 51701 INSERT BLADDER CATHETER: CPT

## 2025-03-13 PROCEDURE — 36415 COLL VENOUS BLD VENIPUNCTURE: CPT

## 2025-03-13 PROCEDURE — 93005 ELECTROCARDIOGRAM TRACING: CPT | Performed by: INTERNAL MEDICINE

## 2025-03-13 RX ORDER — OXYCODONE AND ACETAMINOPHEN 5; 325 MG/1; MG/1
1 TABLET ORAL EVERY 4 HOURS PRN
Status: DISCONTINUED | OUTPATIENT
Start: 2025-03-13 | End: 2025-03-15 | Stop reason: HOSPADM

## 2025-03-13 RX ORDER — SENNA AND DOCUSATE SODIUM 50; 8.6 MG/1; MG/1
2 TABLET, FILM COATED ORAL DAILY PRN
Status: DISCONTINUED | OUTPATIENT
Start: 2025-03-13 | End: 2025-03-15 | Stop reason: HOSPADM

## 2025-03-13 RX ORDER — 0.9 % SODIUM CHLORIDE 0.9 %
500 INTRAVENOUS SOLUTION INTRAVENOUS ONCE
Status: DISCONTINUED | OUTPATIENT
Start: 2025-03-13 | End: 2025-03-13

## 2025-03-13 RX ORDER — POLYETHYLENE GLYCOL 3350 17 G/17G
17 POWDER, FOR SOLUTION ORAL DAILY PRN
Status: DISCONTINUED | OUTPATIENT
Start: 2025-03-13 | End: 2025-03-13

## 2025-03-13 RX ORDER — POLYETHYLENE GLYCOL 3350 17 G/17G
17 POWDER, FOR SOLUTION ORAL DAILY
Status: DISCONTINUED | OUTPATIENT
Start: 2025-03-13 | End: 2025-03-15 | Stop reason: HOSPADM

## 2025-03-13 RX ORDER — METOPROLOL TARTRATE 1 MG/ML
5 INJECTION, SOLUTION INTRAVENOUS EVERY 6 HOURS PRN
Status: DISCONTINUED | OUTPATIENT
Start: 2025-03-13 | End: 2025-03-15 | Stop reason: HOSPADM

## 2025-03-13 RX ADMIN — BUDESONIDE AND FORMOTEROL FUMARATE DIHYDRATE 2 PUFF: 160; 4.5 AEROSOL RESPIRATORY (INHALATION) at 21:15

## 2025-03-13 RX ADMIN — MAGNESIUM HYDROXIDE 30 ML: 400 SUSPENSION ORAL at 12:55

## 2025-03-13 RX ADMIN — INSULIN GLARGINE 15 UNITS: 100 INJECTION, SOLUTION SUBCUTANEOUS at 08:56

## 2025-03-13 RX ADMIN — IPRATROPIUM BROMIDE AND ALBUTEROL SULFATE 1 DOSE: .5; 2.5 SOLUTION RESPIRATORY (INHALATION) at 08:30

## 2025-03-13 RX ADMIN — IPRATROPIUM BROMIDE AND ALBUTEROL SULFATE 1 DOSE: .5; 2.5 SOLUTION RESPIRATORY (INHALATION) at 21:15

## 2025-03-13 RX ADMIN — ACETAMINOPHEN 650 MG: 325 TABLET ORAL at 08:57

## 2025-03-13 RX ADMIN — POLYETHYLENE GLYCOL 3350 17 G: 17 POWDER, FOR SOLUTION ORAL at 17:38

## 2025-03-13 RX ADMIN — BUDESONIDE AND FORMOTEROL FUMARATE DIHYDRATE 2 PUFF: 160; 4.5 AEROSOL RESPIRATORY (INHALATION) at 08:31

## 2025-03-13 RX ADMIN — INSULIN LISPRO 2 UNITS: 100 INJECTION, SOLUTION INTRAVENOUS; SUBCUTANEOUS at 11:24

## 2025-03-13 RX ADMIN — MAGNESIUM HYDROXIDE 30 ML: 400 SUSPENSION ORAL at 20:21

## 2025-03-13 RX ADMIN — LISINOPRIL 10 MG: 20 TABLET ORAL at 08:59

## 2025-03-13 RX ADMIN — SODIUM CHLORIDE 500 ML: 0.9 INJECTION, SOLUTION INTRAVENOUS at 15:10

## 2025-03-13 RX ADMIN — FUROSEMIDE 40 MG: 40 TABLET ORAL at 08:59

## 2025-03-13 RX ADMIN — ACETAMINOPHEN 650 MG: 325 TABLET ORAL at 23:25

## 2025-03-13 RX ADMIN — ATORVASTATIN CALCIUM 40 MG: 40 TABLET, FILM COATED ORAL at 09:00

## 2025-03-13 RX ADMIN — INSULIN LISPRO 6 UNITS: 100 INJECTION, SOLUTION INTRAVENOUS; SUBCUTANEOUS at 17:39

## 2025-03-13 RX ADMIN — BUSPIRONE HYDROCHLORIDE 30 MG: 10 TABLET ORAL at 08:57

## 2025-03-13 RX ADMIN — OXYCODONE HYDROCHLORIDE AND ACETAMINOPHEN 1 TABLET: 5; 325 TABLET ORAL at 17:39

## 2025-03-13 RX ADMIN — SODIUM CHLORIDE, PRESERVATIVE FREE 10 ML: 5 INJECTION INTRAVENOUS at 09:00

## 2025-03-13 RX ADMIN — CLOPIDOGREL BISULFATE 75 MG: 75 TABLET, FILM COATED ORAL at 08:57

## 2025-03-13 RX ADMIN — FERROUS SULFATE TAB EC 325 MG (65 MG FE EQUIVALENT) 325 MG: 325 (65 FE) TABLET DELAYED RESPONSE at 08:57

## 2025-03-13 RX ADMIN — IPRATROPIUM BROMIDE AND ALBUTEROL SULFATE 1 DOSE: .5; 2.5 SOLUTION RESPIRATORY (INHALATION) at 13:54

## 2025-03-13 RX ADMIN — SODIUM CHLORIDE, PRESERVATIVE FREE 10 ML: 5 INJECTION INTRAVENOUS at 20:22

## 2025-03-13 RX ADMIN — PREDNISONE 40 MG: 20 TABLET ORAL at 08:58

## 2025-03-13 RX ADMIN — MEGESTROL ACETATE 20 MG: 20 TABLET ORAL at 09:01

## 2025-03-13 RX ADMIN — FAMOTIDINE 20 MG: 20 TABLET, FILM COATED ORAL at 20:22

## 2025-03-13 RX ADMIN — ACETAMINOPHEN 650 MG: 325 TABLET ORAL at 16:58

## 2025-03-13 RX ADMIN — BUSPIRONE HYDROCHLORIDE 30 MG: 10 TABLET ORAL at 14:06

## 2025-03-13 RX ADMIN — GUAIFENESIN 600 MG: 600 TABLET, EXTENDED RELEASE ORAL at 08:59

## 2025-03-13 RX ADMIN — BUSPIRONE HYDROCHLORIDE 30 MG: 10 TABLET ORAL at 20:22

## 2025-03-13 RX ADMIN — SENNOSIDES AND DOCUSATE SODIUM 2 TABLET: 50; 8.6 TABLET ORAL at 08:59

## 2025-03-13 RX ADMIN — METOPROLOL TARTRATE 12.5 MG: 25 TABLET, FILM COATED ORAL at 20:23

## 2025-03-13 RX ADMIN — INSULIN LISPRO 2 UNITS: 100 INJECTION, SOLUTION INTRAVENOUS; SUBCUTANEOUS at 20:22

## 2025-03-13 RX ADMIN — ACETAMINOPHEN 650 MG: 325 TABLET ORAL at 02:58

## 2025-03-13 RX ADMIN — METOPROLOL TARTRATE 5 MG: 5 INJECTION INTRAVENOUS at 15:11

## 2025-03-13 RX ADMIN — APIXABAN 5 MG: 5 TABLET, FILM COATED ORAL at 08:58

## 2025-03-13 RX ADMIN — GUAIFENESIN 600 MG: 600 TABLET, EXTENDED RELEASE ORAL at 20:22

## 2025-03-13 RX ADMIN — METOPROLOL TARTRATE 12.5 MG: 25 TABLET, FILM COATED ORAL at 08:58

## 2025-03-13 RX ADMIN — MEGESTROL ACETATE 20 MG: 20 TABLET ORAL at 20:24

## 2025-03-13 RX ADMIN — APIXABAN 5 MG: 5 TABLET, FILM COATED ORAL at 20:22

## 2025-03-13 RX ADMIN — FAMOTIDINE 20 MG: 20 TABLET, FILM COATED ORAL at 08:59

## 2025-03-13 RX ADMIN — CEFTRIAXONE SODIUM 2000 MG: 2 INJECTION, POWDER, FOR SOLUTION INTRAMUSCULAR; INTRAVENOUS at 12:55

## 2025-03-13 ASSESSMENT — PAIN DESCRIPTION - DESCRIPTORS
DESCRIPTORS: SHARP
DESCRIPTORS: DISCOMFORT
DESCRIPTORS: ACHING
DESCRIPTORS: DISCOMFORT
DESCRIPTORS: BURNING;SHARP

## 2025-03-13 ASSESSMENT — PAIN SCALES - GENERAL
PAINLEVEL_OUTOF10: 10

## 2025-03-13 ASSESSMENT — PAIN DESCRIPTION - LOCATION
LOCATION: BACK
LOCATION: RECTUM
LOCATION: HIP

## 2025-03-13 ASSESSMENT — PAIN DESCRIPTION - ORIENTATION
ORIENTATION: RIGHT

## 2025-03-13 ASSESSMENT — ENCOUNTER SYMPTOMS: SHORTNESS OF BREATH: 1

## 2025-03-13 NOTE — CONSULTS
PROGRESS NOTE          PATIENT NAME: Nancy L Haase  MEDICAL RECORD NO. [id number]  DATE: 3/13/2025    HD: # 4      Patient Active Problem List   Diagnosis    Chronic bronchitis (HCC)    Lung nodule    ACS (acute coronary syndrome) (HCC)    Essential hypertension    S/P drug eluting coronary stent placement - Mid RCA () 2/11/19 (Dr. cee)    Gastroenteritis    Coronary artery disease involving native coronary artery of native heart without angina pectoris    Vitamin D deficiency    Marijuana abuse    Type 2 diabetes mellitus with hyperglycemia, without long-term current use of insulin (HCC)    Other constipation    Urinary retention    COPD exacerbation (HCC)    Lobar pneumonia    Acute on chronic systolic congestive heart failure (HCC)    Former smoker    Anxiety    Class 2 obesity with body mass index (BMI) of 36.0 to 36.9 in adult    Pyuria    Dyspnea    COPD with exacerbation (HCC)    Paroxysmal A-fib (HCC)    CKD stage 3 due to type 2 diabetes mellitus (HCC)    Anemia    HARVEY (acute kidney injury)    Stage 3a chronic kidney disease (HCC)    Postmenopausal bleeding    Abnormal uterine bleeding (AUB)    Anemia due to blood loss, acute    Pelvic pain    Abnormal vaginal bleeding    Right lower lobe pneumonia    S/p Pap smear, Hscope D&C, Liletta IUD insertion 3/6/25    Chronic congestive heart failure (HCC)    Longstanding persistent atrial fibrillation (HCC)    Acute and chronic respiratory failure with hypoxia (HCC)       DIAGNOSIS AND PLAN    64 year old female admitted with CHF exacerbation found to have constipation s/p enema with bottle cap \"foreign body\" retained in rectum.  Continue daily bowel regimen, observe for passage of foreign body, patient not tolerating rectal exams. Serial KUB.     Chief Complaint: \"pain in rectal area\"    SUBJECTIVE  General surgery was re consulted today regarding a rectal foreign body due to a bottle cap from an enema bottle during administration on 3/12. Per Rn the cap is  None.     Bones: No bony abnormalities.  Degenerative changes of the hips and spine.     Soft tissues: Normal.  Intrauterine device projects over the mid upper pelvis     IMPRESSION:  Non-obstructive bowel gas pattern. Normal stool volume.     Normal stool volume.         Mesha Goodman PA-C  3/13/2025, 1:36 PM

## 2025-03-13 NOTE — TELEPHONE ENCOUNTER
Obstetric/Gynecology Telephone Encounter Note    I called and spoke with both Pina and her daughter in law Claire in regards to pathology results. We discussed endometrial cancer diagnosis and referral placed to gynecology oncology. Discussed continue to take megace medication by mouth and that IUD is in place. She does not report any ongoing vaginal bleeding at this time.      Patient verbalized understanding and agreement to plan of care. All questions answered.     DO Nneka Hernandez [facility]  [address] [facility]    [id number]  [facility], [id number]  3/13/2025, 10:36 AM

## 2025-03-13 NOTE — PROGRESS NOTES
[facility]  Internal Medicine Teaching Residency Program  Inpatient Daily Progress Note  ______________________________________________________________________________    Patient: Nancy L Haase  YOB: 1960   MRN:[id number]    Acct: [id number]     Room: 0449/0449-01  Admit date: 3/9/2025  Today's date: 03/13/25  Number of days in the hospital: 4    SUBJECTIVE   Admitting Diagnosis: Acute and chronic respiratory failure with hypoxia (HCC)  CC: Shortness of breath    Patient seen and examined at bedside.  Apparently patient was given enema for constipation but RN never removed the cap on the enema leading to cap getting stuck in the patient's butt.  Patient is significant pain because of foreign body retention.  Bedside manipulation was attempted yesterday but n were not able to get it out.  Will consult general surgery for removal of foreign body        ROS:  Review of Systems   Constitutional: Negative.    HENT: Negative.     Respiratory:  Positive for shortness of breath (Improving).    Cardiovascular:  Negative for chest pain and palpitations.   Genitourinary: Negative.         Plan :-    Will continue patient on diet  Insulin glargine 15 units    BRIEF HISTORY     The patient is a 64 y.o. female with PMHx of atrial fibrillation Eliquis, COPD, iron deficiency anemia, type 2 diabetes mellitus, CHF and CKD.     They present to the ED with a chief complaint of shortness of breath and cough since 1 day, sudden onset.  Patient stated the night before when she was going to bathroom she experienced shortness of breath and felt difficulty in walking.  Patient felt nebulizer and inhaler treatments but did not.  Patient received Combivent, Solu-Medrol and magnesium by EMS en route.  Patient denied fever, chest pain, abdominal pain, nausea/vomiting, syncope, dizziness and weakness/burning micturition.  Patient denied any new vaginal bleeding.  Patient was admitted from   25 mg Rectal Once    insulin glargine  15 Units SubCUTAneous Daily    predniSONE  40 mg Oral Daily    guaiFENesin  600 mg Oral BID    busPIRone  30 mg Oral TID    furosemide  40 mg Oral Daily    cefTRIAXone (ROCEPHIN) IV  2,000 mg IntraVENous Q24H    famotidine  20 mg Oral BID    apixaban  5 mg Oral BID    clopidogrel  75 mg Oral Daily    ipratropium 0.5 mg-albuterol 2.5 mg  1 Dose Inhalation TID RT    atorvastatin  40 mg Oral Daily    ferrous sulfate  325 mg Oral Daily with breakfast    lisinopril  10 mg Oral Daily    megestrol  20 mg Oral BID    metoprolol tartrate  12.5 mg Oral BID    budesonide-formoterol  2 puff Inhalation BID RT    sodium chloride flush  5-40 mL IntraVENous 2 times per day    insulin lispro  0-8 Units SubCUTAneous 4x Daily AC & HS     Continuous Infusions:    sodium chloride      dextrose       PRN Medicationsbisacodyl, 10 mg, Daily PRN  lidocaine, , PRN  ipratropium 0.5 mg-albuterol 2.5 mg, 1 Dose, Q4H PRN  nitroGLYCERIN, 0.4 mg, Q5 Min PRN  sodium chloride flush, 5-40 mL, PRN  sodium chloride, , PRN  potassium chloride, 40 mEq, PRN   Or  potassium alternative oral replacement, 40 mEq, PRN   Or  potassium chloride, 10 mEq, PRN  magnesium sulfate, 2,000 mg, PRN  ondansetron, 4 mg, Q8H PRN   Or  ondansetron, 4 mg, Q6H PRN  acetaminophen, 650 mg, Q6H PRN   Or  acetaminophen, 650 mg, Q6H PRN  glucose, 4 tablet, PRN  dextrose bolus, 125 mL, PRN   Or  dextrose bolus, 250 mL, PRN  glucagon (rDNA), 1 mg, PRN  dextrose, , Continuous PRN        Diagnostic Labs:  CBC:   Recent Labs     03/11/25 0341 03/12/25 0448 03/13/25  0316   WBC 12.5* 11.6* 15.7*   RBC 3.54* 3.68* 4.29   HGB 9.2* 9.6* 11.1*   HCT 29.8* 30.6* 36.7   MCV 84.2 83.2 85.5   RDW 17.6* 17.3* 17.3*    326 375     BMP:   Recent Labs     03/11/25 0341 03/12/25 0448 03/13/25  0316    138 137   K 4.2 3.9 3.8    103 105   CO2 22 24 20   BUN 33* 36* 36*   CREATININE 1.6* 1.5* 1.6*     BNP: No results for input(s): \"BNP\"

## 2025-03-13 NOTE — PROGRESS NOTES
PATIENT REFUSES TO WEAR BIPAP     [x] Risks and benefits explained to patient   [x] Patient refuses to wear Bipap stating \"no\"  [x] Patient verbalizes understanding of information presented.

## 2025-03-13 NOTE — PLAN OF CARE
Problem: Chronic Conditions and Co-morbidities  Goal: Patient's chronic conditions and co-morbidity symptoms are monitored and maintained or improved  Outcome: Progressing     Problem: Pain  Goal: Verbalizes/displays adequate comfort level or baseline comfort level  Outcome: Progressing     Problem: Skin/Tissue Integrity  Goal: Skin integrity remains intact  Description: 1.  Monitor for areas of redness and/or skin breakdown  2.  Assess vascular access sites hourly  3.  Every 4-6 hours minimum:  Change oxygen saturation probe site  4.  Every 4-6 hours:  If on nasal continuous positive airway pressure, respiratory therapy assess nares and determine need for appliance change or resting period  Outcome: Progressing     Problem: Safety - Adult  Goal: Free from fall injury  Outcome: Progressing     Problem: ABCDS Injury Assessment  Goal: Absence of physical injury  Outcome: Progressing     Problem: Respiratory - Adult  Goal: Achieves optimal ventilation and oxygenation  Outcome: Progressing     Problem: Cardiovascular - Adult  Goal: Maintains optimal cardiac output and hemodynamic stability  Outcome: Progressing  Goal: Absence of cardiac dysrhythmias or at baseline  Outcome: Progressing     Problem: Hematologic - Adult  Goal: Maintains hematologic stability  Outcome: Progressing

## 2025-03-13 NOTE — PROGRESS NOTES
Physical Therapy        Physical Therapy Cancel Note      DATE: 3/13/2025    NAME: Nancy L Haase  MRN: [id number]   : 1960      Patient not seen this date for Physical Therapy due to:    Patient Declined: Pt stating she is not feeling well and just received bad news. Pt requesting to hold off on PT for now, will check back as time allows.       Electronically signed by Susi Lo PTA on 3/13/2025 at 10:52 AM

## 2025-03-13 NOTE — PLAN OF CARE
Problem: Chronic Conditions and Co-morbidities  Goal: Patient's chronic conditions and co-morbidity symptoms are monitored and maintained or improved  3/12/2025 2227 by Eleni Alvarado RN  Outcome: Progressing  Flowsheets (Taken 3/11/2025 0425 by Malinda Smith RN)  Care Plan - Patient's Chronic Conditions and Co-Morbidity Symptoms are Monitored and Maintained or Improved:   Monitor and assess patient's chronic conditions and comorbid symptoms for stability, deterioration, or improvement   Collaborate with multidisciplinary team to address chronic and comorbid conditions and prevent exacerbation or deterioration   Update acute care plan with appropriate goals if chronic or comorbid symptoms are exacerbated and prevent overall improvement and discharge  3/12/2025 1315 by Shu Cortez RN  Outcome: Progressing     Problem: Pain  Goal: Verbalizes/displays adequate comfort level or baseline comfort level  3/12/2025 2227 by Eleni Alvarado RN  Outcome: Progressing  Flowsheets (Taken 3/11/2025 0425 by Malinda Smith RN)  Verbalizes/displays adequate comfort level or baseline comfort level:   Encourage patient to monitor pain and request assistance   Assess pain using appropriate pain scale   Administer analgesics based on type and severity of pain and evaluate response   Implement non-pharmacological measures as appropriate and evaluate response   Consider cultural and social influences on pain and pain management   Notify Licensed Independent Practitioner if interventions unsuccessful or patient reports new pain  3/12/2025 1315 by Shu Cortez RN  Outcome: Progressing     Problem: Skin/Tissue Integrity  Goal: Skin integrity remains intact  Description: 1.  Monitor for areas of redness and/or skin breakdown  2.  Assess vascular access sites hourly  3.  Every 4-6 hours minimum:  Change oxygen saturation probe site  4.  Every 4-6 hours:  If on nasal continuous positive airway pressure, respiratory therapy assess

## 2025-03-14 LAB
ALBUMIN SERPL-MCNC: 3.5 G/DL (ref 3.5–5.2)
ALBUMIN/GLOB SERPL: 1.3 {RATIO} (ref 1–2.5)
ALP SERPL-CCNC: 67 U/L (ref 35–104)
ALT SERPL-CCNC: 31 U/L (ref 10–35)
ANION GAP SERPL CALCULATED.3IONS-SCNC: 11 MMOL/L (ref 9–16)
AST SERPL-CCNC: 17 U/L (ref 10–35)
BASOPHILS # BLD: 0.03 K/UL (ref 0–0.2)
BASOPHILS NFR BLD: 0 % (ref 0–2)
BILIRUB SERPL-MCNC: 0.4 MG/DL (ref 0–1.2)
BUN SERPL-MCNC: 37 MG/DL (ref 8–23)
CALCIUM SERPL-MCNC: 8.6 MG/DL (ref 8.6–10.4)
CHLORIDE SERPL-SCNC: 104 MMOL/L (ref 98–107)
CO2 SERPL-SCNC: 22 MMOL/L (ref 20–31)
CREAT SERPL-MCNC: 1.7 MG/DL (ref 0.6–0.9)
EKG ATRIAL RATE: 202 BPM
EKG Q-T INTERVAL: 308 MS
EKG QRS DURATION: 74 MS
EKG QTC CALCULATION (BAZETT): 476 MS
EKG R AXIS: 47 DEGREES
EKG T AXIS: -82 DEGREES
EKG VENTRICULAR RATE: 144 BPM
EOSINOPHIL # BLD: 0.07 K/UL (ref 0–0.44)
EOSINOPHILS RELATIVE PERCENT: 0 % (ref 1–4)
ERYTHROCYTE [DISTWIDTH] IN BLOOD BY AUTOMATED COUNT: 17.4 % (ref 11.8–14.4)
GFR, ESTIMATED: 34 ML/MIN/1.73M2
GLUCOSE BLD-MCNC: 137 MG/DL (ref 65–105)
GLUCOSE BLD-MCNC: 283 MG/DL (ref 65–105)
GLUCOSE BLD-MCNC: 304 MG/DL (ref 65–105)
GLUCOSE BLD-MCNC: 312 MG/DL (ref 65–105)
GLUCOSE SERPL-MCNC: 126 MG/DL (ref 74–99)
HCT VFR BLD AUTO: 37 % (ref 36.3–47.1)
HGB BLD-MCNC: 11 G/DL (ref 11.9–15.1)
IMM GRANULOCYTES # BLD AUTO: 0.12 K/UL (ref 0–0.3)
IMM GRANULOCYTES NFR BLD: 1 %
LYMPHOCYTES NFR BLD: 2.66 K/UL (ref 1.1–3.7)
LYMPHOCYTES RELATIVE PERCENT: 15 % (ref 24–43)
MCH RBC QN AUTO: 25.8 PG (ref 25.2–33.5)
MCHC RBC AUTO-ENTMCNC: 29.7 G/DL (ref 28.4–34.8)
MCV RBC AUTO: 86.7 FL (ref 82.6–102.9)
MICROORGANISM SPEC CULT: NORMAL
MICROORGANISM SPEC CULT: NORMAL
MONOCYTES NFR BLD: 1.43 K/UL (ref 0.1–1.2)
MONOCYTES NFR BLD: 8 % (ref 3–12)
NEUTROPHILS NFR BLD: 76 % (ref 36–65)
NEUTS SEG NFR BLD: 12.92 K/UL (ref 1.5–8.1)
NRBC BLD-RTO: 0 PER 100 WBC
PLATELET # BLD AUTO: 378 K/UL (ref 138–453)
PMV BLD AUTO: 10.2 FL (ref 8.1–13.5)
POTASSIUM SERPL-SCNC: 4 MMOL/L (ref 3.7–5.3)
PROT SERPL-MCNC: 6.2 G/DL (ref 6.6–8.7)
RBC # BLD AUTO: 4.27 M/UL (ref 3.95–5.11)
RBC # BLD: ABNORMAL 10*6/UL
SERVICE CMNT-IMP: NORMAL
SERVICE CMNT-IMP: NORMAL
SODIUM SERPL-SCNC: 137 MMOL/L (ref 136–145)
SPECIMEN DESCRIPTION: NORMAL
SPECIMEN DESCRIPTION: NORMAL
WBC OTHER # BLD: 17.2 K/UL (ref 3.5–11.3)

## 2025-03-14 PROCEDURE — 94761 N-INVAS EAR/PLS OXIMETRY MLT: CPT

## 2025-03-14 PROCEDURE — 85025 COMPLETE CBC W/AUTO DIFF WBC: CPT

## 2025-03-14 PROCEDURE — 94640 AIRWAY INHALATION TREATMENT: CPT

## 2025-03-14 PROCEDURE — 2500000003 HC RX 250 WO HCPCS: Performed by: INTERNAL MEDICINE

## 2025-03-14 PROCEDURE — 6370000000 HC RX 637 (ALT 250 FOR IP)

## 2025-03-14 PROCEDURE — 2060000000 HC ICU INTERMEDIATE R&B

## 2025-03-14 PROCEDURE — 51798 US URINE CAPACITY MEASURE: CPT

## 2025-03-14 PROCEDURE — 80053 COMPREHEN METABOLIC PANEL: CPT

## 2025-03-14 PROCEDURE — 6360000002 HC RX W HCPCS: Performed by: INTERNAL MEDICINE

## 2025-03-14 PROCEDURE — 2500000003 HC RX 250 WO HCPCS

## 2025-03-14 PROCEDURE — 97535 SELF CARE MNGMENT TRAINING: CPT

## 2025-03-14 PROCEDURE — 36415 COLL VENOUS BLD VENIPUNCTURE: CPT

## 2025-03-14 PROCEDURE — 82947 ASSAY GLUCOSE BLOOD QUANT: CPT

## 2025-03-14 PROCEDURE — 6370000000 HC RX 637 (ALT 250 FOR IP): Performed by: SURGERY

## 2025-03-14 PROCEDURE — 97530 THERAPEUTIC ACTIVITIES: CPT

## 2025-03-14 RX ORDER — LIDOCAINE HYDROCHLORIDE 20 MG/ML
JELLY TOPICAL PRN
Status: DISCONTINUED | OUTPATIENT
Start: 2025-03-14 | End: 2025-03-15 | Stop reason: HOSPADM

## 2025-03-14 RX ORDER — LEVOFLOXACIN 250 MG/1
250 TABLET, FILM COATED ORAL DAILY
Qty: 2 TABLET | Refills: 0 | Status: SHIPPED | OUTPATIENT
Start: 2025-03-14 | End: 2025-03-16

## 2025-03-14 RX ORDER — FENTANYL CITRATE 50 UG/ML
25 INJECTION, SOLUTION INTRAMUSCULAR; INTRAVENOUS EVERY 4 HOURS PRN
Status: DISCONTINUED | OUTPATIENT
Start: 2025-03-14 | End: 2025-03-15 | Stop reason: HOSPADM

## 2025-03-14 RX ORDER — LACTULOSE 10 G/15ML
20 SOLUTION ORAL ONCE
Status: COMPLETED | OUTPATIENT
Start: 2025-03-14 | End: 2025-03-14

## 2025-03-14 RX ADMIN — FAMOTIDINE 20 MG: 20 TABLET, FILM COATED ORAL at 21:49

## 2025-03-14 RX ADMIN — INSULIN LISPRO 4 UNITS: 100 INJECTION, SOLUTION INTRAVENOUS; SUBCUTANEOUS at 18:19

## 2025-03-14 RX ADMIN — IPRATROPIUM BROMIDE AND ALBUTEROL SULFATE 1 DOSE: .5; 2.5 SOLUTION RESPIRATORY (INHALATION) at 07:42

## 2025-03-14 RX ADMIN — INSULIN LISPRO 6 UNITS: 100 INJECTION, SOLUTION INTRAVENOUS; SUBCUTANEOUS at 21:50

## 2025-03-14 RX ADMIN — INSULIN LISPRO 6 UNITS: 100 INJECTION, SOLUTION INTRAVENOUS; SUBCUTANEOUS at 12:52

## 2025-03-14 RX ADMIN — CLOPIDOGREL BISULFATE 75 MG: 75 TABLET, FILM COATED ORAL at 07:42

## 2025-03-14 RX ADMIN — METOPROLOL TARTRATE 12.5 MG: 25 TABLET, FILM COATED ORAL at 07:43

## 2025-03-14 RX ADMIN — GUAIFENESIN 600 MG: 600 TABLET, EXTENDED RELEASE ORAL at 07:43

## 2025-03-14 RX ADMIN — BUDESONIDE AND FORMOTEROL FUMARATE DIHYDRATE 2 PUFF: 160; 4.5 AEROSOL RESPIRATORY (INHALATION) at 21:42

## 2025-03-14 RX ADMIN — SODIUM CHLORIDE, PRESERVATIVE FREE 10 ML: 5 INJECTION INTRAVENOUS at 07:44

## 2025-03-14 RX ADMIN — GUAIFENESIN 600 MG: 600 TABLET, EXTENDED RELEASE ORAL at 21:49

## 2025-03-14 RX ADMIN — APIXABAN 5 MG: 5 TABLET, FILM COATED ORAL at 07:43

## 2025-03-14 RX ADMIN — IPRATROPIUM BROMIDE AND ALBUTEROL SULFATE 1 DOSE: .5; 2.5 SOLUTION RESPIRATORY (INHALATION) at 14:38

## 2025-03-14 RX ADMIN — POLYETHYLENE GLYCOL 3350 17 G: 17 POWDER, FOR SOLUTION ORAL at 07:45

## 2025-03-14 RX ADMIN — BUSPIRONE HYDROCHLORIDE 30 MG: 10 TABLET ORAL at 07:42

## 2025-03-14 RX ADMIN — LACTULOSE 20 G: 20 SOLUTION ORAL at 09:09

## 2025-03-14 RX ADMIN — BUSPIRONE HYDROCHLORIDE 30 MG: 10 TABLET ORAL at 14:29

## 2025-03-14 RX ADMIN — INSULIN GLARGINE 15 UNITS: 100 INJECTION, SOLUTION SUBCUTANEOUS at 07:44

## 2025-03-14 RX ADMIN — CEFTRIAXONE SODIUM 2000 MG: 2 INJECTION, POWDER, FOR SOLUTION INTRAMUSCULAR; INTRAVENOUS at 14:29

## 2025-03-14 RX ADMIN — PREDNISONE 40 MG: 20 TABLET ORAL at 07:43

## 2025-03-14 RX ADMIN — SODIUM CHLORIDE, PRESERVATIVE FREE 10 ML: 5 INJECTION INTRAVENOUS at 21:50

## 2025-03-14 RX ADMIN — BUSPIRONE HYDROCHLORIDE 30 MG: 10 TABLET ORAL at 21:49

## 2025-03-14 RX ADMIN — BUDESONIDE AND FORMOTEROL FUMARATE DIHYDRATE 2 PUFF: 160; 4.5 AEROSOL RESPIRATORY (INHALATION) at 07:44

## 2025-03-14 RX ADMIN — METOPROLOL TARTRATE 12.5 MG: 25 TABLET, FILM COATED ORAL at 21:49

## 2025-03-14 RX ADMIN — ATORVASTATIN CALCIUM 40 MG: 40 TABLET, FILM COATED ORAL at 07:43

## 2025-03-14 RX ADMIN — LIDOCAINE HYDROCHLORIDE: 20 JELLY TOPICAL at 09:09

## 2025-03-14 RX ADMIN — APIXABAN 5 MG: 5 TABLET, FILM COATED ORAL at 21:50

## 2025-03-14 RX ADMIN — FUROSEMIDE 40 MG: 40 TABLET ORAL at 07:43

## 2025-03-14 RX ADMIN — MEGESTROL ACETATE 20 MG: 20 TABLET ORAL at 07:43

## 2025-03-14 RX ADMIN — OXYCODONE HYDROCHLORIDE AND ACETAMINOPHEN 1 TABLET: 5; 325 TABLET ORAL at 01:24

## 2025-03-14 RX ADMIN — IPRATROPIUM BROMIDE AND ALBUTEROL SULFATE 1 DOSE: .5; 2.5 SOLUTION RESPIRATORY (INHALATION) at 21:42

## 2025-03-14 RX ADMIN — LISINOPRIL 10 MG: 20 TABLET ORAL at 07:43

## 2025-03-14 RX ADMIN — MEGESTROL ACETATE 20 MG: 20 TABLET ORAL at 21:49

## 2025-03-14 RX ADMIN — FAMOTIDINE 20 MG: 20 TABLET, FILM COATED ORAL at 07:43

## 2025-03-14 RX ADMIN — FERROUS SULFATE TAB EC 325 MG (65 MG FE EQUIVALENT) 325 MG: 325 (65 FE) TABLET DELAYED RESPONSE at 07:43

## 2025-03-14 RX ADMIN — OXYCODONE HYDROCHLORIDE AND ACETAMINOPHEN 1 TABLET: 5; 325 TABLET ORAL at 09:09

## 2025-03-14 ASSESSMENT — PAIN DESCRIPTION - DESCRIPTORS
DESCRIPTORS: DISCOMFORT
DESCRIPTORS: DISCOMFORT
DESCRIPTORS: BURNING;DISCOMFORT;SHARP

## 2025-03-14 ASSESSMENT — PAIN SCALES - GENERAL
PAINLEVEL_OUTOF10: 10
PAINLEVEL_OUTOF10: 10
PAINLEVEL_OUTOF10: 9
PAINLEVEL_OUTOF10: 10
PAINLEVEL_OUTOF10: 10

## 2025-03-14 ASSESSMENT — PAIN DESCRIPTION - LOCATION
LOCATION: RECTUM

## 2025-03-14 ASSESSMENT — ENCOUNTER SYMPTOMS: SHORTNESS OF BREATH: 1

## 2025-03-14 ASSESSMENT — PAIN SCALES - WONG BAKER: WONGBAKER_NUMERICALRESPONSE: HURTS A LITTLE BIT

## 2025-03-14 NOTE — PLAN OF CARE
Problem: Respiratory - Adult  Goal: Achieves optimal ventilation and oxygenation  3/14/2025 0745 by Zoe Harrison, RCMARLEE  Outcome: Progressing

## 2025-03-14 NOTE — PROGRESS NOTES
PROGRESS NOTE          PATIENT NAME: Nancy L Haase  MEDICAL RECORD NO. [id number]  DATE: 3/14/2025  PRIMARY CARE PHYSICIAN: Scar Reyes MD    HD: # 5    ASSESSMENT    Patient Active Problem List   Diagnosis    Chronic bronchitis (HCC)    Lung nodule    ACS (acute coronary syndrome) (HCC)    Essential hypertension    S/P drug eluting coronary stent placement - Mid RCA () 2/11/19 (Dr. cee)    Gastroenteritis    Coronary artery disease involving native coronary artery of native heart without angina pectoris    Vitamin D deficiency    Marijuana abuse    Type 2 diabetes mellitus with hyperglycemia, without long-term current use of insulin (HCC)    Other constipation    Urinary retention    COPD exacerbation (HCC)    Lobar pneumonia    Acute on chronic systolic congestive heart failure (HCC)    Former smoker    Anxiety    Class 2 obesity with body mass index (BMI) of 36.0 to 36.9 in adult    Pyuria    Dyspnea    COPD with exacerbation (HCC)    Paroxysmal A-fib (HCC)    CKD stage 3 due to type 2 diabetes mellitus (HCC)    Anemia    HARVEY (acute kidney injury)    Stage 3a chronic kidney disease (HCC)    Postmenopausal bleeding    Abnormal uterine bleeding (AUB)    Anemia due to blood loss, acute    Pelvic pain    Abnormal vaginal bleeding    Right lower lobe pneumonia    S/p Pap smear, Hscope D&C, Liletta IUD insertion 3/6/25    Chronic congestive heart failure (HCC)    Longstanding persistent atrial fibrillation (HCC)    Acute and chronic respiratory failure with hypoxia (HCC)    63yo female on consult for possible retained enema cap in rectum following enema on 3/13     MEDICAL DECISION MAKING AND PLAN    KUB does not show retained object will obtain CT to evaluate for enema cap   Significant rectal pain, pain control per primary with percocet and lidocaine PRN to anal opening   General surgery to follow       Chief Complaint: \"pain\"    SUBJECTIVE    Nancy L Haase is having pain at her anal opening. She had  bowel gas pattern is nonobstructive.  There is a device in the central pelvis, with appearance of IUD, seen only on frontal view, in the prior CT abdomen Essure tubal ligation was seen, therefore this may be the tubal ligation device or recently placed IUD.  Otherwise no evidence of radiopaque foreign body.  There is atherosclerotic calcification of the aorta and visualized femoral arteries.  No acute bony abnormalities.     1. Nonobstructive bowel gas pattern. 2. A device similar to IUD is seen in the central pelvis, as described above. Otherwise no evidence of radiopaque foreign body.     XR ABDOMEN (KUB) (SINGLE AP VIEW)  Result Date: 3/12/2025  EXAMINATION: ONE SUPINE XRAY VIEW(S) OF THE ABDOMEN 3/12/2025 7:02 pm COMPARISON: None. HISTORY: ORDERING SYSTEM PROVIDED HISTORY: To rule out constipation TECHNOLOGIST PROVIDED HISTORY: To rule out constipation FINDINGS: Bowel gas pattern: Non-dilated air-filled loops of bowel are present. Abnormal calcifications: None. Bones: No bony abnormalities.  Degenerative changes of the hips and spine. Soft tissues: Normal.  Intrauterine device projects over the mid upper pelvis     Non-obstructive bowel gas pattern. Normal stool volume. Normal stool volume.           BINDU NATHAN - CNP  3/14/25, 11:37 AM

## 2025-03-14 NOTE — PROGRESS NOTES
Occupational Therapy  Occupational Therapy Daily Treatment Note  Facility/Department: [address] ONC/MED SURG   Patient Name: Nancy L Haase        MRN: [id number]    : 1960    Date of Service: 3/14/2025    Chief Complaint   Patient presents with    SOB   Copied from Emergency Medicine:  Nancy L Haase is a 64 y.o. female who presents to the ED with c/o shortness of breath which began last night.       Of note, patient was admitted from 3/2 - 3/8 for postmenopausal vaginal bleeding while on Eliquis and Plavix.  During that admission, did require 2 units PRBCs for hemoglobin less than 7.  She underwent D&C with Liletta IUD insertion on 3/6.  Her vaginal bleeding slowed and eventually stopped 3/7.  Patient was discharged yesterday at 5 PM.       Since then, she has noted increasing shortness of breath worse with exertion.  Patient states she is no longer able to stand up and walk to the bathroom due to shortness of breath.  She tried nebulizer and inhaler treatments without significant improvement, so she called 911.  EMS gave Combivent treatment, 125 mg Solu-Medrol, and 2 g magnesium en route.  Upon arrival to the ED, patient was still reporting significant shortness of breath and cough.  She denies any fever, chest pain, abdominal pain, nausea/vomiting.  Past Medical History:  has a past medical history of Acute on chronic systolic CHF (congestive heart failure) (HCC), Asthma, Blood circulation, collateral, CAD (coronary artery disease), COPD (chronic obstructive pulmonary disease) ([facility]), COPD exacerbation ([facility]), Diabetes mellitus (HCC), Hyperlipidemia, Hypertension, Ischemic colitis, Movement disorder, Neuropathy, PAD (peripheral artery disease), Tobacco abuse, and Tobacco abuse counseling.  Past Surgical History:  has a past surgical history that includes Coronary angioplasty with stent; Tonsillectomy; Inner ear surgery (Right); Coronary angioplasty with stent (2019); and Dilation and curettage of uterus

## 2025-03-14 NOTE — PLAN OF CARE
Problem: Chronic Conditions and Co-morbidities  Goal: Patient's chronic conditions and co-morbidity symptoms are monitored and maintained or improved  3/13/2025 2222 by Eleni Alvarado RN  Outcome: Progressing  Flowsheets (Taken 3/11/2025 0425 by Malinda Smith RN)  Care Plan - Patient's Chronic Conditions and Co-Morbidity Symptoms are Monitored and Maintained or Improved:   Monitor and assess patient's chronic conditions and comorbid symptoms for stability, deterioration, or improvement   Collaborate with multidisciplinary team to address chronic and comorbid conditions and prevent exacerbation or deterioration   Update acute care plan with appropriate goals if chronic or comorbid symptoms are exacerbated and prevent overall improvement and discharge  3/13/2025 1345 by Adrianna Gutierrez RN  Outcome: Progressing     Problem: Pain  Goal: Verbalizes/displays adequate comfort level or baseline comfort level  3/13/2025 2222 by Eleni Alvarado RN  Outcome: Progressing  Flowsheets (Taken 3/11/2025 0425 by Malinda Smith RN)  Verbalizes/displays adequate comfort level or baseline comfort level:   Encourage patient to monitor pain and request assistance   Assess pain using appropriate pain scale   Administer analgesics based on type and severity of pain and evaluate response   Implement non-pharmacological measures as appropriate and evaluate response   Consider cultural and social influences on pain and pain management   Notify Licensed Independent Practitioner if interventions unsuccessful or patient reports new pain  3/13/2025 1345 by Adrianna Gutierrez RN  Outcome: Progressing     Problem: Skin/Tissue Integrity  Goal: Skin integrity remains intact  Description: 1.  Monitor for areas of redness and/or skin breakdown  2.  Assess vascular access sites hourly  3.  Every 4-6 hours minimum:  Change oxygen saturation probe site  4.  Every 4-6 hours:  If on nasal continuous positive airway pressure, respiratory therapy  assess nares and determine need for appliance change or resting period  3/13/2025 2222 by Eleni Alvarado RN  Outcome: Progressing  Flowsheets (Taken 3/11/2025 0425 by Malinda Smith, RN)  Skin Integrity Remains Intact:   Monitor for areas of redness and/or skin breakdown   Assess vascular access sites hourly   Every 4-6 hours minimum: Change oxygen saturation probe site  3/13/2025 1345 by Adrianna Gutierrez RN  Outcome: Progressing     Problem: Safety - Adult  Goal: Free from fall injury  3/13/2025 2222 by Eleni Alvarado RN  Outcome: Progressing  Flowsheets (Taken 3/12/2025 2227)  Free From Fall Injury: Instruct family/caregiver on patient safety  3/13/2025 1345 by Adrianna Gutierrez RN  Outcome: Progressing     Problem: ABCDS Injury Assessment  Goal: Absence of physical injury  3/13/2025 2222 by Eleni Alvarado RN  Outcome: Progressing  Flowsheets (Taken 3/11/2025 0300 by Malinda Smith, RN)  Absence of Physical Injury: Implement safety measures based on patient assessment  3/13/2025 1345 by Adrianna Gutierrez RN  Outcome: Progressing

## 2025-03-14 NOTE — PROGRESS NOTES
Patient passed large bowel movement in the toilet. Writer was able to locate foreign object that was in the patients rectum in the toilet. Patient states that she is feeling much better and rectal pain has significantly decreased.

## 2025-03-14 NOTE — CARE COORDINATION
Case Management   Daily Progress Note       Patient Name: Nancy L Haase                   YOB: 1960  Diagnosis: Acute on chronic systolic congestive heart failure (HCC) [I50.23]  COPD with acute exacerbation (HCC) [J44.1]  Acute and chronic respiratory failure with hypoxia (HCC) [J96.21]                       GMLOS: 3.9 days  Length of Stay: 5  days    Anticipated Discharge Date: One day until discharge    Readmission Risk (Low < 19, Mod (19-27), High > 27): Readmission Risk Score: 23.4      Patient Transitional Goal: Home    Current Transitional Plan    [x] Home Independently    [] Home with HC    [] Skilled Nursing Facility    [] Acute Rehabilitation    [] Long Term Acute Care (LTAC)    [] Other:     Plan for the Stay (Medical Management) :          Workflow Continuation (Additional Notes) : Pt denies discharge needs. Will call for ride home.         SHANNON FOSTER RN  March 14, 2025

## 2025-03-14 NOTE — CONSULTS
Catracho Mckenna, Doyle Downing Mostafa, Fransisco, Kash Jr.  Urology Consult      Patient:  Nancy L Haase  MRN: [id number]  YOB: 1960    CHIEF COMPLAINT: Urinary retention    HISTORY OF PRESENT ILLNESS:   The patient is a 64 y.o. female with past medical history of A-fib on Eliquis, COPD, type 2 diabetes, CHF and CKD presented on 3/9 with shortness of breath and cough.  Since admission, patient has had constipation and difficulty urinating.  Yesterday patient was unable to urinate Shook catheter was placed for 400 cc.  Urology has been consulted for urinary retention.      Labs yesterday showing WBC 15.7, hemoglobin 11.1, creatinine 1.6 (baseline 1.5).  UA from 3//25 negative.  CT abdomen pelvis from 3/4/2025 showing no evidence of hydronephrosis or stones.      On evaluation, patient is afebrile and hemodynamically stable.  Patient is resting comfortably in bed and in no acute distress.  Patient states that she has had urinary retention in the past with constipation requiring Shook catheter placement.  Patient states these usually resolve after constipation resolves.  Patient currently denies fever, chills, nausea, vomiting, flank pain, dysuria, hematuria, frequency, urgency, or any other signs of UTI.      Patient's old records, notes and chart reviewed and summarized above.    Past Medical History:    Past Medical History:   Diagnosis Date    Acute on chronic systolic CHF (congestive heart failure) (Formerly [facility]) 12/20/2021    Asthma     Blood circulation, collateral     CAD (coronary artery disease)     COPD (chronic obstructive pulmonary disease) (Formerly [facility])     COPD exacerbation (Formerly [facility]) 12/18/2021    Diabetes mellitus (Formerly [facility])     Hyperlipidemia     Hypertension     Ischemic colitis 10/28/2021    Movement disorder     B/L torn rotator cuff.    Neuropathy     PAD (peripheral artery disease)     Tobacco abuse 11/19/2016    Tobacco abuse counseling 7/25/2021       Past Surgical History:    Past Surgical History:  Congestive heart failure. 2. Mediastinal and bilateral hilar adenopathy, likely reactive. 3. Acute cholecystitis.     XR CHEST (2 VW)  Result Date: 3/9/2025  EXAMINATION: TWO XRAY VIEWS OF THE CHEST 3/9/2025 6:20 am COMPARISON: March 5, 2025. HISTORY: ORDERING SYSTEM PROVIDED HISTORY: Shortness of Breath TECHNOLOGIST PROVIDED HISTORY: Shortness of Breath FINDINGS: Interval worsening of now small right pleural effusion with hazy right basilar airspace opacity and prominent interstitial markings with central vascular congestion.No left pleural effusion or pneumothorax.The cardiomediastinal silhouette is unchanged.No acute osseous abnormality.     Mild pulmonary edema with now small right pleural effusion and a right basilar airspace opacity, which may be secondary to atelectasis or infection.       Assessment and Plan    problem list:  Urinary retention likely secondary to constipation    Plan:  Maintain Shook catheter for now  Recommend extensive bowel regimen to avoid constipation  When patient condition improves, patient is having regular bowel movements, and patient is able to void from a physiologic voiding position; recommend void trial at that time or closer to discharge.  Avoid opiates/anticholinergics as these will worsen urinary retention and constipation  Please call urologic service the day prior to discharge for Shook catheter recommendations and void trial instructions.      Thank you for involving us in the care of Nancy L Haase. Should you have any questions, please do not hesitate to contact us at any time.    Bin Torrez MD  Urology Resident, PGY-2

## 2025-03-14 NOTE — PLAN OF CARE
Per primary service, patient has passed enema cap. No need for CT a/p. General surgery to sign off     - BINDU Samson

## 2025-03-14 NOTE — PROGRESS NOTES
[facility]  Internal Medicine Teaching Residency Program  Inpatient Daily Progress Note  ______________________________________________________________________________    Patient: Nancy L Haase  YOB: 1960   MRN:[id number]    Acct: [id number]     Room: 0449/0449-01  Admit date: 3/9/2025  Today's date: 03/14/25  Number of days in the hospital: 5    SUBJECTIVE   Admitting Diagnosis: Acute and chronic respiratory failure with hypoxia (HCC)  CC: Shortness of breath    Patient seen and examined at bedside.  Patient was retaining urine, Shook's catheterization was done and urology was consulted.  Patient is still complaining of lower pelvic pain rectal pain and was crying.  AOx4.  Hemodynamically stable and maintaining saturation room air  Morning labs reviewed, BMP revealed creatinine 1.7, glucose 137, CBC revealed leukocytosis of 17.2, hemoglobin 11.0  Patient had few liquid bowel movements overnight, but did not have any solid bowel movement or did not pass the foreign body at    General Surgery recommended continue to monitor for passage of foreign body in stool.  Urology recommended maintaining Shook catheter for now and void trial prior to discharge.        ROS:  Review of Systems   Constitutional: Negative.    HENT: Negative.     Respiratory:  Positive for shortness of breath (Improving).    Cardiovascular:  Negative for chest pain and palpitations.   Genitourinary: Negative.         Plan :-    Will continue patient on pain medication with Percocet every 4 hours as needed.  Will continue patient Megace.  Will monitor for passage of foreign body in stool    BRIEF HISTORY     The patient is a 64 y.o. female with PMHx of atrial fibrillation Eliquis, COPD, iron deficiency anemia, type 2 diabetes mellitus, CHF and CKD.     They present to the ED with a chief complaint of shortness of breath and cough since 1 day, sudden onset.  Patient stated the night before  secondary to atelectasis or infection.     XR CHEST PORTABLE  Result Date: 3/5/2025  Trace right pleural effusion with hazy right lower lobe airspace disease. This could represent pneumonia in the appropriate clinical setting     CT ABDOMEN PELVIS W IV CONTRAST Additional Contrast? None  Result Date: 3/4/2025  1. No acute intra-abdominal or pelvic abnormality. 2. Scattered left-sided colonic diverticulosis. 3. Renal cortical simple cysts. 4. Degenerative changes in the lumbar spine.       ASSESSMENT & PLAN     Assessment and Plan:    Principal Problem:    Acute and chronic respiratory failure with hypoxia (HCC)  Active Problems:    Acute on chronic systolic congestive heart failure (HCC)  Resolved Problems:    * No resolved hospital problems. *      Foreign body retention in the rectum  Constipation  Urinary retention    Patient was having constipation and received suppositories as well as anemia on 3/12  The enema cap has been retained in rectum since 3/12  General Surgery has been consulted, currently recommending monitor for passage of foreign bodies as rectal manipulation is painful.  Percocet every 4 hours as needed  Shook's catheterization for urinary retention.  Urology recommended voiding trial prior to discharge.      Acute hypoxic respiratory failure possibly secondary to pulmonary edema secondary to congestive heart failure : Improving  Atrial fibrillation   CAD  Hypertension    Chest x-ray on admission revealed mild pulmonary edema with small right pleural effusion  CT scan of chest revealed signs concerning for congestive heart failure  proBNP at admission 6781  Patient was started on nitroglycerin infusion and 40 mg IV Lasix twice daily  Continue IV Lasix 40 mg twice daily  Continue Lipitor 40 mg, aspirin 81 mg, Plavix, Lopressor 12.5 mg twice daily, Eliquis 5 mg twice daily  Will hold lisinopril for now, will consider restarting if blood pressure tolerates.    Concerns for acute cholecystitis    CT

## 2025-03-14 NOTE — PROGRESS NOTES
Physical Therapy Cancel Note      DATE: 3/14/2025    NAME: Nancy L Haase  MRN: [id number]   : 1960      Patient not seen this date for Physical Therapy due to:    Patient Declined: pt states she received laxative recently, doesn't want to get up until she feels like it's ready to work, states she's been getting back and forth to the bathroom independently, [facility] \"to the [facility] earlier w/o trouble; states she hopes to have a BM and go home today, if still here tomorrow willing to work with PT.       Electronically signed by Henri Pritchett PT on 3/14/2025 at 10:14 AM

## 2025-03-15 VITALS
WEIGHT: 220.46 LBS | HEIGHT: 69 IN | SYSTOLIC BLOOD PRESSURE: 94 MMHG | BODY MASS INDEX: 32.65 KG/M2 | HEART RATE: 85 BPM | OXYGEN SATURATION: 100 % | DIASTOLIC BLOOD PRESSURE: 76 MMHG | TEMPERATURE: 97.5 F | RESPIRATION RATE: 20 BRPM

## 2025-03-15 LAB
ALBUMIN SERPL-MCNC: 3.3 G/DL (ref 3.5–5.2)
ALBUMIN/GLOB SERPL: 1.3 {RATIO} (ref 1–2.5)
ALP SERPL-CCNC: 65 U/L (ref 35–104)
ALT SERPL-CCNC: 24 U/L (ref 10–35)
ANION GAP SERPL CALCULATED.3IONS-SCNC: 13 MMOL/L (ref 9–16)
AST SERPL-CCNC: 14 U/L (ref 10–35)
BASOPHILS # BLD: <0.03 K/UL (ref 0–0.2)
BASOPHILS NFR BLD: 0 % (ref 0–2)
BILIRUB SERPL-MCNC: 0.4 MG/DL (ref 0–1.2)
BUN SERPL-MCNC: 45 MG/DL (ref 8–23)
CALCIUM SERPL-MCNC: 8.6 MG/DL (ref 8.6–10.4)
CHLORIDE SERPL-SCNC: 103 MMOL/L (ref 98–107)
CO2 SERPL-SCNC: 19 MMOL/L (ref 20–31)
CREAT SERPL-MCNC: 2 MG/DL (ref 0.6–0.9)
EOSINOPHIL # BLD: 0.1 K/UL (ref 0–0.44)
EOSINOPHILS RELATIVE PERCENT: 1 % (ref 1–4)
ERYTHROCYTE [DISTWIDTH] IN BLOOD BY AUTOMATED COUNT: 17.4 % (ref 11.8–14.4)
GFR, ESTIMATED: 28 ML/MIN/1.73M2
GLUCOSE BLD-MCNC: 259 MG/DL (ref 65–105)
GLUCOSE SERPL-MCNC: 128 MG/DL (ref 74–99)
HCT VFR BLD AUTO: 35.9 % (ref 36.3–47.1)
HGB BLD-MCNC: 10.5 G/DL (ref 11.9–15.1)
IMM GRANULOCYTES # BLD AUTO: 0.12 K/UL (ref 0–0.3)
IMM GRANULOCYTES NFR BLD: 1 %
LYMPHOCYTES NFR BLD: 2.24 K/UL (ref 1.1–3.7)
LYMPHOCYTES RELATIVE PERCENT: 17 % (ref 24–43)
MCH RBC QN AUTO: 25.7 PG (ref 25.2–33.5)
MCHC RBC AUTO-ENTMCNC: 29.2 G/DL (ref 28.4–34.8)
MCV RBC AUTO: 87.8 FL (ref 82.6–102.9)
MONOCYTES NFR BLD: 0.98 K/UL (ref 0.1–1.2)
MONOCYTES NFR BLD: 7 % (ref 3–12)
NEUTROPHILS NFR BLD: 74 % (ref 36–65)
NEUTS SEG NFR BLD: 9.79 K/UL (ref 1.5–8.1)
NRBC BLD-RTO: 0 PER 100 WBC
PLATELET # BLD AUTO: 338 K/UL (ref 138–453)
PMV BLD AUTO: 10.1 FL (ref 8.1–13.5)
POTASSIUM SERPL-SCNC: 3.8 MMOL/L (ref 3.7–5.3)
PROT SERPL-MCNC: 5.9 G/DL (ref 6.6–8.7)
RBC # BLD AUTO: 4.09 M/UL (ref 3.95–5.11)
RBC # BLD: ABNORMAL 10*6/UL
SODIUM SERPL-SCNC: 135 MMOL/L (ref 136–145)
WBC OTHER # BLD: 13.3 K/UL (ref 3.5–11.3)

## 2025-03-15 PROCEDURE — 94640 AIRWAY INHALATION TREATMENT: CPT

## 2025-03-15 PROCEDURE — 2500000003 HC RX 250 WO HCPCS

## 2025-03-15 PROCEDURE — 85025 COMPLETE CBC W/AUTO DIFF WBC: CPT

## 2025-03-15 PROCEDURE — 51798 US URINE CAPACITY MEASURE: CPT

## 2025-03-15 PROCEDURE — 6370000000 HC RX 637 (ALT 250 FOR IP)

## 2025-03-15 PROCEDURE — 36415 COLL VENOUS BLD VENIPUNCTURE: CPT

## 2025-03-15 PROCEDURE — 80053 COMPREHEN METABOLIC PANEL: CPT

## 2025-03-15 PROCEDURE — 94761 N-INVAS EAR/PLS OXIMETRY MLT: CPT

## 2025-03-15 PROCEDURE — 82947 ASSAY GLUCOSE BLOOD QUANT: CPT

## 2025-03-15 PROCEDURE — 2700000000 HC OXYGEN THERAPY PER DAY

## 2025-03-15 PROCEDURE — 99239 HOSP IP/OBS DSCHRG MGMT >30: CPT | Performed by: INTERNAL MEDICINE

## 2025-03-15 RX ORDER — INSULIN GLARGINE 100 [IU]/ML
20 INJECTION, SOLUTION SUBCUTANEOUS DAILY
Status: DISCONTINUED | OUTPATIENT
Start: 2025-03-15 | End: 2025-03-15 | Stop reason: HOSPADM

## 2025-03-15 RX ADMIN — INSULIN GLARGINE 20 UNITS: 100 INJECTION, SOLUTION SUBCUTANEOUS at 08:50

## 2025-03-15 RX ADMIN — GUAIFENESIN 600 MG: 600 TABLET, EXTENDED RELEASE ORAL at 08:50

## 2025-03-15 RX ADMIN — IPRATROPIUM BROMIDE AND ALBUTEROL SULFATE 1 DOSE: .5; 2.5 SOLUTION RESPIRATORY (INHALATION) at 09:41

## 2025-03-15 RX ADMIN — METOPROLOL TARTRATE 12.5 MG: 25 TABLET, FILM COATED ORAL at 08:50

## 2025-03-15 RX ADMIN — ATORVASTATIN CALCIUM 40 MG: 40 TABLET, FILM COATED ORAL at 08:51

## 2025-03-15 RX ADMIN — IPRATROPIUM BROMIDE AND ALBUTEROL SULFATE 1 DOSE: .5; 2.5 SOLUTION RESPIRATORY (INHALATION) at 13:22

## 2025-03-15 RX ADMIN — CLOPIDOGREL BISULFATE 75 MG: 75 TABLET, FILM COATED ORAL at 08:50

## 2025-03-15 RX ADMIN — MEGESTROL ACETATE 20 MG: 20 TABLET ORAL at 08:52

## 2025-03-15 RX ADMIN — INSULIN LISPRO 4 UNITS: 100 INJECTION, SOLUTION INTRAVENOUS; SUBCUTANEOUS at 13:05

## 2025-03-15 RX ADMIN — FUROSEMIDE 40 MG: 40 TABLET ORAL at 08:50

## 2025-03-15 RX ADMIN — ACETAMINOPHEN 650 MG: 325 TABLET ORAL at 12:13

## 2025-03-15 RX ADMIN — BUDESONIDE AND FORMOTEROL FUMARATE DIHYDRATE 2 PUFF: 160; 4.5 AEROSOL RESPIRATORY (INHALATION) at 09:41

## 2025-03-15 RX ADMIN — PREDNISONE 40 MG: 20 TABLET ORAL at 08:51

## 2025-03-15 RX ADMIN — APIXABAN 5 MG: 5 TABLET, FILM COATED ORAL at 08:50

## 2025-03-15 RX ADMIN — FERROUS SULFATE TAB EC 325 MG (65 MG FE EQUIVALENT) 325 MG: 325 (65 FE) TABLET DELAYED RESPONSE at 08:50

## 2025-03-15 RX ADMIN — BUSPIRONE HYDROCHLORIDE 30 MG: 10 TABLET ORAL at 08:50

## 2025-03-15 RX ADMIN — METOPROLOL TARTRATE 5 MG: 5 INJECTION INTRAVENOUS at 08:11

## 2025-03-15 RX ADMIN — FAMOTIDINE 20 MG: 20 TABLET, FILM COATED ORAL at 08:50

## 2025-03-15 RX ADMIN — POLYETHYLENE GLYCOL 3350 17 G: 17 POWDER, FOR SOLUTION ORAL at 08:51

## 2025-03-15 ASSESSMENT — PAIN SCALES - GENERAL
PAINLEVEL_OUTOF10: 9
PAINLEVEL_OUTOF10: 0
PAINLEVEL_OUTOF10: 4

## 2025-03-15 ASSESSMENT — PAIN SCALES - WONG BAKER: WONGBAKER_NUMERICALRESPONSE: NO HURT

## 2025-03-15 ASSESSMENT — ENCOUNTER SYMPTOMS: SHORTNESS OF BREATH: 1

## 2025-03-15 ASSESSMENT — PAIN DESCRIPTION - LOCATION: LOCATION: HEAD

## 2025-03-15 NOTE — PROGRESS NOTES
03/15/25 0941   Care Plan - Respiratory Goals   Achieves optimal ventilation and oxygenation Assess for changes in respiratory status;Assess for changes in mentation and behavior;Position to facilitate oxygenation and minimize respiratory effort;Oxygen supplementation based on oxygen saturation or arterial blood gases;Encourage broncho-pulmonary hygiene including cough, deep breathe, incentive spirometry;Assess the need for suctioning and aspirate as needed;Assess and instruct to report shortness of breath or any respiratory difficulty;Respiratory therapy support as indicated

## 2025-03-15 NOTE — PROGRESS NOTES
[facility]  Internal Medicine Teaching Residency Program  Inpatient Daily Progress Note  ______________________________________________________________________________    Patient: Nancy L Haase  YOB: 1960   MRN:[id number]    Acct: [id number]     Room: 0449/0449-01  Admit date: 3/9/2025  Today's date: 03/15/25  Number of days in the hospital: 6    SUBJECTIVE   Admitting Diagnosis: Acute and chronic respiratory failure with hypoxia (HCC)  CC: Shortness of breath    Patient seen and examined at bedside.  Patient passed foreign body yesterday and has voided on her own overnight.  AOx4.  Hemodynamically stable and maintaining saturation room air  Morning labs pending      ROS:  Review of Systems   Constitutional: Negative.    HENT: Negative.     Respiratory:  Positive for shortness of breath (Improving).    Cardiovascular:  Negative for chest pain and palpitations.   Genitourinary: Negative.         Plan :-    Will continue patient Megace.  Will consider discharging patient home today    BRIEF HISTORY     The patient is a 64 y.o. female with PMHx of atrial fibrillation Eliquis, COPD, iron deficiency anemia, type 2 diabetes mellitus, CHF and CKD.     They present to the ED with a chief complaint of shortness of breath and cough since 1 day, sudden onset.  Patient stated the night before when she was going to bathroom she experienced shortness of breath and felt difficulty in walking.  Patient felt nebulizer and inhaler treatments but did not.  Patient received Combivent, Solu-Medrol and magnesium by EMS en route.  Patient denied fever, chest pain, abdominal pain, nausea/vomiting, syncope, dizziness and weakness/burning micturition.  Patient denied any new vaginal bleeding.  Patient was admitted from 3/2 to 3/8 for postmenopausal bleeding, while on Eliquis and Plavix.  Patient was transfused with 2 PRBC in the previous admission, patient also underwent  hysteroscopy, D&C and was treated with Megace.  Recent echo on 3/20/2025 had shown EF 57% with normal systolic function and normal diastolic function with moderate stenosis of mitral valve and dilated left atrium.  CT chest PE and chest x-ray were done in ED which had shown pulmonary edema and concern for acute cholecystitis.  Initial labs on admission had shown VBG pCO2 31.9, pCO2 38.5, pH 7.37, WBC 10.8, hemoglobin 7.8, platelets 218, troponin 18, creatinine 1.3, BUN 18, NT proBNP 6781, repeat troponin 22 and lactate 2.4,    OBJECTIVE     Vital Signs:  BP (!) 93/57   Pulse 69   Temp 97.5 °F (36.4 °C) (Oral)   Resp 17   Ht 1.753 m (5' 9\")   Wt 100 kg (220 lb 7.4 oz)   SpO2 96%   BMI 32.56 kg/m²     Temp (24hrs), Av °F (36.7 °C), Min:97.5 °F (36.4 °C), Max:98.3 °F (36.8 °C)    In: 900   Out: 963 [Urine:963]    Physical Exam:  Physical Exam  Constitutional:       Appearance: Normal appearance.   HENT:      Head: Normocephalic and atraumatic.   Eyes:      General: No scleral icterus.  Cardiovascular:      Rate and Rhythm: Normal rate. Rhythm irregular.      Heart sounds: Normal heart sounds. No murmur heard.  Pulmonary:      Effort: No respiratory distress.      Breath sounds: No wheezing or rales.   Abdominal:      General: Bowel sounds are normal. There is no distension.      Tenderness: There is no abdominal tenderness.      Hernia: No hernia is present.   Musculoskeletal:         General: No swelling.      Right lower leg: No edema.      Left lower leg: No edema.   Skin:     Coloration: Skin is not jaundiced.   Neurological:      Mental Status: She is alert and oriented to person, place, and time.           Medications:  Scheduled Medications:    insulin glargine  20 Units SubCUTAneous Daily    polyethylene glycol  17 g Oral Daily    hydrocortisone  25 mg Rectal Once    predniSONE  40 mg Oral Daily    guaiFENesin  600 mg Oral BID    busPIRone  30 mg Oral TID    furosemide  40 mg Oral Daily

## 2025-03-17 NOTE — DISCHARGE SUMMARY
of breath, nausea vomiting or diarrhea    Follow up labs: None    Follow up imaging: None    Note that over 30 minutes was spent in preparing discharge papers, discussing discharge with patient, medication review, etc.      Chris Strauss MD,   Internal Medicine Resident, PGY-1  [facility]; [place], [place]  3/17/2025, 1:48 PM

## 2025-04-03 ENCOUNTER — HOSPITAL ENCOUNTER (OUTPATIENT)
Age: 65
Setting detail: SPECIMEN
Discharge: HOME | End: 2025-04-03

## 2025-04-03 ENCOUNTER — OFFICE VISIT (OUTPATIENT)
Dept: GYNECOLOGIC ONCOLOGY | Age: 65
End: 2025-04-03

## 2025-04-03 ENCOUNTER — TELEPHONE (OUTPATIENT)
Dept: GYNECOLOGIC ONCOLOGY | Age: 65
End: 2025-04-03

## 2025-04-03 VITALS
DIASTOLIC BLOOD PRESSURE: 76 MMHG | SYSTOLIC BLOOD PRESSURE: 121 MMHG | TEMPERATURE: 98.1 F | WEIGHT: 211.8 LBS | OXYGEN SATURATION: 99 % | BODY MASS INDEX: 31.28 KG/M2 | HEART RATE: 84 BPM

## 2025-04-03 DIAGNOSIS — J44.1 COPD WITH EXACERBATION (HCC): ICD-10-CM

## 2025-04-03 DIAGNOSIS — R30.0 DYSURIA: ICD-10-CM

## 2025-04-03 DIAGNOSIS — Z23 NEED FOR PNEUMOCOCCAL VACCINE: ICD-10-CM

## 2025-04-03 DIAGNOSIS — Z23 NEED FOR TDAP VACCINATION: ICD-10-CM

## 2025-04-03 DIAGNOSIS — B35.6 TINEA CRURIS: ICD-10-CM

## 2025-04-03 DIAGNOSIS — I73.9 PVD (PERIPHERAL VASCULAR DISEASE): ICD-10-CM

## 2025-04-03 DIAGNOSIS — Z23 NEED FOR SHINGLES VACCINE: ICD-10-CM

## 2025-04-03 DIAGNOSIS — I48.91 ATRIAL FIBRILLATION, UNSPECIFIED TYPE (HCC): ICD-10-CM

## 2025-04-03 DIAGNOSIS — I50.9 CHRONIC CONGESTIVE HEART FAILURE, UNSPECIFIED HEART FAILURE TYPE (HCC): ICD-10-CM

## 2025-04-03 DIAGNOSIS — Z71.6 ENCOUNTER FOR SMOKING CESSATION COUNSELING: ICD-10-CM

## 2025-04-03 DIAGNOSIS — F17.200 TOBACCO DEPENDENCE: ICD-10-CM

## 2025-04-03 DIAGNOSIS — C54.1 PRIMARY ENDOMETRIOID CARCINOMA OF ENDOMETRIUM OF UTERINE BODY: Primary | ICD-10-CM

## 2025-04-03 DIAGNOSIS — Z23 NEED FOR COVID-19 VACCINE: ICD-10-CM

## 2025-04-03 DIAGNOSIS — J42 CHRONIC BRONCHITIS, UNSPECIFIED CHRONIC BRONCHITIS TYPE (HCC): ICD-10-CM

## 2025-04-03 DIAGNOSIS — R91.1 LUNG NODULE: ICD-10-CM

## 2025-04-03 DIAGNOSIS — E11.65 POORLY CONTROLLED DIABETES MELLITUS (HCC): ICD-10-CM

## 2025-04-03 DIAGNOSIS — B37.9 CANDIDIASIS: ICD-10-CM

## 2025-04-03 DIAGNOSIS — F12.20 TETRAHYDROCANNABINOL (THC) DEPENDENCE (HCC): ICD-10-CM

## 2025-04-03 DIAGNOSIS — I25.119 CORONARY ARTERY DISEASE INVOLVING NATIVE HEART WITH ANGINA PECTORIS, UNSPECIFIED VESSEL OR LESION TYPE: ICD-10-CM

## 2025-04-03 RX ORDER — MEGESTROL ACETATE 40 MG/1
160 TABLET ORAL 2 TIMES DAILY
Qty: 240 TABLET | Refills: 0 | Status: SHIPPED | OUTPATIENT
Start: 2025-04-03 | End: 2025-05-03

## 2025-04-03 RX ORDER — MEGESTROL ACETATE 40 MG/1
160 TABLET ORAL 2 TIMES DAILY
Qty: 240 TABLET | Refills: 0 | Status: SHIPPED | OUTPATIENT
Start: 2025-04-03 | End: 2025-04-03

## 2025-04-03 RX ORDER — NYSTATIN 100000 [USP'U]/G
POWDER TOPICAL
Qty: 60 G | Refills: 3 | Status: SHIPPED | OUTPATIENT
Start: 2025-04-03

## 2025-04-03 NOTE — PATIENT INSTRUCTIONS
POST OPERATIVE INSTRUCTIONS for HYSTERECTOMY    Pain Control:  You may feel some chest, shoulder, or abdominal discomfort for a few days.  This discomfort is a result of the gas that was introduced into the abdomen during surgery.  Your body will absorb this gas within 24 to 48 hours which will relieve these symptoms.  In the meantime, it may be helpful to apply heat to your abdomen or to lie flat.  It is important to take a stool softener such as Colace while taking narcotic pain medication such as Percocet or Vicodin.  Please purchase a stool softener before your surgery.  You may take \"over the counter\" paint relievers that do not contain aspirin such as acetaminophen (Tylenol) or Ibuprofen (Motrin or Advil).  Do not exceed the daily recommended dose.    Note:  If the Pain is not relieved by pain medication, becomes worse, or you have difficulty breathing, call our office.     Incision Care:  Inspect your incision(s) daily.   A small amount of blood or clear drainage from the incisions is normal and not a cause for concern.  Bruising around your incision sites is common and not a cause for concern.  Your incisions may become itchy for a few days. This is part of the normal healing process.  As your incisions heal, they will change in color and may become numb for several weeks.  If you have small dressings or band-aids, they may be removed 24 hours.  If you have steri-strips (small adhesive strips) in place, they will peel and fall off.  If they do not fall off within 10 days, carefully peel them off.  If you have stitches, they will dissolve on their own.  If you have staples, they will be removed at the post-operative visit.  Note:  If you notice any redness, heave drainage, or bleeding from your incisions, please call our office at [phone].    Nutrition:  You may resume the diet you had prior to surgery.  Drink 6-8 glasses of water daily.    Bowel Function:  For the first several days after surgery, the

## 2025-04-03 NOTE — TELEPHONE ENCOUNTER
Megace on  backorder, not available at patient's preferred pharmacy.  Writer called several local pharmacies to determine stock and was informed [facility] [place] had enough for rx.  Writer informed patient, patient states she has no way of getting there.  Writer informed provider.  Provider instructed patient to call local pharmacies to determine if there is stock and notify office if found, patient updated.  Patient voiced understanding and appreciation.

## 2025-04-03 NOTE — PROGRESS NOTES
[facility] Gynecologic Oncology  [address], [address], [address]  [place] [address]    GYNECOLOGIC ONCOLOGY    PATIENT NAME: Nancy L Haase  MRN: [id number]  DATE: 4/3/25      TREATMENT SUMMARY:  THESE RECORDS HAVE BEEN REVIEWED, UNLESS OTHERWISE INDICATED       FIGO Grade 1 carcinoma of the UTERUS (Diagnosed 03/2025)  The patient reports to have experienced symptoms of post-menopausal bleeding and abdominal pain    DIAGNOSTIC STUDIES:  IMAGING EVALUATION:  CXR (03/05/25): Trace right pleural effusion with hazy right lower lobe airspace disease.This could represent pneumonia in the appropriate clinical setting  CXR (03/09/25): Mild pulmonary edema with now small right pleural effusion and a right basilar airspace opacity, which may be secondary to atelectasis or infection.  CTPE (03/09/25):   1. Congestive heart failure. 2. Mediastinal and bilateral hilar adenopathy, likely reactive. 3. Acute cholecystitis.  Pelvic Ultrasound (11/18/24). Transvaginal   Uterus: 7.4 x 2.8 x 3.7 cm.  Punctate hyperdensities consistent with incidental calcifications. Endometrial stripe: Endometrial stripe is within normal limits. Right Ovary: Not visualized due to bowel gas. Left Ovary:  Not visualized due to bowel gas. Free Fluid: No evidence of free fluid.  Pelvic Ultrasound (03/02/25). Transvaginal  Uterus: 6.4 x 3.7 x 2.9 cm. Endometrial stripe: 4 mm thickness. Right Ovary:Not measured. Left Ovary: Not measured.  CT-scan CAP (IV CONTRAST) (03/04/25)  1. No acute intra-abdominal or pelvic abnormality. 2. Scattered left-sided colonic diverticulosis. 3. Renal cortical simple cysts. 4. Degenerative changes in the lumbar spine.     DIAGNOSIS ESTABLISHED:   Dilation & Curettage, hysteroscopy (3/6/25), under the care of Dr. Shanelle Ruiz:   ENDOMETRIUM, CURETTAGE:   -ENDOMETRIOID CARCINOMA, FIGO GRADE 1, WITH PROMINENT SECRETORY FEATURES   -NO LOSS OF NUCLEAR EXPRESSION OF MMR PROTEINS: LOW PROBABILITY OF MICROSATELLITE 
Review of Systems   Constitutional:  Negative for appetite change, chills, diaphoresis, fatigue, fever and unexpected weight change.   HENT:   Positive for hearing loss (chronic issue). Negative for lump/mass, mouth sores, nosebleeds, sore throat, tinnitus, trouble swallowing and voice change.    Eyes:  Negative for eye problems and icterus.   Respiratory:  Positive for cough. Negative for chest tightness, hemoptysis, shortness of breath and wheezing.    Cardiovascular:  Negative for chest pain, leg swelling and palpitations.        Was in hospital for CHF   Gastrointestinal:  Negative for abdominal distention, abdominal pain, blood in stool, constipation, diarrhea, nausea, rectal pain and vomiting.   Endocrine: Positive for hot flashes.   Genitourinary:  Positive for frequency (Due to diuretic), pelvic pain and vaginal discharge (clear). Negative for bladder incontinence, difficulty urinating, dyspareunia, dysuria, hematuria, menstrual problem, nocturia and vaginal bleeding.    Musculoskeletal:  Positive for back pain (chronic). Negative for arthralgias, flank pain, gait problem, myalgias, neck pain and neck stiffness.   Skin:  Negative for itching, rash and wound.   Neurological:  Positive for dizziness (with lisinopril in afternoons). Negative for extremity weakness, gait problem, headaches, light-headedness, numbness, seizures and speech difficulty.   Hematological:  Negative for adenopathy. Bruises/bleeds easily.   Psychiatric/Behavioral:  Negative for confusion, decreased concentration, depression, sleep disturbance and suicidal ideas. The patient is nervous/anxious.        
the poorly Insulin dependent diabetes  Cardiology for CHF, CAD (requiring Nitrostat PRN), Atrial fibrillation   Pulmonary medicine for poorly controlled Asthma / COPD  Vascular surgery for unevaluated Peripheral artery disease  Return to the office in 2 weeks to review the PET/CT-scan results and determine further management or sooner for any abnormal symptoms. Return precautions including, but not limited to abnormal vaginal bleeding, abnormal GI or  symptoms, and abdominal / pelvic pain have been reviewed.    2. Tinea Cruris  The patient has been counseled regarding her abnormal symptoms and physical exam findings. Treatment options have been reviewed in detail.  An escription for Nystatin powder has been sent to the patient's pharmacy. Instructions and side effect profile has been reviewed    3. Smoking Cessation (Quit tobacco smoking 2020. Continues to smoke 5-6 joints THC daily)  I counseled the patient for 10 minutes regarding the impact of smoking on the body and importance of quitting.  The patient was reminded that smoking is the most preventable cause of illness and death. She has been informed that it increases the risk of heart disease and cancer and worsening of Asthma / COPD. She expresses an understanding of the risks, and expresses NO desire to quit at this time.    4. Health Maintenance:  Not up to date   Mammogram and Colorectal Cancer Screening (Never)  Tdap, Shingles and Influenza (Not UTD). Pneumonia (PPV23, 2021). COVID-19 (Not UTD).   TSH, HgBA1C and fasting Lipid panel are overdue    Request most recent note and labs from [facility]    DISPOSITION:     The patient verbalized understanding of our extensive discussion today and of follow up instructions. All questions were answered to her apparent satisfaction. Therefore, she agreed to proceed as planned. Return to the office in 2 weeks to review the PET/CT-scan results and determine further management or sooner for any abnormal

## 2025-04-04 ENCOUNTER — TELEPHONE (OUTPATIENT)
Dept: GYNECOLOGIC ONCOLOGY | Age: 65
End: 2025-04-04

## 2025-04-04 LAB
MICROORGANISM SPEC CULT: NORMAL
SERVICE CMNT-IMP: NORMAL
SPECIMEN DESCRIPTION: NORMAL

## 2025-04-04 NOTE — TELEPHONE ENCOUNTER
Called and informed patient that rx was sent to [facility] in [place].  Patient inquired if pharmacy delivers.  Writer encouraged patient to call and ask pharmacy.  Patient to call pharmacy.

## 2025-04-04 NOTE — TELEPHONE ENCOUNTER
Patient called office to discuss medication that was prescribed. Requested that prescription is sent to [facility] in [place]. Would like prescription delivered due to transportation difficulties.

## 2025-04-04 NOTE — TELEPHONE ENCOUNTER
Writer informed patient found medication at [facility] in [place].  Rx sent to pharmacy.  Called to inform patient, no answer, left voicemail.

## 2025-04-05 PROBLEM — Z98.890 POST-OPERATIVE STATE: Status: RESOLVED | Noted: 2025-03-06 | Resolved: 2025-04-05

## 2025-04-07 ENCOUNTER — OFFICE VISIT (OUTPATIENT)
Dept: PULMONOLOGY | Age: 65
End: 2025-04-07
Payer: MEDICARE

## 2025-04-07 ENCOUNTER — TELEPHONE (OUTPATIENT)
Dept: GYNECOLOGIC ONCOLOGY | Age: 65
End: 2025-04-07

## 2025-04-07 VITALS
DIASTOLIC BLOOD PRESSURE: 73 MMHG | WEIGHT: 211 LBS | HEART RATE: 89 BPM | SYSTOLIC BLOOD PRESSURE: 103 MMHG | RESPIRATION RATE: 12 BRPM | HEIGHT: 69 IN | OXYGEN SATURATION: 98 % | BODY MASS INDEX: 31.25 KG/M2

## 2025-04-07 DIAGNOSIS — J44.9 CHRONIC OBSTRUCTIVE PULMONARY DISEASE, UNSPECIFIED COPD TYPE (HCC): Primary | ICD-10-CM

## 2025-04-07 DIAGNOSIS — G47.33 OSA (OBSTRUCTIVE SLEEP APNEA): ICD-10-CM

## 2025-04-07 DIAGNOSIS — I73.9 PVD (PERIPHERAL VASCULAR DISEASE): Primary | ICD-10-CM

## 2025-04-07 DIAGNOSIS — Z87.891 HISTORY OF SMOKING GREATER THAN 50 PACK YEARS: ICD-10-CM

## 2025-04-07 DIAGNOSIS — Z87.09 HISTORY OF PULMONARY EDEMA: ICD-10-CM

## 2025-04-07 PROCEDURE — G8427 DOCREV CUR MEDS BY ELIG CLIN: HCPCS | Performed by: INTERNAL MEDICINE

## 2025-04-07 PROCEDURE — 3023F SPIROM DOC REV: CPT | Performed by: INTERNAL MEDICINE

## 2025-04-07 PROCEDURE — 1111F DSCHRG MED/CURRENT MED MERGE: CPT | Performed by: INTERNAL MEDICINE

## 2025-04-07 PROCEDURE — 1036F TOBACCO NON-USER: CPT | Performed by: INTERNAL MEDICINE

## 2025-04-07 PROCEDURE — 3078F DIAST BP <80 MM HG: CPT | Performed by: INTERNAL MEDICINE

## 2025-04-07 PROCEDURE — 3017F COLORECTAL CA SCREEN DOC REV: CPT | Performed by: INTERNAL MEDICINE

## 2025-04-07 PROCEDURE — 99204 OFFICE O/P NEW MOD 45 MIN: CPT | Performed by: INTERNAL MEDICINE

## 2025-04-07 PROCEDURE — G8417 CALC BMI ABV UP PARAM F/U: HCPCS | Performed by: INTERNAL MEDICINE

## 2025-04-07 PROCEDURE — 3074F SYST BP LT 130 MM HG: CPT | Performed by: INTERNAL MEDICINE

## 2025-04-07 NOTE — TELEPHONE ENCOUNTER
Writer spoke with patient and patient provided writer with PA Code for CoverMyMeds.  Writer repeated code and was told it was correct.  Writer attempted code and code was not found.  Writer spoke with pharmacy and was informed that they did not have patient's correct insurance information.  Writer called patient and instructed her to call pharmacy to give proper information.  Patient states she gave it to them a few days ago but will call and confirm.    Fax received from [employer] regarding Megace.  Correct code provided for CoverMyMeds.  Writer completed PA for Megace, response given via CoverMyMeds was medication covered and no PA required.  Response printed and placed in media.

## 2025-04-07 NOTE — TELEPHONE ENCOUNTER
Patient called stating that she was prescribed Megace. She tried to pick it up at her pharmacy and was told it needed a prior authorization. She asked if we had received the fax yet. I let her know the fax had not come through. She stated she was going to call her pharmacy back.

## 2025-04-07 NOTE — PROGRESS NOTES
use of AI, including the recording.            Brice Eli MD, MD             4/7/2025, 2:21 PM

## 2025-04-07 NOTE — TELEPHONE ENCOUNTER
Called Dr. Simon's office requesting most recent progress notes as well as lab results from the past 12 months.  Left voicemail with nurse.

## 2025-04-07 NOTE — TELEPHONE ENCOUNTER
Called and informed patient of PVR order per vascular request.  Writer to scheduled PVR per patient request, writer confirmed when patient is available.  Appt scheduled, patient informed of date/time/location.  Patient unsure if she will be there in time due to transportation, writer instructed patient to call central scheduling to reschedule if needed.  Patient voiced understanding and appreciation.

## 2025-04-09 ENCOUNTER — TELEPHONE (OUTPATIENT)
Dept: GYNECOLOGIC ONCOLOGY | Age: 65
End: 2025-04-09

## 2025-04-09 ENCOUNTER — HOSPITAL ENCOUNTER (OUTPATIENT)
Dept: SLEEP CENTER | Age: 65
Discharge: HOME | End: 2025-04-11
Payer: MEDICARE

## 2025-04-09 DIAGNOSIS — G47.30 SLEEP APNEA, UNSPECIFIED TYPE: ICD-10-CM

## 2025-04-09 PROCEDURE — 95810 POLYSOM 6/> YRS 4/> PARAM: CPT

## 2025-04-10 ENCOUNTER — RESULTS FOLLOW-UP (OUTPATIENT)
Dept: GYNECOLOGIC ONCOLOGY | Age: 65
End: 2025-04-10

## 2025-04-10 VITALS
HEIGHT: 69 IN | OXYGEN SATURATION: 95 % | RESPIRATION RATE: 18 BRPM | HEART RATE: 80 BPM | WEIGHT: 211 LBS | BODY MASS INDEX: 31.25 KG/M2

## 2025-04-10 PROBLEM — Z23 NEED FOR COVID-19 VACCINE: Status: ACTIVE | Noted: 2025-04-10

## 2025-04-10 PROBLEM — Z23 NEED FOR TDAP VACCINATION: Status: ACTIVE | Noted: 2025-04-10

## 2025-04-10 PROBLEM — I25.119 CORONARY ARTERY DISEASE INVOLVING NATIVE HEART WITH ANGINA PECTORIS: Status: ACTIVE | Noted: 2021-07-25

## 2025-04-10 PROBLEM — I73.9 PVD (PERIPHERAL VASCULAR DISEASE): Status: ACTIVE | Noted: 2025-04-10

## 2025-04-10 PROBLEM — F17.200 TOBACCO DEPENDENCE: Status: ACTIVE | Noted: 2025-04-10

## 2025-04-10 PROBLEM — C54.1 PRIMARY ENDOMETRIOID CARCINOMA OF ENDOMETRIUM OF UTERINE BODY: Status: ACTIVE | Noted: 2025-04-10

## 2025-04-10 PROBLEM — Z23 NEED FOR PNEUMOCOCCAL VACCINE: Status: ACTIVE | Noted: 2025-04-10

## 2025-04-10 PROBLEM — F12.20 TETRAHYDROCANNABINOL (THC) DEPENDENCE (HCC): Status: ACTIVE | Noted: 2025-04-10

## 2025-04-10 PROBLEM — I48.91 ATRIAL FIBRILLATION (HCC): Status: ACTIVE | Noted: 2022-12-16

## 2025-04-10 PROBLEM — B35.6 TINEA CRURIS: Status: ACTIVE | Noted: 2025-04-10

## 2025-04-10 ASSESSMENT — SLEEP AND FATIGUE QUESTIONNAIRES
HOW LIKELY ARE YOU TO NOD OFF OR FALL ASLEEP WHILE SITTING AND READING: WOULD NEVER DOZE
HOW LIKELY ARE YOU TO NOD OFF OR FALL ASLEEP WHILE SITTING QUIETLY AFTER LUNCH WITHOUT ALCOHOL: WOULD NEVER DOZE
HOW LIKELY ARE YOU TO NOD OFF OR FALL ASLEEP WHILE WATCHING TV: WOULD NEVER DOZE
HOW LIKELY ARE YOU TO NOD OFF OR FALL ASLEEP WHILE LYING DOWN TO REST IN THE AFTERNOON WHEN CIRCUMSTANCES PERMIT: SLIGHT CHANCE OF DOZING
HOW LIKELY ARE YOU TO NOD OFF OR FALL ASLEEP WHILE SITTING INACTIVE IN A PUBLIC PLACE: WOULD NEVER DOZE
HOW LIKELY ARE YOU TO NOD OFF OR FALL ASLEEP WHEN YOU ARE A PASSENGER IN A CAR FOR AN HOUR WITHOUT A BREAK: WOULD NEVER DOZE
HOW LIKELY ARE YOU TO NOD OFF OR FALL ASLEEP IN A CAR, WHILE STOPPED FOR A FEW MINUTES IN TRAFFIC: WOULD NEVER DOZE
HOW LIKELY ARE YOU TO NOD OFF OR FALL ASLEEP WHILE SITTING AND TALKING TO SOMEONE: WOULD NEVER DOZE
ESS TOTAL SCORE: 1

## 2025-04-10 NOTE — TELEPHONE ENCOUNTER
Pt notified [facility] Ct was rescheduled to 4/16/25 at 1:30 pm arrive 1:00 pm.Instructions given.  * NOTHING TO EAT OR DRINK 6 HOURS PRIOR TO TEST EXCEPT FOR WATER  * DRINK 24 OZ OF WATER STARTING 2 HOURS PRIOR TO TEST  * NO VIOGROUS EXERCISE 24 HOURS PRIOR TO TEST  * NO DIABETIC MEDICATION THE MORNING OF EXAM (INCLUDING INSULIN)  * WEAR CLOTHING THAT DOES NOT HAVE ANY METAL SNAPS, ZIPPERS, ETC.  * PLEASE REMOVE ANY JEWELERY BEFORE ARRIVING FOR YOUR APPOINTMENT  * ARRIVE 30 MINUTES PRIOR TO YOUR EXAM TIME  * REPORT TO ENTRANCE A (EMERGENCY ENTANCE)  * PATIENTS SHOULD PLAN ON BEING AT THE HOSPITAL FOR 2.5 HOURS

## 2025-04-10 NOTE — TELEPHONE ENCOUNTER
I'm still waiting for the office visit to be completed. Pt currently on the schedule for the PET tomorrow 4/11. Services will need r/s to a later date or canceled until the auth has been obtained

## 2025-04-10 NOTE — RESULT ENCOUNTER NOTE
Please inform the patient regarding the negative urine culture result.    Thank you for your assistance,  Sunita Elias MD  Gynecologic Oncology

## 2025-04-10 NOTE — RESULT ENCOUNTER NOTE
Called and informed patient of urine results and instructions.  Patient voiced understanding and appreciation.

## 2025-04-15 ENCOUNTER — HOSPITAL ENCOUNTER (OUTPATIENT)
Dept: PULMONOLOGY | Age: 65
Discharge: HOME | End: 2025-04-15
Payer: MEDICARE

## 2025-04-15 DIAGNOSIS — J44.1 COPD WITH EXACERBATION (HCC): ICD-10-CM

## 2025-04-15 DIAGNOSIS — J42 CHRONIC BRONCHITIS, UNSPECIFIED CHRONIC BRONCHITIS TYPE (HCC): ICD-10-CM

## 2025-04-15 DIAGNOSIS — R91.1 LUNG NODULE: ICD-10-CM

## 2025-04-15 LAB
DLCO %PRED: NORMAL
DLCO PRED: NORMAL
DLCO/VA %PRED: NORMAL
DLCO/VA PRED: NORMAL
DLCO/VA: NORMAL
DLCO: NORMAL
EXPIRATORY TIME: NORMAL
FEF 25-75% %PRED-PRE: NORMAL
FEF 25-75% PRED: NORMAL
FEF 25-75-PRE: NORMAL
FEV1 %PRED-PRE: NORMAL
FEV1 PRED: NORMAL
FEV1/FVC %PRED-PRE: NORMAL
FEV1/FVC PRED: NORMAL
FEV1/FVC: NORMAL
FEV1: NORMAL
FVC %PRED-PRE: NORMAL
FVC PRED: NORMAL
FVC: NORMAL
GAW %PRED: NORMAL
GAW PRED: NORMAL
GAW: NORMAL
IC PRE %PRED: NORMAL
IC PRED: NORMAL
IC: NORMAL
MVV %PRED-PRE: NORMAL
MVV PRED: NORMAL
MVV-PRE: NORMAL
PEF %PRED-PRE: NORMAL
PEF PRED: NORMAL
PEF-PRE: NORMAL
RAW %PRED: NORMAL
RAW PRED: NORMAL
RAW: NORMAL
RV PRE %PRED: NORMAL
RV PRED: NORMAL
RV: NORMAL
SVC %PRED: NORMAL
SVC PRED: NORMAL
SVC: NORMAL
TLC PRE %PRED: NORMAL
TLC PRED: NORMAL
TLC: NORMAL
VA %PRED: NORMAL
VA PRED: NORMAL
VA: NORMAL
VTG %PRED: NORMAL
VTG PRED: NORMAL
VTG: NORMAL

## 2025-04-15 PROCEDURE — 94726 PLETHYSMOGRAPHY LUNG VOLUMES: CPT

## 2025-04-15 PROCEDURE — 94729 DIFFUSING CAPACITY: CPT

## 2025-04-15 PROCEDURE — 94060 EVALUATION OF WHEEZING: CPT

## 2025-04-15 PROCEDURE — 6370000000 HC RX 637 (ALT 250 FOR IP): Performed by: OBSTETRICS & GYNECOLOGY

## 2025-04-15 PROCEDURE — 94664 DEMO&/EVAL PT USE INHALER: CPT

## 2025-04-15 RX ORDER — ALBUTEROL SULFATE 90 UG/1
2 INHALANT RESPIRATORY (INHALATION) ONCE
Status: COMPLETED | OUTPATIENT
Start: 2025-04-15 | End: 2025-04-15

## 2025-04-15 RX ADMIN — ALBUTEROL SULFATE 2 PUFF: 90 AEROSOL, METERED RESPIRATORY (INHALATION) at 14:30

## 2025-04-15 NOTE — PROCEDURES
Pulmonary function report with bronchodilator    Spirometry notable for a moderate obstructive defect.  FEV1 1.41 just 49% predicted.    There is no improvement with bronchodilator therapy.    Static lung volumes reveal severe air trapping.  Very mild hyperinflation.  The DLCO is mildly reduced       In summary, this can be seen in moderate to severe COPD but clinical correlation is recommended.,        Daniel Piña MD.

## 2025-04-16 ENCOUNTER — HOSPITAL ENCOUNTER (OUTPATIENT)
Dept: NUCLEAR MEDICINE | Age: 65
Discharge: HOME | End: 2025-04-18
Attending: OBSTETRICS & GYNECOLOGY
Payer: MEDICARE

## 2025-04-16 DIAGNOSIS — C54.1 PRIMARY ENDOMETRIOID CARCINOMA OF ENDOMETRIUM OF UTERINE BODY: ICD-10-CM

## 2025-04-16 LAB — GLUCOSE BLD-MCNC: 157 MG/DL (ref 65–105)

## 2025-04-16 PROCEDURE — 82947 ASSAY GLUCOSE BLOOD QUANT: CPT

## 2025-04-16 PROCEDURE — A9609 HC RX DIAGNOSTIC RADIOPHARMACEUTICAL: HCPCS | Performed by: OBSTETRICS & GYNECOLOGY

## 2025-04-16 PROCEDURE — 2500000003 HC RX 250 WO HCPCS: Performed by: OBSTETRICS & GYNECOLOGY

## 2025-04-16 PROCEDURE — 78815 PET IMAGE W/CT SKULL-THIGH: CPT

## 2025-04-16 PROCEDURE — 3430000000 HC RX DIAGNOSTIC RADIOPHARMACEUTICAL: Performed by: OBSTETRICS & GYNECOLOGY

## 2025-04-16 RX ORDER — SODIUM CHLORIDE 0.9 % (FLUSH) 0.9 %
10 SYRINGE (ML) INJECTION
Status: COMPLETED | OUTPATIENT
Start: 2025-04-16 | End: 2025-04-16

## 2025-04-16 RX ORDER — FLUDEOXYGLUCOSE F 18 200 MCI/ML
10 INJECTION, SOLUTION INTRAVENOUS
Status: COMPLETED | OUTPATIENT
Start: 2025-04-16 | End: 2025-04-16

## 2025-04-16 RX ADMIN — SODIUM CHLORIDE, PRESERVATIVE FREE 10 ML: 5 INJECTION INTRAVENOUS at 13:10

## 2025-04-16 RX ADMIN — FLUDEOXYGLUCOSE F 18 13.02 MILLICURIE: 200 INJECTION, SOLUTION INTRAVENOUS at 13:10

## 2025-04-17 ENCOUNTER — TELEPHONE (OUTPATIENT)
Dept: GYNECOLOGIC ONCOLOGY | Age: 65
End: 2025-04-17

## 2025-04-17 NOTE — TELEPHONE ENCOUNTER
Called patient's PCP to confirm receipt of surgical clearance request. Sheree Ramirez MA stated they had not received the initial fax. Writer re-faxed surgical clearance request today.

## 2025-04-18 ENCOUNTER — TELEPHONE (OUTPATIENT)
Dept: FAMILY MEDICINE CLINIC | Age: 65
End: 2025-04-18

## 2025-04-18 ENCOUNTER — TELEMEDICINE (OUTPATIENT)
Dept: GYNECOLOGIC ONCOLOGY | Age: 65
End: 2025-04-18

## 2025-04-18 DIAGNOSIS — R94.8 ABNORMAL PET SCAN OF COLON: ICD-10-CM

## 2025-04-18 DIAGNOSIS — R94.8 ABNORMAL GASTROINTESTINAL PET SCAN: ICD-10-CM

## 2025-04-18 DIAGNOSIS — C54.1 PRIMARY ENDOMETRIOID CARCINOMA OF ENDOMETRIUM OF UTERINE BODY: Primary | ICD-10-CM

## 2025-04-18 DIAGNOSIS — E01.0 THYROMEGALY: ICD-10-CM

## 2025-04-18 ASSESSMENT — ENCOUNTER SYMPTOMS
VOMITING: 0
BACK PAIN: 0
ABDOMINAL PAIN: 0
NAUSEA: 0
HEMOPTYSIS: 0
COUGH: 1
DIARRHEA: 0
RECTAL PAIN: 0
WHEEZING: 0
SHORTNESS OF BREATH: 0
ABDOMINAL DISTENTION: 0
BLOOD IN STOOL: 0
CHEST TIGHTNESS: 0
CONSTIPATION: 1

## 2025-04-18 NOTE — PROGRESS NOTES
Review of Systems   Constitutional: Negative.    HENT:   Negative for lump/mass.    Respiratory:  Positive for cough. Negative for chest tightness, hemoptysis, shortness of breath and wheezing.    Cardiovascular: Negative.    Gastrointestinal:  Positive for constipation (taking stool softners). Negative for abdominal distention, abdominal pain, blood in stool, diarrhea, nausea, rectal pain and vomiting.   Endocrine: Negative.    Genitourinary:  Positive for frequency (reports due to taking Lasix) and pelvic pain. Negative for bladder incontinence, difficulty urinating, dyspareunia, dysuria, hematuria, vaginal bleeding and vaginal discharge.    Musculoskeletal:  Negative for back pain, flank pain and gait problem.   Skin: Negative.    Neurological:  Positive for extremity weakness. Negative for dizziness, gait problem, headaches, light-headedness, numbness and seizures.      
PAD.    Today, the patient presents virtually to discuss PET/CT results. She is alone on the call. She reports being hard of hearing however she admits to hearing well during our conversation today.    She confirms taking megace 160 mg BID, she denies vaginal bleeding. She reports baseline urination habits. Denies dysuria or hematuria. She admits to constipation however symptoms are improved while taking stool softeners, she reports formed BM this morning.. Her appetite is strong, she denies nausea or vomiting. She reports her blood glucose levels are in the 140s.       Review of Systems    Prior to Visit Medications    Medication Sig Taking? Authorizing Provider   lisinopril (PRINIVIL;ZESTRIL) 5 MG tablet Take 1 tablet by mouth daily Yes Dia Sinha MD   nystatin (MYCOSTATIN) 583278 UNIT/GM powder Apply 3 times daily. Yes Sunita Elias MD   megestrol (MEGACE) 40 MG tablet Take 4 tablets by mouth 2 times daily Yes Sunita Elias MD   ferrous sulfate (FE TABS 325) 325 (65 Fe) MG EC tablet Take 1 tablet by mouth daily (with breakfast) Yes Jeremy Michelle MD   nitroGLYCERIN (NITROSTAT) 0.4 MG SL tablet up to max of 3 total doses. If no relief after 1 dose, call 911. Yes Kimani Mccarty DO   baclofen (LIORESAL) 10 MG tablet Take by mouth Yes Dia Sinha MD   potassium chloride (KLOR-CON) 10 MEQ extended release tablet Take 1 tablet by mouth daily Yes Dia Sinha MD   Hydrocortisone, Perianal, (PROCTO-BASHIR) 1 % cream 2 times daily Apply topically 2 times daily. Yes Ariel Stubbs MD   busPIRone (BUSPAR) 30 MG tablet Take 30 mg by mouth 3 times daily as needed Yes Dia Sinha MD   acetaminophen (TYLENOL) 325 MG tablet Take 2 tablets by mouth every 4-6 hours as needed Yes Dia Sinha MD   furosemide (LASIX) 40 MG tablet Take 1 tablet by mouth in the morning. Hold for 2 more days. Yes Karly Reynolds MD   mometasone-formoterol (DULERA) 200-5 MCG/ACT inhaler

## 2025-04-21 ENCOUNTER — TELEPHONE (OUTPATIENT)
Dept: GASTROENTEROLOGY | Age: 65
End: 2025-04-21

## 2025-04-21 ENCOUNTER — OFFICE VISIT (OUTPATIENT)
Dept: GASTROENTEROLOGY | Age: 65
End: 2025-04-21
Payer: MEDICARE

## 2025-04-21 VITALS
SYSTOLIC BLOOD PRESSURE: 114 MMHG | HEART RATE: 81 BPM | HEIGHT: 69 IN | WEIGHT: 218.6 LBS | DIASTOLIC BLOOD PRESSURE: 72 MMHG | BODY MASS INDEX: 32.38 KG/M2 | TEMPERATURE: 97.5 F | RESPIRATION RATE: 20 BRPM

## 2025-04-21 DIAGNOSIS — Z12.11 COLON CANCER SCREENING: ICD-10-CM

## 2025-04-21 DIAGNOSIS — R94.8 ABNORMAL GASTROINTESTINAL PET SCAN: Primary | ICD-10-CM

## 2025-04-21 PROCEDURE — 3017F COLORECTAL CA SCREEN DOC REV: CPT | Performed by: INTERNAL MEDICINE

## 2025-04-21 PROCEDURE — G8427 DOCREV CUR MEDS BY ELIG CLIN: HCPCS | Performed by: INTERNAL MEDICINE

## 2025-04-21 PROCEDURE — 99204 OFFICE O/P NEW MOD 45 MIN: CPT | Performed by: INTERNAL MEDICINE

## 2025-04-21 PROCEDURE — 1036F TOBACCO NON-USER: CPT | Performed by: INTERNAL MEDICINE

## 2025-04-21 PROCEDURE — 3078F DIAST BP <80 MM HG: CPT | Performed by: INTERNAL MEDICINE

## 2025-04-21 PROCEDURE — 3074F SYST BP LT 130 MM HG: CPT | Performed by: INTERNAL MEDICINE

## 2025-04-21 PROCEDURE — G8417 CALC BMI ABV UP PARAM F/U: HCPCS | Performed by: INTERNAL MEDICINE

## 2025-04-21 ASSESSMENT — ENCOUNTER SYMPTOMS
COLOR CHANGE: 0
CONSTIPATION: 1
DIARRHEA: 0
NAUSEA: 0
VOICE CHANGE: 0
WHEEZING: 0
ANAL BLEEDING: 0
ABDOMINAL PAIN: 0
SORE THROAT: 0
CHOKING: 0
RECTAL PAIN: 0
ABDOMINAL DISTENTION: 0
COUGH: 0
BLOOD IN STOOL: 0
VOMITING: 0
TROUBLE SWALLOWING: 0

## 2025-04-21 NOTE — TELEPHONE ENCOUNTER
Pt saw Dr. Guerin today in clinic. Colonoscopy ordered. Pt takes Eliquis and Plavix, managed by PCP. Pt will need cardiac clearance for A-fib/CAD. Writer informed pt clearance requests will be faxed. Once received, pt will receive a call to schedule procedure. Pt would like procedure at [place].

## 2025-04-21 NOTE — PROGRESS NOTES
needed for Wheezing, Disp: 120 each, Rfl: 0    tiotropium (SPIRIVA) 18 MCG inhalation capsule, Inhale 1 capsule into the lungs daily Pt told that she was using this medication at home. (Patient taking differently: Inhale 1 capsule into the lungs daily Pt told that she was using this medication at home.Reports she is using inhaler form), Disp: 1 capsule, Rfl: 0    ALLERGIES:   Allergies   Allergen Reactions    Codeine      vomiting    Morphine      hypotension       FAMILY HISTORY:       Problem Relation Age of Onset    Hypertension Mother     Hypertension Father     Diabetes Sister     Diabetes Brother     Stroke Brother          SOCIAL HISTORY:   Social History     Socioeconomic History    Marital status:      Spouse name: Not on file    Number of children: Not on file    Years of education: Not on file    Highest education level: Not on file   Occupational History    Not on file   Tobacco Use    Smoking status: Former     Current packs/day: 0.00     Average packs/day: 2.0 packs/day for 45.8 years (91.6 ttl pk-yrs)     Types: Cigarettes     Start date:      Quit date: 10/26/2020     Years since quittin.4    Smokeless tobacco: Former   Substance and Sexual Activity    Alcohol use: No    Drug use: Yes     Frequency: 7.0 times per week     Types: Marijuana (Weed)     Comment: smokes    Sexual activity: Not Currently   Other Topics Concern    Not on file   Social History Narrative    Not on file     Social Drivers of Health     Financial Resource Strain: Not on file   Food Insecurity: No Food Insecurity (3/9/2025)    Hunger Vital Sign     Worried About Running Out of Food in the Last Year: Never true     Ran Out of Food in the Last Year: Never true   Transportation Needs: No Transportation Needs (3/9/2025)    PRAPARE - Transportation     Lack of Transportation (Medical): No     Lack of Transportation (Non-Medical): No   Physical Activity: Not on file   Stress: Not on file   Social Connections: Not on

## 2025-04-21 NOTE — ED NOTES
04/21/2025    Speaking with pt.'s wife Howard [phone] re: discharge. \"No. I don't have no concerns,\" with him discharging and returning home today. Regarding a safe home environment, Howard reported several firearms were removed from home and secured in a safe at a friends house, however, one handgun is still unaccounted for. She searched the house \"pretty good\" several times and looked in the places he said it would be, but she still could not find it. She said they will look together and remove it and place it with the others once it is found. Otherwise, said she talked with pt yesterday and he told her he agreed to see a counselor. AVS and therapy appointment reviewed. Staff were going to [place] back with him today about seeing a psychiatrist. No further questions or concerns at this time.     Paulino Cabrera LCSW           Pt rr even and nonlabored, NAD. Pt resting on cot with eyes closed, full cardiac monitor on. Will continue to monitor.      Valentin Reese RN  12/16/22 [id number]

## 2025-04-24 ENCOUNTER — HOSPITAL ENCOUNTER (OUTPATIENT)
Dept: VASCULAR LAB | Age: 65
Discharge: HOME | End: 2025-04-26
Payer: MEDICARE

## 2025-04-24 DIAGNOSIS — I73.9 PVD (PERIPHERAL VASCULAR DISEASE): ICD-10-CM

## 2025-04-24 LAB
VAS LEFT ABI: 0.78
VAS LEFT ANKLE BP: 95 MMHG
VAS LEFT ARM BP: 122 MMHG
VAS LEFT CALF PRESSURE: 94 MMHG
VAS LEFT DORSALIS PEDIS BP: 95 MMHG
VAS LEFT PTA BP: 80 MMHG
VAS LEFT TBI: 0.53
VAS LEFT THIGH PRESSURE: 114 MMHG
VAS LEFT TOE PRESSURE: 65 MMHG
VAS RIGHT ABI: 0.56
VAS RIGHT ANKLE BP: 68 MMHG
VAS RIGHT ARM BP: 115 MMHG
VAS RIGHT CALF PRESSURE: 72 MMHG
VAS RIGHT DORSALIS PEDIS BP: 68 MMHG
VAS RIGHT PTA BP: 66 MMHG
VAS RIGHT TBI: 0.45
VAS RIGHT THIGH PRESSURE: 93 MMHG
VAS RIGHT TOE PRESSURE: 55 MMHG

## 2025-04-24 PROCEDURE — 93923 UPR/LXTR ART STDY 3+ LVLS: CPT

## 2025-04-25 ENCOUNTER — INITIAL CONSULT (OUTPATIENT)
Dept: VASCULAR SURGERY | Age: 65
End: 2025-04-25
Payer: MEDICARE

## 2025-04-25 VITALS
DIASTOLIC BLOOD PRESSURE: 73 MMHG | WEIGHT: 218 LBS | HEIGHT: 69 IN | HEART RATE: 77 BPM | OXYGEN SATURATION: 97 % | SYSTOLIC BLOOD PRESSURE: 101 MMHG | RESPIRATION RATE: 16 BRPM | BODY MASS INDEX: 32.29 KG/M2

## 2025-04-25 DIAGNOSIS — I70.213 ATHEROSCLER OF NATIVE ARTERY OF BOTH LEGS WITH INTERMIT CLAUDICATION: Primary | ICD-10-CM

## 2025-04-25 PROCEDURE — 3074F SYST BP LT 130 MM HG: CPT | Performed by: SURGERY

## 2025-04-25 PROCEDURE — 99203 OFFICE O/P NEW LOW 30 MIN: CPT | Performed by: SURGERY

## 2025-04-25 PROCEDURE — 3078F DIAST BP <80 MM HG: CPT | Performed by: SURGERY

## 2025-04-25 PROCEDURE — 3017F COLORECTAL CA SCREEN DOC REV: CPT | Performed by: SURGERY

## 2025-04-25 PROCEDURE — 1036F TOBACCO NON-USER: CPT | Performed by: SURGERY

## 2025-04-25 PROCEDURE — G8417 CALC BMI ABV UP PARAM F/U: HCPCS | Performed by: SURGERY

## 2025-04-25 PROCEDURE — G8427 DOCREV CUR MEDS BY ELIG CLIN: HCPCS | Performed by: SURGERY

## 2025-04-25 NOTE — PROGRESS NOTES
[facility]  [address]  [place] [address]  Dept: [phone]     Patient: Nancy L Haase  : 1960  MRN: [id number]  DOS: 2025    Referring provider:  Sunita Elias MD         HPI:  Nancy L Haase is a 64 y.o. female who comes to the office for the first time regarding her lower extremity claudication.  She has short distance claudication approximately 20 feet.  She can get around her house but still has trouble moving from one end of the house to the other.  She certainly cannot walk at any greater distances.  When asked whether or not she can walk half a football field she says that that would be impossible.  She also explains to me that she cannot go 25 yards without having to stop.  She denies rest pain.  She had MARLY and PVR revealing an MARLY of 0.5 on the right and 0.7 on the left.  The PVR waveform analysis is consistent with inflow disease.  She does have thigh and calf claudication.  CT was done which was a normal abdomen pelvis protocol without vascular protocol showing what I think is aortoiliac occlusive disease.  She has very significant stenotic disease on the right common iliac with a small terminal aorta and some calcified disease on the left as well.    She has relatively newly diagnosed uterine cancer and is getting clearance from any doctors to go ahead with hysterectomy.  I have nothing from my standpoint that would prevent her from going ahead with this operative therapy.  Past Medical History:   Diagnosis Date    Acute on chronic systolic CHF (congestive heart failure) ([facility]) 2021    Asthma     Blood circulation, collateral     CAD (coronary artery disease)     COPD (chronic obstructive pulmonary disease) ([facility])     COPD exacerbation ([facility]) 2021    Diabetes mellitus ([facility])     Hyperlipidemia     Hypertension     Ischemic colitis 10/28/2021    Movement disorder

## 2025-04-28 ENCOUNTER — HOSPITAL ENCOUNTER (OUTPATIENT)
Dept: GENERAL RADIOLOGY | Age: 65
Discharge: HOME | End: 2025-04-30
Payer: MEDICARE

## 2025-04-28 ENCOUNTER — RESULTS FOLLOW-UP (OUTPATIENT)
Dept: GYNECOLOGIC ONCOLOGY | Age: 65
End: 2025-04-28

## 2025-04-28 DIAGNOSIS — R94.8 ABNORMAL GASTROINTESTINAL PET SCAN: ICD-10-CM

## 2025-04-28 DIAGNOSIS — C54.1 PRIMARY ENDOMETRIOID CARCINOMA OF ENDOMETRIUM OF UTERINE BODY (HCC): ICD-10-CM

## 2025-04-28 PROCEDURE — 74250 X-RAY XM SM INT 1CNTRST STD: CPT

## 2025-04-28 PROCEDURE — 2500000003 HC RX 250 WO HCPCS: Performed by: PHYSICIAN ASSISTANT

## 2025-04-28 RX ADMIN — BARIUM SULFATE 500 ML: 960 POWDER, FOR SUSPENSION ORAL at 08:38

## 2025-04-28 NOTE — RESULT ENCOUNTER NOTE
Please advise patient the small bowel study is negative   Please remind patient to complete thyroid labs at next availability    I see she has met with GI and is pending cardiac clearance prior to colonoscopy  Therefore please reschedule our VV 2 weeks out to allow for timing to complete testing unless she is having concerns she needs to discuss    Thank you  Lori

## 2025-04-28 NOTE — RESULT ENCOUNTER NOTE
Called and informed patient of small bowel study results and instructions.  Patient voiced understanding and appreciation.  5/1/25 appt rescheduled.

## 2025-05-02 ENCOUNTER — OFFICE VISIT (OUTPATIENT)
Age: 65
End: 2025-05-02
Payer: MEDICARE

## 2025-05-02 ENCOUNTER — TELEPHONE (OUTPATIENT)
Dept: GYNECOLOGIC ONCOLOGY | Age: 65
End: 2025-05-02

## 2025-05-02 VITALS
WEIGHT: 218 LBS | SYSTOLIC BLOOD PRESSURE: 120 MMHG | HEIGHT: 69 IN | HEART RATE: 80 BPM | DIASTOLIC BLOOD PRESSURE: 72 MMHG | BODY MASS INDEX: 32.29 KG/M2

## 2025-05-02 DIAGNOSIS — C54.1 PRIMARY ENDOMETRIOID CARCINOMA OF ENDOMETRIUM OF UTERINE BODY (HCC): ICD-10-CM

## 2025-05-02 DIAGNOSIS — R30.0 DYSURIA: Primary | ICD-10-CM

## 2025-05-02 DIAGNOSIS — R33.9 URINARY RETENTION: ICD-10-CM

## 2025-05-02 LAB
BILIRUBIN, POC: ABNORMAL
BLOOD URINE, POC: ABNORMAL
CLARITY, POC: ABNORMAL
COLOR, POC: YELLOW
GLUCOSE URINE, POC: ABNORMAL MG/DL
KETONES, POC: ABNORMAL MG/DL
LEUKOCYTE EST, POC: ABNORMAL
NITRITE, POC: ABNORMAL
PH, POC: 6
PROTEIN, POC: ABNORMAL MG/DL
SPECIFIC GRAVITY, POC: ABNORMAL
UROBILINOGEN, POC: 0.2 MG/DL

## 2025-05-02 PROCEDURE — 3017F COLORECTAL CA SCREEN DOC REV: CPT | Performed by: UROLOGY

## 2025-05-02 PROCEDURE — G8427 DOCREV CUR MEDS BY ELIG CLIN: HCPCS | Performed by: UROLOGY

## 2025-05-02 PROCEDURE — 3078F DIAST BP <80 MM HG: CPT | Performed by: UROLOGY

## 2025-05-02 PROCEDURE — 99204 OFFICE O/P NEW MOD 45 MIN: CPT | Performed by: UROLOGY

## 2025-05-02 PROCEDURE — 3074F SYST BP LT 130 MM HG: CPT | Performed by: UROLOGY

## 2025-05-02 PROCEDURE — 1036F TOBACCO NON-USER: CPT | Performed by: UROLOGY

## 2025-05-02 PROCEDURE — G8417 CALC BMI ABV UP PARAM F/U: HCPCS | Performed by: UROLOGY

## 2025-05-02 PROCEDURE — 81002 URINALYSIS NONAUTO W/O SCOPE: CPT | Performed by: UROLOGY

## 2025-05-02 RX ORDER — MEGESTROL ACETATE 40 MG/1
160 TABLET ORAL 2 TIMES DAILY
Qty: 720 TABLET | Refills: 3 | Status: SHIPPED | OUTPATIENT
Start: 2025-05-02 | End: 2026-04-27

## 2025-05-02 NOTE — TELEPHONE ENCOUNTER
Patient is requesting a refill of her Megace. She states she is getting low and doesn't want to run out. Requests refill be sent to the [facility] in [place], [place].

## 2025-05-02 NOTE — PROGRESS NOTES
pelvis.  Pelvic phleboliths.  Psoas shadows symmetric in appearance.  Moderate bilateral hip osteoarthrosis. At 0 minutes, there is contrast opacifying the stomach, duodenal C-loop and jejunum.  Probable duodenal or jejunal diverticulum measuring 2.7 cm. At 15 minutes, contrast is seen extending further into the proximal small bowel. At 30 minutes, contrast is seen within the proximal and mid small bowel. At 60 minutes, contrast is seen throughout the majority of the small bowel and extending into the distal small bowel. At 90 minutes, contrast is seen opacifying the stomach, duodenum and entirety of the small bowel with a small amount of contrast in the cecum. Additional spot images were performed which demonstrates a relatively normal appearing terminal ileum/ileocecal valve.  No obvious lesion identified in the region of the terminal ileum/ileocecal valve.  Normal migration of contrast into the cecum from the ileum was noted during spot imaging. There is normal transit time to the terminal ileum.  No focal abnormalities, strictures or obstructions are seen of the small bowel.  Spot images of the terminal ileum are unremarkable.     Overall, unremarkable small bowel follow-through series.  Specifically, no gross abnormality identified in association with the terminal ileum or ileocecal valve region by radiography/fluoroscopy.         Last several PSA's:  No results found for: \"PSA\"    Last total testosterone:  No results found for: \"TESTOSTERONE\"    Urinalysis today:  Results for orders placed or performed in visit on 05/02/25   POCT Urinalysis Dipstick no Micro   Result Value Ref Range    Color, UA yellow     Clarity, UA sl. cloudy     Glucose, UA POC neg mg/dL    Bilirubin, UA neg     Ketones, UA neg mg/dL    Spec Grav, UA 1/20     Blood, UA POC large     pH, UA 6.0     Protein, UA POC neg mg/dL    Urobilinogen, UA 0.2 mg/dL    Leukocytes, UA small     Nitrite, UA neg          Last BUN and creatinine:  Lab

## 2025-05-05 ENCOUNTER — PREP FOR PROCEDURE (OUTPATIENT)
Age: 65
End: 2025-05-05

## 2025-05-09 ENCOUNTER — TELEPHONE (OUTPATIENT)
Dept: GYNECOLOGIC ONCOLOGY | Age: 65
End: 2025-05-09

## 2025-05-09 ENCOUNTER — TELEPHONE (OUTPATIENT)
Age: 65
End: 2025-05-09

## 2025-05-09 NOTE — TELEPHONE ENCOUNTER
Pt called stating she was referred to GI and had consult but has not be contacted to schedule colonoscopy. She reports colonoscopy is needed prior to scheduling any surgery with .Pt given  office number and will follow up with them regarding scheduling.

## 2025-05-09 NOTE — TELEPHONE ENCOUNTER
Patient is scheduled for surgery on 6/13 at 12:45 PM.  Talked to patient and gave her the date, time and instructions.  Patient confirmed and verbalized understanding.  Letter mailed out today.

## 2025-05-12 ENCOUNTER — TELEPHONE (OUTPATIENT)
Dept: GYNECOLOGIC ONCOLOGY | Age: 65
End: 2025-05-12

## 2025-05-12 NOTE — TELEPHONE ENCOUNTER
Patient called office in regards to referral to GI - Dr. Guerin. Patient states that she has waited for a while to have the colonoscopy scheduled, but Dr. Guerin needs clearance from other providers before proceeding. Patient was seen 4/21/25 with Dr. Guerin, and cardiac clearance was needed. Patient expressed frustration with having to wait and said, \"I have waited two weeks, is he waiting for me to die from cancer?\". Writer gave patient phone number to call Dr. Guerin's office for follow up.

## 2025-05-12 NOTE — TELEPHONE ENCOUNTER
Thank you for directing her to Dr. Guerin office as clearances/timing will need to be handled by his staff

## 2025-05-14 ENCOUNTER — PREP FOR PROCEDURE (OUTPATIENT)
Dept: GASTROENTEROLOGY | Age: 65
End: 2025-05-14

## 2025-05-14 DIAGNOSIS — R94.8 ABNORMAL GASTROINTESTINAL PET SCAN: ICD-10-CM

## 2025-05-14 DIAGNOSIS — Z12.11 COLON CANCER SCREENING: ICD-10-CM

## 2025-05-15 ENCOUNTER — PATIENT MESSAGE (OUTPATIENT)
Dept: GASTROENTEROLOGY | Age: 65
End: 2025-05-15

## 2025-05-15 ENCOUNTER — TELEPHONE (OUTPATIENT)
Dept: GYNECOLOGIC ONCOLOGY | Age: 65
End: 2025-05-15

## 2025-05-15 NOTE — TELEPHONE ENCOUNTER
Patient called to reschedule her VV for after her colonoscopy. She denied any heavy bleeding and states she is taking her megace as prescribed. She is now scheduled for VV 6/5 2025.

## 2025-05-19 ENCOUNTER — HOSPITAL ENCOUNTER (OUTPATIENT)
Dept: PREADMISSION TESTING | Age: 65
Discharge: HOME | End: 2025-05-23

## 2025-05-19 VITALS — BODY MASS INDEX: 31.25 KG/M2 | HEIGHT: 69 IN | WEIGHT: 211 LBS

## 2025-05-19 NOTE — PROGRESS NOTES
Pre-op Instructions For Out-Patient Endoscopy Surgery    Medication Instructions:  Please stop herbs and any supplements now (includes vitamins and minerals).    For these medications:  Dulaglutide (Trulicity), Exenatide (Byetta and Bydureon, Liraglutide (Victoza), Lixisenatide (Adlyxin), Semaglutide (Ozempic and Rybelsus), Tirzepatide (Mounjaro, Zepbound,Wegovy)- Stop 1 week prior if taking weekly or 1 day prior if taking every 12 hours or daily.     Please contact your surgeon and prescribing physician for pre-op instructions for any blood thinners. STOP CLOPIDOGREL/PLAVIX AND ELIQUIS AS DIRECTED    If you have inhalers/aerosol treatments at home, please use them the morning of your surgery and bring the inhalers with you to the hospital.    Please take the following medications the morning of your surgery with a sip of water:    Metoprolol tartrate and Inhalers    Surgery Instructions:  After midnight before surgery:  Do not eat or drink anything, including water, mints, gum, and hard candy.  You may brush your teeth without swallowing.  No smoking, chewing tobacco, or street drugs.       ** Please Follow Bowel Prep instructions if given by surgeon's office**    Please shower or bathe before surgery.       Please do not wear any cologne, lotion, powder, jewelry, piercings, perfume, makeup, nail polish, hair accessories, or hair spray on the day of surgery.  Wear loose comfortable clothing.    Leave your valuables at home but bring a payment source for any after-surgery prescriptions you plan to fill at [facility].  Bring a storage case for any glasses/contacts.    An adult who is responsible for you MUST remain in the hospital and drive you home and should be with you for the first 24 hours after surgery.     The Day of Surgery:  Arrive at [facility] Entr[facility] at the time directed by your surgeon and check in at the desk.     If you have a living will or healthcare power of

## 2025-05-20 ENCOUNTER — TELEPHONE (OUTPATIENT)
Dept: GYNECOLOGIC ONCOLOGY | Age: 65
End: 2025-05-20

## 2025-05-20 DIAGNOSIS — C54.1 PRIMARY ENDOMETRIOID CARCINOMA OF ENDOMETRIUM OF UTERINE BODY (HCC): Primary | ICD-10-CM

## 2025-05-20 RX ORDER — MEGESTROL ACETATE 40 MG/ML
160 SUSPENSION ORAL 2 TIMES DAILY
Qty: 240 ML | Refills: 3 | Status: SHIPPED | OUTPATIENT
Start: 2025-05-20

## 2025-05-20 NOTE — TELEPHONE ENCOUNTER
I called around to 5 local [facility] hospitals.  I did find the Megace tablets at [facility] in [place] where 90 pills are in stock. This would allow patient 11-day supply but then per pharmacy unable to order more at this time however it does appear [facility] area appear to have medication in stock.    I called to discuss above findings with the patient.  She reports she does not drive therefore inability to frequent multiple pharmacies.  She wishes for oral suspension which can be delivered by [facility] pharmacy.  She reports no concerns if there is ill taste to the medication as this does not affect her. Therefore respecting her wishes oral megace sent to [facility] pharmacy for continuity of therapy. Follow up as planned for VV to discuss care.

## 2025-05-20 NOTE — TELEPHONE ENCOUNTER
Patient called office requesting rx for liquid Megace. Medication is on back order until July, and she only has enough for two doses. Per pt, the pharmacy has a liquid form.

## 2025-05-22 NOTE — PRE-PROCEDURE INSTRUCTIONS
Have you received your Prep? Any questions with prep instructions?   Only Clear Liquid Diet day before.  Nothing to eat after midnight day before procedure.  Are you taking any blood thinners? If so, you need to Stop.  Remove any jewelry and body piercings.  Do you wear glasses? If so, please bring a case to store them in.  Are you having any Covid symptoms?  Do you have any new rashes, infections, etc. that we should be aware of?  Do you have a ride home the day of surgery? It cannot be a cab or medical transportation.  Verify surgery time/date and what time to arrive at hospital.  NO ANSWER, VM LEFT TO ARRIVE AT 1330

## 2025-05-23 ENCOUNTER — ANESTHESIA EVENT (OUTPATIENT)
Dept: ENDOSCOPY | Age: 65
End: 2025-05-23
Payer: MEDICARE

## 2025-05-27 ENCOUNTER — HOSPITAL ENCOUNTER (OUTPATIENT)
Age: 65
Setting detail: OUTPATIENT SURGERY
Discharge: HOME | End: 2025-05-27
Attending: INTERNAL MEDICINE | Admitting: INTERNAL MEDICINE
Payer: MEDICARE

## 2025-05-27 ENCOUNTER — ANESTHESIA (OUTPATIENT)
Dept: ENDOSCOPY | Age: 65
End: 2025-05-27
Payer: MEDICARE

## 2025-05-27 VITALS
DIASTOLIC BLOOD PRESSURE: 52 MMHG | TEMPERATURE: 97.1 F | WEIGHT: 211 LBS | RESPIRATION RATE: 16 BRPM | BODY MASS INDEX: 31.25 KG/M2 | OXYGEN SATURATION: 99 % | HEIGHT: 69 IN | SYSTOLIC BLOOD PRESSURE: 124 MMHG | HEART RATE: 68 BPM

## 2025-05-27 DIAGNOSIS — R94.8 ABNORMAL GASTROINTESTINAL PET SCAN: ICD-10-CM

## 2025-05-27 DIAGNOSIS — Z12.11 COLON CANCER SCREENING: ICD-10-CM

## 2025-05-27 LAB — GLUCOSE BLD-MCNC: 121 MG/DL (ref 65–105)

## 2025-05-27 PROCEDURE — 2580000003 HC RX 258: Performed by: ANESTHESIOLOGY

## 2025-05-27 PROCEDURE — 82947 ASSAY GLUCOSE BLOOD QUANT: CPT

## 2025-05-27 PROCEDURE — 7100000010 HC PHASE II RECOVERY - FIRST 15 MIN: Performed by: INTERNAL MEDICINE

## 2025-05-27 PROCEDURE — 7100000001 HC PACU RECOVERY - ADDTL 15 MIN: Performed by: INTERNAL MEDICINE

## 2025-05-27 PROCEDURE — 6370000000 HC RX 637 (ALT 250 FOR IP): Performed by: ANESTHESIOLOGY

## 2025-05-27 PROCEDURE — 6360000002 HC RX W HCPCS: Performed by: NURSE ANESTHETIST, CERTIFIED REGISTERED

## 2025-05-27 PROCEDURE — 3609010600 HC COLONOSCOPY POLYPECTOMY SNARE/COLD BIOPSY: Performed by: INTERNAL MEDICINE

## 2025-05-27 PROCEDURE — 2709999900 HC NON-CHARGEABLE SUPPLY: Performed by: INTERNAL MEDICINE

## 2025-05-27 PROCEDURE — 94640 AIRWAY INHALATION TREATMENT: CPT

## 2025-05-27 PROCEDURE — 3700000001 HC ADD 15 MINUTES (ANESTHESIA): Performed by: INTERNAL MEDICINE

## 2025-05-27 PROCEDURE — 88305 TISSUE EXAM BY PATHOLOGIST: CPT

## 2025-05-27 PROCEDURE — 7100000011 HC PHASE II RECOVERY - ADDTL 15 MIN: Performed by: INTERNAL MEDICINE

## 2025-05-27 PROCEDURE — 7100000030 HC ASPR PHASE II RECOVERY - FIRST 15 MIN: Performed by: INTERNAL MEDICINE

## 2025-05-27 PROCEDURE — 45385 COLONOSCOPY W/LESION REMOVAL: CPT | Performed by: INTERNAL MEDICINE

## 2025-05-27 PROCEDURE — 3700000000 HC ANESTHESIA ATTENDED CARE: Performed by: INTERNAL MEDICINE

## 2025-05-27 PROCEDURE — 45380 COLONOSCOPY AND BIOPSY: CPT | Performed by: INTERNAL MEDICINE

## 2025-05-27 PROCEDURE — 2500000003 HC RX 250 WO HCPCS: Performed by: NURSE ANESTHETIST, CERTIFIED REGISTERED

## 2025-05-27 PROCEDURE — 94761 N-INVAS EAR/PLS OXIMETRY MLT: CPT

## 2025-05-27 PROCEDURE — 7100000000 HC PACU RECOVERY - FIRST 15 MIN: Performed by: INTERNAL MEDICINE

## 2025-05-27 PROCEDURE — 7100000031 HC ASPR PHASE II RECOVERY - ADDTL 15 MIN: Performed by: INTERNAL MEDICINE

## 2025-05-27 RX ORDER — PROPOFOL 10 MG/ML
INJECTION, EMULSION INTRAVENOUS
Status: DISCONTINUED | OUTPATIENT
Start: 2025-05-27 | End: 2025-05-27 | Stop reason: SDUPTHER

## 2025-05-27 RX ORDER — SODIUM CHLORIDE 0.9 % (FLUSH) 0.9 %
5-40 SYRINGE (ML) INJECTION EVERY 12 HOURS SCHEDULED
Status: DISCONTINUED | OUTPATIENT
Start: 2025-05-27 | End: 2025-05-27 | Stop reason: HOSPADM

## 2025-05-27 RX ORDER — MIDAZOLAM HYDROCHLORIDE 2 MG/2ML
INJECTION, SOLUTION INTRAMUSCULAR; INTRAVENOUS
Status: DISCONTINUED | OUTPATIENT
Start: 2025-05-27 | End: 2025-05-27 | Stop reason: SDUPTHER

## 2025-05-27 RX ORDER — ROCURONIUM BROMIDE 10 MG/ML
INJECTION, SOLUTION INTRAVENOUS
Status: DISCONTINUED | OUTPATIENT
Start: 2025-05-27 | End: 2025-05-27 | Stop reason: SDUPTHER

## 2025-05-27 RX ORDER — SODIUM CHLORIDE 9 MG/ML
INJECTION, SOLUTION INTRAVENOUS CONTINUOUS
Status: DISCONTINUED | OUTPATIENT
Start: 2025-05-27 | End: 2025-05-27 | Stop reason: HOSPADM

## 2025-05-27 RX ORDER — LIDOCAINE HYDROCHLORIDE 10 MG/ML
1 INJECTION, SOLUTION EPIDURAL; INFILTRATION; INTRACAUDAL; PERINEURAL
Status: DISCONTINUED | OUTPATIENT
Start: 2025-05-27 | End: 2025-05-27 | Stop reason: HOSPADM

## 2025-05-27 RX ORDER — LIDOCAINE HYDROCHLORIDE 20 MG/ML
INJECTION, SOLUTION EPIDURAL; INFILTRATION; INTRACAUDAL; PERINEURAL
Status: DISCONTINUED | OUTPATIENT
Start: 2025-05-27 | End: 2025-05-27 | Stop reason: SDUPTHER

## 2025-05-27 RX ORDER — SUCCINYLCHOLINE/SOD CL,ISO/PF 200MG/10ML
SYRINGE (ML) INTRAVENOUS
Status: DISCONTINUED | OUTPATIENT
Start: 2025-05-27 | End: 2025-05-27 | Stop reason: SDUPTHER

## 2025-05-27 RX ORDER — SODIUM CHLORIDE 0.9 % (FLUSH) 0.9 %
5-40 SYRINGE (ML) INJECTION PRN
Status: DISCONTINUED | OUTPATIENT
Start: 2025-05-27 | End: 2025-05-27 | Stop reason: HOSPADM

## 2025-05-27 RX ORDER — SODIUM CHLORIDE 9 MG/ML
INJECTION, SOLUTION INTRAVENOUS PRN
Status: DISCONTINUED | OUTPATIENT
Start: 2025-05-27 | End: 2025-05-27 | Stop reason: HOSPADM

## 2025-05-27 RX ORDER — SIMETHICONE 40MG/0.6ML
SUSPENSION, DROPS(FINAL DOSAGE FORM)(ML) ORAL PRN
Status: DISCONTINUED | OUTPATIENT
Start: 2025-05-27 | End: 2025-05-27 | Stop reason: ALTCHOICE

## 2025-05-27 RX ORDER — DIPHENHYDRAMINE HYDROCHLORIDE 50 MG/ML
INJECTION, SOLUTION INTRAMUSCULAR; INTRAVENOUS
Status: DISCONTINUED | OUTPATIENT
Start: 2025-05-27 | End: 2025-05-27 | Stop reason: SDUPTHER

## 2025-05-27 RX ORDER — IPRATROPIUM BROMIDE AND ALBUTEROL SULFATE 2.5; .5 MG/3ML; MG/3ML
1 SOLUTION RESPIRATORY (INHALATION) ONCE
Status: COMPLETED | OUTPATIENT
Start: 2025-05-27 | End: 2025-05-27

## 2025-05-27 RX ADMIN — SUGAMMADEX 200 MG: 100 INJECTION, SOLUTION INTRAVENOUS at 17:03

## 2025-05-27 RX ADMIN — PROPOFOL 50 MG: 10 INJECTION, EMULSION INTRAVENOUS at 15:58

## 2025-05-27 RX ADMIN — PROPOFOL 20 MG: 10 INJECTION, EMULSION INTRAVENOUS at 15:39

## 2025-05-27 RX ADMIN — ROCURONIUM BROMIDE 25 MG: 10 INJECTION, SOLUTION INTRAVENOUS at 16:27

## 2025-05-27 RX ADMIN — SODIUM CHLORIDE: 9 INJECTION, SOLUTION INTRAVENOUS at 14:21

## 2025-05-27 RX ADMIN — IPRATROPIUM BROMIDE AND ALBUTEROL SULFATE 1 DOSE: .5; 2.5 SOLUTION RESPIRATORY (INHALATION) at 14:46

## 2025-05-27 RX ADMIN — PROPOFOL 20 MG: 10 INJECTION, EMULSION INTRAVENOUS at 15:38

## 2025-05-27 RX ADMIN — PROPOFOL 70 MG: 10 INJECTION, EMULSION INTRAVENOUS at 16:23

## 2025-05-27 RX ADMIN — PROPOFOL 175 MCG/KG/MIN: 10 INJECTION, EMULSION INTRAVENOUS at 15:37

## 2025-05-27 RX ADMIN — LIDOCAINE HYDROCHLORIDE 60 MG: 20 INJECTION, SOLUTION EPIDURAL; INFILTRATION; INTRACAUDAL; PERINEURAL at 15:37

## 2025-05-27 RX ADMIN — Medication 120 MG: at 16:24

## 2025-05-27 RX ADMIN — MIDAZOLAM HYDROCHLORIDE 2 MG: 1 INJECTION, SOLUTION INTRAMUSCULAR; INTRAVENOUS at 15:53

## 2025-05-27 RX ADMIN — DIPHENHYDRAMINE HYDROCHLORIDE 25 MG: 50 INJECTION INTRAMUSCULAR; INTRAVENOUS at 16:19

## 2025-05-27 ASSESSMENT — ENCOUNTER SYMPTOMS
BACK PAIN: 1
SHORTNESS OF BREATH: 1
CONSTIPATION: 1
RESPIRATORY NEGATIVE: 1

## 2025-05-27 ASSESSMENT — PAIN - FUNCTIONAL ASSESSMENT
PAIN_FUNCTIONAL_ASSESSMENT: NONE - DENIES PAIN
PAIN_FUNCTIONAL_ASSESSMENT: ADULT NONVERBAL PAIN SCALE (NPVS)
PAIN_FUNCTIONAL_ASSESSMENT: 0-10

## 2025-05-27 NOTE — ANESTHESIA POSTPROCEDURE EVALUATION
Department of Anesthesiology  Postprocedure Note    Patient: Nancy L Haase  MRN: [id number]  YOB: 1960  Date of evaluation: 5/27/2025    Procedure Summary       Date: 05/27/25 Room / Location: [place] [address] / [facility]    Anesthesia Start: 1533 Anesthesia Stop: 1713    Procedure: COLONOSCOPY POLYPECTOMY SNARE/BIOPSY Diagnosis:       Colon cancer screening      Abnormal gastrointestinal PET scan      (Colon cancer screening [Z12.11])      (Abnormal gastrointestinal PET scan [R94.8])    Surgeons: Kathryn Guerin MD Responsible Provider: Damon Jones MD    Anesthesia Type: General ASA Status: 3            Anesthesia Type: General    Katherin Phase I: Katherin Score: 8    Katherin Phase II:      Anesthesia Post Evaluation    Patient location during evaluation: PACU  Patient participation: complete - patient participated  Level of consciousness: awake and alert  Airway patency: patent  Nausea & Vomiting: no vomiting  Cardiovascular status: hemodynamically stable  Respiratory status: acceptable  Hydration status: euvolemic  Comments: POST- ANESTHESIA EVALUATION       Pt Name: Nancy L Haase  MRN: [id number]  YOB: 1960  Date of evaluation: 5/27/2025  Time:  5:28 PM      BP (!) 148/116   Pulse 84   Temp 98.1 °F (36.7 °C) (Infrared)   Resp 20   Ht 1.753 m (5' 9.02\")   Wt 95.7 kg (211 lb)   SpO2 92%   BMI 31.14 kg/m²      Consciousness Level  Awake  Cardiopulmonary Status  Stable  Pain Adequately Treated YES  Nausea / Vomiting  NO  Adequate Hydration  YES  Anesthesia Related Complications NONE      Electronically signed by Damon Jones MD on 5/27/2025 at 5:28 PM         Pain management: satisfactory to patient    No notable events documented.

## 2025-05-27 NOTE — ANESTHESIA PRE PROCEDURE
Counseling given: Not Answered      Vital Signs (Current):   Vitals:    05/27/25 1352   BP: 138/70   Pulse: 74   Resp: 18   Temp: 97.1 °F (36.2 °C)   TempSrc: Tympanic   SpO2: 99%   Weight: 95.7 kg (211 lb)   Height: 1.753 m (5' 9.02\")                                              BP Readings from Last 3 Encounters:   05/27/25 138/70   05/02/25 120/72   04/25/25 101/73       NPO Status: Time of last liquid consumption: 1245 (bowl prep)                        Time of last solid consumption: 0700                        Date of last liquid consumption: 05/27/25                        Date of last solid food consumption: 05/26/25    BMI:   Wt Readings from Last 3 Encounters:   05/27/25 95.7 kg (211 lb)   05/19/25 95.7 kg (211 lb)   05/02/25 98.9 kg (218 lb)     Body mass index is 31.14 kg/m².    CBC:   Lab Results   Component Value Date/Time    WBC 13.3 03/15/2025 06:05 AM    RBC 4.09 03/15/2025 06:05 AM    RBC 4.10 06/02/2012 05:17 AM    HGB 10.5 03/15/2025 06:05 AM    HCT 35.9 03/15/2025 06:05 AM    MCV 87.8 03/15/2025 06:05 AM    RDW 17.4 03/15/2025 06:05 AM     03/15/2025 06:05 AM     06/02/2012 05:17 AM       CMP:   Lab Results   Component Value Date/Time     03/15/2025 06:05 AM    K 3.8 03/15/2025 06:05 AM     03/15/2025 06:05 AM    CO2 19 03/15/2025 06:05 AM    BUN 45 03/15/2025 06:05 AM    CREATININE 2.0 03/15/2025 06:05 AM    GFRAA 34 08/11/2022 02:55 PM    LABGLOM 28 03/15/2025 06:05 AM    LABGLOM 28 10/27/2023 07:36 PM    GLUCOSE 128 03/15/2025 06:05 AM    GLUCOSE 94 06/02/2012 05:17 AM    CALCIUM 8.6 03/15/2025 06:05 AM    BILITOT 0.4 03/15/2025 06:05 AM    ALKPHOS 65 03/15/2025 06:05 AM    AST 14 03/15/2025 06:05 AM    ALT 24 03/15/2025 06:05 AM       POC Tests: No results for input(s): \"POCGLU\", \"POCNA\", \"POCK\", \"POCCL\", \"POCBUN\", \"POCHEMO\", \"POCHCT\" in the last 72 hours.    Coags:   Lab Results   Component Value Date/Time    PROTIME 15.5 03/09/2025

## 2025-05-27 NOTE — OP NOTE
Colonoscopy report    Colonoscopy Procedure Note    Procedure:  Colonoscopy with polypectomy with snare and biopsy forceps    Procedure Date: 5/27/2025    Indications: Colon cancer screening, abnormal PET CT scan    Sedation:  MAC that was converted to general anesthesia due to respiratory instability    Attending Physician:  Dr. Kathryn Guerin MD.     Assistant Physician: None    Consent:   Informed consent was obtained for the procedure after explaining the risks including bleeding, perforation, aspiration, arrhythmia, risks related to sedation, reaction to medications and rarely death, benefits and alternatives to the patient. The patient verbalized understanding and agreed to proceed with the procedure.       Procedure Details:  The patient was placed in the left lateral decubitus position.  Oxygen and cardiac monitoring equipment was attached. The patient's vital signs were monitored continuously  throughout the procedure. After appropriate sedation was achieved, a rectal examination was performed.  The pediatric colonoscope was inserted into the rectum and advanced under direct vision to the cecum, which was identified by the ileocecal valve and appendiceal orifice.  The quality of the colonic preparation was fair.  A careful inspection was made as the colonoscope was withdrawn, including a retroflexed view of the rectum; findings and interventions are described below.  Appropriate photodocumentation was obtained.           Complications:  None    Estimated blood loss:  Minimal           Disposition:  Home           Condition: stable    Withdrawal Time: 52 minutes    Findings:   The bowel prep was fair.  Multiple small largemouth diverticula noted in the sigmoid and descending colon.  2 polyps noted in the cecum measuring 7 to 8 mm, resected with cold snare  6 polyps noted in the ascending colon measuring 4 to 8 mm, resected with cold snare  2 polyps noted in the transverse colon measuring 2 and 4 mm, resected  with cold biopsy forceps and cold snare respectively  9 polyps noted in the rectosigmoid and sigmoid colon, size ranging 3 mm to 1.2 cm, resected with a combination of cold biopsy forceps and cold/hot snare.  Retroflexion examination in the rectum unremarkable except for skin tag    Specimen Removed: Multiple colon polyps    Endoscopic Impression:    Fair prep.  Severe sigmoid/descending colon diverticulosis.  Total 19 polyps (2X cecum, 6X ascending, 2X transverse, 9X sigmoid/rectosigmoid), size 3 mm to 1.2 cm, resected with cold biopsy forceps and cold/hot snare  Skin tags    Recommendations:  Follow pathology results.  Repeat colonoscopy for screening/surveillance in 1 year  High-fiber diet    Attending Attestation: I performed the procedure.    Kathryn Guerin MD

## 2025-05-27 NOTE — H&P
HISTORY and PHYSICAL  [facility]       NAME:  Nancy L Haase  MRN: [id number]   YOB: 1960   Date: 5/27/2025   Age: 64 y.o.  Gender: female       COMPLAINT AND PRESENT HISTORY:     Nancy L Haase is 64 y.o.,  female, presents for COLONOSCOPY DIAGNOSTIC     Primary dx: Colon cancer screening [Z12.11]  Abnormal gastrointestinal PET scan [R94.8].    HPI:  Nancy L Haase is 64 y.o.,   female, having a Diagnostic Colonoscopy.  No Prior Colonoscopy was done before.Patient denies any  FH of Colon Cancer.   Pt had abnormal PET scan of the colon which showed abnormal uptake in the small bowel and rectum. Pt has hx of grade 1 endometrioid adenocarcinoma found on hysteroscopy dilation and curettage in March 2025 . Currently She is managed with progesterone IUD and oral progesterone therapy for this   Patient reports  changes in bowel habits. She has constipation since 2012. No GI /Rectal bleeding, currently she experiencing  black stools due to iron    Denies abdominal pain , no N/V, no abdominal bloating or weight loss . Patient denies any Dysphagia.  Pt has hx of GERD, Pt is on a PPI./ Antacid, that is helping to control symptoms.    No fever or chills  Prep fully completed: yes . Pt reports her last BM is clear liquid     Review of additional significant medical hx:  (See chart for additional detail, including current medications /see ROS for current S/S):     NPO status: pt NPO since the past midnight   Medications taken TODAY (with sip of water): pt took her am medication with sip of water   Anticoagulation status: pt stopped taking Eliquis and plavix  five days ago     Denies personal hx of blood clots.  Denies personal hx of MRSA infection.  Denies any personal or family hx of previous complications w/anesthesia.    PAST MEDICAL HISTORY     Past Medical History:   Diagnosis Date    Acute on chronic systolic CHF (congestive heart failure) (HCC) 12/20/2021    Asthma     Blood circulation,

## 2025-05-27 NOTE — DISCHARGE INSTRUCTIONS
DISCHARGE INSTRUCTIONS FOR COLONOSCOPY    In order to continue your care at home, please follow the instructions below.    For General Anesthesia:  Do not drink any alcoholic beverages or make any legal or important decisions for 24 hours.    Do not drive or operate machinery for 24 hours.  You may return to work after 24 hours.        Diet    Drink plenty of fluids after surgery, unless you are on a fluid restriction.   After general anesthesia, start out eating lightly (broth, soup, bread, etc.) advancing as tolerated to your usual diet.  Try to avoid spicy or greasy/fatty foods for 24 hours.   Avoid milk/milk product for several hours.       Medications  Take medications as ordered by your surgeon.    Activities  Limit your activities for 24 hours.  Walk around to help pass gas.  You may shower.    Call your surgeon for the following:  If you have abdominal pain that is not relieved by passing gas.   For an oral temperature (by mouth) is 101 degrees or higher, chills or excessive sweating.  You have increasing and progressive bleeding or drainage from surgery area.  Persistent nausea or vomiting  Rectal bleeding (may be red, maroon, or black) or change in your bowel habits.  Redness or swelling at the IV site.  If you are unable to urinate within 8 hours of surgery.  For any questions or concerns you may have.    Repeat colonoscopy for screening/surveillance in 1 year

## 2025-05-29 LAB — SURGICAL PATHOLOGY REPORT: NORMAL

## 2025-05-30 ENCOUNTER — RESULTS FOLLOW-UP (OUTPATIENT)
Dept: GASTROENTEROLOGY | Age: 65
End: 2025-05-30

## 2025-06-05 ENCOUNTER — TELEMEDICINE (OUTPATIENT)
Dept: GYNECOLOGIC ONCOLOGY | Age: 65
End: 2025-06-05
Payer: MEDICARE

## 2025-06-05 DIAGNOSIS — Z23 NEED FOR SHINGLES VACCINE: ICD-10-CM

## 2025-06-05 DIAGNOSIS — Z23 NEED FOR TDAP VACCINATION: ICD-10-CM

## 2025-06-05 DIAGNOSIS — E01.0 THYROMEGALY: ICD-10-CM

## 2025-06-05 DIAGNOSIS — J44.9 CHRONIC OBSTRUCTIVE PULMONARY DISEASE, UNSPECIFIED COPD TYPE (HCC): ICD-10-CM

## 2025-06-05 DIAGNOSIS — I48.91 ATRIAL FIBRILLATION, UNSPECIFIED TYPE (HCC): ICD-10-CM

## 2025-06-05 DIAGNOSIS — I73.9 PVD (PERIPHERAL VASCULAR DISEASE): ICD-10-CM

## 2025-06-05 DIAGNOSIS — R91.1 LUNG NODULE: ICD-10-CM

## 2025-06-05 DIAGNOSIS — I25.119 CORONARY ARTERY DISEASE INVOLVING NATIVE HEART WITH ANGINA PECTORIS, UNSPECIFIED VESSEL OR LESION TYPE: ICD-10-CM

## 2025-06-05 DIAGNOSIS — Z23 NEED FOR COVID-19 VACCINE: ICD-10-CM

## 2025-06-05 DIAGNOSIS — I50.9 CHRONIC CONGESTIVE HEART FAILURE, UNSPECIFIED HEART FAILURE TYPE (HCC): ICD-10-CM

## 2025-06-05 DIAGNOSIS — B35.6 TINEA CRURIS: ICD-10-CM

## 2025-06-05 DIAGNOSIS — Z23 NEED FOR PNEUMOCOCCAL VACCINE: ICD-10-CM

## 2025-06-05 DIAGNOSIS — F12.20 TETRAHYDROCANNABINOL (THC) DEPENDENCE (HCC): ICD-10-CM

## 2025-06-05 DIAGNOSIS — C54.1 MALIGNANT NEOPLASM OF ENDOMETRIUM (HCC): Primary | ICD-10-CM

## 2025-06-05 DIAGNOSIS — E11.65 POORLY CONTROLLED DIABETES MELLITUS (HCC): ICD-10-CM

## 2025-06-05 PROCEDURE — 2022F DILAT RTA XM EVC RTNOPTHY: CPT | Performed by: OBSTETRICS & GYNECOLOGY

## 2025-06-05 PROCEDURE — 3046F HEMOGLOBIN A1C LEVEL >9.0%: CPT | Performed by: OBSTETRICS & GYNECOLOGY

## 2025-06-05 PROCEDURE — 99215 OFFICE O/P EST HI 40 MIN: CPT | Performed by: OBSTETRICS & GYNECOLOGY

## 2025-06-05 PROCEDURE — G8428 CUR MEDS NOT DOCUMENT: HCPCS | Performed by: OBSTETRICS & GYNECOLOGY

## 2025-06-05 PROCEDURE — 3017F COLORECTAL CA SCREEN DOC REV: CPT | Performed by: OBSTETRICS & GYNECOLOGY

## 2025-06-05 NOTE — PROGRESS NOTES
[facility] Gynecologic Oncology  [address], [address]  [place] [place] [facility] [address]    GYNECOLOGIC ONCOLOGY   FOLLOW UP NOTE    PATIENT NAME: Nancy L Haase  MRN: [id number]  DATE: 06/05/2025    REASON FOR VISIT:     Grade 1, Endometrioid adenocarcinoma  Follow up note    TELEHEALTH CONSENT:      Nancy L Haase, was evaluated through a synchronous (real-time) audio-video encounter. The patient is aware that this is a billable service, which includes applicable co-pays. This Virtual Visit was conducted with patient's (and/or legal guardian's) consent. Patient identification was verified, and a caregiver was present when appropriate.     The patient was located at Home:   [address] [place] [id number]    Provider was located at Facility (Appt Dept):   [address] [place] [address]  [address]  [place] [address]    Confirm you are appropriately licensed, registered, or certified to deliver care in the state where the patient is located as indicated above. If you are not or unsure, please re-schedule the visit: Yes, I confirm.      Total time spent for this encounter: 45 minutes  --Sunita Elias MD on 06/05/25 at 1600  An electronic signature was used to authenticate this note.      On 06/05/25, Nancy L Haase consented to a Telehealth visit with the [facility] Gynecologic Oncology.     Nancy L Haase was contacted to initiate a video encounter. Prior to continuing visit, Nancy L Haase was verified using two factors. All individuals in the room were identified and approved by the patient prior to the consult.     The patient was noted to be at home and unaccompanied.     Additionally, Nancy L Haase is reminded that all clinical decision making is being based upon the conversation and limited physical exam. It is discussed that further evaluation may be needed due to the limited nature of video visits. Patient confirms understanding and consents to telemedicine encounter.      This video visit is conducted

## 2025-06-05 NOTE — PROGRESS NOTES
[facility] Gynecologic Oncology  [address], [address]  [place] [address]    GYNECOLOGIC ONCOLOGY    PATIENT NAME: Nancy L Haase  MRN: [id number]  DATE: 06/05/25      TREATMENT SUMMARY:  THESE RECORDS HAVE BEEN REVIEWED, UNLESS OTHERWISE INDICATED     FIGO Grade 1 carcinoma of the UTERUS (Diagnosed 03/2025)  The patient reports to have experienced symptoms of post-menopausal bleeding and abdominal pain    DIAGNOSTIC STUDIES:  IMAGING EVALUATION:  PET (4/18/25): No metabolically active adenopathy in neck, chest, abdomen, pelvis. No metabolically active pulmonary nodules or masses. Focal area of isolated uptake within loop of distal ileum SUV max 9.42. Small amount of activity in rectum SUV max 6.4. Endometrium is thickened and shows slight irregularity, increased FDG uptake consistent with diagnosis, SUV max 10.2. Enlarged right thyroid lobe greater than the left extending to subcarinal area without nodules or abnormal activity. Severe sigmoid diverticulosis. IUD in place. Renal cyst of 22 mm.   Small Bowel Follow Through (04/28/25): Overall, unremarkable. No gross abnormality identified in association with terminal ileum or ileocecal valve region by radiography/fluoroscopy.     DIAGNOSIS ESTABLISHED:   Dilation & Curettage, hysteroscopy (3/6/25), under the care of Dr. Shanelle Ruiz:   ENDOMETRIUM, CURETTAGE:   -ENDOMETRIOID CARCINOMA, FIGO GRADE 1, WITH PROMINENT SECRETORY FEATURES   -NO LOSS OF NUCLEAR EXPRESSION OF MMR PROTEINS: LOW PROBABILITY OF MICROSATELLITE INSTABILITY-HIGH (MSI-H)     HEALTH MAINTENANCE:     She denies to have any history of cervix dysplasia. Last pap test normal per patient (03/06/25), a copy of the report has been reviewed.  Negative for intraepithelial lesion or malignancy. Endocervix component present.  HPV negative    Mammogram: not done, overdue.     Colorectal Cancer Screening by colonoscopy (05/27/25) by Dr. Guerin: 19 poylps resected, fragments of tubular adenoma

## 2025-06-05 NOTE — PROGRESS NOTES
Paper refill has been uploaded.    Review of Systems   Constitutional:  Positive for appetite change (Increased appetite since starting Megace). Negative for chills, diaphoresis, fatigue, fever and unexpected weight change.   HENT:   Negative for hearing loss, lump/mass, mouth sores, nosebleeds, sore throat, tinnitus, trouble swallowing and voice change.    Eyes:  Negative for eye problems and icterus.   Respiratory:  Positive for cough (D/T Asthma / COPD). Negative for chest tightness, hemoptysis, shortness of breath and wheezing.    Cardiovascular:  Negative for chest pain, leg swelling and palpitations.   Gastrointestinal:  Positive for diarrhea (after eating fruit). Negative for abdominal distention, abdominal pain, blood in stool, constipation, nausea, rectal pain and vomiting.   Endocrine: Positive for hot flashes.   Genitourinary:  Positive for vaginal bleeding (Light pink with wiping once every 2-3 weeks). Negative for bladder incontinence, difficulty urinating, dyspareunia, dysuria, frequency, hematuria, menstrual problem, nocturia, pelvic pain and vaginal discharge.    Musculoskeletal:  Negative for arthralgias, back pain, flank pain, gait problem, myalgias, neck pain and neck stiffness.   Skin:  Negative for itching, rash and wound.   Neurological:  Negative for dizziness, extremity weakness, gait problem, headaches, light-headedness, numbness, seizures and speech difficulty.   Hematological:  Negative for adenopathy. Does not bruise/bleed easily.   Psychiatric/Behavioral:  Negative for confusion, decreased concentration, depression, sleep disturbance and suicidal ideas. The patient is not nervous/anxious.

## 2025-06-06 ENCOUNTER — TELEPHONE (OUTPATIENT)
Dept: GYNECOLOGIC ONCOLOGY | Age: 65
End: 2025-06-06

## 2025-06-06 ENCOUNTER — PREP FOR PROCEDURE (OUTPATIENT)
Dept: GYNECOLOGIC ONCOLOGY | Age: 65
End: 2025-06-06

## 2025-06-06 DIAGNOSIS — C54.1 PRIMARY ENDOMETRIOID CARCINOMA OF ENDOMETRIUM OF UTERINE BODY (HCC): Primary | ICD-10-CM

## 2025-06-06 DIAGNOSIS — Z01.818 PRE-OP EVALUATION: ICD-10-CM

## 2025-06-06 PROBLEM — C55 UTERINE CANCER (HCC): Status: ACTIVE | Noted: 2025-06-06

## 2025-06-06 NOTE — TELEPHONE ENCOUNTER
Called and spoke to patient and notified that:    Surgery is scheduled at [facility] on 7/17/2025 at 730am; arrive at 545am.    PAT appt is scheduled for 7/2/2025 at 10am.    Post Op appt is scheduled on 7/31/2025 at 1130am with ZENAIDA Martines.     Writer explained pre-op and post op instructions as well as PAT appt to patient. Patient voiced understanding of all above dates/times/locations and instructions and had no further questions at this time.

## 2025-06-09 RX ORDER — SODIUM CHLORIDE 0.9 % (FLUSH) 0.9 %
5-40 SYRINGE (ML) INJECTION PRN
Status: CANCELLED | OUTPATIENT
Start: 2025-06-09

## 2025-06-09 RX ORDER — SODIUM CHLORIDE 0.9 % (FLUSH) 0.9 %
5-40 SYRINGE (ML) INJECTION EVERY 12 HOURS SCHEDULED
Status: CANCELLED | OUTPATIENT
Start: 2025-06-09

## 2025-06-09 RX ORDER — SODIUM CHLORIDE 9 MG/ML
INJECTION, SOLUTION INTRAVENOUS PRN
Status: CANCELLED | OUTPATIENT
Start: 2025-06-09

## 2025-06-09 RX ORDER — SODIUM CHLORIDE 9 MG/ML
INJECTION, SOLUTION INTRAVENOUS CONTINUOUS
Status: CANCELLED | OUTPATIENT
Start: 2025-06-09

## 2025-06-09 RX ORDER — ENOXAPARIN SODIUM 100 MG/ML
40 INJECTION SUBCUTANEOUS
Status: CANCELLED | OUTPATIENT
Start: 2025-06-09

## 2025-06-12 NOTE — H&P
Urology History and Physical    Patient:  Nancy L Haase  MRN: [id number]  YOB: 1960      HISTORY OF PRESENT ILLNESS:   The patient is a 64 y.o. female presenting today for cytoscopy with urodynamics    Patient's old records, notes and chart reviewed and summarized above.    Past Medical History:    Past Medical History:   Diagnosis Date    Acute on chronic systolic CHF (congestive heart failure) ([facility]) 12/20/2021    Asthma     Blood circulation, collateral     CAD (coronary artery disease)     COPD (chronic obstructive pulmonary disease) ([facility])     COPD exacerbation ([facility]) 12/18/2021    Diabetes mellitus ([facility])     Hyperlipidemia     Hypertension     Ischemic colitis 10/28/2021    Movement disorder     B/L torn rotator cuff.    Neuropathy     PAD (peripheral artery disease)     Tobacco abuse 11/19/2016    Tobacco abuse counseling 07/25/2021    Under care of service provider     pcp-Scar Johnson-last visit april 2025    Under care of service provider     ljxemwxrip-XOE-Tq vincent-last visit april 2025    Under care of service provider     zrnqxtfyyt-wgpwg-yy vincent-last visit april 2025    Under care of service provider     gi-marcy-last visit april 2025    Under care of service provider     [facility]    Under care of service provider     urology-specialty clinic-last visit may 2025       Past Surgical History:    Past Surgical History:   Procedure Laterality Date    COLONOSCOPY N/A 5/27/2025    COLONOSCOPY POLYPECTOMY SNARE/BIOPSY performed by Kathryn Guerin MD at [facility] ENDO    CORONARY ANGIOPLASTY WITH STENT PLACEMENT      x2    CORONARY ANGIOPLASTY WITH STENT PLACEMENT  02/11/2019    JOSE MARTIN to RCA per Dr. Calix radial approuch    DILATION AND CURETTAGE OF UTERUS N/A 3/6/2025    EUA, DILATATION AND CURETTAGE HYSTEROSCOPY,  PAP SMEAR, IUD INSERTION (LILETTA, PAP KIT) performed by Shanelle Ruiz DO at [facility] OR    INNER EAR SURGERY Right      review of systems negative other than HPI      Physical Exam:    Constitutional: Patient in no acute distress;   Neuro: alert and oriented to person place and time.    Psych: Mood and affect normal.  Skin: Normal  Lungs: Respiratory effort normal, clear to auscultation bilaterally  Cardiovascular:  Normal peripheral pulses, normal heart sounds  Abdomen: Soft, non-tender, non-distended   Bladder non-tender and not distended.  LE no edema/TTP    LABS:  No results for input(s): \"WBC\", \"HGB\", \"HCT\", \"MCV\", \"PLT\" in the last 72 hours.  No results for input(s): \"NA\", \"K\", \"CL\", \"CO2\", \"PHOS\", \"BUN\", \"CREATININE\" in the last 72 hours.    Invalid input(s): \"CA\"  No results found for: \"PSA\"    Additional Lab/culture results:  @URINECULTURE    Urinalysis: No results for input(s): \"COLORU\", \"PHUR\", \"LABCAST\", \"WBCUA\", \"RBCUA\", \"MUCUS\", \"TRICHOMONAS\", \"YEAST\", \"BACTERIA\", \"CLARITYU\", \"SPECGRAV\", \"LEUKOCYTESUR\", \"UROBILINOGEN\", \"BILIRUBINUR\", \"BLOODU\" in the last 72 hours.    Invalid input(s): \"NITRATE\", \"GLUCOSEUKETONESUAMORPHOUS\"     -----------------------------------------------------------------    Assessment and Plan   Impression:  Plan for urodynamics and cytoscopy    Plan:   Risk, benefits and alternatives explained to patient

## 2025-06-12 NOTE — DISCHARGE INSTRUCTIONS
Discharge instructions: Cystoscopy  You May experience painful urination and see blood in the urine after your procedure.  This should resolve over time.      Pt ok to discharge home in good condition  No heavy lifting, >10 lbs for today  Pt should avoid strenuous activity for today  Pt should walk moderately at home  Pt ok to shower   Pt may resume diet as tolerated  Please call attending physician or hospital  with questions  Call or Present to ED if fever (> 101F), intractable nausea vomiting or pain.  Rx in chart     Pt should follow up with Dr. Hewitt , in 5 weeks, call to confirm appointment

## 2025-06-13 ENCOUNTER — HOSPITAL ENCOUNTER (OUTPATIENT)
Age: 65
Setting detail: OUTPATIENT SURGERY
Discharge: HOME | End: 2025-06-13
Attending: UROLOGY | Admitting: UROLOGY
Payer: MEDICARE

## 2025-06-13 ENCOUNTER — TELEPHONE (OUTPATIENT)
Dept: GYNECOLOGIC ONCOLOGY | Age: 65
End: 2025-06-13

## 2025-06-13 VITALS
DIASTOLIC BLOOD PRESSURE: 70 MMHG | HEART RATE: 69 BPM | RESPIRATION RATE: 18 BRPM | TEMPERATURE: 96.8 F | BODY MASS INDEX: 31.25 KG/M2 | OXYGEN SATURATION: 100 % | WEIGHT: 211 LBS | SYSTOLIC BLOOD PRESSURE: 113 MMHG | HEIGHT: 69 IN

## 2025-06-13 PROBLEM — Z12.11 COLON CANCER SCREENING: Status: RESOLVED | Noted: 2025-05-14 | Resolved: 2025-06-13

## 2025-06-13 PROCEDURE — 2500000003 HC RX 250 WO HCPCS: Performed by: UROLOGY

## 2025-06-13 PROCEDURE — 7100000041 HC SPAR PHASE II RECOVERY - ADDTL 15 MIN: Performed by: UROLOGY

## 2025-06-13 PROCEDURE — 6370000000 HC RX 637 (ALT 250 FOR IP): Performed by: UROLOGY

## 2025-06-13 PROCEDURE — 7100000040 HC SPAR PHASE II RECOVERY - FIRST 15 MIN: Performed by: UROLOGY

## 2025-06-13 PROCEDURE — 3600000014 HC SURGERY LEVEL 4 ADDTL 15MIN: Performed by: UROLOGY

## 2025-06-13 PROCEDURE — 2709999900 HC NON-CHARGEABLE SUPPLY: Performed by: UROLOGY

## 2025-06-13 PROCEDURE — 3600000004 HC SURGERY LEVEL 4 BASE: Performed by: UROLOGY

## 2025-06-13 RX ORDER — SULFAMETHOXAZOLE AND TRIMETHOPRIM 800; 160 MG/1; MG/1
1 TABLET ORAL 2 TIMES DAILY
Qty: 6 TABLET | Refills: 0 | Status: SHIPPED | OUTPATIENT
Start: 2025-06-13 | End: 2025-06-16

## 2025-06-13 RX ORDER — ULTRASOUND COUPLING MEDIUM
GEL (GRAM) TOPICAL PRN
Status: DISCONTINUED | OUTPATIENT
Start: 2025-06-13 | End: 2025-06-13 | Stop reason: ALTCHOICE

## 2025-06-13 ASSESSMENT — PAIN - FUNCTIONAL ASSESSMENT
PAIN_FUNCTIONAL_ASSESSMENT: ACTIVITIES ARE NOT PREVENTED
PAIN_FUNCTIONAL_ASSESSMENT: 0-10
PAIN_FUNCTIONAL_ASSESSMENT: NONE - DENIES PAIN

## 2025-06-13 ASSESSMENT — PAIN DESCRIPTION - DESCRIPTORS: DESCRIPTORS: ACHING

## 2025-06-13 NOTE — TELEPHONE ENCOUNTER
Patient called in stating she is almost out of her nystatin powder. Patient states she has been using it as directed but it has not been working for her. She also reports starting an antibiotic recently and she is unsure if this has any effect on it. Please advise.

## 2025-06-13 NOTE — OP NOTE
Operative Note      Patient: Nancy L Haase  YOB: 1960  MRN: [id number]    Date of Procedure: 6/13/2025    Pre-Op Diagnosis Codes:      * Urinary retention [R33.9]    Post-Op Diagnosis: Same       Procedure(s):  URODYNAMICS  CYSTOSCOPY    Surgeon(s):  Ariel Hewitt Jr., MD    Assistant:   Resident: Navarro Ramírez MD    Anesthesia: Local    Estimated Blood Loss (mL): Minimal    Complications: None    Specimens:   * No specimens in log *    Implants:  * No implants in log *      Drains: * No LDAs found *    Findings:  Infection Present At Time Of Surgery (PATOS) (choose all levels that have infection present):  No infection present  Other Findings: cytoscopy completed, negative for acute findings  Urodynamics completed, results found below    INDICATIONS FOR THE PROCEDURE:  The patient is a Female with history of recurrent urinary retention. The patient is here for diagnostic evaluation.    NARRATIVE SUMMARY OF PROCEDURE:  The patient was identified in the pre operative area and after a thorough informed consent was obtained, the patient was moved back to the operative suite.  In the room the patient had urodynamics catheters placed in the bladder and rectum in the usual sterile fashion.  EMG electrodes were placed on the skin.  The patient was then positioned on the urodynamics commode in sitting position.  We started filling the bladder with cystograffin contrast at a rate of 60 cc / minute.  Using fluoroscopy we were able to visualize the bladder filling.     Straight catheterization for Postvoid Residual: 240 mL  Uroflow Study:  Amount Voided: 160 mL  Maximum Flow Rate: 13.8 mL/sec  Average Flow Rate: 6.4 mL/sec  CMG with Voiding Pressure Study with intraabdominal probe:  First Sensation: 34.2 mL  First Urge: 52.2 mL  Capacity: 198.9 mL  Compliant bladder? - yes  Max detrusor pressure 23 < 40 cm H2O    Surface EMG Normal: Yes    Cystoscopy Operative Note:    Findings:   The patient was prepped and  draped in the usual sterile fashion.  The flexible cystoscope was advanced through the urethra and into the bladder.  The bladder was thoroughly inspected and the following was noted:    Vagina: normal appearing vagina with normal color and discharge, no lesions  Residual Urine: none  Urethra: not indicated  Bladder: No tumors or CIS noted.  No bladder diverticulum.  There was none trabeculation noted.  Ureters: Clear efflux from both ureters.  Orifices with normal configuration and location.    The cystoscope was removed.  The patient tolerated the procedure well.      Plan  The patient was given a prescription for bactrim to prevent infection.  She will follow up in three months in office with PVR.   Should continue work-up chronic constipation        Electronically signed by Navarro Ramírez MD on 6/13/2025 at 12:39 PM

## 2025-06-24 ENCOUNTER — TELEPHONE (OUTPATIENT)
Dept: GYNECOLOGIC ONCOLOGY | Age: 65
End: 2025-06-24

## 2025-06-24 DIAGNOSIS — D64.9 ANEMIA, UNSPECIFIED TYPE: Primary | ICD-10-CM

## 2025-06-24 RX ORDER — FERROUS SULFATE 325(65) MG
325 TABLET, DELAYED RELEASE (ENTERIC COATED) ORAL
Qty: 30 TABLET | Refills: 0 | Status: SHIPPED | OUTPATIENT
Start: 2025-06-24

## 2025-06-24 NOTE — TELEPHONE ENCOUNTER
Patient called stating she hasn't been able to refill her ferrous sulfate. She states the pharmacy told her that they don't have an order for it but her medication shows 3 refills remaining. She tried calling her PCP but didn't receive a call back from them. She is unsure if she is supposed to keep taking them, if she should stop, and how to get refills if she does need to continue it. Please advise.

## 2025-06-24 NOTE — TELEPHONE ENCOUNTER
It appears the iron supplement was sent to [facility] pharmacy with 3 refills so if she is calling her local pharmacy for the request that would be why they don't see it. I will send a 1 month supply as we are anticipating surgery. Which pharmacy does she want it sent to?  Additionally, we will be checking her hemoglobin at preadmission appointment next week July 2 so until then I would recommend she continue taking the tablet once daily.

## 2025-06-25 NOTE — DISCHARGE INSTRUCTIONS
Preoperative Instructions:    Stop eating solid foods at midnight the night prior to surgery.    Stop drinking clear liquids at midnight the night prior to surgery.  (Follow bowel prep instructions if instructed by your surgeon.)    Arrive at the surgery center (Entrance B) by 5:45-6:00 on 7/17/2025  (or as directed by your surgeon's office).    If you have been given a blood band, you must bring it with you the day of surgery.    Please stop any blood thinning medications as directed by your surgeon or prescribing physician. Failure to stop certain medications may interfere with your scheduled surgery.    These may include:  Aspirin, Warfarin (Coumadin), Clopidogrel (Plavix), Ibuprofen (Motrin, Advil), Naproxen (Aleve), Meloxicam (Mobic), Celecoxib (Celebrex), Eliquis, Pradaxa, Xarelto, Effient, Fish Oil, Herbal supplements.  Eliquis  Plavix  Lisinopril - 24 hours  Trulicity - no injection within 7 days of surgery date    You may continue the rest of your medications through the night before surgery unless instructed otherwise.    Please take only the following medication(s) the day of surgery with a small sip of water:  Lopressor/metoprolol    Please use and bring inhalers the day of surgery, if applies.  Please bring CPAP the day of surgery, if applies.    PLEASE NOTE:  THE ABOVE (IF ANY) DISCONTINUED MEDS MAY ONLY BE FROM   \"CLEANING UP\" THE MED LIST AND WERE NOT ACTUALLY CANCELLED; SEE CHART FOR DETAILS AND ALWAYS CHECK WITH PRESCRIBING PROVIDER BEFORE DISCONTINUING ANY MEDICATIONS        REMINDERS:  ** If you are going home the day of your procedure, you will need a friend or family member to drive you home after your procedure.  Your  must be 18 years of age or older and able to sign off on your discharge instructions.  Taxi cabs or any form of public transportation is not acceptable.    ** It is preferable that the friend or family member stay at the hospital throughout your procedure.  ** If you are  who has a contagious disease (chicken pox, measles, etc.) Please call your doctor before coming to the hospital.      If your child is having surgery please make arrangements for any other children to be cared for at home on the day of surgery.  Other children are not permitted in recovery room and we want you to be able to spend time with the patient.  If other arrangements are not available then we suggest that you have a second adult to stay in the waiting room.      If you have any other questions regarding your procedure or the day of surgery, please call [phone], or [phone]

## 2025-07-01 ENCOUNTER — TELEPHONE (OUTPATIENT)
Dept: GYNECOLOGIC ONCOLOGY | Age: 65
End: 2025-07-01

## 2025-07-01 NOTE — TELEPHONE ENCOUNTER
Called patient to remind her of PAT appt and that she needs to be fasting for lab work. No answer, left VM.

## 2025-07-02 ENCOUNTER — HOSPITAL ENCOUNTER (OUTPATIENT)
Dept: PREADMISSION TESTING | Age: 65
Discharge: HOME | End: 2025-07-02
Payer: MEDICARE

## 2025-07-02 VITALS
HEART RATE: 89 BPM | DIASTOLIC BLOOD PRESSURE: 82 MMHG | TEMPERATURE: 97.7 F | RESPIRATION RATE: 18 BRPM | HEIGHT: 69 IN | SYSTOLIC BLOOD PRESSURE: 140 MMHG | BODY MASS INDEX: 33.62 KG/M2 | OXYGEN SATURATION: 95 % | WEIGHT: 227 LBS

## 2025-07-02 DIAGNOSIS — Z01.818 PRE-OP EVALUATION: ICD-10-CM

## 2025-07-02 DIAGNOSIS — C54.1 PRIMARY ENDOMETRIOID CARCINOMA OF ENDOMETRIUM OF UTERINE BODY (HCC): ICD-10-CM

## 2025-07-02 LAB
ABO + RH BLD: NORMAL
ALBUMIN SERPL-MCNC: 4.1 G/DL (ref 3.5–5.2)
ALBUMIN/GLOB SERPL: 1.4 {RATIO} (ref 1–2.5)
ALP SERPL-CCNC: 85 U/L (ref 35–104)
ALT SERPL-CCNC: 8 U/L (ref 10–35)
ANION GAP SERPL CALCULATED.3IONS-SCNC: 13 MMOL/L (ref 9–16)
ARM BAND NUMBER: NORMAL
AST SERPL-CCNC: 11 U/L (ref 10–35)
BASOPHILS # BLD: 0.06 K/UL (ref 0–0.2)
BASOPHILS NFR BLD: 1 % (ref 0–2)
BILIRUB SERPL-MCNC: 0.2 MG/DL (ref 0–1.2)
BLOOD BANK SAMPLE EXPIRATION: NORMAL
BLOOD GROUP ANTIBODIES SERPL: NEGATIVE
BUN SERPL-MCNC: 49 MG/DL (ref 8–23)
CALCIUM SERPL-MCNC: 9.9 MG/DL (ref 8.6–10.4)
CHLORIDE SERPL-SCNC: 102 MMOL/L (ref 98–107)
CHOLEST SERPL-MCNC: 118 MG/DL (ref 0–199)
CHOLESTEROL/HDL RATIO: 2.6
CO2 SERPL-SCNC: 22 MMOL/L (ref 20–31)
CREAT SERPL-MCNC: 1.8 MG/DL (ref 0.6–0.9)
EOSINOPHIL # BLD: 0.11 K/UL (ref 0–0.44)
EOSINOPHILS RELATIVE PERCENT: 1 % (ref 1–4)
ERYTHROCYTE [DISTWIDTH] IN BLOOD BY AUTOMATED COUNT: 15.2 % (ref 11.8–14.4)
EST. AVERAGE GLUCOSE BLD GHB EST-MCNC: 194 MG/DL
GFR, ESTIMATED: 31 ML/MIN/1.73M2
GLUCOSE SERPL-MCNC: 152 MG/DL (ref 74–99)
HBA1C MFR BLD: 8.4 % (ref 4–6)
HCT VFR BLD AUTO: 38.6 % (ref 36.3–47.1)
HDLC SERPL-MCNC: 45 MG/DL
HGB BLD-MCNC: 12.5 G/DL (ref 11.9–15.1)
IMM GRANULOCYTES # BLD AUTO: 0.15 K/UL (ref 0–0.3)
IMM GRANULOCYTES NFR BLD: 2 %
LDLC SERPL CALC-MCNC: 57 MG/DL (ref 0–100)
LYMPHOCYTES NFR BLD: 2.1 K/UL (ref 1.1–3.7)
LYMPHOCYTES RELATIVE PERCENT: 21 % (ref 24–43)
MCH RBC QN AUTO: 27.5 PG (ref 25.2–33.5)
MCHC RBC AUTO-ENTMCNC: 32.4 G/DL (ref 28.4–34.8)
MCV RBC AUTO: 85 FL (ref 82.6–102.9)
MONOCYTES NFR BLD: 0.74 K/UL (ref 0.1–1.2)
MONOCYTES NFR BLD: 7 % (ref 3–12)
NEUTROPHILS NFR BLD: 68 % (ref 36–65)
NEUTS SEG NFR BLD: 6.96 K/UL (ref 1.5–8.1)
NRBC BLD-RTO: 0 PER 100 WBC
PLATELET # BLD AUTO: 257 K/UL (ref 138–453)
PMV BLD AUTO: 10.7 FL (ref 8.1–13.5)
POTASSIUM SERPL-SCNC: 4.7 MMOL/L (ref 3.7–5.3)
PROT SERPL-MCNC: 7.1 G/DL (ref 6.6–8.7)
RBC # BLD AUTO: 4.54 M/UL (ref 3.95–5.11)
RBC # BLD: ABNORMAL 10*6/UL
SODIUM SERPL-SCNC: 137 MMOL/L (ref 136–145)
T4 FREE SERPL-MCNC: 1.4 NG/DL (ref 0.92–1.68)
TRIGL SERPL-MCNC: 80 MG/DL
TSH SERPL DL<=0.05 MIU/L-ACNC: 1.34 UIU/ML (ref 0.27–4.2)
VLDLC SERPL CALC-MCNC: 16 MG/DL (ref 1–30)
WBC OTHER # BLD: 10.1 K/UL (ref 3.5–11.3)

## 2025-07-02 PROCEDURE — 36415 COLL VENOUS BLD VENIPUNCTURE: CPT

## 2025-07-02 PROCEDURE — 93005 ELECTROCARDIOGRAM TRACING: CPT

## 2025-07-02 PROCEDURE — 86900 BLOOD TYPING SEROLOGIC ABO: CPT

## 2025-07-02 PROCEDURE — 86901 BLOOD TYPING SEROLOGIC RH(D): CPT

## 2025-07-02 PROCEDURE — 85025 COMPLETE CBC W/AUTO DIFF WBC: CPT

## 2025-07-02 PROCEDURE — 84439 ASSAY OF FREE THYROXINE: CPT

## 2025-07-02 PROCEDURE — 84443 ASSAY THYROID STIM HORMONE: CPT

## 2025-07-02 PROCEDURE — 83036 HEMOGLOBIN GLYCOSYLATED A1C: CPT

## 2025-07-02 PROCEDURE — 80061 LIPID PANEL: CPT

## 2025-07-02 PROCEDURE — 80053 COMPREHEN METABOLIC PANEL: CPT

## 2025-07-02 PROCEDURE — 86850 RBC ANTIBODY SCREEN: CPT

## 2025-07-02 RX ORDER — TIOTROPIUM BROMIDE INHALATION SPRAY 3.12 UG/1
2 SPRAY, METERED RESPIRATORY (INHALATION)
COMMUNITY
Start: 2025-06-23

## 2025-07-02 NOTE — PROGRESS NOTES
Anesthesia Focused Assessment      STOP-BANG Sleep Apnea Questionnaire    SNORE loudly (heard through closed doors)?   No  TIRED, fatigued, sleepy during daytime?    No  OBSERVED stopping breathing during sleep?   No  High blood PRESSURE being treated?    Yes    BMI over 35?        No  AGE over 50?        Yes  NECK circumference over 16\"?     No  GENDER (male)?       No             Total 2  High risk 5-8  Intermediate risk 3-4  Low risk 0-2    Obstructive Sleep Apnea: denies  If YES, machine used: no     Type 1 DM:   no  T2DM:  yes    Coronary Artery Disease:  yes, stents and history of atrial fibrillation  Hypertension:  yes    Active smoker:  up to 2 ppd for 46 years, quit 2020  Drinks alcohol:  no  Recreational drugs: marijuana     Dentition:     Defib / AICD / Pacemaker: no      Renal Failure/dialysis:  no    Patient was evaluated in PAT & anesthesia guidelines were applied.   NPO guidelines, medication instructions and scheduled arrival time were reviewed with patient.  I advised patient to please contact the surgeon's office, ahead of time if possible, if any new signs or symptoms of illness, infection, rash, etc    Hx of anesthesia complications:  no  Family hx of anesthesia complications:  no                                                                                                                     Vascular office note with clearance is in paper chart and EMR from Dr. Mathews.    Updated cardiac clearance to be requested from Dr. Mccarty, per chart has upcoming appointment, clearance on file is from colonoscopy.  Last office notes are on file from 4/2025.    Pulmonology office note with clearance is in paper chart and EMR from Dr. Eli from April 2025.  Will request updated for chart.    PCP clearance pending, will request copy for chart, follows with Dr. Reyes.    ARIANA RUST PA-C  7/2/25  10:32 AM

## 2025-07-03 ENCOUNTER — RESULTS FOLLOW-UP (OUTPATIENT)
Dept: GYNECOLOGIC ONCOLOGY | Age: 65
End: 2025-07-03

## 2025-07-03 LAB
EKG ATRIAL RATE: 76 BPM
EKG P-R INTERVAL: 152 MS
EKG Q-T INTERVAL: 394 MS
EKG QRS DURATION: 72 MS
EKG QTC CALCULATION (BAZETT): 439 MS
EKG R AXIS: 52 DEGREES
EKG T AXIS: 14 DEGREES
EKG VENTRICULAR RATE: 75 BPM

## 2025-07-03 PROCEDURE — 93010 ELECTROCARDIOGRAM REPORT: CPT | Performed by: INTERNAL MEDICINE

## 2025-07-07 ENCOUNTER — TELEPHONE (OUTPATIENT)
Dept: PULMONOLOGY | Age: 65
End: 2025-07-07

## 2025-07-07 ENCOUNTER — TELEPHONE (OUTPATIENT)
Dept: GYNECOLOGIC ONCOLOGY | Age: 65
End: 2025-07-07

## 2025-07-07 NOTE — TELEPHONE ENCOUNTER
Message    New preop evaluation letter in [facility]   ----- Message -----   From: Antonia Spencer   Sent: 7/2/2025   1:23 PM EDT   To: Brice Eli MD   Subject: Import                                            Imported by Antonia Spencer on 7/2/2025 at  1:23 PM to the following: Abstract on 7/2/2025 with Brice Eli MD [[id number]]     Media Information                      Notice:       Document on 7/2/2025  1:23 PM by Antonia Spencer: 7/2/25 Surgery Clearance Request                   Description       7/2/25 Surgery Clearance Request              Patient       Haase, Nancy L              Document Type 1       Procedure/Surgery        *Writer faxed surgery clearance letter completed by Dr Eli to pre-op at [phone]*

## 2025-07-08 ENCOUNTER — TELEPHONE (OUTPATIENT)
Dept: GYNECOLOGIC ONCOLOGY | Age: 65
End: 2025-07-08

## 2025-07-08 NOTE — TELEPHONE ENCOUNTER
Patient called in very upset and tearful this morning. She states she has been experiencing extreme anxiety due to her cancer diagnosis and has not been sleeping. She states she has tried calling her PCP but has not been getting a response so she is wanting to know if there is something that we can do for her. Please advise.

## 2025-07-08 NOTE — TELEPHONE ENCOUNTER
Called to discuss concerns with the patient  She reports feeling anxious regarding upcoming surgery and outcomes and \"every time I close my eyes to go to sleep I have a panic attack\"  She reports using Buspar 30 mg three times daily for anxiety and has been on this medication per PCP for many years  She reports previously using trazodone in the past  She believes her anxiety is heightened due to her blood sugars running over 200 daily due to megace     Discussed with patient given her BS running over 200 daily I would recommend she decrease current dose of Megace to 80 mg BID  Additionally offered patient antianxiety medications and she states she \"does not want to take more medication if I don't have to\"  We discussed helpful sleep hygiene measures and hopefulness that the dose reduction will help in her decreased anxiety

## 2025-07-09 ENCOUNTER — TELEPHONE (OUTPATIENT)
Dept: GYNECOLOGIC ONCOLOGY | Age: 65
End: 2025-07-09

## 2025-07-09 NOTE — PROGRESS NOTES
Received medical clearance for gyn surgery scheduled 7/17/2025. Dr. Jada Palma's  notified and clearance placed in physical chart.

## 2025-07-16 ENCOUNTER — ANESTHESIA EVENT (OUTPATIENT)
Dept: OPERATING ROOM | Age: 65
DRG: 740 | End: 2025-07-16
Payer: MEDICARE

## 2025-07-16 ASSESSMENT — ENCOUNTER SYMPTOMS: SHORTNESS OF BREATH: 1

## 2025-07-16 NOTE — H&P
GYN/Onc Pre-Op H&P  [facility]    Patient Name: Nancy L Haase     Patient : 1960  Room/Bed: [facility] OR [facility]/NONE  Admission Date/Time: 2025  5:04 AM  Primary Care Physician: Scar Reyes MD  MRN: [id number]    Date: 2025  Time: 7:08 AM    The patient was seen in pre-op holding. She is here for robotic assisted laparoscopic hysterectomy, bilateral salpingo-oophorectomy, ICG intracervix dey injection, sentinel lymph node mapping, sentinel lymph node removal, any other indicated procedures.    Please refer to Progress Note dated 25 by Dr. Elias for further details. There have been no changes to health history or medications since this visit.    The procedure risks and complications were reviewed.  The labs, Consent, and H&P were reviewed and updated.  The patient was counseled on the possibility of  the need of a second surgery.  The patient voiced understanding and had all of her questions answered. The possibility of incomplete removal of abnormal tissue was discussed.      ALLERGIES:  Allergies as of 2025 - Fully Reviewed 2025   Allergen Reaction Noted    Codeine  2013    Morphine  2013       MEDICATIONS:  Current Facility-Administered Medications   Medication Dose Route Frequency Provider Last Rate Last Admin    0.9 % sodium chloride infusion   IntraVENous Continuous Lori Baez PA-VIGNESH        sodium chloride flush 0.9 % injection 5-40 mL  5-40 mL IntraVENous 2 times per day Lori Baez PA-VIGNESH        sodium chloride flush 0.9 % injection 5-40 mL  5-40 mL IntraVENous PRN Lori Baez PA-C        0.9 % sodium chloride infusion   IntraVENous PRN Lori Baez PA-VIGNESH        metroNIDAZOLE (FLAGYL) 500 mg in 0.9% NaCl 100 mL IVPB premix  500 mg IntraVENous On Call to OR Lori Baez PA-C 100 mL/hr at 25 0618 500 mg at 25 0618    And    ceFAZolin (ANCEF) 2000 mg in sterile water 20 mL IV syringe  2,000 mg IntraVENous On Call to OR

## 2025-07-17 ENCOUNTER — HOSPITAL ENCOUNTER (INPATIENT)
Age: 65
LOS: 1 days | Discharge: HOME | DRG: 740 | End: 2025-07-17
Attending: OBSTETRICS & GYNECOLOGY | Admitting: OBSTETRICS & GYNECOLOGY
Payer: MEDICARE

## 2025-07-17 ENCOUNTER — ANESTHESIA (OUTPATIENT)
Dept: OPERATING ROOM | Age: 65
DRG: 740 | End: 2025-07-17
Payer: MEDICARE

## 2025-07-17 VITALS
RESPIRATION RATE: 18 BRPM | OXYGEN SATURATION: 95 % | DIASTOLIC BLOOD PRESSURE: 70 MMHG | HEART RATE: 78 BPM | HEIGHT: 69 IN | TEMPERATURE: 97 F | SYSTOLIC BLOOD PRESSURE: 162 MMHG | WEIGHT: 227 LBS | BODY MASS INDEX: 33.62 KG/M2

## 2025-07-17 DIAGNOSIS — C55 UTERINE CANCER (HCC): ICD-10-CM

## 2025-07-17 DIAGNOSIS — Z90.710 H/O TOTAL HYSTERECTOMY: Primary | ICD-10-CM

## 2025-07-17 PROBLEM — Z98.890 POST-OPERATIVE STATE: Status: ACTIVE | Noted: 2025-07-17

## 2025-07-17 LAB
ALBUMIN SERPL-MCNC: 4.1 G/DL (ref 3.5–5.2)
ALBUMIN/GLOB SERPL: 1.5 {RATIO} (ref 1–2.5)
ALP SERPL-CCNC: 68 U/L (ref 35–104)
ALT SERPL-CCNC: 10 U/L (ref 10–35)
ANION GAP SERPL CALCULATED.3IONS-SCNC: 16 MMOL/L (ref 9–16)
AST SERPL-CCNC: 12 U/L (ref 10–35)
BASOPHILS # BLD: 0.06 K/UL (ref 0–0.2)
BASOPHILS NFR BLD: 1 % (ref 0–2)
BILIRUB SERPL-MCNC: 0.2 MG/DL (ref 0–1.2)
BUN SERPL-MCNC: 45 MG/DL (ref 8–23)
CALCIUM SERPL-MCNC: 9.9 MG/DL (ref 8.6–10.4)
CASE NUMBER:: NORMAL
CHLORIDE SERPL-SCNC: 104 MMOL/L (ref 98–107)
CO2 SERPL-SCNC: 18 MMOL/L (ref 20–31)
CREAT SERPL-MCNC: 1.8 MG/DL (ref 0.6–0.9)
EOSINOPHIL # BLD: 0.11 K/UL (ref 0–0.44)
EOSINOPHILS RELATIVE PERCENT: 1 % (ref 1–4)
ERYTHROCYTE [DISTWIDTH] IN BLOOD BY AUTOMATED COUNT: 15.5 % (ref 11.8–14.4)
GFR, ESTIMATED: 31 ML/MIN/1.73M2
GLUCOSE BLD-MCNC: 243 MG/DL (ref 65–105)
GLUCOSE BLD-MCNC: 254 MG/DL (ref 65–105)
GLUCOSE SERPL-MCNC: 175 MG/DL (ref 74–99)
HCT VFR BLD AUTO: 37.8 % (ref 36.3–47.1)
HGB BLD-MCNC: 12.2 G/DL (ref 11.9–15.1)
IMM GRANULOCYTES # BLD AUTO: 0.08 K/UL (ref 0–0.3)
IMM GRANULOCYTES NFR BLD: 1 %
LYMPHOCYTES NFR BLD: 1.81 K/UL (ref 1.1–3.7)
LYMPHOCYTES RELATIVE PERCENT: 20 % (ref 24–43)
MCH RBC QN AUTO: 28.3 PG (ref 25.2–33.5)
MCHC RBC AUTO-ENTMCNC: 32.3 G/DL (ref 28.4–34.8)
MCV RBC AUTO: 87.7 FL (ref 82.6–102.9)
MONOCYTES NFR BLD: 0.77 K/UL (ref 0.1–1.2)
MONOCYTES NFR BLD: 8 % (ref 3–12)
NEUTROPHILS NFR BLD: 69 % (ref 36–65)
NEUTS SEG NFR BLD: 6.39 K/UL (ref 1.5–8.1)
NRBC BLD-RTO: 0 PER 100 WBC
PLATELET # BLD AUTO: 235 K/UL (ref 138–453)
PMV BLD AUTO: 11 FL (ref 8.1–13.5)
POTASSIUM SERPL-SCNC: 4.5 MMOL/L (ref 3.7–5.3)
PROT SERPL-MCNC: 6.9 G/DL (ref 6.6–8.7)
RBC # BLD AUTO: 4.31 M/UL (ref 3.95–5.11)
RBC # BLD: ABNORMAL 10*6/UL
SODIUM SERPL-SCNC: 138 MMOL/L (ref 136–145)
WBC OTHER # BLD: 9.2 K/UL (ref 3.5–11.3)

## 2025-07-17 PROCEDURE — 2700000000 HC OXYGEN THERAPY PER DAY

## 2025-07-17 PROCEDURE — 6360000002 HC RX W HCPCS

## 2025-07-17 PROCEDURE — 88305 TISSUE EXAM BY PATHOLOGIST: CPT

## 2025-07-17 PROCEDURE — 85025 COMPLETE CBC W/AUTO DIFF WBC: CPT

## 2025-07-17 PROCEDURE — 6370000000 HC RX 637 (ALT 250 FOR IP)

## 2025-07-17 PROCEDURE — 6370000000 HC RX 637 (ALT 250 FOR IP): Performed by: ANESTHESIOLOGY

## 2025-07-17 PROCEDURE — 82947 ASSAY GLUCOSE BLOOD QUANT: CPT

## 2025-07-17 PROCEDURE — 7100000001 HC PACU RECOVERY - ADDTL 15 MIN: Performed by: OBSTETRICS & GYNECOLOGY

## 2025-07-17 PROCEDURE — 2580000003 HC RX 258

## 2025-07-17 PROCEDURE — 2500000003 HC RX 250 WO HCPCS: Performed by: OBSTETRICS & GYNECOLOGY

## 2025-07-17 PROCEDURE — 2500000003 HC RX 250 WO HCPCS

## 2025-07-17 PROCEDURE — 96372 THER/PROPH/DIAG INJ SC/IM: CPT

## 2025-07-17 PROCEDURE — 0UT24ZZ RESECTION OF BILATERAL OVARIES, PERCUTANEOUS ENDOSCOPIC APPROACH: ICD-10-PCS | Performed by: OBSTETRICS & GYNECOLOGY

## 2025-07-17 PROCEDURE — S2900 ROBOTIC SURGICAL SYSTEM: HCPCS | Performed by: OBSTETRICS & GYNECOLOGY

## 2025-07-17 PROCEDURE — G0378 HOSPITAL OBSERVATION PER HR: HCPCS

## 2025-07-17 PROCEDURE — 6360000002 HC RX W HCPCS: Performed by: ANESTHESIOLOGY

## 2025-07-17 PROCEDURE — 6360000002 HC RX W HCPCS: Performed by: PHYSICIAN ASSISTANT

## 2025-07-17 PROCEDURE — 2580000003 HC RX 258: Performed by: NURSE ANESTHETIST, CERTIFIED REGISTERED

## 2025-07-17 PROCEDURE — 1200000000 HC SEMI PRIVATE

## 2025-07-17 PROCEDURE — 7100000000 HC PACU RECOVERY - FIRST 15 MIN: Performed by: OBSTETRICS & GYNECOLOGY

## 2025-07-17 PROCEDURE — 3700000001 HC ADD 15 MINUTES (ANESTHESIA): Performed by: OBSTETRICS & GYNECOLOGY

## 2025-07-17 PROCEDURE — 0UT94ZZ RESECTION OF UTERUS, PERCUTANEOUS ENDOSCOPIC APPROACH: ICD-10-PCS | Performed by: OBSTETRICS & GYNECOLOGY

## 2025-07-17 PROCEDURE — 6360000002 HC RX W HCPCS: Performed by: NURSE ANESTHETIST, CERTIFIED REGISTERED

## 2025-07-17 PROCEDURE — 3600000009 HC SURGERY ROBOT BASE: Performed by: OBSTETRICS & GYNECOLOGY

## 2025-07-17 PROCEDURE — 2500000003 HC RX 250 WO HCPCS: Performed by: NURSE ANESTHETIST, CERTIFIED REGISTERED

## 2025-07-17 PROCEDURE — 88112 CYTOPATH CELL ENHANCE TECH: CPT

## 2025-07-17 PROCEDURE — 6370000000 HC RX 637 (ALT 250 FOR IP): Performed by: OBSTETRICS & GYNECOLOGY

## 2025-07-17 PROCEDURE — 2709999900 HC NON-CHARGEABLE SUPPLY: Performed by: OBSTETRICS & GYNECOLOGY

## 2025-07-17 PROCEDURE — 4A1635H MONITORING OF LYMPHATIC FLOW USING INDOCYANINE GREEN DYE, PERCUTANEOUS APPROACH: ICD-10-PCS | Performed by: OBSTETRICS & GYNECOLOGY

## 2025-07-17 PROCEDURE — 2720000010 HC SURG SUPPLY STERILE: Performed by: OBSTETRICS & GYNECOLOGY

## 2025-07-17 PROCEDURE — 6360000002 HC RX W HCPCS: Performed by: OBSTETRICS & GYNECOLOGY

## 2025-07-17 PROCEDURE — 3700000000 HC ANESTHESIA ATTENDED CARE: Performed by: OBSTETRICS & GYNECOLOGY

## 2025-07-17 PROCEDURE — 8E0W4CZ ROBOTIC ASSISTED PROCEDURE OF TRUNK REGION, PERCUTANEOUS ENDOSCOPIC APPROACH: ICD-10-PCS | Performed by: OBSTETRICS & GYNECOLOGY

## 2025-07-17 PROCEDURE — 94761 N-INVAS EAR/PLS OXIMETRY MLT: CPT

## 2025-07-17 PROCEDURE — 3600000019 HC SURGERY ROBOT ADDTL 15MIN: Performed by: OBSTETRICS & GYNECOLOGY

## 2025-07-17 PROCEDURE — 80053 COMPREHEN METABOLIC PANEL: CPT

## 2025-07-17 PROCEDURE — 07BC4ZX EXCISION OF PELVIS LYMPHATIC, PERCUTANEOUS ENDOSCOPIC APPROACH, DIAGNOSTIC: ICD-10-PCS | Performed by: OBSTETRICS & GYNECOLOGY

## 2025-07-17 PROCEDURE — 0UT74ZZ RESECTION OF BILATERAL FALLOPIAN TUBES, PERCUTANEOUS ENDOSCOPIC APPROACH: ICD-10-PCS | Performed by: OBSTETRICS & GYNECOLOGY

## 2025-07-17 RX ORDER — SODIUM CHLORIDE 0.9 % (FLUSH) 0.9 %
5-40 SYRINGE (ML) INJECTION PRN
Status: DISCONTINUED | OUTPATIENT
Start: 2025-07-17 | End: 2025-07-17 | Stop reason: HOSPADM

## 2025-07-17 RX ORDER — DEXTROSE MONOHYDRATE 100 MG/ML
INJECTION, SOLUTION INTRAVENOUS CONTINUOUS PRN
Status: DISCONTINUED | OUTPATIENT
Start: 2025-07-17 | End: 2025-07-17 | Stop reason: HOSPADM

## 2025-07-17 RX ORDER — SODIUM CHLORIDE 9 MG/ML
INJECTION, SOLUTION INTRAVENOUS PRN
Status: DISCONTINUED | OUTPATIENT
Start: 2025-07-17 | End: 2025-07-17 | Stop reason: HOSPADM

## 2025-07-17 RX ORDER — INDOCYANINE GREEN AND WATER 25 MG
KIT INJECTION PRN
Status: DISCONTINUED | OUTPATIENT
Start: 2025-07-17 | End: 2025-07-17 | Stop reason: HOSPADM

## 2025-07-17 RX ORDER — ALBUTEROL SULFATE 0.83 MG/ML
2.5 SOLUTION RESPIRATORY (INHALATION) EVERY 6 HOURS PRN
Status: DISCONTINUED | OUTPATIENT
Start: 2025-07-17 | End: 2025-07-17 | Stop reason: HOSPADM

## 2025-07-17 RX ORDER — PROCHLORPERAZINE EDISYLATE 5 MG/ML
10 INJECTION INTRAMUSCULAR; INTRAVENOUS EVERY 6 HOURS PRN
Status: DISCONTINUED | OUTPATIENT
Start: 2025-07-17 | End: 2025-07-17 | Stop reason: SDUPTHER

## 2025-07-17 RX ORDER — SIMETHICONE 80 MG
80 TABLET,CHEWABLE ORAL EVERY 6 HOURS PRN
Status: DISCONTINUED | OUTPATIENT
Start: 2025-07-17 | End: 2025-07-17 | Stop reason: HOSPADM

## 2025-07-17 RX ORDER — CEFAZOLIN SODIUM 1 G/3ML
INJECTION, POWDER, FOR SOLUTION INTRAMUSCULAR; INTRAVENOUS
Status: DISCONTINUED | OUTPATIENT
Start: 2025-07-17 | End: 2025-07-17 | Stop reason: SDUPTHER

## 2025-07-17 RX ORDER — IPRATROPIUM BROMIDE AND ALBUTEROL SULFATE 2.5; .5 MG/3ML; MG/3ML
1 SOLUTION RESPIRATORY (INHALATION)
Status: COMPLETED | OUTPATIENT
Start: 2025-07-17 | End: 2025-07-17

## 2025-07-17 RX ORDER — BUDESONIDE AND FORMOTEROL FUMARATE DIHYDRATE 80; 4.5 UG/1; UG/1
2 AEROSOL RESPIRATORY (INHALATION)
Status: DISCONTINUED | OUTPATIENT
Start: 2025-07-17 | End: 2025-07-17 | Stop reason: HOSPADM

## 2025-07-17 RX ORDER — MIDAZOLAM HYDROCHLORIDE 1 MG/ML
INJECTION, SOLUTION INTRAMUSCULAR; INTRAVENOUS
Status: DISCONTINUED | OUTPATIENT
Start: 2025-07-17 | End: 2025-07-17 | Stop reason: SDUPTHER

## 2025-07-17 RX ORDER — ACETAMINOPHEN 500 MG
1000 TABLET ORAL EVERY 6 HOURS PRN
Qty: 60 TABLET | Refills: 1 | Status: ON HOLD | OUTPATIENT
Start: 2025-07-17 | End: 2025-07-21 | Stop reason: HOSPADM

## 2025-07-17 RX ORDER — GLUCAGON 1 MG/ML
1 KIT INJECTION PRN
Status: DISCONTINUED | OUTPATIENT
Start: 2025-07-17 | End: 2025-07-17 | Stop reason: HOSPADM

## 2025-07-17 RX ORDER — WATER 10 ML/10ML
INJECTION INTRAMUSCULAR; INTRAVENOUS; SUBCUTANEOUS PRN
Status: DISCONTINUED | OUTPATIENT
Start: 2025-07-17 | End: 2025-07-17 | Stop reason: HOSPADM

## 2025-07-17 RX ORDER — ACETAMINOPHEN 500 MG
1000 TABLET ORAL EVERY 6 HOURS SCHEDULED
Status: DISCONTINUED | OUTPATIENT
Start: 2025-07-17 | End: 2025-07-17 | Stop reason: HOSPADM

## 2025-07-17 RX ORDER — HYDRALAZINE HYDROCHLORIDE 20 MG/ML
10 INJECTION INTRAMUSCULAR; INTRAVENOUS
Status: DISCONTINUED | OUTPATIENT
Start: 2025-07-17 | End: 2025-07-17 | Stop reason: HOSPADM

## 2025-07-17 RX ORDER — SENNA AND DOCUSATE SODIUM 50; 8.6 MG/1; MG/1
2 TABLET, FILM COATED ORAL 2 TIMES DAILY
Qty: 120 TABLET | Refills: 0 | Status: ON HOLD | OUTPATIENT
Start: 2025-07-17 | End: 2025-07-21 | Stop reason: HOSPADM

## 2025-07-17 RX ORDER — SODIUM CHLORIDE 0.9 % (FLUSH) 0.9 %
5-40 SYRINGE (ML) INJECTION EVERY 12 HOURS SCHEDULED
Status: DISCONTINUED | OUTPATIENT
Start: 2025-07-17 | End: 2025-07-17 | Stop reason: HOSPADM

## 2025-07-17 RX ORDER — BUSPIRONE HYDROCHLORIDE 15 MG/1
30 TABLET ORAL 3 TIMES DAILY PRN
Status: DISCONTINUED | OUTPATIENT
Start: 2025-07-17 | End: 2025-07-17 | Stop reason: HOSPADM

## 2025-07-17 RX ORDER — FAMOTIDINE 20 MG/1
20 TABLET, FILM COATED ORAL 2 TIMES DAILY PRN
Status: DISCONTINUED | OUTPATIENT
Start: 2025-07-17 | End: 2025-07-17 | Stop reason: HOSPADM

## 2025-07-17 RX ORDER — ONDANSETRON 2 MG/ML
INJECTION INTRAMUSCULAR; INTRAVENOUS
Status: DISCONTINUED | OUTPATIENT
Start: 2025-07-17 | End: 2025-07-17 | Stop reason: SDUPTHER

## 2025-07-17 RX ORDER — LIDOCAINE HYDROCHLORIDE 10 MG/ML
INJECTION, SOLUTION EPIDURAL; INFILTRATION; INTRACAUDAL; PERINEURAL
Status: DISCONTINUED | OUTPATIENT
Start: 2025-07-17 | End: 2025-07-17 | Stop reason: SDUPTHER

## 2025-07-17 RX ORDER — ROCURONIUM BROMIDE 10 MG/ML
INJECTION, SOLUTION INTRAVENOUS
Status: DISCONTINUED | OUTPATIENT
Start: 2025-07-17 | End: 2025-07-17 | Stop reason: SDUPTHER

## 2025-07-17 RX ORDER — LISINOPRIL 5 MG/1
5 TABLET ORAL DAILY
Status: DISCONTINUED | OUTPATIENT
Start: 2025-07-17 | End: 2025-07-17 | Stop reason: HOSPADM

## 2025-07-17 RX ORDER — ENOXAPARIN SODIUM 100 MG/ML
40 INJECTION SUBCUTANEOUS DAILY
Status: DISCONTINUED | OUTPATIENT
Start: 2025-07-18 | End: 2025-07-17 | Stop reason: HOSPADM

## 2025-07-17 RX ORDER — LABETALOL HYDROCHLORIDE 5 MG/ML
10 INJECTION, SOLUTION INTRAVENOUS
Status: DISCONTINUED | OUTPATIENT
Start: 2025-07-17 | End: 2025-07-17 | Stop reason: HOSPADM

## 2025-07-17 RX ORDER — METRONIDAZOLE 500 MG/100ML
500 INJECTION, SOLUTION INTRAVENOUS
Status: COMPLETED | OUTPATIENT
Start: 2025-07-17 | End: 2025-07-17

## 2025-07-17 RX ORDER — PROPOFOL 10 MG/ML
INJECTION, EMULSION INTRAVENOUS
Status: DISCONTINUED | OUTPATIENT
Start: 2025-07-17 | End: 2025-07-17 | Stop reason: SDUPTHER

## 2025-07-17 RX ORDER — MAGNESIUM HYDROXIDE 1200 MG/15ML
LIQUID ORAL CONTINUOUS PRN
Status: DISCONTINUED | OUTPATIENT
Start: 2025-07-17 | End: 2025-07-17 | Stop reason: HOSPADM

## 2025-07-17 RX ORDER — ENOXAPARIN SODIUM 100 MG/ML
40 INJECTION SUBCUTANEOUS
Status: COMPLETED | OUTPATIENT
Start: 2025-07-17 | End: 2025-07-17

## 2025-07-17 RX ORDER — SODIUM CHLORIDE 9 MG/ML
INJECTION, SOLUTION INTRAVENOUS CONTINUOUS
Status: DISCONTINUED | OUTPATIENT
Start: 2025-07-17 | End: 2025-07-17

## 2025-07-17 RX ORDER — DEXAMETHASONE SODIUM PHOSPHATE 10 MG/ML
INJECTION, SOLUTION INTRA-ARTICULAR; INTRALESIONAL; INTRAMUSCULAR; INTRAVENOUS; SOFT TISSUE
Status: DISCONTINUED | OUTPATIENT
Start: 2025-07-17 | End: 2025-07-17 | Stop reason: SDUPTHER

## 2025-07-17 RX ORDER — PROCHLORPERAZINE EDISYLATE 5 MG/ML
10 INJECTION INTRAMUSCULAR; INTRAVENOUS EVERY 6 HOURS PRN
Status: DISCONTINUED | OUTPATIENT
Start: 2025-07-17 | End: 2025-07-17 | Stop reason: HOSPADM

## 2025-07-17 RX ORDER — BACLOFEN 10 MG/1
10 TABLET ORAL DAILY PRN
Status: DISCONTINUED | OUTPATIENT
Start: 2025-07-17 | End: 2025-07-17 | Stop reason: HOSPADM

## 2025-07-17 RX ORDER — ATORVASTATIN CALCIUM 40 MG/1
40 TABLET, FILM COATED ORAL NIGHTLY
Status: DISCONTINUED | OUTPATIENT
Start: 2025-07-17 | End: 2025-07-17 | Stop reason: HOSPADM

## 2025-07-17 RX ORDER — OXYCODONE HYDROCHLORIDE 5 MG/1
5 TABLET ORAL EVERY 4 HOURS PRN
Refills: 0 | Status: DISCONTINUED | OUTPATIENT
Start: 2025-07-17 | End: 2025-07-17 | Stop reason: HOSPADM

## 2025-07-17 RX ORDER — SIMETHICONE 80 MG
80 TABLET,CHEWABLE ORAL EVERY 6 HOURS
Status: DISCONTINUED | OUTPATIENT
Start: 2025-07-17 | End: 2025-07-17 | Stop reason: HOSPADM

## 2025-07-17 RX ORDER — ULTRASOUND COUPLING MEDIUM
GEL (GRAM) TOPICAL PRN
Status: DISCONTINUED | OUTPATIENT
Start: 2025-07-17 | End: 2025-07-17 | Stop reason: HOSPADM

## 2025-07-17 RX ORDER — INSULIN LISPRO 100 [IU]/ML
0-12 INJECTION, SOLUTION INTRAVENOUS; SUBCUTANEOUS PRN
Status: DISCONTINUED | OUTPATIENT
Start: 2025-07-17 | End: 2025-07-17 | Stop reason: HOSPADM

## 2025-07-17 RX ORDER — IPRATROPIUM BROMIDE AND ALBUTEROL SULFATE 2.5; .5 MG/3ML; MG/3ML
SOLUTION RESPIRATORY (INHALATION)
Status: COMPLETED
Start: 2025-07-17 | End: 2025-07-17

## 2025-07-17 RX ORDER — SODIUM CHLORIDE, SODIUM LACTATE, POTASSIUM CHLORIDE, CALCIUM CHLORIDE 600; 310; 30; 20 MG/100ML; MG/100ML; MG/100ML; MG/100ML
INJECTION, SOLUTION INTRAVENOUS
Status: DISCONTINUED | OUTPATIENT
Start: 2025-07-17 | End: 2025-07-17 | Stop reason: SDUPTHER

## 2025-07-17 RX ORDER — SENNOSIDES 8.6 MG/1
2 TABLET ORAL 2 TIMES DAILY
Status: DISCONTINUED | OUTPATIENT
Start: 2025-07-17 | End: 2025-07-17 | Stop reason: HOSPADM

## 2025-07-17 RX ORDER — ONDANSETRON 2 MG/ML
4 INJECTION INTRAMUSCULAR; INTRAVENOUS
Status: DISCONTINUED | OUTPATIENT
Start: 2025-07-17 | End: 2025-07-17 | Stop reason: HOSPADM

## 2025-07-17 RX ORDER — CALCIUM CARBONATE 500 MG/1
500 TABLET, CHEWABLE ORAL 3 TIMES DAILY PRN
Status: DISCONTINUED | OUTPATIENT
Start: 2025-07-17 | End: 2025-07-17 | Stop reason: HOSPADM

## 2025-07-17 RX ORDER — LABETALOL HYDROCHLORIDE 5 MG/ML
INJECTION, SOLUTION INTRAVENOUS
Status: DISCONTINUED | OUTPATIENT
Start: 2025-07-17 | End: 2025-07-17 | Stop reason: SDUPTHER

## 2025-07-17 RX ORDER — FENTANYL CITRATE 50 UG/ML
INJECTION, SOLUTION INTRAMUSCULAR; INTRAVENOUS
Status: DISCONTINUED | OUTPATIENT
Start: 2025-07-17 | End: 2025-07-17 | Stop reason: SDUPTHER

## 2025-07-17 RX ORDER — PROCHLORPERAZINE MALEATE 10 MG
10 TABLET ORAL EVERY 6 HOURS PRN
Status: DISCONTINUED | OUTPATIENT
Start: 2025-07-17 | End: 2025-07-17 | Stop reason: HOSPADM

## 2025-07-17 RX ORDER — OXYCODONE HYDROCHLORIDE 5 MG/1
5 TABLET ORAL EVERY 6 HOURS PRN
Qty: 12 TABLET | Refills: 0 | Status: ON HOLD | OUTPATIENT
Start: 2025-07-17 | End: 2025-07-21 | Stop reason: HOSPADM

## 2025-07-17 RX ORDER — SODIUM CHLORIDE 9 MG/ML
INJECTION, SOLUTION INTRAVENOUS CONTINUOUS
Status: DISCONTINUED | OUTPATIENT
Start: 2025-07-17 | End: 2025-07-17 | Stop reason: HOSPADM

## 2025-07-17 RX ORDER — INSULIN LISPRO 100 [IU]/ML
4 INJECTION, SOLUTION INTRAVENOUS; SUBCUTANEOUS ONCE
Status: COMPLETED | OUTPATIENT
Start: 2025-07-17 | End: 2025-07-17

## 2025-07-17 RX ADMIN — LISINOPRIL 5 MG: 5 TABLET ORAL at 15:42

## 2025-07-17 RX ADMIN — HYDROMORPHONE HYDROCHLORIDE 1 MG: 1 INJECTION, SOLUTION INTRAMUSCULAR; INTRAVENOUS; SUBCUTANEOUS at 15:43

## 2025-07-17 RX ADMIN — ACETAMINOPHEN 1000 MG: 500 TABLET ORAL at 15:42

## 2025-07-17 RX ADMIN — CEFAZOLIN 2 G: 1 INJECTION, POWDER, FOR SOLUTION INTRAMUSCULAR; INTRAVENOUS at 08:12

## 2025-07-17 RX ADMIN — HYDROMORPHONE HYDROCHLORIDE 0.5 MG: 1 INJECTION, SOLUTION INTRAMUSCULAR; INTRAVENOUS; SUBCUTANEOUS at 13:26

## 2025-07-17 RX ADMIN — ROCURONIUM BROMIDE 20 MG: 10 INJECTION, SOLUTION INTRAVENOUS at 10:27

## 2025-07-17 RX ADMIN — SUGAMMADEX 200 MG: 100 INJECTION, SOLUTION INTRAVENOUS at 12:38

## 2025-07-17 RX ADMIN — HYDROMORPHONE HYDROCHLORIDE 0.25 MG: 1 INJECTION, SOLUTION INTRAMUSCULAR; INTRAVENOUS; SUBCUTANEOUS at 09:54

## 2025-07-17 RX ADMIN — LIDOCAINE HYDROCHLORIDE 50 MG: 10 INJECTION, SOLUTION EPIDURAL; INFILTRATION; INTRACAUDAL; PERINEURAL at 07:49

## 2025-07-17 RX ADMIN — PROPOFOL 200 MG: 10 INJECTION, EMULSION INTRAVENOUS at 07:49

## 2025-07-17 RX ADMIN — IPRATROPIUM BROMIDE AND ALBUTEROL SULFATE 1 DOSE: 2.5; .5 SOLUTION RESPIRATORY (INHALATION) at 06:55

## 2025-07-17 RX ADMIN — ROCURONIUM BROMIDE 20 MG: 10 INJECTION, SOLUTION INTRAVENOUS at 09:23

## 2025-07-17 RX ADMIN — SODIUM CHLORIDE, POTASSIUM CHLORIDE, SODIUM LACTATE AND CALCIUM CHLORIDE: 600; 310; 30; 20 INJECTION, SOLUTION INTRAVENOUS at 07:42

## 2025-07-17 RX ADMIN — INSULIN LISPRO 4 UNITS: 100 INJECTION, SOLUTION INTRAVENOUS; SUBCUTANEOUS at 17:54

## 2025-07-17 RX ADMIN — ROCURONIUM BROMIDE 20 MG: 10 INJECTION, SOLUTION INTRAVENOUS at 08:41

## 2025-07-17 RX ADMIN — IPRATROPIUM BROMIDE AND ALBUTEROL SULFATE 1 DOSE: .5; 2.5 SOLUTION RESPIRATORY (INHALATION) at 06:55

## 2025-07-17 RX ADMIN — Medication 5 MG: at 11:10

## 2025-07-17 RX ADMIN — HYDROMORPHONE HYDROCHLORIDE 0.5 MG: 1 INJECTION, SOLUTION INTRAMUSCULAR; INTRAVENOUS; SUBCUTANEOUS at 09:40

## 2025-07-17 RX ADMIN — ROCURONIUM BROMIDE 10 MG: 10 INJECTION, SOLUTION INTRAVENOUS at 11:29

## 2025-07-17 RX ADMIN — Medication 15 MG: at 08:34

## 2025-07-17 RX ADMIN — Medication 5 MG: at 12:13

## 2025-07-17 RX ADMIN — Medication 5 MG: at 10:13

## 2025-07-17 RX ADMIN — FENTANYL CITRATE 50 MCG: 50 INJECTION, SOLUTION INTRAMUSCULAR; INTRAVENOUS at 07:49

## 2025-07-17 RX ADMIN — FENTANYL CITRATE 100 MCG: 50 INJECTION, SOLUTION INTRAMUSCULAR; INTRAVENOUS at 08:36

## 2025-07-17 RX ADMIN — ROCURONIUM BROMIDE 10 MG: 10 INJECTION, SOLUTION INTRAVENOUS at 12:01

## 2025-07-17 RX ADMIN — ONDANSETRON 4 MG: 2 INJECTION, SOLUTION INTRAMUSCULAR; INTRAVENOUS at 12:09

## 2025-07-17 RX ADMIN — BUSPIRONE HYDROCHLORIDE 30 MG: 15 TABLET ORAL at 15:43

## 2025-07-17 RX ADMIN — SODIUM CHLORIDE, SODIUM LACTATE, POTASSIUM CHLORIDE, CALCIUM CHLORIDE: 600; 310; 30; 20 INJECTION, SOLUTION INTRAVENOUS at 08:13

## 2025-07-17 RX ADMIN — Medication 5 MG: at 09:01

## 2025-07-17 RX ADMIN — HYDROMORPHONE HYDROCHLORIDE 0.25 MG: 1 INJECTION, SOLUTION INTRAMUSCULAR; INTRAVENOUS; SUBCUTANEOUS at 12:40

## 2025-07-17 RX ADMIN — ROCURONIUM BROMIDE 50 MG: 10 INJECTION, SOLUTION INTRAVENOUS at 07:49

## 2025-07-17 RX ADMIN — DEXAMETHASONE SODIUM PHOSPHATE 4 MG: 10 INJECTION INTRAMUSCULAR; INTRAVENOUS at 08:44

## 2025-07-17 RX ADMIN — Medication 20 MG: at 08:12

## 2025-07-17 RX ADMIN — METRONIDAZOLE 500 MG: 500 INJECTION, SOLUTION INTRAVENOUS at 06:18

## 2025-07-17 RX ADMIN — SODIUM CHLORIDE, SODIUM LACTATE, POTASSIUM CHLORIDE, CALCIUM CHLORIDE: 600; 310; 30; 20 INJECTION, SOLUTION INTRAVENOUS at 09:13

## 2025-07-17 RX ADMIN — PROPOFOL 50 MG: 10 INJECTION, EMULSION INTRAVENOUS at 08:40

## 2025-07-17 RX ADMIN — SIMETHICONE 80 MG: 80 TABLET, CHEWABLE ORAL at 15:42

## 2025-07-17 RX ADMIN — SODIUM CHLORIDE: 0.9 INJECTION, SOLUTION INTRAVENOUS at 14:44

## 2025-07-17 RX ADMIN — SENNOSIDES 17.2 MG: 8.6 TABLET, FILM COATED ORAL at 15:42

## 2025-07-17 RX ADMIN — PROCHLORPERAZINE EDISYLATE 10 MG: 5 INJECTION INTRAMUSCULAR; INTRAVENOUS at 15:42

## 2025-07-17 RX ADMIN — PROPOFOL 100 MG: 10 INJECTION, EMULSION INTRAVENOUS at 08:36

## 2025-07-17 RX ADMIN — HYDROMORPHONE HYDROCHLORIDE 0.25 MG: 1 INJECTION, SOLUTION INTRAMUSCULAR; INTRAVENOUS; SUBCUTANEOUS at 11:26

## 2025-07-17 RX ADMIN — CEFAZOLIN 2 G: 1 INJECTION, POWDER, FOR SOLUTION INTRAMUSCULAR; INTRAVENOUS at 12:13

## 2025-07-17 RX ADMIN — MIDAZOLAM 1 MG: 1 INJECTION INTRAMUSCULAR; INTRAVENOUS at 09:05

## 2025-07-17 RX ADMIN — ROCURONIUM BROMIDE 20 MG: 10 INJECTION, SOLUTION INTRAVENOUS at 09:47

## 2025-07-17 RX ADMIN — FENTANYL CITRATE 50 MCG: 50 INJECTION, SOLUTION INTRAMUSCULAR; INTRAVENOUS at 08:14

## 2025-07-17 RX ADMIN — ENOXAPARIN SODIUM 40 MG: 100 INJECTION SUBCUTANEOUS at 06:18

## 2025-07-17 RX ADMIN — Medication 5 MG: at 11:23

## 2025-07-17 RX ADMIN — INSULIN LISPRO 4 UNITS: 100 INJECTION, SOLUTION INTRAVENOUS; SUBCUTANEOUS at 13:52

## 2025-07-17 RX ADMIN — HYDROMORPHONE HYDROCHLORIDE 0.25 MG: 1 INJECTION, SOLUTION INTRAMUSCULAR; INTRAVENOUS; SUBCUTANEOUS at 09:30

## 2025-07-17 RX ADMIN — Medication 15 MG: at 08:45

## 2025-07-17 ASSESSMENT — PAIN SCALES - GENERAL
PAINLEVEL_OUTOF10: 7
PAINLEVEL_OUTOF10: 5
PAINLEVEL_OUTOF10: 0
PAINLEVEL_OUTOF10: 9
PAINLEVEL_OUTOF10: 7
PAINLEVEL_OUTOF10: 0
PAINLEVEL_OUTOF10: 9

## 2025-07-17 ASSESSMENT — PAIN DESCRIPTION - LOCATION
LOCATION: BACK
LOCATION: BACK
LOCATION: ABDOMEN;BACK

## 2025-07-17 ASSESSMENT — PAIN - FUNCTIONAL ASSESSMENT: PAIN_FUNCTIONAL_ASSESSMENT: NONE - DENIES PAIN

## 2025-07-17 NOTE — PROGRESS NOTES
Advance Care Planning     Advance Care Planning Inpatient Note  [facility] Department    Today's Date: 7/17/2025  Unit: [facility] 3C MED SURG    Received request from HealthCare Provider.  Upon review of chart and communication with care team, patient's decision making abilities are not in question.. Patient was/were present in the room during visit.    Goals of ACP Conversation:  Discuss advance care planning documents    Health Care Decision Makers:       Primary Decision Maker: Haase,Tony - Child - [phone]  Summary:  Completed New Documents    Advance Care Planning Documents (Patient Wishes):  Healthcare Power of /Advance Directive Appointment of Health Care Agent     Assessment:  Pt was awake and receptive to visit. Pt reported being in pain. Pt wanted to name son and HCPOA. Pt completed forms with assistance from . Pt expressed gratitude for the assistance.       Interventions:  Assisted in the completion of documents according to patient's wishes at this time    Care Preferences Communicated:   No    Outcomes/Plan:  New advance directive completed.    Electronically signed by Kirt Jean,  Intern on 7/17/2025 at 5:18 PM

## 2025-07-17 NOTE — ANESTHESIA PRE PROCEDURE
Department of Anesthesiology  Preprocedure Note       Name:  Nancy L Haase   Age:  64 y.o.  :  1960                                          MRN:  [id number]         Date:  2025      Surgeon: Surgeon(s):  Sunita Elias MD    Procedure: Procedure(s):  ROBOTIC LAPAROSCOPIC ASSISTED HYSTERECTOMY, BILATERAL SALPINGO-OOPHORECTOMY, ICG INTRACERVIX DYE INJECTION,  SENTINEL LYMPH NODE MAPPING, SENTINEL LYMPH NODE REMOVAL, ANY OTHER INDICATED PROCEDURES  (C-MAX BED, ICG DYE)    Medications prior to admission:   Prior to Admission medications    Medication Sig Start Date End Date Taking? Authorizing Provider   SPIRIVA RESPIMAT 2.5 MCG/ACT AERS inhaler Inhale 2 puffs into the lungs daily 25  Yes Dia Sinha MD   nystatin (MYCOSTATIN) 002304 UNIT/GM powder Apply 3 times daily. 25  Yes Lori Baez PA-C   megestrol (MEGACE) 40 MG/ML suspension Take 4 mLs by mouth 2 times daily 25  Yes Lori Baez PA-C   lisinopril (PRINIVIL;ZESTRIL) 5 MG tablet Take 1 tablet by mouth daily   Yes ProviderDia MD   ferrous sulfate (FE TABS 325) 325 (65 Fe) MG EC tablet Take 1 tablet by mouth daily (with breakfast) 3/9/25  Yes Jeremy Michelle MD   baclofen (LIORESAL) 10 MG tablet Take 1 tablet by mouth daily as needed 23  Yes ProviderDia MD   potassium chloride (KLOR-CON) 10 MEQ extended release tablet Take 1 tablet by mouth daily 23  Yes ProviderDia MD   busPIRone (BUSPAR) 30 MG tablet Take 30 mg by mouth 3 times daily as needed 22  Yes ProviderDia MD   acetaminophen (TYLENOL) 325 MG tablet Take 2 tablets by mouth every 4-6 hours as needed 22  Yes ProviderDia MD   furosemide (LASIX) 40 MG tablet Take 1 tablet by mouth in the morning. Hold for 2 more days. 22  Yes Karly Reynolds MD   mometasone-formoterol (DULERA) 200-5 MCG/ACT inhaler Inhale 2 puffs into the lungs in the morning and 2 puffs in the evening. 3/3/22  Yes  discussed with CRNA.                  Sathish Amaral MD   7/17/2025

## 2025-07-17 NOTE — BRIEF OP NOTE
Brief Operative Note  Department of Gynecologic Oncology  [facility]     Patient: Nancy L Haase   : 1960  MRN: [id number]       Acct: [id number]   Date of Procedure: 25     Pre-operative Diagnosis: 64 y.o. female    Endometrial adenocarcinoma  Hypertension  Atrial fibrillation  Congestive heart failure  Type 2 diabetes  Chronic kidney disease stage III  Chronic obstructive pulmonary disease  Bronchitis  Coronary artery disease  Acute coronary syndrome status post stents  Peripheral vascular disease  Marijuana use  Tobacco use  Anxiety  Intrauterine Device in Place  BMI 33    Post-operative Diagnosis:   Endometrial adenocarcinoma  Hypertension  Atrial fibrillation  Congestive heart failure  Type 2 diabetes  Chronic kidney disease stage III  Chronic obstructive pulmonary disease  Bronchitis  Coronary artery disease  Acute coronary syndrome status post stents  Peripheral vascular disease  Marijuana use  Tobacco use  Anxiety  BMI 33    Procedure: Robotic assisted laparoscopic hysterectomy, bilateral salpingo-oophorectomy, ICG intracervical dye injection, sentinel lymph node mapping, bilateral pelvic sentinel lymph node dissection    Surgeon: Dr. Elias      Assistant(s): Brooke Mas DO, PGY3; Soraya Kendall MD, PGY2    Anesthesia: general    Findings: Normal appearing external genitalia and vaginal mucosa without lesions. Cervix normal in appearance without lesions. Uterus anteverted approximately 6cm. IUD incidentally removed during vaginal prep. Anterior abdominal wall adhesions to the liver, normal in appearance. Normal appearing uterus, bilateral fallopian tubes, and ovaries. Unsuccessful ICG dye mapping.   Total IV fluids/Blood products:  1250 ml crystalloid  Urine Output:  950 ml clear    Estimated blood loss:  50mL  Drains:  none  Specimens:  Uterus, cervix, bilateral fallopian tubes, bilateral ovaries, right and left pelvic lymph nodes   Instrument and Sponge Count:  Correct  Complications:  none  Condition:  stable, transferred to post anesthesia recovery    See full operative report for further details.    Brooke Mas DO  Gyn Onc Resident  7/17/2025, 12:32 PM      Attending Physician Statement  I was present, scrubbed and participated in the entire case. I agree with the above documentation.  Electronically signed by Sunita Elias MD on 7/17/2025 at 1:10 PM  Gynecologic Oncology

## 2025-07-17 NOTE — PROGRESS NOTES
Gynecology Oncology Progress Note      Nancy L Haase is a 64 y.o. female , POD # 0 s/p Robotic assisted laparoscopic hysterectomy, bilateral salpingo-oophorectomy, ICG intra cervix die injection, bilateral pelvic lymph nodes dissection     Patient seen and examined. She is resting comfortably in the chair. Pain is controlled. Patient is  tolerating oral intake. She has not yet urinated. She denies any vaginal bleeding. She is  ambulating without difficulty.  She is not passing flatus. She denies Fever/Chills, Chest Pain, SOB, N/V, Calf Pain.    Vitals:  Vitals:    25 1430 25 1530 25 1613 25 1644   BP: (!) 152/104   (!) 162/70   Pulse: 79   78   Resp:    Temp:       TempSrc:    Oral   SpO2: 95% 96%  95%   Weight:       Height:           Intake/Output:   Last Shift: No intake/output data recorded.  Current Shift: No intake/output data recorded.    Patient has not yet voided     Physical Exam:  General: Alert and oriented, no acute distress  HEENT: normocephalic, atraumatic, supple, symmetrical, trachea midline, Pupils equal and reactive to light, Extraocular muscles intact, sclera non icteric  Respiratory: Good air movement bilaterally, unlabored respirations  Cardiovascular: Regular rate and rhythm  Abdomen:  soft, non tender, non distended, no rebound, guarding, or rigidity  Incisions: 5 port sites affixed with dermabond   Extremities: No LE edema, no calf tenderness or swelling, EPC in place   Psych: affect appropriate    Lab:  Recent Results (from the past 24 hours)   CBC with Auto Differential    Collection Time: 25  6:13 AM   Result Value Ref Range    WBC 9.2 3.5 - 11.3 k/uL    RBC 4.31 3.95 - 5.11 m/uL    Hemoglobin 12.2 11.9 - 15.1 g/dL    Hematocrit 37.8 36.3 - 47.1 %    MCV 87.7 82.6 - 102.9 fL    MCH 28.3 25.2 - 33.5 pg    MCHC 32.3 28.4 - 34.8 g/dL    RDW 15.5 (H) 11.8 - 14.4 %    Platelets 235 138 - 453 k/uL    MPV 11.0 8.1 - 13.5 fL    NRBC Automated 0.0 0.0

## 2025-07-17 NOTE — DISCHARGE SUMMARY
Gyn/Oncology Discharge Summary  [facility]      Patient Name: Nancy L Haase  Patient : 1960  Primary Care Physician: Scar Reyes MD  Admit Date: 2025    Principal Diagnosis: Endometrioid Adencocarcinoma    Other Diagnosis:   Uterine cancer (HCC) [C55]  Post-operative state [Z98.890]  Patient Active Problem List   Diagnosis    Chronic bronchitis (HCC)    Lung nodule    ACS (acute coronary syndrome) (HCC)    Essential hypertension    S/P drug eluting coronary stent placement - Mid RCA () 19 (Dr. cee)    Gastroenteritis    Coronary artery disease involving native heart with angina pectoris    Vitamin D deficiency    Marijuana abuse    Poorly controlled diabetes mellitus (HCC)    Other constipation    Urinary retention    COPD exacerbation (HCC)    Lobar pneumonia    Acute on chronic systolic congestive heart failure (HCC)    Former smoker    Anxiety    Class 2 obesity with body mass index (BMI) of 36.0 to 36.9 in adult    Pyuria    Dyspnea    COPD with exacerbation (HCC)    Atrial fibrillation (HCC)    CKD stage 3 due to type 2 diabetes mellitus (HCC)    Anemia    HARVEY (acute kidney injury)    Stage 3a chronic kidney disease (HCC)    Postmenopausal bleeding    Abnormal uterine bleeding (AUB)    Anemia due to blood loss, acute    Pelvic pain    Abnormal vaginal bleeding    Right lower lobe pneumonia    Chronic congestive heart failure (HCC)    Longstanding persistent atrial fibrillation (HCC)    Acute and chronic respiratory failure with hypoxia (HCC)    Primary endometrioid carcinoma of endometrium of uterine body (HCC)    PVD (peripheral vascular disease)    Tinea cruris    Need for shingles vaccine    Need for pneumococcal vaccine    Need for COVID-19 vaccine    Tobacco dependence    Tetrahydrocannabinol (THC) dependence (HCC)    Abnormal gastrointestinal PET scan    Uterine cancer (HCC)    Post-operative state    S/p NOMAN GALVAN, SNLD 25       Infection:

## 2025-07-17 NOTE — PLAN OF CARE
Problem: Chronic Conditions and Co-morbidities  Goal: Patient's chronic conditions and co-morbidity symptoms are monitored and maintained or improved  7/17/2025 1709 by Sonia Bran RN  Outcome: Progressing     Problem: Discharge Planning  Goal: Discharge to home or other facility with appropriate resources  7/17/2025 1709 by Sonia Bran, RN  Outcome: Progressing     Problem: Safety - Adult  Goal: Free from fall injury  7/17/2025 1709 by Sonia Bran RN  Outcome: Progressing     Problem: Pain  Goal: Verbalizes/displays adequate comfort level or baseline comfort level  7/17/2025 1709 by Sonia Bran, RN  Outcome: Progressing

## 2025-07-17 NOTE — CARE COORDINATION
Case Management Assessment  Initial Evaluation    Date/Time of Evaluation: 7/17/2025 4:06 PM  Assessment Completed by: Adrienne Webster    If patient is discharged prior to next notation, then this note serves as note for discharge by case management.    Patient Name: Nancy L Haase                   YOB: 1960  Diagnosis: Uterine cancer (HCC) [C55]  Post-operative state [Z98.890]                   Date / Time: 7/17/2025  5:04 AM    Patient Admission Status: Inpatient   Readmission Risk (Low < 19, Mod (19-27), High > 27): Readmission Risk Score: 18.5    Current PCP: Scar Reyes MD  PCP verified by CM? (P) Yes    Chart Reviewed: Yes      History Provided by: (P) Patient  Patient Orientation: (P) Alert and Oriented    Patient Cognition: (P) Alert    Hospitalization in the last 30 days (Readmission):  No    If yes, Readmission Assessment in CM Navigator will be completed.    Advance Directives:      Code Status: Prior   Patient's Primary Decision Maker is:      Primary Decision Maker: Haase,Tony - Child - [phone]    Discharge Planning:    Patient lives with: (P) Children Type of Home: (P) Apartment  Primary Care Giver: (P) Self  Patient Support Systems include: (P) Children   Current Financial resources: (P) Medicaid, Medicare  Current community resources:    Current services prior to admission: (P) None            Current DME:              Type of Home Care services:  (P) None    ADLS  Prior functional level: (P) Independent in ADLs/IADLs  Current functional level: (P) Independent in ADLs/IADLs    PT AM-PAC:   /24  OT AM-PAC:   /24    Family can provide assistance at DC: (P) Yes  Would you like Case Management to discuss the discharge plan with any other family members/significant others, and if so, who? (P) No  Plans to Return to Present Housing: (P) Yes  Other Identified Issues/Barriers to RETURNING to current housing: medical clearance  Potential Assistance needed at discharge: (P) N/A

## 2025-07-18 LAB — SURGICAL PATHOLOGY REPORT: NORMAL

## 2025-07-19 PROBLEM — N18.32 STAGE 3B CHRONIC KIDNEY DISEASE (CKD) (HCC): Status: ACTIVE | Noted: 2025-07-19

## 2025-07-19 PROBLEM — Z79.4 INSULIN DEPENDENT TYPE 2 DIABETES MELLITUS (HCC): Status: ACTIVE | Noted: 2025-07-19

## 2025-07-19 PROBLEM — J45.20: Status: ACTIVE | Noted: 2025-07-19

## 2025-07-19 PROBLEM — Z95.5 HISTORY OF HEART ARTERY STENT: Status: ACTIVE | Noted: 2025-07-19

## 2025-07-19 PROBLEM — E66.811 OBESITY, CLASS I, BMI 30-34.9: Status: ACTIVE | Noted: 2025-07-19

## 2025-07-19 PROBLEM — I73.9 PERIPHERAL ARTERY DISEASE: Status: ACTIVE | Noted: 2025-07-19

## 2025-07-19 PROBLEM — E11.9 INSULIN DEPENDENT TYPE 2 DIABETES MELLITUS (HCC): Status: ACTIVE | Noted: 2025-07-19

## 2025-07-19 PROBLEM — C54.1 ENDOMETRIAL CANCER (HCC): Status: ACTIVE | Noted: 2025-07-19

## 2025-07-19 PROBLEM — I20.89 CHRONIC STABLE ANGINA: Status: ACTIVE | Noted: 2025-07-19

## 2025-07-19 PROBLEM — Z79.01 CURRENT USE OF LONG TERM ANTICOAGULATION: Status: ACTIVE | Noted: 2025-07-19

## 2025-07-19 NOTE — OP NOTE
OPERATIVE NOTE  PATIENT: Nancy L Haase  YOB: 1960  MRN: [id number]  DATE OF PROCEDURE: 07/17/2025     PRE-OP DIAGNOSIS:   Grade 1, Endometrioid adenocarcinoma   Acute on chronic systolic CHF  Coronary artery disease, with stents  Paroxysmal angina  Long-term anticoagulation   Paroxysmal atrial fibrillation  Poorly controlled Insulin dependent diabetes mellitus  Stage 3b CKD  Poorly controlled Asthma / COPD  Peripheral artery disease  THC smoking dependence  Class I obesity (BMI 31)      POST-OP DIAGNOSIS:   Grade 1, Endometrioid adenocarcinoma   Acute on chronic systolic CHF  Coronary artery disease, with stents  Paroxysmal angina  Long-term anticoagulation   Paroxysmal atrial fibrillation  Poorly controlled Insulin dependent diabetes mellitus  Stage 3b CKD  Poorly controlled Asthma / COPD  Peripheral artery disease  THC smoking dependence  Class I obesity (BMI 31)       PROCEDURE:  Robotic assisted Hysterectomy + Bilateral Salpingo-Oophorectomy + ICG Intracervix dye Injection + Bilateral pelvic lymph node dissection     SURGEON: Sunita Elias MD     ASSISTANT: MD Brooke Gonzalez DO     ANESTHESIA: General     ESTIMATED BLOOD LOSS (mL): 50cc     COMPLICATIONS: None noted at the end of the procedure     SPECIMENS:   ID Type Source Tests Collected by Time   A : IUD FOR GROSS ONLY Hardware Hardware SURGICAL PATHOLOGY Sunita Elias MD 07/17/2025 0821   B : Pelvic Washings Body Fluid Pelvic Washings CYTOLOGY, NON-GYN Sunita Elias MD 07/17/2025 0857   C : Uterus, Cervix, Bilateral Fallopian Tubes, and Ovaries Tissue Uterus SURGICAL PATHOLOGY Sunita Elias MD 07/17/2025 0955   D : Right Pelvic Lymph Nodes Tissue Lymph Node SURGICAL PATHOLOGY Sunita Elias MD 07/17/2025 1117   E : Left Pelvic Lymph Nodes Tissue Lymph Node SURGICAL PATHOLOGY Sunita Elias MD 07/17/2025 1150      INDICATIONS:  65yo in menopause (age 45), with symptoms of post-menopausal bleeding.  Dilation & Curettage, operative hysteroscopy and Liletta IUD placement, demonstrates findings of a Grade 1 Endometrioid adenocarcinoma (03/02/25), by Dr. Shanelle Ruiz. Recommendations were made to proceed with surgical management. She has been advised of the potential risks, benefits and alternatives to the procedure. Informed consent was obtained     FINDINGS:   6cm uterine sound, with normal serosa. <0.5cm pink, raised nodule was noted on the surface of the RIGHT ovary. Normal appearing bilateral fallopian tubes and LEFT ovary was noted. There was no evidence of ascites. The omentum, visualized portions of the small and large bowel were normal appearing. The diaphragm, stomach and liver appeared smooth. No evidence of lymphadenopathy.     DETAILED DESCRIPTION OF PROCEDURE:   The patient was identified and transported to the operating room with IV fluids running. Prophylactic Ancef, Flagyl and Lovenox were administered prior to the procedure. Appropriate monitors were affixed. Bilateral SCDs were on and in place prior to induction of anesthesia. Following induction of general anesthesia, she was intubated without incident, by the anesthesia team. She was placed in the dorsal lithotomy position. She was properly secured prepped and draped in the normal sterile fashion. A surgical timeout was taken to verify the correct patient, procedure, site and precautions. Indocyanine Green (ICG) dye was diluted with 10cc sterile water. 1cc was injected at each site superficial and deep into the cervix at the 3o'clock and 9o'clock positions (2cc total). The uterine manipulator, jeronimo catheter and OG tube was placed and left in place for the duration of the procedure.     A veress needle was introduced into the abdomen in the left upper quadrant at Mcdaniel's point. Entering pressure was low and rodolfo appropriately with CO2 insufflation. A 5mm trocar was introduced into the abdominal cavity under optivue visualization. There were

## 2025-07-20 ENCOUNTER — APPOINTMENT (OUTPATIENT)
Dept: CT IMAGING | Age: 65
DRG: 683 | End: 2025-07-20
Payer: MEDICARE

## 2025-07-20 ENCOUNTER — HOSPITAL ENCOUNTER (OUTPATIENT)
Age: 65
Setting detail: OBSERVATION
Discharge: HOME | DRG: 683 | End: 2025-07-21
Attending: EMERGENCY MEDICINE | Admitting: EMERGENCY MEDICINE
Payer: MEDICARE

## 2025-07-20 ENCOUNTER — APPOINTMENT (OUTPATIENT)
Dept: GENERAL RADIOLOGY | Age: 65
DRG: 683 | End: 2025-07-20
Payer: MEDICARE

## 2025-07-20 DIAGNOSIS — I50.23 ACUTE ON CHRONIC SYSTOLIC CHF (CONGESTIVE HEART FAILURE) (HCC): ICD-10-CM

## 2025-07-20 DIAGNOSIS — N39.0 ACUTE UTI: Primary | ICD-10-CM

## 2025-07-20 DIAGNOSIS — R73.9 HYPERGLYCEMIA: ICD-10-CM

## 2025-07-20 LAB
ALBUMIN SERPL-MCNC: 3.8 G/DL (ref 3.5–5.2)
ALBUMIN/GLOB SERPL: 1.1 {RATIO} (ref 1–2.5)
ALP SERPL-CCNC: 56 U/L (ref 35–104)
ALT SERPL-CCNC: 14 U/L (ref 10–35)
ANION GAP SERPL CALCULATED.3IONS-SCNC: 19 MMOL/L (ref 9–16)
AST SERPL-CCNC: 37 U/L (ref 10–35)
B-OH-BUTYR SERPL-MCNC: 0.55 MMOL/L (ref 0.02–0.27)
BACTERIA URNS QL MICRO: NORMAL
BASOPHILS # BLD: 0.07 K/UL (ref 0–0.2)
BASOPHILS NFR BLD: 1 % (ref 0–2)
BILIRUB SERPL-MCNC: 0.5 MG/DL (ref 0–1.2)
BILIRUB UR QL STRIP: NEGATIVE
BODY TEMPERATURE: 37
BUN SERPL-MCNC: 39 MG/DL (ref 8–23)
CALCIUM SERPL-MCNC: 9.6 MG/DL (ref 8.6–10.4)
CASTS #/AREA URNS LPF: NORMAL /LPF (ref 0–8)
CHLORIDE SERPL-SCNC: 101 MMOL/L (ref 98–107)
CLARITY UR: ABNORMAL
CO2 SERPL-SCNC: 16 MMOL/L (ref 20–31)
COHGB MFR BLD: 2.2 % (ref 0–5)
COLOR UR: YELLOW
CREAT SERPL-MCNC: 1.8 MG/DL (ref 0.6–0.9)
EOSINOPHIL # BLD: 0.13 K/UL (ref 0–0.44)
EOSINOPHILS RELATIVE PERCENT: 1 % (ref 1–4)
EPI CELLS #/AREA URNS HPF: NORMAL /HPF (ref 0–5)
ERYTHROCYTE [DISTWIDTH] IN BLOOD BY AUTOMATED COUNT: 15.3 % (ref 11.8–14.4)
FIO2 ON VENT: ABNORMAL %
GFR, ESTIMATED: 31 ML/MIN/1.73M2
GLUCOSE BLD-MCNC: 210 MG/DL (ref 65–105)
GLUCOSE SERPL-MCNC: 201 MG/DL (ref 74–99)
GLUCOSE UR STRIP-MCNC: NEGATIVE MG/DL
HCO3 VENOUS: 19.5 MMOL/L (ref 24–30)
HCT VFR BLD AUTO: 38.1 % (ref 36.3–47.1)
HGB BLD-MCNC: 12.1 G/DL (ref 11.9–15.1)
HGB UR QL STRIP.AUTO: ABNORMAL
IMM GRANULOCYTES # BLD AUTO: 0.13 K/UL (ref 0–0.3)
IMM GRANULOCYTES NFR BLD: 1 %
KETONES UR STRIP-MCNC: NEGATIVE MG/DL
LEUKOCYTE ESTERASE UR QL STRIP: ABNORMAL
LYMPHOCYTES NFR BLD: 1.44 K/UL (ref 1.1–3.7)
LYMPHOCYTES RELATIVE PERCENT: 16 % (ref 24–43)
MCH RBC QN AUTO: 28.2 PG (ref 25.2–33.5)
MCHC RBC AUTO-ENTMCNC: 31.8 G/DL (ref 28.4–34.8)
MCV RBC AUTO: 88.8 FL (ref 82.6–102.9)
MONOCYTES NFR BLD: 0.65 K/UL (ref 0.1–1.2)
MONOCYTES NFR BLD: 7 % (ref 3–12)
NEGATIVE BASE EXCESS, VEN: 3.1 MMOL/L (ref 0–2)
NEUTROPHILS NFR BLD: 74 % (ref 36–65)
NEUTS SEG NFR BLD: 6.61 K/UL (ref 1.5–8.1)
NITRITE UR QL STRIP: NEGATIVE
NRBC BLD-RTO: 0 PER 100 WBC
O2 SAT, VEN: 94.7 % (ref 60–85)
PCO2 VENOUS: 28.5 MM HG (ref 39–55)
PH UR STRIP: 5.5 [PH] (ref 5–8)
PH VENOUS: 7.45 (ref 7.32–7.42)
PLATELET # BLD AUTO: 262 K/UL (ref 138–453)
PMV BLD AUTO: 10.5 FL (ref 8.1–13.5)
PO2 VENOUS: 68.1 MM HG (ref 30–50)
POTASSIUM SERPL-SCNC: 3.8 MMOL/L (ref 3.7–5.3)
PROT SERPL-MCNC: 7.3 G/DL (ref 6.6–8.7)
PROT UR STRIP-MCNC: ABNORMAL MG/DL
RBC # BLD AUTO: 4.29 M/UL (ref 3.95–5.11)
RBC # BLD: ABNORMAL 10*6/UL
RBC #/AREA URNS HPF: NORMAL /HPF (ref 0–4)
SODIUM SERPL-SCNC: 136 MMOL/L (ref 136–145)
SP GR UR STRIP: 1.01 (ref 1–1.03)
TROPONIN I SERPL HS-MCNC: 21 NG/L (ref 0–14)
TROPONIN I SERPL HS-MCNC: 23 NG/L (ref 0–14)
TSH SERPL DL<=0.05 MIU/L-ACNC: 0.98 UIU/ML (ref 0.27–4.2)
UROBILINOGEN UR STRIP-ACNC: NORMAL EU/DL (ref 0–1)
WBC #/AREA URNS HPF: NORMAL /HPF (ref 0–5)
WBC OTHER # BLD: 9 K/UL (ref 3.5–11.3)

## 2025-07-20 PROCEDURE — 87086 URINE CULTURE/COLONY COUNT: CPT

## 2025-07-20 PROCEDURE — 36415 COLL VENOUS BLD VENIPUNCTURE: CPT

## 2025-07-20 PROCEDURE — 82947 ASSAY GLUCOSE BLOOD QUANT: CPT

## 2025-07-20 PROCEDURE — 96375 TX/PRO/DX INJ NEW DRUG ADDON: CPT

## 2025-07-20 PROCEDURE — 6360000004 HC RX CONTRAST MEDICATION: Performed by: EMERGENCY MEDICINE

## 2025-07-20 PROCEDURE — 99285 EMERGENCY DEPT VISIT HI MDM: CPT

## 2025-07-20 PROCEDURE — 80053 COMPREHEN METABOLIC PANEL: CPT

## 2025-07-20 PROCEDURE — 96374 THER/PROPH/DIAG INJ IV PUSH: CPT

## 2025-07-20 PROCEDURE — 82805 BLOOD GASES W/O2 SATURATION: CPT

## 2025-07-20 PROCEDURE — G0378 HOSPITAL OBSERVATION PER HR: HCPCS

## 2025-07-20 PROCEDURE — 6370000000 HC RX 637 (ALT 250 FOR IP)

## 2025-07-20 PROCEDURE — 82010 KETONE BODYS QUAN: CPT

## 2025-07-20 PROCEDURE — 81001 URINALYSIS AUTO W/SCOPE: CPT

## 2025-07-20 PROCEDURE — 96361 HYDRATE IV INFUSION ADD-ON: CPT

## 2025-07-20 PROCEDURE — 71045 X-RAY EXAM CHEST 1 VIEW: CPT

## 2025-07-20 PROCEDURE — 2580000003 HC RX 258

## 2025-07-20 PROCEDURE — 84484 ASSAY OF TROPONIN QUANT: CPT

## 2025-07-20 PROCEDURE — 85025 COMPLETE CBC W/AUTO DIFF WBC: CPT

## 2025-07-20 PROCEDURE — 71260 CT THORAX DX C+: CPT

## 2025-07-20 PROCEDURE — 6360000002 HC RX W HCPCS

## 2025-07-20 PROCEDURE — 84443 ASSAY THYROID STIM HORMONE: CPT

## 2025-07-20 PROCEDURE — 93005 ELECTROCARDIOGRAM TRACING: CPT | Performed by: EMERGENCY MEDICINE

## 2025-07-20 RX ORDER — ATORVASTATIN CALCIUM 40 MG/1
40 TABLET, FILM COATED ORAL DAILY
Status: DISCONTINUED | OUTPATIENT
Start: 2025-07-21 | End: 2025-07-21 | Stop reason: HOSPADM

## 2025-07-20 RX ORDER — FUROSEMIDE 20 MG/1
40 TABLET ORAL DAILY
Status: DISCONTINUED | OUTPATIENT
Start: 2025-07-21 | End: 2025-07-21

## 2025-07-20 RX ORDER — CEPHALEXIN 500 MG/1
500 CAPSULE ORAL EVERY 12 HOURS SCHEDULED
Status: DISCONTINUED | OUTPATIENT
Start: 2025-07-20 | End: 2025-07-21 | Stop reason: HOSPADM

## 2025-07-20 RX ORDER — ONDANSETRON 2 MG/ML
4 INJECTION INTRAMUSCULAR; INTRAVENOUS EVERY 6 HOURS PRN
Status: DISCONTINUED | OUTPATIENT
Start: 2025-07-20 | End: 2025-07-21 | Stop reason: HOSPADM

## 2025-07-20 RX ORDER — LISINOPRIL 10 MG/1
5 TABLET ORAL DAILY
Status: DISCONTINUED | OUTPATIENT
Start: 2025-07-21 | End: 2025-07-21 | Stop reason: HOSPADM

## 2025-07-20 RX ORDER — POLYETHYLENE GLYCOL 3350 17 G/17G
17 POWDER, FOR SOLUTION ORAL DAILY PRN
Status: DISCONTINUED | OUTPATIENT
Start: 2025-07-20 | End: 2025-07-21 | Stop reason: HOSPADM

## 2025-07-20 RX ORDER — BACLOFEN 10 MG/1
10 TABLET ORAL ONCE
Status: COMPLETED | OUTPATIENT
Start: 2025-07-20 | End: 2025-07-20

## 2025-07-20 RX ORDER — ONDANSETRON 2 MG/ML
4 INJECTION INTRAMUSCULAR; INTRAVENOUS ONCE
Status: COMPLETED | OUTPATIENT
Start: 2025-07-20 | End: 2025-07-20

## 2025-07-20 RX ORDER — 0.9 % SODIUM CHLORIDE 0.9 %
1000 INTRAVENOUS SOLUTION INTRAVENOUS ONCE
Status: COMPLETED | OUTPATIENT
Start: 2025-07-20 | End: 2025-07-20

## 2025-07-20 RX ORDER — LORAZEPAM 2 MG/ML
0.5 INJECTION INTRAMUSCULAR ONCE
Status: COMPLETED | OUTPATIENT
Start: 2025-07-20 | End: 2025-07-20

## 2025-07-20 RX ORDER — SODIUM CHLORIDE 9 MG/ML
INJECTION, SOLUTION INTRAVENOUS PRN
Status: DISCONTINUED | OUTPATIENT
Start: 2025-07-20 | End: 2025-07-21 | Stop reason: HOSPADM

## 2025-07-20 RX ORDER — POTASSIUM CHLORIDE 1500 MG/1
40 TABLET, EXTENDED RELEASE ORAL PRN
Status: DISCONTINUED | OUTPATIENT
Start: 2025-07-20 | End: 2025-07-21 | Stop reason: HOSPADM

## 2025-07-20 RX ORDER — ACETAMINOPHEN 650 MG/1
650 SUPPOSITORY RECTAL EVERY 6 HOURS PRN
Status: DISCONTINUED | OUTPATIENT
Start: 2025-07-20 | End: 2025-07-21 | Stop reason: HOSPADM

## 2025-07-20 RX ORDER — CLOPIDOGREL BISULFATE 75 MG/1
75 TABLET ORAL DAILY
Status: DISCONTINUED | OUTPATIENT
Start: 2025-07-21 | End: 2025-07-21 | Stop reason: HOSPADM

## 2025-07-20 RX ORDER — CEPHALEXIN 500 MG/1
500 CAPSULE ORAL ONCE
Status: DISCONTINUED | OUTPATIENT
Start: 2025-07-20 | End: 2025-07-20

## 2025-07-20 RX ORDER — IOPAMIDOL 755 MG/ML
75 INJECTION, SOLUTION INTRAVASCULAR
Status: COMPLETED | OUTPATIENT
Start: 2025-07-20 | End: 2025-07-20

## 2025-07-20 RX ORDER — BACLOFEN 10 MG/1
10 TABLET ORAL DAILY PRN
Status: DISCONTINUED | OUTPATIENT
Start: 2025-07-20 | End: 2025-07-21 | Stop reason: HOSPADM

## 2025-07-20 RX ORDER — ACETAMINOPHEN 325 MG/1
650 TABLET ORAL EVERY 6 HOURS PRN
Status: DISCONTINUED | OUTPATIENT
Start: 2025-07-20 | End: 2025-07-21 | Stop reason: HOSPADM

## 2025-07-20 RX ORDER — SODIUM CHLORIDE 0.9 % (FLUSH) 0.9 %
5-40 SYRINGE (ML) INJECTION EVERY 12 HOURS SCHEDULED
Status: DISCONTINUED | OUTPATIENT
Start: 2025-07-20 | End: 2025-07-21 | Stop reason: HOSPADM

## 2025-07-20 RX ORDER — ONDANSETRON 4 MG/1
4 TABLET, ORALLY DISINTEGRATING ORAL EVERY 8 HOURS PRN
Status: DISCONTINUED | OUTPATIENT
Start: 2025-07-20 | End: 2025-07-21 | Stop reason: HOSPADM

## 2025-07-20 RX ORDER — METOPROLOL TARTRATE 25 MG/1
12.5 TABLET, FILM COATED ORAL 2 TIMES DAILY
Status: DISCONTINUED | OUTPATIENT
Start: 2025-07-20 | End: 2025-07-21

## 2025-07-20 RX ORDER — POTASSIUM CHLORIDE 7.45 MG/ML
10 INJECTION INTRAVENOUS PRN
Status: DISCONTINUED | OUTPATIENT
Start: 2025-07-20 | End: 2025-07-21 | Stop reason: HOSPADM

## 2025-07-20 RX ORDER — MAGNESIUM SULFATE IN WATER 40 MG/ML
2000 INJECTION, SOLUTION INTRAVENOUS PRN
Status: DISCONTINUED | OUTPATIENT
Start: 2025-07-20 | End: 2025-07-21 | Stop reason: HOSPADM

## 2025-07-20 RX ORDER — BUSPIRONE HYDROCHLORIDE 15 MG/1
30 TABLET ORAL 3 TIMES DAILY PRN
Status: DISCONTINUED | OUTPATIENT
Start: 2025-07-20 | End: 2025-07-21 | Stop reason: HOSPADM

## 2025-07-20 RX ORDER — SODIUM CHLORIDE 0.9 % (FLUSH) 0.9 %
5-40 SYRINGE (ML) INJECTION PRN
Status: DISCONTINUED | OUTPATIENT
Start: 2025-07-20 | End: 2025-07-21 | Stop reason: HOSPADM

## 2025-07-20 RX ORDER — FAMOTIDINE 20 MG/1
20 TABLET, FILM COATED ORAL 2 TIMES DAILY
Status: DISCONTINUED | OUTPATIENT
Start: 2025-07-20 | End: 2025-07-21 | Stop reason: HOSPADM

## 2025-07-20 RX ADMIN — IOPAMIDOL 75 ML: 755 INJECTION, SOLUTION INTRAVENOUS at 19:58

## 2025-07-20 RX ADMIN — ONDANSETRON 4 MG: 2 INJECTION INTRAMUSCULAR; INTRAVENOUS at 16:40

## 2025-07-20 RX ADMIN — Medication 0.5 MG: at 16:40

## 2025-07-20 RX ADMIN — SODIUM CHLORIDE 1000 ML: 9 INJECTION, SOLUTION INTRAVENOUS at 16:41

## 2025-07-20 RX ADMIN — BACLOFEN 10 MG: 10 TABLET ORAL at 18:39

## 2025-07-20 ASSESSMENT — PAIN - FUNCTIONAL ASSESSMENT: PAIN_FUNCTIONAL_ASSESSMENT: 0-10

## 2025-07-20 ASSESSMENT — PAIN SCALES - GENERAL: PAINLEVEL_OUTOF10: 4

## 2025-07-20 ASSESSMENT — LIFESTYLE VARIABLES
HOW OFTEN DO YOU HAVE A DRINK CONTAINING ALCOHOL: NEVER
HOW MANY STANDARD DRINKS CONTAINING ALCOHOL DO YOU HAVE ON A TYPICAL DAY: PATIENT DOES NOT DRINK

## 2025-07-20 NOTE — ED PROVIDER NOTES
[facility]     Emergency Department     Faculty Note/ Attestation      Pt Name: Nancy L Haase                                       MRN: [id number]  Birthdate 1960  Date of evaluation: 7/20/2025    Patients PCP:    Scar Reyes MD    Note Started: 4:38 PM EDT      Attestation  I performed a history and physical examination of the patient and discussed management with the resident. I reviewed the resident’s note and agree with the documented findings and plan of care. Any areas of disagreement are noted on the chart. I was personally present for the key portions of any procedures. I have documented in the chart those procedures where I was not present during the key portions. I have reviewed the emergency nurses triage note. I agree with the chief complaint, past medical history, past surgical history, allergies, medications, social and family history as documented unless otherwise noted below.    For Physician Assistant/ Nurse Practitioner cases/documentation I have personally evaluated this patient and have completed at least one if not all key elements of the E/M (history, physical exam, and MDM). Additional findings are as noted.      Initial Screens:        [place] Coma Scale  Eye Opening: Spontaneous  Best Verbal Response: Oriented  Best Motor Response: Obeys commands  [place] Coma Scale Score: 15    Vitals:    Vitals:    07/20/25 1559   BP: (!) 144/83   Pulse: 95   Resp: 20   Temp: 97.5 °F (36.4 °C)   SpO2: 98%       CHIEF COMPLAINT       Chief Complaint   Patient presents with    Panic Attack     States the oxy for pain is making her sugar go high             DIAGNOSTIC RESULTS             RADIOLOGY:   No orders to display         LABS:  Labs Reviewed   POC GLUCOSE FINGERSTICK - Abnormal; Notable for the following components:       Result Value    POC Glucose 210 (*)     All other components within normal limits   CBC WITH AUTO DIFFERENTIAL   COMPREHENSIVE METABOLIC PANEL

## 2025-07-20 NOTE — ED TRIAGE NOTES
Pt presents to ED room 19 from triage c/o hyperglycemia and heightened anxiety. Pt reports that she recently had a uterine biopsy and is awaiting the results at this time. Pt reports that she has been under a lot of stress lately and has noticed her BS rising. Pt reports that she has been taking oxycodone for the post-operative pain and has noticed an increase of her anxiety after taking this medication. Pt has been taking tylenol for her pain with relief.

## 2025-07-20 NOTE — ED PROVIDER NOTES
[facility] EMERGENCY DEPARTMENT  Emergency Department Encounter  Emergency Medicine Resident     Pt Name:Nancy L Haase  MRN: [id number]  Birthdate 1960  Date of evaluation: 7/20/25  PCP:  Scar Reyes MD  Note Started: 4:05 PM EDT      CHIEF COMPLAINT       Chief Complaint   Patient presents with    Panic Attack     States the oxy for pain is making her sugar go high       HISTORY OF PRESENT ILLNESS  (Location/Symptom, Timing/Onset, Context/Setting, Quality, Duration, Modifying Factors, Severity.)      Nancy L Haase is a 64 y.o. female who presents with multiple medical complaints.  She has a history of T2DM on Trulicity and has had issues controlling her blood sugars recently.  They have been in the high 200s at home despite her taking her medications as prescribed.  She states that she has not been eating very well for fear of raising her blood sugar.  She had a hysterectomy several days ago and was taking oxycodone for pain but did not like the way it made her feel so she stopped.  Pain has been well-controlled with Tylenol.  She states that the elevated blood sugars have been making her anxious and she feels like she is having panic attacks frequently.  She has not been sleeping very well recently due to these concerns.  Anxiety symptoms include chest pain, shortness of breath, palpitations.    PAST MEDICAL / SURGICAL / SOCIAL / FAMILY HISTORY      has a past medical history of A-fib (HCC), Acute on chronic systolic CHF (congestive heart failure) (HCC), Anxiety, Asthma, CAD (coronary artery disease), CKD (chronic kidney disease), Colon polyp, COPD (chronic obstructive pulmonary disease) ([facility]), Diabetes mellitus (HCC), GERD (gastroesophageal reflux disease), Hearing loss, Hyperlipidemia, Hypertension, Ischemic colitis, MI (myocardial infarction) ([facility]), Mitral stenosis, Movement disorder, Neuropathy, PAD (peripheral artery disease), Tobacco abuse, Under care of service provider, Under care of  file   Intimate Partner Violence: Not on file   Housing Stability: Low Risk  (7/17/2025)    Housing Stability Vital Sign     Unable to Pay for Housing in the Last Year: No     Number of Times Moved in the Last Year: 1     Homeless in the Last Year: No       Family History   Problem Relation Age of Onset    Hypertension Mother     Hypertension Father     Diabetes Sister     Diabetes Brother     Stroke Brother        Allergies:  Codeine and Morphine    Home Medications:  Prior to Admission medications    Medication Sig Start Date End Date Taking? Authorizing Provider   acetaminophen (TYLENOL) 500 MG tablet Take 2 tablets by mouth every 6 hours as needed for Pain 7/17/25   Brooke Mas DO   oxyCODONE (ROXICODONE) 5 MG immediate release tablet Take 1 tablet by mouth every 6 hours as needed for Pain for up to 5 days. Intended supply: 3 days. Take lowest dose possible to manage pain Max Daily Amount: 20 mg 7/17/25 7/22/25  Brooke Mas DO   sennosides-docusate sodium (SENOKOT-S) 8.6-50 MG tablet Take 2 tablets by mouth in the morning and at bedtime 7/17/25   Brooke Mas DO   SPIRIVA RESPIMAT 2.5 MCG/ACT AERS inhaler Inhale 2 puffs into the lungs daily 6/23/25   Provider, MD Dia   nystatin (MYCOSTATIN) 400192 UNIT/GM powder Apply 3 times daily. 6/16/25   Lori Baez PA-C   megestrol (MEGACE) 40 MG/ML suspension Take 4 mLs by mouth 2 times daily 5/20/25   Lori Baez PA-C   megestrol (MEGACE) 40 MG tablet Take 4 tablets by mouth 2 times daily 5/2/25 4/27/26  Sunita Elias MD   lisinopril (PRINIVIL;ZESTRIL) 5 MG tablet Take 1 tablet by mouth daily    Provider, MD Dia   ferrous sulfate (FE TABS 325) 325 (65 Fe) MG EC tablet Take 1 tablet by mouth daily (with breakfast) 3/9/25   Jeremy Michelle MD   nitroGLYCERIN (NITROSTAT) 0.4 MG SL tablet up to max of 3 total doses. If no relief after 1 dose, call 911. 9/13/24   Kimani Mccarty DO   baclofen (LIORESAL) 10 MG tablet Take 1 tablet by mouth

## 2025-07-21 VITALS
HEART RATE: 80 BPM | DIASTOLIC BLOOD PRESSURE: 87 MMHG | TEMPERATURE: 98.1 F | RESPIRATION RATE: 23 BRPM | SYSTOLIC BLOOD PRESSURE: 141 MMHG | BODY MASS INDEX: 35.07 KG/M2 | OXYGEN SATURATION: 97 % | WEIGHT: 236.77 LBS | HEIGHT: 69 IN

## 2025-07-21 PROBLEM — I25.10 CORONARY ARTERY CALCIFICATION: Status: ACTIVE | Noted: 2025-07-21

## 2025-07-21 PROBLEM — I25.10 CORONARY ARTERY CALCIFICATION: Status: RESOLVED | Noted: 2025-07-21 | Resolved: 2025-07-21

## 2025-07-21 LAB
EKG ATRIAL RATE: 98 BPM
EKG Q-T INTERVAL: 342 MS
EKG QRS DURATION: 66 MS
EKG QTC CALCULATION (BAZETT): 436 MS
EKG R AXIS: 41 DEGREES
EKG T AXIS: 33 DEGREES
EKG VENTRICULAR RATE: 98 BPM
GLUCOSE BLD-MCNC: 136 MG/DL (ref 65–105)
GLUCOSE BLD-MCNC: 153 MG/DL (ref 65–105)
GLUCOSE BLD-MCNC: 167 MG/DL (ref 65–105)
MICROORGANISM SPEC CULT: NO GROWTH
SPECIMEN DESCRIPTION: NORMAL

## 2025-07-21 PROCEDURE — 2500000003 HC RX 250 WO HCPCS

## 2025-07-21 PROCEDURE — G0378 HOSPITAL OBSERVATION PER HR: HCPCS

## 2025-07-21 PROCEDURE — 6370000000 HC RX 637 (ALT 250 FOR IP)

## 2025-07-21 PROCEDURE — 93010 ELECTROCARDIOGRAM REPORT: CPT | Performed by: INTERNAL MEDICINE

## 2025-07-21 PROCEDURE — 96375 TX/PRO/DX INJ NEW DRUG ADDON: CPT

## 2025-07-21 PROCEDURE — 99223 1ST HOSP IP/OBS HIGH 75: CPT | Performed by: INTERNAL MEDICINE

## 2025-07-21 PROCEDURE — 6370000000 HC RX 637 (ALT 250 FOR IP): Performed by: STUDENT IN AN ORGANIZED HEALTH CARE EDUCATION/TRAINING PROGRAM

## 2025-07-21 PROCEDURE — 82947 ASSAY GLUCOSE BLOOD QUANT: CPT

## 2025-07-21 PROCEDURE — 6360000002 HC RX W HCPCS: Performed by: STUDENT IN AN ORGANIZED HEALTH CARE EDUCATION/TRAINING PROGRAM

## 2025-07-21 RX ORDER — CARVEDILOL 3.12 MG/1
6.25 TABLET ORAL 2 TIMES DAILY WITH MEALS
Status: DISCONTINUED | OUTPATIENT
Start: 2025-07-21 | End: 2025-07-21 | Stop reason: HOSPADM

## 2025-07-21 RX ORDER — CEPHALEXIN 500 MG/1
500 CAPSULE ORAL 2 TIMES DAILY
Qty: 12 CAPSULE | Refills: 0 | Status: SHIPPED | OUTPATIENT
Start: 2025-07-21 | End: 2025-07-27

## 2025-07-21 RX ORDER — CARVEDILOL 6.25 MG/1
6.25 TABLET ORAL 2 TIMES DAILY WITH MEALS
Qty: 60 TABLET | Refills: 3 | Status: SHIPPED | OUTPATIENT
Start: 2025-07-21

## 2025-07-21 RX ORDER — FUROSEMIDE 10 MG/ML
40 INJECTION INTRAMUSCULAR; INTRAVENOUS DAILY
Status: DISCONTINUED | OUTPATIENT
Start: 2025-07-21 | End: 2025-07-21 | Stop reason: HOSPADM

## 2025-07-21 RX ADMIN — METOPROLOL TARTRATE 12.5 MG: 25 TABLET, FILM COATED ORAL at 00:28

## 2025-07-21 RX ADMIN — CARVEDILOL 6.25 MG: 3.12 TABLET, FILM COATED ORAL at 10:42

## 2025-07-21 RX ADMIN — CEPHALEXIN 500 MG: 500 CAPSULE ORAL at 09:30

## 2025-07-21 RX ADMIN — BUSPIRONE HYDROCHLORIDE 30 MG: 15 TABLET ORAL at 00:26

## 2025-07-21 RX ADMIN — ACETAMINOPHEN 650 MG: 325 TABLET ORAL at 09:30

## 2025-07-21 RX ADMIN — BACLOFEN 10 MG: 10 TABLET ORAL at 04:46

## 2025-07-21 RX ADMIN — CEPHALEXIN 500 MG: 500 CAPSULE ORAL at 00:26

## 2025-07-21 RX ADMIN — ONDANSETRON 4 MG: 4 TABLET, ORALLY DISINTEGRATING ORAL at 09:30

## 2025-07-21 RX ADMIN — FUROSEMIDE 40 MG: 10 INJECTION, SOLUTION INTRAMUSCULAR; INTRAVENOUS at 10:42

## 2025-07-21 RX ADMIN — ACETAMINOPHEN 650 MG: 325 TABLET ORAL at 04:46

## 2025-07-21 RX ADMIN — SODIUM CHLORIDE, PRESERVATIVE FREE 10 ML: 5 INJECTION INTRAVENOUS at 09:32

## 2025-07-21 RX ADMIN — SODIUM CHLORIDE, PRESERVATIVE FREE 10 ML: 5 INJECTION INTRAVENOUS at 00:30

## 2025-07-21 ASSESSMENT — PAIN DESCRIPTION - LOCATION: LOCATION: FINGER (COMMENT WHICH ONE);HAND

## 2025-07-21 ASSESSMENT — PAIN SCALES - GENERAL
PAINLEVEL_OUTOF10: 10
PAINLEVEL_OUTOF10: 0
PAINLEVEL_OUTOF10: 6
PAINLEVEL_OUTOF10: 0
PAINLEVEL_OUTOF10: 0

## 2025-07-21 ASSESSMENT — PAIN - FUNCTIONAL ASSESSMENT: PAIN_FUNCTIONAL_ASSESSMENT: PREVENTS OR INTERFERES SOME ACTIVE ACTIVITIES AND ADLS

## 2025-07-21 ASSESSMENT — PAIN DESCRIPTION - DESCRIPTORS: DESCRIPTORS: ACHING

## 2025-07-21 ASSESSMENT — PAIN DESCRIPTION - ORIENTATION: ORIENTATION: RIGHT;LEFT

## 2025-07-21 NOTE — DISCHARGE SUMMARY
CDU Discharge Summary        Patient:  Nancy L Haase  YOB: 1960    MRN: [id number]   Acct: [id number]    Primary Care Physician: Scar Reyes MD    Admit date:  7/20/2025  4:02 PM  Discharge date: 7/21/2025  2:32 PM     Discharge Diagnoses:     1.)  Patient had urinary tract infection with a history of diabetes insipidus treated with cephalexin for 7 days..  Patient's symptoms are improving with the plan to discharge home.    Follow-up:  Call today/tomorrow for a follow up appointment with Scar Reyes MD , or return to the Emergency Room with worsening symptoms    Stressed to patient the importance of following up with primary care doctor for further workup/management of symptoms.  Pt verbalizes understanding and agrees with plan.    Discharge Medication Changes:       Medication List        START taking these medications      carvedilol 6.25 MG tablet  Commonly known as: COREG  Take 1 tablet by mouth 2 times daily (with meals)     cephALEXin 500 MG capsule  Commonly known as: KEFLEX  Take 1 capsule by mouth 2 times daily for 6 days            CHANGE how you take these medications      megestrol 40 MG tablet  Commonly known as: MEGACE  Take 4 tablets by mouth 2 times daily  What changed: Another medication with the same name was removed. Continue taking this medication, and follow the directions you see here.            CONTINUE taking these medications      * albuterol sulfate  (90 Base) MCG/ACT inhaler  Commonly known as: PROVENTIL;VENTOLIN;PROAIR     * albuterol (5 MG/ML) 0.5% nebulizer solution  Commonly known as: PROVENTIL  Take 0.5 mLs by nebulization every 6 hours as needed for Wheezing     apixaban 5 MG Tabs tablet  Commonly known as: Eliquis  Take 1 tablet by mouth 2 times daily     atorvastatin 40 MG tablet  Commonly known as: Lipitor  Take 1 tablet by mouth daily     blood glucose test strips  Test 3 times a day & as needed for symptoms of irregular blood glucose.

## 2025-07-21 NOTE — PROGRESS NOTES
[facility]  CDU / OBSERVATION ENCOUNTER  ATTENDING NOTE       I performed a history and physical examination of the patient and discussed management with the resident or midlevel provider. I reviewed the resident or midlevel provider's note and agree with the documented findings and plan of care. Any areas of disagreement are noted on the chart. I was personally present for the key portions of any procedures. I have documented in the chart those procedures where I was not present during the key portions. I have reviewed the nurses notes. I agree with the chief complaint, past medical history, past surgical history, allergies, medications, social and family history as documented unless otherwise noted below.    The Family history, social history, and ROS are effectively unchanged since admission unless noted elsewhere in the chart.    This patient was placed in the observation unit for reevaluation for possible admission to the hospital    The patient is a 64-year-old female with history of A-fib on Eliquis, CHF, CAD, CKD, COPD, hypertension, hyperlipidemia, GERD, type 2 diabetes, and recent hysterectomy who presents for evaluation anxiety, heart palpitations, chest pain, shortness of breath and hyperglycemia.  The patient underwent her hysterectomy on 7/17/2025 and has been recovering well.  She is off of Percocet and her pain has been well-controlled with Tylenol.  The patient presented to the ER secondary to concerns that her blood sugar was elevated despite being on Trulicity.  In the ER blood sugars were initially in the 200s but came down with treatment.  ED workup was grossly unremarkable except for a UTI and she is being treated with Keflex.  Her CKD is unchanged.  She was placed in the observation unit for cardiology consult.    The patient feels improved this morning. She thinks that she had a panic attack. We are awaiting cardiology recommendations.     Makeda Cruz DO,

## 2025-07-21 NOTE — ED NOTES
ED to inpatient nurses report      Chief Complaint:  Chief Complaint   Patient presents with    Panic Attack     States the oxy for pain is making her sugar go high     Present to ED from: home     MOA:     LOC: alert and orientated to name, place, date  Mobility: Independent  Oxygen Baseline: RA    Current needs required: RA   Pending ED orders: none   Present condition: stable     Why did the patient come to the ED? Pt presents to ED room 19 from triage c/o hyperglycemia and heightened anxiety. Pt reports that she recently had a uterine biopsy and is awaiting the results at this time. Pt reports that she has been under a lot of stress lately and has noticed her BS rising. Pt reports that she has been taking oxycodone for the post-operative pain and has noticed an increase of her anxiety after taking this medication. Pt has been taking tylenol for her pain with relief.   What is the plan? Admit   Any procedures or intervention occur? Meds, IV, CT, Xray, Labs, consults   Any safety concerns?? None     Mental Status:       Psych Assessment:   Psychosocial  Psychosocial (WDL): Within Defined Limits  Vital signs   Vitals:    07/20/25 1932 07/20/25 1933 07/20/25 2005 07/20/25 2007   BP:   (!) 129/49    Pulse: 75 76 93 92   Resp: 15 17 24 24   Temp:       SpO2: 96% 96% 99% 100%        Vitals:  Patient Vitals for the past 24 hrs:   BP Temp Pulse Resp SpO2   07/20/25 2007 -- -- 92 24 100 %   07/20/25 2005 (!) 129/49 -- 93 24 99 %   07/20/25 1933 -- -- 76 17 96 %   07/20/25 1932 -- -- 75 15 96 %   07/20/25 1927 -- -- 89 20 96 %   07/20/25 1858 -- -- 94 17 100 %   07/20/25 1704 -- -- 99 17 97 %   07/20/25 1559 (!) 144/83 97.5 °F (36.4 °C) 95 20 98 %      Visit Vitals  BP (!) 129/49   Pulse 92   Temp 97.5 °F (36.4 °C)   Resp 24   SpO2 100%        LDAs:   Peripheral IV 07/20/25 Right Antecubital (Active)       Peripheral IV 07/20/25 Left Forearm (Active)       Ambulatory Status:  Presents to emergency department  because of

## 2025-07-21 NOTE — PROGRESS NOTES
Congestive Heart Failure Education completed and charted. CHF booklet given. Patient was receptive to education.    Discussed the  importance of medication compliance.    Discussed the importance of a heart healthy diet. Discussed 2000 mg sodium-restricted daily diet.  Patient instructed to limit fluid intake to  1.5 to 2 liters per day.    Patient instructed to weigh self at the same time of each day each morning, reinforced teaching to monitor for 3-5 lb weight increase over 1-2 days notify physician if change noted.      Signs and symptoms of CHF discussed with patient, such as feeling more tired than normal, feeling short of breath, coughing that increases when lying down, sudden weight gain, swelling of the feet, legs or belly.  Patient verbalized understanding to notify physician office if these symptoms occur.  EF 57%   Diastolic Dysfunction    Pt is agreeable to an outpatient CHF Clinic referral . Referral placed.

## 2025-07-21 NOTE — CONSULTS
[facility]    Consult               Today's Date: 7/21/2025  Patient Name: Nancy L Haase  Date of admission: 7/20/2025  4:02 PM  Patient's age: 64 y.o., 1960  Admission Dx: Hyperglycemia [R73.9]  Acute UTI [N39.0]    Requesting Physician: Zak Sullivan MD    Cardiac Evaluation Reason: Cardiac evaluation    History Obtained From: patient and chart review     History of Present Illness:    This patient 64 y.o. years old with past medical history given below. Patient presented to the emergency department due to elevated blood glucose.  Apparently patient reported some fatigue and shortness of breath.  Patient also reported getting panic attacks and anxiety.  Cardiology was consulted as patient was found to have coronary calcification on her CT and generally feeling unwell.    Past Medical History:   has a past medical history of A-fib ([facility]), Acute on chronic systolic CHF (congestive heart failure) ([facility]), Anxiety, Asthma, CAD (coronary artery disease), CKD (chronic kidney disease), Colon polyp, COPD (chronic obstructive pulmonary disease) ([facility]), Diabetes mellitus ([facility]), GERD (gastroesophageal reflux disease), Hearing loss, Hyperlipidemia, Hypertension, Ischemic colitis, MI (myocardial infarction) ([facility]), Mitral stenosis, Movement disorder, Neuropathy, PAD (peripheral artery disease), Tobacco abuse, Under care of service provider, Under care of service provider, Under care of service provider, Under care of service provider, Under care of service provider, Under care of service provider, Uterine cancer ([facility]), and Walking troubles.    Past Surgical History:   has a past surgical history that includes Coronary angioplasty with stent; Tonsillectomy; Inner ear surgery (Right); Coronary angioplasty with stent (02/11/2019); Dilation and curettage of uterus (N/A, 03/06/2025); Colonoscopy (N/A, 05/27/2025); Cystoscopy (N/A, 06/13/2025); Bladder surgery (N/A, 06/13/2025); Cystoscopy (N/A, 06/13/2025);  !EF%!  +----------------------------------------------------------------------+---+  !LV gram                                                               !55 !  +----------------------------------------------------------------------+---+     Procedure Data      ASSESSMENT:  Admitted to the hospital with UTI and uncontrolled diabetes  Hx of CAD s/p PTCA/JOSE MARTIN of RCA , LAD.  Mild disease in the RPDA and moderate disease in LCx on cardiac cath from 02/2019 s/p cardiac cath 10/2021 with in-stent restenosis of the RCA stent s/p PTCA/JOSE MARTIN  Hypertension  Hyperlipidemia  Paroxysmal atrial fibrillation  CKD  Moderate stenosis of mitral valve  COPD      RECOMMENDATIONS:  Continue Eliquis and Plavix  Continue Lipitor, lisinopril.  Will change metoprolol to Coreg for better control of blood pressure.  Go up on Coreg as tolerated  No further cardiac workup is indicated as an inpatient.  Patient can follow-up with cardiology as an outpatient.  Will continue to follow      Reba Love MD.  Fellow, cardiovascular disease   [facility], [place], [place].        Attending Physician Statement  I have discussed the case of Nancy L Haase including pertinent history and exam findings with the student/resident/fellow. I have seen and examined the patient and the key elements of the encounter have been performed by me. I agree with the assessment, plan and orders as documented by the resident With changes made to the note.     Electronically signed by Billie Le MD on 7/21/2025 at 12:33 PM.    [facility]      [phone]

## 2025-07-21 NOTE — H&P
[facility]  CDU / OBSERVATION ENCOUNTER  Physician NOTE     Pt Name: Nancy L Haase  MRN: [id number]  Birthdate 1960  Date of evaluation: 7/21/25  Patient's PCP is :  Scar Reyes MD    CHIEF COMPLAINT       Chief Complaint   Patient presents with    Panic Attack     States the oxy for pain is making her sugar go high         HISTORY OF PRESENT ILLNESS    Nancy L Haase is a 64 y.o. female who presents with multiple medical complaints.  She has a history of type 2 diabetes insipidus.  She has been concerns due to her blood sugar being elevated recently.  They have been into the 200s despite her taking her home medications.  She had a hysterectomy on 17 July.  They gave her oxycodone for pain.  She states that her pain is well-controlled but she feels like she has been having anxiety and associates it with the oxycodone.  She presents to the emergency department due to what she feels was a panic attack associated with chest pain, shortness of breath, and palpitations.    On further examination in the emergency department she is started on Keflex.  She was then admitted to observation to be evaluated by cardiology due to chest pain and shortness of breath.    History was obtained in part through review of the ED chart. When possible, a direct discussion was had with ED nurses, residents, and attendings  REVIEW OF SYSTEMS       General ROS - No fevers, No malaise   Ophthalmic ROS - No discharge, No changes in vision  ENT ROS -  No sore throat, No rhinorrhea,   Respiratory ROS - no cough, no  wheezing  Cardiovascular ROS - no dyspnea on exertion  Gastrointestinal ROS - no nausea or vomiting, no change in bowel habits, no black or bloody stools  Genito-Urinary ROS - No trouble voiding, or hematuria  Musculoskeletal ROS - No myalgias, No arthalgias  Neurological ROS - No headache, no dizziness/lightheadedness, No focal weakness, no loss of sensation  Dermatological ROS - No lesions, No rash  98.1 °F (36.7 °C). Her blood pressure is 141/87 (abnormal) and her pulse is 80. Her respiration is 23 and oxygen saturation is 97%.      CONSTITUTIONAL: AOx4, no apparent distress, appears stated age    HEAD: normocephalic, atraumatic   EYES: PERRLA, EOMI    ENT: moist mucous membranes, uvula midline   NECK: supple, symmetric   BACK: symmetric   LUNGS: clear to auscultation bilaterally   CARDIOVASCULAR: regular rate and rhythm, no murmurs, rubs or gallops   ABDOMEN: Well-healing skull surgical scars with Dermabond in place.  Scattered ecchymosis but soft nontender not peritoneal.     NEUROLOGIC:  MAEx4, no focal sensory or motor deficits   MUSCULOSKELETAL: no clubbing, cyanosis or edema   SKIN: no rash or wounds       DIFFERENTIAL DIAGNOSIS/MDM:     FROM ED MEDICAL DECISION MAKING NOTE:   Nancy L Haase is a 64 y.o. female who presents with multiple medical complaints.  She has a history of T2DM on Trulicity and has had issues controlling her blood sugars recently.  They have been in the high 200s at home despite her taking her medications as prescribed.  She states that she has not been eating very well for fear of raising her blood sugar.  She had a hysterectomy several days ago and was taking oxycodone for pain but did not like the way it made her feel so she stopped.  Pain has been well-controlled with Tylenol.  She states that the elevated blood sugars have been making her anxious and she feels like she is having panic attacks frequently.  She has not been sleeping very well recently due to these concerns.  Anxiety symptoms include chest pain, shortness of breath, palpitations.     PAST MEDICAL / SURGICAL / SOCIAL / FAMILY HISTORY       has a past medical history of A-fib (HCC), Acute on chronic systolic CHF (congestive heart failure) (HCC), Anxiety, Asthma, CAD (coronary artery disease), CKD (chronic kidney disease), Colon polyp, COPD (chronic obstructive pulmonary disease) (HCC), Diabetes mellitus (HCC), GERD

## 2025-07-21 NOTE — CARE COORDINATION
Discharge Report    [facility]  Clinical Case Management Department  Written by: HAZEL SPRING RN    Patient Name: Nancy L Haase  Attending Provider: Zak Sullivan MD  Admit Date: 2025  4:02 PM  MRN: [id number]  Account: [id number]                     : 1960  Discharge Date: 25      Disposition: home    HAZEL SPRING RN

## 2025-07-21 NOTE — DISCHARGE INSTRUCTIONS
Take your medication as indicated and prescribed.  You were given an antibiotic, make sure you get the prescription filled and take the antibiotics until finished.  Drink plenty of water while taking the antibiotics.  Avoid drinking alcohol or drinks that have caffeine in it while taking antibiotics.      PLEASE RETURN TO THE EMERGENCY DEPARTMENT IMMEDIATELY for worsening symptoms, inability to urinate, worsening of blood in your urine, or if you develop any concerning symptoms such as: high fever not relieved by acetaminophen (Tylenol) and/or ibuprofen (Motrin / Advil), chills, shortness of breath, chest pain, feeling of your heart fluttering or racing, persistent nausea and/or vomiting, vomiting up blood, blood in your stool, loss of consciousness, numbness, weakness or tingling in the arms or legs or change in color of the extremities, changes in mental status, persistent headache, blurry vision, loss of bladder / bowel.

## 2025-07-21 NOTE — PROGRESS NOTES
Pt took own dose of morning medications. The pt provided nurse with list of medications that were taken. Requested to not take home medications again as the pt medications were unlabeled and in a pill container. The pt agreed and will accept medications provided by her care team. Note created to provide insight to MAR 'not given' status for meds.

## 2025-07-21 NOTE — DISCHARGE INSTR - COC
I73.9    Tinea cruris B35.6    Need for shingles vaccine Z23    Need for pneumococcal vaccine Z23    Need for COVID-19 vaccine Z23    Tobacco dependence F17.200    Tetrahydrocannabinol (THC) dependence ([facility]) F12.20    Abnormal gastrointestinal PET scan R94.8    Uterine cancer ([facility]) C55    Post-operative state Z98.890    S/p RALH, BSO, SNLD 7/17/25 Z90.710    Obesity, Class I, BMI 30-34.9 E66.811    Poorly controlled intermittent asthma without complication J45.20    Stage 3b chronic kidney disease (CKD) ([facility]) N18.32    Insulin dependent type 2 diabetes mellitus ([facility]) E11.9, Z79.4    Current use of long term anticoagulation Z79.01    Chronic stable angina I20.89    History of heart artery stent Z95.5    Peripheral artery disease I73.9    Endometrial cancer ([facility]) C54.1    Acute UTI N39.0       Isolation/Infection:   Isolation            Contact          Patient Infection Status        Infection Onset Added Last Indicated Last Indicated By Review Planned Expiration    MRSA 09/29/21 09/29/21 12/18/21 MRSA DNA Probe, Nasal                           Nurse Assessment:  Last Vital Signs: BP (!) 140/87   Pulse 76   Temp 97.7 °F (36.5 °C) (Oral)   Resp 18   SpO2 99%     Last documented pain score (0-10 scale): Pain Level: 10  Last Weight:   Wt Readings from Last 1 Encounters:   07/17/25 103 kg (227 lb)     Mental Status:  {IP PT MENTAL STATUS:27821}    IV Access:  { MÓNICA IV ACCESS:967650540}    Nursing Mobility/ADLs:  Walking   {CHP DME ADLs:554081942}  Transfer  {CHP DME ADLs:639016622}  Bathing  {CHP DME ADLs:258022584}  Dressing  {CHP DME ADLs:903768179}  Toileting  {CHP DME ADLs:271061093}  Feeding  {CHP DME ADLs:433951971}  Med Admin  {CHP DME ADLs:246627301}  Med Delivery   { MÓNICA MED Delivery:489791117}    Wound Care Documentation and Therapy:  Incision 07/17/25 Abdomen Medial;Upper (Active)   Dressing Status Other (Comment) 07/21/25 0042   Incision Cleansed Cleansed with saline 07/17/25 1234   Dressing/Treatment    Address:  Phone:  Fax:    Dialysis Facility (if applicable)   Name:  Address:  Dialysis Schedule:  Phone:  Fax:    / signature: {Esignature:503485212}    PHYSICIAN SECTION    Prognosis: {Prognosis:1444614628}    Condition at Discharge: { Patient Condition:749904456}    Rehab Potential (if transferring to Rehab): {Prognosis:7150924436}    Recommended Labs or Other Treatments After Discharge: ***    Physician Certification: I certify the above information and transfer of Nancy L Haase  is necessary for the continuing treatment of the diagnosis listed and that she requires {Admit to Appropriate Level of Care:66757} for {GREATER/LESS:174236235} 30 days.     Update Admission H&P: {CHP DME Changes in HandP:484773254}    PHYSICIAN SIGNATURE:  Electronically signed by Makeda Cruz DO on 7/21/25 at 7:05 AM EDT

## 2025-07-22 ENCOUNTER — APPOINTMENT (OUTPATIENT)
Dept: ULTRASOUND IMAGING | Age: 65
DRG: 683 | End: 2025-07-22
Payer: MEDICARE

## 2025-07-22 ENCOUNTER — APPOINTMENT (OUTPATIENT)
Dept: CT IMAGING | Age: 65
DRG: 683 | End: 2025-07-22
Payer: MEDICARE

## 2025-07-22 ENCOUNTER — HOSPITAL ENCOUNTER (INPATIENT)
Age: 65
LOS: 1 days | Discharge: HOME | DRG: 683 | End: 2025-07-23
Attending: EMERGENCY MEDICINE | Admitting: INTERNAL MEDICINE
Payer: MEDICARE

## 2025-07-22 DIAGNOSIS — I24.9 ACUTE CORONARY SYNDROME (HCC): ICD-10-CM

## 2025-07-22 DIAGNOSIS — N18.9 ACUTE KIDNEY INJURY SUPERIMPOSED ON CKD: ICD-10-CM

## 2025-07-22 DIAGNOSIS — N17.9 ACUTE KIDNEY INJURY SUPERIMPOSED ON CKD: ICD-10-CM

## 2025-07-22 DIAGNOSIS — N17.9 AKI (ACUTE KIDNEY INJURY): Primary | ICD-10-CM

## 2025-07-22 DIAGNOSIS — R06.02 SHORTNESS OF BREATH: ICD-10-CM

## 2025-07-22 DIAGNOSIS — F41.9 ANXIETY: ICD-10-CM

## 2025-07-22 PROBLEM — E11.9 TYPE 2 DIABETES MELLITUS (HCC): Status: ACTIVE | Noted: 2025-07-22

## 2025-07-22 LAB
ALBUMIN SERPL-MCNC: 3.6 G/DL (ref 3.5–5.2)
ALBUMIN/GLOB SERPL: 1.1 {RATIO} (ref 1–2.5)
ALP SERPL-CCNC: 71 U/L (ref 35–104)
ALT SERPL-CCNC: 19 U/L (ref 10–35)
ANION GAP SERPL CALCULATED.3IONS-SCNC: 19 MMOL/L (ref 9–16)
AST SERPL-CCNC: 29 U/L (ref 10–35)
B-OH-BUTYR SERPL-MCNC: 0.24 MMOL/L (ref 0.02–0.27)
BACTERIA URNS QL MICRO: NORMAL
BASOPHILS # BLD: 0.07 K/UL (ref 0–0.2)
BASOPHILS NFR BLD: 1 % (ref 0–2)
BILIRUB DIRECT SERPL-MCNC: 0.1 MG/DL (ref 0–0.2)
BILIRUB INDIRECT SERPL-MCNC: 0.3 MG/DL (ref 0–1)
BILIRUB SERPL-MCNC: 0.4 MG/DL (ref 0–1.2)
BILIRUB UR QL STRIP: NEGATIVE
BILIRUB UR QL STRIP: NEGATIVE
BNP SERPL-MCNC: 496 PG/ML (ref 0–125)
BODY TEMPERATURE: 37
BUN SERPL-MCNC: 49 MG/DL (ref 8–23)
CALCIUM SERPL-MCNC: 9.4 MG/DL (ref 8.6–10.4)
CASTS #/AREA URNS LPF: ABNORMAL /LPF (ref 0–2)
CASTS #/AREA URNS LPF: ABNORMAL /LPF (ref 0–2)
CASTS #/AREA URNS LPF: NORMAL /LPF (ref 0–8)
CHLORIDE SERPL-SCNC: 100 MMOL/L (ref 98–107)
CLARITY UR: CLEAR
CLARITY UR: CLEAR
CO2 SERPL-SCNC: 14 MMOL/L (ref 20–31)
COHGB MFR BLD: 0.5 % (ref 0–5)
COLOR UR: YELLOW
COLOR UR: YELLOW
CORTIS SERPL-MCNC: 14.3 UG/DL (ref 2.5–19.5)
CREAT SERPL-MCNC: 2.4 MG/DL (ref 0.6–0.9)
CREAT UR-MCNC: 75.2 MG/DL (ref 28–217)
D DIMER PPP FEU-MCNC: 6.5 UG/ML FEU (ref 0–0.57)
EOSINOPHIL # BLD: 0.23 K/UL (ref 0–0.44)
EOSINOPHIL,URINE: NORMAL
EOSINOPHILS RELATIVE PERCENT: 2 % (ref 1–4)
EPI CELLS #/AREA URNS HPF: ABNORMAL /HPF (ref 0–5)
EPI CELLS #/AREA URNS HPF: NORMAL /HPF (ref 0–5)
ERYTHROCYTE [DISTWIDTH] IN BLOOD BY AUTOMATED COUNT: 14.9 % (ref 11.8–14.4)
FIO2 ON VENT: ABNORMAL %
GFR, ESTIMATED: 22 ML/MIN/1.73M2
GLOBULIN SER CALC-MCNC: 3.3 G/DL
GLUCOSE BLD-MCNC: 153 MG/DL (ref 65–105)
GLUCOSE BLD-MCNC: 164 MG/DL (ref 65–105)
GLUCOSE BLD-MCNC: 195 MG/DL (ref 65–105)
GLUCOSE SERPL-MCNC: 180 MG/DL (ref 74–99)
GLUCOSE UR STRIP-MCNC: NEGATIVE MG/DL
GLUCOSE UR STRIP-MCNC: NEGATIVE MG/DL
HCO3 VENOUS: 21.6 MMOL/L (ref 24–30)
HCT VFR BLD AUTO: 38.1 % (ref 36.3–47.1)
HGB BLD-MCNC: 12 G/DL (ref 11.9–15.1)
HGB UR QL STRIP.AUTO: ABNORMAL
HGB UR QL STRIP.AUTO: ABNORMAL
IMM GRANULOCYTES # BLD AUTO: 0.13 K/UL (ref 0–0.3)
IMM GRANULOCYTES NFR BLD: 1 %
KETONES UR STRIP-MCNC: ABNORMAL MG/DL
KETONES UR STRIP-MCNC: NEGATIVE MG/DL
LACTIC ACID, WHOLE BLOOD: 1.8 MMOL/L (ref 0.7–2.1)
LEUKOCYTE ESTERASE UR QL STRIP: ABNORMAL
LEUKOCYTE ESTERASE UR QL STRIP: NEGATIVE
LYMPHOCYTES NFR BLD: 1.75 K/UL (ref 1.1–3.7)
LYMPHOCYTES RELATIVE PERCENT: 17 % (ref 24–43)
MCH RBC QN AUTO: 28.1 PG (ref 25.2–33.5)
MCHC RBC AUTO-ENTMCNC: 31.5 G/DL (ref 28.4–34.8)
MCV RBC AUTO: 89.2 FL (ref 82.6–102.9)
MONOCYTES NFR BLD: 0.8 K/UL (ref 0.1–1.2)
MONOCYTES NFR BLD: 8 % (ref 3–12)
NEGATIVE BASE EXCESS, VEN: 2.6 MMOL/L (ref 0–2)
NEUTROPHILS NFR BLD: 71 % (ref 36–65)
NEUTS SEG NFR BLD: 7.36 K/UL (ref 1.5–8.1)
NITRITE UR QL STRIP: NEGATIVE
NITRITE UR QL STRIP: NEGATIVE
NRBC BLD-RTO: 0 PER 100 WBC
O2 SAT, VEN: 76.6 % (ref 60–85)
OSMOLALITY UR: 458 MOSM/KG (ref 80–1300)
PCO2 VENOUS: 35.5 MM HG (ref 39–55)
PH UR STRIP: 5 [PH] (ref 5–8)
PH UR STRIP: 5.5 [PH] (ref 5–8)
PH VENOUS: 7.4 (ref 7.32–7.42)
PLATELET # BLD AUTO: 276 K/UL (ref 138–453)
PMV BLD AUTO: 10.4 FL (ref 8.1–13.5)
PO2 VENOUS: 40.7 MM HG (ref 30–50)
POTASSIUM SERPL-SCNC: 3.9 MMOL/L (ref 3.7–5.3)
PROT SERPL-MCNC: 6.9 G/DL (ref 6.6–8.7)
PROT UR STRIP-MCNC: ABNORMAL MG/DL
PROT UR STRIP-MCNC: NEGATIVE MG/DL
RBC # BLD AUTO: 4.27 M/UL (ref 3.95–5.11)
RBC # BLD: ABNORMAL 10*6/UL
RBC #/AREA URNS HPF: ABNORMAL /HPF (ref 0–2)
RBC #/AREA URNS HPF: NORMAL /HPF (ref 0–4)
SODIUM SERPL-SCNC: 133 MMOL/L (ref 136–145)
SODIUM UR-SCNC: 37 MMOL/L
SP GR UR STRIP: 1.02 (ref 1–1.03)
SP GR UR STRIP: 1.03 (ref 1–1.03)
TOTAL PROTEIN, URINE: 10 MG/DL
TSH SERPL DL<=0.05 MIU/L-ACNC: 1.15 UIU/ML (ref 0.27–4.2)
URINE TOTAL PROTEIN CREATININE RATIO: 0.13 (ref 0–0.2)
UROBILINOGEN UR STRIP-ACNC: NORMAL EU/DL (ref 0–1)
UROBILINOGEN UR STRIP-ACNC: NORMAL EU/DL (ref 0–1)
WBC #/AREA URNS HPF: ABNORMAL /HPF (ref 0–5)
WBC #/AREA URNS HPF: NORMAL /HPF (ref 0–5)
WBC OTHER # BLD: 10.3 K/UL (ref 3.5–11.3)

## 2025-07-22 PROCEDURE — 6360000002 HC RX W HCPCS

## 2025-07-22 PROCEDURE — 2500000003 HC RX 250 WO HCPCS: Performed by: INTERNAL MEDICINE

## 2025-07-22 PROCEDURE — 83605 ASSAY OF LACTIC ACID: CPT

## 2025-07-22 PROCEDURE — 94640 AIRWAY INHALATION TREATMENT: CPT

## 2025-07-22 PROCEDURE — 76770 US EXAM ABDO BACK WALL COMP: CPT

## 2025-07-22 PROCEDURE — 84156 ASSAY OF PROTEIN URINE: CPT

## 2025-07-22 PROCEDURE — 2580000003 HC RX 258: Performed by: INTERNAL MEDICINE

## 2025-07-22 PROCEDURE — 96374 THER/PROPH/DIAG INJ IV PUSH: CPT

## 2025-07-22 PROCEDURE — 87205 SMEAR GRAM STAIN: CPT

## 2025-07-22 PROCEDURE — 80076 HEPATIC FUNCTION PANEL: CPT

## 2025-07-22 PROCEDURE — 82947 ASSAY GLUCOSE BLOOD QUANT: CPT

## 2025-07-22 PROCEDURE — 82533 TOTAL CORTISOL: CPT

## 2025-07-22 PROCEDURE — 70450 CT HEAD/BRAIN W/O DYE: CPT

## 2025-07-22 PROCEDURE — 2500000003 HC RX 250 WO HCPCS

## 2025-07-22 PROCEDURE — 6360000002 HC RX W HCPCS: Performed by: EMERGENCY MEDICINE

## 2025-07-22 PROCEDURE — 6370000000 HC RX 637 (ALT 250 FOR IP)

## 2025-07-22 PROCEDURE — 85025 COMPLETE CBC W/AUTO DIFF WBC: CPT

## 2025-07-22 PROCEDURE — 6360000004 HC RX CONTRAST MEDICATION

## 2025-07-22 PROCEDURE — 94761 N-INVAS EAR/PLS OXIMETRY MLT: CPT

## 2025-07-22 PROCEDURE — 81001 URINALYSIS AUTO W/SCOPE: CPT

## 2025-07-22 PROCEDURE — 82570 ASSAY OF URINE CREATININE: CPT

## 2025-07-22 PROCEDURE — 2580000003 HC RX 258

## 2025-07-22 PROCEDURE — 85379 FIBRIN DEGRADATION QUANT: CPT

## 2025-07-22 PROCEDURE — 93005 ELECTROCARDIOGRAM TRACING: CPT | Performed by: EMERGENCY MEDICINE

## 2025-07-22 PROCEDURE — 83880 ASSAY OF NATRIURETIC PEPTIDE: CPT

## 2025-07-22 PROCEDURE — 96372 THER/PROPH/DIAG INJ SC/IM: CPT

## 2025-07-22 PROCEDURE — 87040 BLOOD CULTURE FOR BACTERIA: CPT

## 2025-07-22 PROCEDURE — 99223 1ST HOSP IP/OBS HIGH 75: CPT | Performed by: INTERNAL MEDICINE

## 2025-07-22 PROCEDURE — 2060000000 HC ICU INTERMEDIATE R&B

## 2025-07-22 PROCEDURE — 82805 BLOOD GASES W/O2 SATURATION: CPT

## 2025-07-22 PROCEDURE — 82010 KETONE BODYS QUAN: CPT

## 2025-07-22 PROCEDURE — 84300 ASSAY OF URINE SODIUM: CPT

## 2025-07-22 PROCEDURE — 80048 BASIC METABOLIC PNL TOTAL CA: CPT

## 2025-07-22 PROCEDURE — 71260 CT THORAX DX C+: CPT

## 2025-07-22 PROCEDURE — 36415 COLL VENOUS BLD VENIPUNCTURE: CPT

## 2025-07-22 PROCEDURE — 76705 ECHO EXAM OF ABDOMEN: CPT

## 2025-07-22 PROCEDURE — 87086 URINE CULTURE/COLONY COUNT: CPT

## 2025-07-22 PROCEDURE — 84443 ASSAY THYROID STIM HORMONE: CPT

## 2025-07-22 PROCEDURE — 96361 HYDRATE IV INFUSION ADD-ON: CPT

## 2025-07-22 PROCEDURE — 83935 ASSAY OF URINE OSMOLALITY: CPT

## 2025-07-22 PROCEDURE — 99285 EMERGENCY DEPT VISIT HI MDM: CPT

## 2025-07-22 RX ORDER — POTASSIUM CHLORIDE 1500 MG/1
40 TABLET, EXTENDED RELEASE ORAL PRN
Status: DISCONTINUED | OUTPATIENT
Start: 2025-07-22 | End: 2025-07-23 | Stop reason: HOSPADM

## 2025-07-22 RX ORDER — ENOXAPARIN SODIUM 100 MG/ML
40 INJECTION SUBCUTANEOUS DAILY
Status: DISCONTINUED | OUTPATIENT
Start: 2025-07-22 | End: 2025-07-22

## 2025-07-22 RX ORDER — ONDANSETRON 2 MG/ML
4 INJECTION INTRAMUSCULAR; INTRAVENOUS EVERY 6 HOURS PRN
Status: DISCONTINUED | OUTPATIENT
Start: 2025-07-22 | End: 2025-07-23 | Stop reason: HOSPADM

## 2025-07-22 RX ORDER — CLOPIDOGREL BISULFATE 75 MG/1
75 TABLET ORAL DAILY
Status: DISCONTINUED | OUTPATIENT
Start: 2025-07-22 | End: 2025-07-23 | Stop reason: HOSPADM

## 2025-07-22 RX ORDER — SODIUM CHLORIDE 9 MG/ML
INJECTION, SOLUTION INTRAVENOUS PRN
Status: DISCONTINUED | OUTPATIENT
Start: 2025-07-22 | End: 2025-07-23 | Stop reason: HOSPADM

## 2025-07-22 RX ORDER — IOPAMIDOL 755 MG/ML
75 INJECTION, SOLUTION INTRAVASCULAR
Status: COMPLETED | OUTPATIENT
Start: 2025-07-22 | End: 2025-07-22

## 2025-07-22 RX ORDER — SODIUM CHLORIDE, SODIUM LACTATE, POTASSIUM CHLORIDE, CALCIUM CHLORIDE 600; 310; 30; 20 MG/100ML; MG/100ML; MG/100ML; MG/100ML
INJECTION, SOLUTION INTRAVENOUS CONTINUOUS
Status: DISCONTINUED | OUTPATIENT
Start: 2025-07-22 | End: 2025-07-22

## 2025-07-22 RX ORDER — CARVEDILOL 6.25 MG/1
6.25 TABLET ORAL 2 TIMES DAILY WITH MEALS
Status: DISCONTINUED | OUTPATIENT
Start: 2025-07-22 | End: 2025-07-23 | Stop reason: HOSPADM

## 2025-07-22 RX ORDER — POTASSIUM CHLORIDE 7.45 MG/ML
10 INJECTION INTRAVENOUS PRN
Status: DISCONTINUED | OUTPATIENT
Start: 2025-07-22 | End: 2025-07-23 | Stop reason: HOSPADM

## 2025-07-22 RX ORDER — ACETAMINOPHEN 325 MG/1
650 TABLET ORAL EVERY 6 HOURS PRN
Status: DISCONTINUED | OUTPATIENT
Start: 2025-07-22 | End: 2025-07-23 | Stop reason: HOSPADM

## 2025-07-22 RX ORDER — 0.9 % SODIUM CHLORIDE 0.9 %
1000 INTRAVENOUS SOLUTION INTRAVENOUS ONCE
Status: COMPLETED | OUTPATIENT
Start: 2025-07-22 | End: 2025-07-22

## 2025-07-22 RX ORDER — FERROUS SULFATE 325(65) MG
325 TABLET, DELAYED RELEASE (ENTERIC COATED) ORAL
Status: DISCONTINUED | OUTPATIENT
Start: 2025-07-23 | End: 2025-07-23 | Stop reason: HOSPADM

## 2025-07-22 RX ORDER — METOCLOPRAMIDE HYDROCHLORIDE 5 MG/ML
10 INJECTION INTRAMUSCULAR; INTRAVENOUS ONCE
Status: COMPLETED | OUTPATIENT
Start: 2025-07-22 | End: 2025-07-22

## 2025-07-22 RX ORDER — DEXTROSE MONOHYDRATE 100 MG/ML
INJECTION, SOLUTION INTRAVENOUS CONTINUOUS PRN
Status: DISCONTINUED | OUTPATIENT
Start: 2025-07-22 | End: 2025-07-23 | Stop reason: HOSPADM

## 2025-07-22 RX ORDER — SODIUM CHLORIDE 0.9 % (FLUSH) 0.9 %
5-40 SYRINGE (ML) INJECTION EVERY 12 HOURS SCHEDULED
Status: DISCONTINUED | OUTPATIENT
Start: 2025-07-22 | End: 2025-07-23 | Stop reason: HOSPADM

## 2025-07-22 RX ORDER — POLYETHYLENE GLYCOL 3350 17 G/17G
17 POWDER, FOR SOLUTION ORAL DAILY PRN
Status: DISCONTINUED | OUTPATIENT
Start: 2025-07-22 | End: 2025-07-23 | Stop reason: HOSPADM

## 2025-07-22 RX ORDER — HYDROXYZINE HYDROCHLORIDE 50 MG/ML
50 INJECTION, SOLUTION INTRAMUSCULAR ONCE
Status: COMPLETED | OUTPATIENT
Start: 2025-07-22 | End: 2025-07-22

## 2025-07-22 RX ORDER — ALBUTEROL SULFATE 90 UG/1
2 INHALANT RESPIRATORY (INHALATION) EVERY 6 HOURS PRN
Status: DISCONTINUED | OUTPATIENT
Start: 2025-07-22 | End: 2025-07-23 | Stop reason: HOSPADM

## 2025-07-22 RX ORDER — LEVOFLOXACIN 750 MG/1
750 TABLET, FILM COATED ORAL
Status: DISCONTINUED | OUTPATIENT
Start: 2025-07-22 | End: 2025-07-23

## 2025-07-22 RX ORDER — ACETAMINOPHEN 650 MG/1
650 SUPPOSITORY RECTAL EVERY 6 HOURS PRN
Status: DISCONTINUED | OUTPATIENT
Start: 2025-07-22 | End: 2025-07-23 | Stop reason: HOSPADM

## 2025-07-22 RX ORDER — DIPHENHYDRAMINE HCL 25 MG
25 TABLET ORAL ONCE
Status: COMPLETED | OUTPATIENT
Start: 2025-07-22 | End: 2025-07-22

## 2025-07-22 RX ORDER — SODIUM CHLORIDE 0.9 % (FLUSH) 0.9 %
5-40 SYRINGE (ML) INJECTION PRN
Status: DISCONTINUED | OUTPATIENT
Start: 2025-07-22 | End: 2025-07-23 | Stop reason: HOSPADM

## 2025-07-22 RX ORDER — BUDESONIDE AND FORMOTEROL FUMARATE DIHYDRATE 80; 4.5 UG/1; UG/1
1 AEROSOL RESPIRATORY (INHALATION)
Status: DISCONTINUED | OUTPATIENT
Start: 2025-07-22 | End: 2025-07-23 | Stop reason: HOSPADM

## 2025-07-22 RX ORDER — HYDROXYZINE HYDROCHLORIDE 50 MG/1
25 TABLET, FILM COATED ORAL 3 TIMES DAILY PRN
Status: DISCONTINUED | OUTPATIENT
Start: 2025-07-22 | End: 2025-07-23 | Stop reason: HOSPADM

## 2025-07-22 RX ORDER — FUROSEMIDE 40 MG/1
40 TABLET ORAL DAILY
Status: DISCONTINUED | OUTPATIENT
Start: 2025-07-23 | End: 2025-07-23 | Stop reason: HOSPADM

## 2025-07-22 RX ORDER — ATORVASTATIN CALCIUM 40 MG/1
40 TABLET, FILM COATED ORAL DAILY
Status: DISCONTINUED | OUTPATIENT
Start: 2025-07-22 | End: 2025-07-23 | Stop reason: HOSPADM

## 2025-07-22 RX ORDER — INSULIN LISPRO 100 [IU]/ML
0-8 INJECTION, SOLUTION INTRAVENOUS; SUBCUTANEOUS
Status: DISCONTINUED | OUTPATIENT
Start: 2025-07-22 | End: 2025-07-23 | Stop reason: HOSPADM

## 2025-07-22 RX ORDER — ONDANSETRON 4 MG/1
4 TABLET, ORALLY DISINTEGRATING ORAL EVERY 8 HOURS PRN
Status: DISCONTINUED | OUTPATIENT
Start: 2025-07-22 | End: 2025-07-23 | Stop reason: HOSPADM

## 2025-07-22 RX ORDER — BUSPIRONE HYDROCHLORIDE 10 MG/1
30 TABLET ORAL 3 TIMES DAILY
Status: DISCONTINUED | OUTPATIENT
Start: 2025-07-22 | End: 2025-07-23 | Stop reason: HOSPADM

## 2025-07-22 RX ORDER — GLUCAGON 1 MG/ML
1 KIT INJECTION PRN
Status: DISCONTINUED | OUTPATIENT
Start: 2025-07-22 | End: 2025-07-23 | Stop reason: HOSPADM

## 2025-07-22 RX ORDER — MAGNESIUM SULFATE IN WATER 40 MG/ML
2000 INJECTION, SOLUTION INTRAVENOUS PRN
Status: DISCONTINUED | OUTPATIENT
Start: 2025-07-22 | End: 2025-07-23 | Stop reason: HOSPADM

## 2025-07-22 RX ADMIN — IOPAMIDOL 75 ML: 755 INJECTION, SOLUTION INTRAVENOUS at 08:52

## 2025-07-22 RX ADMIN — SODIUM CHLORIDE, SODIUM LACTATE, POTASSIUM CHLORIDE, AND CALCIUM CHLORIDE: .6; .31; .03; .02 INJECTION, SOLUTION INTRAVENOUS at 14:56

## 2025-07-22 RX ADMIN — ONDANSETRON 4 MG: 2 INJECTION, SOLUTION INTRAMUSCULAR; INTRAVENOUS at 13:16

## 2025-07-22 RX ADMIN — INSULIN LISPRO 2 UNITS: 100 INJECTION, SOLUTION INTRAVENOUS; SUBCUTANEOUS at 21:41

## 2025-07-22 RX ADMIN — METOCLOPRAMIDE HYDROCHLORIDE 10 MG: 5 INJECTION INTRAMUSCULAR; INTRAVENOUS at 07:45

## 2025-07-22 RX ADMIN — ACETAMINOPHEN 650 MG: 325 TABLET ORAL at 18:02

## 2025-07-22 RX ADMIN — DIPHENHYDRAMINE HYDROCHLORIDE 25 MG: 25 TABLET ORAL at 07:45

## 2025-07-22 RX ADMIN — CARVEDILOL 6.25 MG: 12.5 TABLET, FILM COATED ORAL at 16:24

## 2025-07-22 RX ADMIN — SODIUM CHLORIDE 1000 ML: 9 INJECTION, SOLUTION INTRAVENOUS at 08:02

## 2025-07-22 RX ADMIN — BUSPIRONE HYDROCHLORIDE 30 MG: 10 TABLET ORAL at 21:30

## 2025-07-22 RX ADMIN — BUDESONIDE AND FORMOTEROL FUMARATE DIHYDRATE 1 PUFF: 80; 4.5 AEROSOL RESPIRATORY (INHALATION) at 19:50

## 2025-07-22 RX ADMIN — SODIUM CHLORIDE 1000 ML: 9 INJECTION, SOLUTION INTRAVENOUS at 09:39

## 2025-07-22 RX ADMIN — DICLOFENAC SODIUM 2 G: 10 GEL TOPICAL at 21:31

## 2025-07-22 RX ADMIN — Medication 5 MG: at 22:20

## 2025-07-22 RX ADMIN — HYDROXYZINE HYDROCHLORIDE 50 MG: 50 INJECTION, SOLUTION INTRAMUSCULAR at 06:22

## 2025-07-22 RX ADMIN — WATER 1000 MG: 1 INJECTION INTRAMUSCULAR; INTRAVENOUS; SUBCUTANEOUS at 13:17

## 2025-07-22 RX ADMIN — SODIUM CHLORIDE, PRESERVATIVE FREE 10 ML: 5 INJECTION INTRAVENOUS at 21:30

## 2025-07-22 RX ADMIN — BUSPIRONE HYDROCHLORIDE 30 MG: 10 TABLET ORAL at 14:56

## 2025-07-22 RX ADMIN — LEVOFLOXACIN 750 MG: 750 TABLET, FILM COATED ORAL at 22:20

## 2025-07-22 RX ADMIN — HYDROXYZINE HYDROCHLORIDE 25 MG: 50 TABLET ORAL at 21:29

## 2025-07-22 RX ADMIN — SODIUM BICARBONATE: 84 INJECTION, SOLUTION INTRAVENOUS at 19:01

## 2025-07-22 ASSESSMENT — PAIN DESCRIPTION - ORIENTATION
ORIENTATION: RIGHT
ORIENTATION: RIGHT;LEFT
ORIENTATION: RIGHT;LEFT

## 2025-07-22 ASSESSMENT — ENCOUNTER SYMPTOMS
VOMITING: 0
BACK PAIN: 0
COUGH: 0
ABDOMINAL PAIN: 0
SHORTNESS OF BREATH: 1
NAUSEA: 0
SHORTNESS OF BREATH: 0
DIARRHEA: 0

## 2025-07-22 ASSESSMENT — PAIN DESCRIPTION - DESCRIPTORS
DESCRIPTORS: ACHING

## 2025-07-22 ASSESSMENT — PAIN DESCRIPTION - LOCATION
LOCATION: HAND

## 2025-07-22 ASSESSMENT — PAIN SCALES - GENERAL
PAINLEVEL_OUTOF10: 8
PAINLEVEL_OUTOF10: 3
PAINLEVEL_OUTOF10: 8

## 2025-07-22 NOTE — ED PROVIDER NOTES
[facility]   Emergency Department  Faculty Attestation       I performed a history and physical examination of the patient and discussed management with the resident. I reviewed the resident’s note and agree with the documented findings including all diagnostic interpretations and plan of care. Any areas of disagreement are noted on the chart. I was personally present for the key portions of any procedures. I have documented in the chart those procedures where I was not present during the key portions. I have reviewed the emergency nurses triage note. I agree with the chief complaint, past medical history, past surgical history, allergies, medications, social and family history as documented unless otherwise noted below.  For Physician Assistant/ Nurse Practitioner cases/documentation I have personally evaluated this patient and have completed at least one if not all key elements of the E/M (history, physical exam, and MDM). Additional findings are as noted.    Patient Name: Nancy L Haase  MRN: [id number]  : 1960  Primary Care Physician: Scar Reyes MD    Date of evaluationa: 2025   Note Started: 6:32 AM EDT    Pertinent Comments     Chief Complaint:   Chief Complaint   Patient presents with    Anxiety        Initial vitals: (If not listed, please see nursing documentation)  ED Triage Vitals [25 0543]   BP Systolic BP Percentile Diastolic BP Percentile Temp Temp src Pulse Respirations SpO2   (!) 141/79 -- -- 97.9 °F (36.6 °C) -- 94 20 98 %      Height Weight         -- --              HPI/PE/Impression:  This is a 64 y.o. female who presents to the Emergency Department stating she feels off, and is uncertain was going on.  She feels that she cannot remember things and that her brain is just not functioning.  States it was just admitted, and was started on antibiotic.  However was not having actual urinary symptoms.  She had a total hysterectomy done via robotics on

## 2025-07-22 NOTE — ED PROVIDER NOTES
[facility] EMERGENCY DEPARTMENT  Emergency Department  Emergency Medicine Resident Turn-Over     Note Started: 7:47 AM EDT    Care of Nancy L Haase was assumed from Dr. Hart and is being seen for Anxiety  .  The patient's initial evaluation and plan have been discussed with the prior provider who initially evaluated the patient.     EMERGENCY DEPARTMENT COURSE / MEDICAL DECISION MAKING:       MEDICATIONS GIVEN:  Orders Placed This Encounter   Medications    hydrOXYzine (VISTARIL) injection 50 mg    metoclopramide (REGLAN) injection 10 mg    diphenhydrAMINE (BENADRYL) tablet 25 mg    sodium chloride 0.9 % bolus 1,000 mL    iopamidol (ISOVUE-370) 76 % injection 75 mL    sodium chloride 0.9 % bolus 1,000 mL    cefTRIAXone (ROCEPHIN) 1,000 mg in sterile water 10 mL IV syringe     Antimicrobial Indications:   Urinary Tract Infection    sodium chloride flush 0.9 % injection 5-40 mL    sodium chloride flush 0.9 % injection 5-40 mL    0.9 % sodium chloride infusion    OR Linked Order Group     potassium chloride (KLOR-CON M) extended release tablet 40 mEq     potassium bicarb-citric acid (EFFER-K) effervescent tablet 40 mEq     potassium chloride 10 mEq/100 mL IVPB (Peripheral Line)    magnesium sulfate 2000 mg in 50 mL IVPB premix    DISCONTD: enoxaparin (LOVENOX) injection 40 mg     Indication of Use:   Prophylaxis-DVT/PE    OR Linked Order Group     ondansetron (ZOFRAN-ODT) disintegrating tablet 4 mg     ondansetron (ZOFRAN) injection 4 mg    polyethylene glycol (GLYCOLAX) packet 17 g    OR Linked Order Group     acetaminophen (TYLENOL) tablet 650 mg     acetaminophen (TYLENOL) suppository 650 mg    lactated ringers infusion     Complete last bag that is running at 24 hours and then saline lock IV    insulin lispro (HUMALOG,ADMELOG) injection vial 0-8 Units    budesonide-formoterol (SYMBICORT) 80-4.5 MCG/ACT inhaler 1 puff    albuterol sulfate HFA (PROVENTIL;VENTOLIN;PROAIR) 108 (90 Base) MCG/ACT  resident. [KM]   0902 CT Head W/O Contrast  IMPRESSION:  No acute intracranial abnormality.   []   0935 WBC, UA: 10 TO 20 [WH]   0935 Epithelial Cells, UA: 5 TO 10 [WH]   0935 RBC, UA: 20 TO 50 [WH]   0936 CT CHEST PULMONARY EMBOLISM W CONTRAST  IMPRESSION:  1. No pulmonary embolism.  2. Mild emphysema with chronic interstitial changes. No acute pulmonary  finding.  3. Diffuse thyroid enlargement with no focal nodule.   []   1017 Gynecology oncology stating that she is okay from a surgical standpoint, []   1124 Medicine agrees for admission.  Ceftriaxone for UTI. []      ED Course User Index  [KM] Jose Angel Hart MD  [] Roberto Colorado MD       OUTSTANDING TASKS / RECOMMENDATIONS:    Gyn onc     FINAL IMPRESSION:     1. HARVEY (acute kidney injury)        DISPOSITION:         DISPOSITION:  []  Discharge   []  Transfer -    []  Admission -to medicine for HARVEY   []  Against Medical Advice   []  Eloped   FOLLOW-UP: No follow-up provider specified.   DISCHARGE MEDICATIONS: New Prescriptions    No medications on file           Roberto Colorado MD  Emergency Medicine Resident  [facility]

## 2025-07-22 NOTE — ED PROVIDER NOTES
FACULTY SIGN-OUT  ADDENDUM     Care of this patient was assumed from previous attending physician. The patient's initial evaluation and plan have been discussed with the prior provider who initially evaluated the patient.      Note Started: 8:01 AM EDT    Attestation  I was available and discussed any additional care issues that arose and coordinated the management plans with the resident(s) caring for the patient during my duty period. Any areas of disagreement with resident's documentation of care or procedures are noted on the chart. I was personally present for the key portions of any/all procedures, during my duty period. I have documented in the chart those procedures where I was not present during the key portions.       ED COURSE      The patient was given the following medications:  Orders Placed This Encounter   Medications    hydrOXYzine (VISTARIL) injection 50 mg    metoclopramide (REGLAN) injection 10 mg    diphenhydrAMINE (BENADRYL) tablet 25 mg    sodium chloride 0.9 % bolus 1,000 mL       RECENT VITALS:   Temp: 97.9 °F (36.6 °C), Pulse: 93, Respirations: 23, BP: (!) 158/86    MEDICAL DECISION MAKING        Nancy L Haase is a 64 y.o. female who presents to the Emergency Department with complaints of not feeling well. Hx of hysterectomy on the 17 of this month and subsequent admission to ETU for UTI and d/c yesterday. Today, dimer and Cr elevated. Awaiting CT r/o PE. Plan admit.      Tamia Daniels MD  Attending Emergency Physician    (Please note that portions of this note were completed with a voice recognition program.  Efforts were made to edit the dictations but occasionally words are mis-transcribed.)

## 2025-07-22 NOTE — H&P
[facility]     Department of Internal Medicine - Staff Internal Medicine Teaching Service          ADMISSION NOTE/HISTORY AND PHYSICAL EXAMINATION   Date: 7/22/2025  Patient Name: Nancy L Haase  Date of admission: 7/22/2025  5:41 AM  YOB: 1960  PCP: Scar Reyes MD  History Obtained From:  patient    CHIEF COMPLAINT     Shortness of breath    HISTORY OF PRESENTING ILLNESS     The patient is a 64 y.o. female with PMHx of CAD S/P PCI in 2021, Afib on eliquis, T2 DM, CKD, recent h/o hysterectomy for endometrioid adenocarcinoma.    They present to the ED with a chief complaint of \"not able to breathe, not able to sleep\" after waking up today morning.  She also experienced profuse sweating and subjective chills along with shortness of breath.  She felt like she needed more oxygen to breathe.  She also reports productive cough since 1 month with increasing thick yellow mucopurulent expectoration since the past week.  She is a k/c/o COPD not on any home oxygen.she has no associated chest pain, palpitations, syncope, nausea, vomiting, fever.  She was discharged yesterday from this hospital following hysterectomy for endometrioid carcinoma.   She has a history of decompensated heart failure requiring hospital admission in March 2025.        Review of Systems   Constitutional:  Negative for fever.   Respiratory:  Positive for shortness of breath.    Cardiovascular:  Negative for chest pain and leg swelling.   Psychiatric/Behavioral:  Positive for confusion.         Initial Vitals on Presentation:  Temp: 97.9, RR: 20, HR 94, /79, SPO2 98% on room air    Initial Course in the ED:   - Pt presented with above complaint, found to be .   - Lab work showing S.Na 133, BUN 49, creatinine 2.4  - Imaging showing no evidence of pulmonary embolism  -    PAST MEDICAL HISTORY     Past Medical History:   Diagnosis Date    A-fib (HCC)     Acute on chronic systolic CHF (congestive heart  failure) ([facility]) 12/20/2021    Anxiety     Asthma     CAD (coronary artery disease)     CKD (chronic kidney disease)     Colon polyp     COPD (chronic obstructive pulmonary disease) ([facility])     Diabetes mellitus ([facility])     GERD (gastroesophageal reflux disease)     Hearing loss     deaf right ear-50 percent hearing left    Hyperlipidemia     Hypertension     Ischemic colitis 10/28/2021    MI (myocardial infarction) ([facility])     x3---2012, 2015, 2019    Mitral stenosis     moderate    Movement disorder     B/L torn rotator cuff.    Neuropathy     PAD (peripheral artery disease)     Tobacco abuse 11/19/2016    Under care of service provider     pcp-Scar Johnson-last visit april 2025    Under care of service provider     scdusoasux-JJF-Eo vincent-last visit april 2025    Under care of service provider     usljqucxvi-fiqys-lv vincent-last visit april 2025    Under care of service provider     mb-xnxm-qbobaf-last visit april 2025    Under care of service provider     vascular-[facility]-last visit april 2025    Under care of service provider     urology-specialty clinic-last visit may 2025    Uterine cancer ([facility])     Walking troubles     pain in legs when walking long distances       PAST SURGICAL HISTORY     Past Surgical History:   Procedure Laterality Date    BLADDER SURGERY N/A 06/13/2025    URODYNAMICS performed by Ariel Hewitt Jr., MD at [facility] OR    COLONOSCOPY N/A 05/27/2025    COLONOSCOPY POLYPECTOMY SNARE/BIOPSY performed by Kathryn Guerin MD at [facility] ENDO    CORONARY ANGIOPLASTY WITH STENT PLACEMENT      x2    CORONARY ANGIOPLASTY WITH STENT PLACEMENT  02/11/2019    JOSE MARTIN to RCA per Dr. Calix radial approuch    CYSTOSCOPY N/A 06/13/2025    URODYNAMICS    CYSTOSCOPY N/A 06/13/2025    CYSTOSCOPY performed by Ariel Hewitt Jr., MD at [facility] OR    DILATION AND CURETTAGE OF UTERUS N/A 03/06/2025    EUA, DILATATION AND CURETTAGE HYSTEROSCOPY,  PAP SMEAR, IUD INSERTION (LILETTA, PAP KIT) performed

## 2025-07-22 NOTE — ED PROVIDER NOTES
[facility] EMERGENCY DEPARTMENT  Emergency Department Encounter  Emergency Medicine Resident     Pt Name:Nancy L Haase  MRN: [id number]  Birthdate 1960  Date of evaluation: 7/22/25  PCP:  Scar Reyes MD  Note Started: 6:16 AM EDT      CHIEF COMPLAINT       Chief Complaint   Patient presents with    Anxiety       HISTORY OF PRESENT ILLNESS  (Location/Symptom, Timing/Onset, Context/Setting, Quality, Duration, Modifying Factors, Severity.)      Nancy L Haase is a 64 y.o. female who presents with anxiety and concern for hyperglycemia.  Patient states she recently had a hysterectomy a few days ago due to cancer and was recently admitted for UTI.  She states that since then she is having trouble sleeping, inability to control her blood sugar which fluctuates, and pain in the buttocks due to it being the area of surgical repair.  She attributes most of her ongoing symptoms to anxiety but notes dizziness and some shortness of breath.  Patient is on Eliquis for atrial fibrillation.  She denies any and all abdominal pain.    PAST MEDICAL / SURGICAL / SOCIAL / FAMILY HISTORY      has a past medical history of A-fib (HCC), Acute on chronic systolic CHF (congestive heart failure) (HCC), Anxiety, Asthma, CAD (coronary artery disease), CKD (chronic kidney disease), Colon polyp, COPD (chronic obstructive pulmonary disease) (HCC), Diabetes mellitus (HCC), GERD (gastroesophageal reflux disease), Hearing loss, Hyperlipidemia, Hypertension, Ischemic colitis, MI (myocardial infarction) (HCC), Mitral stenosis, Movement disorder, Neuropathy, PAD (peripheral artery disease), Tobacco abuse, Under care of service provider, Under care of service provider, Under care of service provider, Under care of service provider, Under care of service provider, Under care of service provider, Uterine cancer (HCC), and Walking troubles.       has a past surgical history that includes Coronary angioplasty with stent; Tonsillectomy;

## 2025-07-22 NOTE — ANESTHESIA POSTPROCEDURE EVALUATION
Department of Anesthesiology  Postprocedure Note    Patient: Nancy L Haase  MRN: [id number]  YOB: 1960  Date of evaluation: 7/22/2025    Procedure Summary       Date: 07/17/25 Room / Location: [address]    Anesthesia Start: 0741 Anesthesia Stop: 1254    Procedure: ROBOTIC LAPAROSCOPIC ASSISTED HYSTERECTOMY, BILATERAL SALPINGO-OOPHORECTOMY, ICG INTRACERVIX DYE INJECTION,  BILATERAL PELVIC LYMPH NODE DISSECTION (Abdomen) Diagnosis:       Uterine cancer (HCC)      (Uterine cancer (HCC) [C55])    Surgeons: Sunita Elias MD Responsible Provider: Sathish Amaral MD    Anesthesia Type: general ASA Status: 3            Anesthesia Type: No value filed.    Katherin Phase I: Katherin Score: 9    Katherin Phase II:      Anesthesia Post Evaluation    Patient location during evaluation: PACU  Patient participation: complete - patient participated  Level of consciousness: awake  Airway patency: patent  Nausea & Vomiting: no nausea and no vomiting  Cardiovascular status: blood pressure returned to baseline  Respiratory status: acceptable  Hydration status: euvolemic  Comments: BP (!) 162/70   Pulse 78   Temp 97 °F (36.1 °C) (Temporal)   Resp 18   Ht 1.753 m (5' 9\")   Wt 103 kg (227 lb)   SpO2 95%   BMI 33.52 kg/m²   Pain Assessment: 0-10 Pain Level: 5        No notable events documented.

## 2025-07-22 NOTE — ED NOTES
Pt pressed call light   Writer at bedside   Pt sitting up on the side of the bed reporting \"I'm so anxious I just want to get up, and I feel like I'm going to throw up and die\"   Writer notified Dr. Quintero   Will continue with plan of care

## 2025-07-22 NOTE — CONSULTS
GYN/ONC Consult  [facility] [place]    Patient Name: Nancy L Haase     Patient : 1960  Room/Bed: 45/45  Admission Date/Time: 2025  5:41 AM  Primary Care Physician: Scar Reyes MD    Consulting Provider: Dr. Colorado  Reason for Consult: S/p MANDY on 25    CC:   Chief Complaint   Patient presents with    Anxiety                HPI: Nancy L Haase is a 64 y.o. female  presents to the ED complaining of shortness of breath, lightheadedness, anxiety, and inability to sleep \"for last few weeks:. Patient has a history of primary endometrioid carcinoma and had a Robotic hysterectomy, bilateral salpingo-oophorectomy, sentinel lymph node biopsy with gynecologic oncology on 25. Patient was admitted to the hospital and subsequently discharged yesterday 25. On admission, patient complained of shortness of breath and chest pain. Cardiology was consulted and recommended continuing Eliquis and Plavix. They changed her Metoprolol to Coreg and stated no further cardiology workup was necessary inpatient. She was subsequently deemed medically stable for discharge.    Patient's vital signs are stable on exam, saturating appropriately on room air. She states that she has previously been on Buspar 30 mg TID for anxiety but has not taken over last few weeks. She received a dose in the ED and states she feels better.     Patient denies vaginal bleeding. States her port sites have remained clean and dry. Has appropriate tenderness to palpation on abdominal exam.     REVIEW OF SYSTEMS:   Constitutional: negative fever, negative chills, negative weight changes   HEENT: negative visual disturbances, negative headaches, negative dizziness, negative hearing loss  Breast: Negative breast abnormalities, negative breast lumps, negative nipple discharge  Respiratory: negative dyspnea, negative cough, + SOB  Cardiovascular: negative chest pain,  negative palpitations, negative arrhythmia, negative

## 2025-07-22 NOTE — PLAN OF CARE
Briefly 64-year-old female presented to ED with shortness of breath and was initially diagnosed with pancreatic.  Patient received IM Atarax in the emergency department.  Patient could possibly have COPD exacerbation Vs CHF exacerbation as well.  Patient was discharged from the hospital 1 day prior after she was diagnosed with UTI on Keflex for 7 days.  Patient recently underwent robotic laparoscopic assisted ostectomy with bilateral salpingo-oophorectomy for endometrial adenocarcinoma on 7/17/2025.    Past medical history :- Paroxysmal A-fib, COPD on room air, type 2 diabetes mellitus, CKD stage III secondary to diabetic nephrosclerosis, CAD status post PCI to RCA, hyperlipidemia        Assessment :-    Shortness of breath likely secondary to COPD exacerbation Vs CHF exacerbation  Acute kidney injury on chronic kidney disease secondary to diabetic nephrosclerosis  Possible sepsis secondary to UTI versus recent postoperative  Hypertension  Type 2 diabetes mellitus, A1c 8.4 on 7/2/2025  Recent UTI  Heart failure with preserved ejection fraction, recent echo on 3/4/2025 reveals EF 57% with abnormal diastolic dysfunction.    Plan    Will start patient on albuterol, Symbicort and Spiriva for now.  Oxygen supplementation as needed.  Will switch antibiotic to levofloxacin.  IV Solu-Medrol 40 mg daily for now.  Will obtain echo.  Will consider cardiology consultation based on echocardiogram.  Will start patient on intravenous fluids.  Will obtain ultrasonography of abdomen for possible intra-abdominal bleeding, ultrasonography of kidney for evaluation for possible obstruction.  Will recheck H&H every 12 hours for 3 occurrences.  Will hold anticoagulants and antiplatelets for now due to ecchymosis on the abdomen.  Will consult OB/GYN.  Will obtain UA, will switch patient on levofloxacin and de-escalate when indicated.  Will follow-up on urine culture results.      Chris Strauss MD  Internal Medicine Resident,

## 2025-07-22 NOTE — ED NOTES
Pt given mouth swab per request for ice water   Pt educated that she is unable to have water at this time   Will continue with plan of care

## 2025-07-22 NOTE — ED NOTES
Pt to ED room 8 via EMS with c/o anxiety. Pt states she was just seen yesterday and discharged and since leaving she has felt extremely anxious. Pt states she takes 30mg buspar regularly but feels as if the medication is not helping her. Pt states she does have a good support system at home, but being home alone makes her feel more anxious. Pt denies any other concerns at this time, states \"something just feels off\". Pt did have uterine cancer and has hysterectomy on 7/15. Vitals obtained.     Pt A&Ox4, respirations even and unlabored, and speaking in complete sentences. Call light within reach.

## 2025-07-23 ENCOUNTER — APPOINTMENT (OUTPATIENT)
Age: 65
DRG: 683 | End: 2025-07-23
Attending: INTERNAL MEDICINE
Payer: MEDICARE

## 2025-07-23 VITALS
BODY MASS INDEX: 33.92 KG/M2 | HEART RATE: 81 BPM | RESPIRATION RATE: 19 BRPM | SYSTOLIC BLOOD PRESSURE: 145 MMHG | TEMPERATURE: 97.7 F | OXYGEN SATURATION: 96 % | WEIGHT: 229 LBS | DIASTOLIC BLOOD PRESSURE: 98 MMHG | HEIGHT: 69 IN

## 2025-07-23 LAB
ANION GAP SERPL CALCULATED.3IONS-SCNC: 13 MMOL/L (ref 9–16)
BASOPHILS # BLD: 0.04 K/UL (ref 0–0.2)
BASOPHILS NFR BLD: 1 % (ref 0–2)
BUN SERPL-MCNC: 30 MG/DL (ref 8–23)
CALCIUM SERPL-MCNC: 8.7 MG/DL (ref 8.6–10.4)
CHLORIDE SERPL-SCNC: 107 MMOL/L (ref 98–107)
CO2 SERPL-SCNC: 19 MMOL/L (ref 20–31)
CREAT SERPL-MCNC: 1.4 MG/DL (ref 0.6–0.9)
EOSINOPHIL # BLD: 0.19 K/UL (ref 0–0.44)
EOSINOPHILS RELATIVE PERCENT: 3 % (ref 1–4)
ERYTHROCYTE [DISTWIDTH] IN BLOOD BY AUTOMATED COUNT: 15.2 % (ref 11.8–14.4)
GFR, ESTIMATED: 43 ML/MIN/1.73M2
GLUCOSE BLD-MCNC: 119 MG/DL (ref 65–105)
GLUCOSE BLD-MCNC: 129 MG/DL (ref 65–105)
GLUCOSE BLD-MCNC: 140 MG/DL (ref 65–105)
GLUCOSE BLD-MCNC: 272 MG/DL (ref 65–105)
GLUCOSE SERPL-MCNC: 130 MG/DL (ref 74–99)
HCT VFR BLD AUTO: 30.8 % (ref 36.3–47.1)
HGB BLD-MCNC: 10.1 G/DL (ref 11.9–15.1)
IMM GRANULOCYTES # BLD AUTO: 0.1 K/UL (ref 0–0.3)
IMM GRANULOCYTES NFR BLD: 2 %
LYMPHOCYTES NFR BLD: 1.49 K/UL (ref 1.1–3.7)
LYMPHOCYTES RELATIVE PERCENT: 22 % (ref 24–43)
MCH RBC QN AUTO: 28.7 PG (ref 25.2–33.5)
MCHC RBC AUTO-ENTMCNC: 32.8 G/DL (ref 28.4–34.8)
MCV RBC AUTO: 87.5 FL (ref 82.6–102.9)
MICROORGANISM SPEC CULT: NORMAL
MONOCYTES NFR BLD: 0.56 K/UL (ref 0.1–1.2)
MONOCYTES NFR BLD: 8 % (ref 3–12)
NEUTROPHILS NFR BLD: 65 % (ref 36–65)
NEUTS SEG NFR BLD: 4.33 K/UL (ref 1.5–8.1)
NRBC BLD-RTO: 0 PER 100 WBC
PLATELET # BLD AUTO: 259 K/UL (ref 138–453)
PMV BLD AUTO: 10.8 FL (ref 8.1–13.5)
POTASSIUM SERPL-SCNC: 3.7 MMOL/L (ref 3.7–5.3)
RBC # BLD AUTO: 3.52 M/UL (ref 3.95–5.11)
RBC # BLD: ABNORMAL 10*6/UL
SERVICE CMNT-IMP: NORMAL
SODIUM SERPL-SCNC: 139 MMOL/L (ref 136–145)
SPECIMEN DESCRIPTION: NORMAL
WBC OTHER # BLD: 6.7 K/UL (ref 3.5–11.3)

## 2025-07-23 PROCEDURE — 80048 BASIC METABOLIC PNL TOTAL CA: CPT

## 2025-07-23 PROCEDURE — 6370000000 HC RX 637 (ALT 250 FOR IP): Performed by: INTERNAL MEDICINE

## 2025-07-23 PROCEDURE — G0108 DIAB MANAGE TRN  PER INDIV: HCPCS

## 2025-07-23 PROCEDURE — 85025 COMPLETE CBC W/AUTO DIFF WBC: CPT

## 2025-07-23 PROCEDURE — 94640 AIRWAY INHALATION TREATMENT: CPT

## 2025-07-23 PROCEDURE — 2500000003 HC RX 250 WO HCPCS

## 2025-07-23 PROCEDURE — 6370000000 HC RX 637 (ALT 250 FOR IP)

## 2025-07-23 PROCEDURE — 6360000002 HC RX W HCPCS

## 2025-07-23 PROCEDURE — 2500000003 HC RX 250 WO HCPCS: Performed by: INTERNAL MEDICINE

## 2025-07-23 PROCEDURE — 36415 COLL VENOUS BLD VENIPUNCTURE: CPT

## 2025-07-23 PROCEDURE — 82947 ASSAY GLUCOSE BLOOD QUANT: CPT

## 2025-07-23 PROCEDURE — 99233 SBSQ HOSP IP/OBS HIGH 50: CPT | Performed by: INTERNAL MEDICINE

## 2025-07-23 PROCEDURE — 97165 OT EVAL LOW COMPLEX 30 MIN: CPT

## 2025-07-23 PROCEDURE — 97535 SELF CARE MNGMENT TRAINING: CPT

## 2025-07-23 PROCEDURE — 2580000003 HC RX 258: Performed by: INTERNAL MEDICINE

## 2025-07-23 RX ORDER — DICYCLOMINE HYDROCHLORIDE 10 MG/1
20 CAPSULE ORAL 4 TIMES DAILY PRN
Qty: 120 CAPSULE | Refills: 0 | Status: SHIPPED | OUTPATIENT
Start: 2025-07-23 | End: 2025-08-22

## 2025-07-23 RX ORDER — ALPRAZOLAM 0.5 MG
0.5 TABLET ORAL 3 TIMES DAILY PRN
Qty: 45 TABLET | Refills: 0 | Status: SHIPPED | OUTPATIENT
Start: 2025-07-23 | End: 2025-08-12

## 2025-07-23 RX ORDER — DICYCLOMINE HYDROCHLORIDE 10 MG/1
20 CAPSULE ORAL 4 TIMES DAILY PRN
Status: DISCONTINUED | OUTPATIENT
Start: 2025-07-23 | End: 2025-07-23 | Stop reason: HOSPADM

## 2025-07-23 RX ORDER — PREDNISONE 20 MG/1
40 TABLET ORAL DAILY
Status: DISCONTINUED | OUTPATIENT
Start: 2025-07-24 | End: 2025-07-23 | Stop reason: HOSPADM

## 2025-07-23 RX ORDER — LEVOFLOXACIN 750 MG/1
750 TABLET, FILM COATED ORAL EVERY 24 HOURS
Status: DISCONTINUED | OUTPATIENT
Start: 2025-07-23 | End: 2025-07-23 | Stop reason: HOSPADM

## 2025-07-23 RX ORDER — PREDNISONE 20 MG/1
40 TABLET ORAL DAILY
Qty: 10 TABLET | Refills: 0 | Status: SHIPPED | OUTPATIENT
Start: 2025-07-24 | End: 2025-07-29

## 2025-07-23 RX ORDER — CALCIUM CARBONATE 500 MG/1
500 TABLET, CHEWABLE ORAL 3 TIMES DAILY PRN
Status: DISCONTINUED | OUTPATIENT
Start: 2025-07-23 | End: 2025-07-23 | Stop reason: HOSPADM

## 2025-07-23 RX ORDER — ASPIRIN 81 MG/1
81 TABLET, CHEWABLE ORAL DAILY
Qty: 30 TABLET | Refills: 3 | Status: SHIPPED | OUTPATIENT
Start: 2025-07-23

## 2025-07-23 RX ORDER — IPRATROPIUM BROMIDE AND ALBUTEROL SULFATE 2.5; .5 MG/3ML; MG/3ML
1 SOLUTION RESPIRATORY (INHALATION)
Status: DISCONTINUED | OUTPATIENT
Start: 2025-07-23 | End: 2025-07-23 | Stop reason: HOSPADM

## 2025-07-23 RX ORDER — IPRATROPIUM BROMIDE AND ALBUTEROL SULFATE 2.5; .5 MG/3ML; MG/3ML
3 SOLUTION RESPIRATORY (INHALATION)
Qty: 360 ML | Refills: 3 | Status: SHIPPED | OUTPATIENT
Start: 2025-07-23

## 2025-07-23 RX ORDER — INSULIN LISPRO 100 [IU]/ML
INJECTION, SOLUTION INTRAVENOUS; SUBCUTANEOUS
Qty: 3 ML | Refills: 3 | Status: SHIPPED | OUTPATIENT
Start: 2025-07-23 | End: 2025-07-23

## 2025-07-23 RX ORDER — INSULIN LISPRO 100 [IU]/ML
INJECTION, SOLUTION INTRAVENOUS; SUBCUTANEOUS
Qty: 3 ML | Refills: 3 | Status: SHIPPED | OUTPATIENT
Start: 2025-07-23

## 2025-07-23 RX ORDER — LEVOFLOXACIN 750 MG/1
750 TABLET, FILM COATED ORAL EVERY 24 HOURS
Qty: 5 TABLET | Refills: 0 | Status: SHIPPED | OUTPATIENT
Start: 2025-07-23 | End: 2025-07-28

## 2025-07-23 RX ORDER — SENNA AND DOCUSATE SODIUM 50; 8.6 MG/1; MG/1
2 TABLET, FILM COATED ORAL DAILY PRN
Qty: 60 TABLET | Refills: 0 | Status: SHIPPED | OUTPATIENT
Start: 2025-07-23 | End: 2025-08-22

## 2025-07-23 RX ORDER — INSULIN LISPRO 100 [IU]/ML
0-8 INJECTION, SOLUTION INTRAVENOUS; SUBCUTANEOUS
Qty: 10 EACH | Refills: 0 | Status: CANCELLED | OUTPATIENT
Start: 2025-07-23 | End: 2025-08-22

## 2025-07-23 RX ORDER — SENNA AND DOCUSATE SODIUM 50; 8.6 MG/1; MG/1
2 TABLET, FILM COATED ORAL DAILY PRN
Status: DISCONTINUED | OUTPATIENT
Start: 2025-07-23 | End: 2025-07-23 | Stop reason: HOSPADM

## 2025-07-23 RX ORDER — ALPRAZOLAM 0.5 MG
0.5 TABLET ORAL 3 TIMES DAILY PRN
Status: DISCONTINUED | OUTPATIENT
Start: 2025-07-23 | End: 2025-07-23 | Stop reason: HOSPADM

## 2025-07-23 RX ORDER — CALCIUM CARBONATE 500 MG/1
500 TABLET, CHEWABLE ORAL 3 TIMES DAILY PRN
Qty: 30 TABLET | Refills: 0 | Status: SHIPPED | OUTPATIENT
Start: 2025-07-23 | End: 2025-08-22

## 2025-07-23 RX ADMIN — ALPRAZOLAM 0.5 MG: 0.5 TABLET ORAL at 11:42

## 2025-07-23 RX ADMIN — FUROSEMIDE 40 MG: 40 TABLET ORAL at 08:10

## 2025-07-23 RX ADMIN — SODIUM CHLORIDE, PRESERVATIVE FREE 10 ML: 5 INJECTION INTRAVENOUS at 08:12

## 2025-07-23 RX ADMIN — DICLOFENAC SODIUM 2 G: 10 GEL TOPICAL at 08:14

## 2025-07-23 RX ADMIN — SENNOSIDES, DOCUSATE SODIUM 2 TABLET: 50; 8.6 TABLET, FILM COATED ORAL at 11:42

## 2025-07-23 RX ADMIN — HYDROXYZINE HYDROCHLORIDE 25 MG: 50 TABLET ORAL at 04:29

## 2025-07-23 RX ADMIN — INSULIN LISPRO 4 UNITS: 100 INJECTION, SOLUTION INTRAVENOUS; SUBCUTANEOUS at 13:09

## 2025-07-23 RX ADMIN — BUDESONIDE AND FORMOTEROL FUMARATE DIHYDRATE 1 PUFF: 80; 4.5 AEROSOL RESPIRATORY (INHALATION) at 07:54

## 2025-07-23 RX ADMIN — TIOTROPIUM BROMIDE INHALATION SPRAY 3 MCG: 3.12 SPRAY, METERED RESPIRATORY (INHALATION) at 07:54

## 2025-07-23 RX ADMIN — BUSPIRONE HYDROCHLORIDE 30 MG: 10 TABLET ORAL at 08:09

## 2025-07-23 RX ADMIN — FERROUS SULFATE TAB EC 325 MG (65 MG FE EQUIVALENT) 325 MG: 325 (65 FE) TABLET DELAYED RESPONSE at 08:10

## 2025-07-23 RX ADMIN — CARVEDILOL 6.25 MG: 12.5 TABLET, FILM COATED ORAL at 08:10

## 2025-07-23 RX ADMIN — ACETAMINOPHEN 650 MG: 325 TABLET ORAL at 00:38

## 2025-07-23 RX ADMIN — SERTRALINE 50 MG: 50 TABLET, FILM COATED ORAL at 11:52

## 2025-07-23 RX ADMIN — DICYCLOMINE HYDROCHLORIDE 20 MG: 10 CAPSULE ORAL at 11:52

## 2025-07-23 RX ADMIN — WATER 40 MG: 1 INJECTION INTRAMUSCULAR; INTRAVENOUS; SUBCUTANEOUS at 08:10

## 2025-07-23 RX ADMIN — ATORVASTATIN CALCIUM 40 MG: 40 TABLET, FILM COATED ORAL at 08:10

## 2025-07-23 RX ADMIN — ANTACID TABLETS 500 MG: 500 TABLET, CHEWABLE ORAL at 13:09

## 2025-07-23 RX ADMIN — SODIUM BICARBONATE: 84 INJECTION, SOLUTION INTRAVENOUS at 08:16

## 2025-07-23 ASSESSMENT — PAIN SCALES - GENERAL
PAINLEVEL_OUTOF10: 5
PAINLEVEL_OUTOF10: 1
PAINLEVEL_OUTOF10: 10
PAINLEVEL_OUTOF10: 6

## 2025-07-23 ASSESSMENT — PAIN DESCRIPTION - DESCRIPTORS: DESCRIPTORS: THROBBING

## 2025-07-23 ASSESSMENT — PAIN DESCRIPTION - LOCATION: LOCATION: WRIST

## 2025-07-23 ASSESSMENT — ENCOUNTER SYMPTOMS: WHEEZING: 1

## 2025-07-23 ASSESSMENT — PAIN DESCRIPTION - ORIENTATION: ORIENTATION: RIGHT

## 2025-07-23 NOTE — DISCHARGE INSTRUCTIONS
You were admitted to the hospital for the acute COPD exacerbation and acute kidney injury.    If you begin to experience any symptoms such as chest pain shortness of breath nausea vomiting dizziness drowsiness abdominal pain loss of consciousness or any other symptoms you find concerning please come to the ED for follow-up evaluation.    If you have been given medication please take them as prescribed. Do not take more medication than recommended at any given time.     Please follow-up with your primary care provider within 5 to 7 days for continued care.     Please feel free return to the hospital if your symptoms worsen or any new concerning symptoms develop.  Follow-up with your primary care physician as needed for all other the concerns.     PCP Follow up:  Labs: BMP in 1 week

## 2025-07-23 NOTE — PLAN OF CARE
Problem: Respiratory - Adult  Goal: Achieves optimal ventilation and oxygenation  7/23/2025 0448 by Mahnaz Stanford, RN  Outcome: Progressing  Flowsheets (Taken 7/22/2025 2130)  Achieves optimal ventilation and oxygenation:   Assess for changes in respiratory status   Assess for changes in mentation and behavior  7/22/2025 1952 by Sam Abdi RCP  Outcome: Progressing

## 2025-07-23 NOTE — PLAN OF CARE
Problem: ABCDS Injury Assessment  Goal: Absence of physical injury  7/23/2025 1450 by Sandy Garcia RN  Outcome: Progressing     Problem: Pain  Goal: Verbalizes/displays adequate comfort level or baseline comfort level  7/23/2025 1450 by Sandy Garcia RN  Outcome: Progressing     Problem: Respiratory - Adult  Goal: Achieves optimal ventilation and oxygenation  7/23/2025 1450 by Sandy Garcia RN  Outcome: Progressing

## 2025-07-23 NOTE — PROGRESS NOTES
Pharmacy Note     Renal Dose Adjustment    Nancy L Haase is a 64 y.o. female. Pharmacist assessment of renally cleared medications.    Recent Labs     07/20/25  1632 07/22/25  0619   BUN 39* 49*       Recent Labs     07/20/25  1632 07/22/25  0619   CREATININE 1.8* 2.4*       Estimated Creatinine Clearance: 31 mL/min (A) (based on SCr of 2.4 mg/dL (H)).  Estimated CrCl using Ideal Body Weight: 25 mL/min (based on IBW 66 kg)    Height:   Ht Readings from Last 1 Encounters:   07/22/25 1.753 m (5' 9\")     Weight:  Wt Readings from Last 1 Encounters:   07/22/25 103.9 kg (229 lb)       The following medication dose has been adjusted based upon renal function per P&T Guidelines:             Levofloxacin 750 mg by mouth every 24 hours changed to 750 mg every 48 hours.    Ariel Gonsales [facility]  7/22/2025 9:45 PM        
          Pharmacy Note     Renal Dose Adjustment    Nancy L Haase is a 64 y.o. female. Pharmacist assessment of renally cleared medications.    Recent Labs     07/22/25  0619 07/23/25  0410   BUN 49* 30*       Recent Labs     07/22/25  0619 07/23/25  0410   CREATININE 2.4* 1.4*       Estimated Creatinine Clearance: 53 mL/min (A) (based on SCr of 1.4 mg/dL (H)).  Estimated CrCl using Ideal Body Weight: 53 mL/min (based on IBW  kg)    Height:   Ht Readings from Last 1 Encounters:   07/22/25 1.753 m (5' 9\")     Weight:  Wt Readings from Last 1 Encounters:   07/22/25 103.9 kg (229 lb)       The following medication dose has been adjusted based upon renal function per P&T Guidelines:             Levofloxacin dose change to 750 mg daily from q 48 hours for crcl > 50        
    [facility]  Internal Medicine Teaching Residency Program  Inpatient Daily Progress Note  ______________________________________________________________________________    Patient: Nancy L Haase  YOB: 1960   MRN:[id number]    Acct: [id number]     Room: [facility]042-02  Admit date: 7/22/2025  Today's date: 07/23/25  Number of days in the hospital: 1    SUBJECTIVE   Admitting Diagnosis: Acute kidney injury superimposed on CKD  CC:   Chief Complaint   Patient presents with    Anxiety        Pt examined at bedside. Chart & results reviewed.   Patient afebrile AAO X 4  She is saturating well in room air.  Patient says she was not able to sleep well, she woke intermittently anxious.  Vitals stable      Intake/Output Summary (Last 24 hours) at 7/23/2025 0653  Last data filed at 7/23/2025 0644  Gross per 24 hour   Intake 1597.24 ml   Output 800 ml   Net 797.24 ml      Review of Systems   Constitutional:  Negative for fever.   Respiratory:  Positive for wheezing.    Psychiatric/Behavioral:  Negative for agitation and confusion.          BRIEF HISTORY     The patient is a 64 y.o. female with PMHx of CAD S/P PCI in 2021, Afib on eliquis, T2 DM, CKD, recent h/o hysterectomy for endometrioid adenocarcinoma.     They present to the ED with a chief complaint of \"not able to breathe, not able to sleep\" after waking up today morning.  She also experienced profuse sweating and subjective chills along with shortness of breath.  She felt like she needed more oxygen to breathe.  She also reports productive cough since 1 month with increasing thick yellow mucopurulent expectoration since the past week.  She is a k/c/o COPD not on any home oxygen.she has no associated chest pain, palpitations, syncope, nausea, vomiting, fever.  She was discharged yesterday from this hospital following hysterectomy for endometrioid carcinoma.   She has a history of decompensated heart failure requiring 
Bladder scan showing 464 ml.   Dr. Strauss on unit rounding and has been updated. Per Dr. Strauss,  do not straight cath at this time and continue to monitor, as Pt is voiding per self.   
CLINICAL PHARMACY NOTE: MEDS TO BEDS    Total # of Prescriptions Filled: 6   The following medications were delivered to the patient:  Levofloxacin 750mg  Dicyclomine 10mg  Alprazolam 0.5mg  Prednisone 20mg  Spiriva 2.5mcg  Sertraline 50mg    Additional Documentation: delivered to patient in room 420 7/23 at 3:35pm. No co-pay.  
Congestive Heart Failure Education note:      2nd teaching, appointment card provided for 08/06 1500 referral consult appointment.      Discussed 2000mg/day sodium restricted diet; patient verbalized understanding.    Moderate daily exercise encouraged as tolerated. Discussed rest breaks as needed; patient verbalized understanding.    Patient instructed to weigh self at the same time of each day, using same clothes and same scale; reinforced teaching to monitor for 3-5 lb weight increase over 1-2 days notify physician if charge noted.  Patient verbalized understanding.    Patient instructed to limit fluid intake to 2 liters per day.  Patient verbalized understanding.    Signs and symptoms of CHF discussed with patient, such as feeling more tired than normal, feeling short of breath, coughing that increases when you lie down, sudden weight gain, swelling of your feet, legs or belly.  Patient verbalized understanding to notify physician office if these symptoms occur.    Compliance with plan of care and further disease process causes discussed with patient, patient encouraged to keep all follow up appointments.  Patient verbalized understanding.  
Inpatient Diabetes Education    Referral on Nancy L Haase for       Lab Results   Component Value Date    LABA1C 8.4 (H) 07/02/2025    LABA1C 9.7 (H) 12/14/2022    LABA1C 9.9 (H) 12/14/2022     Pina states that she has had diabetes since 2012. She reports a family hx of diabetes because a brother and a sister have it. She elaborates that she has a granddaughter who has Type 1 diabetes.    Pina states the she tests her blood sugar at home with a glucometer.    Pina reports that she gave up regular pop (Sprite and Mountain Dew) last year and lost 20lbs.    Pina reports that her PCP, Dr. Reyes, prescribes her diabetes medications. She states that her current regimen for diabetes medication is weekly Trulicity. She takes the GLP1 on Tuesdays.     She was willing to learn about insulin (in case it is prescribed for her.) Demonstrated use of an insulin pen. Patient states that she is familiar with how to use an insulin syringe/vial method because of her granddaughter.     Reviewed Diabetes Survival Skills with patient:   A1C, Blood glucose targets, hypo and hyperglycemia, importance of home blood glucose monitoring, healthy eating, be active as recommended by health care providers, take medications oral and/or insulin as directed    Educational materials provided:   _x__  Handout - Living with diabetes? from [employer] kinetic 2022  _x__  Blood sugar log  _x__ Handout - How to Check Your Blood Sugar from [email or web address]  _x__ Handout - Managing diabetes safely during sick days from [employer] 2021    _x__ [facility] Diabetes Education contact card    Patient verbalizes understanding  of survival skills education.     RECOMMENDATIONS FOR OUTPATIENT PLAN:    Diabetes Self-Monitoring Supplies:  Meter and supplies typically ordered by PCP.    Diabetes Medications:  Has been taught about insulin in case it is needed.    Diabetes Education / HCP follow -up:   _x_ Would recommend follow-up education at outpatient diabetes 
Occupational Therapy  Occupational Therapy Initial Evaluation  Facility/Department: [facility] 4B STEPDOWN   Patient Name: Nancy L Haase        MRN: [id number]    : 1960    Date of Service: 2025    Chief Complaint   Patient presents with    Anxiety     Past Medical History:  has a past medical history of A-fib ([facility]), Acute on chronic systolic CHF (congestive heart failure) ([facility]), Anxiety, Asthma, CAD (coronary artery disease), CKD (chronic kidney disease), Colon polyp, COPD (chronic obstructive pulmonary disease) ([facility]), Diabetes mellitus ([facility]), GERD (gastroesophageal reflux disease), Hearing loss, Hyperlipidemia, Hypertension, Ischemic colitis, MI (myocardial infarction) ([facility]), Mitral stenosis, Movement disorder, Neuropathy, PAD (peripheral artery disease), Tobacco abuse, Under care of service provider, Under care of service provider, Under care of service provider, Under care of service provider, Under care of service provider, Under care of service provider, Uterine cancer ([facility]), and Walking troubles.  Past Surgical History:  has a past surgical history that includes Coronary angioplasty with stent; Tonsillectomy; Inner ear surgery (Right); Coronary angioplasty with stent (2019); Dilation and curettage of uterus (N/A, 2025); Colonoscopy (N/A, 2025); Cystoscopy (N/A, 2025); Bladder surgery (N/A, 2025); Cystoscopy (N/A, 2025); Laparoscopic hysterectomy (2025); and Hysterectomy (N/A, 2025).    Discharge Recommendations  Discharge Recommendations: Defer OT at this time       Assessment  Prognosis: Good  Decision Making: Low Complexity  No Skilled OT: No OT goals identified;Safe to return home;At baseline function;Independent with ADL's;Independent with functional mobility  REQUIRES OT FOLLOW-UP: No  Activity Tolerance  Activity Tolerance: Patient tolerated treatment well  Safety Devices  Type of Devices: Call light within reach, Left in chair, Gait belt, Nurse 
Patient admitted into room 420 from ED. Alert and oriented x4 on room air. Vitals obtained and stable. Scattered bruising noted on BUE due to frequent blood draws recently. Patient has one small skin tear/abrasion on right lower arm covered by a bandage. Patient states that she \"bumped into a wall\" at home. Patient has large area of bruising in various stages of healing across entire abdomen related to recent laparoscopic hysterectomy on 7/17. All five lap sites appear to be intact with surgical glue leftover from surgery still. Admission completed. Primary RN Brett to assume care.  
patient on last admission (dc 7/21) and stated no further inpatient management required              - Echo ordered for further monitoring               - Cardiology will be consulted based on results              - Albuterol/Symbicort/Spiriva ordered              - Will continue to monitor vitals closely     HARVEY              - Cr noted to be 2.4 on admission   - AM Cr noted to be 1.4          - Patient spontaneously voiding              - Gentle IV fluids ordered as to not exacerbate CHF     Hypertension              - VSS              - Continue home Meds: Coreg     Anxiety              - Buspar 30 mg TID ordered inpatient              - Atarax PRN              - Can consider starting longer term SSRI              - Patient should follow closely with outpatient mental health providers or psychiatry team   - Discussed additional meds patient may take on outpatient basis   - Patient utilized atarax overnight. With combination of buspar, patient states anxiety was improved     S/p NOMAN GALVAN 7/17              - No surgical post operative complaints              - Denies vaginal bleeding              - Abdomen appropriately tender to palpation              - Moderate bruising appreciated throughout abdomen              - Patient stable from a post operative standpoint     Abdominal and UE Ecchymosis              - Moderate to severe ecchymosis appreciated throughout abdomen and bilateral UE              - Anticoagulation held by primary team              - Plt wnl at 276     UTI              - Patient states dysuria improved from previously              - Ucx pending              - Moderate Leuk seen on urinalysis, nitrite neg              - In an abundance of caution, [id number] q24 was started              - Will continue to monitor and de-escalate as needed     T2DM              - POCT BG with MDISS ordered on admission      BMI 33     Gyn Onc to sign off. Please do not hesitate to reach out for any gynecologic

## 2025-07-23 NOTE — PLAN OF CARE
Problem: Pain  Goal: Verbalizes/displays adequate comfort level or baseline comfort level  Outcome: Progressing     Problem: Safety - Adult  Goal: Free from fall injury  Outcome: Progressing     Problem: Discharge Planning  Goal: Discharge to home or other facility with appropriate resources  Outcome: Progressing  Flowsheets (Taken 7/22/2025 2130)  Discharge to home or other facility with appropriate resources:   Identify barriers to discharge with patient and caregiver   Arrange for needed discharge resources and transportation as appropriate   Identify discharge learning needs (meds, wound care, etc)   Refer to discharge planning if patient needs post-hospital services based on physician order or complex needs related to functional status, cognitive ability or social support system

## 2025-07-24 LAB
EKG ATRIAL RATE: 79 BPM
EKG P-R INTERVAL: 176 MS
EKG Q-T INTERVAL: 392 MS
EKG QRS DURATION: 70 MS
EKG QTC CALCULATION (BAZETT): 449 MS
EKG R AXIS: 28 DEGREES
EKG T AXIS: -5 DEGREES
EKG VENTRICULAR RATE: 79 BPM

## 2025-07-24 PROCEDURE — 93010 ELECTROCARDIOGRAM REPORT: CPT | Performed by: INTERNAL MEDICINE

## 2025-07-24 NOTE — DISCHARGE SUMMARY
[facility]     Department of Internal Medicine - Staff Internal Medicine Teaching Service    INPATIENT DISCHARGE SUMMARY      Patient Identification:  Nancy L Haase is a 64 y.o. female.  :  1960  MRN: [id number]     Acct: [id number]   PCP: Scar Reyes MD  Admit Date:  2025  Discharge date and time: 2025  3:49 PM   Attending Provider: No att. providers found                                     ACTIVE DISCHARGE DIAGNOSES     Hospital Problem Lists:  Principal Problem:    Acute kidney injury superimposed on CKD  Active Problems:    Class 2 obesity with body mass index (BMI) of 36.0 to 36.9 in adult    Atrial fibrillation (HCC)    CKD stage 3 due to type 2 diabetes mellitus (HCC)    HARVEY (acute kidney injury)    UTI (urinary tract infection)    Primary hypertension    Type 2 diabetes mellitus (HCC)  Resolved Problems:    * No resolved hospital problems. *      HOSPITAL STAY     Brief Inpatient course:   Nancy L Haase is a 64 y.o. female who presented with PMHx of CAD S/P PCI in , Afib on eliquis, T2 DM, CKD, recent h/o hysterectomy for endometrioid adenocarcinoma.     They present to the ED with a chief complaint of \"not able to breathe, not able to sleep\" after waking up today morning.  She also experienced profuse sweating and subjective chills along with shortness of breath.  She felt like she needed more oxygen to breathe.  She also reports productive cough since 1 month with increasing thick yellow mucopurulent expectoration since the past week.  She is a k/c/o COPD not on any home oxygen.she has no associated chest pain, palpitations, syncope, nausea, vomiting, fever.  She was discharged yesterday from this hospital following hysterectomy for endometrioid carcinoma.   She has a history of decompensated heart failure requiring hospital admission in 2025.  Her EKG, chest x-ray, D-dimer, CT chest was unremarkable.  She was started on antibiotics.  OB/GYN  consult was sought, advised conservative management and antianxiety measures.  Patient symptomatically better and vitals stable.    Procedures/ Significant Interventions:        Consults:     Consults:     Final Specialist Recommendations/Findings:   IP CONSULT TO GYNECOLOGIC ONCOLOGY  IP CONSULT TO INTERNAL MEDICINE  IP CONSULT TO CASE MANAGEMENT  IP CONSULT TO DIABETES EDUCATOR      Any Hospital Acquired Infections: none    Discharge Functional Status:  stable    DISCHARGE PLAN     Disposition: home    Patient Instructions:   Discharge Medication List as of 7/23/2025  2:29 PM        START taking these medications    Details   ALPRAZolam (XANAX) 0.5 MG tablet Take 1 tablet by mouth 3 times daily as needed for Anxiety for up to 20 days. Max Daily Amount: 1.5 mg, Disp-45 tablet, R-0Normal      diclofenac sodium (VOLTAREN) 1 % GEL Apply 2 g topically 2 times daily, Topical, 2 TIMES DAILY Starting Wed 7/23/2025, Disp-120 g, R-0, Normal      dicyclomine (BENTYL) 10 MG capsule Take 2 capsules by mouth 4 times daily as needed (abdominal cramps), Disp-120 capsule, R-0Normal      ipratropium 0.5 mg-albuterol 2.5 mg (DUONEB) 0.5-2.5 (3) MG/3ML SOLN nebulizer solution Inhale 3 mLs into the lungs 4 times daily, Disp-360 mL, R-3Normal      levoFLOXacin (LEVAQUIN) 750 MG tablet Take 1 tablet by mouth every 24 hours for 5 days, Disp-5 tablet, R-0Normal      predniSONE (DELTASONE) 20 MG tablet Take 2 tablets by mouth daily for 5 doses, Disp-10 tablet, R-0Normal      sennosides-docusate sodium (SENOKOT-S) 8.6-50 MG tablet Take 2 tablets by mouth daily as needed for Constipation, Disp-60 tablet, R-0Normal      tiotropium (SPIRIVA RESPIMAT) 2.5 MCG/ACT AERS inhaler Inhale 2 puffs into the lungs daily, Disp-60 each, R-3Normal      calcium carbonate (TUMS) 500 MG chewable tablet Take 1 tablet by mouth 3 times daily as needed for Heartburn, Disp-30 tablet, R-0Normal      sertraline (ZOLOFT) 50 MG tablet Take 1 tablet by mouth daily,

## 2025-07-25 ENCOUNTER — TELEPHONE (OUTPATIENT)
Dept: GYNECOLOGIC ONCOLOGY | Age: 65
End: 2025-07-25

## 2025-07-25 NOTE — TELEPHONE ENCOUNTER
Called patient to inquire on post op status:  Fever: Patient denies fever.  Pain control: Patient reports she is having no pain.Reports she is taking 1500 mg of Tylenol at night before going to bed.Advised per provider she should not take more than 1000 mg.She voiced understanding.  Incision(s): Patient reports no redness, swelling or drainage at incision sites.She reports bruising is improving.Writer educated patient on showering instructions and importance of showering ,no tub baths,swimming or hot tubs.Writer educated on proper incision care: showering, keeping incisions clean and dry, and s/s of infection: increased redness, warm to touch, increased drainage, increased swelling, increased pain.    /BM:  Patient denies any concerns with urination. She reports she is currently being treated for UTI. She denies any constipation. States she had bowel movement today.Writer educated patient on proper fluid intake and continue use of stool softeners to prevent constipation.   Vaginal Bleeding: Patient reports she is is not having any vaginal bleeding.She does report cream colored discharge when wiping and some itching. Patient educated on expected bleeding: small clots, period-like bleeding vs concerning bleeding: large clots, needing to change pad hourly.  Appetite: Patient reports eating and drinking fine. Pt does report blood sugars have been elevated.BS -300 at lunch time today.   Other: Pt reports having lightheadedness in the morning when getting up after eating and taking morning medications. She reports she gets up slowly from sitting to standing position. Dr. Elias notified and wants patient to follow up with PCP for elevated blood sugars, lightheadedness.Encourage to increase fluids. Pt notified and voiced understanding. Reports she has f/u appt on 8/6 with PCP this was the soonest appt they had.She reports she will call PCP and notify.       Post op instructions reviewed, including when to go to ER:

## 2025-07-30 LAB — SURGICAL PATHOLOGY REPORT: NORMAL

## 2025-07-31 ENCOUNTER — HOSPITAL ENCOUNTER (OUTPATIENT)
Dept: GENERAL RADIOLOGY | Age: 65
Discharge: HOME | End: 2025-08-02
Payer: MEDICARE

## 2025-07-31 ENCOUNTER — HOSPITAL ENCOUNTER (OUTPATIENT)
Age: 65
Setting detail: SPECIMEN
Discharge: HOME | End: 2025-07-31

## 2025-07-31 ENCOUNTER — OFFICE VISIT (OUTPATIENT)
Dept: GYNECOLOGIC ONCOLOGY | Age: 65
End: 2025-07-31

## 2025-07-31 ENCOUNTER — TELEPHONE (OUTPATIENT)
Dept: GYNECOLOGIC ONCOLOGY | Age: 65
End: 2025-07-31

## 2025-07-31 ENCOUNTER — HOSPITAL ENCOUNTER (OUTPATIENT)
Dept: LAB | Age: 65
Discharge: HOME | End: 2025-07-31
Payer: MEDICARE

## 2025-07-31 VITALS
OXYGEN SATURATION: 98 % | WEIGHT: 229 LBS | DIASTOLIC BLOOD PRESSURE: 84 MMHG | SYSTOLIC BLOOD PRESSURE: 124 MMHG | HEART RATE: 86 BPM | HEIGHT: 69 IN | BODY MASS INDEX: 33.92 KG/M2

## 2025-07-31 DIAGNOSIS — R11.0 NAUSEA: ICD-10-CM

## 2025-07-31 DIAGNOSIS — N89.8 VAGINAL ITCHING: ICD-10-CM

## 2025-07-31 DIAGNOSIS — C55 MALIGNANT NEOPLASM OF UTERUS, UNSPECIFIED SITE (HCC): Primary | ICD-10-CM

## 2025-07-31 DIAGNOSIS — R53.1 WEAKNESS: ICD-10-CM

## 2025-07-31 DIAGNOSIS — C55 MALIGNANT NEOPLASM OF UTERUS, UNSPECIFIED SITE (HCC): ICD-10-CM

## 2025-07-31 DIAGNOSIS — C54.1 MALIGNANT NEOPLASM OF ENDOMETRIUM (HCC): ICD-10-CM

## 2025-07-31 DIAGNOSIS — D72.829 LEUKOCYTOSIS, UNSPECIFIED TYPE: Primary | ICD-10-CM

## 2025-07-31 LAB
ALBUMIN SERPL-MCNC: 4.3 G/DL (ref 3.5–5.2)
ALBUMIN/GLOB SERPL: 1.4 {RATIO} (ref 1–2.5)
ALP SERPL-CCNC: 70 U/L (ref 35–104)
ALT SERPL-CCNC: 22 U/L (ref 10–35)
ANION GAP SERPL CALCULATED.3IONS-SCNC: 16 MMOL/L (ref 9–16)
AST SERPL-CCNC: 19 U/L (ref 10–35)
BASOPHILS # BLD: 0.1 K/UL (ref 0–0.2)
BASOPHILS NFR BLD: 1 % (ref 0–2)
BILIRUB SERPL-MCNC: 0.7 MG/DL (ref 0–1.2)
BUN SERPL-MCNC: 31 MG/DL (ref 8–23)
CALCIUM SERPL-MCNC: 9.5 MG/DL (ref 8.6–10.4)
CHLORIDE SERPL-SCNC: 100 MMOL/L (ref 98–107)
CO2 SERPL-SCNC: 21 MMOL/L (ref 20–31)
CREAT SERPL-MCNC: 1.9 MG/DL (ref 0.6–0.9)
EOSINOPHIL # BLD: 0.13 K/UL (ref 0–0.44)
EOSINOPHILS RELATIVE PERCENT: 1 % (ref 1–4)
ERYTHROCYTE [DISTWIDTH] IN BLOOD BY AUTOMATED COUNT: 14.9 % (ref 11.8–14.4)
GFR, ESTIMATED: 29 ML/MIN/1.73M2
GLUCOSE SERPL-MCNC: 229 MG/DL (ref 74–99)
HCT VFR BLD AUTO: 43.8 % (ref 36.3–47.1)
HGB BLD-MCNC: 14.5 G/DL (ref 11.9–15.1)
IMM GRANULOCYTES # BLD AUTO: 0.28 K/UL (ref 0–0.3)
IMM GRANULOCYTES NFR BLD: 2 %
LYMPHOCYTES NFR BLD: 1.85 K/UL (ref 1.1–3.7)
LYMPHOCYTES RELATIVE PERCENT: 16 % (ref 24–43)
MCH RBC QN AUTO: 28.8 PG (ref 25.2–33.5)
MCHC RBC AUTO-ENTMCNC: 33.1 G/DL (ref 28.4–34.8)
MCV RBC AUTO: 87.1 FL (ref 82.6–102.9)
MONOCYTES NFR BLD: 0.84 K/UL (ref 0.1–1.2)
MONOCYTES NFR BLD: 7 % (ref 3–12)
NEUTROPHILS NFR BLD: 72 % (ref 36–65)
NEUTS SEG NFR BLD: 8.35 K/UL (ref 1.5–8.1)
NRBC BLD-RTO: 0 PER 100 WBC
PLATELET # BLD AUTO: 308 K/UL (ref 138–453)
PMV BLD AUTO: 10.6 FL (ref 8.1–13.5)
POTASSIUM SERPL-SCNC: 3.6 MMOL/L (ref 3.7–5.3)
PROT SERPL-MCNC: 7.3 G/DL (ref 6.6–8.7)
RBC # BLD AUTO: 5.03 M/UL (ref 3.95–5.11)
RBC # BLD: ABNORMAL 10*6/UL
SODIUM SERPL-SCNC: 137 MMOL/L (ref 136–145)
WBC OTHER # BLD: 11.6 K/UL (ref 3.5–11.3)

## 2025-07-31 PROCEDURE — 99024 POSTOP FOLLOW-UP VISIT: CPT | Performed by: PHYSICIAN ASSISTANT

## 2025-07-31 PROCEDURE — 36415 COLL VENOUS BLD VENIPUNCTURE: CPT

## 2025-07-31 PROCEDURE — 85025 COMPLETE CBC W/AUTO DIFF WBC: CPT

## 2025-07-31 PROCEDURE — 80053 COMPREHEN METABOLIC PANEL: CPT

## 2025-07-31 PROCEDURE — 74018 RADEX ABDOMEN 1 VIEW: CPT

## 2025-07-31 RX ORDER — ONDANSETRON 4 MG/1
4 TABLET, ORALLY DISINTEGRATING ORAL 3 TIMES DAILY PRN
Qty: 21 TABLET | Refills: 0 | Status: SHIPPED | OUTPATIENT
Start: 2025-07-31

## 2025-07-31 ASSESSMENT — ENCOUNTER SYMPTOMS
DIARRHEA: 1
COUGH: 1
BACK PAIN: 1
SHORTNESS OF BREATH: 1

## 2025-07-31 NOTE — TELEPHONE ENCOUNTER
Completed [employer] Financial Assistance forms with patient at her visit. Patient out of pocket cost for this testing is $0. Patient voiced understanding.

## 2025-07-31 NOTE — PROGRESS NOTES
Review of Systems   Respiratory:  Positive for cough and shortness of breath (COPD).    Gastrointestinal:  Positive for diarrhea.   Endocrine: Positive for hot flashes.   Genitourinary:  Positive for vaginal discharge.    Musculoskeletal:  Positive for back pain.   Skin:  Positive for itching (vaginal).   Neurological:  Positive for dizziness.   Hematological:  Bruises/bleeds easily.

## 2025-07-31 NOTE — PROGRESS NOTES
[facility] Gynecologic Oncology  [address]  [place] [address]    Nancy L Haase is a 64 y.o. female who presents for a Post Operative visit today     CC/HPI: Patient is a  post menopausal female who was diagnosed with grade 1 endometrioid adenocarcinoma on hysteroscopy dilation and curettage in 2025.     She presented to ED in 2025 with concerns of vaginal bleeding since 2024. Pelvic US was essentially unremarkable with endometrial stripe at 4 mm. CT abdomen pelvis was negative for acute intra abdominal or pelvic abnormalities. Patient's Hbg dropped from 10.4 to 6.8, she required 2 U pRBC.     Patient was placed on megace and underwent EUA, hysteroscopy, dilation and curettage with Liletta IUD placement on 3/6/2025. Intraoperative findings of significantly proliferative endometrial cavity. Visualization was poor and patient was experiencing significant wheezing and excretions requiring treatment per anesthesia. Endometrial curettage was performed with sharp curettage and IUD was placed.    The final pathology revealed grade 1 endometrioid adenocarcinoma, MMR intact. She was referred to [facility] Gynecologic Oncology for further evaluation and management.     Patient's medical history is significant for CHF, CAD s/p 2 stent placements, atrial fibrillation, HTN, DM II, CKD, COPD, PAD, history tobacco abuse and current THC use.    Regarding her uterine cancer diagnosis, patient was counseled on high risk nature of standard care of treatment including hysterectomy, BSO and staging. She was counseled on treatment with progesterone emitting IUD and advised to initiate 160mg Megace BID to supplement her treatment.    PET/CT on 2025 revealed evidence of focal area of uptake in the distal ileum and rectum.  Large thyroid.  Recommend small bowel follow-through and colonoscopy.    She completed small bowel follow-through on 2025 which was overall

## 2025-08-01 ENCOUNTER — TELEPHONE (OUTPATIENT)
Age: 65
End: 2025-08-01

## 2025-08-01 LAB
CANDIDA SPECIES: POSITIVE
GARDNERELLA VAGINALIS: POSITIVE
SOURCE: ABNORMAL
TRICHOMONAS: NEGATIVE

## 2025-08-01 NOTE — TELEPHONE ENCOUNTER
Name: Nancy L Haase  : 1960  MRN: [id number]    Oncology Navigation- Initial Note:    Intake-  Contact Type: Telephone    Notes: Attempted to contact pt for introductory call, no answer, VM left requested contact writer @ [phone].  Will continue to follow.    Electronically signed by Nivia Whitfield RN on 2025 at 11:25 AM

## 2025-08-04 ENCOUNTER — TELEPHONE (OUTPATIENT)
Age: 65
End: 2025-08-04

## 2025-08-06 ENCOUNTER — TELEPHONE (OUTPATIENT)
Age: 65
End: 2025-08-06

## 2025-08-07 ENCOUNTER — APPOINTMENT (OUTPATIENT)
Dept: CT IMAGING | Age: 65
End: 2025-08-07
Payer: MEDICARE

## 2025-08-07 ENCOUNTER — HOSPITAL ENCOUNTER (EMERGENCY)
Age: 65
Discharge: HOME | End: 2025-08-08
Attending: EMERGENCY MEDICINE
Payer: MEDICARE

## 2025-08-07 VITALS
HEART RATE: 107 BPM | RESPIRATION RATE: 16 BRPM | OXYGEN SATURATION: 98 % | TEMPERATURE: 97.5 F | DIASTOLIC BLOOD PRESSURE: 117 MMHG | SYSTOLIC BLOOD PRESSURE: 146 MMHG

## 2025-08-07 DIAGNOSIS — K57.92 ACUTE DIVERTICULITIS: ICD-10-CM

## 2025-08-07 DIAGNOSIS — R10.84 GENERALIZED ABDOMINAL PAIN: ICD-10-CM

## 2025-08-07 DIAGNOSIS — R11.2 NAUSEA AND VOMITING, UNSPECIFIED VOMITING TYPE: Primary | ICD-10-CM

## 2025-08-07 LAB
ALBUMIN SERPL-MCNC: 4.3 G/DL (ref 3.5–5.2)
ALBUMIN/GLOB SERPL: 1.4 {RATIO} (ref 1–2.5)
ALP SERPL-CCNC: 75 U/L (ref 35–104)
ALT SERPL-CCNC: 21 U/L (ref 10–35)
ANION GAP SERPL CALCULATED.3IONS-SCNC: 16 MMOL/L (ref 9–16)
AST SERPL-CCNC: 21 U/L (ref 10–35)
BACTERIA URNS QL MICRO: NORMAL
BASOPHILS # BLD: 0.05 K/UL (ref 0–0.2)
BASOPHILS NFR BLD: 1 % (ref 0–2)
BILIRUB SERPL-MCNC: 0.7 MG/DL (ref 0–1.2)
BILIRUB UR QL STRIP: NEGATIVE
BUN SERPL-MCNC: 23 MG/DL (ref 8–23)
CALCIUM SERPL-MCNC: 10 MG/DL (ref 8.6–10.4)
CASTS #/AREA URNS LPF: NORMAL /LPF (ref 0–8)
CHLORIDE SERPL-SCNC: 97 MMOL/L (ref 98–107)
CLARITY UR: ABNORMAL
CO2 SERPL-SCNC: 23 MMOL/L (ref 20–31)
COLOR UR: YELLOW
CREAT SERPL-MCNC: 1.8 MG/DL (ref 0.6–0.9)
EOSINOPHIL # BLD: 0.04 K/UL (ref 0–0.44)
EOSINOPHILS RELATIVE PERCENT: 0 % (ref 1–4)
EPI CELLS #/AREA URNS HPF: NORMAL /HPF (ref 0–5)
ERYTHROCYTE [DISTWIDTH] IN BLOOD BY AUTOMATED COUNT: 14.3 % (ref 11.8–14.4)
GFR, ESTIMATED: 31 ML/MIN/1.73M2
GLUCOSE SERPL-MCNC: 197 MG/DL (ref 74–99)
GLUCOSE UR STRIP-MCNC: NEGATIVE MG/DL
HCT VFR BLD AUTO: 41.9 % (ref 36.3–47.1)
HGB BLD-MCNC: 13.5 G/DL (ref 11.9–15.1)
HGB UR QL STRIP.AUTO: NEGATIVE
IMM GRANULOCYTES # BLD AUTO: 0.03 K/UL (ref 0–0.3)
IMM GRANULOCYTES NFR BLD: 0 %
KETONES UR STRIP-MCNC: NEGATIVE MG/DL
LEUKOCYTE ESTERASE UR QL STRIP: ABNORMAL
LIPASE SERPL-CCNC: 23 U/L (ref 13–60)
LYMPHOCYTES NFR BLD: 1.94 K/UL (ref 1.1–3.7)
LYMPHOCYTES RELATIVE PERCENT: 18 % (ref 24–43)
MCH RBC QN AUTO: 28.4 PG (ref 25.2–33.5)
MCHC RBC AUTO-ENTMCNC: 32.2 G/DL (ref 28.4–34.8)
MCV RBC AUTO: 88 FL (ref 82.6–102.9)
MONOCYTES NFR BLD: 0.97 K/UL (ref 0.1–1.2)
MONOCYTES NFR BLD: 9 % (ref 3–12)
NEUTROPHILS NFR BLD: 72 % (ref 36–65)
NEUTS SEG NFR BLD: 7.96 K/UL (ref 1.5–8.1)
NITRITE UR QL STRIP: NEGATIVE
NRBC BLD-RTO: 0 PER 100 WBC
PH UR STRIP: 5.5 [PH] (ref 5–8)
PLATELET # BLD AUTO: 221 K/UL (ref 138–453)
PMV BLD AUTO: 12 FL (ref 8.1–13.5)
POTASSIUM SERPL-SCNC: 3.7 MMOL/L (ref 3.7–5.3)
PROT SERPL-MCNC: 7.4 G/DL (ref 6.6–8.7)
PROT UR STRIP-MCNC: NEGATIVE MG/DL
RBC # BLD AUTO: 4.76 M/UL (ref 3.95–5.11)
RBC #/AREA URNS HPF: NORMAL /HPF (ref 0–4)
SODIUM SERPL-SCNC: 136 MMOL/L (ref 136–145)
SP GR UR STRIP: 1.02 (ref 1–1.03)
UROBILINOGEN UR STRIP-ACNC: NORMAL EU/DL (ref 0–1)
WBC #/AREA URNS HPF: NORMAL /HPF (ref 0–5)
WBC OTHER # BLD: 11 K/UL (ref 3.5–11.3)

## 2025-08-07 PROCEDURE — 80053 COMPREHEN METABOLIC PANEL: CPT

## 2025-08-07 PROCEDURE — 96375 TX/PRO/DX INJ NEW DRUG ADDON: CPT | Performed by: EMERGENCY MEDICINE

## 2025-08-07 PROCEDURE — 93005 ELECTROCARDIOGRAM TRACING: CPT

## 2025-08-07 PROCEDURE — 81001 URINALYSIS AUTO W/SCOPE: CPT

## 2025-08-07 PROCEDURE — 85025 COMPLETE CBC W/AUTO DIFF WBC: CPT

## 2025-08-07 PROCEDURE — 74177 CT ABD & PELVIS W/CONTRAST: CPT

## 2025-08-07 PROCEDURE — 2580000003 HC RX 258

## 2025-08-07 PROCEDURE — 6360000002 HC RX W HCPCS

## 2025-08-07 PROCEDURE — 6360000004 HC RX CONTRAST MEDICATION

## 2025-08-07 PROCEDURE — 96374 THER/PROPH/DIAG INJ IV PUSH: CPT | Performed by: EMERGENCY MEDICINE

## 2025-08-07 PROCEDURE — 99285 EMERGENCY DEPT VISIT HI MDM: CPT | Performed by: EMERGENCY MEDICINE

## 2025-08-07 PROCEDURE — 87086 URINE CULTURE/COLONY COUNT: CPT

## 2025-08-07 PROCEDURE — 83690 ASSAY OF LIPASE: CPT

## 2025-08-07 PROCEDURE — 6370000000 HC RX 637 (ALT 250 FOR IP)

## 2025-08-07 PROCEDURE — 96361 HYDRATE IV INFUSION ADD-ON: CPT | Performed by: EMERGENCY MEDICINE

## 2025-08-07 PROCEDURE — 2500000003 HC RX 250 WO HCPCS

## 2025-08-07 RX ORDER — DROPERIDOL 2.5 MG/ML
0.62 INJECTION, SOLUTION INTRAMUSCULAR; INTRAVENOUS ONCE
Status: COMPLETED | OUTPATIENT
Start: 2025-08-07 | End: 2025-08-07

## 2025-08-07 RX ORDER — IOPAMIDOL 755 MG/ML
75 INJECTION, SOLUTION INTRAVASCULAR
Status: COMPLETED | OUTPATIENT
Start: 2025-08-07 | End: 2025-08-07

## 2025-08-07 RX ORDER — ONDANSETRON 2 MG/ML
4 INJECTION INTRAMUSCULAR; INTRAVENOUS ONCE
Status: COMPLETED | OUTPATIENT
Start: 2025-08-07 | End: 2025-08-07

## 2025-08-07 RX ORDER — 0.9 % SODIUM CHLORIDE 0.9 %
1000 INTRAVENOUS SOLUTION INTRAVENOUS ONCE
Status: COMPLETED | OUTPATIENT
Start: 2025-08-07 | End: 2025-08-07

## 2025-08-07 RX ADMIN — ONDANSETRON 4 MG: 2 INJECTION, SOLUTION INTRAMUSCULAR; INTRAVENOUS at 15:49

## 2025-08-07 RX ADMIN — AMOXICILLIN AND CLAVULANATE POTASSIUM 1 TABLET: 875; 125 TABLET, FILM COATED ORAL at 23:37

## 2025-08-07 RX ADMIN — PANTOPRAZOLE SODIUM 40 MG: 40 INJECTION, POWDER, FOR SOLUTION INTRAVENOUS at 15:48

## 2025-08-07 RX ADMIN — IOPAMIDOL 75 ML: 755 INJECTION, SOLUTION INTRAVENOUS at 19:04

## 2025-08-07 RX ADMIN — DROPERIDOL 0.62 MG: 2.5 INJECTION, SOLUTION INTRAMUSCULAR; INTRAVENOUS at 15:49

## 2025-08-07 RX ADMIN — SODIUM CHLORIDE 1000 ML: 0.9 INJECTION, SOLUTION INTRAVENOUS at 16:59

## 2025-08-08 LAB
EKG ATRIAL RATE: 107 BPM
EKG Q-T INTERVAL: 366 MS
EKG QRS DURATION: 80 MS
EKG QTC CALCULATION (BAZETT): 442 MS
EKG R AXIS: 40 DEGREES
EKG T AXIS: -34 DEGREES
EKG VENTRICULAR RATE: 88 BPM
LACTIC ACID, WHOLE BLOOD: 1.6 MMOL/L (ref 0.7–2.1)
MICROORGANISM SPEC CULT: NORMAL
SPECIMEN DESCRIPTION: NORMAL

## 2025-08-08 PROCEDURE — 83605 ASSAY OF LACTIC ACID: CPT

## 2025-08-08 RX ORDER — ONDANSETRON 4 MG/1
4 TABLET, FILM COATED ORAL 3 TIMES DAILY PRN
Qty: 15 TABLET | Refills: 0 | Status: SHIPPED | OUTPATIENT
Start: 2025-08-08

## 2025-08-09 LAB
DNA RANGE(S) EXAMINED NAR: NORMAL
GENE DIS ANL INTERP-IMP: POSITIVE
GENE DIS ASSESSED: NORMAL
REASON FOR STUDY: NORMAL
TEMPUS BLOOD TUMOR MUTATIONAL BURDEN: 3.8 M/MB
TEMPUS LCA: NORMAL
TEMPUS MSI NOTE: NORMAL
TEMPUS PORTAL: NORMAL
TEMPUS TREATMENT IMPLICATIONS NOTE: NORMAL
TEMPUS TRIAL1: NORMAL
TEMPUS TRIALCOUNT: 1

## 2025-08-13 ENCOUNTER — HOSPITAL ENCOUNTER (OUTPATIENT)
Dept: INTERVENTIONAL RADIOLOGY/VASCULAR | Age: 65
Discharge: HOME | End: 2025-08-15

## 2025-08-13 DIAGNOSIS — C55 MALIGNANT NEOPLASM OF UTERUS, UNSPECIFIED SITE (HCC): ICD-10-CM

## 2025-08-13 RX ORDER — SODIUM CHLORIDE 9 MG/ML
INJECTION, SOLUTION INTRAVENOUS CONTINUOUS
Status: DISCONTINUED | OUTPATIENT
Start: 2025-08-13 | End: 2025-08-16 | Stop reason: HOSPADM

## 2025-08-14 ENCOUNTER — TELEPHONE (OUTPATIENT)
Age: 65
End: 2025-08-14

## 2025-08-14 ENCOUNTER — HOSPITAL ENCOUNTER (OUTPATIENT)
Dept: RADIATION ONCOLOGY | Age: 65
Discharge: HOME | End: 2025-08-14
Payer: MEDICARE

## 2025-08-14 ENCOUNTER — HOSPITAL ENCOUNTER (OUTPATIENT)
Age: 65
Setting detail: OBSERVATION
Discharge: HOME | End: 2025-08-15
Attending: STUDENT IN AN ORGANIZED HEALTH CARE EDUCATION/TRAINING PROGRAM | Admitting: EMERGENCY MEDICINE
Payer: MEDICARE

## 2025-08-14 VITALS
RESPIRATION RATE: 16 BRPM | TEMPERATURE: 97.5 F | BODY MASS INDEX: 33.29 KG/M2 | DIASTOLIC BLOOD PRESSURE: 80 MMHG | SYSTOLIC BLOOD PRESSURE: 139 MMHG | WEIGHT: 225.4 LBS | OXYGEN SATURATION: 98 % | HEART RATE: 65 BPM

## 2025-08-14 DIAGNOSIS — R51.9 NONINTRACTABLE HEADACHE, UNSPECIFIED CHRONICITY PATTERN, UNSPECIFIED HEADACHE TYPE: ICD-10-CM

## 2025-08-14 DIAGNOSIS — R42 DIZZINESS: Primary | ICD-10-CM

## 2025-08-14 DIAGNOSIS — H81.10 BENIGN PAROXYSMAL POSITIONAL VERTIGO, UNSPECIFIED LATERALITY: ICD-10-CM

## 2025-08-14 DIAGNOSIS — R07.89 ATYPICAL CHEST PAIN: ICD-10-CM

## 2025-08-14 LAB
BASOPHILS # BLD: 0.05 K/UL (ref 0–0.2)
BASOPHILS NFR BLD: 1 % (ref 0–2)
EOSINOPHIL # BLD: 0.09 K/UL (ref 0–0.44)
EOSINOPHILS RELATIVE PERCENT: 1 % (ref 1–4)
ERYTHROCYTE [DISTWIDTH] IN BLOOD BY AUTOMATED COUNT: 14.7 % (ref 11.8–14.4)
HCT VFR BLD AUTO: 36.9 % (ref 36.3–47.1)
HGB BLD-MCNC: 11.7 G/DL (ref 11.9–15.1)
IMM GRANULOCYTES # BLD AUTO: 0.03 K/UL (ref 0–0.3)
IMM GRANULOCYTES NFR BLD: 0 %
LACTIC ACID, WHOLE BLOOD: 2.5 MMOL/L (ref 0.7–2.1)
LYMPHOCYTES NFR BLD: 1.37 K/UL (ref 1.1–3.7)
LYMPHOCYTES RELATIVE PERCENT: 20 % (ref 24–43)
MCH RBC QN AUTO: 28.8 PG (ref 25.2–33.5)
MCHC RBC AUTO-ENTMCNC: 31.7 G/DL (ref 28.4–34.8)
MCV RBC AUTO: 90.9 FL (ref 82.6–102.9)
MONOCYTES NFR BLD: 0.48 K/UL (ref 0.1–1.2)
MONOCYTES NFR BLD: 7 % (ref 3–12)
NEUTROPHILS NFR BLD: 71 % (ref 36–65)
NEUTS SEG NFR BLD: 4.92 K/UL (ref 1.5–8.1)
NRBC BLD-RTO: 0 PER 100 WBC
PLATELET # BLD AUTO: 204 K/UL (ref 138–453)
PMV BLD AUTO: 11.7 FL (ref 8.1–13.5)
RBC # BLD AUTO: 4.06 M/UL (ref 3.95–5.11)
RBC # BLD: ABNORMAL 10*6/UL
WBC OTHER # BLD: 6.9 K/UL (ref 3.5–11.3)

## 2025-08-14 PROCEDURE — 93005 ELECTROCARDIOGRAM TRACING: CPT

## 2025-08-14 PROCEDURE — 80053 COMPREHEN METABOLIC PANEL: CPT

## 2025-08-14 PROCEDURE — 6360000002 HC RX W HCPCS

## 2025-08-14 PROCEDURE — 83690 ASSAY OF LIPASE: CPT

## 2025-08-14 PROCEDURE — 83605 ASSAY OF LACTIC ACID: CPT

## 2025-08-14 PROCEDURE — 84484 ASSAY OF TROPONIN QUANT: CPT

## 2025-08-14 PROCEDURE — 2580000003 HC RX 258

## 2025-08-14 PROCEDURE — 96374 THER/PROPH/DIAG INJ IV PUSH: CPT

## 2025-08-14 PROCEDURE — 99213 OFFICE O/P EST LOW 20 MIN: CPT | Performed by: RADIOLOGY

## 2025-08-14 PROCEDURE — 85025 COMPLETE CBC W/AUTO DIFF WBC: CPT

## 2025-08-14 PROCEDURE — 99285 EMERGENCY DEPT VISIT HI MDM: CPT

## 2025-08-14 RX ORDER — 0.9 % SODIUM CHLORIDE 0.9 %
1000 INTRAVENOUS SOLUTION INTRAVENOUS ONCE
Status: COMPLETED | OUTPATIENT
Start: 2025-08-14 | End: 2025-08-15

## 2025-08-14 RX ORDER — ONDANSETRON 2 MG/ML
4 INJECTION INTRAMUSCULAR; INTRAVENOUS ONCE
Status: COMPLETED | OUTPATIENT
Start: 2025-08-14 | End: 2025-08-14

## 2025-08-14 RX ADMIN — SODIUM CHLORIDE 1000 ML: 9 INJECTION, SOLUTION INTRAVENOUS at 23:30

## 2025-08-14 RX ADMIN — ONDANSETRON 4 MG: 2 INJECTION INTRAMUSCULAR; INTRAVENOUS at 23:30

## 2025-08-14 ASSESSMENT — LIFESTYLE VARIABLES
HOW MANY STANDARD DRINKS CONTAINING ALCOHOL DO YOU HAVE ON A TYPICAL DAY: PATIENT DOES NOT DRINK
HOW OFTEN DO YOU HAVE A DRINK CONTAINING ALCOHOL: NEVER

## 2025-08-14 ASSESSMENT — PAIN SCALES - GENERAL: PAINLEVEL_OUTOF10: 2

## 2025-08-14 ASSESSMENT — PAIN - FUNCTIONAL ASSESSMENT: PAIN_FUNCTIONAL_ASSESSMENT: 0-10

## 2025-08-15 ENCOUNTER — APPOINTMENT (OUTPATIENT)
Dept: CT IMAGING | Age: 65
End: 2025-08-15
Payer: MEDICARE

## 2025-08-15 VITALS
DIASTOLIC BLOOD PRESSURE: 79 MMHG | HEART RATE: 75 BPM | RESPIRATION RATE: 25 BRPM | OXYGEN SATURATION: 98 % | TEMPERATURE: 97.6 F | SYSTOLIC BLOOD PRESSURE: 144 MMHG | BODY MASS INDEX: 33.23 KG/M2 | WEIGHT: 225 LBS

## 2025-08-15 PROBLEM — I05.0 MODERATE MITRAL STENOSIS: Status: ACTIVE | Noted: 2025-08-15

## 2025-08-15 PROBLEM — R42 LIGHTHEADEDNESS: Status: ACTIVE | Noted: 2025-08-15

## 2025-08-15 PROBLEM — I10 ESSENTIAL HYPERTENSION: Status: ACTIVE | Noted: 2025-08-15

## 2025-08-15 PROBLEM — R42 DIZZINESS: Status: ACTIVE | Noted: 2025-08-15

## 2025-08-15 LAB
ALBUMIN SERPL-MCNC: 4 G/DL (ref 3.5–5.2)
ALBUMIN/GLOB SERPL: 1.5 {RATIO} (ref 1–2.5)
ALP SERPL-CCNC: 83 U/L (ref 35–104)
ALT SERPL-CCNC: 24 U/L (ref 10–35)
ANION GAP SERPL CALCULATED.3IONS-SCNC: 15 MMOL/L (ref 9–16)
AST SERPL-CCNC: 23 U/L (ref 10–35)
BACTERIA URNS QL MICRO: NORMAL
BILIRUB SERPL-MCNC: 0.3 MG/DL (ref 0–1.2)
BILIRUB UR QL STRIP: NEGATIVE
BUN SERPL-MCNC: 24 MG/DL (ref 8–23)
CALCIUM SERPL-MCNC: 9.9 MG/DL (ref 8.6–10.4)
CASTS #/AREA URNS LPF: NORMAL /LPF (ref 0–8)
CHLORIDE SERPL-SCNC: 102 MMOL/L (ref 98–107)
CLARITY UR: CLEAR
CO2 SERPL-SCNC: 25 MMOL/L (ref 20–31)
COLOR UR: YELLOW
CREAT SERPL-MCNC: 1.6 MG/DL (ref 0.6–0.9)
EKG ATRIAL RATE: 70 BPM
EKG P AXIS: 69 DEGREES
EKG P-R INTERVAL: 168 MS
EKG Q-T INTERVAL: 424 MS
EKG QRS DURATION: 78 MS
EKG QTC CALCULATION (BAZETT): 457 MS
EKG R AXIS: 42 DEGREES
EKG T AXIS: 14 DEGREES
EKG VENTRICULAR RATE: 70 BPM
EPI CELLS #/AREA URNS HPF: NORMAL /HPF (ref 0–5)
GFR, ESTIMATED: 36 ML/MIN/1.73M2
GLUCOSE BLD-MCNC: 156 MG/DL (ref 65–105)
GLUCOSE SERPL-MCNC: 177 MG/DL (ref 74–99)
GLUCOSE UR STRIP-MCNC: NEGATIVE MG/DL
HGB UR QL STRIP.AUTO: ABNORMAL
KETONES UR STRIP-MCNC: NEGATIVE MG/DL
LEUKOCYTE ESTERASE UR QL STRIP: ABNORMAL
LIPASE SERPL-CCNC: 37 U/L (ref 13–60)
MICROORGANISM SPEC CULT: NORMAL
NITRITE UR QL STRIP: NEGATIVE
PH UR STRIP: 5.5 [PH] (ref 5–8)
POTASSIUM SERPL-SCNC: 3.5 MMOL/L (ref 3.7–5.3)
PROT SERPL-MCNC: 6.6 G/DL (ref 6.6–8.7)
PROT UR STRIP-MCNC: NEGATIVE MG/DL
RBC #/AREA URNS HPF: NORMAL /HPF (ref 0–4)
SODIUM SERPL-SCNC: 142 MMOL/L (ref 136–145)
SP GR UR STRIP: 1.04 (ref 1–1.03)
SPECIMEN DESCRIPTION: NORMAL
TROPONIN I SERPL HS-MCNC: 22 NG/L (ref 0–14)
TROPONIN I SERPL HS-MCNC: 25 NG/L (ref 0–14)
UROBILINOGEN UR STRIP-ACNC: NORMAL EU/DL (ref 0–1)
WBC #/AREA URNS HPF: NORMAL /HPF (ref 0–5)

## 2025-08-15 PROCEDURE — 94761 N-INVAS EAR/PLS OXIMETRY MLT: CPT

## 2025-08-15 PROCEDURE — 82947 ASSAY GLUCOSE BLOOD QUANT: CPT

## 2025-08-15 PROCEDURE — 81001 URINALYSIS AUTO W/SCOPE: CPT

## 2025-08-15 PROCEDURE — G0378 HOSPITAL OBSERVATION PER HR: HCPCS

## 2025-08-15 PROCEDURE — 87086 URINE CULTURE/COLONY COUNT: CPT

## 2025-08-15 PROCEDURE — 84484 ASSAY OF TROPONIN QUANT: CPT

## 2025-08-15 PROCEDURE — 2500000003 HC RX 250 WO HCPCS

## 2025-08-15 PROCEDURE — 70450 CT HEAD/BRAIN W/O DYE: CPT

## 2025-08-15 PROCEDURE — 6360000002 HC RX W HCPCS

## 2025-08-15 PROCEDURE — 6370000000 HC RX 637 (ALT 250 FOR IP)

## 2025-08-15 PROCEDURE — 94664 DEMO&/EVAL PT USE INHALER: CPT

## 2025-08-15 PROCEDURE — 94640 AIRWAY INHALATION TREATMENT: CPT

## 2025-08-15 PROCEDURE — 6360000004 HC RX CONTRAST MEDICATION

## 2025-08-15 PROCEDURE — 96376 TX/PRO/DX INJ SAME DRUG ADON: CPT

## 2025-08-15 PROCEDURE — 71260 CT THORAX DX C+: CPT

## 2025-08-15 RX ORDER — MECLIZINE HCL 12.5 MG 12.5 MG/1
25 TABLET ORAL 3 TIMES DAILY PRN
Status: DISCONTINUED | OUTPATIENT
Start: 2025-08-15 | End: 2025-08-15 | Stop reason: HOSPADM

## 2025-08-15 RX ORDER — ACETAMINOPHEN 325 MG/1
650 TABLET ORAL EVERY 6 HOURS PRN
Status: DISCONTINUED | OUTPATIENT
Start: 2025-08-15 | End: 2025-08-15 | Stop reason: HOSPADM

## 2025-08-15 RX ORDER — CALCIUM CARBONATE 500 MG/1
500 TABLET, CHEWABLE ORAL 3 TIMES DAILY PRN
Status: DISCONTINUED | OUTPATIENT
Start: 2025-08-15 | End: 2025-08-15 | Stop reason: HOSPADM

## 2025-08-15 RX ORDER — POTASSIUM CHLORIDE 7.45 MG/ML
10 INJECTION INTRAVENOUS PRN
Status: DISCONTINUED | OUTPATIENT
Start: 2025-08-15 | End: 2025-08-15 | Stop reason: HOSPADM

## 2025-08-15 RX ORDER — FUROSEMIDE 20 MG/1
40 TABLET ORAL DAILY
Status: DISCONTINUED | OUTPATIENT
Start: 2025-08-15 | End: 2025-08-15 | Stop reason: HOSPADM

## 2025-08-15 RX ORDER — DICYCLOMINE HYDROCHLORIDE 10 MG/1
20 CAPSULE ORAL 4 TIMES DAILY PRN
Status: DISCONTINUED | OUTPATIENT
Start: 2025-08-15 | End: 2025-08-15 | Stop reason: HOSPADM

## 2025-08-15 RX ORDER — IPRATROPIUM BROMIDE AND ALBUTEROL SULFATE 2.5; .5 MG/3ML; MG/3ML
1 SOLUTION RESPIRATORY (INHALATION)
Status: DISCONTINUED | OUTPATIENT
Start: 2025-08-15 | End: 2025-08-15 | Stop reason: HOSPADM

## 2025-08-15 RX ORDER — LISINOPRIL 10 MG/1
5 TABLET ORAL DAILY
Status: DISCONTINUED | OUTPATIENT
Start: 2025-08-15 | End: 2025-08-15 | Stop reason: HOSPADM

## 2025-08-15 RX ORDER — ENOXAPARIN SODIUM 100 MG/ML
30 INJECTION SUBCUTANEOUS 2 TIMES DAILY
Status: DISCONTINUED | OUTPATIENT
Start: 2025-08-15 | End: 2025-08-15

## 2025-08-15 RX ORDER — MAGNESIUM SULFATE IN WATER 40 MG/ML
2000 INJECTION, SOLUTION INTRAVENOUS PRN
Status: DISCONTINUED | OUTPATIENT
Start: 2025-08-15 | End: 2025-08-15 | Stop reason: HOSPADM

## 2025-08-15 RX ORDER — ONDANSETRON 4 MG/1
4 TABLET, ORALLY DISINTEGRATING ORAL EVERY 8 HOURS PRN
Status: DISCONTINUED | OUTPATIENT
Start: 2025-08-15 | End: 2025-08-15 | Stop reason: HOSPADM

## 2025-08-15 RX ORDER — MECLIZINE HYDROCHLORIDE 25 MG/1
25 TABLET ORAL 3 TIMES DAILY PRN
Qty: 30 TABLET | Refills: 0 | Status: SHIPPED | OUTPATIENT
Start: 2025-08-15 | End: 2025-08-25

## 2025-08-15 RX ORDER — ALBUTEROL SULFATE 5 MG/ML
2.5 SOLUTION RESPIRATORY (INHALATION) EVERY 6 HOURS PRN
Status: DISCONTINUED | OUTPATIENT
Start: 2025-08-15 | End: 2025-08-15 | Stop reason: HOSPADM

## 2025-08-15 RX ORDER — POLYETHYLENE GLYCOL 3350 17 G/17G
17 POWDER, FOR SOLUTION ORAL DAILY PRN
Status: DISCONTINUED | OUTPATIENT
Start: 2025-08-15 | End: 2025-08-15 | Stop reason: HOSPADM

## 2025-08-15 RX ORDER — SODIUM CHLORIDE 9 MG/ML
INJECTION, SOLUTION INTRAVENOUS PRN
Status: DISCONTINUED | OUTPATIENT
Start: 2025-08-15 | End: 2025-08-15 | Stop reason: HOSPADM

## 2025-08-15 RX ORDER — IOPAMIDOL 755 MG/ML
100 INJECTION, SOLUTION INTRAVASCULAR
Status: COMPLETED | OUTPATIENT
Start: 2025-08-15 | End: 2025-08-15

## 2025-08-15 RX ORDER — ATORVASTATIN CALCIUM 80 MG/1
40 TABLET, FILM COATED ORAL DAILY
Status: DISCONTINUED | OUTPATIENT
Start: 2025-08-15 | End: 2025-08-15 | Stop reason: HOSPADM

## 2025-08-15 RX ORDER — POTASSIUM CHLORIDE 1500 MG/1
40 TABLET, EXTENDED RELEASE ORAL PRN
Status: DISCONTINUED | OUTPATIENT
Start: 2025-08-15 | End: 2025-08-15 | Stop reason: HOSPADM

## 2025-08-15 RX ORDER — SODIUM CHLORIDE 0.9 % (FLUSH) 0.9 %
5-40 SYRINGE (ML) INJECTION EVERY 12 HOURS SCHEDULED
Status: DISCONTINUED | OUTPATIENT
Start: 2025-08-15 | End: 2025-08-15 | Stop reason: HOSPADM

## 2025-08-15 RX ORDER — ACETAMINOPHEN 650 MG/1
650 SUPPOSITORY RECTAL EVERY 6 HOURS PRN
Status: DISCONTINUED | OUTPATIENT
Start: 2025-08-15 | End: 2025-08-15 | Stop reason: HOSPADM

## 2025-08-15 RX ORDER — ONDANSETRON 2 MG/ML
4 INJECTION INTRAMUSCULAR; INTRAVENOUS EVERY 6 HOURS PRN
Status: DISCONTINUED | OUTPATIENT
Start: 2025-08-15 | End: 2025-08-15 | Stop reason: HOSPADM

## 2025-08-15 RX ORDER — SODIUM CHLORIDE 0.9 % (FLUSH) 0.9 %
5-40 SYRINGE (ML) INJECTION PRN
Status: DISCONTINUED | OUTPATIENT
Start: 2025-08-15 | End: 2025-08-15 | Stop reason: HOSPADM

## 2025-08-15 RX ORDER — INSULIN LISPRO 100 [IU]/ML
0-4 INJECTION, SOLUTION INTRAVENOUS; SUBCUTANEOUS
Status: DISCONTINUED | OUTPATIENT
Start: 2025-08-15 | End: 2025-08-15 | Stop reason: HOSPADM

## 2025-08-15 RX ORDER — CLOPIDOGREL BISULFATE 75 MG/1
75 TABLET ORAL DAILY
Status: DISCONTINUED | OUTPATIENT
Start: 2025-08-15 | End: 2025-08-15 | Stop reason: HOSPADM

## 2025-08-15 RX ORDER — POTASSIUM CHLORIDE 750 MG/1
10 CAPSULE, EXTENDED RELEASE ORAL DAILY
Status: DISCONTINUED | OUTPATIENT
Start: 2025-08-15 | End: 2025-08-15 | Stop reason: HOSPADM

## 2025-08-15 RX ORDER — ASPIRIN 81 MG/1
81 TABLET, CHEWABLE ORAL DAILY
Status: DISCONTINUED | OUTPATIENT
Start: 2025-08-15 | End: 2025-08-15 | Stop reason: HOSPADM

## 2025-08-15 RX ADMIN — ATORVASTATIN CALCIUM 40 MG: 80 TABLET, FILM COATED ORAL at 09:50

## 2025-08-15 RX ADMIN — FUROSEMIDE 40 MG: 20 TABLET ORAL at 09:50

## 2025-08-15 RX ADMIN — APIXABAN 5 MG: 5 TABLET, FILM COATED ORAL at 09:50

## 2025-08-15 RX ADMIN — ONDANSETRON 4 MG: 4 TABLET, ORALLY DISINTEGRATING ORAL at 12:18

## 2025-08-15 RX ADMIN — SODIUM CHLORIDE, PRESERVATIVE FREE 10 ML: 5 INJECTION INTRAVENOUS at 09:52

## 2025-08-15 RX ADMIN — ACETAMINOPHEN 650 MG: 325 TABLET ORAL at 12:18

## 2025-08-15 RX ADMIN — LISINOPRIL 5 MG: 10 TABLET ORAL at 09:51

## 2025-08-15 RX ADMIN — IPRATROPIUM BROMIDE AND ALBUTEROL SULFATE 1 DOSE: .5; 2.5 SOLUTION RESPIRATORY (INHALATION) at 11:33

## 2025-08-15 RX ADMIN — SERTRALINE HYDROCHLORIDE 50 MG: 50 TABLET ORAL at 09:50

## 2025-08-15 RX ADMIN — CLOPIDOGREL BISULFATE 75 MG: 75 TABLET, FILM COATED ORAL at 09:50

## 2025-08-15 RX ADMIN — ASPIRIN 81 MG: 81 TABLET, CHEWABLE ORAL at 09:50

## 2025-08-15 RX ADMIN — IOPAMIDOL 100 ML: 755 INJECTION, SOLUTION INTRAVENOUS at 01:02

## 2025-08-15 RX ADMIN — ONDANSETRON 4 MG: 2 INJECTION, SOLUTION INTRAMUSCULAR; INTRAVENOUS at 04:55

## 2025-08-15 RX ADMIN — ACETAMINOPHEN 650 MG: 325 TABLET ORAL at 04:58

## 2025-08-15 RX ADMIN — POTASSIUM CHLORIDE 10 MEQ: 750 CAPSULE, EXTENDED RELEASE ORAL at 09:50

## 2025-08-15 ASSESSMENT — ENCOUNTER SYMPTOMS
RHINORRHEA: 0
SHORTNESS OF BREATH: 0
SORE THROAT: 0

## 2025-08-15 ASSESSMENT — PAIN SCALES - GENERAL: PAINLEVEL_OUTOF10: 2

## 2025-08-16 PROBLEM — Z98.890 POST-OPERATIVE STATE: Status: RESOLVED | Noted: 2025-07-17 | Resolved: 2025-08-16

## 2025-08-18 ENCOUNTER — HOSPITAL ENCOUNTER (OUTPATIENT)
Dept: NUCLEAR MEDICINE | Age: 65
Discharge: HOME | End: 2025-08-20
Payer: MEDICARE

## 2025-08-18 ENCOUNTER — TELEPHONE (OUTPATIENT)
Age: 65
End: 2025-08-18

## 2025-08-18 ENCOUNTER — INITIAL CONSULT (OUTPATIENT)
Age: 65
End: 2025-08-18
Payer: MEDICARE

## 2025-08-18 VITALS
RESPIRATION RATE: 18 BRPM | SYSTOLIC BLOOD PRESSURE: 128 MMHG | BODY MASS INDEX: 35.27 KG/M2 | HEIGHT: 67 IN | TEMPERATURE: 97.7 F | HEART RATE: 62 BPM | WEIGHT: 224.7 LBS | DIASTOLIC BLOOD PRESSURE: 58 MMHG

## 2025-08-18 DIAGNOSIS — I50.23 ACUTE ON CHRONIC SYSTOLIC CONGESTIVE HEART FAILURE (HCC): ICD-10-CM

## 2025-08-18 DIAGNOSIS — C55 MALIGNANT NEOPLASM OF UTERUS, UNSPECIFIED SITE (HCC): ICD-10-CM

## 2025-08-18 DIAGNOSIS — C55 MALIGNANT NEOPLASM OF UTERUS, UNSPECIFIED SITE (HCC): Primary | ICD-10-CM

## 2025-08-18 DIAGNOSIS — C54.1 MALIGNANT NEOPLASM OF ENDOMETRIUM (HCC): ICD-10-CM

## 2025-08-18 DIAGNOSIS — C54.9 MALIGNANT NEOPLASM OF CORPUS UTERI, UNSPECIFIED (HCC): ICD-10-CM

## 2025-08-18 DIAGNOSIS — Z90.710 H/O TOTAL HYSTERECTOMY: ICD-10-CM

## 2025-08-18 LAB — GLUCOSE BLD-MCNC: 153 MG/DL (ref 65–105)

## 2025-08-18 PROCEDURE — 3017F COLORECTAL CA SCREEN DOC REV: CPT | Performed by: INTERNAL MEDICINE

## 2025-08-18 PROCEDURE — 78815 PET IMAGE W/CT SKULL-THIGH: CPT

## 2025-08-18 PROCEDURE — 99205 OFFICE O/P NEW HI 60 MIN: CPT | Performed by: INTERNAL MEDICINE

## 2025-08-18 PROCEDURE — 3078F DIAST BP <80 MM HG: CPT | Performed by: INTERNAL MEDICINE

## 2025-08-18 PROCEDURE — 3430000000 HC RX DIAGNOSTIC RADIOPHARMACEUTICAL: Performed by: PHYSICIAN ASSISTANT

## 2025-08-18 PROCEDURE — 82947 ASSAY GLUCOSE BLOOD QUANT: CPT

## 2025-08-18 PROCEDURE — 1111F DSCHRG MED/CURRENT MED MERGE: CPT | Performed by: INTERNAL MEDICINE

## 2025-08-18 PROCEDURE — 2500000003 HC RX 250 WO HCPCS: Performed by: PHYSICIAN ASSISTANT

## 2025-08-18 PROCEDURE — G8417 CALC BMI ABV UP PARAM F/U: HCPCS | Performed by: INTERNAL MEDICINE

## 2025-08-18 PROCEDURE — G8427 DOCREV CUR MEDS BY ELIG CLIN: HCPCS | Performed by: INTERNAL MEDICINE

## 2025-08-18 PROCEDURE — A9609 HC RX DIAGNOSTIC RADIOPHARMACEUTICAL: HCPCS | Performed by: PHYSICIAN ASSISTANT

## 2025-08-18 PROCEDURE — 3074F SYST BP LT 130 MM HG: CPT | Performed by: INTERNAL MEDICINE

## 2025-08-18 PROCEDURE — 1036F TOBACCO NON-USER: CPT | Performed by: INTERNAL MEDICINE

## 2025-08-18 RX ORDER — FLUDEOXYGLUCOSE F 18 200 MCI/ML
10 INJECTION, SOLUTION INTRAVENOUS
Status: COMPLETED | OUTPATIENT
Start: 2025-08-18 | End: 2025-08-18

## 2025-08-18 RX ORDER — FAMOTIDINE 20 MG/1
20 TABLET, FILM COATED ORAL 2 TIMES DAILY
COMMUNITY
Start: 2025-08-04

## 2025-08-18 RX ORDER — BACLOFEN 10 MG/1
10 TABLET ORAL DAILY
COMMUNITY
Start: 2025-07-28

## 2025-08-18 RX ORDER — SODIUM CHLORIDE 0.9 % (FLUSH) 0.9 %
10 SYRINGE (ML) INJECTION
Status: COMPLETED | OUTPATIENT
Start: 2025-08-18 | End: 2025-08-18

## 2025-08-18 RX ADMIN — SODIUM CHLORIDE, PRESERVATIVE FREE 10 ML: 5 INJECTION INTRAVENOUS at 12:42

## 2025-08-18 RX ADMIN — FLUDEOXYGLUCOSE F 18 13 MILLICURIE: 200 INJECTION, SOLUTION INTRAVENOUS at 12:42

## 2025-08-19 LAB
PD-L1 BY 22C3 TISS IMSTN DOC: NEGATIVE
TEMPUS PD-L1 (22C3) COMBINED POSITIVE SCORE: <1
TEMPUS PD-L1 (22C3) TUMOR PROPORTION SCORE: <1 %
TEMPUS PORTAL: NORMAL

## 2025-08-20 ENCOUNTER — TELEPHONE (OUTPATIENT)
Dept: INFUSION THERAPY | Facility: MEDICAL CENTER | Age: 65
End: 2025-08-20

## 2025-08-21 ENCOUNTER — TELEPHONE (OUTPATIENT)
Age: 65
End: 2025-08-21

## 2025-08-21 PROBLEM — N39.0 UTI (URINARY TRACT INFECTION): Status: RESOLVED | Noted: 2023-02-25 | Resolved: 2025-08-21

## 2025-08-26 LAB
DNA RANGE(S) EXAMINED NAR: NORMAL
GENE DIS ANL INTERP-IMP: POSITIVE
GENE DIS ASSESSED: NORMAL
GENE MUT TESTED BLD/T: 5.3 M/MB
HLA-A HIGH RES: NORMAL
HLA-A UNRESLVD ALLELES HIGH RES: NO
HLA-B HIGH RES: NORMAL
HLA-B UNRESLVD ALLELES HIGH RES: NO
HLA-C HIGH RES: NORMAL
HLA-C UNRESLVD ALLELES HIGH RES: NO
PD-L1 BY 22C3 TISS IMSTN DOC: NEGATIVE
REASON FOR STUDY: NORMAL
TEMPUS GERMLINE NOTE: NORMAL
TEMPUS HLA-A SAMPLE TYPE: NORMAL
TEMPUS HLA-B SAMPLE TYPE: NORMAL
TEMPUS HLA-C SAMPLE TYPE: NORMAL
TEMPUS LCA: NORMAL
TEMPUS MSI NOTE: NORMAL
TEMPUS PD-L1 (22C3) COMBINED POSITIVE SCORE: <1
TEMPUS PD-L1 (22C3) TUMOR PROPORTION SCORE: <1 %
TEMPUS PORTAL: NORMAL
TEMPUS THERAPY1: NORMAL
TEMPUS THERAPYCOUNT: 1
TEMPUS TRIAL1: NORMAL
TEMPUS TRIAL3: NORMAL
TEMPUS TRIALCOUNT: 3
TEMPUS TRIALMATCHES2: NORMAL
TEMPUS XR RESULT 1: NORMAL

## 2025-08-27 ENCOUNTER — HOSPITAL ENCOUNTER (OUTPATIENT)
Dept: INTERVENTIONAL RADIOLOGY/VASCULAR | Age: 65
Discharge: HOME | End: 2025-08-29
Payer: MEDICARE

## 2025-08-27 VITALS
OXYGEN SATURATION: 100 % | SYSTOLIC BLOOD PRESSURE: 118 MMHG | BODY MASS INDEX: 35.16 KG/M2 | HEIGHT: 67 IN | TEMPERATURE: 97.3 F | WEIGHT: 224 LBS | RESPIRATION RATE: 20 BRPM | HEART RATE: 60 BPM | DIASTOLIC BLOOD PRESSURE: 60 MMHG

## 2025-08-27 DIAGNOSIS — C55 MALIGNANT NEOPLASM OF UTERUS, UNSPECIFIED SITE (HCC): ICD-10-CM

## 2025-08-27 LAB
GLUCOSE BLD-MCNC: 165 MG/DL (ref 65–105)
INR PPP: 1
PARTIAL THROMBOPLASTIN TIME: 20.1 SEC (ref 23–36.5)
PLATELET # BLD AUTO: 223 K/UL (ref 138–453)
PROTHROMBIN TIME: 13.2 SEC (ref 11.7–14.9)

## 2025-08-27 PROCEDURE — 85730 THROMBOPLASTIN TIME PARTIAL: CPT

## 2025-08-27 PROCEDURE — 6360000002 HC RX W HCPCS: Performed by: RADIOLOGY

## 2025-08-27 PROCEDURE — 85049 AUTOMATED PLATELET COUNT: CPT

## 2025-08-27 PROCEDURE — 85610 PROTHROMBIN TIME: CPT

## 2025-08-27 PROCEDURE — 76937 US GUIDE VASCULAR ACCESS: CPT

## 2025-08-27 PROCEDURE — 77001 FLUOROGUIDE FOR VEIN DEVICE: CPT

## 2025-08-27 PROCEDURE — 82947 ASSAY GLUCOSE BLOOD QUANT: CPT

## 2025-08-27 PROCEDURE — C1788 PORT, INDWELLING, IMP: HCPCS

## 2025-08-27 PROCEDURE — 6360000002 HC RX W HCPCS: Performed by: PHYSICIAN ASSISTANT

## 2025-08-27 PROCEDURE — 36561 INSERT TUNNELED CV CATH: CPT

## 2025-08-27 RX ORDER — SODIUM CHLORIDE 9 MG/ML
INJECTION, SOLUTION INTRAVENOUS CONTINUOUS
Status: DISCONTINUED | OUTPATIENT
Start: 2025-08-27 | End: 2025-08-30 | Stop reason: HOSPADM

## 2025-08-27 RX ORDER — FENTANYL CITRATE 50 UG/ML
INJECTION, SOLUTION INTRAMUSCULAR; INTRAVENOUS PRN
Status: COMPLETED | OUTPATIENT
Start: 2025-08-27 | End: 2025-08-27

## 2025-08-27 RX ORDER — HEPARIN 100 UNIT/ML
SYRINGE INTRAVENOUS PRN
Status: COMPLETED | OUTPATIENT
Start: 2025-08-27 | End: 2025-08-27

## 2025-08-27 RX ADMIN — Medication 2000 MG: at 07:35

## 2025-08-27 RX ADMIN — HEPARIN 500 UNITS: 100 SYRINGE at 10:00

## 2025-08-27 RX ADMIN — FENTANYL CITRATE 25 MCG: 50 INJECTION, SOLUTION INTRAMUSCULAR; INTRAVENOUS at 09:39

## 2025-08-27 ASSESSMENT — PAIN - FUNCTIONAL ASSESSMENT: PAIN_FUNCTIONAL_ASSESSMENT: 0-10

## 2025-08-27 ASSESSMENT — PAIN SCALES - GENERAL
PAINLEVEL_OUTOF10: 0

## 2025-09-02 ENCOUNTER — HOSPITAL ENCOUNTER (OUTPATIENT)
Dept: OTHER | Age: 65
Discharge: HOME | End: 2025-09-02
Attending: SURGERY
Payer: MEDICARE

## 2025-09-02 VITALS
RESPIRATION RATE: 20 BRPM | HEART RATE: 73 BPM | SYSTOLIC BLOOD PRESSURE: 104 MMHG | OXYGEN SATURATION: 95 % | WEIGHT: 222.6 LBS | DIASTOLIC BLOOD PRESSURE: 75 MMHG | BODY MASS INDEX: 35.13 KG/M2

## 2025-09-02 DIAGNOSIS — I70.213 ATHEROSCLER OF NATIVE ARTERY OF BOTH LEGS WITH INTERMIT CLAUDICATION: ICD-10-CM

## 2025-09-02 PROCEDURE — 99213 OFFICE O/P EST LOW 20 MIN: CPT

## 2025-09-02 RX ORDER — METOPROLOL TARTRATE 25 MG/1
12.5 TABLET, FILM COATED ORAL 2 TIMES DAILY
COMMUNITY

## 2025-09-02 ASSESSMENT — PAIN SCALES - GENERAL: PAINLEVEL_OUTOF10: 0

## 2025-09-04 ENCOUNTER — OFFICE VISIT (OUTPATIENT)
Dept: GYNECOLOGIC ONCOLOGY | Age: 65
End: 2025-09-04

## 2025-09-04 VITALS
OXYGEN SATURATION: 98 % | BODY MASS INDEX: 35.32 KG/M2 | HEART RATE: 77 BPM | DIASTOLIC BLOOD PRESSURE: 90 MMHG | WEIGHT: 223.8 LBS | SYSTOLIC BLOOD PRESSURE: 112 MMHG | TEMPERATURE: 97.3 F

## 2025-09-04 DIAGNOSIS — C54.1 MALIGNANT NEOPLASM OF ENDOMETRIUM (HCC): Primary | ICD-10-CM

## 2025-09-04 PROCEDURE — 99024 POSTOP FOLLOW-UP VISIT: CPT | Performed by: PHYSICIAN ASSISTANT

## 2025-09-04 ASSESSMENT — ENCOUNTER SYMPTOMS
DIARRHEA: 1
HEMOPTYSIS: 0
SHORTNESS OF BREATH: 0
TROUBLE SWALLOWING: 0
VOICE CHANGE: 0
EYE PROBLEMS: 0
BLOOD IN STOOL: 0
CONSTIPATION: 0
RECTAL PAIN: 0
BACK PAIN: 0
SORE THROAT: 0
COUGH: 1
WHEEZING: 0
CHEST TIGHTNESS: 0
VOMITING: 0
NAUSEA: 1
ABDOMINAL PAIN: 1
ABDOMINAL DISTENTION: 0
SCLERAL ICTERUS: 0

## (undated) DEVICE — SUTURE MONOCRYL + SZ 4-0 L18IN ABSRB UD L19MM PS-2 3/8 CIR MCP496G

## (undated) DEVICE — TUBING, SUCTION, 9/32" X 20', STRAIGHT: Brand: MEDLINE INDUSTRIES, INC.

## (undated) DEVICE — SEAL

## (undated) DEVICE — GOWN,POLY REINFORCED,LG: Brand: MEDLINE

## (undated) DEVICE — SOLUTION SCRB 4OZ 4% CHG H2O AIDED FOR PREOPERATIVE SKIN

## (undated) DEVICE — SYSTEM POS SZ 36 X 20 X 1 IN INTEGR ADJ ARM PROTECTOR LIFT

## (undated) DEVICE — PROTECTOR ULN NRV PUR FOAM HK LOOP STRP ANATOMICALLY

## (undated) DEVICE — Device

## (undated) DEVICE — STRAP,POSITIONING,KNEE/BODY,FOAM,4X60": Brand: MEDLINE

## (undated) DEVICE — AIRSEAL 12 MM ACCESS PORT AND PALM GRIP OBTURATOR WITH BLADELESS OPTICAL TIP, 120 MM LENGTH: Brand: AIRSEAL

## (undated) DEVICE — MARKER,SKIN,WI/RULER AND LABELS: Brand: MEDLINE

## (undated) DEVICE — SYSTEM WST MGMT AVETA

## (undated) DEVICE — TRAP,MUCUS SPECIMEN, 80CC: Brand: MEDLINE

## (undated) DEVICE — SHEET,DRAPE,70X100,STERILE: Brand: MEDLINE

## (undated) DEVICE — AGENT HEMOSTATIC SURGIFLOW MATRIX KIT W/THROMBIN

## (undated) DEVICE — TISSUE RETRIEVAL SYSTEM: Brand: INZII RETRIEVAL SYSTEM

## (undated) DEVICE — SNARE ENDOSCP L240CM OD24MM LOOP W15MM RND INSUL STIFF BRAID

## (undated) DEVICE — COVER OR TBL W40XL90IN ABSRB STD AND GRIPPY BK SAHARA

## (undated) DEVICE — SOLIDIFIER FLD 3.2OZ 3000CC TRAD IN BTL LIQUI-LOC

## (undated) DEVICE — SUTURE VICRYL + SZ 0 L27IN ABSRB VLT L26MM UR-6 5/8 CIR VCP603H

## (undated) DEVICE — COUNTER NDL 10 COUNT HLD 20 FOAM BLK SGL MAG

## (undated) DEVICE — DRAPE,UTILTY,TAPE,15X26, 4EA/PK: Brand: MEDLINE

## (undated) DEVICE — APPLICATOR ENDOSCP L34CM W/ S STL CANN PLAS OBT STYL FOR

## (undated) DEVICE — SYRINGE MED 5ML STD CLR PLAS LUERLOCK TIP N CTRL DISP

## (undated) DEVICE — APPLICATOR MEDICATED 26 CC SOLUTION HI LT ORNG CHLORAPREP

## (undated) DEVICE — ELECTRODE EMG GEL PTCH W/ WIRE NEOTRODE II URODYN

## (undated) DEVICE — CONTAINER,SPECIMEN,4OZ,OR STRL: Brand: MEDLINE

## (undated) DEVICE — DRAPE,REIN 53X77,STERILE: Brand: MEDLINE

## (undated) DEVICE — ENDOSCOPIC KIT 1.1+ 10 FT OP4 CA DE

## (undated) DEVICE — INSUFFLATION NEEDLE TO ESTABLISH PNEUMOPERITONEUM.: Brand: INSUFFLATION NEEDLE

## (undated) DEVICE — Device: Brand: PUMP TUBING INFUSION LINE

## (undated) DEVICE — CATHETER URODYN AIR CHARGED ABD SENSOR

## (undated) DEVICE — 1LYRTR 16FR10ML100%SIL UMS SNP: Brand: MEDLINE INDUSTRIES, INC.

## (undated) DEVICE — TIP COVER ACCESSORY

## (undated) DEVICE — SYRINGE MED 10ML LUERLOCK TIP W/O SFTY DISP

## (undated) DEVICE — ARM DRAPE

## (undated) DEVICE — SYRINGE MEDICAL 3ML CLEAR PLASTIC STANDARD NON CONTROL LUERLOCK TIP DISPOSABLE

## (undated) DEVICE — COVER,LIGHT HANDLE,FLX,2/PK: Brand: MEDLINE INDUSTRIES, INC.

## (undated) DEVICE — STRAP ARMBRD W1.5XL32IN FOAM STR YET SFT W/ HK AND LOOP

## (undated) DEVICE — ELECTRO LUBE IS A SINGLE PATIENT USE DEVICE THAT IS INTENDED TO BE USED ON ELECTROSURGICAL ELECTRODES TO REDUCE STICKING.: Brand: KEY SURGICAL ELECTRO LUBE

## (undated) DEVICE — FORCEPS BX 240CM 2.4MM L NDL RAD JAW 4 M00513334

## (undated) DEVICE — BLADELESS OBTURATOR: Brand: WECK VISTA

## (undated) DEVICE — GARMENT,MEDLINE,DVT,INT,CALF,MED, GEN2: Brand: MEDLINE

## (undated) DEVICE — KIT DETRGNT ENZYMATIC SOLUTION 100 ML SOAK SHLD 5 VI LG HUMD

## (undated) DEVICE — 3M™ IOBAN™ 2 ANTIMICROBIAL INCISE DRAPE 6650EZ: Brand: IOBAN™ 2

## (undated) DEVICE — SPONGE,LAP,4"X18",XR,ST,5/PK,40PK/CS: Brand: MEDLINE INDUSTRIES, INC.

## (undated) DEVICE — TRI-LUMEN FILTERED TUBE SET WITH ACTIVATED CHARCOAL FILTER: Brand: AIRSEAL

## (undated) DEVICE — VCARE MEDIUM, UTERINE MANIPULATOR, VAGINAL-CERVICAL-AHLUWALIA'S-RETRACTOR-ELEVATOR: Brand: VCARE

## (undated) DEVICE — TROCAR: Brand: KII FIOS FIRST ENTRY

## (undated) DEVICE — GLOVE ORANGE PI 7 1/2   MSG9075

## (undated) DEVICE — POLYP TRAP: Brand: TRAPEASE®

## (undated) DEVICE — TOWEL,OR,DSP,ST,NATURAL,DLX,4/PK,20PK/CS: Brand: MEDLINE

## (undated) DEVICE — GAUZE,SPONGE,4"X4",16PLY,STRL,LF,10/TRAY: Brand: MEDLINE

## (undated) DEVICE — HYSTEROSCOPE RIGID CORAL AVETA DISP

## (undated) DEVICE — NEEDLE SPINAL 22GA L3.5IN SPINOCAN

## (undated) DEVICE — GLOVE SURG SZ 55 CRM LTX FREE POLYISOPRENE POLYMER BEAD ANTI

## (undated) DEVICE — LIQUIBAND RAPID ADHESIVE 36/CS 0.8ML: Brand: MEDLINE

## (undated) DEVICE — SYSTEM ENDOSCP FLUID MGMT CAP AVETA

## (undated) DEVICE — SOLUTION SCRB 4OZ 2% CHG FOR SURG SCRBBING HND WSH

## (undated) DEVICE — ERBE NESSY® OMEGA PLATE USA (85+23)CM² , WITH CABLE 3 M: Brand: ERBE

## (undated) DEVICE — GLOVE SURG SZ 65 THK91MIL LTX FREE SYN POLYISOPRENE

## (undated) DEVICE — GLOVE SURG SZ 65 L12IN FNGR THK79MIL GRN LTX FREE

## (undated) DEVICE — NEEDLE HYPO 25GA L1.5IN BLU POLYPR HUB S STL REG BVL STR

## (undated) DEVICE — DRAPE,UNDRBUT,WHT GRAD PCH,CAPPORT,20/CS: Brand: MEDLINE

## (undated) DEVICE — SUTURE VICRYL SZ 0 L27IN ABSRB UD L36MM CT-1 1/2 CIR J260H